# Patient Record
Sex: FEMALE | Race: WHITE | NOT HISPANIC OR LATINO | Employment: OTHER | RURAL
[De-identification: names, ages, dates, MRNs, and addresses within clinical notes are randomized per-mention and may not be internally consistent; named-entity substitution may affect disease eponyms.]

---

## 2017-08-29 ENCOUNTER — CONSULT (OUTPATIENT)
Dept: CARDIOLOGY | Facility: CLINIC | Age: 77
End: 2017-08-29

## 2017-08-29 ENCOUNTER — TELEPHONE (OUTPATIENT)
Dept: CARDIOLOGY | Facility: CLINIC | Age: 77
End: 2017-08-29

## 2017-08-29 VITALS
BODY MASS INDEX: 28.55 KG/M2 | HEART RATE: 101 BPM | WEIGHT: 136 LBS | DIASTOLIC BLOOD PRESSURE: 86 MMHG | HEIGHT: 58 IN | SYSTOLIC BLOOD PRESSURE: 128 MMHG

## 2017-08-29 DIAGNOSIS — I10 ESSENTIAL HYPERTENSION: ICD-10-CM

## 2017-08-29 DIAGNOSIS — I48.19 PERSISTENT ATRIAL FIBRILLATION (HCC): Primary | ICD-10-CM

## 2017-08-29 DIAGNOSIS — I25.10 CORONARY ARTERY DISEASE INVOLVING NATIVE CORONARY ARTERY OF NATIVE HEART WITHOUT ANGINA PECTORIS: ICD-10-CM

## 2017-08-29 PROCEDURE — 99204 OFFICE O/P NEW MOD 45 MIN: CPT | Performed by: INTERNAL MEDICINE

## 2017-08-29 PROCEDURE — 93000 ELECTROCARDIOGRAM COMPLETE: CPT | Performed by: INTERNAL MEDICINE

## 2017-08-29 RX ORDER — POTASSIUM CHLORIDE 750 MG/1
10 TABLET, EXTENDED RELEASE ORAL 2 TIMES DAILY
COMMUNITY
End: 2019-11-19

## 2017-08-29 RX ORDER — AMLODIPINE BESYLATE 5 MG/1
5 TABLET ORAL DAILY
COMMUNITY
End: 2019-12-02 | Stop reason: ALTCHOICE

## 2017-08-29 RX ORDER — BISOPROLOL FUMARATE 5 MG/1
5 TABLET, FILM COATED ORAL DAILY
COMMUNITY
End: 2018-09-20

## 2017-08-29 RX ORDER — ISOSORBIDE MONONITRATE 120 MG/1
120 TABLET, EXTENDED RELEASE ORAL DAILY
Status: ON HOLD | COMMUNITY
End: 2019-01-01 | Stop reason: SDUPTHER

## 2017-08-29 RX ORDER — FUROSEMIDE 40 MG/1
80 TABLET ORAL DAILY
COMMUNITY
End: 2019-12-02 | Stop reason: ALTCHOICE

## 2017-08-29 RX ORDER — ALENDRONATE SODIUM 70 MG/1
70 TABLET ORAL
COMMUNITY
End: 2019-06-03 | Stop reason: ALTCHOICE

## 2017-08-29 RX ORDER — CLOPIDOGREL BISULFATE 75 MG/1
75 TABLET ORAL DAILY
COMMUNITY
End: 2019-12-02 | Stop reason: ALTCHOICE

## 2017-08-29 RX ORDER — AMIODARONE HYDROCHLORIDE 200 MG/1
100 TABLET ORAL DAILY
COMMUNITY
End: 2017-12-21

## 2017-08-29 RX ORDER — PRAVASTATIN SODIUM 20 MG
20 TABLET ORAL DAILY
COMMUNITY
End: 2017-12-21

## 2017-08-29 NOTE — PROGRESS NOTES
Electrophysiology Consult     Pat Nowak  1940  [phone]      08/29/17    DATE OF ADMISSION: (Not on file)  [facility] CARDIOLOGY    Fredi Ledesma MD  [address] [facility] [id number]    Chief Complaint   Patient presents with   • Atrial Fibrillation     Problem List:   1. Persistent Atrial Fibrillation   A. CHADSVasc =  5 On Xarelto, diagnosed at time of CABG 2012  B. Amiodarone Initiation with ECV to NSR 6/26/17   2. CAD   A. Multiple previous PCI to LAD  B. CABG 2012   C. Last [facility] 4/2017: patent stents, and grafts  D. Echocardiogram 8/30/16: EF 60%, mild to moderate MR. RVSP 50 mmHg. Mild Pulmonary HTN  3. HTN  4. HLP    History of Present Illness:   77 year old WF referred by Dr. Block for atrial fibrillation. She was originally diagnosed in 2012 post operatively CABG. She was cardioverted and did well. However, two years ago, she had recurrence and was put on Amiodarone with ECV to NSR. She went off Amiodarone per Dr. Block's recommendation in June of this year, however, she had recurrence and was put back on it with subsequent ECV to NSR. Now, she is back in atrial fibrillation. She feels poorly with no energy and SOB. She has some CP with exertion as well. When she was in NSR after her ECV, she did not have these symptoms. She is on Xarelto but does not have any bleeding issues. Her BP is now controlled, but it had been running high a few months ago.     No Known Allergies     Cannot display prior to admission medications because the patient has not been admitted in this contact.            Current Outpatient Prescriptions:   •  alendronate (FOSAMAX) 70 MG tablet, Take 70 mg by mouth Every 7 (Seven) Days., Disp: , Rfl:   •  amiodarone (PACERONE) 100 MG half tablet, Take 100 mg by mouth Daily., Disp: , Rfl:   •  amLODIPine (NORVASC) 5 MG tablet, Take 5 mg by mouth Daily., Disp: , Rfl:   •  bisoprolol (ZEBeta) 5 MG tablet, Take 5 mg by mouth Daily.,  Disp: , Rfl:   •  clopidogrel (PLAVIX) 75 MG tablet, Take 75 mg by mouth Daily., Disp: , Rfl:   •  furosemide (LASIX) 40 MG tablet, Take 40 mg by mouth 2 (Two) Times a Day., Disp: , Rfl:   •  isosorbide mononitrate (IMDUR) 120 MG 24 hr tablet, Take 120 mg by mouth Daily., Disp: , Rfl:   •  potassium chloride (K-DUR,KLOR-CON) 10 MEQ CR tablet, Take 10 mEq by mouth 2 (Two) Times a Day., Disp: , Rfl:   •  pravastatin (PRAVACHOL) 20 MG tablet, Take 20 mg by mouth Daily., Disp: , Rfl:   •  rivaroxaban (XARELTO) 20 MG tablet, Take 20 mg by mouth Daily., Disp: , Rfl:     Social History     Social History   • Marital status:      Spouse name: N/A   • Number of children: N/A   • Years of education: N/A     Social History Main Topics   • Smoking status: Never Smoker   • Smokeless tobacco: Never Used   • Alcohol use No   • Drug use: No   • Sexual activity: Defer     Other Topics Concern   • None     Social History Narrative   • None       Family History: no cardiac family history. Family has  of cancer    REVIEW OF SYSTEMS:   CONST:  No weight loss, fever, chills, + weakness or fatigue.   HEENT:  No visual loss, blurred vision, double vision, yellow sclerae.                   No hearing loss, congestion, sore throat.   SKIN:      No rashes, urticaria, ulcers, sores.     RESP:     - shortness of breath,+ hemoptysis, cough, sputum.   GI:           No anorexia, nausea, vomiting, diarrhea. No abdominal pain, melena.   :         No burning on urination, hematuria or increased frequency.  ENDO:    No diaphoresis, cold or heat intolerance. No polyuria or polydipsia.   NEURO:  No headache, dizziness, syncope, paralysis, ataxia, or parasthesias.                  No change in bowel or bladder control. No history of CVA/TIA  MUSC:    No muscle, back pain, joint pain or stiffness.   HEME:    No anemia, bleeding, bruising. No history of DVT/PE.  PSYCH:  No history of depression, anxiety    Vitals:    17 1207   BP: 128/86  "  BP Location: Left arm   Patient Position: Sitting   Pulse: 101   Weight: 136 lb (61.7 kg)   Height: 58\" (147.3 cm)                 Physical Exam:  GEN: Well nourished, Well- developed  No acute distress  HEENT: Normocephalic, Atraumatic, PERRLA, moist mucous membranes  NECK: supple, NO JVD, no thyromegaly, no lymphadenopathy  CARD: S1S2  irr irr no murmur, gallop, rub  LUNGS: Clear to auscultataion, normal respiratory effort  ABDOMEN: Soft, nontender, normal bowel sounds  EXTREMITIES:No gross deformities,  No clubbing, cyanosis, or edema  SKIN: Warm, dry  NEURO: No focal deficits  PSYCHIATRIC: Normal affect and mood        ECG 12 Lead  Date/Time: 8/29/2017 1:10 PM  Performed by: GEOFF ARCHER  Authorized by: GEOFF ARCHER   Rhythm: atrial fibrillation  BPM: 72                ICD-10-CM ICD-9-CM   1. Persistent atrial fibrillation I48.1 427.31   2. Essential hypertension I10 401.9   3. Coronary artery disease involving native coronary artery of native heart without angina pectoris I25.10 414.01       Assessment and Plan:   1. Persistent atrial fibrillation - symptomatic in nature, failed Amiodarone. Will stop Amiodarone today. In 3 months, after Amio wash out, will initiate Tikosyn if she can afford it. If her heart rates start elevating above 100 in the meantime, double Zebeta. Can lower norvasc if BP too low   CHADSVASc = 5 on Xarelto  2. HTN - controlled  3. CAD - stable, follow with DR. Block    [facility]    Scribed for Geoff Archer MD by Stephania Canales PA-C. 8/29/2017  1:11 PM     IGeoff MD, personally performed the services described in this documentation as scribed by the above named individual in my presence, and it is both accurate and complete.  8/29/2017  1:14 PM  "

## 2017-12-21 ENCOUNTER — OFFICE VISIT (OUTPATIENT)
Dept: CARDIOLOGY | Facility: CLINIC | Age: 77
End: 2017-12-21

## 2017-12-21 VITALS
HEART RATE: 98 BPM | DIASTOLIC BLOOD PRESSURE: 64 MMHG | BODY MASS INDEX: 27.92 KG/M2 | HEIGHT: 58 IN | SYSTOLIC BLOOD PRESSURE: 120 MMHG | WEIGHT: 133 LBS

## 2017-12-21 DIAGNOSIS — I48.19 PERSISTENT ATRIAL FIBRILLATION (HCC): Primary | ICD-10-CM

## 2017-12-21 DIAGNOSIS — I63.40 CEREBROVASCULAR ACCIDENT (CVA) DUE TO EMBOLISM OF CEREBRAL ARTERY (HCC): ICD-10-CM

## 2017-12-21 DIAGNOSIS — I10 ESSENTIAL HYPERTENSION: ICD-10-CM

## 2017-12-21 DIAGNOSIS — I25.10 CORONARY ARTERY DISEASE INVOLVING NATIVE CORONARY ARTERY OF NATIVE HEART WITHOUT ANGINA PECTORIS: ICD-10-CM

## 2017-12-21 PROBLEM — I63.9 CEREBROVASCULAR ACCIDENT (CVA) DUE TO EMBOLISM (HCC): Status: ACTIVE | Noted: 2017-12-21

## 2017-12-21 PROCEDURE — 99214 OFFICE O/P EST MOD 30 MIN: CPT | Performed by: INTERNAL MEDICINE

## 2017-12-21 RX ORDER — ATORVASTATIN CALCIUM 40 MG/1
40 TABLET, FILM COATED ORAL NIGHTLY
COMMUNITY
End: 2019-01-01 | Stop reason: SDUPTHER

## 2017-12-21 NOTE — PROGRESS NOTES
Pat Nowak  1940  [phone]      12/21/2017    [facility] CARDIOLOGY     Fredi Ledesma MD  [address] [place] [id number]    Chief Complaint   Patient presents with   • Atrial Fibrillation       Problem List:   1. Persistent Atrial Fibrillation    A. CHADSVasc =  5 On Xarelto, diagnosed at time of CABG 2012   B. Amiodarone Initiation with ECV to NSR 6/26/17   2. CAD    A. Multiple previous PCI to LAD   B. CABG 2012    C. Last [facility] 4/2017: patent stents, and grafts   D. Echocardiogram 8/30/16: EF 60%, mild to moderate MR. RVSP 50 mmHg. Mild Pulmonary HTN  3. CVA  4. HTN  5. HLP    Allergies  No Known Allergies    Current Medications    Current Outpatient Prescriptions:   •  alendronate (FOSAMAX) 70 MG tablet, Take 70 mg by mouth Every 7 (Seven) Days., Disp: , Rfl:   •  amLODIPine (NORVASC) 5 MG tablet, Take 5 mg by mouth Daily., Disp: , Rfl:   •  apixaban (ELIQUIS) 2.5 MG tablet tablet, Take 2.5 mg by mouth 2 (Two) Times a Day., Disp: , Rfl:   •  atorvastatin (LIPITOR) 40 MG tablet, Take 40 mg by mouth Daily., Disp: , Rfl:   •  bisoprolol (ZEBeta) 5 MG tablet, Take 5 mg by mouth Daily., Disp: , Rfl:   •  clopidogrel (PLAVIX) 75 MG tablet, Take 75 mg by mouth Daily., Disp: , Rfl:   •  furosemide (LASIX) 40 MG tablet, Take 40 mg by mouth 2 (Two) Times a Day., Disp: , Rfl:   •  isosorbide mononitrate (IMDUR) 120 MG 24 hr tablet, Take 120 mg by mouth Daily., Disp: , Rfl:   •  potassium chloride (K-DUR,KLOR-CON) 10 MEQ CR tablet, Take 10 mEq by mouth 2 (Two) Times a Day., Disp: , Rfl:     History of Present Illness   HPI    Pt presents for follow up of persistent atrial fibrillation.  She was recently hospitalized last week for a CVA.  9 days ago she suddenly could not speak, no weakness.  She was admitted at , imaging revealed a small acute infarct within left frontal labe.  TPA/thrombectomy was not administered or performed.  She had been compliant on her  "Xarelto.  Her symptoms started to improve the next day and by the next day she was speaking normally.  Her Xarelto was switched to Eliquis.  This was felt to be cardioembolic in nature, however there was concern for paradoxical emboli as a PFO was seen on ECHO.  She has been compliant with her Eliquis.  She has had persistent atrial fibrillation.  She notes fatigue and shortness of breath but is otherwise unaware of her afib.  Since we last saw the pt, CP, LH, and dizziness.  Blood pressures have been well controlled.      ROS:  General:  + fatigue, - weight gain or loss  Cardiovascular:  Denies CP, PND, syncope, near syncope, edema or palpitations.  Pulmonary:  + FIGUEROA, cough, or wheezing      Vitals:    12/21/17 1023 12/21/17 1055   BP: (!) 120/6 120/64   BP Location: Right arm    Patient Position: Sitting    Pulse: 98    Weight: 60.3 kg (133 lb)    Height: 147.3 cm (58\")      PE:  General: NAD  Neck: no JVD, no carotid bruits, no TM  Heart Irregular rhythm , NL S1, S2, S4 present, no rubs, murmurs  Lungs: CTA, no wheezes, rhonchi, or rales  Abd: soft, non-tender, NL BS  Ext: No musculoskeletal deformities, no edema, cyanosis, or clubbing  Psych: normal mood and affect    Diagnostic Data:      Procedures    1. Persistent atrial fibrillation    2. Cerebrovascular accident (CVA) due to embolism of cerebral artery    3. Coronary artery disease involving native coronary artery of native heart without angina pectoris    4. Essential hypertension          Plan:  1) Persistent atrial fibrillation:  - Off amiodarone with plan for Tikosyn initiation though has financial concerns, cannot perform external CV for three months after recent CVA  2) Anticoagulation  - Continue Eliquis/plavix  3) CVA:  - CVA on Xarelto, now switched to Eliquis  - Symptoms resolved  3) CAD  - Stable with no anginal complaints  4) HTN  - Well controlled on current regimen  - Wt loss, exercise, salt reduction    F/up in 3 months    Scribed for Tien WHITLEY" MD Suzi by Sandy Solorio, APRN. 12/21/2017  10:55 AM     I, Tien Archer MD, personally performed the services described in this documentation as scribed by the above named individual in my presence, and it is both accurate and complete.  12/21/2017  11:08 AM      Records obtained from : Patient was admitted on 12/12/2017 and discharged the following day.  She was admitted with aphasia and imaging demonstrated a small acute infarct in the left frontal lobe.  TPA and thrombectomy were not performed.  Her Xarelto was switched to Eliquis and Plavix was initiated.  An echocardiogram was performed which demonstrated normal LV size and function EF 65% mild TR and moderate pulmonary hypertension and a patent foramen ovale her creatinine upon admission was 2.2

## 2018-03-14 NOTE — PROGRESS NOTES
Pat Nowak  1940  [phone]      03/15/2018    [facility] CARDIOLOGY     Fredi Ledesma MD  [address] [place] [id number]    No chief complaint on file.      Problem List:   1. Persistent Atrial Fibrillation               A. CHADSVasc =  5 On Xarelto, diagnosed at time of CABG 2012              B. Amiodarone Initiation with ECV to NSR 6/26/17   2. CAD               A. Multiple previous PCI to LAD              B. CABG 2012               C. Last [facility] 4/2017: patent stents, and grafts              D. Echocardiogram 8/30/16: EF 60%, mild to moderate MR. RVSP 50 mmHg. Mild Pulmonary HTN  3. CVA  4. HTN  5. HLP    Allergies  No Known Allergies    Current Medications    Current Outpatient Prescriptions:   •  alendronate (FOSAMAX) 70 MG tablet, Take 70 mg by mouth Every 7 (Seven) Days., Disp: , Rfl:   •  amLODIPine (NORVASC) 5 MG tablet, Take 5 mg by mouth Daily., Disp: , Rfl:   •  apixaban (ELIQUIS) 2.5 MG tablet tablet, Take 2.5 mg by mouth 2 (Two) Times a Day., Disp: , Rfl:   •  atorvastatin (LIPITOR) 40 MG tablet, Take 40 mg by mouth Daily., Disp: , Rfl:   •  bisoprolol (ZEBeta) 5 MG tablet, Take 5 mg by mouth Daily., Disp: , Rfl:   •  clopidogrel (PLAVIX) 75 MG tablet, Take 75 mg by mouth Daily., Disp: , Rfl:   •  furosemide (LASIX) 40 MG tablet, Take 40 mg by mouth 2 (Two) Times a Day., Disp: , Rfl:   •  isosorbide mononitrate (IMDUR) 120 MG 24 hr tablet, Take 120 mg by mouth Daily., Disp: , Rfl:   •  potassium chloride (K-DUR,KLOR-CON) 10 MEQ CR tablet, Take 10 mEq by mouth 2 (Two) Times a Day., Disp: , Rfl:     History of Present Illness   HPI    Pt presents for follow up of atrial fibrillation.  When we saw her last in the office in December, she was recently recovering from a CVA and had been switched from Xarelto to Eliquis. Since we last saw the pt, pt denies any AF episodes, SOB, CP, LH, and dizziness. Denies any hospitalizations, ER visits, bleeding on  Eliquis, or TIA/CVA symptoms. Overall feels well.    ROS:  General:  Denies fatigue, weight gain or loss  Cardiovascular:  Denies CP, PND, syncope, near syncope, edema or palpitations.  Pulmonary:  Denies FIGUEROA, cough, or wheezing      There were no vitals filed for this visit.  PE:  General: NAD  Neck: no JVD, no carotid bruits, no TM  Heart RRR, NL S1, S2, S4 present, no rubs, murmurs  Lungs: CTA, no wheezes, rhonchi, or rales  Abd: soft, non-tender, NL BS  Ext: No musculoskeletal deformities, no edema, cyanosis, or clubbing  Psych: normal mood and affect    Diagnostic Data:      Procedures    1. Persistent atrial fibrillation    2. Coronary artery disease involving native coronary artery of native heart without angina pectoris    3. Essential hypertension    4. Cerebrovascular accident (CVA) due to embolism of cerebral artery          Plan:  1) Persistent atrial fibrillation:  -  - Continue present medications.   2) Anticoagulation  Continue Eliquis  3) CAD:  - Stable with no anginal complaints  4) HTN:  - Well controlled on current regimen  - Wt loss, exercise, salt reduction  5) CVA:  - 12/2017, on Eliquis/plavix    F/up in 6 months

## 2018-03-15 ENCOUNTER — OFFICE VISIT (OUTPATIENT)
Dept: CARDIOLOGY | Facility: CLINIC | Age: 78
End: 2018-03-15

## 2018-03-15 VITALS
DIASTOLIC BLOOD PRESSURE: 66 MMHG | SYSTOLIC BLOOD PRESSURE: 116 MMHG | HEIGHT: 56 IN | BODY MASS INDEX: 30.14 KG/M2 | WEIGHT: 134 LBS | HEART RATE: 91 BPM

## 2018-03-15 DIAGNOSIS — I10 ESSENTIAL HYPERTENSION: ICD-10-CM

## 2018-03-15 DIAGNOSIS — I20.0 UNSTABLE ANGINA PECTORIS (HCC): Primary | ICD-10-CM

## 2018-03-15 DIAGNOSIS — I63.40 CEREBROVASCULAR ACCIDENT (CVA) DUE TO EMBOLISM OF CEREBRAL ARTERY (HCC): ICD-10-CM

## 2018-03-15 DIAGNOSIS — I48.19 PERSISTENT ATRIAL FIBRILLATION (HCC): ICD-10-CM

## 2018-03-15 PROCEDURE — 99214 OFFICE O/P EST MOD 30 MIN: CPT | Performed by: INTERNAL MEDICINE

## 2018-03-15 RX ORDER — BISOPROLOL FUMARATE 5 MG/1
5 TABLET, FILM COATED ORAL 2 TIMES DAILY
Qty: 180 TABLET | Refills: 2 | Status: SHIPPED | OUTPATIENT
Start: 2018-03-15 | End: 2018-09-20 | Stop reason: SDUPTHER

## 2018-03-15 NOTE — PROGRESS NOTES
Pat Nowak  1940  [phone]      03/15/2018    [facility] CARDIOLOGY     Fredi Ledesma MD  [address] [place] [id number]    Chief Complaint   Patient presents with   • Atrial Fibrillation       Problem List:   1. Persistent Atrial Fibrillation               A. CHADSVasc =  5 On Xarelto, diagnosed at time of CABG 2012              B. Amiodarone Initiation with ECV to NSR 6/26/17   2. CAD               A. Multiple previous PCI to LAD              B. CABG 2012               C. Last [facility] 4/2017: patent stents, and grafts              D. Echocardiogram 8/30/16: EF 60%, mild to moderate MR. RVSP 50 mmHg. Mild Pulmonary HTN  3. CVA  4. HTN  5. HLP      Allergies  No Known Allergies    Current Medications    Current Outpatient Prescriptions:   •  alendronate (FOSAMAX) 70 MG tablet, Take 70 mg by mouth Every 7 (Seven) Days., Disp: , Rfl:   •  amLODIPine (NORVASC) 5 MG tablet, Take 5 mg by mouth Daily., Disp: , Rfl:   •  apixaban (ELIQUIS) 2.5 MG tablet tablet, Take 2.5 mg by mouth 2 (Two) Times a Day., Disp: , Rfl:   •  atorvastatin (LIPITOR) 40 MG tablet, Take 40 mg by mouth Daily., Disp: , Rfl:   •  bisoprolol (ZEBeta) 5 MG tablet, Take 5 mg by mouth Daily., Disp: , Rfl:   •  clopidogrel (PLAVIX) 75 MG tablet, Take 75 mg by mouth Daily., Disp: , Rfl:   •  furosemide (LASIX) 40 MG tablet, Take 40 mg by mouth 2 (Two) Times a Day., Disp: , Rfl:   •  potassium chloride (K-DUR,KLOR-CON) 10 MEQ CR tablet, Take 10 mEq by mouth 2 (Two) Times a Day., Disp: , Rfl:   •  isosorbide mononitrate (IMDUR) 120 MG 24 hr tablet, Take 120 mg by mouth Daily., Disp: , Rfl:     History of Present Illness   HPI    Pt presents for follow up of AF/TIA/HTN . Since we last saw the pt, pt denies any TIA/CVA,  LH, and dizziness. Denies any hospitalizations, ER visits, bleeding, or TIA/CVA symptoms. Overall feels better. Has CP episodes frequently with exertion with pain relieving after  "stopping. SL NTG relieves the pain. BP at home has been stable with HR 70's. HR sometimes go high.    ROS:  General:  + fatigue, No weight gain or loss  Cardiovascular:  +CP, No PND, syncope, near syncope, edema or palpitations.  Pulmonary:  + FIGUEROA, No cough, or wheezing      Vitals:    03/15/18 1238   BP: 116/66   BP Location: Right arm   Patient Position: Sitting   Pulse: 91   Weight: 60.8 kg (134 lb)   Height: 142.2 cm (56\")     Body mass index is 30.04 kg/m².  PE:  General: NAD  Neck: no JVD, no carotid bruits, no TM  Heart RRR, NL S1, S2, S4 present, no rubs, murmurs  Lungs: CTA, no wheezes, rhonchi, or rales  Abd: soft, non-tender, NL BS  Ext: No musculoskeletal deformities, no edema, cyanosis, or clubbing  Psych: normal mood and affect    Diagnostic Data:      Procedures    1. Unstable angina pectoris    2. Persistent atrial fibrillation    3. Essential hypertension    4. Cerebrovascular accident (CVA) due to embolism of cerebral artery          Plan:  1) USA: needs to see Dr Block ASAYANET; increase zebeta to 5 mg po BID  Continue present medications.   2) CAF: needs better HR control;see # 1  3) Anticoagulation  Continue NOAC/plavix  4) HTN  Wt loss, exercise, salt reduction  5) CVA: on meds    F/up in 6 months      "

## 2018-09-20 ENCOUNTER — OFFICE VISIT (OUTPATIENT)
Dept: CARDIOLOGY | Facility: CLINIC | Age: 78
End: 2018-09-20

## 2018-09-20 VITALS
BODY MASS INDEX: 29.25 KG/M2 | DIASTOLIC BLOOD PRESSURE: 59 MMHG | SYSTOLIC BLOOD PRESSURE: 108 MMHG | HEIGHT: 56 IN | WEIGHT: 130 LBS | HEART RATE: 74 BPM

## 2018-09-20 DIAGNOSIS — I20.0 UNSTABLE ANGINA PECTORIS (HCC): Primary | ICD-10-CM

## 2018-09-20 DIAGNOSIS — R06.09 DOE (DYSPNEA ON EXERTION): ICD-10-CM

## 2018-09-20 DIAGNOSIS — I48.19 PERSISTENT ATRIAL FIBRILLATION (HCC): ICD-10-CM

## 2018-09-20 DIAGNOSIS — I10 ESSENTIAL HYPERTENSION: ICD-10-CM

## 2018-09-20 PROCEDURE — 99214 OFFICE O/P EST MOD 30 MIN: CPT | Performed by: INTERNAL MEDICINE

## 2018-09-20 RX ORDER — BISOPROLOL FUMARATE 5 MG/1
5 TABLET, FILM COATED ORAL 2 TIMES DAILY
Qty: 180 TABLET | Refills: 2 | Status: SHIPPED | OUTPATIENT
Start: 2018-09-20 | End: 2018-10-17 | Stop reason: DRUGHIGH

## 2018-09-20 RX ORDER — NITROGLYCERIN 0.4 MG/1
0.4 TABLET SUBLINGUAL
COMMUNITY
Start: 2018-08-13 | End: 2020-01-01 | Stop reason: HOSPADM

## 2018-09-20 NOTE — PROGRESS NOTES
Pat Nowak  1940    There is no work phone number on file.    09/20/2018    [facility] CARDIOLOGY     Atkins, Fredi Mishra MD  [address] [place] [id number]    Chief Complaint   Patient presents with   • Atrial Fibrillation       Problem List:   1. Persistent Atrial Fibrillation               A. CHADSVasc =  5 On Xarelto, diagnosed at time of CABG 2012              B. Amiodarone Initiation with ECV to NSR 6/26/17               C. left heart catheterization by Dr. Block March 2018 database incomplete   2. CAD               A. Multiple previous PCI to LAD              B. CABG 2012               C. Last [facility] 4/2017: patent stents, and grafts              D. Echocardiogram 8/30/16: EF 60%, mild to moderate MR. RVSP 50 mmHg. Mild Pulmonary HTN  3. CVA  4. HTN  5. HLP  6.  History of secondhand smoke exposure      Allergies  Allergies   Allergen Reactions   • Tuberculin Tests Rash       Current Medications    Current Outpatient Prescriptions:   •  alendronate (FOSAMAX) 70 MG tablet, Take 70 mg by mouth Every 7 (Seven) Days., Disp: , Rfl:   •  amLODIPine (NORVASC) 5 MG tablet, Take 5 mg by mouth Daily., Disp: , Rfl:   •  apixaban (ELIQUIS) 2.5 MG tablet tablet, Take 2.5 mg by mouth 2 (Two) Times a Day., Disp: , Rfl:   •  atorvastatin (LIPITOR) 40 MG tablet, Take 40 mg by mouth Daily., Disp: , Rfl:   •  bisoprolol (ZEBETA) 5 MG tablet, Take 1 tablet by mouth 2 (Two) Times a Day., Disp: 180 tablet, Rfl: 2  •  clopidogrel (PLAVIX) 75 MG tablet, Take 75 mg by mouth Daily., Disp: , Rfl:   •  furosemide (LASIX) 40 MG tablet, Take 40 mg by mouth Daily., Disp: , Rfl:   •  isosorbide mononitrate (IMDUR) 120 MG 24 hr tablet, Take 120 mg by mouth Daily., Disp: , Rfl:   •  nitroglycerin (NITROSTAT) 0.4 MG SL tablet, , Disp: , Rfl:   •  potassium chloride (K-DUR,KLOR-CON) 10 MEQ CR tablet, Take 10 mEq by mouth 2 (Two) Times a Day., Disp: , Rfl:     History of Present Illness  "  HPI    Pt presents for follow up of AF/HTN . Since we last saw the pt, she underwent apparent left heart catheterization by Dr. Block in March due to increasing episodes of chest pain with exertion.  Per the patient, she was told that there was no significant blockages.  Her beta blocker was increased.  She continued to have these episodes of chest pain with moderate exertional activities as well as shortness of breath while walking up steps.  They may or may not resolve with sublingual nitroglycerin.  She also is concerned that her heart rate tends to speed up when she does any minimal exertional activity despite being on beta blockers.  There is been no lightheadedness or near syncope.  She has not been back to the emergency room or hospital since her heart catheterization.    Blood pressures at home and been well controlled.    ROS:  General:  + fatigue, No weight gain or loss  Cardiovascular:  + CP, No PND, syncope, near syncope, edema or palpitations.  Pulmonary:  + FIGUEROA, + cough, No  wheezing      Vitals:    09/20/18 1244   BP: 108/59   BP Location: Left arm   Patient Position: Sitting   Pulse: 74   Weight: 59 kg (130 lb)   Height: 142.2 cm (56\")     Body mass index is 29.15 kg/m².  PE:  General: NAD  Neck: no JVD, no carotid bruits, no TM  Heart irregular rate and rhythm NL S1, S2, no rubs, murmurs  Lungs: CTA, no wheezes, rhonchi, or rales  Abd: soft, non-tender, NL BS  Ext: No musculoskeletal deformities, no edema, cyanosis, or clubbing  Psych: normal mood and affect    Diagnostic Data:      Procedures    1. Unstable angina pectoris (CMS/HCC)    2. FIGUEROA (dyspnea on exertion)    3. Persistent atrial fibrillation (CMS/HCC)    4. Essential hypertension          Plan:  1) unstable angina: We'll retrieve the left heart catheterization results from Dr. Block's office.  Consider altering nitrates once the results of the heart catheter are known.  2) dyspnea on exertion with a long-standing history of secondhand " smoke and abnormal chest x-ray., Will have the patient see a pulmonologist in [place] for PFTs and repeat chest x-ray.  3) atrial fibrillation chronic in nature with possible rapid ventricular rates.  We'll set up for a zio patch.    4) anticoagulation: Continue no lag.  Continue NOAC/warfarin/ASA  5) HTN  Wt loss, exercise, salt reduction    F/up in 6 months

## 2018-09-24 DIAGNOSIS — R06.09 DYSPNEA ON EXERTION: Primary | ICD-10-CM

## 2018-09-24 DIAGNOSIS — I48.19 PERSISTENT ATRIAL FIBRILLATION (HCC): ICD-10-CM

## 2018-10-17 ENCOUNTER — TELEPHONE (OUTPATIENT)
Dept: CARDIOLOGY | Facility: CLINIC | Age: 78
End: 2018-10-17

## 2018-10-17 RX ORDER — BISOPROLOL FUMARATE 5 MG/1
7.5 TABLET, FILM COATED ORAL 2 TIMES DAILY
Qty: 270 TABLET | Refills: 3 | Status: ON HOLD | OUTPATIENT
Start: 2018-10-17 | End: 2020-01-01 | Stop reason: SDUPTHER

## 2018-10-17 NOTE — TELEPHONE ENCOUNTER
Called pt per GFT and Sylvia report she is to increase her Zebeta to 7.5mg 1 BID. Pt verbally understands and new rx called into pharmacy.

## 2018-11-19 ENCOUNTER — OFFICE VISIT (OUTPATIENT)
Dept: PULMONOLOGY | Facility: CLINIC | Age: 78
End: 2018-11-19

## 2018-11-19 VITALS
TEMPERATURE: 98.5 F | HEART RATE: 96 BPM | WEIGHT: 131 LBS | OXYGEN SATURATION: 92 % | BODY MASS INDEX: 29.47 KG/M2 | SYSTOLIC BLOOD PRESSURE: 136 MMHG | DIASTOLIC BLOOD PRESSURE: 82 MMHG | HEIGHT: 56 IN

## 2018-11-19 DIAGNOSIS — J45.991 COUGH VARIANT ASTHMA: ICD-10-CM

## 2018-11-19 DIAGNOSIS — R06.02 SOB (SHORTNESS OF BREATH): Primary | ICD-10-CM

## 2018-11-19 DIAGNOSIS — I48.0 PAROXYSMAL ATRIAL FIBRILLATION (HCC): ICD-10-CM

## 2018-11-19 LAB
BNP SERPL-MCNC: 621 PG/ML (ref 0–100)
TSH SERPL DL<=0.05 MIU/L-ACNC: 9.43 MIU/ML (ref 0.35–5.35)

## 2018-11-19 PROCEDURE — 87385 HISTOPLASMA CAPSUL AG IA: CPT | Performed by: INTERNAL MEDICINE

## 2018-11-19 PROCEDURE — 86003 ALLG SPEC IGE CRUDE XTRC EA: CPT | Performed by: INTERNAL MEDICINE

## 2018-11-19 PROCEDURE — 86235 NUCLEAR ANTIGEN ANTIBODY: CPT | Performed by: INTERNAL MEDICINE

## 2018-11-19 PROCEDURE — 86256 FLUORESCENT ANTIBODY TITER: CPT | Performed by: INTERNAL MEDICINE

## 2018-11-19 PROCEDURE — 86698 HISTOPLASMA ANTIBODY: CPT | Performed by: INTERNAL MEDICINE

## 2018-11-19 PROCEDURE — 86609 BACTERIUM ANTIBODY: CPT | Performed by: INTERNAL MEDICINE

## 2018-11-19 PROCEDURE — 82164 ANGIOTENSIN I ENZYME TEST: CPT | Performed by: INTERNAL MEDICINE

## 2018-11-19 PROCEDURE — 86635 COCCIDIOIDES ANTIBODY: CPT | Performed by: INTERNAL MEDICINE

## 2018-11-19 PROCEDURE — 82103 ALPHA-1-ANTITRYPSIN TOTAL: CPT | Performed by: INTERNAL MEDICINE

## 2018-11-19 PROCEDURE — 86606 ASPERGILLUS ANTIBODY: CPT | Performed by: INTERNAL MEDICINE

## 2018-11-19 PROCEDURE — 83520 IMMUNOASSAY QUANT NOS NONAB: CPT | Performed by: INTERNAL MEDICINE

## 2018-11-19 PROCEDURE — 94375 RESPIRATORY FLOW VOLUME LOOP: CPT | Performed by: INTERNAL MEDICINE

## 2018-11-19 PROCEDURE — 86612 BLASTOMYCES ANTIBODY: CPT

## 2018-11-19 PROCEDURE — 82104 ALPHA-1-ANTITRYPSIN PHENO: CPT | Performed by: INTERNAL MEDICINE

## 2018-11-19 PROCEDURE — 86698 HISTOPLASMA ANTIBODY: CPT

## 2018-11-19 PROCEDURE — 82787 IGG 1 2 3 OR 4 EACH: CPT | Performed by: INTERNAL MEDICINE

## 2018-11-19 PROCEDURE — 83880 ASSAY OF NATRIURETIC PEPTIDE: CPT | Performed by: INTERNAL MEDICINE

## 2018-11-19 PROCEDURE — 86430 RHEUMATOID FACTOR TEST QUAL: CPT | Performed by: INTERNAL MEDICINE

## 2018-11-19 PROCEDURE — 86331 IMMUNODIFFUSION OUCHTERLONY: CPT | Performed by: INTERNAL MEDICINE

## 2018-11-19 PROCEDURE — 99205 OFFICE O/P NEW HI 60 MIN: CPT | Performed by: INTERNAL MEDICINE

## 2018-11-19 PROCEDURE — 84443 ASSAY THYROID STIM HORMONE: CPT | Performed by: INTERNAL MEDICINE

## 2018-11-19 PROCEDURE — 86671 FUNGUS NES ANTIBODY: CPT | Performed by: INTERNAL MEDICINE

## 2018-11-19 PROCEDURE — 86225 DNA ANTIBODY NATIVE: CPT | Performed by: INTERNAL MEDICINE

## 2018-11-19 PROCEDURE — 94729 DIFFUSING CAPACITY: CPT | Performed by: INTERNAL MEDICINE

## 2018-11-19 PROCEDURE — 82784 ASSAY IGA/IGD/IGG/IGM EACH: CPT | Performed by: INTERNAL MEDICINE

## 2018-11-19 PROCEDURE — 86602 ANTINOMYCES ANTIBODY: CPT | Performed by: INTERNAL MEDICINE

## 2018-11-19 PROCEDURE — 36415 COLL VENOUS BLD VENIPUNCTURE: CPT | Performed by: INTERNAL MEDICINE

## 2018-11-19 PROCEDURE — 94726 PLETHYSMOGRAPHY LUNG VOLUMES: CPT | Performed by: INTERNAL MEDICINE

## 2018-11-19 PROCEDURE — 94618 PULMONARY STRESS TESTING: CPT | Performed by: INTERNAL MEDICINE

## 2018-11-19 PROCEDURE — 82785 ASSAY OF IGE: CPT | Performed by: INTERNAL MEDICINE

## 2018-11-19 PROCEDURE — 86200 CCP ANTIBODY: CPT | Performed by: INTERNAL MEDICINE

## 2018-11-19 PROCEDURE — 86612 BLASTOMYCES ANTIBODY: CPT | Performed by: INTERNAL MEDICINE

## 2018-11-20 ENCOUNTER — LAB REQUISITION (OUTPATIENT)
Dept: LAB | Facility: HOSPITAL | Age: 78
End: 2018-11-20

## 2018-11-20 DIAGNOSIS — Z00.00 ROUTINE GENERAL MEDICAL EXAMINATION AT A HEALTH CARE FACILITY: ICD-10-CM

## 2018-11-20 LAB
BASOPHILS # BLD AUTO: 0.04 10*3/MM3 (ref 0–0.2)
BASOPHILS NFR BLD AUTO: 0.7 % (ref 0–1)
DEPRECATED RDW RBC AUTO: 56.4 FL (ref 37–54)
EOSINOPHIL # BLD AUTO: 0.25 10*3/MM3 (ref 0–0.3)
EOSINOPHIL NFR BLD AUTO: 4.3 % (ref 0–3)
ERYTHROCYTE [DISTWIDTH] IN BLOOD BY AUTOMATED COUNT: 15.9 % (ref 11.3–14.5)
HCT VFR BLD AUTO: 30.5 % (ref 34.5–44)
HGB BLD-MCNC: 9.3 G/DL (ref 11.5–15.5)
IMM GRANULOCYTES # BLD: 0.02 10*3/MM3 (ref 0–0.03)
IMM GRANULOCYTES NFR BLD: 0.3 % (ref 0–0.6)
LYMPHOCYTES # BLD AUTO: 0.73 10*3/MM3 (ref 0.6–4.8)
LYMPHOCYTES NFR BLD AUTO: 12.6 % (ref 24–44)
MCH RBC QN AUTO: 29.3 PG (ref 27–31)
MCHC RBC AUTO-ENTMCNC: 30.5 G/DL (ref 32–36)
MCV RBC AUTO: 96.2 FL (ref 80–99)
MONOCYTES # BLD AUTO: 0.43 10*3/MM3 (ref 0–1)
MONOCYTES NFR BLD AUTO: 7.4 % (ref 0–12)
NEUTROPHILS # BLD AUTO: 4.33 10*3/MM3 (ref 1.5–8.3)
NEUTROPHILS NFR BLD AUTO: 75 % (ref 41–71)
PLATELET # BLD AUTO: 288 10*3/MM3 (ref 150–450)
PMV BLD AUTO: 9.6 FL (ref 6–12)
RBC # BLD AUTO: 3.17 10*6/MM3 (ref 3.89–5.14)
RHEUMATOID FACT SERPL-ACNC: NEGATIVE [IU]/ML
WBC NRBC COR # BLD: 5.78 10*3/MM3 (ref 3.5–10.8)

## 2018-11-20 PROCEDURE — 85025 COMPLETE CBC W/AUTO DIFF WBC: CPT | Performed by: INTERNAL MEDICINE

## 2018-11-20 NOTE — PROGRESS NOTES
CC:    Shortness of air    HPI:    This is a pleasant 77 y/o WF w/ h/o lifetime non-smoking, Afib, prior amiodarone use, CAD s/p CABG 2012, previous stents, HTN, HLD, prior CVA w/ no obvious residual deficit, h/o secondhand smoke exposure, owns pet bird x 15-20 years who presents for evaluation of FIGUEROA.  She recently had a right and left heart cath 4/30/18 at [facility].  This showed a PA pressure of 45/23 (mean 30), PCWP 16, LVEDP 15, and CI of 1.5.  Calculated PVR is 6 Wood units.  LHC showed severe 2vCAD w/ patent LIMA to LAD, patent stents in LAD, patent SVG to PDA, and medical management was recommended.  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  No obvious auto-immune disease.      PMH:    Past Medical History:   Diagnosis Date   • Stroke (CMS/HCC)      PSH:    Past Surgical History:   Procedure Laterality Date   • BYPASS GRAFT     • CORONARY ANGIOPLASTY WITH STENT PLACEMENT       FH:    Family History   Problem Relation Age of Onset   • Cancer Mother    • No Known Problems Father    • Liver disease Sister      SH:    Social History     Socioeconomic History   • Marital status:      Spouse name: Not on file   • Number of children: Not on file   • Years of education: Not on file   • Highest education level: Not on file   Social Needs   • Financial resource strain: Not on file   • Food insecurity - worry: Not on file   • Food insecurity - inability: Not on file   • Transportation needs - medical: Not on file   • Transportation needs - non-medical: Not on file   Occupational History   • Not on file   Tobacco Use   • Smoking status: Never Smoker   • Smokeless tobacco: Never Used   Substance and Sexual Activity   • Alcohol use: No   • Drug use: No   • Sexual activity: Defer   Other Topics Concern   • Not on file   Social History Narrative   • Not on file     ALLERGIES:    Allergies   Allergen Reactions   • Tuberculin Tests Rash     MEDICATIONS:      Current Outpatient Medications:    •  alendronate (FOSAMAX) 70 MG tablet, Take 70 mg by mouth Every 7 (Seven) Days., Disp: , Rfl:   •  amLODIPine (NORVASC) 5 MG tablet, Take 5 mg by mouth Daily., Disp: , Rfl:   •  apixaban (ELIQUIS) 2.5 MG tablet tablet, Take 2.5 mg by mouth 2 (Two) Times a Day., Disp: , Rfl:   •  atorvastatin (LIPITOR) 40 MG tablet, Take 40 mg by mouth Daily., Disp: , Rfl:   •  bisoprolol (ZEBETA) 5 MG tablet, Take 1.5 tablets by mouth 2 (Two) Times a Day., Disp: 270 tablet, Rfl: 3  •  clopidogrel (PLAVIX) 75 MG tablet, Take 75 mg by mouth Daily., Disp: , Rfl:   •  furosemide (LASIX) 40 MG tablet, Take 40 mg by mouth Daily., Disp: , Rfl:   •  isosorbide mononitrate (IMDUR) 120 MG 24 hr tablet, Take 120 mg by mouth Daily., Disp: , Rfl:   •  nitroglycerin (NITROSTAT) 0.4 MG SL tablet, , Disp: , Rfl:   •  potassium chloride (K-DUR,KLOR-CON) 10 MEQ CR tablet, Take 10 mEq by mouth 2 (Two) Times a Day., Disp: , Rfl:   ROS:  Per HPI, otherwise all systems reviewed and negative.    DIAGNOSTIC DATA (Reviewed and interpreted by me unless otherwise specified):    PFT 11/19/18 - No obstruction, No restriction, moderate reduction in DLCO corrects for alveolar volume    6MW 11/19/18 - RA resting sat 95%, minimum sat 94% on RA, walked 137.2 meters 36% predicted    CXR 11/19/18 - chronic appearing changes in bases, mediastinal / hilar LAD    Vitals:    11/19/18 1416   BP: 136/82   Pulse: 96   Temp: 98.5 °F (36.9 °C)   SpO2: 92%       Physical Exam   Constitutional: Oriented to person, place, and time. Appears well-developed and well-nourished.   Head: Normocephalic and atraumatic.   Nose: Nose normal.   Mouth/Throat: Oropharynx is clear and moist.   Eyes: Conjunctivae are normal.  Pupils normal.  Neck: No tracheal deviation present.   Cardiovascular: Normal rate, regular rhythm, normal heart sounds and intact distal pulses.  Exam reveals no gallop and no friction rub.  No thrill.  No JVD.  No edema.  No murmur heard.  Pulmonary/Chest: Effort  normal and breath sounds normal.  No tenderness to palpation.  No clubbing.   Abdominal: Soft. Bowel sounds are normal. No distension. No tenderness. There is no guarding.   Musculoskeletal: Normal range of motion.  No tenderness.  Lymphadenopathy:  No cervical adenopathy.   Neurological:  No new focal neurological deficits observed   Skin: Skin is warm and dry. No rash noted.   Psychiatric: Normal mood and affect.  Behavior is normal. Judgment normal.    Assessment/Plan     1)  Dyspnea - possibilities include HP (pet bird), pulmonary arterial hypertension, amiodarone induced ILD vs other etiologies.  Need more data.  Will get HRCT, auto-immune labs.  Give a trial of Trelegy to see if this helps her symptoms.    RTC 1 month to follow up testing    Jeff Laura MD  Pulmonology and Critical Care Medicine  11/20/18 9:40 AM  Electronically Signed    C.C.:  Tien Archer MD, Fredi Ledesma MD

## 2018-11-21 LAB
ACE SERPL-CCNC: 16 U/L (ref 14–82)
CENTROMERE B AB SER-ACNC: <0.2 AI (ref 0–0.9)
CHROMATIN AB SERPL-ACNC: <0.2 AI (ref 0–0.9)
DSDNA AB SER-ACNC: <1 IU/ML (ref 0–9)
ENA JO1 AB SER-ACNC: <0.2 AI (ref 0–0.9)
ENA RNP AB SER-ACNC: 0.4 AI (ref 0–0.9)
ENA SCL70 AB SER-ACNC: <0.2 AI (ref 0–0.9)
ENA SM AB SER-ACNC: <0.2 AI (ref 0–0.9)
ENA SS-A AB SER-ACNC: <0.2 AI (ref 0–0.9)
ENA SS-B AB SER-ACNC: <0.2 AI (ref 0–0.9)
IGA SERPL-MCNC: 171 MG/DL (ref 64–422)
IGG SERPL-MCNC: 1338 MG/DL (ref 700–1600)
IGG SERPL-MCNC: 1349 MG/DL (ref 700–1600)
IGG1 SER-MCNC: 970 MG/DL (ref 248–810)
IGG2 SER-MCNC: 398 MG/DL (ref 130–555)
IGG3 SER-MCNC: 93 MG/DL (ref 15–102)
IGG4 SER-MCNC: 12 MG/DL (ref 2–96)
IGM SERPL-MCNC: 127 MG/DL (ref 26–217)
Lab: NORMAL

## 2018-11-22 LAB
A FUMIGATUS IGE QN: <0.1 KU/L
A NIGER IGE QN: <0.1 KU/L
CCP IGA+IGG SERPL IA-ACNC: 6 UNITS (ref 0–19)
H CAPSUL AB TITR SER ID: NEGATIVE {TITER}
H CAPSUL AG SER IA-MCNC: <0.5 NG/ML
Lab: NORMAL
TOTAL IGE SMQN RAST: 26 IU/ML (ref 0–100)

## 2018-11-23 LAB
A ALTERNATA IGE QN: <0.1 KU/L
A FLAVUS AB SER QL ID: NEGATIVE
A FUMIGATUS AB SER QL ID: NEGATIVE
A FUMIGATUS IGE QN: <0.1 KU/L
A NIGER AB SER QL ID: NEGATIVE
A NIGER AB SER QL: NEGATIVE
AMER ROACH IGE QN: <0.1 KU/L
B DERMAT AB TITR SER: NEGATIVE {TITER}
BAHIA GRASS IGE QN: <0.1 KU/L
BERMUDA GRASS IGE QN: <0.1 KU/L
BOXELDER IGE QN: <0.1 KU/L
C HERBARUM IGE QN: <0.1 KU/L
C-ANCA TITR SER IF: NORMAL TITER
CAT DANDER IGG QN: <0.1 KU/L
CMN PIGWEED IGE QN: <0.1 KU/L
COMMON RAGWEED IGE QN: <0.1 KU/L
CONV CLASS DESCRIPTION: ABNORMAL
D FARINAE IGE QN: <0.1 KU/L
D PTERONYSS IGE QN: <0.1 KU/L
DOG DANDER IGE QN: 0.14 KU/L
ENGL PLANTAIN IGE QN: <0.1 KU/L
H CAPSUL AB TITR SER ID: NEGATIVE {TITER}
HAZELNUT POLN IGE QN: <0.1 KU/L
JOHNSON GRASS IGE QN: <0.1 KU/L
KENT BLUE GRASS IGE QN: <0.1 KU/L
M RACEMOSUS IGE QN: <0.1 KU/L
MT JUNIPER IGE QN: <0.1 KU/L
MUGWORT IGE QN: <0.1 KU/L
MYELOPEROXIDASE AB SER-ACNC: <9 U/ML (ref 0–9)
NETTLE IGE QN: <0.1 KU/L
P NOTATUM IGE QN: <0.1 KU/L
P-ANCA ATYPICAL TITR SER IF: NORMAL TITER
P-ANCA TITR SER IF: NORMAL TITER
PROTEINASE3 AB SER IA-ACNC: <3.5 U/ML (ref 0–3.5)
S BOTRYOSUM IGE QN: <0.1 KU/L
SHEEP SORREL IGE QN: <0.1 KU/L
SWEET GUM IGE QN: <0.1 KU/L
T011-IGE MAPLE LEAF SYCAMORE: <0.1 KU/L
WHITE ELM IGE QN: <0.1 KU/L
WHITE HICKORY IGE QN: <0.1 KU/L
WHITE MULBERRY IGE QN: <0.1 KU/L
WHITE OAK IGE QN: <0.1 KU/L

## 2018-11-26 DIAGNOSIS — R06.02 SHORTNESS OF BREATH: Primary | ICD-10-CM

## 2018-11-26 LAB
A FUMIGATUS1 AB SER QL ID: NEGATIVE
A PULLULANS AB SER QL: NEGATIVE
A1AT SERPL-MCNC: 153 MG/DL (ref 90–200)
LACEYELLA SACCHARI AB SER QL: NEGATIVE
MICROPOLYSPORA FAENI: NEGATIVE
PHENOTYPE: NORMAL
PIGEON SERUM AB QL ID: NEGATIVE
T VULGARIS AB SER QL ID: NEGATIVE

## 2018-11-28 LAB
JO-1 (WB)*: NEGATIVE
KU AB SER QL: NEGATIVE
Lab: NEGATIVE
MI2 AB SER QL: NEGATIVE
OJ*: NEGATIVE
PL-12*: NEGATIVE
PL-7*: NEGATIVE
PM-SCL 100*: NEGATIVE
PM-SCL 75*: NEGATIVE
RNP: 32.8 EU/ML
RO-52*: NEGATIVE
SIGNAL RECOGNITION PARTICLE*: NEGATIVE

## 2018-11-29 ENCOUNTER — TRANSCRIBE ORDERS (OUTPATIENT)
Dept: PULMONOLOGY | Facility: CLINIC | Age: 78
End: 2018-11-29

## 2018-12-20 ENCOUNTER — OFFICE VISIT (OUTPATIENT)
Dept: CARDIOLOGY | Facility: CLINIC | Age: 78
End: 2018-12-20

## 2018-12-20 DIAGNOSIS — I48.19 PERSISTENT ATRIAL FIBRILLATION (HCC): Primary | ICD-10-CM

## 2018-12-20 DIAGNOSIS — I25.10 CORONARY ARTERY DISEASE INVOLVING NATIVE CORONARY ARTERY OF NATIVE HEART WITHOUT ANGINA PECTORIS: ICD-10-CM

## 2018-12-20 DIAGNOSIS — R06.09 DOE (DYSPNEA ON EXERTION): ICD-10-CM

## 2018-12-20 PROCEDURE — 99214 OFFICE O/P EST MOD 30 MIN: CPT | Performed by: INTERNAL MEDICINE

## 2018-12-20 NOTE — PROGRESS NOTES
Pat Nowak  1940  [phone]    12/20/2018    [facility] CARDIOLOGY     [place], Fredi Mishra MD  [address] [address]    Chief Complaint   Patient presents with   • Atrial Fibrillation   • Hypertension   • Coronary Artery Disease          Problem List:   1. Persistent Atrial Fibrillation               A. CHADSVasc =  5 On Xarelto, diagnosed at time of CABG 2012              B. Amiodarone Initiation with ECV to NSR 6/26/17               C. Holter monitor on 11/1/2018 persistent atrial fibrillation at a ventricular rate 75 bpm.    2. CAD               A. Multiple previous PCI to LAD              B. CABG 2012               C.[facility] 4/2017: patent stents, and grafts              D.[facility] 4/30/2018 severe 2 vessel native disease, patent LIMA to LAD, patent stents in mid LAD, patent SVG                     to posterior lateral branch with skip to PDA, normal LV size and function, mild pulmonary hypertension with right ventricular              pressure 47 over 10 mmHg and pulmonary artery pressure 45/23 mmHg.              E. Echocardiogram 8/30/16: EF 60%, mild to moderate MR. RVSP 50 mmHg. Mild Pulmonary HTN  3. CVA  4. HTN  5. HLP  6.  History of secondhand smoke exposure/COPD/interstitial lung disease      Allergies  Allergies   Allergen Reactions   • Tuberculin Tests Rash       Current Medications    Current Outpatient Medications:   •  alendronate (FOSAMAX) 70 MG tablet, Take 70 mg by mouth Every 7 (Seven) Days., Disp: , Rfl:   •  amLODIPine (NORVASC) 5 MG tablet, Take 5 mg by mouth Daily., Disp: , Rfl:   •  apixaban (ELIQUIS) 2.5 MG tablet tablet, Take 2.5 mg by mouth 2 (Two) Times a Day., Disp: , Rfl:   •  atorvastatin (LIPITOR) 40 MG tablet, Take 40 mg by mouth Daily., Disp: , Rfl:   •  bisoprolol (ZEBETA) 5 MG tablet, Take 1.5 tablets by mouth 2 (Two) Times a Day., Disp: 270 tablet, Rfl: 3  •  clopidogrel (PLAVIX) 75 MG tablet, Take 75 mg by mouth Daily.,  Disp: , Rfl:   •  Fluticasone-Umeclidin-Vilant 100-62.5-25 MCG/INH aerosol powder , Inhale 1 each Daily., Disp: 1 each, Rfl: 11  •  furosemide (LASIX) 40 MG tablet, Take 40 mg by mouth Daily., Disp: , Rfl:   •  isosorbide mononitrate (IMDUR) 120 MG 24 hr tablet, Take 120 mg by mouth Daily., Disp: , Rfl:   •  nitroglycerin (NITROSTAT) 0.4 MG SL tablet, , Disp: , Rfl:   •  potassium chloride (K-DUR,KLOR-CON) 10 MEQ CR tablet, Take 10 mEq by mouth 2 (Two) Times a Day., Disp: , Rfl:     History of Present Illness   Pt presents for follow up of AF/CAD/HTN. Since we last saw the pt, pt did see Dr. Josue Laura from pulmonary associates evaluation of her underlying lung disease.  Workup is presently in progress.  She apparently had a CT scan of her chest at [place] however the results are unknown at this time.  She was started on the medication and her shortness of breath has improved.  In addition with increasing her Zebeta, her palpitations are also improved with more energy.  However she continues to have chest tightness with exertional activity.  She is not sure whether or not she's been tried on Ranexa in the past.  Her blood pressures have been doing well at home running approximately 110/70 with heart rates in the 60-70 bpm range.  Denies any hospitalizations, ER visits, bleeding, or TIA/CVA symptoms.    ROS:  General:  + Mild fatigue, No weight gain or loss  Cardiovascular:  + CP, No PND, syncope, near syncope, edema or palpitations.  Pulmonary:  Denies FIGUEROA, cough, or wheezing      Vitals:    12/20/18 1300   BP: 114/71   Pulse: 82   Weight: 59 kg (130 lb)     Body mass index is 29.15 kg/m².  PE:  General: NAD  Neck: no JVD, no carotid bruits, no TM  Heart irregular rate and rhythm NL S1, S2, , no rubs, murmurs, PMI normal.  Lungs: CTA, no wheezes, rhonchi, or rales  Abd: soft, non-tender, NL BS  Ext: No musculoskeletal deformities, no edema, cyanosis, or clubbing  Psych: normal mood and affect    Diagnostic  Data:      Procedures    1. Persistent atrial fibrillation (CMS/[facility])    2. Coronary artery disease involving native coronary artery of native heart without angina pectoris    3. FIGUEROA (dyspnea on exertion)        Plan: .  1) atrial fibrillation chronic in nature with adequate heart rate control.  Symptoms of palpitations much improved after increasing beta blocker therapy.     2) unstable angina: Per report of last heart catheterization, no obvious areas of non-revascularization.  Follow-up with Dr. Block for further evaluation.  Possible addition of Ranexa although patient is not sure she has tried this medication the past.  She will look at her home medications and notify us by telephone if she did take this medication or had  previous problems with it  3)  dyspnea on exertion; improved overall after being treated with new medication by pulmonary associates.  She is to see pulmonary associates next week for follow-up of her CT scan of the chest.  4) anticoagulation Continue NOAC/Plavix      F/up in 4 months

## 2018-12-27 ENCOUNTER — OFFICE VISIT (OUTPATIENT)
Dept: PULMONOLOGY | Facility: CLINIC | Age: 78
End: 2018-12-27

## 2018-12-27 VITALS
HEIGHT: 56 IN | WEIGHT: 131 LBS | HEART RATE: 100 BPM | SYSTOLIC BLOOD PRESSURE: 130 MMHG | TEMPERATURE: 98.6 F | OXYGEN SATURATION: 93 % | DIASTOLIC BLOOD PRESSURE: 80 MMHG | BODY MASS INDEX: 29.47 KG/M2

## 2018-12-27 DIAGNOSIS — I63.40 CEREBROVASCULAR ACCIDENT (CVA) DUE TO EMBOLISM OF CEREBRAL ARTERY (HCC): ICD-10-CM

## 2018-12-27 DIAGNOSIS — R06.09 DYSPNEA ON EXERTION: Primary | ICD-10-CM

## 2018-12-27 DIAGNOSIS — I48.19 PERSISTENT ATRIAL FIBRILLATION (HCC): ICD-10-CM

## 2018-12-27 DIAGNOSIS — I27.21 PULMONARY ARTERIAL HYPERTENSION (HCC): ICD-10-CM

## 2018-12-27 PROCEDURE — 99214 OFFICE O/P EST MOD 30 MIN: CPT | Performed by: NURSE PRACTITIONER

## 2018-12-27 NOTE — PROGRESS NOTES
[facility] Pulmonary Follow up    CHIEF COMPLAINT    Dyspnea    HISTORY OF PRESENT ILLNESS    Pat Nowak is a 78 y.o.female here today for follow-up after her initial visit by Dr. Jeff Laura on November 19 for worsening shortness of air.  She had a heart catheterization in April at [facility] that showed a PA pressure of 45/23 with a wedge of 16.  She comes in today for follow-up on her further testing including a high-resolution CT scan and labs.  She does still complain of quite a bit of shortness of air with activity.  She also, is of generalized fatigue as well as lightheadedness and dizziness.  On her last visit she was given a sample of Trelegy, she does feel like it has helped her shortness of air some.    She does have persistent A. fib and follows up with Dr. Archer.  She is on Eliquis.  Her daughter states she did have a mild stroke this year on the Eliquis.  She does complain of worsening lower extremity edema, if she takes 2 of her Lasix in a day her shortness of breath is improved.    Patient Active Problem List   Diagnosis   • Persistent atrial fibrillation (CMS/HCC)   • Coronary artery disease involving native coronary artery of native heart without angina pectoris   • Essential hypertension   • Cerebrovascular accident (CVA) due to embolism (CMS/HCC)   • Dyspnea on exertion   • Pulmonary arterial hypertension (CMS/HCC)       Allergies   Allergen Reactions   • Tuberculin Tests Rash       Current Outpatient Medications:   •  alendronate (FOSAMAX) 70 MG tablet, Take 70 mg by mouth Every 7 (Seven) Days., Disp: , Rfl:   •  amLODIPine (NORVASC) 5 MG tablet, Take 5 mg by mouth Daily., Disp: , Rfl:   •  apixaban (ELIQUIS) 2.5 MG tablet tablet, Take 2.5 mg by mouth 2 (Two) Times a Day., Disp: , Rfl:   •  atorvastatin (LIPITOR) 40 MG tablet, Take 40 mg by mouth Daily., Disp: , Rfl:   •  bisoprolol (ZEBETA) 5 MG tablet, Take 1.5 tablets by mouth 2 (Two) Times a Day., Disp: 270 tablet, Rfl: 3  •   "clopidogrel (PLAVIX) 75 MG tablet, Take 75 mg by mouth Daily., Disp: , Rfl:   •  Fluticasone-Umeclidin-Vilant 100-62.5-25 MCG/INH aerosol powder , Inhale 1 each Daily., Disp: 1 each, Rfl: 11  •  furosemide (LASIX) 40 MG tablet, Take 40 mg by mouth Daily., Disp: , Rfl:   •  isosorbide mononitrate (IMDUR) 120 MG 24 hr tablet, Take 120 mg by mouth Daily., Disp: , Rfl:   •  nitroglycerin (NITROSTAT) 0.4 MG SL tablet, , Disp: , Rfl:   •  potassium chloride (K-DUR,KLOR-CON) 10 MEQ CR tablet, Take 10 mEq by mouth 2 (Two) Times a Day., Disp: , Rfl:   MEDICATION LIST AND ALLERGIES REVIEWED.    Social History     Tobacco Use   • Smoking status: Never Smoker   • Smokeless tobacco: Never Used   Substance Use Topics   • Alcohol use: No   • Drug use: No       FAMILY AND SOCIAL HISTORY REVIEWED.    Review of Systems   Constitutional: Positive for fatigue. Negative for chills, fever and unexpected weight change.   HENT: Negative for congestion, nosebleeds, postnasal drip, rhinorrhea, sinus pressure and trouble swallowing.    Respiratory: Positive for chest tightness and shortness of breath. Negative for cough and wheezing.    Cardiovascular: Positive for chest pain, palpitations and leg swelling.   Gastrointestinal: Negative for abdominal pain, constipation, diarrhea, nausea and vomiting.   Genitourinary: Negative for dysuria, frequency, hematuria and urgency.   Musculoskeletal: Positive for arthralgias. Negative for myalgias.   Neurological: Positive for dizziness, light-headedness and headaches. Negative for weakness and numbness.   All other systems reviewed and are negative.  .    /80   Pulse 100   Temp 98.6 °F (37 °C)   Ht 142.2 cm (56\")   Wt 59.4 kg (131 lb)   SpO2 93% Comment: room air at rest  BMI 29.37 kg/m²     Immunization History   Administered Date(s) Administered   • Influenza, Unspecified 10/01/2018   • Pneumococcal, Unspecified 11/20/2017   • Tetanus 11/19/2013       Physical Exam   Constitutional: She " is oriented to person, place, and time. She appears well-developed and well-nourished.   HENT:   Head: Normocephalic and atraumatic.   Eyes: EOM are normal. Pupils are equal, round, and reactive to light.   Neck: Normal range of motion. Neck supple.   Cardiovascular: Normal rate and regular rhythm.   No murmur heard.  Pulmonary/Chest: Effort normal and breath sounds normal. No respiratory distress. She has no wheezes. She has no rales.   Abdominal: Soft. Bowel sounds are normal. She exhibits no distension.   Musculoskeletal: Normal range of motion. She exhibits no edema.   Neurological: She is alert and oriented to person, place, and time.   Skin: Skin is warm and dry. No erythema.   Psychiatric: She has a normal mood and affect. Her behavior is normal.   Vitals reviewed.        RESULTS    Lab Results   Component Value Date    WBC 5.78 11/20/2018    HGB 9.3 (L) 11/20/2018    HCT 30.5 (L) 11/20/2018    MCV 96.2 11/20/2018     11/20/2018     heumatoid Factor Qualitative Negative Negative      Aspergillus fumigatus Neg:<1:1 Negative    Aspergillus flavus Neg:<1:1 Negative    Aspergillus niger Neg:<1:1 Negative    Blastomyces Abs, Qn, DID Neg:<1:1 Negative    Histoplasma Ab, Qn DID Neg:<1:1 Negative      A-1 Antitrypsin 90 - 200 mg/dL 153      IgE 0 - 100 IU/mL 26      IgG, IgA, IgM normal  ANADN, ANCA negative      TSH 0.350 - 5.350 mIU/mL 9.428 Abnormally high       RNP 32.8 Abnormal   A        No results found for: GLUCOSE, CALCIUM, NA, K, CO2, CL, BUN, CREATININE, EGFRIFAFRI, EGFRIFNONA, BCR, ANIONGAP      HRCT - [facility]  Impression from [facility]: The lungs demonstrate patchy areas of groundglass opacities with the appearance of a mosaic attenuation.  This could be related to some degree of alveolar edema, likely cardiogenic, however this appearance is somewhat nonspecific and could also be related to obstructive small airway disease such as bronchiolitis, as well as occlusive vascular disease  such as chronic thromboembolic disease.  Recommendations were for a high-resolution CT scan with inspiratory and expiratory sequence as well as evaluation for chronic thromboembolic disease such as a CT with PE protocol.    She also had four-chamber cardiomegaly with small bilateral pleural effusions.    Moderate-sized hiatal hernia.    PROBLEM LIST    Problem List Items Addressed This Visit        Cardiovascular and Mediastinum    Persistent atrial fibrillation (CMS/HCC)    Cerebrovascular accident (CVA) due to embolism (CMS/HCC)    Pulmonary arterial hypertension (CMS/HCC)       Respiratory    Dyspnea on exertion - Primary            DISCUSSION    I do believe we need to rule out chronic thromboembolic disease, I'll order a VQ scan.  Follow-up with Dr. Archer or the heart failure clinic for signs of worsening heart failure and pulmonary edema.  Follow-up high-resolution CT scan with supine and prone imaging to evaluate for small airway disease and edema.  Continue on the Trelegy for now.    Follow-up with myself or Dr. Jeff Laura in 4-6 weeks after the above testing.    I spent 25 minutes with the patient and family. I spent > 50% percent of this time counseling and discussing diagnosis, diagnostic testing, evaluation, current status, treatment options and management.    Odessa Khan, APRN  12/27/20181:18 PM  Electronically signed     Please note that portions of this note were completed with a voice recognition program. Efforts were made to edit the dictations, but occasionally words are mistranscribed.      CC: Fredi Ledesma MD

## 2018-12-28 VITALS
WEIGHT: 130 LBS | SYSTOLIC BLOOD PRESSURE: 114 MMHG | BODY MASS INDEX: 27.29 KG/M2 | HEIGHT: 58 IN | DIASTOLIC BLOOD PRESSURE: 71 MMHG | HEART RATE: 82 BPM

## 2018-12-28 PROBLEM — I27.21 PULMONARY ARTERIAL HYPERTENSION (HCC): Status: ACTIVE | Noted: 2018-12-28

## 2019-01-01 ENCOUNTER — TELEPHONE (OUTPATIENT)
Dept: CARDIOLOGY | Facility: CLINIC | Age: 79
End: 2019-01-01

## 2019-01-01 ENCOUNTER — READMISSION MANAGEMENT (OUTPATIENT)
Dept: CALL CENTER | Facility: HOSPITAL | Age: 79
End: 2019-01-01

## 2019-01-01 ENCOUNTER — TRANSITIONAL CARE MANAGEMENT TELEPHONE ENCOUNTER (OUTPATIENT)
Dept: FAMILY MEDICINE CLINIC | Facility: CLINIC | Age: 79
End: 2019-01-01

## 2019-01-01 ENCOUNTER — OFFICE VISIT (OUTPATIENT)
Dept: FAMILY MEDICINE CLINIC | Facility: CLINIC | Age: 79
End: 2019-01-01

## 2019-01-01 ENCOUNTER — CONSULT (OUTPATIENT)
Dept: CARDIOLOGY | Facility: CLINIC | Age: 79
End: 2019-01-01

## 2019-01-01 ENCOUNTER — HOSPITAL ENCOUNTER (OUTPATIENT)
Facility: HOSPITAL | Age: 79
Setting detail: OBSERVATION
Discharge: HOME | End: 2019-12-25
Attending: INTERNAL MEDICINE | Admitting: INTERNAL MEDICINE

## 2019-01-01 VITALS
HEIGHT: 58 IN | BODY MASS INDEX: 29.34 KG/M2 | OXYGEN SATURATION: 92 % | WEIGHT: 139.77 LBS | HEART RATE: 77 BPM | SYSTOLIC BLOOD PRESSURE: 130 MMHG | TEMPERATURE: 98.2 F | DIASTOLIC BLOOD PRESSURE: 80 MMHG | RESPIRATION RATE: 17 BRPM

## 2019-01-01 VITALS
HEART RATE: 58 BPM | BODY MASS INDEX: 28.97 KG/M2 | OXYGEN SATURATION: 90 % | HEIGHT: 58 IN | DIASTOLIC BLOOD PRESSURE: 68 MMHG | WEIGHT: 138 LBS | SYSTOLIC BLOOD PRESSURE: 110 MMHG

## 2019-01-01 VITALS
OXYGEN SATURATION: 96 % | HEART RATE: 70 BPM | WEIGHT: 134.2 LBS | HEIGHT: 58 IN | BODY MASS INDEX: 28.17 KG/M2 | RESPIRATION RATE: 18 BRPM

## 2019-01-01 DIAGNOSIS — N18.30 CHRONIC KIDNEY DISEASE, STAGE 3 (HCC): ICD-10-CM

## 2019-01-01 DIAGNOSIS — I50.33 ACUTE ON CHRONIC DIASTOLIC CONGESTIVE HEART FAILURE (HCC): ICD-10-CM

## 2019-01-01 DIAGNOSIS — I25.10 CORONARY ARTERY DISEASE INVOLVING NATIVE CORONARY ARTERY OF NATIVE HEART WITHOUT ANGINA PECTORIS: ICD-10-CM

## 2019-01-01 DIAGNOSIS — I27.21 PULMONARY ARTERIAL HYPERTENSION (HCC): ICD-10-CM

## 2019-01-01 DIAGNOSIS — E03.9 ACQUIRED HYPOTHYROIDISM: ICD-10-CM

## 2019-01-01 DIAGNOSIS — I50.31 ACUTE DIASTOLIC HEART FAILURE (HCC): ICD-10-CM

## 2019-01-01 DIAGNOSIS — E87.70 HYPERVOLEMIA, UNSPECIFIED HYPERVOLEMIA TYPE: Primary | ICD-10-CM

## 2019-01-01 DIAGNOSIS — E78.5 DYSLIPIDEMIA: ICD-10-CM

## 2019-01-01 DIAGNOSIS — I48.19 PERSISTENT ATRIAL FIBRILLATION (HCC): ICD-10-CM

## 2019-01-01 DIAGNOSIS — I10 ESSENTIAL HYPERTENSION: ICD-10-CM

## 2019-01-01 DIAGNOSIS — I50.33 ACUTE ON CHRONIC DIASTOLIC CONGESTIVE HEART FAILURE (HCC): Primary | ICD-10-CM

## 2019-01-01 LAB
ALBUMIN SERPL-MCNC: 4 G/DL (ref 3.5–5.2)
ALBUMIN SERPL-MCNC: 4.1 G/DL (ref 3.5–5.2)
ALBUMIN/GLOB SERPL: 1.3 G/DL
ALP SERPL-CCNC: 57 U/L (ref 39–117)
ALT SERPL W P-5'-P-CCNC: 11 U/L (ref 1–33)
ANION GAP SERPL CALCULATED.3IONS-SCNC: 15.5 MMOL/L (ref 5–15)
ANION GAP SERPL CALCULATED.3IONS-SCNC: 15.7 MMOL/L (ref 5–15)
ANION GAP SERPL CALCULATED.3IONS-SCNC: 18.2 MMOL/L (ref 5–15)
ANISOCYTOSIS BLD QL: NORMAL
AST SERPL-CCNC: 18 U/L (ref 1–32)
BASOPHILS # BLD AUTO: 0.04 10*3/MM3 (ref 0–0.2)
BASOPHILS # BLD AUTO: 0.05 10*3/MM3 (ref 0–0.2)
BASOPHILS NFR BLD AUTO: 1 % (ref 0–1.5)
BASOPHILS NFR BLD AUTO: 1.1 % (ref 0–1.5)
BILIRUB SERPL-MCNC: 0.8 MG/DL (ref 0.2–1.2)
BUN BLD-MCNC: 43 MG/DL (ref 8–23)
BUN BLD-MCNC: 48 MG/DL (ref 8–23)
BUN BLD-MCNC: 52 MG/DL (ref 8–23)
BUN SERPL-MCNC: 37 MG/DL (ref 8–23)
BUN SERPL-MCNC: 50 MG/DL (ref 8–23)
BUN/CREAT SERPL: 18.6 (ref 7–25)
BUN/CREAT SERPL: 20.7 (ref 7–25)
BUN/CREAT SERPL: 22 (ref 7–25)
BUN/CREAT SERPL: 22.3 (ref 7–25)
BUN/CREAT SERPL: 22.7 (ref 7–25)
CALCIUM SERPL-MCNC: 9.4 MG/DL (ref 8.6–10.5)
CALCIUM SERPL-MCNC: 9.5 MG/DL (ref 8.6–10.5)
CALCIUM SPEC-SCNC: 9.3 MG/DL (ref 8.6–10.5)
CALCIUM SPEC-SCNC: 9.4 MG/DL (ref 8.6–10.5)
CALCIUM SPEC-SCNC: 9.5 MG/DL (ref 8.6–10.5)
CHLORIDE SERPL-SCNC: 100 MMOL/L (ref 98–107)
CHLORIDE SERPL-SCNC: 98 MMOL/L (ref 98–107)
CHLORIDE SERPL-SCNC: 98 MMOL/L (ref 98–107)
CHLORIDE SERPL-SCNC: 99 MMOL/L (ref 98–107)
CHLORIDE SERPL-SCNC: 99 MMOL/L (ref 98–107)
CO2 SERPL-SCNC: 23.5 MMOL/L (ref 22–29)
CO2 SERPL-SCNC: 23.8 MMOL/L (ref 22–29)
CO2 SERPL-SCNC: 24 MMOL/L (ref 22–29)
CO2 SERPL-SCNC: 24.5 MMOL/L (ref 22–29)
CO2 SERPL-SCNC: 25.3 MMOL/L (ref 22–29)
CREAT BLD-MCNC: 2.08 MG/DL (ref 0.57–1)
CREAT BLD-MCNC: 2.15 MG/DL (ref 0.57–1)
CREAT BLD-MCNC: 2.29 MG/DL (ref 0.57–1)
CREAT SERPL-MCNC: 1.99 MG/DL (ref 0.57–1)
CREAT SERPL-MCNC: 2.27 MG/DL (ref 0.57–1)
DEPRECATED RDW RBC AUTO: 60.8 FL (ref 37–54)
DEPRECATED RDW RBC AUTO: 60.9 FL (ref 37–54)
EOSINOPHIL # BLD AUTO: 0.17 10*3/MM3 (ref 0–0.4)
EOSINOPHIL # BLD AUTO: 0.22 10*3/MM3 (ref 0–0.4)
EOSINOPHIL NFR BLD AUTO: 3.9 % (ref 0.3–6.2)
EOSINOPHIL NFR BLD AUTO: 5.7 % (ref 0.3–6.2)
ERYTHROCYTE [DISTWIDTH] IN BLOOD BY AUTOMATED COUNT: 18.1 % (ref 12.3–15.4)
ERYTHROCYTE [DISTWIDTH] IN BLOOD BY AUTOMATED COUNT: 18.2 % (ref 12.3–15.4)
GFR SERPL CREATININE-BSD FRML MDRD: 21 ML/MIN/1.73
GFR SERPL CREATININE-BSD FRML MDRD: 22 ML/MIN/1.73
GFR SERPL CREATININE-BSD FRML MDRD: 23 ML/MIN/1.73
GLOBULIN UR ELPH-MCNC: 3.1 GM/DL
GLUCOSE BLD-MCNC: 102 MG/DL (ref 65–99)
GLUCOSE BLD-MCNC: 104 MG/DL (ref 65–99)
GLUCOSE BLD-MCNC: 99 MG/DL (ref 65–99)
GLUCOSE SERPL-MCNC: 124 MG/DL (ref 65–99)
GLUCOSE SERPL-MCNC: 145 MG/DL (ref 65–99)
HCT VFR BLD AUTO: 28.1 % (ref 34–46.6)
HCT VFR BLD AUTO: 29.1 % (ref 34–46.6)
HGB BLD-MCNC: 8.7 G/DL (ref 12–15.9)
HGB BLD-MCNC: 8.7 G/DL (ref 12–15.9)
HYPOCHROMIA BLD QL: NORMAL
IMM GRANULOCYTES # BLD AUTO: 0.01 10*3/MM3 (ref 0–0.05)
IMM GRANULOCYTES # BLD AUTO: 0.03 10*3/MM3 (ref 0–0.05)
IMM GRANULOCYTES NFR BLD AUTO: 0.3 % (ref 0–0.5)
IMM GRANULOCYTES NFR BLD AUTO: 0.7 % (ref 0–0.5)
LYMPHOCYTES # BLD AUTO: 0.59 10*3/MM3 (ref 0.7–3.1)
LYMPHOCYTES # BLD AUTO: 0.61 10*3/MM3 (ref 0.7–3.1)
LYMPHOCYTES NFR BLD AUTO: 14 % (ref 19.6–45.3)
LYMPHOCYTES NFR BLD AUTO: 15.2 % (ref 19.6–45.3)
MAGNESIUM SERPL-MCNC: 2.1 MG/DL (ref 1.6–2.4)
MAGNESIUM SERPL-MCNC: 2.2 MG/DL (ref 1.6–2.4)
MCH RBC QN AUTO: 27.9 PG (ref 26.6–33)
MCH RBC QN AUTO: 28.5 PG (ref 26.6–33)
MCHC RBC AUTO-ENTMCNC: 29.9 G/DL (ref 31.5–35.7)
MCHC RBC AUTO-ENTMCNC: 31 G/DL (ref 31.5–35.7)
MCV RBC AUTO: 92.1 FL (ref 79–97)
MCV RBC AUTO: 93.3 FL (ref 79–97)
MONOCYTES # BLD AUTO: 0.4 10*3/MM3 (ref 0.1–0.9)
MONOCYTES # BLD AUTO: 0.44 10*3/MM3 (ref 0.1–0.9)
MONOCYTES NFR BLD AUTO: 10.1 % (ref 5–12)
MONOCYTES NFR BLD AUTO: 10.3 % (ref 5–12)
NEUTROPHILS # BLD AUTO: 2.63 10*3/MM3 (ref 1.7–7)
NEUTROPHILS # BLD AUTO: 3.07 10*3/MM3 (ref 1.7–7)
NEUTROPHILS NFR BLD AUTO: 67.5 % (ref 42.7–76)
NEUTROPHILS NFR BLD AUTO: 70.2 % (ref 42.7–76)
NRBC BLD AUTO-RTO: 0 /100 WBC (ref 0–0.2)
NRBC BLD AUTO-RTO: 0 /100 WBC (ref 0–0.2)
NT-PROBNP SERPL-MCNC: ABNORMAL PG/ML (ref 0–738)
OVALOCYTES BLD QL SMEAR: NORMAL
PHOSPHATE SERPL-MCNC: 3.7 MG/DL (ref 2.5–4.5)
PLATELET # BLD AUTO: 215 10*3/MM3 (ref 140–450)
PLATELET # BLD AUTO: 219 10*3/MM3 (ref 140–450)
PMV BLD AUTO: 9.1 FL (ref 6–12)
PMV BLD AUTO: 9.3 FL (ref 6–12)
POIKILOCYTOSIS BLD QL SMEAR: NORMAL
POTASSIUM BLD-SCNC: 4 MMOL/L (ref 3.5–5.2)
POTASSIUM BLD-SCNC: 4.3 MMOL/L (ref 3.5–5.2)
POTASSIUM BLD-SCNC: 4.4 MMOL/L (ref 3.5–5.2)
POTASSIUM SERPL-SCNC: 3.6 MMOL/L (ref 3.5–5.2)
POTASSIUM SERPL-SCNC: 4.2 MMOL/L (ref 3.5–5.2)
PROT SERPL-MCNC: 7.1 G/DL (ref 6–8.5)
RBC # BLD AUTO: 3.05 10*6/MM3 (ref 3.77–5.28)
RBC # BLD AUTO: 3.12 10*6/MM3 (ref 3.77–5.28)
SMALL PLATELETS BLD QL SMEAR: ADEQUATE
SODIUM BLD-SCNC: 139 MMOL/L (ref 136–145)
SODIUM BLD-SCNC: 140 MMOL/L (ref 136–145)
SODIUM BLD-SCNC: 140 MMOL/L (ref 136–145)
SODIUM SERPL-SCNC: 141 MMOL/L (ref 136–145)
SODIUM SERPL-SCNC: 141 MMOL/L (ref 136–145)
WBC MORPH BLD: NORMAL
WBC NRBC COR # BLD: 3.89 10*3/MM3 (ref 3.4–10.8)
WBC NRBC COR # BLD: 4.37 10*3/MM3 (ref 3.4–10.8)

## 2019-01-01 PROCEDURE — 99214 OFFICE O/P EST MOD 30 MIN: CPT | Performed by: INTERNAL MEDICINE

## 2019-01-01 PROCEDURE — G0378 HOSPITAL OBSERVATION PER HR: HCPCS

## 2019-01-01 PROCEDURE — 99496 TRANSJ CARE MGMT HIGH F2F 7D: CPT | Performed by: FAMILY MEDICINE

## 2019-01-01 PROCEDURE — 25010000002 FUROSEMIDE PER 20 MG: Performed by: INTERNAL MEDICINE

## 2019-01-01 PROCEDURE — 96376 TX/PRO/DX INJ SAME DRUG ADON: CPT

## 2019-01-01 PROCEDURE — 80053 COMPREHEN METABOLIC PANEL: CPT | Performed by: FAMILY MEDICINE

## 2019-01-01 PROCEDURE — 99225 PR SBSQ OBSERVATION CARE/DAY 25 MINUTES: CPT | Performed by: INTERNAL MEDICINE

## 2019-01-01 PROCEDURE — 96374 THER/PROPH/DIAG INJ IV PUSH: CPT

## 2019-01-01 PROCEDURE — 99220 PR INITIAL OBSERVATION CARE/DAY 70 MINUTES: CPT | Performed by: FAMILY MEDICINE

## 2019-01-01 PROCEDURE — 85025 COMPLETE CBC W/AUTO DIFF WBC: CPT | Performed by: FAMILY MEDICINE

## 2019-01-01 PROCEDURE — G0379 DIRECT REFER HOSPITAL OBSERV: HCPCS

## 2019-01-01 PROCEDURE — 80069 RENAL FUNCTION PANEL: CPT | Performed by: INTERNAL MEDICINE

## 2019-01-01 PROCEDURE — 83735 ASSAY OF MAGNESIUM: CPT | Performed by: FAMILY MEDICINE

## 2019-01-01 PROCEDURE — 25010000002 FUROSEMIDE PER 20 MG: Performed by: FAMILY MEDICINE

## 2019-01-01 PROCEDURE — 80048 BASIC METABOLIC PNL TOTAL CA: CPT | Performed by: FAMILY MEDICINE

## 2019-01-01 PROCEDURE — 85007 BL SMEAR W/DIFF WBC COUNT: CPT | Performed by: FAMILY MEDICINE

## 2019-01-01 PROCEDURE — 99217 PR OBSERVATION CARE DISCHARGE MANAGEMENT: CPT | Performed by: INTERNAL MEDICINE

## 2019-01-01 RX ORDER — ISOSORBIDE MONONITRATE 120 MG/1
60 TABLET, EXTENDED RELEASE ORAL DAILY
Start: 2019-01-01 | End: 2020-01-01 | Stop reason: HOSPADM

## 2019-01-01 RX ORDER — ISOSORBIDE MONONITRATE 60 MG/1
60 TABLET, EXTENDED RELEASE ORAL DAILY
Status: DISCONTINUED | OUTPATIENT
Start: 2019-01-01 | End: 2019-01-01 | Stop reason: HOSPADM

## 2019-01-01 RX ORDER — ONDANSETRON 2 MG/ML
4 INJECTION INTRAMUSCULAR; INTRAVENOUS EVERY 6 HOURS PRN
Status: DISCONTINUED | OUTPATIENT
Start: 2019-01-01 | End: 2019-01-01 | Stop reason: HOSPADM

## 2019-01-01 RX ORDER — SODIUM CHLORIDE 9 MG/ML
50 INJECTION, SOLUTION INTRAVENOUS CONTINUOUS
Status: DISCONTINUED | OUTPATIENT
Start: 2019-01-01 | End: 2019-01-01

## 2019-01-01 RX ORDER — LEVOTHYROXINE SODIUM 0.07 MG/1
75 TABLET ORAL
Status: DISCONTINUED | OUTPATIENT
Start: 2019-01-01 | End: 2019-01-01 | Stop reason: HOSPADM

## 2019-01-01 RX ORDER — PANTOPRAZOLE SODIUM 40 MG/1
40 TABLET, DELAYED RELEASE ORAL DAILY
Status: DISCONTINUED | OUTPATIENT
Start: 2019-01-01 | End: 2019-01-01 | Stop reason: HOSPADM

## 2019-01-01 RX ORDER — ONDANSETRON 4 MG/1
4 TABLET, FILM COATED ORAL EVERY 6 HOURS PRN
Status: DISCONTINUED | OUTPATIENT
Start: 2019-01-01 | End: 2019-01-01 | Stop reason: HOSPADM

## 2019-01-01 RX ORDER — FERROUS SULFATE TAB EC 324 MG (65 MG FE EQUIVALENT) 324 (65 FE) MG
324 TABLET DELAYED RESPONSE ORAL 2 TIMES DAILY WITH MEALS
Status: DISCONTINUED | OUTPATIENT
Start: 2019-01-01 | End: 2019-01-01 | Stop reason: HOSPADM

## 2019-01-01 RX ORDER — BISOPROLOL FUMARATE 5 MG/1
5 TABLET, FILM COATED ORAL 2 TIMES DAILY
Status: DISCONTINUED | OUTPATIENT
Start: 2019-01-01 | End: 2019-01-01 | Stop reason: HOSPADM

## 2019-01-01 RX ORDER — MYCOPHENOLATE MOFETIL 500 MG/1
TABLET ORAL 2 TIMES DAILY
Status: ON HOLD | COMMUNITY
End: 2019-01-01

## 2019-01-01 RX ORDER — NITROGLYCERIN 0.4 MG/1
0.4 TABLET SUBLINGUAL
Status: DISCONTINUED | OUTPATIENT
Start: 2019-01-01 | End: 2019-01-01 | Stop reason: HOSPADM

## 2019-01-01 RX ORDER — MYCOPHENOLATE MOFETIL 250 MG/1
500 CAPSULE ORAL EVERY 12 HOURS SCHEDULED
Status: DISCONTINUED | OUTPATIENT
Start: 2019-01-01 | End: 2019-01-01

## 2019-01-01 RX ORDER — POTASSIUM CHLORIDE 750 MG/1
10 CAPSULE, EXTENDED RELEASE ORAL 2 TIMES DAILY WITH MEALS
Status: DISCONTINUED | OUTPATIENT
Start: 2019-01-01 | End: 2019-01-01 | Stop reason: HOSPADM

## 2019-01-01 RX ORDER — FUROSEMIDE 40 MG/1
40 TABLET ORAL 2 TIMES DAILY
Qty: 60 TABLET | Refills: 0 | Status: ON HOLD | OUTPATIENT
Start: 2019-01-01 | End: 2020-01-01 | Stop reason: SDUPTHER

## 2019-01-01 RX ORDER — ATORVASTATIN CALCIUM 40 MG/1
40 TABLET, FILM COATED ORAL NIGHTLY
Status: DISCONTINUED | OUTPATIENT
Start: 2019-01-01 | End: 2019-01-01 | Stop reason: HOSPADM

## 2019-01-01 RX ORDER — SODIUM CHLORIDE 0.9 % (FLUSH) 0.9 %
10 SYRINGE (ML) INJECTION EVERY 12 HOURS SCHEDULED
Status: DISCONTINUED | OUTPATIENT
Start: 2019-01-01 | End: 2019-01-01 | Stop reason: HOSPADM

## 2019-01-01 RX ORDER — FUROSEMIDE 10 MG/ML
40 INJECTION INTRAMUSCULAR; INTRAVENOUS EVERY 12 HOURS
Status: DISCONTINUED | OUTPATIENT
Start: 2019-01-01 | End: 2019-01-01

## 2019-01-01 RX ORDER — SPIRONOLACTONE 25 MG/1
12.5 TABLET ORAL DAILY
Qty: 30 TABLET | Refills: 0 | Status: SHIPPED | OUTPATIENT
Start: 2019-01-01 | End: 2020-01-01 | Stop reason: HOSPADM

## 2019-01-01 RX ORDER — ATORVASTATIN CALCIUM 40 MG/1
40 TABLET, FILM COATED ORAL NIGHTLY
Qty: 90 TABLET | Refills: 3 | Status: SHIPPED | OUTPATIENT
Start: 2019-01-01 | End: 2020-01-01 | Stop reason: SDUPTHER

## 2019-01-01 RX ORDER — SODIUM CHLORIDE 0.9 % (FLUSH) 0.9 %
10 SYRINGE (ML) INJECTION AS NEEDED
Status: DISCONTINUED | OUTPATIENT
Start: 2019-01-01 | End: 2019-01-01 | Stop reason: HOSPADM

## 2019-01-01 RX ORDER — SPIRONOLACTONE 25 MG/1
12.5 TABLET ORAL DAILY
Status: DISCONTINUED | OUTPATIENT
Start: 2019-01-01 | End: 2019-01-01 | Stop reason: HOSPADM

## 2019-01-01 RX ORDER — FUROSEMIDE 10 MG/ML
80 INJECTION INTRAMUSCULAR; INTRAVENOUS EVERY 12 HOURS
Status: DISCONTINUED | OUTPATIENT
Start: 2019-01-01 | End: 2019-01-01 | Stop reason: HOSPADM

## 2019-01-01 RX ADMIN — FERROUS SULFATE TAB EC 324 MG (65 MG FE EQUIVALENT) 324 MG: 324 (65 FE) TABLET DELAYED RESPONSE at 07:48

## 2019-01-01 RX ADMIN — APIXABAN 2.5 MG: 2.5 TABLET, FILM COATED ORAL at 20:45

## 2019-01-01 RX ADMIN — FUROSEMIDE 40 MG: 10 INJECTION, SOLUTION INTRAMUSCULAR; INTRAVENOUS at 06:58

## 2019-01-01 RX ADMIN — FERROUS SULFATE TAB EC 324 MG (65 MG FE EQUIVALENT) 324 MG: 324 (65 FE) TABLET DELAYED RESPONSE at 18:20

## 2019-01-01 RX ADMIN — POTASSIUM CHLORIDE 10 MEQ: 750 CAPSULE, EXTENDED RELEASE ORAL at 08:02

## 2019-01-01 RX ADMIN — FUROSEMIDE 80 MG: 10 INJECTION, SOLUTION INTRAMUSCULAR; INTRAVENOUS at 09:03

## 2019-01-01 RX ADMIN — LEVOTHYROXINE SODIUM 75 MCG: 75 TABLET ORAL at 06:58

## 2019-01-01 RX ADMIN — BISOPROLOL FUMARATE 5 MG: 5 TABLET ORAL at 20:45

## 2019-01-01 RX ADMIN — ATORVASTATIN CALCIUM 40 MG: 40 TABLET, FILM COATED ORAL at 21:52

## 2019-01-01 RX ADMIN — PANTOPRAZOLE SODIUM 40 MG: 40 TABLET, DELAYED RELEASE ORAL at 08:02

## 2019-01-01 RX ADMIN — POTASSIUM CHLORIDE 10 MEQ: 750 CAPSULE, EXTENDED RELEASE ORAL at 17:41

## 2019-01-01 RX ADMIN — SPIRONOLACTONE 12.5 MG: 25 TABLET ORAL at 13:00

## 2019-01-01 RX ADMIN — FERROUS SULFATE TAB EC 324 MG (65 MG FE EQUIVALENT) 324 MG: 324 (65 FE) TABLET DELAYED RESPONSE at 08:02

## 2019-01-01 RX ADMIN — SODIUM CHLORIDE 50 ML/HR: 9 INJECTION, SOLUTION INTRAVENOUS at 18:20

## 2019-01-01 RX ADMIN — SODIUM CHLORIDE, PRESERVATIVE FREE 10 ML: 5 INJECTION INTRAVENOUS at 09:17

## 2019-01-01 RX ADMIN — LEVOTHYROXINE SODIUM 75 MCG: 75 TABLET ORAL at 06:50

## 2019-01-01 RX ADMIN — FERROUS SULFATE TAB EC 324 MG (65 MG FE EQUIVALENT) 324 MG: 324 (65 FE) TABLET DELAYED RESPONSE at 17:41

## 2019-01-01 RX ADMIN — POTASSIUM CHLORIDE 10 MEQ: 750 CAPSULE, EXTENDED RELEASE ORAL at 07:48

## 2019-01-01 RX ADMIN — BISOPROLOL FUMARATE 5 MG: 5 TABLET ORAL at 08:02

## 2019-01-01 RX ADMIN — ISOSORBIDE MONONITRATE 60 MG: 60 TABLET, EXTENDED RELEASE ORAL at 07:46

## 2019-01-01 RX ADMIN — PANTOPRAZOLE SODIUM 40 MG: 40 TABLET, DELAYED RELEASE ORAL at 07:49

## 2019-01-01 RX ADMIN — APIXABAN 2.5 MG: 2.5 TABLET, FILM COATED ORAL at 07:48

## 2019-01-01 RX ADMIN — BISOPROLOL FUMARATE 5 MG: 5 TABLET ORAL at 07:49

## 2019-01-01 RX ADMIN — FUROSEMIDE 40 MG: 10 INJECTION, SOLUTION INTRAMUSCULAR; INTRAVENOUS at 18:21

## 2019-01-01 RX ADMIN — ATORVASTATIN CALCIUM 40 MG: 40 TABLET, FILM COATED ORAL at 20:45

## 2019-01-01 RX ADMIN — BISOPROLOL FUMARATE 5 MG: 5 TABLET ORAL at 21:52

## 2019-01-01 RX ADMIN — ISOSORBIDE MONONITRATE 60 MG: 60 TABLET, EXTENDED RELEASE ORAL at 08:02

## 2019-01-01 RX ADMIN — SODIUM CHLORIDE, PRESERVATIVE FREE 10 ML: 5 INJECTION INTRAVENOUS at 21:55

## 2019-01-01 RX ADMIN — APIXABAN 2.5 MG: 2.5 TABLET, FILM COATED ORAL at 08:02

## 2019-01-01 RX ADMIN — POTASSIUM CHLORIDE 10 MEQ: 750 CAPSULE, EXTENDED RELEASE ORAL at 18:20

## 2019-01-01 RX ADMIN — SPIRONOLACTONE 12.5 MG: 25 TABLET ORAL at 08:02

## 2019-01-01 RX ADMIN — APIXABAN 2.5 MG: 2.5 TABLET, FILM COATED ORAL at 21:52

## 2019-01-01 RX ADMIN — FUROSEMIDE 80 MG: 10 INJECTION, SOLUTION INTRAMUSCULAR; INTRAVENOUS at 17:40

## 2019-01-02 ENCOUNTER — TELEPHONE (OUTPATIENT)
Dept: PULMONOLOGY | Facility: CLINIC | Age: 79
End: 2019-01-02

## 2019-01-02 DIAGNOSIS — R06.02 SHORTNESS OF BREATH: ICD-10-CM

## 2019-01-02 NOTE — TELEPHONE ENCOUNTER
Call discuss her CT scan findings as well as follow-up with Dr. Laura.  She is a high-resolution CT scan of VQ scan pending as well as follow-up with Dr. Jeff Laura on February 15.  Also gave the disks to Isabelle our x-ray tech to scan him into PACS.

## 2019-01-21 ENCOUNTER — HOSPITAL ENCOUNTER (OUTPATIENT)
Dept: GENERAL RADIOLOGY | Facility: HOSPITAL | Age: 79
Discharge: HOME | End: 2019-01-21

## 2019-01-21 ENCOUNTER — HOSPITAL ENCOUNTER (OUTPATIENT)
Dept: CT IMAGING | Facility: HOSPITAL | Age: 79
Discharge: HOME | End: 2019-01-21
Admitting: NURSE PRACTITIONER

## 2019-01-21 ENCOUNTER — HOSPITAL ENCOUNTER (OUTPATIENT)
Dept: NUCLEAR MEDICINE | Facility: HOSPITAL | Age: 79
Discharge: HOME | End: 2019-01-21

## 2019-01-21 ENCOUNTER — HOSPITAL ENCOUNTER (OUTPATIENT)
Dept: CT IMAGING | Facility: HOSPITAL | Age: 79
End: 2019-01-21

## 2019-01-21 ENCOUNTER — APPOINTMENT (OUTPATIENT)
Dept: CT IMAGING | Facility: HOSPITAL | Age: 79
End: 2019-01-21

## 2019-01-21 DIAGNOSIS — R06.09 DYSPNEA ON EXERTION: ICD-10-CM

## 2019-01-21 DIAGNOSIS — I27.21 PULMONARY ARTERIAL HYPERTENSION (HCC): ICD-10-CM

## 2019-01-21 PROCEDURE — 0 XENON XE 133: Performed by: NURSE PRACTITIONER

## 2019-01-21 PROCEDURE — 0 TECHNETIUM ALBUMIN AGGREGATED: Performed by: NURSE PRACTITIONER

## 2019-01-21 PROCEDURE — 78582 LUNG VENTILAT&PERFUS IMAGING: CPT

## 2019-01-21 PROCEDURE — 71250 CT THORAX DX C-: CPT

## 2019-01-21 PROCEDURE — 71045 X-RAY EXAM CHEST 1 VIEW: CPT

## 2019-01-21 PROCEDURE — A9540 TC99M MAA: HCPCS | Performed by: NURSE PRACTITIONER

## 2019-01-21 PROCEDURE — A9558 XE133 XENON 10MCI: HCPCS | Performed by: NURSE PRACTITIONER

## 2019-01-21 RX ADMIN — Medication 1 DOSE: at 09:46

## 2019-01-21 RX ADMIN — XENON XE-133 11.63 MILLICURIE: 10 GAS RESPIRATORY (INHALATION) at 09:35

## 2019-04-08 ENCOUNTER — OFFICE VISIT (OUTPATIENT)
Dept: PULMONOLOGY | Facility: CLINIC | Age: 79
End: 2019-04-08

## 2019-04-08 VITALS
BODY MASS INDEX: 29.11 KG/M2 | HEIGHT: 56 IN | TEMPERATURE: 97.3 F | OXYGEN SATURATION: 92 % | HEART RATE: 80 BPM | DIASTOLIC BLOOD PRESSURE: 60 MMHG | WEIGHT: 129.4 LBS | SYSTOLIC BLOOD PRESSURE: 122 MMHG

## 2019-04-08 DIAGNOSIS — J84.9 INTERSTITIAL LUNG DISEASE (HCC): Primary | ICD-10-CM

## 2019-04-08 PROCEDURE — 99214 OFFICE O/P EST MOD 30 MIN: CPT | Performed by: INTERNAL MEDICINE

## 2019-04-08 RX ORDER — MELATONIN
2000 DAILY
COMMUNITY
End: 2019-12-02 | Stop reason: ALTCHOICE

## 2019-04-08 RX ORDER — LEVOTHYROXINE SODIUM 0.07 MG/1
75 TABLET ORAL DAILY
COMMUNITY
Start: 2019-03-29 | End: 2020-01-01 | Stop reason: SDUPTHER

## 2019-04-08 RX ORDER — SODIUM CHLORIDE, SODIUM LACTATE, POTASSIUM CHLORIDE, CALCIUM CHLORIDE 600; 310; 30; 20 MG/100ML; MG/100ML; MG/100ML; MG/100ML
125 INJECTION, SOLUTION INTRAVENOUS CONTINUOUS
Status: CANCELLED | OUTPATIENT
Start: 2019-04-08

## 2019-04-08 RX ORDER — PREDNISONE 10 MG/1
20 TABLET ORAL DAILY
Qty: 60 TABLET | Refills: 1 | Status: SHIPPED | OUTPATIENT
Start: 2019-04-08 | End: 2019-06-03 | Stop reason: SDUPTHER

## 2019-04-08 RX ORDER — LIDOCAINE HYDROCHLORIDE 10 MG/ML
0.1 INJECTION, SOLUTION EPIDURAL; INFILTRATION; INTRACAUDAL; PERINEURAL ONCE AS NEEDED
Status: CANCELLED | OUTPATIENT
Start: 2019-04-08

## 2019-04-08 NOTE — H&P (VIEW-ONLY)
CC:    Shortness of air    HPI:    This is a pleasant 77 y/o WF w/ h/o lifetime non-smoking, Afib, prior amiodarone use, CAD s/p CABG 2012, previous stents, HTN, HLD, prior CVA w/ no obvious residual deficit, h/o secondhand smoke exposure, owns pet bird x 15-20 years who presents for evaluation of FIGUEROA.  She recently had a right and left heart cath 4/30/18 at [facility].  This showed a PA pressure of 45/23 (mean 30), PCWP 16, LVEDP 15, and CI of 1.5.  Calculated PVR is 6 Wood units.  LHC showed severe 2vCAD w/ patent LIMA to LAD, patent stents in LAD, patent SVG to PDA, and medical management was recommended.  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  No obvious auto-immune disease.      INTERVAL HISTORY:    Returns today for follow up.  Still has pet bird.  HRCT re-demonstrates diffuse GGO / mosaic attenuation worse in upper lobes.  V/Q w/ no evidence of CTEPH but +air trapping.  Extensive auto-immune / infectious serologies negative.  Breathing about the same.    PMH:    Past Medical History:   Diagnosis Date   • Stroke (CMS/HCC)      PSH:    Past Surgical History:   Procedure Laterality Date   • BYPASS GRAFT     • CORONARY ANGIOPLASTY WITH STENT PLACEMENT       FH:    Family History   Problem Relation Age of Onset   • Cancer Mother    • No Known Problems Father    • Liver disease Sister      SH:    Social History     Socioeconomic History   • Marital status:      Spouse name: Not on file   • Number of children: Not on file   • Years of education: Not on file   • Highest education level: Not on file   Tobacco Use   • Smoking status: Never Smoker   • Smokeless tobacco: Never Used   Substance and Sexual Activity   • Alcohol use: No   • Drug use: No   • Sexual activity: Defer     ALLERGIES:    Allergies   Allergen Reactions   • Tuberculin Tests Rash     MEDICATIONS:      Current Outpatient Medications:   •  alendronate (FOSAMAX) 70 MG tablet, Take 70 mg by mouth Every 7 (Seven)  Days., Disp: , Rfl:   •  amLODIPine (NORVASC) 5 MG tablet, Take 5 mg by mouth Daily., Disp: , Rfl:   •  apixaban (ELIQUIS) 2.5 MG tablet tablet, Take 2.5 mg by mouth 2 (Two) Times a Day., Disp: , Rfl:   •  atorvastatin (LIPITOR) 40 MG tablet, Take 40 mg by mouth Daily., Disp: , Rfl:   •  bisoprolol (ZEBETA) 5 MG tablet, Take 1.5 tablets by mouth 2 (Two) Times a Day., Disp: 270 tablet, Rfl: 3  •  cholecalciferol (VITAMIN D3) 1000 units tablet, Take 2,000 Units by mouth Daily., Disp: , Rfl:   •  clopidogrel (PLAVIX) 75 MG tablet, Take 75 mg by mouth Daily., Disp: , Rfl:   •  Fluticasone-Umeclidin-Vilant 100-62.5-25 MCG/INH aerosol powder , Inhale 1 each Daily., Disp: 1 each, Rfl: 11  •  furosemide (LASIX) 40 MG tablet, Take 40 mg by mouth Daily., Disp: , Rfl:   •  isosorbide mononitrate (IMDUR) 120 MG 24 hr tablet, Take 120 mg by mouth Daily., Disp: , Rfl:   •  levothyroxine (SYNTHROID, LEVOTHROID) 75 MCG tablet, , Disp: , Rfl:   •  nitroglycerin (NITROSTAT) 0.4 MG SL tablet, , Disp: , Rfl:   •  potassium chloride (K-DUR,KLOR-CON) 10 MEQ CR tablet, Take 10 mEq by mouth 2 (Two) Times a Day., Disp: , Rfl:   ROS:  Per HPI, otherwise all systems reviewed and negative.    DIAGNOSTIC DATA (Reviewed and interpreted by me unless otherwise specified):    PFT 11/19/18 - No obstruction, No restriction, moderate reduction in DLCO corrects for alveolar volume    6MW 11/19/18 - RA resting sat 95%, minimum sat 94% on RA, walked 137.2 meters 36% predicted    CXR 11/19/18 - chronic appearing changes in bases, mediastinal / hilar LAD    HRCT 11/30/18 & 1/21/19 - extensive GGO predominant in the bilateral upper lobes, likely some component of mosaic attenuation    V/Q 1/21/19 - air trapping    Vitals:    04/08/19 1541   BP: 122/60   Pulse: 80   Temp: 97.3 °F (36.3 °C)   SpO2: 92%       Physical Exam   Constitutional: Oriented to person, place, and time. Appears well-developed and well-nourished.   Head: Normocephalic and atraumatic.    Nose: Nose normal.   Mouth/Throat: Oropharynx is clear and moist.   Eyes: Conjunctivae are normal.  Pupils normal.  Neck: No tracheal deviation present.   Cardiovascular: Normal rate, regular rhythm, normal heart sounds and intact distal pulses.  Exam reveals no gallop and no friction rub.  No thrill.  No JVD.  No edema.  No murmur heard.  Pulmonary/Chest: Effort normal and breath sounds w/ prolonged expiratory phase.  No tenderness to palpation.  No clubbing.   Abdominal: Soft. Bowel sounds are normal. No distension. No tenderness. There is no guarding.   Musculoskeletal: Normal range of motion.  No tenderness.  Lymphadenopathy:  No cervical adenopathy.   Neurological:  No new focal neurological deficits observed   Skin: Skin is warm and dry. No rash noted.   Psychiatric: Normal mood and affect.  Behavior is normal. Judgment normal.    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  On Plavix / Eliquis for multiple coronary stents, also had a CVA one year ago and has Afib.  I think the risk of MI / CVA holding these medications to perform transbronchial biopsies outweighs the benefits.  Will do bronch w/ airway exam and BAL to send for cell count w/ diff, cytology, CD4/CD8 ratio (flow cytometry), and infectious etiologies.   Will go ahead and start prednisone 20 mg daily post procedure.  Has osteoporosis.  If prednisone not tolerated or she fails to improve we can add cellcept.  If she worsens despite treatment and getting rid of the bird we may have to re-consider transbronchial biopsies or even open lung biopsy.  Plan on bronch Friday.  Return in 6 weeks w/ 6MW and Full PFT.    I think her pulmonary htn is secondary to underlying lung disease and there is currently no role for selective pulmonary  vasodilators.    RTC 6 weeks w/ Full PFT and 6MW, bronch w/ BAL friday    Jeff Laura MD  Pulmonology and Critical Care Medicine  04/08/19 4:14 PM  Electronically Signed    C.C.:  Tien Archer MD, Fredi Ledesma MD

## 2019-04-08 NOTE — PROGRESS NOTES
CC:    Shortness of air    HPI:    This is a pleasant 79 y/o WF w/ h/o lifetime non-smoking, Afib, prior amiodarone use, CAD s/p CABG 2012, previous stents, HTN, HLD, prior CVA w/ no obvious residual deficit, h/o secondhand smoke exposure, owns pet bird x 15-20 years who presents for evaluation of FIGUEROA.  She recently had a right and left heart cath 4/30/18 at [facility].  This showed a PA pressure of 45/23 (mean 30), PCWP 16, LVEDP 15, and CI of 1.5.  Calculated PVR is 6 Wood units.  LHC showed severe 2vCAD w/ patent LIMA to LAD, patent stents in LAD, patent SVG to PDA, and medical management was recommended.  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  No obvious auto-immune disease.      INTERVAL HISTORY:    Returns today for follow up.  Still has pet bird.  HRCT re-demonstrates diffuse GGO / mosaic attenuation worse in upper lobes.  V/Q w/ no evidence of CTEPH but +air trapping.  Extensive auto-immune / infectious serologies negative.  Breathing about the same.    PMH:    Past Medical History:   Diagnosis Date   • Stroke (CMS/HCC)      PSH:    Past Surgical History:   Procedure Laterality Date   • BYPASS GRAFT     • CORONARY ANGIOPLASTY WITH STENT PLACEMENT       FH:    Family History   Problem Relation Age of Onset   • Cancer Mother    • No Known Problems Father    • Liver disease Sister      SH:    Social History     Socioeconomic History   • Marital status:      Spouse name: Not on file   • Number of children: Not on file   • Years of education: Not on file   • Highest education level: Not on file   Tobacco Use   • Smoking status: Never Smoker   • Smokeless tobacco: Never Used   Substance and Sexual Activity   • Alcohol use: No   • Drug use: No   • Sexual activity: Defer     ALLERGIES:    Allergies   Allergen Reactions   • Tuberculin Tests Rash     MEDICATIONS:      Current Outpatient Medications:   •  alendronate (FOSAMAX) 70 MG tablet, Take 70 mg by mouth Every 7 (Seven)  Days., Disp: , Rfl:   •  amLODIPine (NORVASC) 5 MG tablet, Take 5 mg by mouth Daily., Disp: , Rfl:   •  apixaban (ELIQUIS) 2.5 MG tablet tablet, Take 2.5 mg by mouth 2 (Two) Times a Day., Disp: , Rfl:   •  atorvastatin (LIPITOR) 40 MG tablet, Take 40 mg by mouth Daily., Disp: , Rfl:   •  bisoprolol (ZEBETA) 5 MG tablet, Take 1.5 tablets by mouth 2 (Two) Times a Day., Disp: 270 tablet, Rfl: 3  •  cholecalciferol (VITAMIN D3) 1000 units tablet, Take 2,000 Units by mouth Daily., Disp: , Rfl:   •  clopidogrel (PLAVIX) 75 MG tablet, Take 75 mg by mouth Daily., Disp: , Rfl:   •  Fluticasone-Umeclidin-Vilant 100-62.5-25 MCG/INH aerosol powder , Inhale 1 each Daily., Disp: 1 each, Rfl: 11  •  furosemide (LASIX) 40 MG tablet, Take 40 mg by mouth Daily., Disp: , Rfl:   •  isosorbide mononitrate (IMDUR) 120 MG 24 hr tablet, Take 120 mg by mouth Daily., Disp: , Rfl:   •  levothyroxine (SYNTHROID, LEVOTHROID) 75 MCG tablet, , Disp: , Rfl:   •  nitroglycerin (NITROSTAT) 0.4 MG SL tablet, , Disp: , Rfl:   •  potassium chloride (K-DUR,KLOR-CON) 10 MEQ CR tablet, Take 10 mEq by mouth 2 (Two) Times a Day., Disp: , Rfl:   ROS:  Per HPI, otherwise all systems reviewed and negative.    DIAGNOSTIC DATA (Reviewed and interpreted by me unless otherwise specified):    PFT 11/19/18 - No obstruction, No restriction, moderate reduction in DLCO corrects for alveolar volume    6MW 11/19/18 - RA resting sat 95%, minimum sat 94% on RA, walked 137.2 meters 36% predicted    CXR 11/19/18 - chronic appearing changes in bases, mediastinal / hilar LAD    HRCT 11/30/18 & 1/21/19 - extensive GGO predominant in the bilateral upper lobes, likely some component of mosaic attenuation    V/Q 1/21/19 - air trapping    Vitals:    04/08/19 1541   BP: 122/60   Pulse: 80   Temp: 97.3 °F (36.3 °C)   SpO2: 92%       Physical Exam   Constitutional: Oriented to person, place, and time. Appears well-developed and well-nourished.   Head: Normocephalic and atraumatic.    Nose: Nose normal.   Mouth/Throat: Oropharynx is clear and moist.   Eyes: Conjunctivae are normal.  Pupils normal.  Neck: No tracheal deviation present.   Cardiovascular: Normal rate, regular rhythm, normal heart sounds and intact distal pulses.  Exam reveals no gallop and no friction rub.  No thrill.  No JVD.  No edema.  No murmur heard.  Pulmonary/Chest: Effort normal and breath sounds w/ prolonged expiratory phase.  No tenderness to palpation.  No clubbing.   Abdominal: Soft. Bowel sounds are normal. No distension. No tenderness. There is no guarding.   Musculoskeletal: Normal range of motion.  No tenderness.  Lymphadenopathy:  No cervical adenopathy.   Neurological:  No new focal neurological deficits observed   Skin: Skin is warm and dry. No rash noted.   Psychiatric: Normal mood and affect.  Behavior is normal. Judgment normal.    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  On Plavix / Eliquis for multiple coronary stents, also had a CVA one year ago and has Afib.  I think the risk of MI / CVA holding these medications to perform transbronchial biopsies outweighs the benefits.  Will do bronch w/ airway exam and BAL to send for cell count w/ diff, cytology, CD4/CD8 ratio (flow cytometry), and infectious etiologies.   Will go ahead and start prednisone 20 mg daily post procedure.  Has osteoporosis.  If prednisone not tolerated or she fails to improve we can add cellcept.  If she worsens despite treatment and getting rid of the bird we may have to re-consider transbronchial biopsies or even open lung biopsy.  Plan on bronch Friday.  Return in 6 weeks w/ 6MW and Full PFT.    I think her pulmonary htn is secondary to underlying lung disease and there is currently no role for selective pulmonary  vasodilators.    RTC 6 weeks w/ Full PFT and 6MW, bronch w/ BAL friday    Jeff Laura MD  Pulmonology and Critical Care Medicine  04/08/19 4:14 PM  Electronically Signed    C.C.:  Tien Archer MD, Fredi Ledesma MD

## 2019-04-11 ENCOUNTER — DOCUMENTATION (OUTPATIENT)
Dept: PULMONOLOGY | Facility: CLINIC | Age: 79
End: 2019-04-11

## 2019-04-11 NOTE — PROGRESS NOTES
Pt called to confirm where Rx Prednisone was sent. Pt notified that Rx Prednisone has been sent to [facility]. Pt verbalized understanding.

## 2019-04-12 ENCOUNTER — ANESTHESIA EVENT (OUTPATIENT)
Dept: GASTROENTEROLOGY | Facility: HOSPITAL | Age: 79
End: 2019-04-12

## 2019-04-12 ENCOUNTER — ANESTHESIA (OUTPATIENT)
Dept: GASTROENTEROLOGY | Facility: HOSPITAL | Age: 79
End: 2019-04-12

## 2019-04-12 ENCOUNTER — HOSPITAL ENCOUNTER (OUTPATIENT)
Facility: HOSPITAL | Age: 79
Setting detail: HOSPITAL OUTPATIENT SURGERY
Discharge: HOME | End: 2019-04-12
Attending: INTERNAL MEDICINE | Admitting: INTERNAL MEDICINE

## 2019-04-12 VITALS
DIASTOLIC BLOOD PRESSURE: 85 MMHG | TEMPERATURE: 98.2 F | OXYGEN SATURATION: 93 % | RESPIRATION RATE: 16 BRPM | HEART RATE: 81 BPM | SYSTOLIC BLOOD PRESSURE: 154 MMHG

## 2019-04-12 DIAGNOSIS — J84.9 INTERSTITIAL LUNG DISEASE (HCC): ICD-10-CM

## 2019-04-12 LAB
APPEARANCE FLD: CLEAR
COLOR FLD: COLORLESS
LYMPHOCYTES NFR FLD MANUAL: 22 %
MACROPHAGE FLUID: 36 %
MESOTHL CELL NFR FLD MANUAL: 15 %
MONOCYTES NFR FLD: 8 %
NEUTROPHILS NFR FLD MANUAL: 19 %
POTASSIUM BLD-SCNC: 4.6 MMOL/L (ref 3.5–5.2)
RBC # FLD AUTO: <1000 /MM3
WBC # FLD AUTO: 371 /MM3

## 2019-04-12 PROCEDURE — 88112 CYTOPATH CELL ENHANCE TECH: CPT | Performed by: INTERNAL MEDICINE

## 2019-04-12 PROCEDURE — 89051 BODY FLUID CELL COUNT: CPT | Performed by: INTERNAL MEDICINE

## 2019-04-12 PROCEDURE — 87070 CULTURE OTHR SPECIMN AEROBIC: CPT | Performed by: INTERNAL MEDICINE

## 2019-04-12 PROCEDURE — 84132 ASSAY OF SERUM POTASSIUM: CPT | Performed by: ANESTHESIOLOGY

## 2019-04-12 PROCEDURE — 87116 MYCOBACTERIA CULTURE: CPT | Performed by: INTERNAL MEDICINE

## 2019-04-12 PROCEDURE — 94799 UNLISTED PULMONARY SVC/PX: CPT

## 2019-04-12 PROCEDURE — 87205 SMEAR GRAM STAIN: CPT | Performed by: INTERNAL MEDICINE

## 2019-04-12 PROCEDURE — 87206 SMEAR FLUORESCENT/ACID STAI: CPT | Performed by: INTERNAL MEDICINE

## 2019-04-12 PROCEDURE — 93010 ELECTROCARDIOGRAM REPORT: CPT | Performed by: INTERNAL MEDICINE

## 2019-04-12 PROCEDURE — 25010000002 PROPOFOL 10 MG/ML EMULSION: Performed by: NURSE ANESTHETIST, CERTIFIED REGISTERED

## 2019-04-12 PROCEDURE — 87102 FUNGUS ISOLATION CULTURE: CPT | Performed by: INTERNAL MEDICINE

## 2019-04-12 PROCEDURE — 93005 ELECTROCARDIOGRAM TRACING: CPT | Performed by: ANESTHESIOLOGY

## 2019-04-12 PROCEDURE — 88305 TISSUE EXAM BY PATHOLOGIST: CPT | Performed by: INTERNAL MEDICINE

## 2019-04-12 PROCEDURE — 31624 DX BRONCHOSCOPE/LAVAGE: CPT | Performed by: INTERNAL MEDICINE

## 2019-04-12 RX ORDER — ONDANSETRON 2 MG/ML
4 INJECTION INTRAMUSCULAR; INTRAVENOUS ONCE AS NEEDED
Status: DISCONTINUED | OUTPATIENT
Start: 2019-04-12 | End: 2019-04-12 | Stop reason: HOSPADM

## 2019-04-12 RX ORDER — LIDOCAINE HYDROCHLORIDE 40 MG/ML
4 INJECTION, SOLUTION RETROBULBAR; TOPICAL ONCE
Status: COMPLETED | OUTPATIENT
Start: 2019-04-12 | End: 2019-04-12

## 2019-04-12 RX ORDER — LIDOCAINE HYDROCHLORIDE 40 MG/ML
4 SOLUTION TOPICAL ONCE
Status: DISCONTINUED | OUTPATIENT
Start: 2019-04-12 | End: 2019-04-12

## 2019-04-12 RX ORDER — PROPOFOL 10 MG/ML
VIAL (ML) INTRAVENOUS AS NEEDED
Status: DISCONTINUED | OUTPATIENT
Start: 2019-04-12 | End: 2019-04-12 | Stop reason: SURG

## 2019-04-12 RX ORDER — LIDOCAINE HYDROCHLORIDE 10 MG/ML
INJECTION, SOLUTION EPIDURAL; INFILTRATION; INTRACAUDAL; PERINEURAL AS NEEDED
Status: DISCONTINUED | OUTPATIENT
Start: 2019-04-12 | End: 2019-04-12 | Stop reason: SURG

## 2019-04-12 RX ORDER — SODIUM CHLORIDE 0.9 % (FLUSH) 0.9 %
1-10 SYRINGE (ML) INJECTION AS NEEDED
Status: DISCONTINUED | OUTPATIENT
Start: 2019-04-12 | End: 2019-04-12 | Stop reason: HOSPADM

## 2019-04-12 RX ORDER — SODIUM CHLORIDE, SODIUM LACTATE, POTASSIUM CHLORIDE, CALCIUM CHLORIDE 600; 310; 30; 20 MG/100ML; MG/100ML; MG/100ML; MG/100ML
125 INJECTION, SOLUTION INTRAVENOUS CONTINUOUS
Status: DISCONTINUED | OUTPATIENT
Start: 2019-04-12 | End: 2019-04-12 | Stop reason: HOSPADM

## 2019-04-12 RX ORDER — LIDOCAINE HYDROCHLORIDE 10 MG/ML
0.5 INJECTION, SOLUTION EPIDURAL; INFILTRATION; INTRACAUDAL; PERINEURAL ONCE AS NEEDED
Status: DISCONTINUED | OUTPATIENT
Start: 2019-04-12 | End: 2019-04-12 | Stop reason: HOSPADM

## 2019-04-12 RX ORDER — LABETALOL HYDROCHLORIDE 5 MG/ML
5 INJECTION, SOLUTION INTRAVENOUS
Status: DISCONTINUED | OUTPATIENT
Start: 2019-04-12 | End: 2019-04-12 | Stop reason: HOSPADM

## 2019-04-12 RX ORDER — IPRATROPIUM BROMIDE AND ALBUTEROL SULFATE 2.5; .5 MG/3ML; MG/3ML
3 SOLUTION RESPIRATORY (INHALATION) ONCE AS NEEDED
Status: DISCONTINUED | OUTPATIENT
Start: 2019-04-12 | End: 2019-04-12 | Stop reason: HOSPADM

## 2019-04-12 RX ORDER — FAMOTIDINE 10 MG/ML
20 INJECTION, SOLUTION INTRAVENOUS ONCE
Status: COMPLETED | OUTPATIENT
Start: 2019-04-12 | End: 2019-04-12

## 2019-04-12 RX ORDER — SODIUM CHLORIDE 0.9 % (FLUSH) 0.9 %
3-10 SYRINGE (ML) INJECTION AS NEEDED
Status: DISCONTINUED | OUTPATIENT
Start: 2019-04-12 | End: 2019-04-12 | Stop reason: HOSPADM

## 2019-04-12 RX ORDER — SODIUM CHLORIDE, SODIUM LACTATE, POTASSIUM CHLORIDE, CALCIUM CHLORIDE 600; 310; 30; 20 MG/100ML; MG/100ML; MG/100ML; MG/100ML
9 INJECTION, SOLUTION INTRAVENOUS CONTINUOUS
Status: DISCONTINUED | OUTPATIENT
Start: 2019-04-12 | End: 2019-04-12 | Stop reason: HOSPADM

## 2019-04-12 RX ORDER — SODIUM CHLORIDE 0.9 % (FLUSH) 0.9 %
3 SYRINGE (ML) INJECTION EVERY 12 HOURS SCHEDULED
Status: DISCONTINUED | OUTPATIENT
Start: 2019-04-12 | End: 2019-04-12 | Stop reason: HOSPADM

## 2019-04-12 RX ORDER — LIDOCAINE HYDROCHLORIDE 10 MG/ML
0.1 INJECTION, SOLUTION EPIDURAL; INFILTRATION; INTRACAUDAL; PERINEURAL ONCE AS NEEDED
Status: DISCONTINUED | OUTPATIENT
Start: 2019-04-12 | End: 2019-04-12 | Stop reason: HOSPADM

## 2019-04-12 RX ORDER — FAMOTIDINE 20 MG/1
20 TABLET, FILM COATED ORAL ONCE
Status: DISCONTINUED | OUTPATIENT
Start: 2019-04-12 | End: 2019-04-12 | Stop reason: HOSPADM

## 2019-04-12 RX ORDER — IPRATROPIUM BROMIDE AND ALBUTEROL SULFATE 2.5; .5 MG/3ML; MG/3ML
3 SOLUTION RESPIRATORY (INHALATION) ONCE
Status: COMPLETED | OUTPATIENT
Start: 2019-04-12 | End: 2019-04-12

## 2019-04-12 RX ADMIN — PROPOFOL 20 MG: 10 INJECTION, EMULSION INTRAVENOUS at 11:58

## 2019-04-12 RX ADMIN — PROPOFOL 30 MG: 10 INJECTION, EMULSION INTRAVENOUS at 11:54

## 2019-04-12 RX ADMIN — FAMOTIDINE 20 MG: 10 INJECTION, SOLUTION INTRAVENOUS at 10:18

## 2019-04-12 RX ADMIN — IPRATROPIUM BROMIDE AND ALBUTEROL SULFATE 3 ML: 2.5; .5 SOLUTION RESPIRATORY (INHALATION) at 12:45

## 2019-04-12 RX ADMIN — LIDOCAINE HYDROCHLORIDE 4 ML: 40 INJECTION, SOLUTION RETROBULBAR; TOPICAL at 10:47

## 2019-04-12 RX ADMIN — SODIUM CHLORIDE, POTASSIUM CHLORIDE, SODIUM LACTATE AND CALCIUM CHLORIDE 9 ML/HR: 600; 310; 30; 20 INJECTION, SOLUTION INTRAVENOUS at 10:18

## 2019-04-12 RX ADMIN — LIDOCAINE HYDROCHLORIDE 50 MG: 10 INJECTION, SOLUTION EPIDURAL; INFILTRATION; INTRACAUDAL; PERINEURAL at 11:54

## 2019-04-12 NOTE — OP NOTE
Pre-Op Diagnosis:  Abnormal CT, Suspected Interstitial Lung Disease    Post-Op Diagnosis:  Same    Procedures:    1) Bronchoscopy    2) Bronchoalveolar Lavage Right Upper Lobe 80 cc in, 30 cc out    Indication:    Narrow differential diagnosis of Interstitial lung disease    Description:    Patient given MAC for sedation.  Anesthetized with topical lidocaine.  Bronchoscope introduced through oropharynx.  Vocal cords had normal abduction and adduction.  Trachea intubated.  Airways examined to segmental level which were normal.  Bronchoscope was wedged in the RUL anterior segment and BAL was performed with instillation of 80 cc of saline a return of 30 cc.  Will send for cytology, cell count w/ diff, cultures, and flow cytometry for cd4/cd8 ratio.    Recommendations:    Start prednisone for suspected hypersensitivity pneumonitis.  Follow up bronch results.    Jeff Laura MD  Pulmonology and Critical Care Medicine  04/12/19 12:30 PM  Electronically Signed

## 2019-04-12 NOTE — INTERVAL H&P NOTE
Proceed w/ Bronch and BAL as planned.    Jeff Laura MD  Pulmonology and Critical Care Medicine  04/12/19 11:47 AM  Electronically Signed

## 2019-04-12 NOTE — ANESTHESIA PREPROCEDURE EVALUATION
Anesthesia Evaluation     Patient summary reviewed and Nursing notes reviewed                Airway   Mallampati: II  Dental      Pulmonary    (+) shortness of breath,   Cardiovascular     (+) hypertension, PVD,       Neuro/Psych- negative ROS  GI/Hepatic/Renal/Endo - negative ROS     Musculoskeletal (-) negative ROS    Abdominal    Substance History - negative use     OB/GYN negative ob/gyn ROS         Other                        Anesthesia Plan    ASA 3     MAC     intravenous induction   Anesthetic plan, all risks, benefits, and alternatives have been provided, discussed and informed consent has been obtained with: patient.

## 2019-04-14 LAB
BACTERIA SPEC RESP CULT: NORMAL
GRAM STN SPEC: NORMAL

## 2019-04-26 LAB
LAB AP CASE REPORT: NORMAL
LAB AP FLOW CYTOMETRY SUMMARY: NORMAL
PATH REPORT.FINAL DX SPEC: NORMAL

## 2019-05-16 ENCOUNTER — OFFICE VISIT (OUTPATIENT)
Dept: CARDIOLOGY | Facility: CLINIC | Age: 79
End: 2019-05-16

## 2019-05-16 VITALS
BODY MASS INDEX: 26.45 KG/M2 | HEIGHT: 58 IN | WEIGHT: 126 LBS | HEART RATE: 84 BPM | SYSTOLIC BLOOD PRESSURE: 108 MMHG | DIASTOLIC BLOOD PRESSURE: 60 MMHG

## 2019-05-16 DIAGNOSIS — R06.09 DYSPNEA ON EXERTION: ICD-10-CM

## 2019-05-16 DIAGNOSIS — I10 ESSENTIAL HYPERTENSION: ICD-10-CM

## 2019-05-16 DIAGNOSIS — I48.19 PERSISTENT ATRIAL FIBRILLATION (HCC): Primary | ICD-10-CM

## 2019-05-16 DIAGNOSIS — J84.9 INTERSTITIAL LUNG DISEASE (HCC): ICD-10-CM

## 2019-05-16 DIAGNOSIS — I25.10 CORONARY ARTERY DISEASE INVOLVING NATIVE CORONARY ARTERY OF NATIVE HEART WITHOUT ANGINA PECTORIS: ICD-10-CM

## 2019-05-16 PROCEDURE — 99213 OFFICE O/P EST LOW 20 MIN: CPT | Performed by: INTERNAL MEDICINE

## 2019-05-16 NOTE — PROGRESS NOTES
Pat Nowak  1940  [phone]    05/16/2019    [facility] CARDIOLOGY     Santa RosaFredi MD  [address] [address]    Chief Complaint   Patient presents with   • Atrial Fibrillation       Problem List:   1. Persistent Atrial Fibrillation               A. CHADSVasc =  5 On Xarelto, diagnosed at time of CABG 2012              B. Amiodarone Initiation with ECV to NSR 6/26/17               C. Holter monitor on 11/1/2018 persistent atrial fibrillation at a ventricular rate 75 bpm.    2. CAD               A. Multiple previous PCI to LAD              B. CABG 2012               C.[facility] 4/2017: patent stents, and grafts              D.[facility] 4/30/2018 severe 2 vessel native disease, patent LIMA to LAD, patent stents in mid LAD, patent SVG                                                   to posterior lateral branch with skip to PDA, normal LV size and function, mild pulmonary hypertension with right ventricular              pressure 47 over 10 mmHg and pulmonary artery pressure 45/23 mmHg.              E. Echocardiogram 8/30/16: EF 60%, mild to moderate MR. RVSP 50 mmHg. Mild Pulmonary HTN  3. CVA  4. HTN  5. HLP  6.  History of secondhand smoke exposure/COPD/interstitial lung disease/hypersensitivity pneumonitis      Allergies  Allergies   Allergen Reactions   • Tuberculin Tests Rash       Current Medications    Current Outpatient Medications:   •  amLODIPine (NORVASC) 5 MG tablet, Take 5 mg by mouth Daily., Disp: , Rfl:   •  apixaban (ELIQUIS) 2.5 MG tablet tablet, Take 2.5 mg by mouth 2 (Two) Times a Day., Disp: , Rfl:   •  atorvastatin (LIPITOR) 40 MG tablet, Take 40 mg by mouth Daily., Disp: , Rfl:   •  bisoprolol (ZEBETA) 5 MG tablet, Take 1.5 tablets by mouth 2 (Two) Times a Day., Disp: 270 tablet, Rfl: 3  •  cholecalciferol (VITAMIN D3) 1000 units tablet, Take 2,000 Units by mouth Daily., Disp: , Rfl:   •  clopidogrel (PLAVIX) 75 MG tablet, Take 75 mg by  mouth Daily., Disp: , Rfl:   •  denosumab (PROLIA) 60 MG/ML solution prefilled syringe syringe, 60 mg 1 (One) Time., Disp: , Rfl:   •  Fluticasone-Umeclidin-Vilant 100-62.5-25 MCG/INH aerosol powder , Inhale 1 each Daily., Disp: 1 each, Rfl: 11  •  furosemide (LASIX) 40 MG tablet, Take 40 mg by mouth Daily., Disp: , Rfl:   •  isosorbide mononitrate (IMDUR) 120 MG 24 hr tablet, Take 120 mg by mouth Daily., Disp: , Rfl:   •  levothyroxine (SYNTHROID, LEVOTHROID) 75 MCG tablet, , Disp: , Rfl:   •  nitroglycerin (NITROSTAT) 0.4 MG SL tablet, , Disp: , Rfl:   •  potassium chloride (K-DUR,KLOR-CON) 10 MEQ CR tablet, Take 10 mEq by mouth 2 (Two) Times a Day., Disp: , Rfl:   •  predniSONE (DELTASONE) 10 MG tablet, Take 2 tablets by mouth Daily., Disp: 60 tablet, Rfl: 1  •  alendronate (FOSAMAX) 70 MG tablet, Take 70 mg by mouth Every 7 (Seven) Days., Disp: , Rfl:     History of Present Illness     Pt presents for follow up of AF/FIGUEROA/HTN. Since we last saw the pt, she underwent a bronchoscopy with a lavage of the right upper lobe on 4/12/2019 by Dr. Laura.  She was subsequently started on prednisone for possible hypersensitivity pneumonitis.  Cytology apparently was within normal limits.  Since initiation of the prednisone, her overall breathing has significantly improved.  She is less short of breath.  She does continue to have episodes of chest pain with exertional activity and is scheduled to see Dr. Block in the upcoming month.  He did bring a list of medications with her and that she tried in the past which included amiodarone as well as Ranexa.  Pt denies any rotations, LH, and dizziness. Denies any hospitalizations, bleeding, or TIA/CVA symptoms. Overall feels ok.  Pressure been well controlled at home.  Heart rates have been running 70s.    ROS:  General:  + fatigue, no weight gain or loss  Cardiovascular:  +CP, PND, syncope, near syncope, edema or palpitations.  Pulmonary:  + FIGUEROA, cough, or wheezing      Vitals:  "   05/16/19 1225   BP: 108/60   BP Location: Left arm   Patient Position: Sitting   Pulse: 84   Weight: 57.2 kg (126 lb)   Height: 147.3 cm (58\")     Body mass index is 26.33 kg/m².  PE:  General: NAD  Neck: no JVD, no carotid bruits, no TM  Heart RRR, NL S1, S2, S4 present, no rubs, murmurs  Lungs: CTA, no wheezes, rhonchi, or rales  Abd: soft, non-tender, NL BS  Ext: No musculoskeletal deformities, no edema, cyanosis, or clubbing  Psych: normal mood and affect    Diagnostic Data:      Procedures    1. Persistent atrial fibrillation (CMS/HCC)    2. Essential hypertension    3. Coronary artery disease involving native coronary artery of native heart without angina pectoris    4. Dyspnea on exertion    5. Interstitial lung disease (CMS/HCC)          Plan:  1) Chronic atrial fibrillation with adequate heart rate control.  on eliquis  2) Unstable angina:  Will up with cardiologist.  3) Dyspnea on exertion/hypersensitivity pneumonitis: Significant improvement on steroids.  4) Anticoagulation Continue NOAC/Plavix     F/up in 12 months      "

## 2019-05-24 LAB
FUNGUS WND CULT: NORMAL
MYCOBACTERIUM SPEC CULT: NORMAL
NIGHT BLUE STAIN TISS: NORMAL

## 2019-06-03 ENCOUNTER — OFFICE VISIT (OUTPATIENT)
Dept: PULMONOLOGY | Facility: CLINIC | Age: 79
End: 2019-06-03

## 2019-06-03 VITALS
TEMPERATURE: 97.5 F | SYSTOLIC BLOOD PRESSURE: 114 MMHG | HEIGHT: 58 IN | BODY MASS INDEX: 26.32 KG/M2 | RESPIRATION RATE: 18 BRPM | DIASTOLIC BLOOD PRESSURE: 78 MMHG | HEART RATE: 78 BPM | OXYGEN SATURATION: 94 % | WEIGHT: 125.38 LBS

## 2019-06-03 DIAGNOSIS — J84.9 INTERSTITIAL LUNG DISEASE (HCC): Primary | ICD-10-CM

## 2019-06-03 PROCEDURE — 99214 OFFICE O/P EST MOD 30 MIN: CPT | Performed by: INTERNAL MEDICINE

## 2019-06-03 PROCEDURE — 94726 PLETHYSMOGRAPHY LUNG VOLUMES: CPT | Performed by: INTERNAL MEDICINE

## 2019-06-03 PROCEDURE — 94729 DIFFUSING CAPACITY: CPT | Performed by: INTERNAL MEDICINE

## 2019-06-03 PROCEDURE — 94375 RESPIRATORY FLOW VOLUME LOOP: CPT | Performed by: INTERNAL MEDICINE

## 2019-06-03 PROCEDURE — 94618 PULMONARY STRESS TESTING: CPT | Performed by: INTERNAL MEDICINE

## 2019-06-03 RX ORDER — PREDNISONE 10 MG/1
20 TABLET ORAL DAILY
Qty: 60 TABLET | Refills: 3 | Status: SHIPPED | OUTPATIENT
Start: 2019-06-03 | End: 2019-12-02 | Stop reason: ALTCHOICE

## 2019-06-03 RX ORDER — PANTOPRAZOLE SODIUM 40 MG/1
40 TABLET, DELAYED RELEASE ORAL DAILY
Qty: 60 TABLET | Refills: 3 | Status: SHIPPED | OUTPATIENT
Start: 2019-06-03 | End: 2020-01-01 | Stop reason: SDUPTHER

## 2019-06-03 RX ORDER — POTASSIUM CHLORIDE 750 MG/1
10 TABLET, FILM COATED, EXTENDED RELEASE ORAL 2 TIMES DAILY
COMMUNITY
Start: 2019-04-18 | End: 2019-12-16 | Stop reason: SDUPTHER

## 2019-06-03 NOTE — PROGRESS NOTES
CC:    Shortness of air    HPI:    This is a pleasant 77 y/o WF w/ h/o lifetime non-smoking, Afib, prior amiodarone use, CAD s/p CABG 2012, previous stents, HTN, HLD, prior CVA w/ no obvious residual deficit, h/o secondhand smoke exposure, owns pet bird x 15-20 years who presents for evaluation of FIGUEROA.  She recently had a right and left heart cath 4/30/18 at [facility].  This showed a PA pressure of 45/23 (mean 30), PCWP 16, LVEDP 15, and CI of 1.5.  Calculated PVR is 6 Wood units.  LHC showed severe 2vCAD w/ patent LIMA to LAD, patent stents in LAD, patent SVG to PDA, and medical management was recommended.  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  No obvious auto-immune disease.     HRCT re-demonstrated diffuse GGO / mosaic attenuation worse in upper lobes.  V/Q w/ no evidence of CTEPH but +air trapping.  Extensive auto-immune / infectious serologies negative.  Underwent bronchoscopy with BAL RUL on 4/12/19 and prednisone 20 mg was started then.  Cultures were negative.  BAL fluid had lymphocyte predominance w/ CD4:CD8 ratio of 0.5.       INTERVAL HISTORY:    Returns today for follow up. Still has pet bird but has someone who is going to take it.  Has been on 20 mg of prednisone since around mid April.  Tolerating it well.  Has noticed a significant subjective improvement in exercise tolerance.  No chest tightness or wheezing.    PMH:    Past Medical History:   Diagnosis Date   • Arthritis    • Atrial fibrillation (CMS/HCC)    • Coronary artery disease    • Disease of thyroid gland    • Elevated cholesterol    • Hypertension    • Stroke (CMS/HCC)      PSH:    Past Surgical History:   Procedure Laterality Date   • BRONCHOSCOPY Bilateral 4/12/2019    Procedure: BRONCHOSCOPY;  Surgeon: Jeff Laura MD;  Location: [facility];  Service: Pulmonary   • BYPASS GRAFT     • CORONARY ANGIOPLASTY WITH STENT PLACEMENT     • HYSTERECTOMY       FH:    Family History   Problem  Relation Age of Onset   • Cancer Mother    • No Known Problems Father    • Liver disease Sister      SH:    Social History     Socioeconomic History   • Marital status:      Spouse name: Not on file   • Number of children: Not on file   • Years of education: Not on file   • Highest education level: Not on file   Tobacco Use   • Smoking status: Never Smoker   • Smokeless tobacco: Never Used   Substance and Sexual Activity   • Alcohol use: No   • Drug use: No   • Sexual activity: Defer     ALLERGIES:    Allergies   Allergen Reactions   • Tuberculin Tests Rash     MEDICATIONS:      Current Outpatient Medications:   •  amLODIPine (NORVASC) 5 MG tablet, Take 5 mg by mouth Daily., Disp: , Rfl:   •  apixaban (ELIQUIS) 2.5 MG tablet tablet, Take 2.5 mg by mouth 2 (Two) Times a Day., Disp: , Rfl:   •  atorvastatin (LIPITOR) 40 MG tablet, Take 40 mg by mouth Daily., Disp: , Rfl:   •  bisoprolol (ZEBETA) 5 MG tablet, Take 1.5 tablets by mouth 2 (Two) Times a Day., Disp: 270 tablet, Rfl: 3  •  cholecalciferol (VITAMIN D3) 1000 units tablet, Take 2,000 Units by mouth Daily., Disp: , Rfl:   •  clopidogrel (PLAVIX) 75 MG tablet, Take 75 mg by mouth Daily., Disp: , Rfl:   •  denosumab (PROLIA) 60 MG/ML solution prefilled syringe syringe, 60 mg 1 (One) Time., Disp: , Rfl:   •  furosemide (LASIX) 40 MG tablet, Take 40 mg by mouth Daily., Disp: , Rfl:   •  isosorbide mononitrate (IMDUR) 120 MG 24 hr tablet, Take 120 mg by mouth Daily., Disp: , Rfl:   •  levothyroxine (SYNTHROID, LEVOTHROID) 75 MCG tablet, , Disp: , Rfl:   •  nitroglycerin (NITROSTAT) 0.4 MG SL tablet, , Disp: , Rfl:   •  potassium chloride (K-DUR) 10 MEQ CR tablet, Take 10 mEq by mouth 2 (Two) Times a Day., Disp: , Rfl:   •  potassium chloride (K-DUR,KLOR-CON) 10 MEQ CR tablet, Take 10 mEq by mouth 2 (Two) Times a Day., Disp: , Rfl:   •  predniSONE (DELTASONE) 10 MG tablet, Take 2 tablets by mouth Daily., Disp: 60 tablet, Rfl: 1  ROS:  Per HPI, otherwise all  systems reviewed and negative.    DIAGNOSTIC DATA (Reviewed and interpreted by me unless otherwise specified):    PFT 11/19/18 - No obstruction, No restriction, air trapping, moderate reduction in DLCO corrects for alveolar volume    PFT 6/3/19 - no obstruction or restriction, FEV1 and FVC significantly improved from prior, air trapping, moderate reduction in DLCO corrects for alveolar volume    6MW 11/19/18 - RA resting sat 95%, minimum sat 94% on RA, walked 137.2 meters 36% predicted    6MW 6/3/19 - starting sat 99%, minimum sat 98% on RA, walked 137 meters, 36% predicted    CXR 11/19/18 - chronic appearing changes in bases, mediastinal / hilar LAD    HRCT 11/30/18 & 1/21/19 - extensive GGO predominant in the bilateral upper lobes, likely some component of mosaic attenuation    V/Q 1/21/19 - air trapping    Vitals:    06/03/19 1038   BP: 114/78   Pulse: 78   Resp: 18   Temp: 97.5 °F (36.4 °C)   SpO2: 94%       Physical Exam   Constitutional: Oriented to person, place, and time. Appears well-developed and well-nourished.   Head: Normocephalic and atraumatic.   Nose: Nose normal.   Mouth/Throat: Oropharynx is clear and moist.   Eyes: Conjunctivae are normal.  Pupils normal.  Neck: No tracheal deviation present.   Cardiovascular: Normal rate, regular rhythm, normal heart sounds and intact distal pulses.  Exam reveals no gallop and no friction rub.  No thrill.  No JVD.  No edema.  No murmur heard.  Pulmonary/Chest: Effort normal and breath sounds normal.  No tenderness to palpation.  No clubbing.   Abdominal: Soft. Bowel sounds are normal. No distension. No tenderness. There is no guarding.   Musculoskeletal: Normal range of motion.  No tenderness.  Lymphadenopathy:  No cervical adenopathy.   Neurological:  No new focal neurological deficits observed   Skin: Skin is warm and dry. No rash noted.   Psychiatric: Normal mood and affect.  Behavior is normal. Judgment normal.    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  Has been on prednisone 20 mg daily since mid April with minimal side effects.  Has osteoporosis.  If prednisone not tolerated or she fails to improve we can add cellcept.  She appears to be clinically and functionally (PFT's) improving on current treatment.  She is getting rid of the bird soon.  Will plan on continuing prednisone 20 mg and seeing her back in 3 months with full PFT, 6MW, and HRCT.  At that point we may be able to begin tapering. Add low dose PPI for gastroprotection.    I think her pulmonary htn is secondary to underlying lung disease and there is currently no role for selective pulmonary vasodilators.    RTC 3 months w/ PFT, 6MW, and HRCT    Jeff Laura MD  Pulmonology and Critical Care Medicine  06/03/19 11:09 AM  Electronically Signed    C.C.:  Tien Archer MD, Fredi Ledesma MD

## 2019-07-08 ENCOUNTER — TELEPHONE (OUTPATIENT)
Dept: PULMONOLOGY | Facility: CLINIC | Age: 79
End: 2019-07-08

## 2019-07-08 NOTE — TELEPHONE ENCOUNTER
Patient called states is having severe swelling in feet is having sepage instructing patient to call PCP or go to ED.

## 2019-08-15 ENCOUNTER — TELEPHONE (OUTPATIENT)
Dept: CARDIOLOGY | Facility: CLINIC | Age: 79
End: 2019-08-15

## 2019-08-15 NOTE — TELEPHONE ENCOUNTER
Pt's daughter had called the other day to see if Dr. Archer had gotten Dr. Block's records.  After review of those records, he feels the only option, due to her poor lung function is to try Tikosyn, with only @60% chance that that may work.  She will not benefit from BivPPM/AVN as her heart rates are controlled and her EF is 60-65%.  Her options are not good.

## 2019-08-22 NOTE — TELEPHONE ENCOUNTER
Dr. Archer reviewed additional recs from 2nd admission and still recommends tikosyn.  I spoke with dtr and instructed her that he still feels this is best option.  She would like to wait until her mother sees pulmonary on 9/4 to determine if they would like to do Tikosyn admission.

## 2019-09-04 ENCOUNTER — OFFICE VISIT (OUTPATIENT)
Dept: PULMONOLOGY | Facility: CLINIC | Age: 79
End: 2019-09-04

## 2019-09-04 ENCOUNTER — HOSPITAL ENCOUNTER (OUTPATIENT)
Dept: CT IMAGING | Facility: HOSPITAL | Age: 79
Discharge: HOME | End: 2019-09-04
Admitting: INTERNAL MEDICINE

## 2019-09-04 VITALS
OXYGEN SATURATION: 91 % | DIASTOLIC BLOOD PRESSURE: 58 MMHG | SYSTOLIC BLOOD PRESSURE: 98 MMHG | WEIGHT: 128.4 LBS | TEMPERATURE: 97.7 F | BODY MASS INDEX: 26.95 KG/M2 | HEIGHT: 58 IN | HEART RATE: 92 BPM

## 2019-09-04 DIAGNOSIS — J84.9 INTERSTITIAL LUNG DISEASE (HCC): ICD-10-CM

## 2019-09-04 DIAGNOSIS — J67.9 HYPERSENSITIVITY PNEUMONITIS (HCC): ICD-10-CM

## 2019-09-04 DIAGNOSIS — J84.9 INTERSTITIAL LUNG DISEASE (HCC): Primary | ICD-10-CM

## 2019-09-04 PROCEDURE — 71250 CT THORAX DX C-: CPT

## 2019-09-04 PROCEDURE — 99214 OFFICE O/P EST MOD 30 MIN: CPT | Performed by: INTERNAL MEDICINE

## 2019-09-04 PROCEDURE — 94729 DIFFUSING CAPACITY: CPT | Performed by: INTERNAL MEDICINE

## 2019-09-04 PROCEDURE — 94375 RESPIRATORY FLOW VOLUME LOOP: CPT | Performed by: INTERNAL MEDICINE

## 2019-09-04 PROCEDURE — 94726 PLETHYSMOGRAPHY LUNG VOLUMES: CPT | Performed by: INTERNAL MEDICINE

## 2019-09-04 RX ORDER — PREDNISONE 1 MG/1
TABLET ORAL
Qty: 10 TABLET | Refills: 0 | Status: SHIPPED | OUTPATIENT
Start: 2019-09-04 | End: 2019-11-19

## 2019-09-04 RX ORDER — MYCOPHENOLATE MOFETIL 500 MG/1
500 TABLET ORAL 2 TIMES DAILY
Qty: 60 TABLET | Refills: 4 | Status: SHIPPED | OUTPATIENT
Start: 2019-09-04 | End: 2019-12-09

## 2019-09-09 DIAGNOSIS — J84.9 INTERSTITIAL LUNG DISEASE (HCC): Primary | ICD-10-CM

## 2019-10-13 ENCOUNTER — HOSPITAL ENCOUNTER (EMERGENCY)
Facility: HOSPITAL | Age: 79
End: 2019-10-13
Attending: EMERGENCY MEDICINE

## 2019-10-13 ENCOUNTER — HOSPITAL ENCOUNTER (INPATIENT)
Facility: HOSPITAL | Age: 79
LOS: 4 days | Discharge: HOME | End: 2019-10-17
Attending: INTERNAL MEDICINE | Admitting: INTERNAL MEDICINE

## 2019-10-13 ENCOUNTER — APPOINTMENT (OUTPATIENT)
Dept: OTHER | Facility: HOSPITAL | Age: 79
End: 2019-10-13

## 2019-10-13 ENCOUNTER — APPOINTMENT (OUTPATIENT)
Dept: CARDIOLOGY | Facility: HOSPITAL | Age: 79
End: 2019-10-13

## 2019-10-13 VITALS — HEIGHT: 58 IN | BODY MASS INDEX: 29.6 KG/M2 | WEIGHT: 141 LBS

## 2019-10-13 DIAGNOSIS — K92.1 GASTROINTESTINAL HEMORRHAGE WITH MELENA: Primary | ICD-10-CM

## 2019-10-13 PROBLEM — N17.9 AKI (ACUTE KIDNEY INJURY) (HCC): Status: ACTIVE | Noted: 2019-10-13

## 2019-10-13 PROBLEM — K92.2 GI BLEED: Status: ACTIVE | Noted: 2019-10-13

## 2019-10-13 LAB
ABO GROUP BLD: NORMAL
ABO GROUP BLD: NORMAL
ADV 40+41 DNA STL QL NAA+NON-PROBE: NOT DETECTED
ALBUMIN SERPL-MCNC: 3.9 G/DL (ref 3.5–5.2)
ALBUMIN/GLOB SERPL: 1.3 G/DL
ALP SERPL-CCNC: 41 U/L (ref 39–117)
ALT SERPL W P-5'-P-CCNC: 11 U/L (ref 1–33)
ANION GAP SERPL CALCULATED.3IONS-SCNC: 11 MMOL/L (ref 5–15)
AST SERPL-CCNC: 16 U/L (ref 1–32)
ASTRO TYP 1-8 RNA STL QL NAA+NON-PROBE: NOT DETECTED
BASOPHILS # BLD AUTO: 0.02 10*3/MM3 (ref 0–0.2)
BASOPHILS NFR BLD AUTO: 0.3 % (ref 0–1.5)
BILIRUB SERPL-MCNC: 0.5 MG/DL (ref 0.2–1.2)
BLD GP AB SCN SERPL QL: NEGATIVE
BUN BLD-MCNC: 42 MG/DL (ref 8–23)
BUN/CREAT SERPL: 22.7 (ref 7–25)
C CAYETANENSIS DNA STL QL NAA+NON-PROBE: NOT DETECTED
C DIFF TOX GENS STL QL NAA+PROBE: NOT DETECTED
CALCIUM SPEC-SCNC: 8.8 MG/DL (ref 8.6–10.5)
CAMPY SP DNA.DIARRHEA STL QL NAA+PROBE: NOT DETECTED
CHLORIDE SERPL-SCNC: 110 MMOL/L (ref 98–107)
CO2 SERPL-SCNC: 20 MMOL/L (ref 22–29)
CREAT BLD-MCNC: 1.85 MG/DL (ref 0.57–1)
CRYPTOSP STL CULT: NOT DETECTED
DEPRECATED RDW RBC AUTO: 55.3 FL (ref 37–54)
E COLI DNA SPEC QL NAA+PROBE: NOT DETECTED
E HISTOLYT AG STL-ACNC: NOT DETECTED
EAEC PAA PLAS AGGR+AATA ST NAA+NON-PRB: NOT DETECTED
EC STX1 + STX2 GENES STL NAA+PROBE: NOT DETECTED
EOSINOPHIL # BLD AUTO: 0.06 10*3/MM3 (ref 0–0.4)
EOSINOPHIL NFR BLD AUTO: 0.9 % (ref 0.3–6.2)
EPEC EAE GENE STL QL NAA+NON-PROBE: NOT DETECTED
ERYTHROCYTE [DISTWIDTH] IN BLOOD BY AUTOMATED COUNT: 17 % (ref 12.3–15.4)
ETEC LTA+ST1A+ST1B TOX ST NAA+NON-PROBE: NOT DETECTED
FERRITIN SERPL-MCNC: 31.8 NG/ML (ref 13–150)
FOLATE SERPL-MCNC: >20 NG/ML (ref 4.78–24.2)
G LAMBLIA DNA SPEC QL NAA+PROBE: NOT DETECTED
GFR SERPL CREATININE-BSD FRML MDRD: 26 ML/MIN/1.73
GLOBULIN UR ELPH-MCNC: 2.9 GM/DL
GLUCOSE BLD-MCNC: 101 MG/DL (ref 65–99)
HCT VFR BLD AUTO: 26.7 % (ref 34–46.6)
HGB BLD-MCNC: 7.9 G/DL (ref 12–15.9)
IMM GRANULOCYTES # BLD AUTO: 0.03 10*3/MM3 (ref 0–0.05)
IMM GRANULOCYTES NFR BLD AUTO: 0.5 % (ref 0–0.5)
IRON 24H UR-MRATE: 29 MCG/DL (ref 37–145)
IRON SATN MFR SERPL: 7 % (ref 20–50)
LYMPHOCYTES # BLD AUTO: 0.72 10*3/MM3 (ref 0.7–3.1)
LYMPHOCYTES NFR BLD AUTO: 10.8 % (ref 19.6–45.3)
MCH RBC QN AUTO: 26.5 PG (ref 26.6–33)
MCHC RBC AUTO-ENTMCNC: 29.6 G/DL (ref 31.5–35.7)
MCV RBC AUTO: 89.6 FL (ref 79–97)
MONOCYTES # BLD AUTO: 0.42 10*3/MM3 (ref 0.1–0.9)
MONOCYTES NFR BLD AUTO: 6.3 % (ref 5–12)
NEUTROPHILS # BLD AUTO: 5.39 10*3/MM3 (ref 1.7–7)
NEUTROPHILS NFR BLD AUTO: 81.2 % (ref 42.7–76)
NOROVIRUS GI+II RNA STL QL NAA+NON-PROBE: NOT DETECTED
NRBC BLD AUTO-RTO: 0 /100 WBC (ref 0–0.2)
NT-PROBNP SERPL-MCNC: 7224 PG/ML (ref 5–1800)
P SHIGELLOIDES DNA STL QL NAA+PROBE: NOT DETECTED
PLATELET # BLD AUTO: 238 10*3/MM3 (ref 140–450)
PMV BLD AUTO: 9.4 FL (ref 6–12)
POTASSIUM BLD-SCNC: 4.6 MMOL/L (ref 3.5–5.2)
PROT SERPL-MCNC: 6.8 G/DL (ref 6–8.5)
RBC # BLD AUTO: 2.98 10*6/MM3 (ref 3.77–5.28)
RETICS # AUTO: 0.06 10*6/MM3 (ref 0.02–0.13)
RETICS/RBC NFR AUTO: 1.95 % (ref 0.7–1.9)
RH BLD: POSITIVE
RH BLD: POSITIVE
RV RNA STL NAA+PROBE: NOT DETECTED
SALMONELLA DNA SPEC QL NAA+PROBE: NOT DETECTED
SAPO I+II+IV+V RNA STL QL NAA+NON-PROBE: NOT DETECTED
SHIGELLA SP+EIEC IPAH STL QL NAA+PROBE: NOT DETECTED
SODIUM BLD-SCNC: 141 MMOL/L (ref 136–145)
T&S EXPIRATION DATE: NORMAL
TIBC SERPL-MCNC: 392 MCG/DL (ref 298–536)
TRANSFERRIN SERPL-MCNC: 263 MG/DL (ref 200–360)
TSH SERPL DL<=0.05 MIU/L-ACNC: 3.17 UIU/ML (ref 0.27–4.2)
V CHOLERAE DNA SPEC QL NAA+PROBE: NOT DETECTED
VIBRIO DNA SPEC NAA+PROBE: NOT DETECTED
VIT B12 BLD-MCNC: 308 PG/ML (ref 211–946)
WBC NRBC COR # BLD: 6.64 10*3/MM3 (ref 3.4–10.8)
YERSINIA STL CULT: NOT DETECTED

## 2019-10-13 PROCEDURE — 82746 ASSAY OF FOLIC ACID SERUM: CPT | Performed by: INTERNAL MEDICINE

## 2019-10-13 PROCEDURE — 86901 BLOOD TYPING SEROLOGIC RH(D): CPT

## 2019-10-13 PROCEDURE — 86850 RBC ANTIBODY SCREEN: CPT | Performed by: INTERNAL MEDICINE

## 2019-10-13 PROCEDURE — 83540 ASSAY OF IRON: CPT | Performed by: INTERNAL MEDICINE

## 2019-10-13 PROCEDURE — 86901 BLOOD TYPING SEROLOGIC RH(D): CPT | Performed by: INTERNAL MEDICINE

## 2019-10-13 PROCEDURE — 85025 COMPLETE CBC W/AUTO DIFF WBC: CPT | Performed by: INTERNAL MEDICINE

## 2019-10-13 PROCEDURE — 87493 C DIFF AMPLIFIED PROBE: CPT | Performed by: INTERNAL MEDICINE

## 2019-10-13 PROCEDURE — 99223 1ST HOSP IP/OBS HIGH 75: CPT | Performed by: INTERNAL MEDICINE

## 2019-10-13 PROCEDURE — 86900 BLOOD TYPING SEROLOGIC ABO: CPT

## 2019-10-13 PROCEDURE — 84443 ASSAY THYROID STIM HORMONE: CPT | Performed by: INTERNAL MEDICINE

## 2019-10-13 PROCEDURE — 82728 ASSAY OF FERRITIN: CPT | Performed by: INTERNAL MEDICINE

## 2019-10-13 PROCEDURE — 93306 TTE W/DOPPLER COMPLETE: CPT | Performed by: INTERNAL MEDICINE

## 2019-10-13 PROCEDURE — 85045 AUTOMATED RETICULOCYTE COUNT: CPT | Performed by: INTERNAL MEDICINE

## 2019-10-13 PROCEDURE — 93306 TTE W/DOPPLER COMPLETE: CPT

## 2019-10-13 PROCEDURE — 84466 ASSAY OF TRANSFERRIN: CPT | Performed by: INTERNAL MEDICINE

## 2019-10-13 PROCEDURE — 83880 ASSAY OF NATRIURETIC PEPTIDE: CPT | Performed by: INTERNAL MEDICINE

## 2019-10-13 PROCEDURE — 80053 COMPREHEN METABOLIC PANEL: CPT | Performed by: INTERNAL MEDICINE

## 2019-10-13 PROCEDURE — 63710000001 MYCOPHENOLATE MOFETIL PER 250 MG: Performed by: INTERNAL MEDICINE

## 2019-10-13 PROCEDURE — 25010000002 FUROSEMIDE PER 20 MG: Performed by: INTERNAL MEDICINE

## 2019-10-13 PROCEDURE — 82607 VITAMIN B-12: CPT | Performed by: INTERNAL MEDICINE

## 2019-10-13 PROCEDURE — 0097U HC BIOFIRE FILMARRAY GI PANEL: CPT | Performed by: INTERNAL MEDICINE

## 2019-10-13 PROCEDURE — 86900 BLOOD TYPING SEROLOGIC ABO: CPT | Performed by: INTERNAL MEDICINE

## 2019-10-13 RX ORDER — LEVOTHYROXINE SODIUM 0.07 MG/1
75 TABLET ORAL
Status: DISCONTINUED | OUTPATIENT
Start: 2019-10-14 | End: 2019-10-17 | Stop reason: HOSPADM

## 2019-10-13 RX ORDER — PANTOPRAZOLE SODIUM 40 MG/10ML
40 INJECTION, POWDER, LYOPHILIZED, FOR SOLUTION INTRAVENOUS
Status: DISCONTINUED | OUTPATIENT
Start: 2019-10-13 | End: 2019-10-16

## 2019-10-13 RX ORDER — BISOPROLOL FUMARATE 5 MG/1
5 TABLET, FILM COATED ORAL DAILY
Status: DISCONTINUED | OUTPATIENT
Start: 2019-10-13 | End: 2019-10-17 | Stop reason: HOSPADM

## 2019-10-13 RX ORDER — SODIUM CHLORIDE 0.9 % (FLUSH) 0.9 %
10 SYRINGE (ML) INJECTION AS NEEDED
Status: DISCONTINUED | OUTPATIENT
Start: 2019-10-13 | End: 2019-10-17 | Stop reason: HOSPADM

## 2019-10-13 RX ORDER — SODIUM CHLORIDE 0.9 % (FLUSH) 0.9 %
10 SYRINGE (ML) INJECTION EVERY 12 HOURS SCHEDULED
Status: DISCONTINUED | OUTPATIENT
Start: 2019-10-13 | End: 2019-10-17 | Stop reason: HOSPADM

## 2019-10-13 RX ORDER — FUROSEMIDE 10 MG/ML
40 INJECTION INTRAMUSCULAR; INTRAVENOUS ONCE
Status: COMPLETED | OUTPATIENT
Start: 2019-10-13 | End: 2019-10-13

## 2019-10-13 RX ORDER — FUROSEMIDE 80 MG
80 TABLET ORAL
COMMUNITY
End: 2019-12-02 | Stop reason: ALTCHOICE

## 2019-10-13 RX ORDER — ATORVASTATIN CALCIUM 40 MG/1
40 TABLET, FILM COATED ORAL NIGHTLY
Status: DISCONTINUED | OUTPATIENT
Start: 2019-10-13 | End: 2019-10-17 | Stop reason: HOSPADM

## 2019-10-13 RX ORDER — MYCOPHENOLATE MOFETIL 250 MG/1
500 CAPSULE ORAL EVERY 12 HOURS SCHEDULED
Status: DISCONTINUED | OUTPATIENT
Start: 2019-10-13 | End: 2019-10-17 | Stop reason: HOSPADM

## 2019-10-13 RX ORDER — ONDANSETRON 2 MG/ML
4 INJECTION INTRAMUSCULAR; INTRAVENOUS EVERY 6 HOURS PRN
Status: DISCONTINUED | OUTPATIENT
Start: 2019-10-13 | End: 2019-10-17 | Stop reason: HOSPADM

## 2019-10-13 RX ADMIN — BISOPROLOL FUMARATE 5 MG: 5 TABLET ORAL at 17:34

## 2019-10-13 RX ADMIN — SODIUM CHLORIDE, PRESERVATIVE FREE 10 ML: 5 INJECTION INTRAVENOUS at 17:31

## 2019-10-13 RX ADMIN — MYCOPHENOLATE MOFETIL 500 MG: 250 CAPSULE ORAL at 17:29

## 2019-10-13 RX ADMIN — SODIUM CHLORIDE, PRESERVATIVE FREE 10 ML: 5 INJECTION INTRAVENOUS at 21:19

## 2019-10-13 RX ADMIN — FUROSEMIDE 40 MG: 10 INJECTION, SOLUTION INTRAMUSCULAR; INTRAVENOUS at 17:29

## 2019-10-13 RX ADMIN — PANTOPRAZOLE SODIUM 40 MG: 40 INJECTION, POWDER, FOR SOLUTION INTRAVENOUS at 17:29

## 2019-10-13 RX ADMIN — ATORVASTATIN CALCIUM 40 MG: 40 TABLET, FILM COATED ORAL at 21:15

## 2019-10-13 NOTE — H&P
[facility] Medicine Services  HISTORY AND PHYSICAL    Patient Name: Pat Morel  : 1940  MRN: [id number]  Primary Care Physician: Fredi Ledesma MD  Date of admission: 10/13/2019      Subjective   Subjective     Chief Complaint:GI bleed    HPI:  Pat Morel is a 79 y.o. female with history of CAD status post CABG, hypersensitivity pneumonitis currently on CellCept and , atrial fibrillation currently on Eliquis, previous stroke with no residual deficits, stable hypertension, pulmonary hypertension, recent GI bleed status post EGD.  Patient is transferred from OSH with concerns for ongoing GI bleed after presenting with 8 days of progressively worsening shortness of breath, with associated epigastric pain and dark tarry stools.  Patient states that for the last 1 week she has been feeling overtly tired, significant swelling, did endorse some abdominal cramping with associated diarrhea, however, yesterday diarrhea dark similar to her previous presentation.  On presentation to OSH work-up did include a CBC that showed hemoglobin of 8.0, Cr 2.19, BUN 43, CT abd\pelv showed multiple large gallstones with no evidence of cholecystitis, pandiverticulosis.  Patient was then transferred here for further work-up.  The time of evaluation patient did endorse abdominal cramping, felt lightheaded/dizzy, denied any fevers or chills, no chest pain, had some nausea but no vomiting.    Review of Systems   Gen- No fevers, chills  CV- No chest pain, palpitations  Resp- No cough, dyspnea  GI- + nausea, no V/D, +abd pain      All other systems reviewed and are negative.     Personal History     Past Medical History:   Diagnosis Date   • Arthritis    • Atrial fibrillation (CMS/HCC)    • Coronary artery disease    • Disease of thyroid gland    • Elevated cholesterol    • Hypertension    • Stroke (CMS/HCC)        Past Surgical History:   Procedure Laterality Date   • BRONCHOSCOPY  Bilateral 4/12/2019    Procedure: BRONCHOSCOPY;  Surgeon: Jeff Laura MD;  Location: [facility] ENDOSCOPY;  Service: Pulmonary   • BYPASS GRAFT     • CORONARY ANGIOPLASTY WITH STENT PLACEMENT     • HYSTERECTOMY         Family History: family history includes Cancer in her mother; Liver disease in her sister; No Known Problems in her father. Otherwise pertinent FHx was reviewed and unremarkable.     Social History:  reports that she has never smoked. She has never used smokeless tobacco. She reports that she does not drink alcohol or use drugs.  Social History     Social History Narrative   • Not on file       Medications:    Available home medication information reviewed.  Medications Prior to Admission   Medication Sig Dispense Refill Last Dose   • furosemide (LASIX) 40 MG tablet Take 40 mg by mouth Daily With Lunch.      • amLODIPine (NORVASC) 5 MG tablet Take 5 mg by mouth Daily.    at Unknown time   • apixaban (ELIQUIS) 2.5 MG tablet tablet Take 2.5 mg by mouth 2 (Two) Times a Day.   Taking   • atorvastatin (LIPITOR) 40 MG tablet Take 40 mg by mouth Daily.   Taking   • bisoprolol (ZEBETA) 5 MG tablet Take 1.5 tablets by mouth 2 (Two) Times a Day. (Patient taking differently: Take 5 mg by mouth Daily.) 270 tablet 3 Taking   • cholecalciferol (VITAMIN D3) 1000 units tablet Take 2,000 Units by mouth Daily.   Taking   • clopidogrel (PLAVIX) 75 MG tablet Take 75 mg by mouth Daily.   Not Taking   • denosumab (PROLIA) 60 MG/ML solution prefilled syringe syringe 60 mg 1 (One) Time.   Not Taking   • Fluticasone Furoate-Vilanterol (BREO ELLIPTA) 200-25 MCG/INH inhaler Inhale 1 puff Daily. 1 each 11    • furosemide (LASIX) 40 MG tablet Take 80 mg by mouth Daily.   Taking   • isosorbide mononitrate (IMDUR) 120 MG 24 hr tablet Take 120 mg by mouth Daily.   Taking   • levothyroxine (SYNTHROID, LEVOTHROID) 75 MCG tablet    Taking   • mycophenolate (CELLCEPT) 500 MG tablet Take 1 tablet by mouth 2 (Two) Times a  Day. 60 tablet 4    • nitroglycerin (NITROSTAT) 0.4 MG SL tablet    Taking   • pantoprazole (PROTONIX) 40 MG EC tablet Take 1 tablet by mouth Daily. 60 tablet 3 Taking   • potassium chloride (K-DUR) 10 MEQ CR tablet Take 10 mEq by mouth 2 (Two) Times a Day.   Taking   • potassium chloride (K-DUR,KLOR-CON) 10 MEQ CR tablet Take 10 mEq by mouth 2 (Two) Times a Day.   Taking   • predniSONE (DELTASONE) 10 MG tablet Take 2 tablets by mouth Daily. 60 tablet 3 Patient Taking Differently   • predniSONE (DELTASONE) 5 MG tablet 5 mg daily x 1 week, then 5 mg mon, wed, fri, then stop 10 tablet 0        Allergies   Allergen Reactions   • Tuberculin Tests Rash       Objective   Objective     Vital Signs:   Temp:  [98.1 °F (36.7 °C)] 98.1 °F (36.7 °C)  Heart Rate:  [89] 89  Resp:  [18] 18  BP: (149-152)/() 149/105        Physical Exam   Constitutional: Pleasant elderly lady, in no acute distress, awake, alert  Eyes: PERRLA, sclerae anicteric, no conjunctival injection  HENT: NCAT, mucous membranes moist  Neck: Supple, no thyromegaly, no lymphadenopathy, trachea midline  Respiratory: Clear to auscultation bilaterally, nonlabored respirations   Cardiovascular: RRR, no murmurs, rubs, or gallops, palpable pedal pulses bilaterally  Gastrointestinal: Positive bowel sounds, soft, mild epigastric tenderness , nondistended  Musculoskeletal: No bilateral ankle edema, no clubbing or cyanosis to extremities  Psychiatric: Appropriate affect, cooperative  Neurologic: Oriented x 3, strength symmetric in all extremities, Cranial Nerves grossly intact to confrontation, speech clear  Skin: No rashes    Results Reviewed:  I have personally reviewed current lab and radiology data.              Invalid input(s):  ALKPHOS  CrCl cannot be calculated (No order found.).  Brief Urine Lab Results     None        Imaging Results (last 24 hours)     ** No results found for the last 24 hours. **             Assessment/Plan   Assessment / Plan     Active  Hospital Problems    Diagnosis POA   • **GI bleed [K92.2] Yes   • Hypersensitivity pneumonitis (CMS/HCC) [J67.9] Yes   • Pulmonary arterial hypertension (CMS/HCC) [I27.21] Yes   • Persistent atrial fibrillation [I48.19] Yes     79 yof with history of hypersensitivity pneumonitis currently on CellCept and prednisone, atrial fibrillation currently on Eliquis, previous stroke with no residual deficits, CAD status post CABG upper GI bleed, stable hypertension, pulmonary hypertension.  Patient is transferred from OSH with concerns for ongoing GI bleed after presenting with 8 days of progressively worsening shortness of breath, with associated epigastric pain and dark tarry stools.    Plan  -GI bleed, suspect upper with setting of elevated BUN, has prior history of ulcers,will start protinx IV, get stat CBC and serum pain, trend h/h q8hrs, consult GI for possible EGD, will keep n.p.o. for now  -Hypersensitivity pneumonitis, followed by pulmonary, patient currently CellCept, she has previously been weaned off prednisone will continue this for now as she is currently stable, consider pulmonary consult in the a.m.  -Hypertension, BP seems to be currently stable, will hold on home antihypertensives considering GI bleed  -History of CAD s/p CABG, now with volume overload, no history of CHF, she is s/p LHC at OSH this past August (data deficient), on Plavix and Lasix which we will hold for now, pending evaluation by GI, get echo  -Group 3 mild pulmonary hypertension likely secondary to underlying, follows with pulmonary.  -History of persistent atrial fibrillation, rates controlled, currently on  Eliquis, will hold this sec to GI bleed, continue beta-blocker.  -Continue Synthroid for hypothyroidism, check TSH  -PT/OT/CM  -RENEE, baseline Cr unknown, 2.19 at OSH, will recheck here    DVT prophylaxis: Mechanical (Eliquis on hold for GI bleed)    CODE STATUS:    Code Status and Medical Interventions:   Ordered at: 10/13/19 1142      Level Of Support Discussed With:    Patient     Code Status:    No CPR     Medical Interventions (Level of Support Prior to Arrest):    Full       Admission Status:  I believe this patient meets INPATIENT status due to GI bleed requiring close monitoring of h/h and GI evaluation for possible EGD.  I feel patient’s risk for adverse outcomes and need for care warrant INPATIENT evaluation and I predict the patient’s care encounter to likely last beyond 2 midnights.    Electronically signed by Luis Mayorga MD, 10/13/19, 10:59 AM.

## 2019-10-13 NOTE — PLAN OF CARE
Problem: Patient Care Overview  Goal: Plan of Care Review  Outcome: Ongoing (interventions implemented as appropriate)   10/13/19 1645   Coping/Psychosocial   Plan of Care Reviewed With patient   Plan of Care Review   Progress improving   OTHER   Outcome Summary pt arrived from OSH. orders and consults completed. EGD planned for tomorrow. c diff sent and negative. will continue to monitor.

## 2019-10-13 NOTE — CONSULTS
[facility] [place] [facility]    Referring Provider: Valente Murphy MD    Primary Care Provider: Fredi Ledesma MD    Reason for Consultation: Melena    Chief complaint melena    History of present illness:  Pat Morel is a 79 y.o. female who is admitted with melena.  She has had loose diarrhea stools for the last 7 days.  Today she had an episode of melena.  She had a history of GI bleed in August of this year at [place].  Apparently  ulcers were found.  Has been on Protonix.  No aspirin NSAIDs.  She is on Plavix and Eliquis.  She is seen in the pulmonary clinic by Dr. Laura for hypersensitivity pneumonitis and started on CellCept.  She has been on steroids with this been decreased.  She had nausea but no vomiting or hematemesis.  No abdominal pain.  Complains of early satiety.  Has nocturnal diarrhea.  No bright red blood per rectum.  Last colonoscopy was over 5 years ago.    Allergies:  Tuberculin tests    Scheduled Meds:    atorvastatin 40 mg Oral Nightly   bisoprolol 5 mg Oral Daily   [START ON 10/14/2019] levothyroxine 75 mcg Oral Q AM   mycophenolate 500 mg Oral Q12H   pantoprazole 40 mg Intravenous BID AC   sodium chloride 10 mL Intravenous Q12H        Infusions:       PRN Meds:  ondansetron  •  sodium chloride    Home Meds:  Medications Prior to Admission   Medication Sig Dispense Refill Last Dose   • furosemide (LASIX) 40 MG tablet Take 40 mg by mouth Daily With Lunch.      • amLODIPine (NORVASC) 5 MG tablet Take 5 mg by mouth Daily.    at Unknown time   • apixaban (ELIQUIS) 2.5 MG tablet tablet Take 2.5 mg by mouth 2 (Two) Times a Day.   Taking   • atorvastatin (LIPITOR) 40 MG tablet Take 40 mg by mouth Daily.   Taking   • bisoprolol (ZEBETA) 5 MG tablet Take 1.5 tablets by mouth 2 (Two) Times a Day. (Patient taking differently: Take 5 mg by mouth Daily.) 270 tablet 3 Taking   • cholecalciferol (VITAMIN D3) 1000 units tablet Take 2,000 Units by mouth Daily.   Taking   • clopidogrel (PLAVIX)  75 MG tablet Take 75 mg by mouth Daily.   Not Taking   • denosumab (PROLIA) 60 MG/ML solution prefilled syringe syringe 60 mg 1 (One) Time.   Not Taking   • Fluticasone Furoate-Vilanterol (BREO ELLIPTA) 200-25 MCG/INH inhaler Inhale 1 puff Daily. 1 each 11    • furosemide (LASIX) 40 MG tablet Take 80 mg by mouth Daily.   Taking   • isosorbide mononitrate (IMDUR) 120 MG 24 hr tablet Take 120 mg by mouth Daily.   Taking   • levothyroxine (SYNTHROID, LEVOTHROID) 75 MCG tablet    Taking   • mycophenolate (CELLCEPT) 500 MG tablet Take 1 tablet by mouth 2 (Two) Times a Day. 60 tablet 4    • nitroglycerin (NITROSTAT) 0.4 MG SL tablet    Taking   • pantoprazole (PROTONIX) 40 MG EC tablet Take 1 tablet by mouth Daily. 60 tablet 3 Taking   • potassium chloride (K-DUR) 10 MEQ CR tablet Take 10 mEq by mouth 2 (Two) Times a Day.   Taking   • potassium chloride (K-DUR,KLOR-CON) 10 MEQ CR tablet Take 10 mEq by mouth 2 (Two) Times a Day.   Taking   • predniSONE (DELTASONE) 10 MG tablet Take 2 tablets by mouth Daily. 60 tablet 3 Patient Taking Differently   • predniSONE (DELTASONE) 5 MG tablet 5 mg daily x 1 week, then 5 mg mon, wed, fri, then stop 10 tablet 0        ROS: Review of Systems   Constitutional: Positive for activity change, appetite change and fatigue. Negative for chills, fever and unexpected weight change.   Respiratory: Positive for shortness of breath.    Cardiovascular: Positive for leg swelling. Negative for chest pain.   Gastrointestinal: Positive for blood in stool, diarrhea and nausea. Negative for abdominal pain and vomiting.   All other systems reviewed and are negative.      PAST MED HX: Pt  has a past medical history of Arthritis, Atrial fibrillation (CMS/HCC), Coronary artery disease, Disease of thyroid gland, Elevated cholesterol, Hypertension, and Stroke (CMS/HCC).  PAST SURG HX: Pt  has a past surgical history that includes Coronary angioplasty with stent; Bypass Graft; Hysterectomy; and Bronchoscopy  (Bilateral, 4/12/2019).  FAM HX: family history includes Cancer in her mother; Liver disease in her sister; No Known Problems in her father.  SOC HX: Pt  reports that she has never smoked. She has never used smokeless tobacco. She reports that she does not drink alcohol or use drugs.    BP (!) 149/105 (BP Location: Right arm, Patient Position: Lying)   Pulse 89   Temp 98.1 °F (36.7 °C) (Axillary)   Resp 18   Wt 64.3 kg (141 lb 12.8 oz)   LMP  (LMP Unknown)   SpO2 96%   BMI 29.64 kg/m²     Physical Exam  Wt Readings from Last 3 Encounters:   10/13/19 64.3 kg (141 lb 12.8 oz)   09/04/19 58.2 kg (128 lb 6.4 oz)   06/03/19 56.9 kg (125 lb 6 oz)   ,body mass index is 29.64 kg/m².    General Well developed; well nourished; no acute distress. Moon face  ENT Good dentition.  Oral mucosa pink & moist without thrush or lesions.    Neck Neck supple; trachea midline. No thyromegaly  Resp CTA; no rhonchi, rales, or wheezes.  Respiration effort normal  CV irregularly irregular, midline sternotomy scar; ; no M/R/G.1 +  lower extremity edema  GI Abd soft, NT, ND, normal active bowel sounds.  No HSM.  No abd hernia  Skin No rash; no lesions; no bruises.  Skin turgor normal  Musc No clubbing; no cyanosis.    Psych Oriented to time, place, and person.  Appropriate affect      Results Review:   I reviewed the patient's new clinical results.    Lab Results   Component Value Date    WBC 6.64 10/13/2019    HGB 7.9 (L) 10/13/2019    HCT 26.7 (L) 10/13/2019    MCV 89.6 10/13/2019     10/13/2019       No results found for: GLUCOSE, BUN, CREATININE, EGFRIFNONA, EGFRIFAFRI, BCR, CO2, CALCIUM, PROTENTOTREF, ALBUMIN, LABIL2, AST, ALT  Outside CT report Per radiologist   1. trace right pleural effusion   2.  Moderate hiatal hernia   3.  Multiple large gallstones  4.  Pandiverticulosis  Results for VILMA ORSAS (MRN [id number]) as of 10/13/2019 12:58   Ref. Range 10/13/2019 12:20   Glucose Latest Ref Range: 65 - 99 mg/dL 101  (H)   Sodium Latest Ref Range: 136 - 145 mmol/L 141   Potassium Latest Ref Range: 3.5 - 5.2 mmol/L 4.6   CO2 Latest Ref Range: 22.0 - 29.0 mmol/L 20.0 (L)   Chloride Latest Ref Range: 98 - 107 mmol/L 110 (H)   Anion Gap Latest Ref Range: 5.0 - 15.0 mmol/L 11.0   Creatinine Latest Ref Range: 0.57 - 1.00 mg/dL 1.85 (H)   BUN Latest Ref Range: 8 - 23 mg/dL 42 (H)   BUN/Creatinine Ratio Latest Ref Range: 7.0 - 25.0  22.7   Calcium Latest Ref Range: 8.6 - 10.5 mg/dL 8.8   eGFR Non  Am Latest Ref Range: >60 mL/min/1.73 26 (L)   Alkaline Phosphatase Latest Ref Range: 39 - 117 U/L 41   Total Protein Latest Ref Range: 6.0 - 8.5 g/dL 6.8   ALT (SGPT) Latest Ref Range: 1 - 33 U/L 11   AST (SGOT) Latest Ref Range: 1 - 32 U/L 16   Total Bilirubin Latest Ref Range: 0.2 - 1.2 mg/dL 0.5   Albumin Latest Ref Range: 3.50 - 5.20 g/dL 3.90     ASSESSMENTS/PLANS    1 melena  2.  Acute blood loss anemia  3.  History peptic ulcer disease  4.  Hypersensitivity pneumonitis  5.  A. fib #6 history of CVA with no residual  6.  CKD  7.  Pulmonary hypertension  8.  Asystematic gallstones  9.  Diarrhea    <<Will obtain anemia panel.  <<Plan EGD for a.m.  Unusual to have peptic disease on a PPI.  Rule out angiectasia.  <<Obtain copy of last EGD.  <Protonix  IV twice daily  <Will check C. difficile toxin as well as GI panel PCR.    I discussed the patients findings and my recommendations with patient, family and nursing staff    Fredi Aaron MD  10/13/19  12:51 PM

## 2019-10-13 NOTE — NURSING NOTE
ACC REVIEW REPORT: [facility]        PATIENT NAME: Pat Morel    PATIENT ID: [id number]    BED:    5H / s582    BED TYPE:  TELEMETRY    BED GIVEN TO:  FLORENTIN REN RN    TIME BED GIVEN:   08:25    YOB: 1940    AGE:  79    GENDER:  FEMALE    PREVIOUS ADMIT TO [facility]:  YES    PREVIOUS ADMISSION DATE:      PATIENT CLASS:  OUTPATIENT    TODAY'S DATE: 10/13/2019    TRANSFER DATE:  10/13/2019    ETA:   10:00 - 10:30    TRANSFERRING FACILITY:  [facility] /     TRANSFERRING FACILITY PHONE # :  ED  [phone]    TRANSFERRING MD:  DR DEEP KLEIN    DATE/TIME REQUEST RECEIVED:  10/13/2019 @ 08:00    [facility] RN:  PARADISE GRANADO RN    REPORT FROM:  FLORENTIN REN RN    TIME REPORT TAKEN:  08:10    DIAGNOSIS:  GI BLEED    REASON FOR TRANSFER TO [facility]:  THERE IS NO GI PHYSICIAN OR SURGEON ON CALL TODAY AT [facility]    TRANSPORTATION:  PT DAUGHTER WILL BRING BY CAR    CLINICAL REASON FOR TRANSFER TO [facility]:   MRS MOREL WAS BROUGHT TO THE [facility] ED AT 06:00 BY HER DAUGHTER WITH C/O SHORTNESS OF AIR, EPIGASTRIC PAIN, and DARK TARRY STOOLS FOR THE LAST 8 DAYS.    SHE HAS A HISTORY OF ATRIAL FIB AND IS ON ELIQUIS.     CLINICAL INFORMATION    HEIGHT:  4'10''    WEIGHT:  141.2 lb    ALLERGIES:  TUBERCULIN TESTS    LAST VITAL SIGNS:  TIME:  08:00  TEMP:  96.8  PULSE:  80  B/P:  121/64  RESP:  18, O2 SAT ON ROOM AIR 93%    LAB INFORMATION:  H/H 8.0 / 26.1,  WBC 6.2,  GLUCOSE 110,  BUN 43,  CREATININE 2.19,  HEMOCCULT was negative but stool was black and tarry.    MEDS/IV FLUIDS:   IV ACCESS PER A # 18 GA ANGIO IN THE LEFT A/C TO SALINE LOCK    MEDICATIONS; NORMAL SALINE BOLUS 500 ml, ZOFRAN 4 mg IVP,  PROTONIX BOLUS 80 mg, pt was on a Protonix drip in ED, this was d/c'd for transport by private vehicle to [facility].      CARDIAC SYSTEM:    CHEST PAIN:  NO    RHYTHM:  ATRIAL FIBRILLATION    Is patient taking or has patient been given any drugs that could increase bleeding?  YES  (Plavix, Brilinta,  Effient, Eliquis, Xarelto, Warfarin, Integrilin, Angiomax)    DRUG:   ELIQUIS    DOSE/FREQUENCY:      CARDIAC NOTES:  MRS ROSAS HAS A HISTORY OF CAD WITH CARDIAC STENTS IN 2010, CABG IN 2012, HTN, ATRIAL FIBRILLATION, PULMONARY HTN, HIGH CHOLESTEROL, INTERSTITIAL LUNG DISEASE, OSTEOPOROSIS, and CVA IN 2017 WITHOUT RESIDUAL.  SHE HAS BEEN TREATED BY DR GEOFF ALVES and DR IVANNA INGRAM.    RESPIRATORY SYSTEM:    LUNG SOUNDS:    CLEAR:  YES    OXYGEN:  NONE    O2 SAT:   93% on room air    RADIOLOGY RESULTS:  A CXR WILL BE SENT WITH THE PATIENT IF AVAILABLE AT TIME OF TRANSFER.    RESPIRATORY STATUS:  MRS ROSAS HAS A HISTORY OF INTERSTITIAL LUNG DISEASE, PULMONARY HTN, and                                                HYPERSENSITIVITY PNEUMONITIS.  HER LUNGS ARE CLEAR BUT SHE STATED ON ADMISSION SHE                                               HAD EXPERIENCED A SHORTNESS OF AIR FOR THE LAST 8 DAYS.       CNS/MUSCULOSKELETAL    ALERT AND ORIENTED:    PERSON:  YES  PLACE:  YES   TIME:  YES    INJURY:   NONE    SANDRITA COMA SCALE:    E:  4  M:  6  V:  5    CNS/MUSCULOSKELETAL NOTES:  MRS ROSAS IS ALERT AND ORIENTED X 3.  SHE IS PLEASANT, HER DAUGHTER IS AT BEDSIDE.  SHE HAS BEEN RESTING ON A STRETCHER BUT PRIOR TO ADMISSION TO THE ED SHE WAS AMBULATORY AT HOME WITHOUT ASSISTANCE.  IN 2017 SHE HAD A CVA OR TIA BUT SHE HAS NO DEFICITS.       GI//GY      ABDOMINAL PAIN:  POSITIVE FOR EPIGASTRIC PAIN    VOMITING: NONE    DIARRHEA: NONE    NAUSEA: NONE    BOWEL SOUNDS: ACTIVE    OCCULT STOOL:  THE HEMOCCULT WAS NEGATIVE BUT THE ED DOC BELIEVES SHE IS POSITIVE FOR OCCULT STOOL    CT SCAN:  ABD / PELVIS WITHOUT CONTRAST on 11/13/2019,  A DISC IS COMING WITH THE PATIENT.    CT SCAN RESULTS:  SMALL RIGHT PLEURAL EFFUSION, HIATAL HERNIA, LARGE GALLSTONES, and PAN DIVERTICULOSIS      GI//GY NOTES:  DIET AT HOME UNKNOWN BY TRANSFERRING NURSE    PAST MEDICAL HISTORY:      OTHER SYMPTOM NOTES:      ADDITIONAL NOTES:             Kamilah White RN  10/13/2019  8:37 AM

## 2019-10-14 ENCOUNTER — ANESTHESIA (OUTPATIENT)
Dept: GASTROENTEROLOGY | Facility: HOSPITAL | Age: 79
End: 2019-10-14

## 2019-10-14 ENCOUNTER — ANESTHESIA EVENT (OUTPATIENT)
Dept: GASTROENTEROLOGY | Facility: HOSPITAL | Age: 79
End: 2019-10-14

## 2019-10-14 PROBLEM — K92.1 GASTROINTESTINAL HEMORRHAGE WITH MELENA: Status: ACTIVE | Noted: 2019-10-13

## 2019-10-14 LAB
HCT VFR BLD AUTO: 27.1 % (ref 34–46.6)
HCT VFR BLD AUTO: 27.9 % (ref 34–46.6)
HCT VFR BLD AUTO: 32.1 % (ref 34–46.6)
HGB BLD-MCNC: 7.9 G/DL (ref 12–15.9)
HGB BLD-MCNC: 8.1 G/DL (ref 12–15.9)
HGB BLD-MCNC: 8.6 G/DL (ref 12–15.9)

## 2019-10-14 PROCEDURE — 63710000001 MYCOPHENOLATE MOFETIL PER 250 MG: Performed by: INTERNAL MEDICINE

## 2019-10-14 PROCEDURE — 85014 HEMATOCRIT: CPT | Performed by: INTERNAL MEDICINE

## 2019-10-14 PROCEDURE — 0DJ08ZZ INSPECTION OF UPPER INTESTINAL TRACT, VIA NATURAL OR ARTIFICIAL OPENING ENDOSCOPIC: ICD-10-PCS | Performed by: INTERNAL MEDICINE

## 2019-10-14 PROCEDURE — 85018 HEMOGLOBIN: CPT | Performed by: INTERNAL MEDICINE

## 2019-10-14 PROCEDURE — 25010000002 PROPOFOL 10 MG/ML EMULSION: Performed by: NURSE ANESTHETIST, CERTIFIED REGISTERED

## 2019-10-14 PROCEDURE — 99233 SBSQ HOSP IP/OBS HIGH 50: CPT | Performed by: INTERNAL MEDICINE

## 2019-10-14 RX ORDER — SODIUM CHLORIDE 0.9 % (FLUSH) 0.9 %
3-10 SYRINGE (ML) INJECTION AS NEEDED
Status: DISCONTINUED | OUTPATIENT
Start: 2019-10-14 | End: 2019-10-14 | Stop reason: HOSPADM

## 2019-10-14 RX ORDER — ONDANSETRON 2 MG/ML
4 INJECTION INTRAMUSCULAR; INTRAVENOUS ONCE AS NEEDED
Status: DISCONTINUED | OUTPATIENT
Start: 2019-10-14 | End: 2019-10-14 | Stop reason: HOSPADM

## 2019-10-14 RX ORDER — FAMOTIDINE 20 MG/1
20 TABLET, FILM COATED ORAL
Status: DISCONTINUED | OUTPATIENT
Start: 2019-10-14 | End: 2019-10-14 | Stop reason: HOSPADM

## 2019-10-14 RX ORDER — DEXAMETHASONE SODIUM PHOSPHATE 4 MG/ML
8 INJECTION, SOLUTION INTRA-ARTICULAR; INTRALESIONAL; INTRAMUSCULAR; INTRAVENOUS; SOFT TISSUE ONCE AS NEEDED
Status: DISCONTINUED | OUTPATIENT
Start: 2019-10-14 | End: 2019-10-14 | Stop reason: HOSPADM

## 2019-10-14 RX ORDER — LIDOCAINE HYDROCHLORIDE 10 MG/ML
INJECTION, SOLUTION EPIDURAL; INFILTRATION; INTRACAUDAL; PERINEURAL AS NEEDED
Status: DISCONTINUED | OUTPATIENT
Start: 2019-10-14 | End: 2019-10-14 | Stop reason: SURG

## 2019-10-14 RX ORDER — PROPOFOL 10 MG/ML
VIAL (ML) INTRAVENOUS AS NEEDED
Status: DISCONTINUED | OUTPATIENT
Start: 2019-10-14 | End: 2019-10-14 | Stop reason: SURG

## 2019-10-14 RX ORDER — FENTANYL CITRATE 50 UG/ML
50 INJECTION, SOLUTION INTRAMUSCULAR; INTRAVENOUS
Status: DISCONTINUED | OUTPATIENT
Start: 2019-10-14 | End: 2019-10-14 | Stop reason: HOSPADM

## 2019-10-14 RX ORDER — SODIUM CHLORIDE 9 MG/ML
9 INJECTION, SOLUTION INTRAVENOUS CONTINUOUS
Status: DISCONTINUED | OUTPATIENT
Start: 2019-10-14 | End: 2019-10-17 | Stop reason: HOSPADM

## 2019-10-14 RX ORDER — LIDOCAINE HYDROCHLORIDE 10 MG/ML
0.5 INJECTION, SOLUTION EPIDURAL; INFILTRATION; INTRACAUDAL; PERINEURAL ONCE AS NEEDED
Status: DISCONTINUED | OUTPATIENT
Start: 2019-10-14 | End: 2019-10-14 | Stop reason: HOSPADM

## 2019-10-14 RX ORDER — PEG-3350, SODIUM SULFATE, SODIUM CHLORIDE, POTASSIUM CHLORIDE, SODIUM ASCORBATE AND ASCORBIC ACID 7.5-2.691G
1000 KIT ORAL
Status: COMPLETED | OUTPATIENT
Start: 2019-10-15 | End: 2019-10-15

## 2019-10-14 RX ORDER — PEG-3350, SODIUM SULFATE, SODIUM CHLORIDE, POTASSIUM CHLORIDE, SODIUM ASCORBATE AND ASCORBIC ACID 7.5-2.691G
1000 KIT ORAL
Status: COMPLETED | OUTPATIENT
Start: 2019-10-14 | End: 2019-10-14

## 2019-10-14 RX ORDER — SODIUM CHLORIDE, SODIUM LACTATE, POTASSIUM CHLORIDE, CALCIUM CHLORIDE 600; 310; 30; 20 MG/100ML; MG/100ML; MG/100ML; MG/100ML
9 INJECTION, SOLUTION INTRAVENOUS CONTINUOUS PRN
Status: DISCONTINUED | OUTPATIENT
Start: 2019-10-14 | End: 2019-10-16

## 2019-10-14 RX ORDER — SODIUM CHLORIDE 0.9 % (FLUSH) 0.9 %
3 SYRINGE (ML) INJECTION EVERY 12 HOURS SCHEDULED
Status: DISCONTINUED | OUTPATIENT
Start: 2019-10-14 | End: 2019-10-14 | Stop reason: HOSPADM

## 2019-10-14 RX ADMIN — LIDOCAINE HYDROCHLORIDE 50 MG: 10 INJECTION, SOLUTION EPIDURAL; INFILTRATION; INTRACAUDAL; PERINEURAL at 09:45

## 2019-10-14 RX ADMIN — PANTOPRAZOLE SODIUM 40 MG: 40 INJECTION, POWDER, FOR SOLUTION INTRAVENOUS at 08:10

## 2019-10-14 RX ADMIN — LEVOTHYROXINE SODIUM 75 MCG: 75 TABLET ORAL at 05:04

## 2019-10-14 RX ADMIN — MYCOPHENOLATE MOFETIL 500 MG: 250 CAPSULE ORAL at 20:54

## 2019-10-14 RX ADMIN — BISOPROLOL FUMARATE 5 MG: 5 TABLET ORAL at 08:11

## 2019-10-14 RX ADMIN — PANTOPRAZOLE SODIUM 40 MG: 40 INJECTION, POWDER, FOR SOLUTION INTRAVENOUS at 17:43

## 2019-10-14 RX ADMIN — POLYETHYLENE GLYCOL 3350, SODIUM SULFATE, SODIUM CHLORIDE, POTASSIUM CHLORIDE, ASCORBIC ACID, SODIUM ASCORBATE 1000 ML: KIT at 17:43

## 2019-10-14 RX ADMIN — MYCOPHENOLATE MOFETIL 500 MG: 250 CAPSULE ORAL at 08:11

## 2019-10-14 RX ADMIN — PROPOFOL 50 MG: 10 INJECTION, EMULSION INTRAVENOUS at 09:46

## 2019-10-14 RX ADMIN — SODIUM CHLORIDE 9 ML/HR: 9 INJECTION, SOLUTION INTRAVENOUS at 09:25

## 2019-10-14 RX ADMIN — ATORVASTATIN CALCIUM 40 MG: 40 TABLET, FILM COATED ORAL at 20:54

## 2019-10-14 RX ADMIN — SODIUM CHLORIDE, PRESERVATIVE FREE 10 ML: 5 INJECTION INTRAVENOUS at 20:56

## 2019-10-14 RX ADMIN — SODIUM CHLORIDE, PRESERVATIVE FREE 10 ML: 5 INJECTION INTRAVENOUS at 08:12

## 2019-10-14 NOTE — PLAN OF CARE
Problem: Patient Care Overview  Goal: Plan of Care Review  Outcome: Ongoing (interventions implemented as appropriate)   10/14/19 0526   Coping/Psychosocial   Plan of Care Reviewed With patient   Plan of Care Review   Progress improving   OTHER   Outcome Summary VSS. No complaints through the night. Rested well. Good UOP. 1 small light brown liquid stool this morning. H&H checks continue. Room air. EGD scheduled for today. Will monitor.        Problem: Fall Risk (Adult)  Goal: Absence of Fall  Outcome: Ongoing (interventions implemented as appropriate)   10/14/19 0526   Fall Risk (Adult)   Absence of Fall making progress toward outcome       Problem: Gastrointestinal Bleeding (Adult)  Goal: Signs and Symptoms of Listed Potential Problems Will be Absent, Minimized or Managed (Gastrointestinal Bleeding)  Outcome: Ongoing (interventions implemented as appropriate)   10/14/19 0526   Goal/Outcome Evaluation   Problems Assessed (GI Bleeding) all   Problems Present (GI Bleeding) situational response

## 2019-10-14 NOTE — CONSULTS
[facility] at [facility]  CARDIOLOGY CONSULTATION NOTE    Pat Morel  : 1940  MRN:[id number]    Date of Admission:10/13/2019  Date of Consultation: 10/14/19    PCP: Fredi Ledesma MD  Cardiologist:  Dr. Block  Pulmonary: Dr. Aguilar.     IDENTIFICATION: A 79 y.o. female     CC: Afib    PROBLEM LIST:   1. Persistent Atrial Fibrillation               A. CHADSVasc =  5 On Xarelto, diagnosed at time of CABG               B. Amiodarone Initiation with ECV to NSR 17               C. Holter monitor on 2018 persistent atrial fibrillation at a ventricular rate 75 bpm.    2. CAD               A. Multiple previous PCI to LAD              B. CABG                C.[facility] 2017: patent stents, and grafts              D.[facility] 2018 severe 2 vessel native disease, patent LIMA to LAD, patent stents in mid LAD, patent  SVG   to posterior lateral branch with skip to PDA, normal LV size and function, mild pulmonary  hypertension with right ventricular   pressure 47 over 10 mmHg and pulmonary artery pressure 45/23  mmHg.              E. Echocardiogram 16: EF 60%, mild to moderate MR. RVSP 50 mmHg. Mild Pulmonary HTN   F. [facility] with Dr. Mcdonnell [place].  no CBI, Medical therapy. Severe Pulmonary hypertension with   Pulmonary pressure over 70mmHg.   3. CVA  4. HTN  5. HLP  6.  History of secondhand smoke exposure/COPD/interstitial lung disease/hypersensitivity pneumonitis, Currently under treatment with Cellcept. Dr. Aguilar  6. GIB:  Recurrent GIB with admit to [facility] 19. Eliquis on hold.         ALLERGIES:   Allergies   Allergen Reactions   • Tuberculin Tests Rash       HOME MEDICINES:   Outpatient Medications    furosemide (LASIX) 40 MG tablet     amLODIPine (NORVASC) 5 MG tablet     apixaban (ELIQUIS) 2.5 MG tablet tablet     atorvastatin (LIPITOR) 40 MG tablet     bisoprolol (ZEBETA) 5 MG tablet     cholecalciferol (VITAMIN D3) 1000 units tablet     clopidogrel  (PLAVIX) 75 MG tablet     denosumab (PROLIA) 60 MG/ML solution prefilled syringe syringe     Fluticasone Furoate-Vilanterol (BREO ELLIPTA) 200-25 MCG/INH inhaler     furosemide (LASIX) 40 MG tablet     isosorbide mononitrate (IMDUR) 120 MG 24 hr tablet     levothyroxine (SYNTHROID, LEVOTHROID) 75 MCG tablet     mycophenolate (CELLCEPT) 500 MG tablet     nitroglycerin (NITROSTAT) 0.4 MG SL tablet     pantoprazole (PROTONIX) 40 MG EC tablet     potassium chloride (K-DUR) 10 MEQ CR tablet     potassium chloride (K-DUR,KLOR-CON) 10 MEQ CR tablet     predniSONE (DELTASONE) 10 MG tablet     predniSONE (DELTASONE) 5 MG tablet          HPI:   79-year-old female seen in consultation regarding atrial fibrillation.  She has long-standing persistent atrial fibrillation rate controlled well on beta-blocker therapy.  She is [facility] for recurrent acute GI bleed.  Eliquis is being held she is undergone a EGD today and she is planning to have a colonoscopy tomorrow.  She is recently had a heart catheterization with Dr. Mcdonnell in [facility].  This revealed patent bypass grafts no intervention required.  However she did have severe pulmonary hypertension with point pressures greater than 70.  She is undergoing treatment for advanced interstitial lung disease with CellCept therapy.  She wonders if she could try anticus and while she is here in the hospital as this was considered a possible plan for her persistent atrial fibrillation.    ROS: All systems have been reviewed and are negative with the exception of those mentioned in the HPI and problem list above.    Surgical History:   Past Surgical History:   Procedure Laterality Date   • BRONCHOSCOPY Bilateral 4/12/2019    Procedure: BRONCHOSCOPY;  Surgeon: Jeff Laura MD;  Location: [facility] ENDOSCOPY;  Service: Pulmonary   • BYPASS GRAFT     • CORONARY ANGIOPLASTY WITH STENT PLACEMENT     • HYSTERECTOMY         Social History:   Social  "History     Socioeconomic History   • Marital status:      Spouse name: Not on file   • Number of children: Not on file   • Years of education: Not on file   • Highest education level: Not on file   Tobacco Use   • Smoking status: Never Smoker   • Smokeless tobacco: Never Used   Substance and Sexual Activity   • Alcohol use: No   • Drug use: No   • Sexual activity: Defer       Family History:   Family History   Problem Relation Age of Onset   • Cancer Mother    • No Known Problems Father    • Liver disease Sister        Objective     /86 (BP Location: Left arm, Patient Position: Lying)   Pulse 87   Temp 97.5 °F (36.4 °C) (Oral)   Resp 18   Ht 147.3 cm (58\")   Wt 62.8 kg (138 lb 6.4 oz)   LMP  (LMP Unknown)   SpO2 96%   BMI 28.93 kg/m²     Intake/Output Summary (Last 24 hours) at 10/14/2019 1404  Last data filed at 10/14/2019 1002  Gross per 24 hour   Intake 100 ml   Output 1150 ml   Net -1050 ml       PHYSICAL EXAM:  Constitutional:  Well-nourished, cooperative, in no acute distress.   Head:  Normocephalic, without obvious abnormality, atraumatic.   Neck: No adenopathy, supple, trachea midline, no thyromegaly, no    carotid bruit, no JVD.   Respiratory:   Fine crackles bases    Cardiovascular:  IRIR, normal S1 and S2, no  murmur, no gallop, no rub, no click.   Pulses: Peripheral pulses are present and equal bilaterally.   GI:   Soft, non-distended. Bowel sounds heard throughout. No organomegaly or masses. Non-tender to palpation, no guarding.   Extremities: No edema, clubbing or cyanosis.   Skin: Skin is warm and dry. No bleeding, bruising or rash.   Neurological: Alert, oriented to time, person and place. No focal deficits.     Labs/Diagnostic Data  Results from last 7 days   Lab Units 10/13/19  1220   SODIUM mmol/L 141   POTASSIUM mmol/L 4.6   CHLORIDE mmol/L 110*   CO2 mmol/L 20.0*   BUN mg/dL 42*   CREATININE mg/dL 1.85*   GLUCOSE mg/dL 101*   CALCIUM mg/dL 8.8         Results from last 7 " days   Lab Units 10/14/19  0756 10/14/19  0040 10/13/19  1220   WBC 10*3/mm3  --   --  6.64   HEMOGLOBIN g/dL 8.1* 7.9* 7.9*   HEMATOCRIT % 27.9* 27.1* 26.7*   PLATELETS 10*3/mm3  --   --  238             Results from last 7 days   Lab Units 10/13/19  1220   TSH uIU/mL 3.170         Results from last 7 days   Lab Units 10/13/19  1220   PROBNP pg/mL 7,224.0*           I personally reviewed the patient's EKG/Telemetry data        Current Medications:      atorvastatin 40 mg Oral Nightly   bisoprolol 5 mg Oral Daily   levothyroxine 75 mcg Oral Q AM   mycophenolate 500 mg Oral Q12H   pantoprazole 40 mg Intravenous BID AC   PEG-KCl-NaCl-NaSulf-Na Asc-C 1,000 mL Oral Once When Specified   Followed by      [START ON 10/15/2019] PEG-KCl-NaCl-NaSulf-Na Asc-C 1,000 mL Oral Once When Specified   sodium chloride 10 mL Intravenous Q12H       lactated ringers 9 mL/hr    sodium chloride 9 mL/hr Last Rate: 9 mL/hr (10/14/19 0925)       Assessment and Plan:     1. Persistent Afib, rate controlled  2. GIB:  Eliquis on hold, EGD today, Colonoscopy in am  3. CKD:  Stage IV, CrCl 21.1  4. Advanced ILD, Pulmonary pressure >70mmHg on RHC 8/19.   5. CAD:  Recent LHC , No CBI .  Continue medical Therapy.     PLAN:  · Patient is not considered a candidate for Tikosyn at this time secondary to ongoing GI bleed, severe anemia, severe renal insufficiency with a creatinine clearance of 21.1.  · Continue to focus on rate control for atrial fibrillation.  · Patient could be considered a candidate for watchman device in the future if able to take Coumadin for short-term.  However long-term prognosis poor in setting of advanced interstitial lung disease.    Thank you for allowing me to participate in the care of Pat Morel. Feel free to contact me directly with any further questions or concerns.  Electronically signed by SARAH Medina, 10/14/19, 2:22 PM.

## 2019-10-14 NOTE — PLAN OF CARE
Problem: Patient Care Overview  Goal: Plan of Care Review  Outcome: Ongoing (interventions implemented as appropriate)   10/14/19 1434   Coping/Psychosocial   Plan of Care Reviewed With patient   Plan of Care Review   Progress no change   OTHER   Outcome Summary Pt had EGD today julienne well; will have colonoscopy tomorrow; vss; denies pain or discomfort; will c/m       Problem: Fall Risk (Adult)  Goal: Absence of Fall  Outcome: Ongoing (interventions implemented as appropriate)   10/14/19 1434   Fall Risk (Adult)   Absence of Fall achieves outcome       Problem: Gastrointestinal Bleeding (Adult)  Goal: Signs and Symptoms of Listed Potential Problems Will be Absent, Minimized or Managed (Gastrointestinal Bleeding)  Outcome: Ongoing (interventions implemented as appropriate)   10/14/19 1434   Goal/Outcome Evaluation   Problems Assessed (GI Bleeding) all   Problems Present (GI Bleeding) other (see comments);situational response  (small amount bright red blood in stool)

## 2019-10-14 NOTE — ANESTHESIA PREPROCEDURE EVALUATION
" Anesthesia Evaluation     Patient summary reviewed and Nursing notes reviewed   NPO Solid Status: > 8 hours  NPO Liquid Status: > 8 hours           Airway   Mallampati: III  Neck ROM: full  No difficulty expected  Dental      Pulmonary    (+) shortness of breath,   (-) COPD, recent URI, sleep apnea, not a smoker, lung cancer    ROS comment: Allergic   Cardiovascular     Patient on routine beta blocker    (+) hypertension, CAD, cardiac stents more than 12 months ago dysrhythmias Atrial Fib, PVD, hyperlipidemia,   (-) past MI, anginaCAB       Neuro/Psych  (+) CVA (2017 frontal \"scattered \"embolic ),     (-) seizures, TIA  GI/Hepatic/Renal/Endo    (+)  GI bleeding, renal disease (creat 1.8 ) CRI, hypothyroidism,   (-) hepatitis, liver disease    Musculoskeletal     Abdominal    Substance History      OB/GYN          Other   (+) arthritis     ROS/Med Hx Other: eliquis plavix prednisone5   Cellsept per pulmanologist     HCT 27 Creat 1.8                 Anesthesia Plan    ASA 3     general   (PFL Maintain MAP >65 )  intravenous induction   Anesthetic plan, all risks, benefits, and alternatives have been provided, discussed and informed consent has been obtained with: patient.    Plan discussed with CRNA.      "

## 2019-10-14 NOTE — ANESTHESIA POSTPROCEDURE EVALUATION
Patient: Pat Morel    Procedure Summary     Date:  10/14/19 Room / Location:   [facility]    Anesthesia Start:  0944 Anesthesia Stop:  1002    Procedure:  ESOPHAGOGASTRODUODENOSCOPY (N/A ) Diagnosis:       Gastrointestinal hemorrhage with melena      (Gastrointestinal hemorrhage with melena [K92.1])    Surgeon:  Fredi Aaron MD Provider:  Des Dugan MD    Anesthesia Type:  general ASA Status:  3          Anesthesia Type: general  Last vitals  BP   137/94 (10/14/19 0909)   Temp   97.6 °F (36.4 °C) (10/14/19 0743)   Pulse   81 (10/14/19 0909)   Resp   15 (10/14/19 0909)     SpO2   97 % (10/14/19 0909)     Post Anesthesia Care and Evaluation    Patient location during evaluation: PACU  Patient participation: complete - patient participated  Level of consciousness: responsive to verbal stimuli  Pain score: 0  Pain management: adequate  Airway patency: patent  Anesthetic complications: No anesthetic complications  PONV Status: none  Cardiovascular status: hemodynamically stable and acceptable  Respiratory status: nonlabored ventilation, acceptable and nasal cannula  Hydration status: acceptable

## 2019-10-14 NOTE — PROGRESS NOTES
[facility] Medicine Services  PROGRESS NOTE    Patient Name: Pat Morel  : 1940  MRN: [id number]    Date of Admission: 10/13/2019  Primary Care Physician: Fredi Ledesma MD    Subjective   Subjective     CC:  F/u GI bleed    HPI:  Patient resting in bed. She complains of bilateral lower quadrant pain. Tolerated EGD well, there were no acute findings. She denies SOA. Son and  at bedside. Asking if patient can see Dr. Archer to discuss Tikosyn while she is here.    Review of Systems  Gen- No fevers, chills  CV- No chest pain, palpitations  Resp- No cough, dyspnea  GI- as above    All other systems reviewed and negative.     Objective   Objective     Vital Signs:   Temp:  [97.1 °F (36.2 °C)-97.7 °F (36.5 °C)] 97.5 °F (36.4 °C)  Heart Rate:  [] 87  Resp:  [15-18] 18  BP: (109-149)/() 131/86        Physical Exam:  Constitutional: No acute distress, awake, alert  HENT: NCAT, mucous membranes moist  Respiratory: Clear to auscultation bilaterally, respiratory effort normal   Cardiovascular: RRR, no murmurs, rubs, or gallops, palpable pedal pulses bilaterally  Gastrointestinal: Positive bowel sounds, soft, tender in bilateral lower quadrants  Musculoskeletal: No bilateral ankle edema  Psychiatric: Appropriate affect, cooperative  Neurologic: Oriented x 3, strength symmetric in all extremities, Cranial Nerves grossly intact to confrontation, speech clear  Skin: No rashes      Results Reviewed:    Results from last 7 days   Lab Units 10/14/19  0756 10/14/19  0040 10/13/19  1220   WBC 10*3/mm3  --   --  6.64   HEMOGLOBIN g/dL 8.1* 7.9* 7.9*   HEMATOCRIT % 27.9* 27.1* 26.7*   PLATELETS 10*3/mm3  --   --  238     Results from last 7 days   Lab Units 10/13/19  1220   SODIUM mmol/L 141   POTASSIUM mmol/L 4.6   CHLORIDE mmol/L 110*   CO2 mmol/L 20.0*   BUN mg/dL 42*   CREATININE mg/dL 1.85*   GLUCOSE mg/dL 101*   CALCIUM mg/dL 8.8   ALT (SGPT) U/L 11   AST  (SGOT) U/L 16   PROBNP pg/mL 7,224.0*     Estimated Creatinine Clearance: 21.1 mL/min (A) (by C-G formula based on SCr of 1.85 mg/dL (H)).    Microbiology Results Abnormal     Procedure Component Value - Date/Time    Gastrointestinal Panel, PCR - Stool, Per Rectum [[id number]]  (Normal) Collected:  10/13/19 1439    Lab Status:  Final result Specimen:  Stool from Per Rectum Updated:  10/13/19 2149     Campylobacter Not Detected     Plesiomonas shigelloides Not Detected     Salmonella Not Detected     Vibrio Not Detected     Vibrio cholerae Not Detected     Yersinia enterocolitica Not Detected     Enteroaggregative E. coli (EAEC) Not Detected     Enteropathogenic E. coli (EPEC) Not Detected     Enterotoxigenic E. coli (ETEC) lt/st Not Detected     Shiga-like toxin-producing E. coli (STEC) stx1/stx2 Not Detected     E. coli O157 Not Detected     Shigella/Enteroinvasive E. coli (EIEC) Not Detected     Cryptosporidium Not Detected     Cyclospora cayetanensis Not Detected     Entamoeba histolytica Not Detected     Giardia lamblia Not Detected     Adenovirus F40/41 Not Detected     Astrovirus Not Detected     Norovirus GI/GII Not Detected     Rotavirus A Not Detected     Sapovirus (I, II, IV or V) Not Detected    Narrative:       If Aeromonas, Staphylococcus aureus or Bacillus cereus are suspected, please order [id number]: Stool Culture, Aeromonas, S aureus, B Cereus.    Clostridium Difficile Toxin - Stool, Per Rectum [[id number]] Collected:  10/13/19 1439    Lab Status:  Final result Specimen:  Stool from Per Rectum Updated:  10/13/19 0579    Narrative:       The following orders were created for panel order Clostridium Difficile Toxin - Stool, Per Rectum.  Procedure                               Abnormality         Status                     ---------                               -----------         ------                     Clostridium Difficile To...[[id number]]  Normal              Final result                 Please view  results for these tests on the individual orders.    Clostridium Difficile Toxin, PCR - Stool, Per Rectum [[id number]]  (Normal) Collected:  10/13/19 1439    Lab Status:  Final result Specimen:  Stool from Per Rectum Updated:  10/13/19 1541     C. Difficile Toxins by PCR Not Detected    Narrative:       Performance characteristics of test not established for patients <2 years of age.  Negative for Toxigenic C. Difficile          Imaging Results (last 24 hours)     Procedure Component Value Units Date/Time    CT Outside Films [[id number]] Resulted:  10/13/19 1310     Updated:  10/13/19 1310    Narrative:       This procedure was auto-finalized with no dictation required.               I have reviewed the medications:  Scheduled Meds:  atorvastatin 40 mg Oral Nightly   bisoprolol 5 mg Oral Daily   levothyroxine 75 mcg Oral Q AM   mycophenolate 500 mg Oral Q12H   pantoprazole 40 mg Intravenous BID AC   PEG-KCl-NaCl-NaSulf-Na Asc-C 1,000 mL Oral Once When Specified   Followed by      [START ON 10/15/2019] PEG-KCl-NaCl-NaSulf-Na Asc-C 1,000 mL Oral Once When Specified   sodium chloride 10 mL Intravenous Q12H     Continuous Infusions:  lactated ringers 9 mL/hr    sodium chloride 9 mL/hr Last Rate: 9 mL/hr (10/14/19 0925)     PRN Meds:.lactated ringers  •  ondansetron  •  sodium chloride      Assessment/Plan   Assessment / Plan     Active Hospital Problems    Diagnosis  POA   • **GI bleed [K92.2]  Yes   • RENEE (acute kidney injury) (CMS/HCC) [N17.9]  Yes   • Gastrointestinal hemorrhage with melena [K92.1]  Unknown   • Hypersensitivity pneumonitis (CMS/HCC) [J67.9]  Yes   • Pulmonary arterial hypertension (CMS/HCC) [I27.21]  Yes   • Persistent atrial fibrillation [I48.19]  Yes      Resolved Hospital Problems   No resolved problems to display.        Brief Hospital Course to date:  Pat Morel is a 79 y.o. female with history of hypersensitivity pneumonitis currently on CellCept and prednisone, atrial fibrillation  currently on Eliquis, previous stroke with no residual deficits, CAD status post CABG upper GI bleed, stable hypertension, pulmonary hypertension.  Patient is transferred from OSH with concerns for ongoing GI bleed after presenting with 8 days of progressively worsening shortness of breath, with associated epigastric pain and dark tarry stools.    GI Bleed:  - GI following, s/p EGD 10/14 which revealed hiatal hernia but no evidence of active bleeding, recommending colonoscopy in the AM  - H&H remains stable, transfuse PRN Hgb <7, trending Hemoglobin  - Eliquis on hold    Hypersensitivity pneumonitis  - followed by pulmonary, patient currently CellCept, she has previously been weaned off prednisone will continue this for now as she is currently stable  - consider pulmonary consult if there is any clinical change    Hypertension  - holding home antihypertensives considering GI bleed, she is normotensive    History of CAD s/p CABG  -  she is s/p LHC at OSH this past August (data deficient)  - on Plavix and Lasix which we will hold for now  - Echo pending    Group 3 mild Pulmonary Hypertension:  - likely secondary to underlying lung disease, follows with pulmonary    History of persistent atrial fibrillation  - rates controlled, currently on  Eliquis, will hold this sec to GI bleed  - continue beta-blocker  - family requesting to discuss starting Tikosyn with Dr. Archer while patient here as they were told in August that this was one of patient's only options, however at that time she wished to wait as it would require hospitalization    Hypothyroidism  Continue Synthroid     Debility  -PT/OT/CM    RENEE:  -baseline Cr unknown, 2.19 at OSH, 1.85 here     DVT Prophylaxis:  Mechanical given GIB    Disposition: I expect the patient to be discharged 2-3 days    CODE STATUS:   Code Status and Medical Interventions:   Ordered at: 10/13/19 1140     Level Of Support Discussed With:    Patient     Code Status:    No CPR      Medical Interventions (Level of Support Prior to Arrest):    Full         Electronically signed by Elva Alegria DO, 10/14/19, 11:09 AM.

## 2019-10-14 NOTE — PROGRESS NOTES
Discharge Planning Assessment  [facility]     Patient Name: Pat Morel  MRN: [id number]  Today's Date: 10/14/2019    Admit Date: 10/13/2019    Discharge Needs Assessment     Row Name 10/14/19 1353       Living Environment    Lives With  spouse    Current Living Arrangements  home/apartment/condo    Living Arrangement Comments  Just moved to an apartment in [place].       Discharge Needs Assessment    Equipment Currently Used at Home  cane, straight;walker, rolling    Equipment Needed After Discharge  none    Discharge Coordination/Progress  No HH involved.        Discharge Plan     Row Name 10/14/19 4002       Plan    Plan  Home at DC    Patient/Family in Agreement with Plan  yes    Plan Comments  I spoke with the pt. She has no DC needs at this time.        Destination      No service coordination in this encounter.      Durable Medical Equipment      No service coordination in this encounter.      Dialysis/Infusion      No service coordination in this encounter.      Home Medical Care      No service coordination in this encounter.      Therapy      No service coordination in this encounter.      Community Resources      No service coordination in this encounter.        Expected Discharge Date and Time     Expected Discharge Date Expected Discharge Time    Oct 16, 2019         Demographic Summary    No documentation.       Functional Status     Row Name 10/14/19 1350       Functional Status    Usual Activity Tolerance  good       Functional Status, IADL    Medications  independent    Meal Preparation  independent    Housekeeping  independent    Laundry  independent    Shopping  independent        Psychosocial    No documentation.       Abuse/Neglect    No documentation.       Legal    No documentation.       Substance Abuse    No documentation.       Patient Forms    No documentation.           Lucie Villatoro RN

## 2019-10-15 ENCOUNTER — ANESTHESIA EVENT (OUTPATIENT)
Dept: GASTROENTEROLOGY | Facility: HOSPITAL | Age: 79
End: 2019-10-15

## 2019-10-15 ENCOUNTER — APPOINTMENT (OUTPATIENT)
Dept: GASTROENTEROLOGY | Facility: HOSPITAL | Age: 79
End: 2019-10-15

## 2019-10-15 ENCOUNTER — ANESTHESIA (OUTPATIENT)
Dept: GASTROENTEROLOGY | Facility: HOSPITAL | Age: 79
End: 2019-10-15

## 2019-10-15 LAB
ANION GAP SERPL CALCULATED.3IONS-SCNC: 12 MMOL/L (ref 5–15)
BASOPHILS # BLD AUTO: 0.04 10*3/MM3 (ref 0–0.2)
BASOPHILS NFR BLD AUTO: 0.6 % (ref 0–1.5)
BUN BLD-MCNC: 26 MG/DL (ref 8–23)
BUN/CREAT SERPL: 15.5 (ref 7–25)
CALCIUM SPEC-SCNC: 9.1 MG/DL (ref 8.6–10.5)
CHLORIDE SERPL-SCNC: 111 MMOL/L (ref 98–107)
CO2 SERPL-SCNC: 20 MMOL/L (ref 22–29)
CREAT BLD-MCNC: 1.68 MG/DL (ref 0.57–1)
DEPRECATED RDW RBC AUTO: 55.7 FL (ref 37–54)
EOSINOPHIL # BLD AUTO: 0.21 10*3/MM3 (ref 0–0.4)
EOSINOPHIL NFR BLD AUTO: 3.2 % (ref 0.3–6.2)
ERYTHROCYTE [DISTWIDTH] IN BLOOD BY AUTOMATED COUNT: 17.3 % (ref 12.3–15.4)
GFR SERPL CREATININE-BSD FRML MDRD: 29 ML/MIN/1.73
GLUCOSE BLD-MCNC: 94 MG/DL (ref 65–99)
HCT VFR BLD AUTO: 27.9 % (ref 34–46.6)
HCT VFR BLD AUTO: 29.8 % (ref 34–46.6)
HGB BLD-MCNC: 8.1 G/DL (ref 12–15.9)
HGB BLD-MCNC: 8.7 G/DL (ref 12–15.9)
IMM GRANULOCYTES # BLD AUTO: 0.02 10*3/MM3 (ref 0–0.05)
IMM GRANULOCYTES NFR BLD AUTO: 0.3 % (ref 0–0.5)
LYMPHOCYTES # BLD AUTO: 0.73 10*3/MM3 (ref 0.7–3.1)
LYMPHOCYTES NFR BLD AUTO: 11 % (ref 19.6–45.3)
MCH RBC QN AUTO: 26.1 PG (ref 26.6–33)
MCHC RBC AUTO-ENTMCNC: 29.2 G/DL (ref 31.5–35.7)
MCV RBC AUTO: 89.5 FL (ref 79–97)
MONOCYTES # BLD AUTO: 0.55 10*3/MM3 (ref 0.1–0.9)
MONOCYTES NFR BLD AUTO: 8.3 % (ref 5–12)
NEUTROPHILS # BLD AUTO: 5.11 10*3/MM3 (ref 1.7–7)
NEUTROPHILS NFR BLD AUTO: 76.6 % (ref 42.7–76)
NRBC BLD AUTO-RTO: 0 /100 WBC (ref 0–0.2)
PLATELET # BLD AUTO: 258 10*3/MM3 (ref 140–450)
PMV BLD AUTO: 9 FL (ref 6–12)
POTASSIUM BLD-SCNC: 3.7 MMOL/L (ref 3.5–5.2)
RBC # BLD AUTO: 3.33 10*6/MM3 (ref 3.77–5.28)
SODIUM BLD-SCNC: 143 MMOL/L (ref 136–145)
WBC NRBC COR # BLD: 6.66 10*3/MM3 (ref 3.4–10.8)

## 2019-10-15 PROCEDURE — 63710000001 MYCOPHENOLATE MOFETIL PER 250 MG: Performed by: INTERNAL MEDICINE

## 2019-10-15 PROCEDURE — 91110 GI TRC IMG INTRAL ESOPH-ILE: CPT

## 2019-10-15 PROCEDURE — 85025 COMPLETE CBC W/AUTO DIFF WBC: CPT | Performed by: INTERNAL MEDICINE

## 2019-10-15 PROCEDURE — 99233 SBSQ HOSP IP/OBS HIGH 50: CPT | Performed by: INTERNAL MEDICINE

## 2019-10-15 PROCEDURE — 85014 HEMATOCRIT: CPT | Performed by: INTERNAL MEDICINE

## 2019-10-15 PROCEDURE — 25010000002 PROPOFOL 10 MG/ML EMULSION: Performed by: NURSE ANESTHETIST, CERTIFIED REGISTERED

## 2019-10-15 PROCEDURE — 0DJD8ZZ INSPECTION OF LOWER INTESTINAL TRACT, VIA NATURAL OR ARTIFICIAL OPENING ENDOSCOPIC: ICD-10-PCS | Performed by: INTERNAL MEDICINE

## 2019-10-15 PROCEDURE — 99232 SBSQ HOSP IP/OBS MODERATE 35: CPT | Performed by: NURSE PRACTITIONER

## 2019-10-15 PROCEDURE — 85018 HEMOGLOBIN: CPT | Performed by: INTERNAL MEDICINE

## 2019-10-15 PROCEDURE — 80048 BASIC METABOLIC PNL TOTAL CA: CPT | Performed by: INTERNAL MEDICINE

## 2019-10-15 RX ORDER — SODIUM CHLORIDE, SODIUM LACTATE, POTASSIUM CHLORIDE, CALCIUM CHLORIDE 600; 310; 30; 20 MG/100ML; MG/100ML; MG/100ML; MG/100ML
9 INJECTION, SOLUTION INTRAVENOUS CONTINUOUS
Status: DISCONTINUED | OUTPATIENT
Start: 2019-10-15 | End: 2019-10-15

## 2019-10-15 RX ORDER — ONDANSETRON 2 MG/ML
4 INJECTION INTRAMUSCULAR; INTRAVENOUS ONCE AS NEEDED
Status: DISCONTINUED | OUTPATIENT
Start: 2019-10-15 | End: 2019-10-15 | Stop reason: HOSPADM

## 2019-10-15 RX ORDER — SIMETHICONE 20 MG/.3ML
200 EMULSION ORAL ONCE
Status: COMPLETED | OUTPATIENT
Start: 2019-10-15 | End: 2019-10-15

## 2019-10-15 RX ORDER — FAMOTIDINE 20 MG/1
20 TABLET, FILM COATED ORAL ONCE
Status: CANCELLED | OUTPATIENT
Start: 2019-10-15 | End: 2019-10-15

## 2019-10-15 RX ORDER — SODIUM CHLORIDE 0.9 % (FLUSH) 0.9 %
3 SYRINGE (ML) INJECTION EVERY 12 HOURS SCHEDULED
Status: DISCONTINUED | OUTPATIENT
Start: 2019-10-15 | End: 2019-10-15 | Stop reason: HOSPADM

## 2019-10-15 RX ORDER — PROPOFOL 10 MG/ML
VIAL (ML) INTRAVENOUS AS NEEDED
Status: DISCONTINUED | OUTPATIENT
Start: 2019-10-15 | End: 2019-10-15 | Stop reason: SURG

## 2019-10-15 RX ORDER — CASTOR OIL AND BALSAM, PERU 788; 87 MG/G; MG/G
OINTMENT TOPICAL EVERY 12 HOURS SCHEDULED
Status: DISCONTINUED | OUTPATIENT
Start: 2019-10-15 | End: 2019-10-17 | Stop reason: HOSPADM

## 2019-10-15 RX ORDER — LIDOCAINE HYDROCHLORIDE 10 MG/ML
0.5 INJECTION, SOLUTION EPIDURAL; INFILTRATION; INTRACAUDAL; PERINEURAL ONCE AS NEEDED
Status: DISCONTINUED | OUTPATIENT
Start: 2019-10-15 | End: 2019-10-15 | Stop reason: HOSPADM

## 2019-10-15 RX ORDER — SODIUM CHLORIDE 0.9 % (FLUSH) 0.9 %
3-10 SYRINGE (ML) INJECTION AS NEEDED
Status: DISCONTINUED | OUTPATIENT
Start: 2019-10-15 | End: 2019-10-15 | Stop reason: HOSPADM

## 2019-10-15 RX ORDER — FAMOTIDINE 10 MG/ML
20 INJECTION, SOLUTION INTRAVENOUS ONCE
Status: CANCELLED | OUTPATIENT
Start: 2019-10-15 | End: 2019-10-15

## 2019-10-15 RX ORDER — LIDOCAINE HYDROCHLORIDE 10 MG/ML
INJECTION, SOLUTION EPIDURAL; INFILTRATION; INTRACAUDAL; PERINEURAL AS NEEDED
Status: DISCONTINUED | OUTPATIENT
Start: 2019-10-15 | End: 2019-10-15 | Stop reason: SURG

## 2019-10-15 RX ORDER — SODIUM CHLORIDE 9 MG/ML
50 INJECTION, SOLUTION INTRAVENOUS CONTINUOUS
Status: DISCONTINUED | OUTPATIENT
Start: 2019-10-15 | End: 2019-10-15

## 2019-10-15 RX ADMIN — BISOPROLOL FUMARATE 5 MG: 5 TABLET ORAL at 09:35

## 2019-10-15 RX ADMIN — SIMETHICONE 200 MG: 20 SUSPENSION/ DROPS ORAL at 09:55

## 2019-10-15 RX ADMIN — PANTOPRAZOLE SODIUM 40 MG: 40 INJECTION, POWDER, FOR SOLUTION INTRAVENOUS at 08:16

## 2019-10-15 RX ADMIN — LIDOCAINE HYDROCHLORIDE 50 MG: 10 INJECTION, SOLUTION EPIDURAL; INFILTRATION; INTRACAUDAL; PERINEURAL at 08:40

## 2019-10-15 RX ADMIN — CASTOR OIL AND BALSAM, PERU: 788; 87 OINTMENT TOPICAL at 17:43

## 2019-10-15 RX ADMIN — SODIUM CHLORIDE 50 ML/HR: 9 INJECTION, SOLUTION INTRAVENOUS at 08:17

## 2019-10-15 RX ADMIN — LEVOTHYROXINE SODIUM 75 MCG: 75 TABLET ORAL at 07:11

## 2019-10-15 RX ADMIN — PANTOPRAZOLE SODIUM 40 MG: 40 INJECTION, POWDER, FOR SOLUTION INTRAVENOUS at 17:45

## 2019-10-15 RX ADMIN — POLYETHYLENE GLYCOL 3350, SODIUM SULFATE, SODIUM CHLORIDE, POTASSIUM CHLORIDE, ASCORBIC ACID, SODIUM ASCORBATE 1000 ML: KIT at 04:09

## 2019-10-15 RX ADMIN — MYCOPHENOLATE MOFETIL 500 MG: 250 CAPSULE ORAL at 21:15

## 2019-10-15 RX ADMIN — ATORVASTATIN CALCIUM 40 MG: 40 TABLET, FILM COATED ORAL at 21:14

## 2019-10-15 RX ADMIN — PROPOFOL 50 MCG/KG/MIN: 10 INJECTION, EMULSION INTRAVENOUS at 08:40

## 2019-10-15 RX ADMIN — PROPOFOL 80 MG: 10 INJECTION, EMULSION INTRAVENOUS at 08:40

## 2019-10-15 NOTE — ANESTHESIA POSTPROCEDURE EVALUATION
Patient: Pat Morel    Procedure Summary     Date:  10/15/19 Room / Location:   [facility]    Anesthesia Start:  0833 Anesthesia Stop:      Procedure:  COLONOSCOPY (N/A ) Diagnosis:       Gastrointestinal hemorrhage with melena      (Gastrointestinal hemorrhage with melena [K92.1])    Surgeon:  Fredi Aaron MD Provider:  Adalberto Wagner MD    Anesthesia Type:  general ASA Status:  4          Anesthesia Type: general  Last vitals  BP   131/96   Temp   97.5   Pulse   79   Resp   16     SpO2   100%     Post Anesthesia Care and Evaluation    Patient location during evaluation: PACU  Patient participation: complete - patient participated  Level of consciousness: awake  Pain score: 0  Pain management: adequate  Airway patency: patent  Anesthetic complications: No anesthetic complications  PONV Status: none  Cardiovascular status: acceptable and stable  Respiratory status: nasal cannula, unassisted, acceptable and spontaneous ventilation  Hydration status: acceptable

## 2019-10-15 NOTE — PROGRESS NOTES
[facility] at [facility]  Progress Note       LOS: 2 days   Patient Care Team:  Fredi Ledesma MD as PCP - General (Internal Medicine)  Tien Archer MD as Consulting Physician (Cardiac Electrophysiology)  Xu Block MD (Cardiology)    Chief Complaint:  F/u AF, anticoagulation    Subjective     Interval History: Patient denies any new complaints today.  Remains in atrial fibrillation with adequate heart rate control.  No c/o CP or changes to SOB.        Review of Systems:   Pertinent positives in HPI, all others reviewed and negative.      Objective       Current Facility-Administered Medications:   •  atorvastatin (LIPITOR) tablet 40 mg, 40 mg, Oral, Nightly, Luis Mayorga MD, 40 mg at 10/14/19 2054  •  bisoprolol (ZEBeta) tablet 5 mg, 5 mg, Oral, Daily, Luis Mayorga MD, 5 mg at 10/15/19 0935  •  lactated ringers infusion, 9 mL/hr, Intravenous, Continuous PRN, Des Dugan MD  •  levothyroxine (SYNTHROID, LEVOTHROID) tablet 75 mcg, 75 mcg, Oral, Q AM, Luis Mayorga MD, 75 mcg at 10/15/19 0711  •  mycophenolate (CELLCEPT) capsule 500 mg, 500 mg, Oral, Q12H, Luis Mayorga MD, 500 mg at 10/14/19 2054  •  ondansetron (ZOFRAN) injection 4 mg, 4 mg, Intravenous, Q6H PRN, Luis Mayorga MD  •  pantoprazole (PROTONIX) injection 40 mg, 40 mg, Intravenous, BID AC, Luis Mayorga MD, 40 mg at 10/15/19 0816  •  sodium chloride 0.9 % flush 10 mL, 10 mL, Intravenous, Q12H, Luis Mayorga MD, 10 mL at 10/14/19 2056  •  sodium chloride 0.9 % flush 10 mL, 10 mL, Intravenous, PRN, Luis Mayorga MD  •  sodium chloride 0.9 % infusion, 9 mL/hr, Intravenous, Continuous, Des Dugan MD, Last Rate: 9 mL/hr at 10/14/19 0925    Vital Sign Min/Max for last 24 hours  Temp  Min: 97 °F (36.1 °C)  Max: 97.6 °F (36.4 °C)   BP  Min: 113/68  Max: 161/90   Pulse  Min: 69  Max: 98   Resp  Min: 16  Max: 18   SpO2  Min: 95 %  Max: 100 %   Flow (L/min)  Min: 2  Max: 2   Weight  Min: 59.9  "kg (132 lb)  Max: 60.2 kg (132 lb 12.8 oz)     Flowsheet Rows      First Filed Value   Admission Height  147.3 cm (57.99\") Documented at 10/13/2019 1505   Admission Weight  64.3 kg (141 lb 12.8 oz) Documented at 10/13/2019 1105            Intake/Output Summary (Last 24 hours) at 10/15/2019 1407  Last data filed at 10/15/2019 0855  Gross per 24 hour   Intake 100 ml   Output --   Net 100 ml       Physical Exam:     General Appearance:    Alert, cooperative, in no acute distress   Lungs:     Clear to auscultation, respirations regular, even and                  unlabored    Heart:    Irreg irreg, normal S1 and S2, no murmur, no gallop, no rub, no click   Chest Wall:    No abnormalities observed   Abdomen:     Normal bowel sounds, no masses, soft, non-tender, non-distended   Extremities:   Moves all extremities well, no edema, no cyanosis, no             redness   Pulses:   Pulses palpable and equal bilaterally   Skin:   No bleeding, bruising or rash        Results Review:   Results from last 7 days   Lab Units 10/15/19  0755 10/15/19  0018 10/14/19  1552  10/13/19  1220   WBC 10*3/mm3 6.66  --   --   --  6.64   HEMOGLOBIN g/dL 8.7* 8.1* 8.6*   < > 7.9*   HEMATOCRIT % 29.8* 27.9* 32.1*   < > 26.7*   PLATELETS 10*3/mm3 258  --   --   --  238    < > = values in this interval not displayed.     Results from last 7 days   Lab Units 10/15/19  0755 10/13/19  1220   SODIUM mmol/L 143 141   POTASSIUM mmol/L 3.7 4.6   CHLORIDE mmol/L 111* 110*   CO2 mmol/L 20.0* 20.0*   BUN mg/dL 26* 42*   CREATININE mg/dL 1.68* 1.85*   GLUCOSE mg/dL 94 101*              Results from last 7 days   Lab Units 10/13/19  1220   TSH uIU/mL 3.170     Results from last 7 days   Lab Units 10/13/19  1220   PROBNP pg/mL 7,224.0*                   Intake/Output Summary (Last 24 hours) at 10/15/2019 1407  Last data filed at 10/15/2019 0855  Gross per 24 hour   Intake 100 ml   Output --   Net 100 ml       I personally viewed and interpreted the patient's " EKG/Telemetry data    EKG: None for review today    Telemetry: Atrial fibrillation, HR 69-98 bpm    Ejection Fraction  No results found for: EF    Echo EF Estimated  No results found for: ECHOEFEST      Present on Admission:  • Pulmonary arterial hypertension (CMS/HCC)  • Hypersensitivity pneumonitis (CMS/HCC)  • Persistent atrial fibrillation  • GI bleed  • RENEE (acute kidney injury) (CMS/HCC)    Assessment/Plan   1. Persistent Afib, rate controlled, CHADSVASc = 5  2. GIB:  H/H stable. Eliquis on hold, Colonoscopy showing excoriated anal mucosa, left diverticula.  Pill Cam today.  3. CKD:  Stage IV, CrCl 21.1  4. Advanced ILD, Pulmonary pressure >70mmHg on RHC 8/19.   5. CAD:  Recent LHC , No CBI.  Continue medical Therapy.     Plan: No changes from an EP standpoint.  Holding anticoagulation at this time.  Awaiting results of PillCam and recommendations regarding restarting blood thinner.  HR's controlled.      Electronically signed by AYANNA Grissom, 10/15/19, 2:07 PM.      ITien MD, personally performed the services face to face as described and documented by the above named individual. I have made any necessary edits and it is both accurate and complete 10/15/2019  8:05 PM

## 2019-10-15 NOTE — NURSING NOTE
Pt's daughter was called and informed to get consent. Pt instructed on pill cam and given instructions on when she can eat, drink, and when finished.  Pt ingested pill without difficulty and verbalized all understanding. Tolerated well.

## 2019-10-15 NOTE — PROGRESS NOTES
[facility]  PROGRESS NOTE    Patient Name: Pat Morel  : 1940  MRN: [id number]    Date of Admission: 10/13/2019  Primary Care Physician: Fredi Ledesma MD    Subjective   Subjective     CC:  F/u GI bleed    HPI:  Patient resting in bed. She has no complaints. Just tired. Pill cam placed.     Review of Systems  Gen- No fevers, chills  CV- No chest pain, palpitations  Resp- No cough, dyspnea  GI- as above    All other systems reviewed and negative.     Objective   Objective     Vital Signs:   Temp:  [97 °F (36.1 °C)-97.6 °F (36.4 °C)] 97.3 °F (36.3 °C)  Heart Rate:  [69-98] 98  Resp:  [16-18] 17  BP: (113-161)/(68-96) 128/91        Physical Exam:  Constitutional: No acute distress, awake, alert  HENT: NCAT, mucous membranes moist  Respiratory: Clear to auscultation bilaterally, respiratory effort normal   Cardiovascular: RRR, no murmurs, rubs, or gallops, palpable pedal pulses bilaterally  Gastrointestinal: Positive bowel sounds, soft, tender in bilateral lower quadrants  Musculoskeletal: No bilateral ankle edema  Psychiatric: Appropriate affect, cooperative  Neurologic: Oriented x 3, strength symmetric in all extremities, Cranial Nerves grossly intact to confrontation, speech clear  Skin: No rashes      Results Reviewed:    Results from last 7 days   Lab Units 10/15/19  0755 10/15/19  0018 10/14/19  1552  10/13/19  1220   WBC 10*3/mm3 6.66  --   --   --  6.64   HEMOGLOBIN g/dL 8.7* 8.1* 8.6*   < > 7.9*   HEMATOCRIT % 29.8* 27.9* 32.1*   < > 26.7*   PLATELETS 10*3/mm3 258  --   --   --  238    < > = values in this interval not displayed.     Results from last 7 days   Lab Units 10/15/19  0755 10/13/19  1220   SODIUM mmol/L 143 141   POTASSIUM mmol/L 3.7 4.6   CHLORIDE mmol/L 111* 110*   CO2 mmol/L 20.0* 20.0*   BUN mg/dL 26* 42*   CREATININE mg/dL 1.68* 1.85*   GLUCOSE mg/dL 94 101*   CALCIUM mg/dL 9.1 8.8   ALT (SGPT) U/L  --  11   AST (SGOT) U/L   --  16   PROBNP pg/mL  --  7,224.0*     Estimated Creatinine Clearance: 22.7 mL/min (A) (by C-G formula based on SCr of 1.68 mg/dL (H)).    Microbiology Results Abnormal     Procedure Component Value - Date/Time    Gastrointestinal Panel, PCR - Stool, Per Rectum [[id number]]  (Normal) Collected:  10/13/19 1439    Lab Status:  Final result Specimen:  Stool from Per Rectum Updated:  10/13/19 4106     Campylobacter Not Detected     Plesiomonas shigelloides Not Detected     Salmonella Not Detected     Vibrio Not Detected     Vibrio cholerae Not Detected     Yersinia enterocolitica Not Detected     Enteroaggregative E. coli (EAEC) Not Detected     Enteropathogenic E. coli (EPEC) Not Detected     Enterotoxigenic E. coli (ETEC) lt/st Not Detected     Shiga-like toxin-producing E. coli (STEC) stx1/stx2 Not Detected     E. coli O157 Not Detected     Shigella/Enteroinvasive E. coli (EIEC) Not Detected     Cryptosporidium Not Detected     Cyclospora cayetanensis Not Detected     Entamoeba histolytica Not Detected     Giardia lamblia Not Detected     Adenovirus F40/41 Not Detected     Astrovirus Not Detected     Norovirus GI/GII Not Detected     Rotavirus A Not Detected     Sapovirus (I, II, IV or V) Not Detected    Narrative:       If Aeromonas, Staphylococcus aureus or Bacillus cereus are suspected, please order [id number]: Stool Culture, Aeromonas, S aureus, B Cereus.    Clostridium Difficile Toxin - Stool, Per Rectum [[id number]] Collected:  10/13/19 1439    Lab Status:  Final result Specimen:  Stool from Per Rectum Updated:  10/13/19 1117    Narrative:       The following orders were created for panel order Clostridium Difficile Toxin - Stool, Per Rectum.  Procedure                               Abnormality         Status                     ---------                               -----------         ------                     Clostridium Difficile To...[[id number]]  Normal              Final result                 Please view  results for these tests on the individual orders.    Clostridium Difficile Toxin, PCR - Stool, Per Rectum [[id number]]  (Normal) Collected:  10/13/19 1439    Lab Status:  Final result Specimen:  Stool from Per Rectum Updated:  10/13/19 1541     C. Difficile Toxins by PCR Not Detected    Narrative:       Performance characteristics of test not established for patients <2 years of age.  Negative for Toxigenic C. Difficile          Imaging Results (last 24 hours)     ** No results found for the last 24 hours. **               I have reviewed the medications:  Scheduled Meds:    atorvastatin 40 mg Oral Nightly   bisoprolol 5 mg Oral Daily   levothyroxine 75 mcg Oral Q AM   mycophenolate 500 mg Oral Q12H   pantoprazole 40 mg Intravenous BID AC   sodium chloride 10 mL Intravenous Q12H     Continuous Infusions:    lactated ringers 9 mL/hr    sodium chloride 9 mL/hr Last Rate: 9 mL/hr (10/14/19 0925)   sodium chloride 50 mL/hr Last Rate: 50 mL/hr (10/15/19 0817)     PRN Meds:.lactated ringers  •  ondansetron  •  sodium chloride      Assessment/Plan   Assessment / Plan     Active Hospital Problems    Diagnosis  POA   • **GI bleed [K92.2]  Yes   • RENEE (acute kidney injury) (CMS/HCC) [N17.9]  Yes   • Gastrointestinal hemorrhage with melena [K92.1]  Unknown   • Hypersensitivity pneumonitis (CMS/HCC) [J67.9]  Yes   • Pulmonary arterial hypertension (CMS/HCC) [I27.21]  Yes   • Persistent atrial fibrillation [I48.19]  Yes      Resolved Hospital Problems   No resolved problems to display.        Brief Hospital Course to date:  Pat Morel is a 79 y.o. female with history of hypersensitivity pneumonitis currently on CellCept and prednisone, atrial fibrillation currently on Eliquis, previous stroke with no residual deficits, CAD status post CABG upper GI bleed, stable hypertension, pulmonary hypertension.  Patient is transferred from OSH with concerns for ongoing GI bleed after presenting with 8 days of progressively worsening  shortness of breath, with associated epigastric pain and dark tarry stools.    GI Bleed:  - GI following, s/p EGD 10/14 which revealed hiatal hernia but no evidence of active bleeding  - colonoscopy done 10/15 without any abnormalities  - pill cam placed 10/15, results pending  - H&H remains stable, transfuse PRN Hgb <7, trending Hemoglobin  - Eliquis on hold    Hypersensitivity pneumonitis  - followed by pulmonary, patient currently on CellCept, she has previously been weaned off prednisone, will continue cellcept, she has f/u appointment with Dr. Laura in December  - consider pulmonary consult if there is any clinical change    Hypertension  - holding home antihypertensives considering GI bleed, she is normotensive    History of CAD s/p CABG  - She is s/p LHC at OSH this past August (data deficient)  - on Plavix and Lasix which we will hold for now  - Echo pending    Group 3 mild Pulmonary Hypertension:  - likely secondary to underlying lung disease, follows with pulmonary    History of persistent atrial fibrillation  - rates controlled, currently on  Eliquis, will hold this sec to GI bleed  - continue beta-blocker  - not a candidate for Tikosyn per Dr. Archer given poor renal function and GIB, continue rate controll    Hypothyroidism  Continue Synthroid     Debility  -PT/OT/CM    RENEE:  -baseline Cr unknown, 2.19 at OSH, now improved to 1.68 here, per family she has some underlying renal dysfunction. Suspect CKD.     DVT Prophylaxis:  Mechanical given GIB     Disposition: I expect the patient to be discharged 2-3 days    CODE STATUS:   Code Status and Medical Interventions:   Ordered at: 10/13/19 1140     Level Of Support Discussed With:    Patient     Code Status:    No CPR     Medical Interventions (Level of Support Prior to Arrest):    Full         Electronically signed by Elva Alegria DO, 10/15/19, 11:40 AM.

## 2019-10-15 NOTE — PLAN OF CARE
Problem: Patient Care Overview  Goal: Plan of Care Review  Outcome: Ongoing (interventions implemented as appropriate)   10/15/19 0817   Coping/Psychosocial   Plan of Care Reviewed With patient   Plan of Care Review   Progress no change   OTHER   Outcome Summary Patient presents with anus/perirectal excoriation and denuded areas sustained from GI bleed, frequent loose stools, and wiping. See wound care order for care needs. Order Venelex ointment for topical care. Make sure to apply stoma powder prior to application. POC discussed with patient. Will continue to follow. Please contact if needs arise. Thanks

## 2019-10-15 NOTE — ANESTHESIA PREPROCEDURE EVALUATION
Anesthesia Evaluation     Patient summary reviewed and Nursing notes reviewed                Airway   Mallampati: II  TM distance: >3 FB  Neck ROM: full  No difficulty expected  Dental - normal exam     Pulmonary - negative pulmonary ROS and normal exam     ROS comment: Advanced ILD, Pulmonary pressure >70mmHg on RHC   Cardiovascular     Rhythm: irregular  Rate: normal    (+) hypertension, CAD, cardiac stents dysrhythmias (eliquis tx) Paroxysmal Atrial Fib, hyperlipidemia,   CAB.      Neuro/Psych  (+) CVA (plavix tx),     GI/Hepatic/Renal/Endo    (+)  GERD, GI bleeding (HCT 28), renal disease, hypothyroidism,     Musculoskeletal     Abdominal  - normal exam    Bowel sounds: normal.   Substance History - negative use     OB/GYN negative ob/gyn ROS         Other   (+) arthritis                   Anesthesia Plan    ASA 4     general   (Propofol    Will keep BP elevated d/t severe pulm htn)  intravenous induction   Anesthetic plan, all risks, benefits, and alternatives have been provided, discussed and informed consent has been obtained with: patient.    Plan discussed with CRNA.

## 2019-10-16 LAB
ANION GAP SERPL CALCULATED.3IONS-SCNC: 12 MMOL/L (ref 5–15)
BASOPHILS # BLD AUTO: 0.03 10*3/MM3 (ref 0–0.2)
BASOPHILS NFR BLD AUTO: 0.4 % (ref 0–1.5)
BH CV ECHO MEAS - AO MAX PG (FULL): 1 MMHG
BH CV ECHO MEAS - AO MAX PG: 3.7 MMHG
BH CV ECHO MEAS - AO MEAN PG (FULL): 1 MMHG
BH CV ECHO MEAS - AO MEAN PG: 2 MMHG
BH CV ECHO MEAS - AO ROOT AREA (BSA CORRECTED): 1.7
BH CV ECHO MEAS - AO ROOT AREA: 5.7 CM^2
BH CV ECHO MEAS - AO ROOT DIAM: 2.7 CM
BH CV ECHO MEAS - AO V2 MAX: 96.1 CM/SEC
BH CV ECHO MEAS - AO V2 MEAN: 65.9 CM/SEC
BH CV ECHO MEAS - AO V2 VTI: 18 CM
BH CV ECHO MEAS - ASC AORTA: 3.2 CM
BH CV ECHO MEAS - AVA(I,A): 22.9 CM^2
BH CV ECHO MEAS - AVA(I,D): 22.9 CM^2
BH CV ECHO MEAS - AVA(V,A): 22.5 CM^2
BH CV ECHO MEAS - AVA(V,D): 22.5 CM^2
BH CV ECHO MEAS - BSA(HAYCOCK): 1.6 M^2
BH CV ECHO MEAS - BSA: 1.6 M^2
BH CV ECHO MEAS - BZI_BMI: 29.5 KILOGRAMS/M^2
BH CV ECHO MEAS - BZI_METRIC_HEIGHT: 147.3 CM
BH CV ECHO MEAS - BZI_METRIC_WEIGHT: 64 KG
BH CV ECHO MEAS - EDV(CUBED): 61.6 ML
BH CV ECHO MEAS - EDV(TEICH): 67.9 ML
BH CV ECHO MEAS - EF(CUBED): 68.8 %
BH CV ECHO MEAS - EF(TEICH): 61 %
BH CV ECHO MEAS - ESV(CUBED): 19.2 ML
BH CV ECHO MEAS - ESV(TEICH): 26.5 ML
BH CV ECHO MEAS - FS: 32.2 %
BH CV ECHO MEAS - IVS/LVPW: 1
BH CV ECHO MEAS - IVSD: 1.1 CM
BH CV ECHO MEAS - LA DIMENSION: 1.8 CM
BH CV ECHO MEAS - LA/AO: 0.67
BH CV ECHO MEAS - LAD MAJOR: 5.8 CM
BH CV ECHO MEAS - LAT PEAK E' VEL: 12.8 CM/SEC
BH CV ECHO MEAS - LATERAL E/E' RATIO: 11.8
BH CV ECHO MEAS - LV MASS(C)D: 147.6 GRAMS
BH CV ECHO MEAS - LV MASS(C)DI: 94 GRAMS/M^2
BH CV ECHO MEAS - LV MAX PG: 2.7 MMHG
BH CV ECHO MEAS - LV MEAN PG: 1 MMHG
BH CV ECHO MEAS - LV V1 MAX: 81.8 CM/SEC
BH CV ECHO MEAS - LV V1 MEAN: 54.3 CM/SEC
BH CV ECHO MEAS - LV V1 VTI: 15.6 CM
BH CV ECHO MEAS - LVIDD: 4 CM
BH CV ECHO MEAS - LVIDS: 2.7 CM
BH CV ECHO MEAS - LVOT AREA (M): 26.4 CM^2
BH CV ECHO MEAS - LVOT AREA: 26.4 CM^2
BH CV ECHO MEAS - LVOT DIAM: 5.8 CM
BH CV ECHO MEAS - LVPWD: 1.1 CM
BH CV ECHO MEAS - MED PEAK E' VEL: 6.9 CM/SEC
BH CV ECHO MEAS - MEDIAL E/E' RATIO: 21.9
BH CV ECHO MEAS - MR MAX PG: 97 MMHG
BH CV ECHO MEAS - MR MAX VEL: 492.5 CM/SEC
BH CV ECHO MEAS - MR MEAN PG: 62 MMHG
BH CV ECHO MEAS - MR MEAN VEL: 372 CM/SEC
BH CV ECHO MEAS - MR VTI: 140 CM
BH CV ECHO MEAS - MV A MAX VEL: 32.1 CM/SEC
BH CV ECHO MEAS - MV DEC SLOPE: 1262 CM/SEC^2
BH CV ECHO MEAS - MV DEC TIME: 0.11 SEC
BH CV ECHO MEAS - MV E MAX VEL: 151 CM/SEC
BH CV ECHO MEAS - MV E/A: 4.7
BH CV ECHO MEAS - MV MAX PG: 11.8 MMHG
BH CV ECHO MEAS - MV MEAN PG: 5 MMHG
BH CV ECHO MEAS - MV V2 MAX: 172 CM/SEC
BH CV ECHO MEAS - MV V2 MEAN: 96.1 CM/SEC
BH CV ECHO MEAS - MV V2 VTI: 26.3 CM
BH CV ECHO MEAS - MVA(VTI): 15.7 CM^2
BH CV ECHO MEAS - PA ACC SLOPE: 797 CM/SEC^2
BH CV ECHO MEAS - PA ACC TIME: 0.06 SEC
BH CV ECHO MEAS - PA MAX PG: 1.1 MMHG
BH CV ECHO MEAS - PA PR(ACCEL): 53.8 MMHG
BH CV ECHO MEAS - PA V2 MAX: 52.1 CM/SEC
BH CV ECHO MEAS - RAP SYSTOLE: 8 MMHG
BH CV ECHO MEAS - RVSP: 50 MMHG
BH CV ECHO MEAS - SI(AO): 65.6 ML/M^2
BH CV ECHO MEAS - SI(CUBED): 27 ML/M^2
BH CV ECHO MEAS - SI(LVOT): 262.5 ML/M^2
BH CV ECHO MEAS - SI(TEICH): 26.4 ML/M^2
BH CV ECHO MEAS - SV(AO): 103.1 ML
BH CV ECHO MEAS - SV(CUBED): 42.4 ML
BH CV ECHO MEAS - SV(LVOT): 412.2 ML
BH CV ECHO MEAS - SV(TEICH): 41.4 ML
BH CV ECHO MEAS - TAPSE (>1.6): 0.7 CM2
BH CV ECHO MEAS - TR MAX PG: 42 MMHG
BH CV ECHO MEAS - TR MAX VEL: 323 CM/SEC
BH CV ECHO MEASUREMENTS AVERAGE E/E' RATIO: 15.33
BH CV VAS BP LEFT ARM: NORMAL MMHG
BH CV XLRA - RV BASE: 4.1 CM
BH CV XLRA - RV LENGTH: 5.5 CM
BH CV XLRA - RV MID: 2.8 CM
BUN BLD-MCNC: 22 MG/DL (ref 8–23)
BUN/CREAT SERPL: 14.2 (ref 7–25)
CALCIUM SPEC-SCNC: 8.6 MG/DL (ref 8.6–10.5)
CHLORIDE SERPL-SCNC: 109 MMOL/L (ref 98–107)
CO2 SERPL-SCNC: 19 MMOL/L (ref 22–29)
CREAT BLD-MCNC: 1.55 MG/DL (ref 0.57–1)
DEPRECATED RDW RBC AUTO: 55.4 FL (ref 37–54)
EOSINOPHIL # BLD AUTO: 0.25 10*3/MM3 (ref 0–0.4)
EOSINOPHIL NFR BLD AUTO: 3.7 % (ref 0.3–6.2)
ERYTHROCYTE [DISTWIDTH] IN BLOOD BY AUTOMATED COUNT: 17.2 % (ref 12.3–15.4)
GFR SERPL CREATININE-BSD FRML MDRD: 32 ML/MIN/1.73
GLUCOSE BLD-MCNC: 87 MG/DL (ref 65–99)
HCT VFR BLD AUTO: 27.9 % (ref 34–46.6)
HGB BLD-MCNC: 8.1 G/DL (ref 12–15.9)
IMM GRANULOCYTES # BLD AUTO: 0.03 10*3/MM3 (ref 0–0.05)
IMM GRANULOCYTES NFR BLD AUTO: 0.4 % (ref 0–0.5)
LEFT ATRIUM VOLUME INDEX: 49.7 ML/M^2
LEFT ATRIUM VOLUME: 78 ML
LYMPHOCYTES # BLD AUTO: 0.79 10*3/MM3 (ref 0.7–3.1)
LYMPHOCYTES NFR BLD AUTO: 11.6 % (ref 19.6–45.3)
MAGNESIUM SERPL-MCNC: 2.1 MG/DL (ref 1.6–2.4)
MCH RBC QN AUTO: 25.7 PG (ref 26.6–33)
MCHC RBC AUTO-ENTMCNC: 29 G/DL (ref 31.5–35.7)
MCV RBC AUTO: 88.6 FL (ref 79–97)
MONOCYTES # BLD AUTO: 0.61 10*3/MM3 (ref 0.1–0.9)
MONOCYTES NFR BLD AUTO: 9 % (ref 5–12)
NEUTROPHILS # BLD AUTO: 5.09 10*3/MM3 (ref 1.7–7)
NEUTROPHILS NFR BLD AUTO: 74.9 % (ref 42.7–76)
NRBC BLD AUTO-RTO: 0 /100 WBC (ref 0–0.2)
PLATELET # BLD AUTO: 250 10*3/MM3 (ref 140–450)
PMV BLD AUTO: 9.3 FL (ref 6–12)
POTASSIUM BLD-SCNC: 3.4 MMOL/L (ref 3.5–5.2)
POTASSIUM BLD-SCNC: 3.8 MMOL/L (ref 3.5–5.2)
RBC # BLD AUTO: 3.15 10*6/MM3 (ref 3.77–5.28)
SODIUM BLD-SCNC: 140 MMOL/L (ref 136–145)
WBC NRBC COR # BLD: 6.8 10*3/MM3 (ref 3.4–10.8)

## 2019-10-16 PROCEDURE — 85025 COMPLETE CBC W/AUTO DIFF WBC: CPT | Performed by: INTERNAL MEDICINE

## 2019-10-16 PROCEDURE — 83735 ASSAY OF MAGNESIUM: CPT | Performed by: INTERNAL MEDICINE

## 2019-10-16 PROCEDURE — 84132 ASSAY OF SERUM POTASSIUM: CPT | Performed by: INTERNAL MEDICINE

## 2019-10-16 PROCEDURE — 99232 SBSQ HOSP IP/OBS MODERATE 35: CPT | Performed by: INTERNAL MEDICINE

## 2019-10-16 PROCEDURE — 80048 BASIC METABOLIC PNL TOTAL CA: CPT | Performed by: INTERNAL MEDICINE

## 2019-10-16 PROCEDURE — 63710000001 MYCOPHENOLATE MOFETIL PER 250 MG: Performed by: INTERNAL MEDICINE

## 2019-10-16 RX ORDER — POTASSIUM CHLORIDE 7.45 MG/ML
10 INJECTION INTRAVENOUS
Status: DISCONTINUED | OUTPATIENT
Start: 2019-10-16 | End: 2019-10-17 | Stop reason: HOSPADM

## 2019-10-16 RX ORDER — POTASSIUM CHLORIDE 1.5 G/1.77G
40 POWDER, FOR SOLUTION ORAL AS NEEDED
Status: DISCONTINUED | OUTPATIENT
Start: 2019-10-16 | End: 2019-10-17 | Stop reason: HOSPADM

## 2019-10-16 RX ORDER — CLOPIDOGREL BISULFATE 75 MG/1
75 TABLET ORAL DAILY
Status: DISCONTINUED | OUTPATIENT
Start: 2019-10-16 | End: 2019-10-17 | Stop reason: HOSPADM

## 2019-10-16 RX ORDER — PANTOPRAZOLE SODIUM 40 MG/1
40 TABLET, DELAYED RELEASE ORAL
Status: DISCONTINUED | OUTPATIENT
Start: 2019-10-16 | End: 2019-10-17 | Stop reason: HOSPADM

## 2019-10-16 RX ORDER — POTASSIUM CHLORIDE 750 MG/1
40 CAPSULE, EXTENDED RELEASE ORAL AS NEEDED
Status: DISCONTINUED | OUTPATIENT
Start: 2019-10-16 | End: 2019-10-17 | Stop reason: HOSPADM

## 2019-10-16 RX ADMIN — ATORVASTATIN CALCIUM 40 MG: 40 TABLET, FILM COATED ORAL at 20:42

## 2019-10-16 RX ADMIN — CLOPIDOGREL BISULFATE 75 MG: 75 TABLET ORAL at 18:23

## 2019-10-16 RX ADMIN — POTASSIUM CHLORIDE 40 MEQ: 750 CAPSULE, EXTENDED RELEASE ORAL at 06:09

## 2019-10-16 RX ADMIN — MYCOPHENOLATE MOFETIL 500 MG: 250 CAPSULE ORAL at 20:42

## 2019-10-16 RX ADMIN — CASTOR OIL AND BALSAM, PERU: 788; 87 OINTMENT TOPICAL at 20:44

## 2019-10-16 RX ADMIN — SODIUM CHLORIDE, PRESERVATIVE FREE 10 ML: 5 INJECTION INTRAVENOUS at 20:44

## 2019-10-16 RX ADMIN — APIXABAN 2.5 MG: 2.5 TABLET, FILM COATED ORAL at 20:42

## 2019-10-16 RX ADMIN — POTASSIUM CHLORIDE 40 MEQ: 750 CAPSULE, EXTENDED RELEASE ORAL at 13:16

## 2019-10-16 RX ADMIN — PANTOPRAZOLE SODIUM 40 MG: 40 INJECTION, POWDER, FOR SOLUTION INTRAVENOUS at 08:30

## 2019-10-16 RX ADMIN — BISOPROLOL FUMARATE 5 MG: 5 TABLET ORAL at 08:29

## 2019-10-16 RX ADMIN — MYCOPHENOLATE MOFETIL 500 MG: 250 CAPSULE ORAL at 08:29

## 2019-10-16 RX ADMIN — LEVOTHYROXINE SODIUM 75 MCG: 75 TABLET ORAL at 06:09

## 2019-10-16 RX ADMIN — SODIUM CHLORIDE, PRESERVATIVE FREE 10 ML: 5 INJECTION INTRAVENOUS at 08:31

## 2019-10-16 RX ADMIN — CASTOR OIL AND BALSAM, PERU: 788; 87 OINTMENT TOPICAL at 08:34

## 2019-10-16 RX ADMIN — PANTOPRAZOLE SODIUM 40 MG: 40 TABLET, DELAYED RELEASE ORAL at 18:23

## 2019-10-16 NOTE — PROGRESS NOTES
[facility]  PROGRESS NOTE    Patient Name: Pat Morel  : 1940  MRN: [id number]    Date of Admission: 10/13/2019  Primary Care Physician: Fredi Ledesma MD    Subjective   Subjective     CC:  F/u GI bleed    HPI:  Patient resting in bed. She has no complaints. No further bleeding that she is aware of. No chest pain. Complains of some abdominal cramping. Tolerating diet.    Review of Systems  Gen- No fevers, chills  CV- No chest pain, palpitations  Resp- No cough, dyspnea  GI- as above    All other systems reviewed and negative.     Objective   Objective     Vital Signs:   Temp:  [97.6 °F (36.4 °C)] 97.6 °F (36.4 °C)  Heart Rate:  [74-85] 85  Resp:  [18] 18  BP: (118-136)/(70-72) 136/72        Physical Exam:  Constitutional: No acute distress, awake, alert  HENT: NCAT, mucous membranes moist  Respiratory: Clear to auscultation bilaterally, respiratory effort normal   Cardiovascular: RRR, no murmurs, rubs, or gallops, palpable pedal pulses bilaterally  Gastrointestinal: Positive bowel sounds, soft, tender in bilateral lower quadrants  Musculoskeletal: No bilateral ankle edema  Psychiatric: Appropriate affect, cooperative  Neurologic: Oriented x 3, strength symmetric in all extremities, Cranial Nerves grossly intact to confrontation, speech clear  Skin: No rashes  Exam is unchanged from 10/15    Results Reviewed:    Results from last 7 days   Lab Units 10/16/19  0346 10/15/19  0755 10/15/19  0018  10/13/19  1220   WBC 10*3/mm3 6.80 6.66  --   --  6.64   HEMOGLOBIN g/dL 8.1* 8.7* 8.1*   < > 7.9*   HEMATOCRIT % 27.9* 29.8* 27.9*   < > 26.7*   PLATELETS 10*3/mm3 250 258  --   --  238    < > = values in this interval not displayed.     Results from last 7 days   Lab Units 10/16/19  0346 10/15/19  0755 10/13/19  1220   SODIUM mmol/L 140 143 141   POTASSIUM mmol/L 3.4* 3.7 4.6   CHLORIDE mmol/L 109* 111* 110*   CO2 mmol/L 19.0* 20.0* 20.0*   BUN mg/dL 22 26*  42*   CREATININE mg/dL 1.55* 1.68* 1.85*   GLUCOSE mg/dL 87 94 101*   CALCIUM mg/dL 8.6 9.1 8.8   ALT (SGPT) U/L  --   --  11   AST (SGOT) U/L  --   --  16   PROBNP pg/mL  --   --  7,224.0*     Estimated Creatinine Clearance: 24.9 mL/min (A) (by C-G formula based on SCr of 1.55 mg/dL (H)).    Microbiology Results Abnormal     Procedure Component Value - Date/Time    Gastrointestinal Panel, PCR - Stool, Per Rectum [[id number]]  (Normal) Collected:  10/13/19 1439    Lab Status:  Final result Specimen:  Stool from Per Rectum Updated:  10/13/19 2145     Campylobacter Not Detected     Plesiomonas shigelloides Not Detected     Salmonella Not Detected     Vibrio Not Detected     Vibrio cholerae Not Detected     Yersinia enterocolitica Not Detected     Enteroaggregative E. coli (EAEC) Not Detected     Enteropathogenic E. coli (EPEC) Not Detected     Enterotoxigenic E. coli (ETEC) lt/st Not Detected     Shiga-like toxin-producing E. coli (STEC) stx1/stx2 Not Detected     E. coli O157 Not Detected     Shigella/Enteroinvasive E. coli (EIEC) Not Detected     Cryptosporidium Not Detected     Cyclospora cayetanensis Not Detected     Entamoeba histolytica Not Detected     Giardia lamblia Not Detected     Adenovirus F40/41 Not Detected     Astrovirus Not Detected     Norovirus GI/GII Not Detected     Rotavirus A Not Detected     Sapovirus (I, II, IV or V) Not Detected    Narrative:       If Aeromonas, Staphylococcus aureus or Bacillus cereus are suspected, please order [id number]: Stool Culture, Aeromonas, S aureus, B Cereus.    Clostridium Difficile Toxin - Stool, Per Rectum [[id number]] Collected:  10/13/19 1439    Lab Status:  Final result Specimen:  Stool from Per Rectum Updated:  10/13/19 1541    Narrative:       The following orders were created for panel order Clostridium Difficile Toxin - Stool, Per Rectum.  Procedure                               Abnormality         Status                     ---------                                -----------         ------                     Clostridium Difficile To...[[id number]]  Normal              Final result                 Please view results for these tests on the individual orders.    Clostridium Difficile Toxin, PCR - Stool, Per Rectum [[id number]]  (Normal) Collected:  10/13/19 1439    Lab Status:  Final result Specimen:  Stool from Per Rectum Updated:  10/13/19 1541     C. Difficile Toxins by PCR Not Detected    Narrative:       Performance characteristics of test not established for patients <2 years of age.  Negative for Toxigenic C. Difficile          Imaging Results (last 24 hours)     ** No results found for the last 24 hours. **               I have reviewed the medications:  Scheduled Meds:    atorvastatin 40 mg Oral Nightly   bisoprolol 5 mg Oral Daily   castor oil-balsam peru  Topical Q12H   levothyroxine 75 mcg Oral Q AM   mycophenolate 500 mg Oral Q12H   pantoprazole 40 mg Intravenous BID AC   sodium chloride 10 mL Intravenous Q12H     Continuous Infusions:    sodium chloride 9 mL/hr Last Rate: 9 mL/hr (10/14/19 0925)     PRN Meds:.ondansetron  •  potassium chloride **OR** potassium chloride **OR** potassium chloride  •  sodium chloride      Assessment/Plan   Assessment / Plan     Active Hospital Problems    Diagnosis  POA   • **GI bleed [K92.2]  Yes   • RENEE (acute kidney injury) (CMS/HCC) [N17.9]  Yes   • Gastrointestinal hemorrhage with melena [K92.1]  Unknown   • Hypersensitivity pneumonitis (CMS/HCC) [J67.9]  Yes   • Pulmonary arterial hypertension (CMS/HCC) [I27.21]  Yes   • Persistent atrial fibrillation [I48.19]  Yes      Resolved Hospital Problems   No resolved problems to display.        Brief Hospital Course to date:  Pat Morel is a 79 y.o. female with history of hypersensitivity pneumonitis currently on CellCept and prednisone, atrial fibrillation currently on Eliquis, previous stroke with no residual deficits, CAD status post CABG upper GI bleed, stable  hypertension, pulmonary hypertension.  Patient is transferred from OSH with concerns for ongoing GI bleed after presenting with 8 days of progressively worsening shortness of breath, with associated epigastric pain and dark tarry stools.    GI Bleed:  - GI following, s/p EGD 10/14 which revealed hiatal hernia but no evidence of active bleeding  - colonoscopy done 10/15 without any abnormalities  - pill cam placed 10/15 and completed. Awaiting results today  - H&H remains stable, transfuse PRN Hgb <7  - Plavix and Eliquis on hold, if pill cam okay, can likely resume both and monitor    Hypersensitivity pneumonitis  - followed by pulmonary, patient currently on CellCept, she has previously been weaned off prednisone, will continue cellcept, she has f/u appointment with Dr. Laura in December  - consider pulmonary consult if there is any clinical change    Hypertension  - holding home antihypertensives considering GI bleed, she is normotensive    History of CAD s/p CABG  - She is s/p LHC at OSH this past August (data deficient)  - Plavix, lasix on hold  - Echo pending    Group 3 mild Pulmonary Hypertension:  - likely secondary to underlying lung disease, follows with pulmonary    History of persistent atrial fibrillation  - rates controlled, currently on  Eliquis, will hold this sec to GI bleed  - continue beta-blocker  - not a candidate for Tikosyn per Dr. Archer given poor renal function and GIB, continue rate controll    Hypothyroidism  Continue Synthroid     Debility  -PT/OT/CM    CKD IV  -baseline Cr unknown, 2.19 at OSH, now improved to 1.55 here.     DVT Prophylaxis:  Mechanical given GIB, if okay with GI will resume Plavix and Eliquis today and monitor     Disposition: I expect the patient to be discharged 1 day    CODE STATUS:   Code Status and Medical Interventions:   Ordered at: 10/13/19 1140     Level Of Support Discussed With:    Patient     Code Status:    No CPR     Medical Interventions (Level of  Support Prior to Arrest):    Full         Electronically signed by Elva Alegria DO, 10/16/19, 2:47 PM.

## 2019-10-16 NOTE — PLAN OF CARE
Problem: Patient Care Overview  Goal: Plan of Care Review  Outcome: Ongoing (interventions implemented as appropriate)   10/16/19 2267   Coping/Psychosocial   Plan of Care Reviewed With patient   Plan of Care Review   Progress improving   OTHER   Outcome Summary VSS. Pt remains on clear liquid diet. No c/o nausea. Pt passed pill cam. Stoma powder and venelex applied to excoriated areas after BMs. Will continue to monitor.

## 2019-10-16 NOTE — PROGRESS NOTES
Continued Stay Note   [place]     Patient Name: Pat Morel  MRN: [id number]  Today's Date: 10/16/2019    Admit Date: 10/13/2019    Discharge Plan     Row Name 10/16/19 1533       Plan    Plan  Home at DC    Patient/Family in Agreement with Plan  yes    Plan Comments  I spoke with the pt. Her goal remains home at DC. She declines the need for HH. States she has no new DC needs.        Discharge Codes    No documentation.       Expected Discharge Date and Time     Expected Discharge Date Expected Discharge Time    Oct 16, 2019             Lucie Villatoro RN

## 2019-10-16 NOTE — PLAN OF CARE
Problem: Patient Care Overview  Goal: Plan of Care Review  Outcome: Ongoing (interventions implemented as appropriate)   10/16/19 1610   Coping/Psychosocial   Plan of Care Reviewed With patient   Plan of Care Review   Progress improving   OTHER   Outcome Summary VSS. AFib on the monitor. Pill cam was taken to endo this afternoon. 2L O2 needed while sleeping. K+ replaced today. Pt diet advanced to GI bland/soft. Pt had 3 BM's during the shift. No blood. Pt does c/o abd discomfort. Will continue to monitor.        Problem: Fall Risk (Adult)  Goal: Absence of Fall  Outcome: Ongoing (interventions implemented as appropriate)      Problem: Gastrointestinal Bleeding (Adult)  Goal: Signs and Symptoms of Listed Potential Problems Will be Absent, Minimized or Managed (Gastrointestinal Bleeding)  Outcome: Ongoing (interventions implemented as appropriate)

## 2019-10-17 VITALS
RESPIRATION RATE: 18 BRPM | WEIGHT: 135.3 LBS | HEIGHT: 58 IN | BODY MASS INDEX: 28.4 KG/M2 | DIASTOLIC BLOOD PRESSURE: 66 MMHG | TEMPERATURE: 98.2 F | SYSTOLIC BLOOD PRESSURE: 126 MMHG | HEART RATE: 76 BPM | OXYGEN SATURATION: 95 %

## 2019-10-17 LAB
BASOPHILS # BLD AUTO: 0.02 10*3/MM3 (ref 0–0.2)
BASOPHILS NFR BLD AUTO: 0.3 % (ref 0–1.5)
DEPRECATED RDW RBC AUTO: 55.2 FL (ref 37–54)
EOSINOPHIL # BLD AUTO: 0.2 10*3/MM3 (ref 0–0.4)
EOSINOPHIL NFR BLD AUTO: 2.8 % (ref 0.3–6.2)
ERYTHROCYTE [DISTWIDTH] IN BLOOD BY AUTOMATED COUNT: 17.2 % (ref 12.3–15.4)
HCT VFR BLD AUTO: 28 % (ref 34–46.6)
HGB BLD-MCNC: 8 G/DL (ref 12–15.9)
IMM GRANULOCYTES # BLD AUTO: 0.03 10*3/MM3 (ref 0–0.05)
IMM GRANULOCYTES NFR BLD AUTO: 0.4 % (ref 0–0.5)
LYMPHOCYTES # BLD AUTO: 0.93 10*3/MM3 (ref 0.7–3.1)
LYMPHOCYTES NFR BLD AUTO: 13 % (ref 19.6–45.3)
MCH RBC QN AUTO: 25.4 PG (ref 26.6–33)
MCHC RBC AUTO-ENTMCNC: 28.6 G/DL (ref 31.5–35.7)
MCV RBC AUTO: 88.9 FL (ref 79–97)
MONOCYTES # BLD AUTO: 0.58 10*3/MM3 (ref 0.1–0.9)
MONOCYTES NFR BLD AUTO: 8.1 % (ref 5–12)
NEUTROPHILS # BLD AUTO: 5.38 10*3/MM3 (ref 1.7–7)
NEUTROPHILS NFR BLD AUTO: 75.4 % (ref 42.7–76)
NRBC BLD AUTO-RTO: 0 /100 WBC (ref 0–0.2)
PLATELET # BLD AUTO: 245 10*3/MM3 (ref 140–450)
PMV BLD AUTO: 9 FL (ref 6–12)
RBC # BLD AUTO: 3.15 10*6/MM3 (ref 3.77–5.28)
WBC NRBC COR # BLD: 7.14 10*3/MM3 (ref 3.4–10.8)

## 2019-10-17 PROCEDURE — 63710000001 MYCOPHENOLATE MOFETIL PER 250 MG: Performed by: INTERNAL MEDICINE

## 2019-10-17 PROCEDURE — 85025 COMPLETE CBC W/AUTO DIFF WBC: CPT | Performed by: INTERNAL MEDICINE

## 2019-10-17 PROCEDURE — 99239 HOSP IP/OBS DSCHRG MGMT >30: CPT | Performed by: INTERNAL MEDICINE

## 2019-10-17 RX ORDER — DOXYCYCLINE HYCLATE 50 MG/1
324 CAPSULE, GELATIN COATED ORAL
Qty: 30 TABLET | Refills: 0 | Status: SHIPPED | OUTPATIENT
Start: 2019-10-17 | End: 2020-01-01 | Stop reason: HOSPADM

## 2019-10-17 RX ADMIN — PANTOPRAZOLE SODIUM 40 MG: 40 TABLET, DELAYED RELEASE ORAL at 16:44

## 2019-10-17 RX ADMIN — APIXABAN 2.5 MG: 2.5 TABLET, FILM COATED ORAL at 08:13

## 2019-10-17 RX ADMIN — LEVOTHYROXINE SODIUM 75 MCG: 75 TABLET ORAL at 06:40

## 2019-10-17 RX ADMIN — PANTOPRAZOLE SODIUM 40 MG: 40 TABLET, DELAYED RELEASE ORAL at 08:13

## 2019-10-17 RX ADMIN — MYCOPHENOLATE MOFETIL 500 MG: 250 CAPSULE ORAL at 08:13

## 2019-10-17 RX ADMIN — BISOPROLOL FUMARATE 5 MG: 5 TABLET ORAL at 08:12

## 2019-10-17 RX ADMIN — CLOPIDOGREL BISULFATE 75 MG: 75 TABLET ORAL at 08:13

## 2019-10-17 RX ADMIN — SODIUM CHLORIDE, PRESERVATIVE FREE 10 ML: 5 INJECTION INTRAVENOUS at 08:13

## 2019-10-17 NOTE — PLAN OF CARE
Problem: Fall Risk (Adult)  Goal: Identify Related Risk Factors and Signs and Symptoms  Outcome: Ongoing (interventions implemented as appropriate)   10/17/19 1512   Fall Risk (Adult)   Related Risk Factors (Fall Risk) age-related changes;fatigue/slow reaction;gait/mobility problems;environment unfamiliar   Signs and Symptoms (Fall Risk) presence of risk factors     Goal: Absence of Fall  Outcome: Ongoing (interventions implemented as appropriate)   10/17/19 1512   Fall Risk (Adult)   Absence of Fall making progress toward outcome

## 2019-10-17 NOTE — DISCHARGE SUMMARY
[facility] Medicine Services  DISCHARGE SUMMARY    Patient Name: Pat Morel  : 1940  MRN: [id number]    Date of Admission: 10/13/2019  Date of Discharge:  10/17/2019  Primary Care Physician: Fredi Ledesma MD    Consults     Date and Time Order Name Status Description    10/14/2019 1055 Inpatient Cardiology Consult Completed     10/13/2019 1140 Inpatient Gastroenterology Consult Completed           Hospital Course     Presenting Problem:   GI bleed [K92.2]    Active Hospital Problems    Diagnosis  POA   • **GI bleed [K92.2]  Yes   • RENEE (acute kidney injury) (CMS/HCC) [N17.9]  Yes   • Gastrointestinal hemorrhage with melena [K92.1]  Unknown   • Hypersensitivity pneumonitis (CMS/HCC) [J67.9]  Yes   • Pulmonary arterial hypertension (CMS/HCC) [I27.21]  Yes   • Persistent atrial fibrillation [I48.19]  Yes      Resolved Hospital Problems   No resolved problems to display.          Hospital Course:  Pat Morel is a 79 y.o. female with history of hypersensitivity pneumonitis currently on CellCept and prednisone, atrial fibrillation currently on Eliquis, CKD IV, previous stroke with no residual deficits, CAD status post CABG upper GI bleed,essential hypertension, pulmonary hypertension.  Patient is transferred from OSH with concerns for ongoing GI bleed after presenting with 8 days of progressively worsening shortness of breath, with associated epigastric pain and dark tarry stools. Gastroenterology was consulted and patient underwent  EGD 10/14 which revealed hiatal hernia but no evidence of active bleeding. Subsequent colonoscopy was performed on 10/15 without any clear evidence of bleeding. Showed excoriated anal mucosa and left diverticula without evidence of diverticulitis. Pill Cam completed 10/16 and results were also negative. Plavix and Eliquis were restarted on 10/17. Her hemoglobin remains low, but stable. She has had no further bleeding since resumption  of her blood thinners. Family did request evaluation by Dr. Archer regarding A.fib and possible Tikosyn initiation, however patient was not felt to be a good candidate due to CKD IV and GIB. Will discharge with oral iron replacement. Patient should f/u with PCP in one week, recommend CBC at that time.    Discharge Follow Up Recommendations for labs/diagnostics:  - follow-up with PCP in one week, recommend CBC at that time  - Keep scheduled follow-up with Dr. Laura in December    Day of Discharge     HPI:   Patient complains of some mild abdominal tenderness, having some diarrhea, but no further dark stools. Denies chest pain. Has some mild SOA with exertion but this is her baseline.    Review of Systems  Gen- No fevers, chills  CV- No chest pain, palpitations  Resp- No cough, dyspnea  GI- No N/V/D, abd pain    Otherwise ROS is negative except as mentioned in the HPI.    Vital Signs:   Temp:  [97.9 °F (36.6 °C)-98.2 °F (36.8 °C)] 98.2 °F (36.8 °C)  Heart Rate:  [76-96] 76  Resp:  [18] 18  BP: (126-131)/(66-81) 126/66     Physical Exam:  Constitutional: No acute distress, awake, alert  HENT: NCAT, mucous membranes moist  Respiratory: Clear to auscultation bilaterally, respiratory effort normal   Cardiovascular: RRR, no murmurs, rubs, or gallops, palpable pedal pulses bilaterally  Gastrointestinal: Positive bowel sounds, soft, nontender, nondistended  Musculoskeletal: No bilateral ankle edema  Psychiatric: Appropriate affect, cooperative  Neurologic: Oriented x 3, strength symmetric in all extremities, Cranial Nerves grossly intact to confrontation, speech clear  Skin: No rashes      Pertinent  and/or Most Recent Results     Results from last 7 days   Lab Units 10/17/19  0325 10/16/19  1703 10/16/19  0346 10/15/19  0755 10/15/19  0018 10/14/19  1552 10/14/19  0756 10/14/19  0040 10/13/19  1220   WBC 10*3/mm3 7.14  --  6.80 6.66  --   --   --   --  6.64   HEMOGLOBIN g/dL 8.0*  --  8.1* 8.7* 8.1* 8.6* 8.1* 7.9*  7.9*   HEMATOCRIT % 28.0*  --  27.9* 29.8* 27.9* 32.1* 27.9* 27.1* 26.7*   PLATELETS 10*3/mm3 245  --  250 258  --   --   --   --  238   SODIUM mmol/L  --   --  140 143  --   --   --   --  141   POTASSIUM mmol/L  --  3.8 3.4* 3.7  --   --   --   --  4.6   CHLORIDE mmol/L  --   --  109* 111*  --   --   --   --  110*   CO2 mmol/L  --   --  19.0* 20.0*  --   --   --   --  20.0*   BUN mg/dL  --   --  22 26*  --   --   --   --  42*   CREATININE mg/dL  --   --  1.55* 1.68*  --   --   --   --  1.85*   GLUCOSE mg/dL  --   --  87 94  --   --   --   --  101*   CALCIUM mg/dL  --   --  8.6 9.1  --   --   --   --  8.8     Results from last 7 days   Lab Units 10/13/19  1220   BILIRUBIN mg/dL 0.5   ALK PHOS U/L 41   ALT (SGPT) U/L 11   AST (SGOT) U/L 16           Invalid input(s): TG, LDLCALC, LDLREALC  Results from last 7 days   Lab Units 10/13/19  1220   TSH uIU/mL 3.170   PROBNP pg/mL 7,224.0*       Brief Urine Lab Results     None          Microbiology Results Abnormal     Procedure Component Value - Date/Time    Gastrointestinal Panel, PCR - Stool, Per Rectum [[id number]]  (Normal) Collected:  10/13/19 1439    Lab Status:  Final result Specimen:  Stool from Per Rectum Updated:  10/13/19 2140     Campylobacter Not Detected     Plesiomonas shigelloides Not Detected     Salmonella Not Detected     Vibrio Not Detected     Vibrio cholerae Not Detected     Yersinia enterocolitica Not Detected     Enteroaggregative E. coli (EAEC) Not Detected     Enteropathogenic E. coli (EPEC) Not Detected     Enterotoxigenic E. coli (ETEC) lt/st Not Detected     Shiga-like toxin-producing E. coli (STEC) stx1/stx2 Not Detected     E. coli O157 Not Detected     Shigella/Enteroinvasive E. coli (EIEC) Not Detected     Cryptosporidium Not Detected     Cyclospora cayetanensis Not Detected     Entamoeba histolytica Not Detected     Giardia lamblia Not Detected     Adenovirus F40/41 Not Detected     Astrovirus Not Detected     Norovirus GI/GII Not Detected      Rotavirus A Not Detected     Sapovirus (I, II, IV or V) Not Detected    Narrative:       If Aeromonas, Staphylococcus aureus or Bacillus cereus are suspected, please order [id number]: Stool Culture, Aeromonas, S aureus, B Cereus.    Clostridium Difficile Toxin - Stool, Per Rectum [[id number]] Collected:  10/13/19 1439    Lab Status:  Final result Specimen:  Stool from Per Rectum Updated:  10/13/19 1541    Narrative:       The following orders were created for panel order Clostridium Difficile Toxin - Stool, Per Rectum.  Procedure                               Abnormality         Status                     ---------                               -----------         ------                     Clostridium Difficile To...[[id number]]  Normal              Final result                 Please view results for these tests on the individual orders.    Clostridium Difficile Toxin, PCR - Stool, Per Rectum [[id number]]  (Normal) Collected:  10/13/19 1439    Lab Status:  Final result Specimen:  Stool from Per Rectum Updated:  10/13/19 1541     C. Difficile Toxins by PCR Not Detected    Narrative:       Performance characteristics of test not established for patients <2 years of age.  Negative for Toxigenic C. Difficile          Imaging Results (all)     Procedure Component Value Units Date/Time    CT Outside Films [[id number]] Resulted:  10/13/19 1310     Updated:  10/13/19 1310    Narrative:       This procedure was auto-finalized with no dictation required.                    Results for orders placed during the hospital encounter of 10/13/19   Adult Transthoracic Echo Complete W/ Cont if Necessary Per Protocol    Narrative · The left atrium is enlarged.  · Global and segmental LV wall motion is normal. The estimated LV ejection   fraction is >70%.  · The aortic valve appears to be mildly sclerotic. AS and AI are absent.  · There is mild-moderate mitral regurgitation.  · There is moderate tricuspid regurgitation with a moderate  elevation is   estimated PA pressure (45-55 mmHg)  · There are no other important findings on this study.            Discharge Details        Discharge Medications      New Medications      Instructions Start Date   ferrous gluconate 324 MG tablet  Commonly known as:  FERGON   324 mg, Oral, Daily With Breakfast         Changes to Medications      Instructions Start Date   bisoprolol 5 MG tablet  Commonly known as:  ZEBETA  What changed:    · how much to take  · when to take this   7.5 mg, Oral, 2 Times Daily         Continue These Medications      Instructions Start Date   amLODIPine 5 MG tablet  Commonly known as:  NORVASC   5 mg, Oral, Daily      apixaban 2.5 MG tablet tablet  Commonly known as:  ELIQUIS   2.5 mg, Oral, 2 Times Daily      atorvastatin 40 MG tablet  Commonly known as:  LIPITOR   40 mg, Oral, Daily      cholecalciferol 1000 units tablet  Commonly known as:  VITAMIN D3   2,000 Units, Oral, Daily      clopidogrel 75 MG tablet  Commonly known as:  PLAVIX   75 mg, Oral, Daily      denosumab 60 MG/ML solution prefilled syringe syringe  Commonly known as:  PROLIA   60 mg, Once      Fluticasone Furoate-Vilanterol 200-25 MCG/INH inhaler  Commonly known as:  BREO ELLIPTA   1 puff, Inhalation, Daily      furosemide 40 MG tablet  Commonly known as:  LASIX   80 mg, Oral, Daily      furosemide 40 MG tablet  Commonly known as:  LASIX   40 mg, Oral, Daily With Lunch      isosorbide mononitrate 120 MG 24 hr tablet  Commonly known as:  IMDUR   120 mg, Oral, Daily      levothyroxine 75 MCG tablet  Commonly known as:  SYNTHROID, LEVOTHROID   No dose, route, or frequency recorded.      mycophenolate 500 MG tablet  Commonly known as:  CELLCEPT   500 mg, Oral, 2 Times Daily      nitroglycerin 0.4 MG SL tablet  Commonly known as:  NITROSTAT   No dose, route, or frequency recorded.      pantoprazole 40 MG EC tablet  Commonly known as:  PROTONIX   40 mg, Oral, Daily      potassium chloride 10 MEQ CR tablet  Commonly known  as:  K-DUR,KLOR-CON   10 mEq, Oral, 2 Times Daily      potassium chloride 10 MEQ CR tablet  Commonly known as:  K-DUR   10 mEq, Oral, 2 Times Daily      predniSONE 10 MG tablet  Commonly known as:  DELTASONE   20 mg, Oral, Daily      predniSONE 5 MG tablet  Commonly known as:  DELTASONE   5 mg daily x 1 week, then 5 mg mon, wed, fri, then stop             Allergies   Allergen Reactions   • Tuberculin Tests Rash         Discharge Disposition:  Home or Self Care    Diet:  Hospital:  Diet Order   Procedures   • Diet Regular; GI Soft/[place]     Discharge:     Discharge Activity:   - as tolerated    CODE STATUS:    Code Status and Medical Interventions:   Ordered at: 10/13/19 1140     Level Of Support Discussed With:    Patient     Code Status:    No CPR     Medical Interventions (Level of Support Prior to Arrest):    Full         Future Appointments   Date Time Provider Department Center   12/9/2019 10:30 AM [facility], [facility] LAB 1 [facility] None   12/9/2019 11:00 AM Jeff Laura MD [facility] None   5/21/2020 10:30 AM Tien Archer MD [facility] None           Time Spent on Discharge:  40 minutes    Electronically signed by Elva Alegria DO, 10/17/19, 12:43 PM.

## 2019-10-17 NOTE — PLAN OF CARE
Problem: Fall Risk (Adult)  Goal: Absence of Fall  Outcome: Ongoing (interventions implemented as appropriate)   10/17/19 0447   Fall Risk (Adult)   Absence of Fall making progress toward outcome       Problem: Gastrointestinal Bleeding (Adult)  Goal: Signs and Symptoms of Listed Potential Problems Will be Absent, Minimized or Managed (Gastrointestinal Bleeding)  Outcome: Ongoing (interventions implemented as appropriate)

## 2019-10-18 ENCOUNTER — READMISSION MANAGEMENT (OUTPATIENT)
Dept: CALL CENTER | Facility: HOSPITAL | Age: 79
End: 2019-10-18

## 2019-10-18 NOTE — OUTREACH NOTE
Prep Survey      Responses   Facility patient discharged from?  [place]   Is patient eligible?  Yes   Discharge diagnosis  GIB,s/p EGD and colonoscopy,  RENEE, hypersensitvity pneumonitis, pulmonary arterial HTN, persistent AFib   Does the patient have one of the following disease processes/diagnoses(primary or secondary)?  Other   Does the patient have Home health ordered?  No   Is there a DME ordered?  No   Comments regarding appointments  See AVS   Prep survey completed?  Yes          Deborah Rivas RN

## 2019-10-21 ENCOUNTER — READMISSION MANAGEMENT (OUTPATIENT)
Dept: CALL CENTER | Facility: HOSPITAL | Age: 79
End: 2019-10-21

## 2019-10-21 NOTE — OUTREACH NOTE
Medical Week 1 Survey      Responses   Facility patient discharged from?  [place]   Does the patient have one of the following disease processes/diagnoses(primary or secondary)?  Other   Is there a successful TCM telephone encounter documented?  No   Week 1 attempt successful?  No   Unsuccessful attempts  Attempt 1          Yudelka Astudillo RN

## 2019-10-23 ENCOUNTER — READMISSION MANAGEMENT (OUTPATIENT)
Dept: CALL CENTER | Facility: HOSPITAL | Age: 79
End: 2019-10-23

## 2019-10-23 NOTE — OUTREACH NOTE
"Medical Week 1 Survey      Responses   Facility patient discharged from?  [place]   Does the patient have one of the following disease processes/diagnoses(primary or secondary)?  Other   Is there a successful TCM telephone encounter documented?  No   Week 1 attempt successful?  Yes   Call start time  1557   Call end time  1608   Discharge diagnosis  GIB,s/p EGD and colonoscopy,  RENEE, hypersensitvity pneumonitis, pulmonary arterial HTN, persistent AFib   Meds reviewed with patient/caregiver?  Yes   Is the patient having any side effects they believe may be caused by any medication additions or changes?  No   Does the patient have all medications ordered at discharge?  Yes   Is the patient taking all medications as directed (includes completed medication regime)?  Yes   Does the patient have a primary care provider?   Yes   Does the patient have an appointment with their PCP within 7 days of discharge?  N/A   Comments regarding PCP  Pt is trying to get a new PCP. Old PCP is 2 hours away. RN gave pt a number to a PCP group in [place].    Has the patient kept scheduled appointments due by today?  N/A   Psychosocial issues?  No   Did the patient receive a copy of their discharge instructions?  Yes   Nursing interventions  Reviewed instructions with patient   What is the patient's perception of their health status since discharge?  Improving [Pt. reports, \"I have when, I walk a limp. I've never had a limp.\"]   Is the patient/caregiver able to teach back signs and symptoms related to disease process for when to call PCP?  Yes   Is the patient/caregiver able to teach back signs and symptoms related to disease process for when to call 911?  Yes   Is the patient/caregiver able to teach back the hierarchy of who to call/visit for symptoms/problems? PCP, Specialist, Home health nurse, Urgent Care, ED, 911  Yes   If the patient is a current smoker, are they able to teach back resources for cessation?  -- [Pt is not a smoker.] "   Additional teach back comments  Advised pt to follow up with new PCP and mention the limp.   Week 1 call completed?  Yes          Noemí Russo RN

## 2019-10-30 ENCOUNTER — READMISSION MANAGEMENT (OUTPATIENT)
Dept: CALL CENTER | Facility: HOSPITAL | Age: 79
End: 2019-10-30

## 2019-10-30 NOTE — OUTREACH NOTE
Medical Week 2 Survey      Responses   Facility patient discharged from?  [place]   Does the patient have one of the following disease processes/diagnoses(primary or secondary)?  Other   Week 2 attempt successful?  No   Unsuccessful attempts  Attempt 1          Torrie Muhammad RN

## 2019-10-31 ENCOUNTER — READMISSION MANAGEMENT (OUTPATIENT)
Dept: CALL CENTER | Facility: HOSPITAL | Age: 79
End: 2019-10-31

## 2019-10-31 NOTE — OUTREACH NOTE
Medical Week 2 Survey      Responses   Facility patient discharged from?  [place]   Does the patient have one of the following disease processes/diagnoses(primary or secondary)?  Other   Week 2 attempt successful?  No   Unsuccessful attempts  Attempt 2          Noemí Russo RN

## 2019-11-03 ENCOUNTER — READMISSION MANAGEMENT (OUTPATIENT)
Dept: CALL CENTER | Facility: HOSPITAL | Age: 79
End: 2019-11-03

## 2019-11-03 NOTE — OUTREACH NOTE
Medical Week 3 Survey      Responses   Facility patient discharged from?  [place]   Does the patient have one of the following disease processes/diagnoses(primary or secondary)?  Other   Week 3 attempt successful?  Yes   Call start time  1143   Call end time  1145   Medication alerts for this patient  reviewed medication, no medication  changes   Meds reviewed with patient/caregiver?  No   Is the patient taking all medications as directed (includes completed medication regime)?  Yes   Comments regarding appointments  has appointments schedule   Does the patient have a primary care provider?   Yes   What is the patient's perception of their health status since discharge?  Improving   Week 3 Call Completed?  Yes   Wrap up additional comments  feels triie she is doing better, trying to get her strength back          Tika Riggs RN

## 2019-11-12 ENCOUNTER — READMISSION MANAGEMENT (OUTPATIENT)
Dept: CALL CENTER | Facility: HOSPITAL | Age: 79
End: 2019-11-12

## 2019-11-12 NOTE — OUTREACH NOTE
Medical Week 4 Survey      Responses   Facility patient discharged from?  [place]   Does the patient have one of the following disease processes/diagnoses(primary or secondary)?  Other   Week 4 attempt successful?  Yes   Call start time  1554   Call end time  1559   Discharge diagnosis  GIB,s/p EGD and colonoscopy,  RENEE, hypersensitvity pneumonitis, pulmonary arterial HTN, persistent AFib   Meds reviewed with patient/caregiver?  Yes   Is the patient having any side effects they believe may be caused by any medication additions or changes?  No   Is the patient taking all medications as directed (includes completed medication regime)?  Yes   Has the patient kept scheduled appointments due by today?  Yes   Is the patient still receiving Home Health Services?  N/A   Psychosocial issues?  No   What is the patient's perception of their health status since discharge?  Improving   Is the patient/caregiver able to teach back signs and symptoms related to disease process for when to call PCP?  Yes   Is the patient/caregiver able to teach back signs and symptoms related to disease process for when to call 911?  Yes   Is the patient/caregiver able to teach back the hierarchy of who to call/visit for symptoms/problems? PCP, Specialist, Home health nurse, Urgent Care, ED, 911  Yes   Week 4 Call Completed?  Yes          Luis Miguel Rasheed RN

## 2019-11-19 ENCOUNTER — OFFICE VISIT (OUTPATIENT)
Dept: GASTROENTEROLOGY | Facility: CLINIC | Age: 79
End: 2019-11-19

## 2019-11-19 VITALS
SYSTOLIC BLOOD PRESSURE: 173 MMHG | BODY MASS INDEX: 28.46 KG/M2 | HEIGHT: 58 IN | DIASTOLIC BLOOD PRESSURE: 65 MMHG | HEART RATE: 77 BPM | WEIGHT: 135.6 LBS

## 2019-11-19 DIAGNOSIS — I48.19 PERSISTENT ATRIAL FIBRILLATION (HCC): ICD-10-CM

## 2019-11-19 DIAGNOSIS — K25.9 GASTRIC ULCER, UNSPECIFIED CHRONICITY, UNSPECIFIED WHETHER GASTRIC ULCER HEMORRHAGE OR PERFORATION PRESENT: Primary | ICD-10-CM

## 2019-11-19 DIAGNOSIS — Z87.19 HISTORY OF MELENA: ICD-10-CM

## 2019-11-19 DIAGNOSIS — I63.40 CEREBROVASCULAR ACCIDENT (CVA) DUE TO EMBOLISM OF CEREBRAL ARTERY (HCC): ICD-10-CM

## 2019-11-19 DIAGNOSIS — I27.21 PULMONARY ARTERIAL HYPERTENSION (HCC): ICD-10-CM

## 2019-11-19 DIAGNOSIS — Z98.890 HISTORY OF COLONOSCOPY: ICD-10-CM

## 2019-11-19 DIAGNOSIS — I25.10 CORONARY ARTERY DISEASE INVOLVING NATIVE CORONARY ARTERY OF NATIVE HEART WITHOUT ANGINA PECTORIS: ICD-10-CM

## 2019-11-19 PROCEDURE — 99214 OFFICE O/P EST MOD 30 MIN: CPT | Performed by: INTERNAL MEDICINE

## 2019-11-19 NOTE — PROGRESS NOTES
GASTROENTEROLOGY OFFICE NOTE  Pat Morel  [id number]  1940    CARE TEAM  Patient Care Team:  Fredi Ledesma MD as PCP - General (Internal Medicine)  Tien Archer MD as Consulting Physician (Cardiac Electrophysiology)  Xu Block MD (Cardiology)    No ref. provider found     Chief Complaint   Patient presents with   • GI Bleeding     upper ER f/u        HISTORY OF PRESENT ILLNESS:  79-year-old white female with history of coronary artery disease, atrial fibrillation, pulmonary hypertension, cerebrovascular accidents, interstitial lung disease and renal insufficiency who presents for posthospitalization follow-up where she presented with an upper GI bleed.    Patient has a history of recurrent upper GI bleeds.  This past summer, she underwent EGD for evaluation of melanotic stools and reportedly was found to have gastric ulcers on upper endoscopy done in [place].  I do not have those records immediately available.  She again had a upper GI bleed in October and was transferred to [facility] where an EGD, subsequent colonoscopy and ultimately PillCam failed to reveal any ongoing bleeding or any source of bleeding.  She has had no further episodes with melena and currently, from a GI standpoint is asymptomatic without dysphagia to solids, odynophagia, early satiety or unexplained weight loss.  She remains on anticoagulant specifically Eliquis and has been discharged from the hospital on Protonix 40 mg daily which she continues to take.    PAST MEDICAL HISTORY  Past Medical History:   Diagnosis Date   • Arthritis    • Atrial fibrillation (CMS/HCC)    • Coronary artery disease    • Disease of thyroid gland    • Elevated cholesterol    • Hypertension    • Stroke (CMS/HCC)         PAST SURGICAL HISTORY  Past Surgical History:   Procedure Laterality Date   • BRONCHOSCOPY Bilateral 4/12/2019    Procedure: BRONCHOSCOPY;  Surgeon: Jeff Laura MD;   Location:  [facility];  Service: Pulmonary   • BYPASS GRAFT     • COLONOSCOPY N/A 10/15/2019    Procedure: COLONOSCOPY;  Surgeon: Ferdi Aaron MD;  Location:  [facility];  Service: Gastroenterology   • CORONARY ANGIOPLASTY WITH STENT PLACEMENT     • ENDOSCOPY N/A 10/14/2019    Procedure: ESOPHAGOGASTRODUODENOSCOPY;  Surgeon: Fredi Aaron MD;  Location:  [facility];  Service: Gastroenterology   • HYSTERECTOMY          MEDICATIONS:    Current Outpatient Medications:   •  amLODIPine (NORVASC) 5 MG tablet, Take 5 mg by mouth Daily., Disp: , Rfl:   •  apixaban (ELIQUIS) 2.5 MG tablet tablet, Take 2.5 mg by mouth 2 (Two) Times a Day., Disp: , Rfl:   •  atorvastatin (LIPITOR) 40 MG tablet, Take 40 mg by mouth Daily., Disp: , Rfl:   •  bisoprolol (ZEBETA) 5 MG tablet, Take 1.5 tablets by mouth 2 (Two) Times a Day. (Patient taking differently: Take 5 mg by mouth Daily.), Disp: 270 tablet, Rfl: 3  •  cholecalciferol (VITAMIN D3) 1000 units tablet, Take 2,000 Units by mouth Daily., Disp: , Rfl:   •  clopidogrel (PLAVIX) 75 MG tablet, Take 75 mg by mouth Daily., Disp: , Rfl:   •  denosumab (PROLIA) 60 MG/ML solution prefilled syringe syringe, 60 mg 1 (One) Time., Disp: , Rfl:   •  ferrous gluconate (FERGON) 324 MG tablet, Take 1 tablet by mouth Daily With Breakfast., Disp: 30 tablet, Rfl: 0  •  Fluticasone Furoate-Vilanterol (BREO ELLIPTA) 200-25 MCG/INH inhaler, Inhale 1 puff Daily., Disp: 1 each, Rfl: 11  •  furosemide (LASIX) 40 MG tablet, Take 80 mg by mouth Daily., Disp: , Rfl:   •  furosemide (LASIX) 40 MG tablet, Take 40 mg by mouth Daily With Lunch., Disp: , Rfl:   •  isosorbide mononitrate (IMDUR) 120 MG 24 hr tablet, Take 120 mg by mouth Daily., Disp: , Rfl:   •  levothyroxine (SYNTHROID, LEVOTHROID) 75 MCG tablet, , Disp: , Rfl:   •  mycophenolate (CELLCEPT) 500 MG tablet, Take 1 tablet by mouth 2 (Two) Times a Day., Disp: 60 tablet, Rfl: 4  •  nitroglycerin (NITROSTAT) 0.4 MG SL tablet, ,  "Disp: , Rfl:   •  pantoprazole (PROTONIX) 40 MG EC tablet, Take 1 tablet by mouth Daily., Disp: 60 tablet, Rfl: 3  •  potassium chloride (K-DUR) 10 MEQ CR tablet, Take 10 mEq by mouth 2 (Two) Times a Day., Disp: , Rfl:   •  predniSONE (DELTASONE) 10 MG tablet, Take 2 tablets by mouth Daily., Disp: 60 tablet, Rfl: 3    ALLERGIES  Allergies   Allergen Reactions   • Tuberculin Tests Rash       FAMILY HISTORY:  Family History   Problem Relation Age of Onset   • Cancer Mother    • No Known Problems Father    • Liver disease Sister        SOCIAL HISTORY  Social History     Socioeconomic History   • Marital status:      Spouse name: Not on file   • Number of children: Not on file   • Years of education: Not on file   • Highest education level: Not on file   Tobacco Use   • Smoking status: Never Smoker   • Smokeless tobacco: Never Used   Substance and Sexual Activity   • Alcohol use: No   • Drug use: No   • Sexual activity: Defer     Socioeconomic History:  She is  with 2 sons and 1 daughter.  She is a non-smoker/nondrinker.       REVIEW OF SYSTEMS  Review of Systems   Constitutional: Negative for activity change, appetite change, chills, diaphoresis, fatigue, fever, unexpected weight gain and unexpected weight loss.   HENT: Negative for trouble swallowing and voice change.    Gastrointestinal: Positive for abdominal pain and nausea. Negative for abdominal distention, anal bleeding, blood in stool, constipation, diarrhea, rectal pain, vomiting, GERD and indigestion.     I reviewed the above noted review of systems.    PHYSICAL EXAM   /65 (BP Location: Right arm, Patient Position: Sitting, Cuff Size: Adult)   Pulse 77   Ht 147.3 cm (57.99\")   Wt 61.5 kg (135 lb 9.6 oz)   LMP  (LMP Unknown)   BMI 28.35 kg/m²   General: Alert and oriented x 3. In no apparent or acute distress.  and No stigmata of chronic liver disease  HEENT: Anicteric slcera. Normal oropharynx  Neck: Supple. Without " lymphadenopathy  CV: Irregular irregular S1-S2 without gallops or clicks  Lungs: Clear to ausculation. Without rales, robchi and wheezing  Abdomen:  Soft,non-distended without palpable masses or hepatosplenomeagaly, areas of rebound tenderness or guarding.   Extremeties: 2-3+ pitting edema  Neurologic:  Alert and oriented x 3 without focal motor or sensory deficits  Rectal exam: deferred        Results Review:  I reviewed the patient's new clinical results.      ASSESSMENT  1.-Recurrent upper GI bleed with history of gastric ulcer on summer 2019 EGD but no ulcer identified on October 2019 EGD.  Extensive GI work-up during recent hospitalization was nondiagnostic and therefore most likely she bled from a small bowel angiodysplasia that was missed on PillCam (common occurrence).  2.-  Renal insufficiency.  Stable in fact, her creatinine was at time of discharge the lowest it had been during her hospitalization at about 1.55  3.-  Atrial fibrillation  4.-  History of atherosclerotic vessel disease, coronary artery disease, cerebrovascular accident    PLAN  1.-  Continue Protonix 40 mg daily with careful monitoring of renal function.  Given her prior history of gastric ulcer and ongoing need for anticoagulants, aggressive prevention of recurrent ulcer disease is recommended and outweighs the unclear and unlikely risk of worsening renal issues with ongoing proton pump inhibitor use.  2.-  Patient is encouraged to seek referral from a primary care physician to nephrologist  3.-  Recurrent melanotic stools should prompt consideration of repeat PillCam to rule out small bowel angiodysplasia  4.-  GI follow-up as needed gastric ulcer history      I discussed the patients findings and my recommendations with patient    Erik Queen MD  11/19/2019   3:12 PM    Much of this note is an electronic transcription of spoken language to printed text. Electronic transcription of spoken language may permit erroneous,  nonsensical word phrases to be inadvertently transcribed.  Although I have reviewed the note for these errors, some may still be present.

## 2019-12-02 ENCOUNTER — OFFICE VISIT (OUTPATIENT)
Dept: FAMILY MEDICINE CLINIC | Facility: CLINIC | Age: 79
End: 2019-12-02

## 2019-12-02 VITALS
WEIGHT: 141 LBS | OXYGEN SATURATION: 92 % | SYSTOLIC BLOOD PRESSURE: 130 MMHG | DIASTOLIC BLOOD PRESSURE: 70 MMHG | HEIGHT: 58 IN | TEMPERATURE: 97.7 F | HEART RATE: 89 BPM | BODY MASS INDEX: 29.6 KG/M2

## 2019-12-02 DIAGNOSIS — E03.9 ACQUIRED HYPOTHYROIDISM: ICD-10-CM

## 2019-12-02 DIAGNOSIS — E78.5 DYSLIPIDEMIA: ICD-10-CM

## 2019-12-02 DIAGNOSIS — D50.9 IRON DEFICIENCY ANEMIA, UNSPECIFIED IRON DEFICIENCY ANEMIA TYPE: ICD-10-CM

## 2019-12-02 DIAGNOSIS — K92.1 GASTROINTESTINAL HEMORRHAGE WITH MELENA: ICD-10-CM

## 2019-12-02 DIAGNOSIS — D68.318 COAGULATION DISORDER DUE TO CIRCULATING ANTICOAGULANTS (HCC): ICD-10-CM

## 2019-12-02 DIAGNOSIS — I25.10 CORONARY ARTERY DISEASE INVOLVING NATIVE CORONARY ARTERY OF NATIVE HEART WITHOUT ANGINA PECTORIS: ICD-10-CM

## 2019-12-02 DIAGNOSIS — I10 ESSENTIAL HYPERTENSION: ICD-10-CM

## 2019-12-02 DIAGNOSIS — N18.30 CHRONIC KIDNEY DISEASE, STAGE 3 (HCC): ICD-10-CM

## 2019-12-02 DIAGNOSIS — I87.2 CHRONIC VENOUS INSUFFICIENCY OF LOWER EXTREMITY: ICD-10-CM

## 2019-12-02 DIAGNOSIS — K21.9 GERD WITHOUT ESOPHAGITIS: ICD-10-CM

## 2019-12-02 DIAGNOSIS — J84.9 INTERSTITIAL LUNG DISEASE (HCC): ICD-10-CM

## 2019-12-02 DIAGNOSIS — I48.19 PERSISTENT ATRIAL FIBRILLATION (HCC): Primary | ICD-10-CM

## 2019-12-02 PROCEDURE — 99214 OFFICE O/P EST MOD 30 MIN: CPT | Performed by: FAMILY MEDICINE

## 2019-12-02 RX ORDER — MULTIVIT-MIN/IRON/FOLIC ACID/K 18-600-40
1 CAPSULE ORAL DAILY
COMMUNITY
End: 2020-01-01

## 2019-12-02 RX ORDER — TORSEMIDE 20 MG/1
20 TABLET ORAL DAILY
Qty: 30 TABLET | Refills: 1 | Status: ON HOLD | OUTPATIENT
Start: 2019-12-02 | End: 2019-12-11 | Stop reason: ALTCHOICE

## 2019-12-02 NOTE — PROGRESS NOTES
New Patient History and Physical      Referring Physician: No ref. provider found    Chief Complaint:    Chief Complaint   Patient presents with   • Establish Care     new to area; c/o A fib, shortness of breath, nausea/vomiting        History of Present Illness:   79-year-old female who is here to establish with a new primary care provider.    No labs since hospitalization at [facility] in mid-October.  S/P hospitalization at [facility] in October for symptomatic anemia; presumed GI loss, but unable to identify on upper/lower endoscopy.    Still having exertional fatigue out of proportion to usual.    GI bleed in past, diagnosed with EGD while living in [place], [place].  Diagnosed with fixed hiatal hernia in past on endoscopy also.    Known chronic atrial fibrillation.  Has been seen by electrophysiologist in [place].  She would like to establish with a more local cardiologist if able due to the difficulty of traveling to and from [place].    Patient does report unresponsive edematous changes to the lower extremities despite increased amount of Lasix.  Currently utilizing 80 mg in the morning and 40 mg at lunch daily.  He has maintained good urine output.  She denies any orthopnea.    Patient is followed by Dr. Laura, pulmonology, in [place] due to her underlying pulmonary hypertension.  She also has been diagnosed with interstitial lung disease, as well as currently utilizing CellCept daily.  Subjective     Review of Systems     1. Constitutional: Negative for fever. Negative for chills, diaphoresis, fatigue and unexpected weight change.   2. HENT: No dysphagia; no changes to vision/hearing/smell/taste; no epistaxis  3. Eyes: Negative for redness and visual disturbance.   4. Respiratory: negative for shortness of breath. Negative for chest pain . Negative for cough and chest tightness.   5. Cardiovascular: Negative for chest pain and palpitations.   6. Gastrointestinal: Negative for abdominal distention, abdominal  pain and blood in stool.   7. Endocrine: Negative for cold intolerance and heat intolerance.   8. Genitourinary: Negative for difficulty urinating, dysuria and frequency.   9. Musculoskeletal: Negative for arthralgias, back pain and myalgias.  Edema to lower extremities as per above.  10. Skin: Negative for color change, rash and wound.   11. Neurological: Negative for syncope, weakness and headaches.   12. Hematological: Negative for adenopathy. Does not bruise/bleed easily.   13. Psychiatric/Behavioral: Negative for confusion. The patient is not nervous/anxious.     The following portions of the patient's history were reviewed and updated as appropriate: allergies, current medications, past family history, past medical history, past social history, past surgical history and problem list.    Past Medical History:   Past Medical History:   Diagnosis Date   • Arthritis    • Atrial fibrillation (CMS/HCC)    • Coronary artery disease    • Disease of thyroid gland    • Elevated cholesterol    • GERD without esophagitis 12/2/2019   • Hypertension    • Stroke (CMS/HCC)        Past Surgical History:  Past Surgical History:   Procedure Laterality Date   • BRONCHOSCOPY Bilateral 4/12/2019    Procedure: BRONCHOSCOPY;  Surgeon: Jeff Laura MD;  Location:  [facility];  Service: Pulmonary   • BYPASS GRAFT     • COLONOSCOPY N/A 10/15/2019    Procedure: COLONOSCOPY;  Surgeon: Fredi Aaron MD;  Location: [facility];  Service: Gastroenterology   • CORONARY ANGIOPLASTY WITH STENT PLACEMENT     • ENDOSCOPY N/A 10/14/2019    Procedure: ESOPHAGOGASTRODUODENOSCOPY;  Surgeon: Fredi Aaron MD;  Location:  [facility];  Service: Gastroenterology   • HYSTERECTOMY         Family History: family history includes Cancer in her mother; Liver disease in her sister; No Known Problems in her father.    Social History:  reports that she has never smoked. She has never used smokeless tobacco. She reports that  "she does not drink alcohol or use drugs.    Medications:    Current Outpatient Medications:   •  apixaban (ELIQUIS) 2.5 MG tablet tablet, Take 2.5 mg by mouth 2 (Two) Times a Day., Disp: , Rfl:   •  atorvastatin (LIPITOR) 40 MG tablet, Take 40 mg by mouth Daily., Disp: , Rfl:   •  bisoprolol (ZEBETA) 5 MG tablet, Take 1.5 tablets by mouth 2 (Two) Times a Day. (Patient taking differently: Take 5 mg by mouth Daily.), Disp: 270 tablet, Rfl: 3  •  ferrous gluconate (FERGON) 324 MG tablet, Take 1 tablet by mouth Daily With Breakfast., Disp: 30 tablet, Rfl: 0  •  isosorbide mononitrate (IMDUR) 120 MG 24 hr tablet, Take 120 mg by mouth Daily., Disp: , Rfl:   •  levothyroxine (SYNTHROID, LEVOTHROID) 75 MCG tablet, , Disp: , Rfl:   •  mycophenolate (CELLCEPT) 500 MG tablet, Take 1 tablet by mouth 2 (Two) Times a Day., Disp: 60 tablet, Rfl: 4  •  nitroglycerin (NITROSTAT) 0.4 MG SL tablet, , Disp: , Rfl:   •  pantoprazole (PROTONIX) 40 MG EC tablet, Take 1 tablet by mouth Daily., Disp: 60 tablet, Rfl: 3  •  potassium chloride (K-DUR) 10 MEQ CR tablet, Take 10 mEq by mouth 2 (Two) Times a Day., Disp: , Rfl:   •  Vitamin D, Cholecalciferol, 50 MCG (2000 UT) capsule, Take  by mouth., Disp: , Rfl:   •  Fluticasone Furoate-Vilanterol (BREO ELLIPTA) 200-25 MCG/INH inhaler, Inhale 1 puff Daily., Disp: 1 each, Rfl: 11  •  torsemide (DEMADEX) 20 MG tablet, Take 1 tablet by mouth Daily., Disp: 30 tablet, Rfl: 1    Allergies:  Allergies   Allergen Reactions   • Tuberculin Tests Rash       Objective     Physical Exam:  Vital Signs: /70   Pulse 89   Temp 97.7 °F (36.5 °C)   Ht 147.3 cm (58\")   Wt 64 kg (141 lb)   LMP  (LMP Unknown)   SpO2 92%   BMI 29.47 kg/m²      General Appearance: alert, oriented x 3, no acute distress.  Skin: warm and dry.   HEENT: Atraumatic.  pupils round and reactive to light and accommodation, oral mucosa pink and moist.  Nares patent without epistaxis.  External auditory canals are patent tympanic " membranes intact.  Neck: supple, no JVD, trachea midline.  No thyromegaly  Lungs: CTA, unlabored breathing effort.  Heart: RRR, normal S1 and S2, no S3, no rub.  Abdomen: soft, non-tender, no palpable bladder, present bowel sounds to auscultation ×4.  No guarding or rigidity.  Extremities: no clubbing, cyanosis.  Good range of motion actively and passively.  Symmetric muscle strength and development.  2+ pitting edema that extends to the knees bilaterally.  Neuro: normal speech and mental status.  Cranial nerves II through XII intact.  No anosmia. DTR 2+; proprioception intact.  No focal motor/sensory deficits.        Assessment / Plan     Assessment:   Pat was seen today for establish care.    Diagnoses and all orders for this visit:    Persistent atrial fibrillation  -     TSH  -     T4, Free  -     Comprehensive Metabolic Panel  -     CBC & Differential  -     Ambulatory Referral to Cardiology    Essential hypertension  -     TSH  -     T4, Free  -     Comprehensive Metabolic Panel  -     CBC & Differential  -     Iron Profile  -     Ferritin  -     torsemide (DEMADEX) 20 MG tablet; Take 1 tablet by mouth Daily.    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        Plan:  Stop lasix.    Start torsemide.  Adjust dose as needed based on clinical response.  Surveillance labs.  Preserved E.F on echo; suspect edema/venous insufficiency partly d/t imdur use.  Pt using very low sodium intake.    Thyroid labs today; adjust dose as needed based on results.    Cont PPI d/t GERD/hiatal hernia and hx of GI bleed.  Iron labs today, infusion if still low.    Referral to Dr. Higgins for establishment with local cardiologist.    Referral to Dr. Tsang for establishment with nephrology.  Discussion Summary:    Discussed plan of care in detail with pt today; pt verb understanding and agrees.  Follow up:  Return in about 3 weeks (around 12/23/2019) for Recheck, Med Change/New Meds.     There are no Patient Instructions on file for this visit.    Anthony Sweet,   12/02/19  6:09 PM    Please note that portions of this note may have been completed with a voice recognition program. Efforts were made to edit the dictations, but occasionally words are mistranscribed.

## 2019-12-03 LAB
ALBUMIN SERPL-MCNC: 4.6 G/DL (ref 3.5–4.8)
ALBUMIN/GLOB SERPL: 1.8 {RATIO} (ref 1.2–2.2)
ALP SERPL-CCNC: 56 IU/L (ref 39–117)
ALT SERPL-CCNC: 13 IU/L (ref 0–32)
AST SERPL-CCNC: 20 IU/L (ref 0–40)
BASOPHILS # BLD AUTO: 0 X10E3/UL (ref 0–0.2)
BASOPHILS NFR BLD AUTO: 0 %
BILIRUB SERPL-MCNC: 0.5 MG/DL (ref 0–1.2)
BUN SERPL-MCNC: 36 MG/DL (ref 8–27)
BUN/CREAT SERPL: 21 (ref 12–28)
CALCIUM SERPL-MCNC: 9.1 MG/DL (ref 8.7–10.3)
CHLORIDE SERPL-SCNC: 99 MMOL/L (ref 96–106)
CO2 SERPL-SCNC: 20 MMOL/L (ref 20–29)
CREAT SERPL-MCNC: 1.7 MG/DL (ref 0.57–1)
EOSINOPHIL # BLD AUTO: 0.2 X10E3/UL (ref 0–0.4)
EOSINOPHIL NFR BLD AUTO: 4 %
ERYTHROCYTE [DISTWIDTH] IN BLOOD BY AUTOMATED COUNT: 20 % (ref 12.3–15.4)
FERRITIN SERPL-MCNC: 79 NG/ML (ref 15–150)
GLOBULIN SER CALC-MCNC: 2.6 G/DL (ref 1.5–4.5)
GLUCOSE SERPL-MCNC: 101 MG/DL (ref 65–99)
HCT VFR BLD AUTO: 28.1 % (ref 34–46.6)
HGB BLD-MCNC: 8.7 G/DL (ref 11.1–15.9)
IMM GRANULOCYTES # BLD AUTO: 0 X10E3/UL (ref 0–0.1)
IMM GRANULOCYTES NFR BLD AUTO: 0 %
IRON SATN MFR SERPL: 18 % (ref 15–55)
IRON SERPL-MCNC: 68 UG/DL (ref 27–139)
LYMPHOCYTES # BLD AUTO: 0.8 X10E3/UL (ref 0.7–3.1)
LYMPHOCYTES NFR BLD AUTO: 14 %
MCH RBC QN AUTO: 27 PG (ref 26.6–33)
MCHC RBC AUTO-ENTMCNC: 31 G/DL (ref 31.5–35.7)
MCV RBC AUTO: 87 FL (ref 79–97)
MONOCYTES # BLD AUTO: 0.6 X10E3/UL (ref 0.1–0.9)
MONOCYTES NFR BLD AUTO: 11 %
NEUTROPHILS # BLD AUTO: 4 X10E3/UL (ref 1.4–7)
NEUTROPHILS NFR BLD AUTO: 71 %
PLATELET # BLD AUTO: 250 X10E3/UL (ref 150–450)
POTASSIUM SERPL-SCNC: 3.8 MMOL/L (ref 3.5–5.2)
PROT SERPL-MCNC: 7.2 G/DL (ref 6–8.5)
RBC # BLD AUTO: 3.22 X10E6/UL (ref 3.77–5.28)
SODIUM SERPL-SCNC: 139 MMOL/L (ref 134–144)
T4 FREE SERPL-MCNC: 1.4 NG/DL (ref 0.82–1.77)
TIBC SERPL-MCNC: 375 UG/DL (ref 250–450)
TSH SERPL DL<=0.005 MIU/L-ACNC: 4.63 UIU/ML (ref 0.45–4.5)
UIBC SERPL-MCNC: 307 UG/DL (ref 118–369)
WBC # BLD AUTO: 5.6 X10E3/UL (ref 3.4–10.8)

## 2019-12-09 ENCOUNTER — HOSPITAL ENCOUNTER (INPATIENT)
Facility: HOSPITAL | Age: 79
LOS: 3 days | Discharge: HOME | End: 2019-12-12
Attending: INTERNAL MEDICINE | Admitting: INTERNAL MEDICINE

## 2019-12-09 ENCOUNTER — APPOINTMENT (OUTPATIENT)
Dept: GENERAL RADIOLOGY | Facility: HOSPITAL | Age: 79
End: 2019-12-09

## 2019-12-09 ENCOUNTER — OFFICE VISIT (OUTPATIENT)
Dept: PULMONOLOGY | Facility: CLINIC | Age: 79
End: 2019-12-09

## 2019-12-09 VITALS
TEMPERATURE: 97.9 F | HEIGHT: 58 IN | OXYGEN SATURATION: 92 % | SYSTOLIC BLOOD PRESSURE: 110 MMHG | WEIGHT: 143 LBS | DIASTOLIC BLOOD PRESSURE: 76 MMHG | HEART RATE: 88 BPM | RESPIRATION RATE: 18 BRPM | BODY MASS INDEX: 30.02 KG/M2

## 2019-12-09 DIAGNOSIS — R13.10 DYSPHAGIA, UNSPECIFIED TYPE: Primary | ICD-10-CM

## 2019-12-09 DIAGNOSIS — J84.9 INTERSTITIAL LUNG DISEASE (HCC): Primary | ICD-10-CM

## 2019-12-09 PROBLEM — E87.70 VOLUME OVERLOAD: Status: ACTIVE | Noted: 2019-12-09

## 2019-12-09 LAB
ALBUMIN SERPL-MCNC: 4.2 G/DL (ref 3.5–5.2)
ALBUMIN/GLOB SERPL: 1.4 G/DL
ALP SERPL-CCNC: 55 U/L (ref 39–117)
ALT SERPL W P-5'-P-CCNC: 10 U/L (ref 1–33)
ANION GAP SERPL CALCULATED.3IONS-SCNC: 16 MMOL/L (ref 5–15)
AST SERPL-CCNC: 18 U/L (ref 1–32)
BASOPHILS # BLD AUTO: 0.02 10*3/MM3 (ref 0–0.2)
BASOPHILS NFR BLD AUTO: 0.4 % (ref 0–1.5)
BILIRUB SERPL-MCNC: 1 MG/DL (ref 0.2–1.2)
BUN BLD-MCNC: 39 MG/DL (ref 8–23)
BUN/CREAT SERPL: 21.4 (ref 7–25)
CALCIUM SPEC-SCNC: 9.6 MG/DL (ref 8.6–10.5)
CHLORIDE SERPL-SCNC: 101 MMOL/L (ref 98–107)
CO2 SERPL-SCNC: 21 MMOL/L (ref 22–29)
CREAT BLD-MCNC: 1.82 MG/DL (ref 0.57–1)
DEPRECATED RDW RBC AUTO: 67.7 FL (ref 37–54)
EOSINOPHIL # BLD AUTO: 0.04 10*3/MM3 (ref 0–0.4)
EOSINOPHIL NFR BLD AUTO: 0.7 % (ref 0.3–6.2)
ERYTHROCYTE [DISTWIDTH] IN BLOOD BY AUTOMATED COUNT: 19.8 % (ref 12.3–15.4)
GFR SERPL CREATININE-BSD FRML MDRD: 27 ML/MIN/1.73
GLOBULIN UR ELPH-MCNC: 3 GM/DL
GLUCOSE BLD-MCNC: 112 MG/DL (ref 65–99)
HBA1C MFR BLD: 5.6 % (ref 4.8–5.6)
HCT VFR BLD AUTO: 29.2 % (ref 34–46.6)
HGB BLD-MCNC: 8.6 G/DL (ref 12–15.9)
IMM GRANULOCYTES # BLD AUTO: 0.02 10*3/MM3 (ref 0–0.05)
IMM GRANULOCYTES NFR BLD AUTO: 0.4 % (ref 0–0.5)
LYMPHOCYTES # BLD AUTO: 0.44 10*3/MM3 (ref 0.7–3.1)
LYMPHOCYTES NFR BLD AUTO: 7.8 % (ref 19.6–45.3)
MCH RBC QN AUTO: 27.6 PG (ref 26.6–33)
MCHC RBC AUTO-ENTMCNC: 29.5 G/DL (ref 31.5–35.7)
MCV RBC AUTO: 93.6 FL (ref 79–97)
MONOCYTES # BLD AUTO: 0.48 10*3/MM3 (ref 0.1–0.9)
MONOCYTES NFR BLD AUTO: 8.6 % (ref 5–12)
NEUTROPHILS # BLD AUTO: 4.61 10*3/MM3 (ref 1.7–7)
NEUTROPHILS NFR BLD AUTO: 82.1 % (ref 42.7–76)
NRBC BLD AUTO-RTO: 0 /100 WBC (ref 0–0.2)
NT-PROBNP SERPL-MCNC: 9920 PG/ML (ref 5–1800)
PLATELET # BLD AUTO: 231 10*3/MM3 (ref 140–450)
PMV BLD AUTO: 9.1 FL (ref 6–12)
POTASSIUM BLD-SCNC: 3.6 MMOL/L (ref 3.5–5.2)
PROT SERPL-MCNC: 7.2 G/DL (ref 6–8.5)
RBC # BLD AUTO: 3.12 10*6/MM3 (ref 3.77–5.28)
SODIUM BLD-SCNC: 138 MMOL/L (ref 136–145)
T4 FREE SERPL-MCNC: 1.86 NG/DL (ref 0.93–1.7)
TSH SERPL DL<=0.05 MIU/L-ACNC: 2.61 UIU/ML (ref 0.27–4.2)
WBC NRBC COR # BLD: 5.61 10*3/MM3 (ref 3.4–10.8)

## 2019-12-09 PROCEDURE — 83036 HEMOGLOBIN GLYCOSYLATED A1C: CPT | Performed by: INTERNAL MEDICINE

## 2019-12-09 PROCEDURE — 71045 X-RAY EXAM CHEST 1 VIEW: CPT

## 2019-12-09 PROCEDURE — 92611 MOTION FLUOROSCOPY/SWALLOW: CPT

## 2019-12-09 PROCEDURE — 74230 X-RAY XM SWLNG FUNCJ C+: CPT

## 2019-12-09 PROCEDURE — 84443 ASSAY THYROID STIM HORMONE: CPT | Performed by: INTERNAL MEDICINE

## 2019-12-09 PROCEDURE — 94010 BREATHING CAPACITY TEST: CPT | Performed by: INTERNAL MEDICINE

## 2019-12-09 PROCEDURE — 74240 X-RAY XM UPR GI TRC 1CNTRST: CPT

## 2019-12-09 PROCEDURE — 85025 COMPLETE CBC W/AUTO DIFF WBC: CPT | Performed by: INTERNAL MEDICINE

## 2019-12-09 PROCEDURE — 80053 COMPREHEN METABOLIC PANEL: CPT | Performed by: INTERNAL MEDICINE

## 2019-12-09 PROCEDURE — 92610 EVALUATE SWALLOWING FUNCTION: CPT

## 2019-12-09 PROCEDURE — 25010000002 FUROSEMIDE PER 20 MG: Performed by: INTERNAL MEDICINE

## 2019-12-09 PROCEDURE — 99214 OFFICE O/P EST MOD 30 MIN: CPT | Performed by: INTERNAL MEDICINE

## 2019-12-09 PROCEDURE — 99223 1ST HOSP IP/OBS HIGH 75: CPT | Performed by: INTERNAL MEDICINE

## 2019-12-09 PROCEDURE — 83880 ASSAY OF NATRIURETIC PEPTIDE: CPT | Performed by: INTERNAL MEDICINE

## 2019-12-09 PROCEDURE — 84439 ASSAY OF FREE THYROXINE: CPT | Performed by: INTERNAL MEDICINE

## 2019-12-09 RX ORDER — ATORVASTATIN CALCIUM 40 MG/1
40 TABLET, FILM COATED ORAL DAILY
Status: DISCONTINUED | OUTPATIENT
Start: 2019-12-10 | End: 2019-12-12 | Stop reason: HOSPADM

## 2019-12-09 RX ORDER — BISOPROLOL FUMARATE 5 MG/1
7.5 TABLET, FILM COATED ORAL 2 TIMES DAILY
Status: DISCONTINUED | OUTPATIENT
Start: 2019-12-09 | End: 2019-12-12 | Stop reason: HOSPADM

## 2019-12-09 RX ORDER — SODIUM CHLORIDE 0.9 % (FLUSH) 0.9 %
10 SYRINGE (ML) INJECTION AS NEEDED
Status: DISCONTINUED | OUTPATIENT
Start: 2019-12-09 | End: 2019-12-12 | Stop reason: HOSPADM

## 2019-12-09 RX ORDER — LEVOTHYROXINE SODIUM 0.07 MG/1
75 TABLET ORAL
Status: DISCONTINUED | OUTPATIENT
Start: 2019-12-10 | End: 2019-12-12 | Stop reason: HOSPADM

## 2019-12-09 RX ORDER — FUROSEMIDE 10 MG/ML
40 INJECTION INTRAMUSCULAR; INTRAVENOUS ONCE
Status: COMPLETED | OUTPATIENT
Start: 2019-12-09 | End: 2019-12-09

## 2019-12-09 RX ORDER — SODIUM CHLORIDE 0.9 % (FLUSH) 0.9 %
10 SYRINGE (ML) INJECTION EVERY 12 HOURS SCHEDULED
Status: DISCONTINUED | OUTPATIENT
Start: 2019-12-09 | End: 2019-12-12 | Stop reason: HOSPADM

## 2019-12-09 RX ORDER — ACETAMINOPHEN 160 MG/5ML
650 SOLUTION ORAL EVERY 4 HOURS PRN
Status: DISCONTINUED | OUTPATIENT
Start: 2019-12-09 | End: 2019-12-12 | Stop reason: HOSPADM

## 2019-12-09 RX ORDER — FUROSEMIDE 10 MG/ML
40 INJECTION INTRAMUSCULAR; INTRAVENOUS DAILY
Status: DISCONTINUED | OUTPATIENT
Start: 2019-12-10 | End: 2019-12-10

## 2019-12-09 RX ORDER — ISOSORBIDE MONONITRATE 60 MG/1
120 TABLET, EXTENDED RELEASE ORAL DAILY
Status: DISCONTINUED | OUTPATIENT
Start: 2019-12-10 | End: 2019-12-12 | Stop reason: HOSPADM

## 2019-12-09 RX ORDER — ACETAMINOPHEN 325 MG/1
650 TABLET ORAL EVERY 4 HOURS PRN
Status: DISCONTINUED | OUTPATIENT
Start: 2019-12-09 | End: 2019-12-12 | Stop reason: HOSPADM

## 2019-12-09 RX ORDER — PANTOPRAZOLE SODIUM 40 MG/1
40 TABLET, DELAYED RELEASE ORAL DAILY
Status: DISCONTINUED | OUTPATIENT
Start: 2019-12-10 | End: 2019-12-12 | Stop reason: HOSPADM

## 2019-12-09 RX ORDER — ACETAMINOPHEN 650 MG/1
650 SUPPOSITORY RECTAL EVERY 4 HOURS PRN
Status: DISCONTINUED | OUTPATIENT
Start: 2019-12-09 | End: 2019-12-12 | Stop reason: HOSPADM

## 2019-12-09 RX ADMIN — SODIUM CHLORIDE, PRESERVATIVE FREE 10 ML: 5 INJECTION INTRAVENOUS at 20:28

## 2019-12-09 RX ADMIN — APIXABAN 2.5 MG: 2.5 TABLET, FILM COATED ORAL at 20:27

## 2019-12-09 RX ADMIN — BARIUM SULFATE 20 ML: 400 PASTE ORAL at 14:50

## 2019-12-09 RX ADMIN — BARIUM SULFATE 10 ML: 0.81 POWDER, FOR SUSPENSION ORAL at 14:55

## 2019-12-09 RX ADMIN — SODIUM CHLORIDE, PRESERVATIVE FREE 10 ML: 5 INJECTION INTRAVENOUS at 14:25

## 2019-12-09 RX ADMIN — FUROSEMIDE 40 MG: 10 INJECTION, SOLUTION INTRAMUSCULAR; INTRAVENOUS at 14:24

## 2019-12-09 RX ADMIN — BISOPROLOL FUMARATE 7.5 MG: 5 TABLET ORAL at 20:27

## 2019-12-09 RX ADMIN — BARIUM SULFATE 100 ML: 0.81 POWDER, FOR SUSPENSION ORAL at 14:50

## 2019-12-09 NOTE — THERAPY EVALUATION
Acute Care - Speech Language Pathology   [facility]allow [facility]       Patient Name: Pat Morel  : 1940  MRN: [id number]  Today's Date: 2019               Admit Date: 2019    Visit Dx:     ICD-10-CM ICD-9-CM   1. Dysphagia, unspecified type R13.10 787.20     Patient Active Problem List   Diagnosis   • Persistent atrial fibrillation   • Coronary artery disease involving native coronary artery of native heart without angina pectoris   • Essential hypertension   • Cerebrovascular accident (CVA) due to embolism (CMS/HCC)   • Dyspnea on exertion   • Pulmonary arterial hypertension (CMS/HCC)   • Interstitial lung disease (CMS/HCC)   • Hypersensitivity pneumonitis (CMS/HCC)   • GI bleed   • RENEE (acute kidney injury) (CMS/HCC)   • Gastrointestinal hemorrhage with melena   • Gastric ulcer   • History of melena   • History of colonoscopy   • Coagulation disorder due to circulating anticoagulants (CMS/HCC)   • Dyslipidemia   • Acquired hypothyroidism   • Chronic kidney disease, stage 3 (CMS/HCC)   • GERD without esophagitis   • Iron deficiency anemia   • Volume overload     Past Medical History:   Diagnosis Date   • Arthritis    • Atrial fibrillation (CMS/HCC)    • Coronary artery disease    • Disease of thyroid gland    • Elevated cholesterol    • GERD without esophagitis 2019   • Hypertension    • Stroke (CMS/HCC)      Past Surgical History:   Procedure Laterality Date   • BRONCHOSCOPY Bilateral 2019    Procedure: BRONCHOSCOPY;  Surgeon: Jeff Laura MD;  Location: [facility] ENDOSCOPY;  Service: Pulmonary   • BYPASS GRAFT     • COLONOSCOPY N/A 10/15/2019    Procedure: COLONOSCOPY;  Surgeon: Fredi Aaron MD;  Location: [facility] ENDOSCOPY;  Service: Gastroenterology   • CORONARY ANGIOPLASTY WITH STENT PLACEMENT     • ENDOSCOPY N/A 10/14/2019    Procedure: ESOPHAGOGASTRODUODENOSCOPY;  Surgeon: Fredi Aaron MD;  Location: [facility]  "ENDOSCOPY;  Service: Gastroenterology   • HYSTERECTOMY          SWALLOW EVALUATION (last 72 hours)      SLP Adult Swallow Evaluation     Row Name 12/09/19 1400                   Rehab Evaluation    Document Type  evaluation  -        Subjective Information  complains of difficulty swallowing  -        Patient Observations  alert;cooperative;agree to therapy  -        Patient/Family Observations  daughter present  -CH        Patient Effort  good  -           General Information    Patient Profile Reviewed  yes  -CH        Pertinent History Of Current Problem  79yoF with history of pulmonary HTN, CVA, GI bleed, CKD III, HTN, CAD, Afib on anticoagulation who presents as a direct admission from pulmonary clinic with worsening volume overload, dyspnea on exertion, weight gain and LE edema. CSE completed d/t patient c/o swallowing difficulties with both liquids and solids and of foods \"getting stuck\" in her throat.  -        Current Method of Nutrition  regular textures;thin liquids  -        Precautions/Limitations, Vision  WFL;for purposes of eval  -CH        Precautions/Limitations, Hearing  WFL;for purposes of eval  -CH        Prior Level of Function-Communication  WFL  -        Prior Level of Function-Swallowing  no diet consistency restrictions  -        Plans/Goals Discussed with  patient and family;agreed upon  -        Barriers to Rehab  none identified  -        Patient's Goals for Discharge  patient did not state  -           Pain Assessment    Additional Documentation  Pain Scale: FACES Pre/Post-Treatment (Group)  -           Pain Scale: FACES Pre/Post-Treatment    Pain: FACES Scale, Pretreatment  0-->no hurt  -CH        Pain: FACES Scale, Post-Treatment  0-->no hurt  -           Oral Motor and Function    Dentition Assessment  natural, present and adequate  -CH        Secretion Management  WNL/WFL  -CH        Mucosal Quality  moist, healthy  -        Volitional Swallow  WFL  -    "     Volitional Cough  WFL  -CH           Oral Musculature and Cranial Nerve Assessment    Oral Motor General Assessment  WFL  -CH           General Eating/Swallowing Observations    Respiratory Support Currently in Use  nasal cannula  -CH        Eating/Swallowing Skills  self-fed;appropriate self-feeding skills observed  -CH        Positioning During Eating  upright 90 degree;upright in bed  -CH        Utensils Used  spoon;cup  -CH        Consistencies Trialed  regular textures;soft textures;ground;mechanical soft, no mixed consistencies;pureed;thin liquids;nectar/syrup-thick liquids ice chips, mixed soft solid consistency  -CH        Pre SpO2 (%)  96  -CH        Post SpO2 (%)  96  -CH           Clinical Swallow Eval    Oral Prep Phase  WFL  -CH        Oral Transit  WFL  -CH        Oral Residue  WFL  -CH        Pharyngeal Phase  suspected pharyngeal impairment  -CH        Esophageal Phase  suspected esophageal impairment  -CH        Clinical Swallow Evaluation Summary  Patient presented with trials of ice chips, spoon, and cup sips of thin H2O, NT liquids via sm single cup sips, pureed, MS, MS mixed, and regular consistencies. Oral phase appeared to be WFL.  Patient tolerated soft solids without s/s of aspiration,  Multiple swallows observed with pureed and  throat clear following mixed consistencies,  initial cup sips of thin H2O and with pureed initial trial, however, not with subsequent trials. Patient tolerated teaspoon sized single sips of thin liquids without s/s of aspiration. C/O solids getting stuck at the level of the UES (patient pointing). Patient also reporting significant reflux. Recommend level IV, no mixed consistencies, thin liquids via small single cup sips, no straws. MBSS with limited upper GI to further assess swallow function.     -CH           Pharyngeal Phase Concerns    Pharyngeal Phase Concerns  multiple swallows;throat clear  -CH        Multiple Swallows  pudding  -        Throat Clear   thin;pudding;other (see comments) mixed consistencies  -           Esophageal Phase Concerns    Esophageal Phase Concerns  sensation of material sticking  -        Sensation of Material Sticking  mixed consistencies;pudding;mechanical soft;other (see comments) inconsistent sensation  -        Esophageal Phase Concerns, Comment  Patient reported significant reflux  -           Clinical Impression    SLP Swallowing Diagnosis  functional oral phase;suspected pharyngeal dysfunction;other (see comments) suspected esophageal dysfunction  -        Functional Impact  risk of aspiration/pneumonia;risk of malnutrition;risk of dehydration  -        Rehab Potential/Prognosis, Swallowing  good, to achieve stated therapy goals  -        Swallow Criteria for Skilled Therapeutic Interventions Met  demonstrates skilled criteria  -           Recommendations    Therapy Frequency (Swallow)  evaluation only  -        SLP Diet Recommendation  mechanical soft with no mixed consistencies;thin liquids;other (see comments) small single cup sips, NO STRAWS  -        Recommended Diagnostics  VFSS (MBS);other (see comments) with limited upper GI  -        Recommended Precautions and Strategies  upright posture during/after eating;small bites of food and sips of liquid;no straw;alternate between small bites of food and sips of liquid  -        SLP Rec. for Method of Medication Administration  meds crushed;with pudding or applesauce;as tolerated  -        Monitor for Signs of Aspiration  yes;notify SLP if any concerns  -        Anticipated Dischage Disposition  unknown  -          User Key  (r) = Recorded By, (t) = Taken By, (c) = Cosigned By    Initials Name Effective Dates    Madiha Lock, MS CCC-SLP 02/14/19 -           EDUCATION  The patient has been educated in the following areas:   Dysphagia (Swallowing Impairment) Modified Diet Instruction.    SLP Recommendation and Plan  SLP Swallowing Diagnosis:  functional oral phase, suspected pharyngeal dysfunction, other (see comments)(suspected esophageal dysfunction)  SLP Diet Recommendation: mechanical soft with no mixed consistencies, thin liquids, other (see comments)(small single cup sips, NO STRAWS)  Recommended Precautions and Strategies: upright posture during/after eating, small bites of food and sips of liquid, no straw, alternate between small bites of food and sips of liquid  SLP Rec. for Method of Medication Administration: meds crushed, with pudding or applesauce, as tolerated     Monitor for Signs of Aspiration: yes, notify SLP if any concerns  Recommended Diagnostics: [place] [place] other (see comments)(with limited upper GI)  Swallow Criteria for Skilled Therapeutic Interventions Met: demonstrates skilled criteria  Anticipated Dischage Disposition: unknown  Rehab Potential/Prognosis, Swallowing: good, to achieve stated therapy goals  Therapy Frequency (Swallow): evaluation only                      Time Calculation:   Time Calculation- SLP     Row Name 12/09/19 1438             Time Calculation- SLP    SLP Start Time  1400  -CH      SLP Received On  12/09/19  -        User Key  (r) = Recorded By, (t) = Taken By, (c) = Cosigned By    Initials Name Provider Type     Madiha Saez MS CCC-SLP Speech and Language Pathologist          Therapy Charges for Today     Code Description Service Date Service Provider Modifiers Qty    [id number] HC ST EVAL ORAL PHARYNG SWALLOW 5 12/9/2019 Madiha Saez MS CCC-SLP GN 1               Madiha Saez MS CCC-MEI  12/9/2019

## 2019-12-09 NOTE — PROGRESS NOTES
CC:    Shortness of air    HPI:    This is a pleasant 77 y/o WF w/ h/o lifetime non-smoking, Afib, prior amiodarone use, CAD s/p CABG 2012, previous stents, HTN, HLD, prior CVA w/ no obvious residual deficit, h/o secondhand smoke exposure, owns pet bird x 15-20 years who presents for evaluation of FIGUEROA.  She recently had a right and left heart cath 4/30/18 at [facility].  This showed a PA pressure of 45/23 (mean 30), PCWP 16, LVEDP 15, and CI of 1.5.  Calculated PVR is 6 Wood units.  LHC showed severe 2vCAD w/ patent LIMA to LAD, patent stents in LAD, patent SVG to PDA, and medical management was recommended.  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  No obvious auto-immune disease.     HRCT re-demonstrated diffuse GGO / mosaic attenuation worse in upper lobes.  V/Q w/ no evidence of CTEPH but +air trapping.  Extensive auto-immune / infectious serologies negative.  Underwent bronchoscopy with BAL RUL on 4/12/19 and prednisone 20 mg was started then.  Cultures were negative.  BAL fluid had lymphocyte predominance w/ CD4:CD8 ratio of 0.5.       INTERVAL HISTORY:    Returns today for follow up.  She did get rid of the bird.  Was recently hospitalized for a second UGIB.   Currently off prednisone.  Today she has gained about 10-15 lbs since last visit and is very swollen.  Has LE and facial edema.  Gets very SOA with exertion.    PMH:    Past Medical History:   Diagnosis Date   • Arthritis    • Atrial fibrillation (CMS/HCC)    • Coronary artery disease    • Disease of thyroid gland    • Elevated cholesterol    • GERD without esophagitis 12/2/2019   • Hypertension    • Stroke (CMS/HCC)      PSH:    Past Surgical History:   Procedure Laterality Date   • BRONCHOSCOPY Bilateral 4/12/2019    Procedure: BRONCHOSCOPY;  Surgeon: Jeff Laura MD;  Location: [facility] ENDOSCOPY;  Service: Pulmonary   • BYPASS GRAFT     • COLONOSCOPY N/A 10/15/2019    Procedure: COLONOSCOPY;  Surgeon:  Fredi Aaron MD;  Location:  [facility] ENDOSCOPY;  Service: Gastroenterology   • CORONARY ANGIOPLASTY WITH STENT PLACEMENT     • ENDOSCOPY N/A 10/14/2019    Procedure: ESOPHAGOGASTRODUODENOSCOPY;  Surgeon: Fredi Aaron MD;  Location:  [facility] ENDOSCOPY;  Service: Gastroenterology   • HYSTERECTOMY       FH:    Family History   Problem Relation Age of Onset   • Cancer Mother    • No Known Problems Father    • Liver disease Sister      SH:    Social History     Socioeconomic History   • Marital status:      Spouse name: Not on file   • Number of children: Not on file   • Years of education: Not on file   • Highest education level: Not on file   Tobacco Use   • Smoking status: Never Smoker   • Smokeless tobacco: Never Used   Substance and Sexual Activity   • Alcohol use: No   • Drug use: No   • Sexual activity: Defer     ALLERGIES:    Allergies   Allergen Reactions   • Tuberculin Tests Rash     MEDICATIONS:      Current Outpatient Medications:   •  apixaban (ELIQUIS) 2.5 MG tablet tablet, Take 2.5 mg by mouth 2 (Two) Times a Day., Disp: , Rfl:   •  atorvastatin (LIPITOR) 40 MG tablet, Take 40 mg by mouth Daily., Disp: , Rfl:   •  bisoprolol (ZEBETA) 5 MG tablet, Take 1.5 tablets by mouth 2 (Two) Times a Day. (Patient taking differently: Take 5 mg by mouth Daily.), Disp: 270 tablet, Rfl: 3  •  ferrous gluconate (FERGON) 324 MG tablet, Take 1 tablet by mouth Daily With Breakfast., Disp: 30 tablet, Rfl: 0  •  Fluticasone Furoate-Vilanterol (BREO ELLIPTA) 200-25 MCG/INH inhaler, Inhale 1 puff Daily., Disp: 1 each, Rfl: 11  •  isosorbide mononitrate (IMDUR) 120 MG 24 hr tablet, Take 120 mg by mouth Daily., Disp: , Rfl:   •  levothyroxine (SYNTHROID, LEVOTHROID) 75 MCG tablet, , Disp: , Rfl:   •  mycophenolate (CELLCEPT) 500 MG tablet, Take 1 tablet by mouth 2 (Two) Times a Day., Disp: 60 tablet, Rfl: 4  •  nitroglycerin (NITROSTAT) 0.4 MG SL tablet, , Disp: , Rfl:   •  pantoprazole (PROTONIX) 40 MG EC tablet, Take 1  tablet by mouth Daily., Disp: 60 tablet, Rfl: 3  •  potassium chloride (K-DUR) 10 MEQ CR tablet, Take 10 mEq by mouth 2 (Two) Times a Day., Disp: , Rfl:   •  torsemide (DEMADEX) 20 MG tablet, Take 1 tablet by mouth Daily., Disp: 30 tablet, Rfl: 1  •  Vitamin D, Cholecalciferol, 50 MCG (2000 UT) capsule, Take  by mouth., Disp: , Rfl:   ROS:  Per HPI, otherwise all systems reviewed and negative.    DIAGNOSTIC DATA (Reviewed and interpreted by me unless otherwise specified):    PFT 11/19/18 - No obstruction, No restriction, air trapping, moderate reduction in DLCO corrects for alveolar volume    PFT 6/3/19 - no obstruction or restriction, FEV1 and FVC significantly improved from prior, air trapping, moderate reduction in DLCO corrects for alveolar volume    PFT 9/4/19 - no obstruction or restriction, +air trapping, moderate reduction in DLCO corrects for alveolar volume, stable from 6/2019    [place] 12/9/19 - no obstruction, mild non-specific reduction in FEV1 and FVC    6MW 11/19/18 - RA resting sat 95%, minimum sat 94% on RA, walked 137.2 meters 36% predicted    6MW 6/3/19 - starting sat 99%, minimum sat 98% on RA, walked 137 meters, 36% predicted    CXR 11/19/18 - chronic appearing changes in bases, mediastinal / hilar LAD    HRCT 11/30/18 & 1/21/19 - extensive GGO predominant in the bilateral upper lobes, likely some component of mosaic attenuation    HRCT 9/4/19 - GGO still present but improved from prior    V/Q 1/21/19 - air trapping    Vitals:    12/09/19 1046   BP: 110/76   Pulse: 88   Resp: 18   Temp: 97.9 °F (36.6 °C)   SpO2: 92%       Physical Exam   Constitutional: Oriented to person, place, and time. Appears well-developed and well-nourished.   Head: Normocephalic and atraumatic.   Nose: Nose normal.   Mouth/Throat: Oropharynx is clear and moist.   Eyes: Conjunctivae are normal.  Pupils normal.  Neck: No tracheal deviation present.   Cardiovascular: Normal rate, regular rhythm, normal heart sounds and  intact distal pulses.  Exam reveals no gallop and no friction rub.  No thrill.  No JVD.  2-3+ edema.  No murmur heard.  Pulmonary/Chest: Effort normal and breath sounds normal.  No tenderness to palpation.  No clubbing.   Abdominal: Soft. Bowel sounds are normal. No distension. No tenderness. There is no guarding.   Musculoskeletal: Normal range of motion.  No tenderness.  Lymphadenopathy:  No cervical adenopathy.   Neurological:  No new focal neurological deficits observed   Skin: Skin is warm and dry. No rash noted.   Psychiatric: Normal mood and affect.  Behavior is normal. Judgment normal.    Assessment/Plan     1)  Hypersensitivity Pneumonitis / Group 3 Mild Pulmonary HTN - based on clinical history of bird exposure, radiographic appearance, BAL findings, and lack of alternative diagnosis on serologies I think she most likely has Hypersensitivity Pneumonitis.  Has been on prednisone 20 mg daily since mid April 2019, is now gaining weight and had a recent GIB x 2.  Currently off prednisone and most recently on cellcept.  She is grossly edematous.  I think is in acute on chronic diastolic heart failure.  At this point I think her lung disease is clinically stable.  Will d/c cellcept.  Off all therapy for HP now.  Cellcept can also cause edema.  Will admit her to the hospital for diuresis.  Will plan on re-checking her PFT and 6MW in about 6 weeks to give her plenty of time off cellcept and to achieve euvolemia.      I think her pulmonary htn is secondary to underlying lung disease and diastolic HF, and there is currently no role for selective pulmonary vasodilators.    RTC 6 weeks for PFT and 6MW    Jeff Laura MD  Pulmonology and Critical Care Medicine  12/09/19 12:17 PM  Electronically Signed    C.C.:  No ref. provider found, Anthony Sweet DO

## 2019-12-09 NOTE — PLAN OF CARE
Problem: Patient Care Overview  Goal: Plan of Care Review  Outcome: Ongoing (interventions implemented as appropriate)  Flowsheets (Taken 12/9/2019 1440)  Plan of Care Reviewed With: patient; daughter  Note:   SLP evaluation completed. Will address dysphagia via MBSS. Please see note for further details and recommendations.

## 2019-12-09 NOTE — H&P
[facility] Medicine Services  HISTORY AND PHYSICAL    Patient Name: Pat Morel  : 1940  MRN: [id number]  Primary Care Physician: Anthony Sweet DO  Date of admission: 2019      Subjective   Subjective     Chief Complaint:  Volume overload     HPI:  Pat Morel is 79yoF with history of pulmonary HTN followed by pulmonary (Dr Laura), CVA, GI bleed, CKD III, HTN, CAD, Afib on anticoagulation who presents as a direct admission from pulmonary clinic with worsening volume overload, dyspnea on exertion, weight gain and LE edema. Patient reports that her PCP adjusted her diuretics from lasix to toresmide recently. She reports urinating very minimal amounts since this change and continued weight gain. Reports gaining almost 10lbs. Also with dyspnea on exertion, difficulty lying flat. Was in office to see Dr Laura today for pulm HTN f/u and was sent to [facility] for direct admission.  Admitted for IV diuresis. Stat labs pending.       Review of Systems   Gen- No fevers, chills  CV- No chest pain, palpitations  Resp- No cough, + dyspnea  GI- No N/V/D, abd pain      All other systems reviewed and are negative.     Personal History     Past Medical History:   Diagnosis Date   • Arthritis    • Atrial fibrillation (CMS/HCC)    • Coronary artery disease    • Disease of thyroid gland    • Elevated cholesterol    • GERD without esophagitis 2019   • Hypertension    • Stroke (CMS/HCC)        Past Surgical History:   Procedure Laterality Date   • BRONCHOSCOPY Bilateral 2019    Procedure: BRONCHOSCOPY;  Surgeon: Jeff Laura MD;  Location:  [facility] ENDOSCOPY;  Service: Pulmonary   • BYPASS GRAFT     • COLONOSCOPY N/A 10/15/2019    Procedure: COLONOSCOPY;  Surgeon: Fredi Aaron MD;  Location:  [facility] ENDOSCOPY;  Service: Gastroenterology   • CORONARY ANGIOPLASTY WITH STENT PLACEMENT     • ENDOSCOPY N/A 10/14/2019    Procedure: ESOPHAGOGASTRODUODENOSCOPY;   Surgeon: Fredi Aaron MD;  Location: [facility] ENDOSCOPY;  Service: Gastroenterology   • HYSTERECTOMY         Family History: family history includes Cancer in her mother; Liver disease in her sister; No Known Problems in her father. Otherwise pertinent FHx was reviewed and unremarkable.     Social History:  reports that she has never smoked. She has never used smokeless tobacco. She reports that she does not drink alcohol or use drugs.  Social History     Social History Narrative   • Not on file       Medications:  Available home medication information reviewed.  Medications Prior to Admission   Medication Sig Dispense Refill Last Dose   • apixaban (ELIQUIS) 2.5 MG tablet tablet Take 2.5 mg by mouth 2 (Two) Times a Day.   12/9/2019 at Unknown time   • atorvastatin (LIPITOR) 40 MG tablet Take 40 mg by mouth Daily.   12/9/2019 at Unknown time   • bisoprolol (ZEBETA) 5 MG tablet Take 1.5 tablets by mouth 2 (Two) Times a Day. (Patient taking differently: Take 5 mg by mouth Daily.) 270 tablet 3 12/9/2019 at Unknown time   • ferrous gluconate (FERGON) 324 MG tablet Take 1 tablet by mouth Daily With Breakfast. 30 tablet 0 12/9/2019 at Unknown time   • isosorbide mononitrate (IMDUR) 120 MG 24 hr tablet Take 120 mg by mouth Daily.   12/9/2019 at Unknown time   • levothyroxine (SYNTHROID, LEVOTHROID) 75 MCG tablet    12/9/2019 at Unknown time   • pantoprazole (PROTONIX) 40 MG EC tablet Take 1 tablet by mouth Daily. 60 tablet 3 12/9/2019 at Unknown time   • potassium chloride (K-DUR) 10 MEQ CR tablet Take 10 mEq by mouth 2 (Two) Times a Day.   12/9/2019 at Unknown time   • torsemide (DEMADEX) 20 MG tablet Take 1 tablet by mouth Daily. 30 tablet 1 12/9/2019 at Unknown time   • Vitamin D, Cholecalciferol, 50 MCG (2000 UT) capsule Take  by mouth.   12/9/2019 at Unknown time   • nitroglycerin (NITROSTAT) 0.4 MG SL tablet    Taking       Allergies   Allergen Reactions   • Tuberculin Tests Rash       Objective   Objective      Vital Signs:   Temp:  [97.9 °F (36.6 °C)] 97.9 °F (36.6 °C)  Heart Rate:  [88] 88  Resp:  [18] 18  BP: (110)/(76) 110/76        Physical Exam   Constitutional: Awake, alert  Eyes: PERRLA, sclerae anicteric, no conjunctival injection  HENT: NCAT, mucous membranes moist, NC   Neck: Supple, no thyromegaly, no lymphadenopathy, trachea midline  Respiratory: slightly labored respirations, on 2 L NC, +bibasilar crackles  Cardiovascular: RRR, no murmurs, rubs, or gallops, palpable pedal pulses bilaterally  Gastrointestinal: Positive bowel sounds, soft, nontender, nondistended  Musculoskeletal: 2+ LE pitting edema   Psychiatric: Appropriate affect, cooperative  Neurologic: Oriented x 3, Cranial Nerves grossly intact to confrontation, speech clear  Skin: No rashes      Results Reviewed:  I have personally reviewed current lab and radiology data.    Results from last 7 days   Lab Units 12/02/19  1648   WBC x10E3/uL 5.6   HEMOGLOBIN g/dL 8.7*   HEMATOCRIT % 28.1*   PLATELETS x10E3/uL 250     Results from last 7 days   Lab Units 12/02/19  1648   SODIUM mmol/L 139   POTASSIUM mmol/L 3.8   CHLORIDE mmol/L 99   TOTAL CO2 mmol/L 20   BUN mg/dL 36*   CREATININE mg/dL 1.70*   CALCIUM mg/dL 9.1   ALT (SGPT) IU/L 13   AST (SGOT) IU/L 20     Estimated Creatinine Clearance: 23.3 mL/min (A) (by C-G formula based on SCr of 1.7 mg/dL (H)).  Brief Urine Lab Results     None        Imaging Results (Last 24 Hours)     Procedure Component Value Units Date/Time    XR Chest 1 View [[id number]] Resulted:  12/09/19 1332     Updated:  12/09/19 1332        Results for orders placed during the hospital encounter of 10/13/19   Adult Transthoracic Echo Complete W/ Cont if Necessary Per Protocol    Narrative · The left atrium is enlarged.  · Global and segmental LV wall motion is normal. The estimated LV ejection   fraction is >70%.  · The aortic valve appears to be mildly sclerotic. AS and AI are absent.  · There is mild-moderate mitral  regurgitation.  · There is moderate tricuspid regurgitation with a moderate elevation is   estimated PA pressure (45-55 mmHg)  · There are no other important findings on this study.          Assessment/Plan   Assessment / Plan     Active Hospital Problems    Diagnosis POA   • Volume overload [E87.70] Yes   • Interstitial lung disease (CMS/HCC) [J84.9] Yes   • Pulmonary arterial hypertension (CMS/HCC) [I27.21] Yes   • Dyspnea on exertion [R06.09] Yes   • Cerebrovascular accident (CVA) due to embolism (CMS/HCC) [I63.9] Yes   • Persistent atrial fibrillation [I48.19] Yes   • Coronary artery disease involving native coronary artery of native heart without angina pectoris [I25.10] Yes   • Essential hypertension [I10] Yes       Pat Morel is 79yoF with history of pulmonary HTN followed by pulmonary (Dr Laura), CVA, GI bleed, CKD III, HTN, CAD, Afib on anticoagulation who presents as a direct admission from pulmonary clinic with worsening volume overload, dyspnea on exertion, weight gain and LE edema. Admitted for IV diuresis.       Volume overload, likely multifactorial  Pulmonary HTN  ---chart reviewed of patient's history of Group 3 Pulm HTN/HSN Pneumonitis followed by pulmonary and recently off prednisone and cellcept. Cellcept can cause edema per pulmonary, but likely this episode also exacerbated by changing diuretic from lasix 120 mg qday to toresmide 20mg qday. Last ECHO reviewed from October 2019- normal EF but elevated RVSP c/w pulmonary HTN.  ----strict I/Os, stat labs pending as well as CXR, will start with IV lasix 40mg and assess response   ----consider cardiology consultation to assist with diuretic management    Reported history of CKD III  ---as above, stat labs pending, reviewed last Cr 1.7 from 12/2   ---avoid nephrotoxic agents, BMP in AM    History of GIB  ---continue PPI    pAF  ----continue eliquis, Bblocker    Hypothyroidism  ---TSH pending, continue synthroid    **Addendum- labs reviewed,  Cr 1.8, appears to be close to baseline. Continue lasix, have changed order to daily. Otherwise plan of care as above.         DVT prophylaxis:  eliquis    CODE STATUS:    Code Status and Medical Interventions:   Ordered at: 12/09/19 1307     Level Of Support Discussed With:    Patient     Code Status:    CPR     Medical Interventions (Level of Support Prior to Arrest):    Full       Admission Status:  I believe this patient meets INPATIENT status due to close monitoring with IV diuresis, possible subspeciality consultation, strict I/Os- likely will be here >2MN.  I feel patient’s risk for adverse outcomes and need for care warrant INPATIENT evaluation and I predict the patient’s care encounter to likely last beyond 2 midnights.    Electronically signed by Nicole High MD, 12/09/19, 1:31 PM.

## 2019-12-09 NOTE — MBS/VFSS/FEES
Acute Care - Speech Language Pathology   Swallow Initial Evaluation  Edie   Modified Barium Swallow Study (MBS)     Patient Name: Pat Morel  : 1940  MRN: [id number]  Today's Date: 2019               Admit Date: 2019    Visit Dx:     ICD-10-CM ICD-9-CM   1. Dysphagia, unspecified type R13.10 787.20     Patient Active Problem List   Diagnosis   • Persistent atrial fibrillation   • Coronary artery disease involving native coronary artery of native heart without angina pectoris   • Essential hypertension   • Cerebrovascular accident (CVA) due to embolism (CMS/HCC)   • Dyspnea on exertion   • Pulmonary arterial hypertension (CMS/HCC)   • Interstitial lung disease (CMS/HCC)   • Hypersensitivity pneumonitis (CMS/HCC)   • GI bleed   • RENEE (acute kidney injury) (CMS/HCC)   • Gastrointestinal hemorrhage with melena   • Gastric ulcer   • History of melena   • History of colonoscopy   • Coagulation disorder due to circulating anticoagulants (CMS/HCC)   • Dyslipidemia   • Acquired hypothyroidism   • Chronic kidney disease, stage 3 (CMS/HCC)   • GERD without esophagitis   • Iron deficiency anemia   • Volume overload     Past Medical History:   Diagnosis Date   • Arthritis    • Atrial fibrillation (CMS/HCC)    • Coronary artery disease    • Disease of thyroid gland    • Elevated cholesterol    • GERD without esophagitis 2019   • Hypertension    • Stroke (CMS/HCC)      Past Surgical History:   Procedure Laterality Date   • BRONCHOSCOPY Bilateral 2019    Procedure: BRONCHOSCOPY;  Surgeon: Jeff Laura MD;  Location: [facility] ENDOSCOPY;  Service: Pulmonary   • BYPASS GRAFT     • COLONOSCOPY N/A 10/15/2019    Procedure: COLONOSCOPY;  Surgeon: Fredi Aaron MD;  Location: [facility] ENDOSCOPY;  Service: Gastroenterology   • CORONARY ANGIOPLASTY WITH STENT PLACEMENT     • ENDOSCOPY N/A 10/14/2019    Procedure: ESOPHAGOGASTRODUODENOSCOPY;  Surgeon: Fredi Aaron MD;  Location:   "[facility]OSCOPY;  Service: Gastroenterology   • HYSTERECTOMY          SWALLOW EVALUATION (last 72 hours)      SLP Adult Swallow Evaluation     Row Name 12/09/19 2674                Rehab Evaluation    Document Type  evaluation  -RD       Subjective Information  no complaints  -RD       Patient Observations  alert;cooperative;agree to therapy  -RD       Patient/Family Observations  No family present  -RD       Patient Effort  good  -RD          General Information    Patient Profile Reviewed  yes  -RD       Pertinent History Of Current Problem  79yoF with history of pulmonary HTN, CVA, GI bleed, CKD III, HTN, CAD, Afib on anticoagulation who presents as a direct admission from pulmonary clinic with worsening volume overload, dyspnea on exertion, weight gain and LE edema. CSE completed d/t patient c/o swallowing difficulties with both liquids and solids and of foods \"getting stuck\" in her throat.  -RD       Current Method of Nutrition  mechanical soft, no mixed consistencies;thin liquids  -RD       Precautions/Limitations, Vision  WFL;for purposes of eval  -RD       Precautions/Limitations, Hearing  WFL;for purposes of eval  -RD       Prior Level of Function-Communication  WFL  -RD       Prior Level of Function-Swallowing  no diet consistency restrictions;esophageal concerns  -RD       Plans/Goals Discussed with  patient;agreed upon  -RD       Barriers to Rehab  none identified  -RD       Patient's Goals for Discharge  return to regular diet  -RD          Pain Assessment    Additional Documentation  Pain Scale: Numbers Pre/Post-Treatment (Group)  -RD          Pain Scale: Numbers Pre/Post-Treatment    Pain Scale: Numbers, Pretreatment  0/10 - no pain  -RD       Pain Scale: Numbers, Post-Treatment  0/10 - no pain  -RD          MBS/VFSS    Utensils Used  spoon;cup;straw  -RD       Consistencies Trialed  thin liquids;pudding thick;regular textures  -RD          MBS/VFSS Interpretation    Oral Prep Phase  WFL  -RD       Oral " Transit Phase  WFL  -RD       Oral Residue  WFL  -RD          Initiation of Pharyngeal Swallow    Initiation of Pharyngeal Swallow  bolus in pyriform sinuses  -RD       Pharyngeal Phase  functional pharyngeal phase of swallowing  -RD       Rosenbek's Scale  thin:;pudding/puree:;regular textures:;1--->level 1  -RD       Pharyngeal Phase, Comment  Functional pharyngeal swallow. No penetration or aspiration w/ any trial or consistency, even when pushed w/ large consecutive sips of thins. No significant pharyngeal residue noted.   -RD          Esophageal Phase    Esophageal Phase  esophageal retention with retrograde flow below PES;see radiology report for further details;other (see comments)  -RD       Esophageal Phase, Comment  Pt w/ limited UGI exam complete. Pt noted w/ hiatal hernia and GERD per radiology PA. Pt would benefit from general aspiration and reflux precautions. Education provided after exam. Pt may benefit from GI f/u if continued concerns.   -RD          Clinical Impression    SLP Swallowing Diagnosis  functional oral phase;functional pharyngeal phase;esophageal dysfunction  -RD       Functional Impact  no impact on function  -RD       Rehab Potential/Prognosis, Swallowing  good, to achieve stated therapy goals  -RD       Swallow Criteria for Skilled Therapeutic Interventions Met  no problems identified which require skilled intervention  -RD          Recommendations    Therapy Frequency (Swallow)  evaluation only  -RD       SLP Diet Recommendation  regular textures;thin liquids  -RD       Recommended Diagnostics  --       Recommended Precautions and Strategies  upright posture during/after eating;other (see comments) general aspiration/reflux precautions; oral care BID/PRN  -RD       SLP Rec. for Method of Medication Administration  meds whole;with thin liquids;meds crushed;with pudding or applesauce;as tolerated  -RD       Monitor for Signs of Aspiration  yes;notify SLP if any concerns  -RD        Anticipated Dischage Disposition  unknown  -RD       Demonstrates Need for Referral to Another Service  gastroenterology;other (see comments) okay as an OP- if cont'd concerns (per MD discretion)  -         User Key  (r) = Recorded By, (t) = Taken By, (c) = Cosigned By    Initials Name Effective Dates    Chantelle Schwartz MS CCC-SLP 04/03/18 -      Madiha Saez MS CCC-SLP 02/14/19 -           EDUCATION  The patient has been educated in the following areas:   Dysphagia (Swallowing Impairment) Oral Care/Hydration Modified Diet Instruction.    SLP Recommendation and Plan  SLP Swallowing Diagnosis: functional oral phase, functional pharyngeal phase, esophageal dysfunction  SLP Diet Recommendation: regular textures, thin liquids  Recommended Precautions and Strategies: upright posture during/after eating, other (see comments)(general aspiration/reflux precautions; oral care BID/PRN)  SLP Rec. for Method of Medication Administration: meds whole, with thin liquids, meds crushed, with pudding or applesauce, as tolerated     Monitor for Signs of Aspiration: yes, notify SLP if any concerns     Swallow Criteria for Skilled Therapeutic Interventions Met: no problems identified which require skilled intervention  Anticipated Dischage Disposition: unknown  Rehab Potential/Prognosis, Swallowing: good, to achieve stated therapy goals  Therapy Frequency (Swallow): evaluation only     Demonstrates Need for Referral to Another Service: gastroenterology, other (see comments)(okay as an OP- if cont'd concerns (per MD discretion))    Plan of Care Reviewed With: patient           Time Calculation:   Time Calculation- SLP     Row Name 12/09/19 1525 12/09/19 1438          Time Calculation- SLP    SLP Start Time  1150  -RD  1400  -     SLP Received On  12/09/19  -  12/09/19  -       User Key  (r) = Recorded By, (t) = Taken By, (c) = Cosigned By    Initials Name Provider Type    Chantelle Schwartz MS CCC-SLP Speech  and Language Pathologist    Madiha Lock, MS CCC-SLP Speech and Language Pathologist          Therapy Charges for Today     Code Description Service Date Service Provider Modifiers Qty    [id number] HC ST MOTION FLUORO EVAL SWALLOW 3 12/9/2019 Chantelle Briscoe, MS CCC-SLP GN 1               Chantelle Briscoe MS CCC-SLP  12/9/2019

## 2019-12-09 NOTE — PLAN OF CARE
Problem: Patient Care Overview  Goal: Plan of Care Review  Outcome: Ongoing (interventions implemented as appropriate)  Flowsheets (Taken 12/9/2019 1511)  Plan of Care Reviewed With: patient  Note:   SLP MBS evaluation completed. Will sign-off as pt presents w/ functional oropharyngeal swallow. Suspect esophageal phase contributing. See radiology PA report for full details. Pt may benefit from further GI f/u or management if continued concerns. Please see note for further details and recommendations.

## 2019-12-09 NOTE — PLAN OF CARE
Problem: Patient Care Overview  Goal: Plan of Care Review  12/9/2019 1653 by Ivelisse Floyd RN  Outcome: Ongoing (interventions implemented as appropriate)  Flowsheets (Taken 12/9/2019 1653)  Progress: no change  Plan of Care Reviewed With: patient     Problem: Fall Risk (Adult)  Goal: Identify Related Risk Factors and Signs and Symptoms  12/9/2019 1653 by Ivelisse Floyd RN  Outcome: Ongoing (interventions implemented as appropriate)  Flowsheets (Taken 12/9/2019 1653)  Related Risk Factors (Fall Risk): slippery/uneven surfaces; environment unfamiliar  Signs and Symptoms (Fall Risk): presence of risk factors  12/9/2019 1652 by Ivelisse Floyd RN  Outcome: Ongoing (interventions implemented as appropriate)  Goal: Absence of Fall  Outcome: Ongoing (interventions implemented as appropriate)  Flowsheets (Taken 12/9/2019 1653)  Absence of Fall: making progress toward outcome     Problem: Fluid Volume Excess (Adult)  Goal: Identify Related Risk Factors and Signs and Symptoms  12/9/2019 1653 by Ivelisse Floyd RN  Outcome: Ongoing (interventions implemented as appropriate)  Flowsheets (Taken 12/9/2019 1653)  Signs and Symptoms (Fluid Volume Excess): acute weight gain; nausea and vomiting; respiratory pattern changes  12/9/2019 1652 by Ivelisse lFoyd RN  Outcome: Ongoing (interventions implemented as appropriate)  Goal: Optimal Fluid Balance  Outcome: Ongoing (interventions implemented as appropriate)  Flowsheets (Taken 12/9/2019 1653)  Optimal Fluid Balance: making progress toward outcome     Problem: Fluid Volume Excess (Adult)  Goal: Optimal Fluid Balance  Outcome: Ongoing (interventions implemented as appropriate)  Flowsheets (Taken 12/9/2019 1653)  Optimal Fluid Balance: making progress toward outcome

## 2019-12-09 NOTE — DISCHARGE INSTR - DIET
MBS/VFSS 12/9/19  Reason for Referral  Patient was referred for a MBS to assess the efficiency of his/her swallow function, rule out aspiration and make recommendations regarding safe dietary consistencies, effective compensatory strategies, and safe eating environment.             Recommendations/Treatment  SLP Swallowing Diagnosis: functional oral phase, functional pharyngeal phase, esophageal dysfunction  Functional Impact: no impact on function  Rehab Potential/Prognosis, Swallowing: good, to achieve stated therapy goals  Swallow Criteria for Skilled Therapeutic Interventions Met: no problems identified which require skilled intervention  Therapy Frequency (Swallow): evaluation only  SLP Diet Recommendation: regular textures, thin liquids  Recommended Precautions and Strategies: upright posture during/after eating, other (see comments)(general aspiration/reflux precautions; oral care BID/PRN)  SLP Rec. for Method of Medication Administration: meds whole, with thin liquids, meds crushed, with pudding or applesauce, as tolerated  Monitor for Signs of Aspiration: yes, notify SLP if any concerns  Anticipated Dischage Disposition: unknown  Demonstrates Need for Referral to Another Service: gastroenterology, other (see comments)(okay as an OP- if cont'd concerns (per MD discretion))    Instrumental Set-up  Utensils Used: spoon, cup, straw  Consistencies Trialed: thin liquids, pudding thick, regular textures    Oral Preparation/ Oral Phase  Oral Prep Phase: WFL  Oral Transit Phase: WFL  Oral Residue: WFL             Pharyngeal Phase  Initiation of Pharyngeal Swallow: bolus in pyriform sinuses  Pharyngeal Phase: functional pharyngeal phase of swallowing  Pharyngeal Phase, Comment: Functional pharyngeal swallow. No penetration or aspiration w/ any trial or consistency, even when pushed w/ large consecutive sips of thins. No significant pharyngeal residue noted.     Cervical Esophageal Phase  Esophageal Phase: esophageal  retention with retrograde flow below PES, see radiology report for further details, other (see comments)  Esophageal Phase, Comment: Pt w/ limited UGI exam complete. Pt noted w/ hiatal hernia and GERD per radiology PA. Pt would benefit from general aspiration and reflux precautions. Education provided after exam. Pt may benefit from GI f/u if continued concerns.

## 2019-12-10 LAB
ANION GAP SERPL CALCULATED.3IONS-SCNC: 14 MMOL/L (ref 5–15)
BASOPHILS # BLD AUTO: 0.03 10*3/MM3 (ref 0–0.2)
BASOPHILS NFR BLD AUTO: 0.7 % (ref 0–1.5)
BUN BLD-MCNC: 37 MG/DL (ref 8–23)
BUN/CREAT SERPL: 19.7 (ref 7–25)
CALCIUM SPEC-SCNC: 9.2 MG/DL (ref 8.6–10.5)
CHLORIDE SERPL-SCNC: 103 MMOL/L (ref 98–107)
CO2 SERPL-SCNC: 22 MMOL/L (ref 22–29)
CREAT BLD-MCNC: 1.88 MG/DL (ref 0.57–1)
DEPRECATED RDW RBC AUTO: 65.4 FL (ref 37–54)
EOSINOPHIL # BLD AUTO: 0.19 10*3/MM3 (ref 0–0.4)
EOSINOPHIL NFR BLD AUTO: 4.1 % (ref 0.3–6.2)
ERYTHROCYTE [DISTWIDTH] IN BLOOD BY AUTOMATED COUNT: 19.4 % (ref 12.3–15.4)
GFR SERPL CREATININE-BSD FRML MDRD: 26 ML/MIN/1.73
GLUCOSE BLD-MCNC: 127 MG/DL (ref 65–99)
HCT VFR BLD AUTO: 26.6 % (ref 34–46.6)
HGB BLD-MCNC: 8 G/DL (ref 12–15.9)
IMM GRANULOCYTES # BLD AUTO: 0.02 10*3/MM3 (ref 0–0.05)
IMM GRANULOCYTES NFR BLD AUTO: 0.4 % (ref 0–0.5)
LYMPHOCYTES # BLD AUTO: 0.6 10*3/MM3 (ref 0.7–3.1)
LYMPHOCYTES NFR BLD AUTO: 13.1 % (ref 19.6–45.3)
MCH RBC QN AUTO: 27.8 PG (ref 26.6–33)
MCHC RBC AUTO-ENTMCNC: 30.1 G/DL (ref 31.5–35.7)
MCV RBC AUTO: 92.4 FL (ref 79–97)
MONOCYTES # BLD AUTO: 0.43 10*3/MM3 (ref 0.1–0.9)
MONOCYTES NFR BLD AUTO: 9.4 % (ref 5–12)
NEUTROPHILS # BLD AUTO: 3.31 10*3/MM3 (ref 1.7–7)
NEUTROPHILS NFR BLD AUTO: 72.3 % (ref 42.7–76)
NRBC BLD AUTO-RTO: 0 /100 WBC (ref 0–0.2)
PLATELET # BLD AUTO: 211 10*3/MM3 (ref 140–450)
PMV BLD AUTO: 9 FL (ref 6–12)
POTASSIUM BLD-SCNC: 3.3 MMOL/L (ref 3.5–5.2)
RBC # BLD AUTO: 2.88 10*6/MM3 (ref 3.77–5.28)
SODIUM BLD-SCNC: 139 MMOL/L (ref 136–145)
WBC NRBC COR # BLD: 4.58 10*3/MM3 (ref 3.4–10.8)

## 2019-12-10 PROCEDURE — 25010000002 FUROSEMIDE PER 20 MG: Performed by: NURSE PRACTITIONER

## 2019-12-10 PROCEDURE — 99232 SBSQ HOSP IP/OBS MODERATE 35: CPT | Performed by: INTERNAL MEDICINE

## 2019-12-10 PROCEDURE — 25010000002 FUROSEMIDE PER 20 MG: Performed by: INTERNAL MEDICINE

## 2019-12-10 PROCEDURE — 99222 1ST HOSP IP/OBS MODERATE 55: CPT | Performed by: INTERNAL MEDICINE

## 2019-12-10 PROCEDURE — 80048 BASIC METABOLIC PNL TOTAL CA: CPT | Performed by: INTERNAL MEDICINE

## 2019-12-10 PROCEDURE — 85025 COMPLETE CBC W/AUTO DIFF WBC: CPT | Performed by: INTERNAL MEDICINE

## 2019-12-10 RX ORDER — POTASSIUM CHLORIDE 750 MG/1
10 CAPSULE, EXTENDED RELEASE ORAL 2 TIMES DAILY WITH MEALS
Status: DISCONTINUED | OUTPATIENT
Start: 2019-12-10 | End: 2019-12-12 | Stop reason: HOSPADM

## 2019-12-10 RX ORDER — FUROSEMIDE 10 MG/ML
40 INJECTION INTRAMUSCULAR; INTRAVENOUS EVERY 12 HOURS
Status: DISCONTINUED | OUTPATIENT
Start: 2019-12-10 | End: 2019-12-11

## 2019-12-10 RX ORDER — POTASSIUM CHLORIDE 750 MG/1
40 CAPSULE, EXTENDED RELEASE ORAL AS NEEDED
Status: DISCONTINUED | OUTPATIENT
Start: 2019-12-10 | End: 2019-12-12 | Stop reason: HOSPADM

## 2019-12-10 RX ORDER — ONDANSETRON 2 MG/ML
4 INJECTION INTRAMUSCULAR; INTRAVENOUS EVERY 6 HOURS PRN
Status: DISCONTINUED | OUTPATIENT
Start: 2019-12-10 | End: 2019-12-12 | Stop reason: HOSPADM

## 2019-12-10 RX ORDER — POTASSIUM CHLORIDE 7.45 MG/ML
10 INJECTION INTRAVENOUS
Status: DISCONTINUED | OUTPATIENT
Start: 2019-12-10 | End: 2019-12-12 | Stop reason: HOSPADM

## 2019-12-10 RX ORDER — POTASSIUM CHLORIDE 1.5 G/1.77G
40 POWDER, FOR SOLUTION ORAL AS NEEDED
Status: DISCONTINUED | OUTPATIENT
Start: 2019-12-10 | End: 2019-12-12 | Stop reason: HOSPADM

## 2019-12-10 RX ORDER — METOLAZONE 5 MG/1
5 TABLET ORAL ONCE
Status: COMPLETED | OUTPATIENT
Start: 2019-12-10 | End: 2019-12-10

## 2019-12-10 RX ADMIN — ATORVASTATIN CALCIUM 40 MG: 40 TABLET, FILM COATED ORAL at 08:55

## 2019-12-10 RX ADMIN — POTASSIUM CHLORIDE 10 MEQ: 750 CAPSULE, EXTENDED RELEASE ORAL at 15:09

## 2019-12-10 RX ADMIN — ISOSORBIDE MONONITRATE 120 MG: 60 TABLET, EXTENDED RELEASE ORAL at 08:54

## 2019-12-10 RX ADMIN — FUROSEMIDE 40 MG: 10 INJECTION, SOLUTION INTRAMUSCULAR; INTRAVENOUS at 08:54

## 2019-12-10 RX ADMIN — BISOPROLOL FUMARATE 7.5 MG: 5 TABLET ORAL at 20:43

## 2019-12-10 RX ADMIN — APIXABAN 2.5 MG: 2.5 TABLET, FILM COATED ORAL at 20:43

## 2019-12-10 RX ADMIN — APIXABAN 2.5 MG: 2.5 TABLET, FILM COATED ORAL at 08:55

## 2019-12-10 RX ADMIN — PANTOPRAZOLE SODIUM 40 MG: 40 TABLET, DELAYED RELEASE ORAL at 08:55

## 2019-12-10 RX ADMIN — METOLAZONE 5 MG: 5 TABLET ORAL at 13:33

## 2019-12-10 RX ADMIN — LEVOTHYROXINE SODIUM 75 MCG: 75 TABLET ORAL at 06:33

## 2019-12-10 RX ADMIN — BISOPROLOL FUMARATE 7.5 MG: 5 TABLET ORAL at 08:54

## 2019-12-10 RX ADMIN — FUROSEMIDE 40 MG: 10 INJECTION, SOLUTION INTRAMUSCULAR; INTRAVENOUS at 20:43

## 2019-12-10 NOTE — PLAN OF CARE
Problem: Patient Care Overview  Goal: Plan of Care Review  Outcome: Ongoing (interventions implemented as appropriate)  Flowsheets  Taken 12/10/2019 0522  Progress: no change  Outcome Summary: Pt has been resting well this shift. Gets up to the bathroom but does get really nauseous while walking. VSS. Will continue to monitor. 0520 12/10/19  Taken 12/9/2019 2000  Plan of Care Reviewed With: patient

## 2019-12-10 NOTE — PROGRESS NOTES
Discharge Planning Assessment  [facility]     Patient Name: Pat Morel  MRN: [id number]  Today's Date: 12/10/2019    Admit Date: 12/9/2019    Discharge Needs Assessment     Row Name 12/10/19 1139       Living Environment    Lives With  spouse    Current Living Arrangements  home/apartment/condo    Primary Care Provided by  self    Provides Primary Care For  no one    Quality of Family Relationships  supportive    Able to Return to Prior Arrangements  yes       Resource/Environmental Concerns    Resource/Environmental Concerns  none    Transportation Concerns  car, none       Transition Planning    Patient/Family Anticipates Transition to  home    Patient/Family Anticipated Services at Transition  none    Transportation Anticipated  family or friend will provide       Discharge Needs Assessment    Readmission Within the Last 30 Days  no previous admission in last 30 days    Equipment Currently Used at Home  walker, rolling    Anticipated Changes Related to Illness  none    Equipment Needed After Discharge  none        Discharge Plan     Row Name 12/10/19 5758       Plan    Plan  Home    Patient/Family in Agreement with Plan  yes    Plan Comments  Spoke with pt at bedside. Pt is independent in ADL's and IADL's with the use of a walker. Pt has prescription coverage with her insurance and her PCP is Anthony Sweet. Pt explained that her and  just moved to [place] From [place] to be closer to Doctors and family. Pt reports her children are very supportive and helpful. Pt had no current discharge needs or concerns. SW continues to follow for discharge needs as arise.     Final Discharge Disposition Code  01 - home or self-care        Destination      Coordination has not been started for this encounter.      Durable Medical Equipment      Coordination has not been started for this encounter.      Dialysis/Infusion      Coordination has not been started for this encounter.      Home Medical Care       Coordination has not been started for this encounter.      Therapy      Coordination has not been started for this encounter.      Community Resources      Coordination has not been started for this encounter.          Demographic Summary     Row Name 12/10/19 1138       General Information    Reason for Consult  discharge planning        Functional Status     Row Name 12/10/19 1138       Functional Status, IADL    Medications  independent    Meal Preparation  assistive equipment    Housekeeping  assistive equipment    Laundry  assistive equipment    Shopping  assistive equipment        Psychosocial    No documentation.       Abuse/Neglect    No documentation.       Legal    No documentation.       Substance Abuse    No documentation.       Patient Forms    No documentation.           RAE Hall

## 2019-12-10 NOTE — CONSULTS
Pat Morel  [id number]  1940   LOS: 1 day   Patient Care Team:  PHYSICIAN: Anthony Sweet DO   ELECTROPHYSIOLOGIST:Tien Archer MD, [facility]  CARDIOLOGIST: Xu Block MD   PULMONOLOGIST: Jeff Laura MD  CARDIOTHORACIC SURGEON: Vivek Beckwith MD  GASTROENTEROLOGIST: Fredi Aaron MD    Mrs. Sandoval is a 79-year-old  white female from [place], [place].    Chief Complaint:  Volume overload    Problem List:  1. Persistent atrial fibrillation:  a. CHADsVasc=  5 On Xarelto, diagnosed at time of CABG 2012  b. Amiodarone Initiation with ECV to NSR 6/26/17   c. Holter monitor on 11/1/2018 persistent atrial fibrillation at a ventricular rate 75 bpm.   d. Echocardiogram 7/17/2019: Normal LV size, thickness, global LV systolic function with EF 60-65%, moderately dilated LA, moderately dilated right heart chambers, normal RV systolic function, no pericardial effusion, mild mitral annular calcification, moderate aortic valve leaflets sclerosis, severe TR with PA systolic pressure 75-80 mmHg   e. Echocardiogram 10/13/2019: LA enlarged, LVEF greater than 70%, aortic valve appears to be mildly sclerotic.  Mild to moderate MR, moderate TR, RVSP 45-55 mmHg  2. Coronary Artery Disease:   a. Multiple previous PCI to LAD  b. CABG 2012   c. Myocardial SPECT 8/30/2016: Ischemia of a moderate portion of the apical and anterior walls noted on myocardial perfusion imaging, normal LV EF, resting hypertension, nondiagnostic ECG with adenosine infusion secondary to adequate heart rate  d. [facility] 4/2017: patent stents, and grafts  e. [facility] 4/30/2018 severe 2 vessel native disease, patent LIMA to LAD, patent stents in mid LAD, patent SVG to posterior lateral branch with skip to PDA, normal LV size and function, mild pulmonary hypertension with right ventricular pressure 47 over 10 mmHg and pulmonary artery pressure 45/23 mmHg.  f. Echocardiogram 8/30/16: EF 60%, mild to moderate MR. RVSP 50 mmHg. Mild Pulmonary  HTN  g. Right and left heart catheterization 7/19/2019 :([facility]) severe two-vessel CAD, patent LIMA to LAD, patent stents in unbypassed diagonal with sluggish flow in distal segment of the stent with significant narrowing of the vessel distal to the stent, occluded SVG to diagonal, patent SVG to PDA, severe biventricular failure  3. CVA  4. Hypertension  5. Severe pulmonary hypertension with RVSP 75-80mmHg in July 2019  6. Hyperlipidemia  7. History of secondhand smoke exposure/COPD/interstitial lung disease/hypersensitivity pneumonitis  8. Chronic anemia and upper GIB August 2019, 4-day [facility] hospitalization October 2019  9. Acute on chronic diastolic heart failure December 2019, proBNP 9920  10. Surgical history  a. Bronchoscopy April 2019  b. CABG  c. LHC with stent placements  d. Hysterectomy    Allergies   Allergen Reactions   • Tuberculin Tests Rash     Medications Prior to Admission   Medication Sig Dispense Refill Last Dose   • apixaban (ELIQUIS) 2.5 MG tablet tablet Take 2.5 mg by mouth 2 (Two) Times a Day.   12/9/2019 at Unknown time   • atorvastatin (LIPITOR) 40 MG tablet Take 40 mg by mouth Daily.   12/9/2019 at Unknown time   • bisoprolol (ZEBETA) 5 MG tablet Take 1.5 tablets by mouth 2 (Two) Times a Day. (Patient taking differently: Take 5 mg by mouth Daily.) 270 tablet 3 12/9/2019 at Unknown time   • ferrous gluconate (FERGON) 324 MG tablet Take 1 tablet by mouth Daily With Breakfast. 30 tablet 0 12/9/2019 at Unknown time   • isosorbide mononitrate (IMDUR) 120 MG 24 hr tablet Take 120 mg by mouth Daily.   12/9/2019 at Unknown time   • levothyroxine (SYNTHROID, LEVOTHROID) 75 MCG tablet    12/9/2019 at Unknown time   • pantoprazole (PROTONIX) 40 MG EC tablet Take 1 tablet by mouth Daily. 60 tablet 3 12/9/2019 at Unknown time   • potassium chloride (K-DUR) 10 MEQ CR tablet Take 10 mEq by mouth 2 (Two) Times a Day.   12/9/2019 at Unknown time   • torsemide (DEMADEX) 20 MG tablet Take 1 tablet by mouth  Daily. 30 tablet 1 12/9/2019 at Unknown time   • Vitamin D, Cholecalciferol, 50 MCG (2000 UT) capsule Take  by mouth.   12/9/2019 at Unknown time   • nitroglycerin (NITROSTAT) 0.4 MG SL tablet    Taking     Scheduled Meds:  apixaban 2.5 mg Oral Q12H   atorvastatin 40 mg Oral Daily   bisoprolol 7.5 mg Oral BID   furosemide 40 mg Intravenous Daily   isosorbide mononitrate 120 mg Oral Daily   levothyroxine 75 mcg Oral Q AM   pantoprazole 40 mg Oral Daily   sodium chloride 10 mL Intravenous Q12H          History of Present Illness:   Patient with diastolic heart failure exacerbation with proBNP on admission 9920.  Patient had recently establish care with a new primary care physician 12/2/2019 who switched her from Lasix to torsemide and since that time the patient has had minimal urine output with continued weight gain.  Patient endorses orthopnea, dyspnea on exertion, lower extremity edema, and recent 10 pound weight gain.  Occasionally she will have burning chest pain.  She was admitted directly from her pulmonology follow-up 12/9/2019. Her last echocardiogram was in October 2019 with EF >70% and RVSP 45-55mmHg.  The patient had a upper GI bleed in August 2019.  She had a heart catheterization apparently in [place] and did not have to have any stents placed at that time.  She had a repeat GI bleed and a 4-day [facility] hospitalization in October 2019.  The patient feels that her breathing has improved since admission.  She is no longer on oxygen therapy and feels that her lower extremity edema has also improved.  The patient was on 80 mg of Lasix in the morning and 40 mg in the afternoon prior to being started on torsemide.  Patient has an upcoming appointment with Dr. Yomi Higgins on 12/20/2019.    Cardiac risk factors: advanced age (older than 55 for men, 65 for women), dyslipidemia, hypertension and sedentary lifestyle.    Social History     Socioeconomic History   • Marital status:      Spouse name: Not on file  "  • Number of children: Not on file   • Years of education: Not on file   • Highest education level: Not on file   Tobacco Use   • Smoking status: Never Smoker   • Smokeless tobacco: Never Used   Substance and Sexual Activity   • Alcohol use: No   • Drug use: No   • Sexual activity: Defer     Family History   Problem Relation Age of Onset   • Cancer Mother    • No Known Problems Father    • Liver disease Sister        Review of Systems  10 point review of systems was completed, positives outlined in the HPI, and otherwise all other systems are negative.      Objective:       Physical Exam  /68 (BP Location: Left arm, Patient Position: Lying)   Pulse 72   Temp 97.7 °F (36.5 °C) (Oral)   Resp 18   Ht 147.3 cm (58\")   Wt 64.9 kg (143 lb 3 oz)   LMP  (LMP Unknown)   SpO2 97%   BMI 29.93 kg/m²       12/09/19  1244   Weight: 64.9 kg (143 lb 3 oz)     Body mass index is 29.93 kg/m².    Intake/Output Summary (Last 24 hours) at 12/10/2019 0944  Last data filed at 12/10/2019 0900  Gross per 24 hour   Intake 200 ml   Output 1300 ml   Net -1100 ml       General Appearance:  Alert, cooperative, no distress, appears stated age   Head:  Normocephalic, without obvious abnormality, atraumatic   Neck: Supple, symmetrical, trachea midline, no adenopathy, thyroid: not enlarged, symmetric, no tenderness/mass/nodules, no carotid bruit or JVD   Lungs:   Decreased breath sounds to auscultation bilaterally, respirations unlabored   Heart:  Irregular rate and rhythm, S1, S2 normal, no murmur, rub or gallop   Abdomen:   Soft, non-tender, no masses, no organomegaly, bowel sounds audible x4   Extremities: 1+ edema, normal range of motion   Pulses: 2+ and symmetric   Skin: Skin color, texture, turgor normal, no rashes or lesions   Neurologic: Normal       Cardiographics  EKG:No new ECG to review    Imaging  Chest x-ray 12/9/19:  Increased bony vascularity of interstitial edema pattern  without evidence of pleural effusion to " suggest decompensation.       Lab Review   Results from last 7 days   Lab Units 12/10/19  0826 12/09/19  1316   SODIUM mmol/L 139 138   POTASSIUM mmol/L 3.3* 3.6   CHLORIDE mmol/L 103 101   CO2 mmol/L 22.0 21.0*   BUN mg/dL 37* 39*   CREATININE mg/dL 1.88* 1.82*   GLUCOSE mg/dL 127* 112*   CALCIUM mg/dL 9.2 9.6     Results from last 7 days   Lab Units 12/10/19  0826 12/09/19  1316   WBC 10*3/mm3 4.58 5.61   HEMOGLOBIN g/dL 8.0* 8.6*   HEMATOCRIT % 26.6* 29.2*   PLATELETS 10*3/mm3 211 231         Results from last 7 days   Lab Units 12/09/19  1316   HEMOGLOBIN A1C % 5.60       proBNP 9920      Assessment:    Patient with diastolic heart failure exacerbation in the setting of pulmonary hypertension and persistent atrial fibrillation.  Would defer MPS/LHC/ECV at this time.  The patient apparently had a heart catheterization August 2019 in [place] which was normal-no deficit.          Plan:     1.  Potassium replacement  2. CBC, BMP, Magnesium, ECG in morning  3. Increase IV lasix to 40mg bid  4. Zaroxolyn 5 mg x once  5. Daily weights, strict I/O  6. Defer MPS/LHC/ECV/echo at this time      Herlinda Woody, APRN. 12/10/2019  10:11 AM     Patient seen and examined and interviewed in a face-to-face encounter.  Case discussed with nurse practitioner.  Pat has a known history of permanent atrial fibrillation and diastolic congestive heart failure presenting with progressive dyspnea and a 10 pound weight gain after recent change in her baseline diuretic dose.  Physical exam is remarkable for mild bibasilar inspiratory rales, and irregular regular heart rate without significant murmur rub or gallop, normal S1-S2 and 2+ bilateral pretibial edema.  Overall consistent with acute on chronic exacerbation of heart failure with preserved ejection fraction  Secondary to inadequate diuretic dose.  We will restore appropriate volume status with scheduled IV loop diuretics while trending renal function and electrolytes.

## 2019-12-10 NOTE — PROGRESS NOTES
[facility] Medicine Services  PROGRESS NOTE    Patient Name: Pat Morel  : 1940  MRN: [id number]    Date of Admission: 2019  Primary Care Physician: Anthony Sweet DO    Subjective   Subjective     CC:  Volume overload, AoC CHF exac    HPI:  Patient reports she feels well. Urinated significantly with diuresis yesterday. Swelling remains, patient states is may be slightly better. No family present this morning. No other complaints or issues.     Review of Systems  Gen- No fevers, chills  CV- No chest pain, palpitations  Resp- No cough, dyspnea  GI- No N/V/D, abd pain        Objective   Objective     Vital Signs:   Temp:  [97.7 °F (36.5 °C)-98 °F (36.7 °C)] 97.7 °F (36.5 °C)  Heart Rate:  [72-83] 72  Resp:  [18-22] 18  BP: (115-157)/(68-90) 115/68        Physical Exam:  GEN- no acute distress noted, resting in bed, awake  HEENT- atraumatic, normocephlic, eomi  NECK- supple, trachea midline, no masses  RESP: ctab, normal effort  CV: no murmurs, s1/s2, rrr  MSK: 2+ pitting edema noted of b/l LE, spontaneous movement of all extremities  NEURO: alert, oriented, no focal deficits  SKIN: no rashes  PSYCH: appropriate mood and affect       Results Reviewed:    Results from last 7 days   Lab Units 12/10/19  0826 19  1316   WBC 10*3/mm3 4.58 5.61   HEMOGLOBIN g/dL 8.0* 8.6*   HEMATOCRIT % 26.6* 29.2*   PLATELETS 10*3/mm3 211 231     Results from last 7 days   Lab Units 12/10/19  0826 19  1316   SODIUM mmol/L 139 138   POTASSIUM mmol/L 3.3* 3.6   CHLORIDE mmol/L 103 101   CO2 mmol/L 22.0 21.0*   BUN mg/dL 37* 39*   CREATININE mg/dL 1.88* 1.82*   GLUCOSE mg/dL 127* 112*   CALCIUM mg/dL 9.2 9.6   ALT (SGPT) U/L  --  10   AST (SGOT) U/L  --  18   PROBNP pg/mL  --  9,920.0*     Estimated Creatinine Clearance: 21.1 mL/min (A) (by C-G formula based on SCr of 1.88 mg/dL (H)).    Microbiology Results Abnormal     None          Imaging Results (Last 24 Hours)     Procedure  Component Value Units Date/Time    FL Video Swallow With Speech [[id number]] Collected:  12/09/19 1520     Updated:  12/09/19 1635    Narrative:       EXAMINATION: FL VIDEO SWALLOW WITH SPEECH-, FL LIMITED UGI FOR MBS  REFLUX-12/09/2019:     INDICATION: Dysphagia; R13.10-Dysphagia, unspecified.     TECHNIQUE: 50 seconds of fluoroscopic time was used for this exam.  Two  associated images were saved. The patient was evaluated in the seated  lateral position while taking a variety of consistencies of barium by  mouth under the direction of speech pathology.     COMPARISON: NONE.     FINDINGS: There was no penetration and no aspiration with any of the  media ingested. A limited view of the esophagus with the patient in the  seated lateral position demonstrated no signs of a high-grade focal  esophageal stricture. There was a moderate sized hiatal hernia, with  mild gastroesophageal reflux to the level of the mid esophagus.       Impression:       Fluoroscopy provided for a modified barium swallow. Please  see speech therapy report for full details and recommendations. Limited  upper GI series demonstrated mild gastroesophageal reflux, and a  moderate sized hiatal hernia.     D:  12/09/2019  E:  12/09/2019              FL Limited Ugi For Mbs Reflux [[id number]] Collected:  12/09/19 1520     Updated:  12/09/19 1635    Narrative:       EXAMINATION: FL VIDEO SWALLOW WITH SPEECH-, FL LIMITED UGI FOR MBS  REFLUX-12/09/2019:     INDICATION: Dysphagia; R13.10-Dysphagia, unspecified.     TECHNIQUE: 50 seconds of fluoroscopic time was used for this exam.  Two  associated images were saved. The patient was evaluated in the seated  lateral position while taking a variety of consistencies of barium by  mouth under the direction of speech pathology.     COMPARISON: NONE.     FINDINGS: There was no penetration and no aspiration with any of the  media ingested. A limited view of the esophagus with the patient in the  seated lateral  position demonstrated no signs of a high-grade focal  esophageal stricture. There was a moderate sized hiatal hernia, with  mild gastroesophageal reflux to the level of the mid esophagus.       Impression:       Fluoroscopy provided for a modified barium swallow. Please  see speech therapy report for full details and recommendations. Limited  upper GI series demonstrated mild gastroesophageal reflux, and a  moderate sized hiatal hernia.     D:  12/09/2019  E:  12/09/2019              XR Chest 1 View [[id number]] Collected:  12/09/19 1606     Updated:  12/09/19 1633    Narrative:       EXAMINATION: XR CHEST 1 VW- 12/09/2019     INDICATION: volume overload      COMPARISON: 01/21/2019     FINDINGS: Status post median sternotomy and CABG with cardiac silhouette  enlarged and increased pulmonary vascularity without effusion. No  pneumothorax.           Impression:       Increased bony vascularity of interstitial edema pattern  without evidence of pleural effusion to suggest decompensation.     D:  12/09/2019  E:  12/09/2019     This report was finalized on 12/9/2019 4:30 PM by Dr. Vasile Echols.             Results for orders placed during the hospital encounter of 10/13/19   Adult Transthoracic Echo Complete W/ Cont if Necessary Per Protocol    Narrative · The left atrium is enlarged.  · Global and segmental LV wall motion is normal. The estimated LV ejection   fraction is >70%.  · The aortic valve appears to be mildly sclerotic. AS and AI are absent.  · There is mild-moderate mitral regurgitation.  · There is moderate tricuspid regurgitation with a moderate elevation is   estimated PA pressure (45-55 mmHg)  · There are no other important findings on this study.          I have reviewed the medications:  Scheduled Meds:  apixaban 2.5 mg Oral Q12H   atorvastatin 40 mg Oral Daily   bisoprolol 7.5 mg Oral BID   furosemide 40 mg Intravenous Q12H   isosorbide mononitrate 120 mg Oral Daily   levothyroxine 75 mcg Oral Q AM    metOLazone 5 mg Oral Once   pantoprazole 40 mg Oral Daily   sodium chloride 10 mL Intravenous Q12H     Continuous Infusions:   PRN Meds:.•  acetaminophen **OR** acetaminophen **OR** acetaminophen  •  ondansetron  •  potassium chloride **OR** potassium chloride **OR** potassium chloride  •  sodium chloride      Assessment/Plan   Assessment / Plan     Active Hospital Problems    Diagnosis  POA   • Volume overload [E87.70]  Yes   • Interstitial lung disease (CMS/HCC) [J84.9]  Yes   • Pulmonary arterial hypertension (CMS/HCC) [I27.21]  Yes   • Dyspnea on exertion [R06.09]  Yes   • Cerebrovascular accident (CVA) due to embolism (CMS/HCC) [I63.9]  Yes   • Persistent atrial fibrillation [I48.19]  Yes   • Coronary artery disease involving native coronary artery of native heart without angina pectoris [I25.10]  Yes   • Essential hypertension [I10]  Yes      Resolved Hospital Problems   No resolved problems to display.        Brief Hospital Course to date:  Pat Morel is 79yoF with history of pulmonary HTN followed by pulmonary (Dr Laura), CVA, GI bleed, CKD III, HTN, CAD, Afib on anticoagulation who presents as a direct admission from pulmonary clinic with worsening volume overload, dyspnea on exertion, weight gain and LE edema. Admitted for IV diuresis.         Volume overload, likely multifactorial  Pulmonary HTN  ---chart reviewed of patient's history of Group 3 Pulm HTN/HSN Pneumonitis followed by pulmonary and recently off prednisone and cellcept. Cellcept can cause edema per pulmonary, but likely this episode also exacerbated by changing diuretic from lasix 120 mg qday to toresmide 20mg qday. Last ECHO reviewed from October 2019- normal EF but elevated RVSP c/w pulmonary HTN.  ----strict I/Os, daily weights, continue IV diuresis, appreciate cardiology assistance with diuresis and care    Hypokalemia  ---replace per protocol, monitor        Reported history of CKD III  ---Cr stable around 1.8 today, monitor  closely in light of diuresis   ---avoid nephrotoxic agents, BMP in AM     History of GIB  ---continue PPI     pAF  ----continue eliquis, Bblocker     Hypothyroidism  ---TSH normal, continue synthroid        DVT Prophylaxis:  eliquis   Disposition: I expect the patient to be discharged pending further improvement    CODE STATUS:   Code Status and Medical Interventions:   Ordered at: 12/09/19 1307     Level Of Support Discussed With:    Patient     Code Status:    CPR     Medical Interventions (Level of Support Prior to Arrest):    Full         Electronically signed by Nicole Hihg MD, 12/10/19, 12:09 PM.

## 2019-12-11 LAB
ANION GAP SERPL CALCULATED.3IONS-SCNC: 13 MMOL/L (ref 5–15)
BUN BLD-MCNC: 37 MG/DL (ref 8–23)
BUN/CREAT SERPL: 19.6 (ref 7–25)
CALCIUM SPEC-SCNC: 9.3 MG/DL (ref 8.6–10.5)
CHLORIDE SERPL-SCNC: 99 MMOL/L (ref 98–107)
CO2 SERPL-SCNC: 26 MMOL/L (ref 22–29)
CREAT BLD-MCNC: 1.89 MG/DL (ref 0.57–1)
DEPRECATED RDW RBC AUTO: 66.1 FL (ref 37–54)
ERYTHROCYTE [DISTWIDTH] IN BLOOD BY AUTOMATED COUNT: 19.5 % (ref 12.3–15.4)
GFR SERPL CREATININE-BSD FRML MDRD: 26 ML/MIN/1.73
GLUCOSE BLD-MCNC: 96 MG/DL (ref 65–99)
HCT VFR BLD AUTO: 25.8 % (ref 34–46.6)
HGB BLD-MCNC: 7.8 G/DL (ref 12–15.9)
MAGNESIUM SERPL-MCNC: 2 MG/DL (ref 1.6–2.4)
MCH RBC QN AUTO: 27.9 PG (ref 26.6–33)
MCHC RBC AUTO-ENTMCNC: 30.2 G/DL (ref 31.5–35.7)
MCV RBC AUTO: 92.1 FL (ref 79–97)
PLATELET # BLD AUTO: 218 10*3/MM3 (ref 140–450)
PMV BLD AUTO: 9.3 FL (ref 6–12)
POTASSIUM BLD-SCNC: 3.3 MMOL/L (ref 3.5–5.2)
RBC # BLD AUTO: 2.8 10*6/MM3 (ref 3.77–5.28)
SODIUM BLD-SCNC: 138 MMOL/L (ref 136–145)
WBC NRBC COR # BLD: 4.8 10*3/MM3 (ref 3.4–10.8)

## 2019-12-11 PROCEDURE — 25010000002 FUROSEMIDE PER 20 MG: Performed by: INTERNAL MEDICINE

## 2019-12-11 PROCEDURE — 99232 SBSQ HOSP IP/OBS MODERATE 35: CPT | Performed by: INTERNAL MEDICINE

## 2019-12-11 PROCEDURE — 85027 COMPLETE CBC AUTOMATED: CPT | Performed by: NURSE PRACTITIONER

## 2019-12-11 PROCEDURE — 83735 ASSAY OF MAGNESIUM: CPT | Performed by: NURSE PRACTITIONER

## 2019-12-11 PROCEDURE — 93010 ELECTROCARDIOGRAM REPORT: CPT | Performed by: INTERNAL MEDICINE

## 2019-12-11 PROCEDURE — 93005 ELECTROCARDIOGRAM TRACING: CPT | Performed by: NURSE PRACTITIONER

## 2019-12-11 PROCEDURE — 25010000002 FUROSEMIDE PER 20 MG: Performed by: NURSE PRACTITIONER

## 2019-12-11 PROCEDURE — 80048 BASIC METABOLIC PNL TOTAL CA: CPT | Performed by: NURSE PRACTITIONER

## 2019-12-11 RX ORDER — FUROSEMIDE 10 MG/ML
40 INJECTION INTRAMUSCULAR; INTRAVENOUS ONCE
Status: COMPLETED | OUTPATIENT
Start: 2019-12-11 | End: 2019-12-11

## 2019-12-11 RX ORDER — BUMETANIDE 2 MG/1
2 TABLET ORAL 2 TIMES DAILY
Status: DISCONTINUED | OUTPATIENT
Start: 2019-12-12 | End: 2019-12-12 | Stop reason: HOSPADM

## 2019-12-11 RX ORDER — POTASSIUM CHLORIDE 750 MG/1
40 CAPSULE, EXTENDED RELEASE ORAL ONCE
Status: COMPLETED | OUTPATIENT
Start: 2019-12-11 | End: 2019-12-11

## 2019-12-11 RX ADMIN — BISOPROLOL FUMARATE 7.5 MG: 5 TABLET ORAL at 08:51

## 2019-12-11 RX ADMIN — POTASSIUM CHLORIDE 10 MEQ: 750 CAPSULE, EXTENDED RELEASE ORAL at 08:50

## 2019-12-11 RX ADMIN — APIXABAN 2.5 MG: 2.5 TABLET, FILM COATED ORAL at 08:52

## 2019-12-11 RX ADMIN — SODIUM CHLORIDE, PRESERVATIVE FREE 10 ML: 5 INJECTION INTRAVENOUS at 22:50

## 2019-12-11 RX ADMIN — POTASSIUM CHLORIDE 40 MEQ: 750 CAPSULE, EXTENDED RELEASE ORAL at 18:42

## 2019-12-11 RX ADMIN — FUROSEMIDE 40 MG: 10 INJECTION, SOLUTION INTRAMUSCULAR; INTRAVENOUS at 18:41

## 2019-12-11 RX ADMIN — FUROSEMIDE 40 MG: 10 INJECTION, SOLUTION INTRAMUSCULAR; INTRAVENOUS at 08:52

## 2019-12-11 RX ADMIN — ISOSORBIDE MONONITRATE 120 MG: 60 TABLET, EXTENDED RELEASE ORAL at 10:22

## 2019-12-11 RX ADMIN — PANTOPRAZOLE SODIUM 40 MG: 40 TABLET, DELAYED RELEASE ORAL at 08:52

## 2019-12-11 RX ADMIN — SODIUM CHLORIDE, PRESERVATIVE FREE 10 ML: 5 INJECTION INTRAVENOUS at 08:53

## 2019-12-11 RX ADMIN — SODIUM CHLORIDE, PRESERVATIVE FREE 10 ML: 5 INJECTION INTRAVENOUS at 18:45

## 2019-12-11 RX ADMIN — BISOPROLOL FUMARATE 7.5 MG: 5 TABLET ORAL at 22:49

## 2019-12-11 RX ADMIN — LEVOTHYROXINE SODIUM 75 MCG: 75 TABLET ORAL at 06:19

## 2019-12-11 RX ADMIN — ATORVASTATIN CALCIUM 40 MG: 40 TABLET, FILM COATED ORAL at 08:52

## 2019-12-11 RX ADMIN — APIXABAN 2.5 MG: 2.5 TABLET, FILM COATED ORAL at 22:49

## 2019-12-11 RX ADMIN — POTASSIUM CHLORIDE 10 MEQ: 750 CAPSULE, EXTENDED RELEASE ORAL at 18:43

## 2019-12-11 NOTE — PLAN OF CARE
Problem: Patient Care Overview  Goal: Plan of Care Review  Outcome: Ongoing (interventions implemented as appropriate)  Flowsheets  Taken 12/11/2019 0325  Progress: improving  Outcome Summary: Pt has been resting this shift. No complaints at this time. Getting up and walking well to the bathroom. VSS. Will continue to monitor. 320 12/11/19  Taken 12/10/2019 2000  Plan of Care Reviewed With: patient

## 2019-12-11 NOTE — PROGRESS NOTES
"[facility] at [facility] Progress Note     LOS: 2 days   Patient Care Team:  Anthony Sweet DO as PCP - General (Family Medicine)  Tien Archer MD as Consulting Physician (Cardiac Electrophysiology)  Xu Block MD (Cardiology)  PCP:  Anthony Sweet DO    Chief Complaint: Congestive heart failure     SUBJECTIVE: Decreasing shortness of breath.  Responding well to diuresis, decreasing lower extremity edema.      Review of Systems:   All systems have been reviewed and are negative with the exception of those mentioned above.      OBJECTIVE:    Vital Sign Min/Max for last 24 hours  Temp  Min: 97.6 °F (36.4 °C)  Max: 98 °F (36.7 °C)   BP  Min: 111/72  Max: 142/84   Pulse  Min: 63  Max: 91   Resp  Min: 16  Max: 18   SpO2  Min: 97 %  Max: 99 %   No data recorded   Weight  Min: 64 kg (141 lb)  Max: 64 kg (141 lb)     Flowsheet Rows      First Filed Value   Admission Height  147.3 cm (58\") Documented at 12/09/2019 1244   Admission Weight  64.9 kg (143 lb 3 oz) Documented at 12/09/2019 1244            Intake/Output Summary (Last 24 hours) at 12/11/2019 1608  Last data filed at 12/11/2019 1345  Gross per 24 hour   Intake 440 ml   Output 2900 ml   Net -2460 ml     Intake & Output (last 3 days)       12/08 0701 - 12/09 0700 12/09 0701 - 12/10 0700 12/10 0701 - 12/11 0700 12/11 0701 - 12/12 0700    P.O.   320 440    Total Intake(mL/kg)   320 (5) 440 (6.9)    Urine (mL/kg/hr)  1000 2500 (1.6) 900 (1.5)    Stool    0    Total Output  1000 2500 900    Net  -1000 -2180 -460            Stool Unmeasured Occurrence    1 x           Physical Exam:    General Appearance:    Alert, cooperative, no distress, appears stated age   Neck:   Supple, symmetrical, trachea midline.   Lungs:     Clear to auscultation bilaterally, respirations unlabored   Chest Wall:    No tenderness or deformity    Heart:   Irregular regular, S1 and S2 normal, no murmur, rub   or gallop, normal carotid impulse bilaterally " without bruit.   Extremities:   Extremities normal, atraumatic, no cyanosis, 1+ bilateral pretibial edema   Pulses:   2+ and symmetric all extremities   Skin:   Skin color, texture, turgor normal, no rashes or lesions      LABS/DIAGNOSTIC DATA:  Results from last 7 days   Lab Units 12/11/19  0621 12/10/19  0826 12/09/19  1316   WBC 10*3/mm3 4.80 4.58 5.61   HEMOGLOBIN g/dL 7.8* 8.0* 8.6*   HEMATOCRIT % 25.8* 26.6* 29.2*   PLATELETS 10*3/mm3 218 211 231     No results found for: TROPONINT      Results from last 7 days   Lab Units 12/11/19  0621 12/10/19  0826 12/09/19  1316   SODIUM mmol/L 138 139 138   POTASSIUM mmol/L 3.3* 3.3* 3.6   CHLORIDE mmol/L 99 103 101   CO2 mmol/L 26.0 22.0 21.0*   BUN mg/dL 37* 37* 39*   CREATININE mg/dL 1.89* 1.88* 1.82*   CALCIUM mg/dL 9.3 9.2 9.6   BILIRUBIN mg/dL  --   --  1.0   ALK PHOS U/L  --   --  55   ALT (SGPT) U/L  --   --  10   AST (SGOT) U/L  --   --  18   GLUCOSE mg/dL 96 127* 112*     Results from last 7 days   Lab Units 12/09/19  1316   HEMOGLOBIN A1C % 5.60         Results from last 7 days   Lab Units 12/09/19  1316   TSH uIU/mL 2.610   FREE T4 ng/dL 1.86*           Medication Review:     apixaban 2.5 mg Oral Q12H   atorvastatin 40 mg Oral Daily   bisoprolol 7.5 mg Oral BID   [START ON 12/12/2019] bumetanide 2 mg Oral BID   furosemide 40 mg Intravenous Once   isosorbide mononitrate 120 mg Oral Daily   levothyroxine 75 mcg Oral Q AM   pantoprazole 40 mg Oral Daily   pharmacy consult - MTM  Does not apply Daily   potassium chloride 10 mEq Oral BID With Meals   sodium chloride 10 mL Intravenous Q12H             ASSESSMENT/PLAN:    Persistent atrial fibrillation    Coronary artery disease involving native coronary artery of native heart without angina pectoris    Essential hypertension    Cerebrovascular accident (CVA) due to embolism (CMS/HCC)    Dyspnea on exertion    Pulmonary arterial hypertension (CMS/HCC)    Interstitial lung disease (CMS/HCC)    Volume  overload      Heart failure with preserved ejection fraction, acute on chronic exacerbation: Responding well to diuresis.  Transitioning to oral Bumex tomorrow.  Anticipate that she will be stable for discharge tomorrow, has initial follow-up with her new primary cardiologist, on December 20.          Bebo Samuels III, MD   12/11/19  4:08 PM

## 2019-12-11 NOTE — PROGRESS NOTES
[facility]  PROGRESS NOTE    Patient Name: Pat Morel  : 1940  MRN: [id number]    Date of Admission: 2019  Primary Care Physician: Anthony Sweet DO    Subjective   Subjective     CC:  Volume overload, AoC CHF exac    HPI:  Doing well. Reports swelling improved. Wondering if she will have to go home on oxygen but reports breathing is improved. Will try to wean oxygen.     Review of Systems  Gen- No fevers, chills  CV- No chest pain, palpitations  Resp- No cough, dyspnea  GI- No N/V/D, abd pain        Objective   Objective     Vital Signs:   Temp:  [97.6 °F (36.4 °C)-98 °F (36.7 °C)] 97.9 °F (36.6 °C)  Heart Rate:  [63-91] 63  Resp:  [16-18] 16  BP: (111-142)/(70-89) 111/72        Physical Exam:  GEN- no acute distress noted, resting in bed, awake  HEENT- atraumatic, normocephlic, eomi  NECK- supple, trachea midline, no masses  RESP: ctab, normal effort, on 2 %   CV: no murmurs, s1/s2, rrr  MSK: 2+ pitting edema noted of b/l LE, spontaneous movement of all extremities  NEURO: alert, oriented, no focal deficits  SKIN: no rashes  PSYCH: appropriate mood and affect       Results Reviewed:    Results from last 7 days   Lab Units 19  0621 12/10/19  0819  1316   WBC 10*3/mm3 4.80 4.58 5.61   HEMOGLOBIN g/dL 7.8* 8.0* 8.6*   HEMATOCRIT % 25.8* 26.6* 29.2*   PLATELETS 10*3/mm3 218 211 231     Results from last 7 days   Lab Units 19  0621 12/10/19  0819  1316   SODIUM mmol/L 138 139 138   POTASSIUM mmol/L 3.3* 3.3* 3.6   CHLORIDE mmol/L 99 103 101   CO2 mmol/L 26.0 22.0 21.0*   BUN mg/dL 37* 37* 39*   CREATININE mg/dL 1.89* 1.88* 1.82*   GLUCOSE mg/dL 96 127* 112*   CALCIUM mg/dL 9.3 9.2 9.6   ALT (SGPT) U/L  --   --  10   AST (SGOT) U/L  --   --  18   PROBNP pg/mL  --   --  9,920.0*     Estimated Creatinine Clearance: 20.9 mL/min (A) (by C-G formula based on SCr of 1.89 mg/dL (H)).    Microbiology Results Abnormal     None           Imaging Results (Last 24 Hours)     ** No results found for the last 24 hours. **          Results for orders placed during the hospital encounter of 10/13/19   Adult Transthoracic Echo Complete W/ Cont if Necessary Per Protocol    Narrative · The left atrium is enlarged.  · Global and segmental LV wall motion is normal. The estimated LV ejection   fraction is >70%.  · The aortic valve appears to be mildly sclerotic. AS and AI are absent.  · There is mild-moderate mitral regurgitation.  · There is moderate tricuspid regurgitation with a moderate elevation is   estimated PA pressure (45-55 mmHg)  · There are no other important findings on this study.          I have reviewed the medications:  Scheduled Meds:    apixaban 2.5 mg Oral Q12H   atorvastatin 40 mg Oral Daily   bisoprolol 7.5 mg Oral BID   furosemide 40 mg Intravenous Q12H   isosorbide mononitrate 120 mg Oral Daily   levothyroxine 75 mcg Oral Q AM   pantoprazole 40 mg Oral Daily   pharmacy consult - MTM  Does not apply Daily   potassium chloride 10 mEq Oral BID With Meals   sodium chloride 10 mL Intravenous Q12H     Continuous Infusions:   PRN Meds:.•  acetaminophen **OR** acetaminophen **OR** acetaminophen  •  ondansetron  •  potassium chloride **OR** potassium chloride **OR** potassium chloride  •  sodium chloride      Assessment/Plan   Assessment / Plan     Active Hospital Problems    Diagnosis  POA   • Volume overload [E87.70]  Yes   • Interstitial lung disease (CMS/HCC) [J84.9]  Yes   • Pulmonary arterial hypertension (CMS/HCC) [I27.21]  Yes   • Dyspnea on exertion [R06.09]  Yes   • Cerebrovascular accident (CVA) due to embolism (CMS/HCC) [I63.9]  Yes   • Persistent atrial fibrillation [I48.19]  Yes   • Coronary artery disease involving native coronary artery of native heart without angina pectoris [I25.10]  Yes   • Essential hypertension [I10]  Yes      Resolved Hospital Problems   No resolved problems to display.        Brief Hospital Course  to date:  Pat Morel is 79yoF with history of pulmonary HTN followed by pulmonary (Dr Laura), CVA, GI bleed, CKD III, HTN, CAD, Afib on anticoagulation who presents as a direct admission from pulmonary clinic with worsening volume overload, dyspnea on exertion, weight gain and LE edema. Admitted for IV diuresis.         Volume overload, likely multifactorial  Pulmonary HTN  ---chart reviewed of patient's history of Group 3 Pulm HTN/HSN Pneumonitis followed by pulmonary and recently off prednisone and cellcept. Cellcept can cause edema per pulmonary, but likely this episode also exacerbated by changing diuretic from lasix 120 mg qday to toresmide 20mg qday. Last ECHO reviewed from October 2019- normal EF but elevated RVSP c/w pulmonary HTN.  ----strict I/Os, daily weights, continue IV diuresis, appreciate cardiology assistance with diuresis and care    Hypokalemia  ---replace per protocol, monitor        Reported history of CKD III  ---Cr stable around 1.8 today, monitor closely in light of diuresis   ---avoid nephrotoxic agents, BMP in AM     History of GIB  ---continue PPI     pAF  ----continue eliquis, Bblocker     Hypothyroidism  ---TSH normal, continue synthroid        DVT Prophylaxis:  eliquis   Disposition: I expect the patient to be discharged pending further improvement    CODE STATUS:   Code Status and Medical Interventions:   Ordered at: 12/09/19 1307     Level Of Support Discussed With:    Patient     Code Status:    CPR     Medical Interventions (Level of Support Prior to Arrest):    Full         Electronically signed by Nicole High MD, 12/11/19, 1:37 PM.

## 2019-12-12 VITALS
TEMPERATURE: 97.8 F | DIASTOLIC BLOOD PRESSURE: 68 MMHG | HEART RATE: 91 BPM | BODY MASS INDEX: 29.01 KG/M2 | RESPIRATION RATE: 18 BRPM | OXYGEN SATURATION: 94 % | SYSTOLIC BLOOD PRESSURE: 124 MMHG | WEIGHT: 138.2 LBS | HEIGHT: 58 IN

## 2019-12-12 LAB
ANION GAP SERPL CALCULATED.3IONS-SCNC: 14 MMOL/L (ref 5–15)
BASOPHILS # BLD AUTO: 0.05 10*3/MM3 (ref 0–0.2)
BASOPHILS NFR BLD AUTO: 1.1 % (ref 0–1.5)
BUN BLD-MCNC: 41 MG/DL (ref 8–23)
BUN/CREAT SERPL: 21.9 (ref 7–25)
CALCIUM SPEC-SCNC: 9.2 MG/DL (ref 8.6–10.5)
CHLORIDE SERPL-SCNC: 98 MMOL/L (ref 98–107)
CO2 SERPL-SCNC: 26 MMOL/L (ref 22–29)
CREAT BLD-MCNC: 1.87 MG/DL (ref 0.57–1)
DEPRECATED RDW RBC AUTO: 67.1 FL (ref 37–54)
EOSINOPHIL # BLD AUTO: 0.27 10*3/MM3 (ref 0–0.4)
EOSINOPHIL NFR BLD AUTO: 5.8 % (ref 0.3–6.2)
ERYTHROCYTE [DISTWIDTH] IN BLOOD BY AUTOMATED COUNT: 19.5 % (ref 12.3–15.4)
GFR SERPL CREATININE-BSD FRML MDRD: 26 ML/MIN/1.73
GLUCOSE BLD-MCNC: 94 MG/DL (ref 65–99)
HCT VFR BLD AUTO: 27.9 % (ref 34–46.6)
HGB BLD-MCNC: 8.2 G/DL (ref 12–15.9)
IMM GRANULOCYTES # BLD AUTO: 0.01 10*3/MM3 (ref 0–0.05)
IMM GRANULOCYTES NFR BLD AUTO: 0.2 % (ref 0–0.5)
LYMPHOCYTES # BLD AUTO: 0.81 10*3/MM3 (ref 0.7–3.1)
LYMPHOCYTES NFR BLD AUTO: 17.3 % (ref 19.6–45.3)
MCH RBC QN AUTO: 27.3 PG (ref 26.6–33)
MCHC RBC AUTO-ENTMCNC: 29.4 G/DL (ref 31.5–35.7)
MCV RBC AUTO: 93 FL (ref 79–97)
MONOCYTES # BLD AUTO: 0.47 10*3/MM3 (ref 0.1–0.9)
MONOCYTES NFR BLD AUTO: 10 % (ref 5–12)
NEUTROPHILS # BLD AUTO: 3.08 10*3/MM3 (ref 1.7–7)
NEUTROPHILS NFR BLD AUTO: 65.6 % (ref 42.7–76)
NRBC BLD AUTO-RTO: 0 /100 WBC (ref 0–0.2)
PLATELET # BLD AUTO: 227 10*3/MM3 (ref 140–450)
PMV BLD AUTO: 9.4 FL (ref 6–12)
POTASSIUM BLD-SCNC: 3.8 MMOL/L (ref 3.5–5.2)
RBC # BLD AUTO: 3 10*6/MM3 (ref 3.77–5.28)
SODIUM BLD-SCNC: 138 MMOL/L (ref 136–145)
WBC NRBC COR # BLD: 4.69 10*3/MM3 (ref 3.4–10.8)

## 2019-12-12 PROCEDURE — 80048 BASIC METABOLIC PNL TOTAL CA: CPT | Performed by: INTERNAL MEDICINE

## 2019-12-12 PROCEDURE — 99232 SBSQ HOSP IP/OBS MODERATE 35: CPT | Performed by: PHYSICIAN ASSISTANT

## 2019-12-12 PROCEDURE — 85025 COMPLETE CBC W/AUTO DIFF WBC: CPT | Performed by: INTERNAL MEDICINE

## 2019-12-12 PROCEDURE — 99239 HOSP IP/OBS DSCHRG MGMT >30: CPT | Performed by: INTERNAL MEDICINE

## 2019-12-12 RX ORDER — BUMETANIDE 2 MG/1
2 TABLET ORAL 2 TIMES DAILY
Qty: 60 TABLET | Refills: 3 | Status: SHIPPED | OUTPATIENT
Start: 2019-12-12 | End: 2019-12-13

## 2019-12-12 RX ADMIN — LEVOTHYROXINE SODIUM 75 MCG: 75 TABLET ORAL at 06:35

## 2019-12-12 RX ADMIN — POTASSIUM CHLORIDE 10 MEQ: 750 CAPSULE, EXTENDED RELEASE ORAL at 08:58

## 2019-12-12 RX ADMIN — SODIUM CHLORIDE, PRESERVATIVE FREE 10 ML: 5 INJECTION INTRAVENOUS at 08:59

## 2019-12-12 RX ADMIN — ISOSORBIDE MONONITRATE 120 MG: 60 TABLET, EXTENDED RELEASE ORAL at 08:58

## 2019-12-12 RX ADMIN — APIXABAN 2.5 MG: 2.5 TABLET, FILM COATED ORAL at 08:59

## 2019-12-12 RX ADMIN — BISOPROLOL FUMARATE 7.5 MG: 5 TABLET ORAL at 08:58

## 2019-12-12 RX ADMIN — PANTOPRAZOLE SODIUM 40 MG: 40 TABLET, DELAYED RELEASE ORAL at 08:58

## 2019-12-12 RX ADMIN — BUMETANIDE 2 MG: 2 TABLET ORAL at 08:58

## 2019-12-12 RX ADMIN — ATORVASTATIN CALCIUM 40 MG: 40 TABLET, FILM COATED ORAL at 08:58

## 2019-12-12 NOTE — NURSING NOTE
Patient refuses appointment to be [facility]. States she has enough doctors and will f/u with her cardiologist and PCP as scheduled.

## 2019-12-12 NOTE — PROGRESS NOTES
"[facility] at [facility] Progress Note     LOS: 3 days   Patient Care Team:  Anthony Sweet DO as PCP - General (Family Medicine)  Tien Archer MD as Consulting Physician (Cardiac Electrophysiology)  Xu Block MD (Cardiology)  PCP:  Anthony Sweet DO    Chief Complaint: Following for Congestive heart failure     SUBJECTIVE: Feeling much better.  Responding well to diuresis, decreasing lower extremity edema.  Ready to go home but worried about the edema worsening.  Asking about diet at home.      Review of Systems:   All systems have been reviewed and are negative with the exception of those mentioned above.      OBJECTIVE:    Vital Sign Min/Max for last 24 hours  Temp  Min: 97.6 °F (36.4 °C)  Max: 98 °F (36.7 °C)   BP  Min: 110/66  Max: 141/96   Pulse  Min: 61  Max: 116   Resp  Min: 16  Max: 18   SpO2  Min: 89 %  Max: 100 %   No data recorded   Weight  Min: 62.7 kg (138 lb 3.2 oz)  Max: 62.7 kg (138 lb 3.2 oz)     Flowsheet Rows      First Filed Value   Admission Height  147.3 cm (58\") Documented at 12/09/2019 1244   Admission Weight  64.9 kg (143 lb 3 oz) Documented at 12/09/2019 1244            Intake/Output Summary (Last 24 hours) at 12/12/2019 1127  Last data filed at 12/12/2019 1100  Gross per 24 hour   Intake 440 ml   Output 2300 ml   Net -1860 ml     Intake & Output (last 3 days)       12/09 0701 - 12/10 0700 12/10 0701 - 12/11 0700 12/11 0701 - 12/12 0700 12/12 0701 - 12/13 0700    P.O.  320 440 240    Total Intake(mL/kg)  320 (5) 440 (7) 240 (3.8)    Urine (mL/kg/hr) 1000 2500 (1.6) 2400 (1.6) 200 (0.7)    Stool   0     Total Output 1000 2500 2400 200    Net -1000 -2180 -1960 +40            Stool Unmeasured Occurrence   1 x            Physical Exam:    General Appearance:    Alert, cooperative, no distress, appears stated age   Neck:   Supple, symmetrical, trachea midline.   Lungs:     Clear to auscultation bilaterally, respirations unlabored   Chest Wall:    No " tenderness or deformity    Heart:   Irregular regular, S1 and S2 normal, no murmur, rub   or gallop, normal carotid impulse bilaterally without bruit.   Extremities:   Extremities normal, atraumatic, no cyanosis, 1+ bilateral pretibial edema   Pulses:   2+ and symmetric all extremities   Skin:   Skin color, texture, turgor normal, no rashes or lesions      LABS/DIAGNOSTIC DATA:  Results from last 7 days   Lab Units 12/12/19  0425 12/11/19  0621 12/10/19  0826   WBC 10*3/mm3 4.69 4.80 4.58   HEMOGLOBIN g/dL 8.2* 7.8* 8.0*   HEMATOCRIT % 27.9* 25.8* 26.6*   PLATELETS 10*3/mm3 227 218 211     No results found for: TROPONINT      Results from last 7 days   Lab Units 12/12/19  0425 12/11/19  0621 12/10/19  0826 12/09/19  1316   SODIUM mmol/L 138 138 139 138   POTASSIUM mmol/L 3.8 3.3* 3.3* 3.6   CHLORIDE mmol/L 98 99 103 101   CO2 mmol/L 26.0 26.0 22.0 21.0*   BUN mg/dL 41* 37* 37* 39*   CREATININE mg/dL 1.87* 1.89* 1.88* 1.82*   CALCIUM mg/dL 9.2 9.3 9.2 9.6   BILIRUBIN mg/dL  --   --   --  1.0   ALK PHOS U/L  --   --   --  55   ALT (SGPT) U/L  --   --   --  10   AST (SGOT) U/L  --   --   --  18   GLUCOSE mg/dL 94 96 127* 112*     Results from last 7 days   Lab Units 12/09/19  1316   HEMOGLOBIN A1C % 5.60         Results from last 7 days   Lab Units 12/09/19  1316   TSH uIU/mL 2.610   FREE T4 ng/dL 1.86*           Medication Review:     apixaban 2.5 mg Oral Q12H   atorvastatin 40 mg Oral Daily   bisoprolol 7.5 mg Oral BID   bumetanide 2 mg Oral BID   isosorbide mononitrate 120 mg Oral Daily   levothyroxine 75 mcg Oral Q AM   pantoprazole 40 mg Oral Daily   pharmacy consult - MTM  Does not apply Daily   potassium chloride 10 mEq Oral BID With Meals   sodium chloride 10 mL Intravenous Q12H             ASSESSMENT/PLAN:    Persistent atrial fibrillation    Coronary artery disease involving native coronary artery of native heart without angina pectoris    Essential hypertension    Cerebrovascular accident (CVA) due to  embolism (CMS/HCC)    Dyspnea on exertion    Pulmonary arterial hypertension (CMS/HCC)    Interstitial lung disease (CMS/HCC)    Volume overload      Heart failure with preserved ejection fraction, acute on chronic exacerbation: Responding well to diuresis.  Transition to oral Bumex today.  Cr is stable.  Anemia has improved slightly.    Okay to discharge from cardiology standpoint today, has initial follow-up with her new primary cardiologist, on December 20 and should keep this. DC meds as noted above under Medication Review.  Discussed appropriate diet/elevation lower extremities/fluid monitoring.  Will provide more dietary info prior to DC.      SARAH Owens functioning independently.  12/12/19  11:27 AM   Electronically signed by SARAH Owens, 12/12/19, 11:33 AM.

## 2019-12-12 NOTE — PROGRESS NOTES
Case Management Discharge Note      Final Note: Spoke with pt at bedside. Pt plan remains to discharge home. Pt denies any HH or medical equipment needs. pt reports that she is worried about hospital bill. SW explained and provided resources such as medicaid spend down, and financial counseling. SW called financial counseling and they are going to mail pt a financial packet.          Destination      No service has been selected for the patient.      Durable Medical Equipment      No service has been selected for the patient.      Dialysis/Infusion      No service has been selected for the patient.      Home Medical Care      No service has been selected for the patient.      Therapy      No service has been selected for the patient.      Community Resources      No service has been selected for the patient.             Final Discharge Disposition Code: 01 - home or self-care

## 2019-12-12 NOTE — PLAN OF CARE
Problem: Fluid Volume Excess (Adult)  Goal: Identify Related Risk Factors and Signs and Symptoms  Outcome: Ongoing (interventions implemented as appropriate)  Flowsheets (Taken 12/12/2019 0605)  Related Risk Factors (Fluid Volume Excess): autoregulation impaired  Signs and Symptoms (Fluid Volume Excess): acute weight gain; edema; lab value changes

## 2019-12-12 NOTE — PLAN OF CARE
Problem: Fall Risk (Adult)  Goal: Absence of Fall  Outcome: Ongoing (interventions implemented as appropriate)  Flowsheets (Taken 12/12/2019 0606)  Absence of Fall: making progress toward outcome

## 2019-12-12 NOTE — DISCHARGE SUMMARY
[facility] Medicine Services  DISCHARGE SUMMARY    Patient Name: Pat Morel  : 1940  MRN: [id number]    Date of Admission: 2019 11:49 AM  Date of Discharge:  2019  Primary Care Physician: Anthony Sweet DO    Consults     Date and Time Order Name Status Description    12/10/2019 0738 Inpatient Cardiology Consult Completed           Hospital Course     Presenting Problem:   CHF (congestive heart failure) (CMS/HCC) [I50.9]  Volume overload [E87.70]    Active Hospital Problems    Diagnosis  POA   • Volume overload [E87.70]  Yes   • Interstitial lung disease (CMS/HCC) [J84.9]  Yes   • Pulmonary arterial hypertension (CMS/HCC) [I27.21]  Yes   • Dyspnea on exertion [R06.09]  Yes   • Cerebrovascular accident (CVA) due to embolism (CMS/HCC) [I63.9]  Yes   • Persistent atrial fibrillation [I48.19]  Yes   • Coronary artery disease involving native coronary artery of native heart without angina pectoris [I25.10]  Yes   • Essential hypertension [I10]  Yes      Resolved Hospital Problems   No resolved problems to display.          Hospital Course:  Pat Morel is 79yoF with history of pulmonary HTN followed by pulmonary (Dr Laura), CVA, GI bleed, CKD III, HTN, CAD, Afib on anticoagulation who presents as a direct admission from pulmonary clinic with worsening volume overload, dyspnea on exertion, weight gain and LE edema. Admitted for IV diuresis.         Volume overload, likely multifactorial  Pulmonary HTN  ---chart reviewed of patient's history of Group 3 Pulm HTN/HSN Pneumonitis followed by pulmonary and recently off prednisone and cellcept. Cellcept can cause edema per pulmonary, but likely this episode also exacerbated by changing diuretic from lasix 120 mg qday to toresmide 20mg qday. Last ECHO reviewed from 2019- normal EF but elevated RVSP c/w pulmonary HTN.  ----patient was followed by cardiology during admission, was initially diuresed with IV bumex  with good response, transitioned to oral bumex 2mg BID at time of discharge   ----patient counseled that if gained 3lbs in 1 day or 5lbs in 3 days instructed to take extra 2mg tablet of bumex on those days        Reported history of CKD III  ---Cr stable around 1.8 throughout admission, will need close PCP follow up     History of GIB  ---continue PPI     pAF  ----continue eliquis, Bblocker     Hypothyroidism  ---TSH normal, continue synthroid       Discharge Follow Up Recommendations for labs/diagnostics:  Dr Sweet 12/23 as scheduled  Dr Higgins 12/20 as scheduled    Day of Discharge     HPI:   Doing well. Ambulated. Now on room air. Wants to go home. On Oral diuretics.     Review of Systems  Gen- No fevers, chills  CV- No chest pain, palpitations  Resp- No cough, dyspnea  GI- No N/V/D, abd pain      Otherwise ROS is negative except as mentioned in the HPI.    Vital Signs:   Temp:  [97.6 °F (36.4 °C)-98 °F (36.7 °C)] 97.8 °F (36.6 °C)  Heart Rate:  [] 80  Resp:  [16-18] 18  BP: (110-141)/(66-97) 124/68     Physical Exam:  GEN- no acute distress noted, resting in bed, awake  HEENT- atraumatic, normocephlic, eomi  NECK- supple, trachea midline, no masses  RESP: ctab, normal effort, on room air  CV: no murmurs, s1/s2, rrr  MSK: 2+ pitting edema noted of b/l LE, spontaneous movement of all extremities  NEURO: alert, oriented, no focal deficits  SKIN: no rashes  PSYCH: appropriate mood and affect          Pertinent  and/or Most Recent Results     Results from last 7 days   Lab Units 12/12/19  0425 12/11/19  0621 12/10/19  0826 12/09/19  1316   WBC 10*3/mm3 4.69 4.80 4.58 5.61   HEMOGLOBIN g/dL 8.2* 7.8* 8.0* 8.6*   HEMATOCRIT % 27.9* 25.8* 26.6* 29.2*   PLATELETS 10*3/mm3 227 218 211 231   SODIUM mmol/L 138 138 139 138   POTASSIUM mmol/L 3.8 3.3* 3.3* 3.6   CHLORIDE mmol/L 98 99 103 101   CO2 mmol/L 26.0 26.0 22.0 21.0*   BUN mg/dL 41* 37* 37* 39*   CREATININE mg/dL 1.87* 1.89* 1.88* 1.82*   GLUCOSE mg/dL 94 96 127*  112*   CALCIUM mg/dL 9.2 9.3 9.2 9.6     Results from last 7 days   Lab Units 12/09/19  1316   BILIRUBIN mg/dL 1.0   ALK PHOS U/L 55   ALT (SGPT) U/L 10   AST (SGOT) U/L 18           Invalid input(s): TG, LDLCALC, LDLREALC  Results from last 7 days   Lab Units 12/09/19  1316   TSH uIU/mL 2.610   HEMOGLOBIN A1C % 5.60   PROBNP pg/mL 9,920.0*       Brief Urine Lab Results     None          Microbiology Results Abnormal     None          Imaging Results (All)     Procedure Component Value Units Date/Time    FL Video Swallow With Speech [[id number]] Collected:  12/09/19 1520     Updated:  12/10/19 1320    Narrative:       EXAMINATION: FL VIDEO SWALLOW WITH SPEECH-, FL LIMITED UGI FOR MBS  REFLUX-12/09/2019:     INDICATION: Dysphagia; R13.10-Dysphagia, unspecified.     TECHNIQUE: 50 seconds of fluoroscopic time was used for this exam.  Two  associated images were saved. The patient was evaluated in the seated  lateral position while taking a variety of consistencies of barium by  mouth under the direction of speech pathology.     COMPARISON: NONE.     FINDINGS: There was no penetration and no aspiration with any of the  media ingested. A limited view of the esophagus with the patient in the  seated lateral position demonstrated no signs of a high-grade focal  esophageal stricture. There was a moderate sized hiatal hernia, with  mild gastroesophageal reflux to the level of the mid esophagus.       Impression:       Fluoroscopy provided for a modified barium swallow. Please  see speech therapy report for full details and recommendations. Limited  upper GI series demonstrated mild gastroesophageal reflux, and a  moderate sized hiatal hernia.     D:  12/09/2019  E:  12/09/2019            This report was finalized on 12/10/2019 1:16 PM by Dr. Franki Rutherford MD.       FL Limited Ugi For Mbs Reflux [[id number]] Collected:  12/09/19 1520     Updated:  12/10/19 1320    Narrative:       EXAMINATION: FL VIDEO SWALLOW WITH SPEECH-,  FL LIMITED UGI FOR MBS  REFLUX-12/09/2019:     INDICATION: Dysphagia; R13.10-Dysphagia, unspecified.     TECHNIQUE: 50 seconds of fluoroscopic time was used for this exam.  Two  associated images were saved. The patient was evaluated in the seated  lateral position while taking a variety of consistencies of barium by  mouth under the direction of speech pathology.     COMPARISON: NONE.     FINDINGS: There was no penetration and no aspiration with any of the  media ingested. A limited view of the esophagus with the patient in the  seated lateral position demonstrated no signs of a high-grade focal  esophageal stricture. There was a moderate sized hiatal hernia, with  mild gastroesophageal reflux to the level of the mid esophagus.       Impression:       Fluoroscopy provided for a modified barium swallow. Please  see speech therapy report for full details and recommendations. Limited  upper GI series demonstrated mild gastroesophageal reflux, and a  moderate sized hiatal hernia.     D:  12/09/2019  E:  12/09/2019            This report was finalized on 12/10/2019 1:16 PM by Dr. Franki Rutherford MD.       XR Chest 1 View [[id number]] Collected:  12/09/19 1606     Updated:  12/09/19 1633    Narrative:       EXAMINATION: XR CHEST 1 VW- 12/09/2019     INDICATION: volume overload      COMPARISON: 01/21/2019     FINDINGS: Status post median sternotomy and CABG with cardiac silhouette  enlarged and increased pulmonary vascularity without effusion. No  pneumothorax.           Impression:       Increased bony vascularity of interstitial edema pattern  without evidence of pleural effusion to suggest decompensation.     D:  12/09/2019  E:  12/09/2019     This report was finalized on 12/9/2019 4:30 PM by Dr. Vasile Echols.                       Results for orders placed during the hospital encounter of 10/13/19   Adult Transthoracic Echo Complete W/ Cont if Necessary Per Protocol    Narrative · The left atrium is enlarged.  ·  Global and segmental LV wall motion is normal. The estimated LV ejection   fraction is >70%.  · The aortic valve appears to be mildly sclerotic. AS and AI are absent.  · There is mild-moderate mitral regurgitation.  · There is moderate tricuspid regurgitation with a moderate elevation is   estimated PA pressure (45-55 mmHg)  · There are no other important findings on this study.            Discharge Details        Discharge Medications      New Medications      Instructions Start Date   bumetanide 2 MG tablet  Commonly known as:  BUMEX   2 mg, Oral, 2 Times Daily         Continue These Medications      Instructions Start Date   apixaban 2.5 MG tablet tablet  Commonly known as:  ELIQUIS   2.5 mg, Oral, 2 Times Daily      atorvastatin 40 MG tablet  Commonly known as:  LIPITOR   40 mg, Oral, Nightly      bisoprolol 5 MG tablet  Commonly known as:  ZEBETA   7.5 mg, Oral, 2 Times Daily      ferrous gluconate 324 MG tablet  Commonly known as:  FERGON   324 mg, Oral, Daily With Breakfast      isosorbide mononitrate 120 MG 24 hr tablet  Commonly known as:  IMDUR   120 mg, Oral, Daily      levothyroxine 75 MCG tablet  Commonly known as:  SYNTHROID, LEVOTHROID   No dose, route, or frequency recorded.      nitroglycerin 0.4 MG SL tablet  Commonly known as:  NITROSTAT   No dose, route, or frequency recorded.      pantoprazole 40 MG EC tablet  Commonly known as:  PROTONIX   40 mg, Oral, Daily      potassium chloride 10 MEQ CR tablet  Commonly known as:  K-DUR   10 mEq, Oral, 2 Times Daily      Vitamin D (Cholecalciferol) 50 MCG (2000 UT) capsule   Oral             Allergies   Allergen Reactions   • Tuberculin Tests Rash         Discharge Disposition:  Home or Self Care    Diet:  Hospital:  Diet Order   Procedures   • Diet Regular; Thin; Cardiac       Activity:  As tolerated    Restrictions or Other Recommendations:  Diuretic instructions  ---if gained 3lbs in 1 day or 5lbs in 3 days instructed to take extra 2mg tablet of bumex  on those days         CODE STATUS:    Code Status and Medical Interventions:   Ordered at: 12/09/19 1307     Level Of Support Discussed With:    Patient     Code Status:    CPR     Medical Interventions (Level of Support Prior to Arrest):    Full       Future Appointments   Date Time Provider Department Center   12/20/2019  9:30 AM Yomi Higgins MD [facility]   12/23/2019  2:15 PM Anthony Sweet DO [facility] None   5/21/2020 10:30 AM Tien Archer MD [facility] None       Additional Instructions for the Follow-ups that You Need to Schedule     Discharge Follow-up with PCP   As directed       Currently Documented PCP:    Anthony Sweet DO    PCP Phone Number:    [phone]     Follow Up Details:  1-2 weeks--- Dr Sweet, reports has appointment 12/23- can keep         Discharge Follow-up with Specified Provider: 12/20, Dr Higgins, primary cardiologist   As directed      To:  12/20, Dr Higgins, primary cardiologist               Time Spent on Discharge: 35 minutes    Electronically signed by Nicole High MD, 12/12/19, 12:27 PM.

## 2019-12-12 NOTE — PLAN OF CARE
Problem: Patient Care Overview  Goal: Plan of Care Review  Outcome: Ongoing (interventions implemented as appropriate)  Flowsheets  Taken 12/11/2019 1855 by Ailyn Palafox RN  Progress: improving  Taken 12/12/2019 0607 by Sonya Lara RN  Plan of Care Reviewed With: patient  Outcome Summary: Edema has improved-has mild edema in BLE; pt reports feeling better; up with one assist

## 2019-12-13 ENCOUNTER — READMISSION MANAGEMENT (OUTPATIENT)
Dept: CALL CENTER | Facility: HOSPITAL | Age: 79
End: 2019-12-13

## 2019-12-13 ENCOUNTER — TRANSITIONAL CARE MANAGEMENT TELEPHONE ENCOUNTER (OUTPATIENT)
Dept: INTERNAL MEDICINE | Facility: CLINIC | Age: 79
End: 2019-12-13

## 2019-12-13 RX ORDER — BUMETANIDE 2 MG/1
2 TABLET ORAL 2 TIMES DAILY
Qty: 180 TABLET | Refills: 0 | Status: SHIPPED | OUTPATIENT
Start: 2019-12-13 | End: 2019-01-01 | Stop reason: HOSPADM

## 2019-12-13 NOTE — OUTREACH NOTE
Prep Survey      Responses   Facility patient discharged from?  [place]   Is patient eligible?  Yes   Discharge diagnosis  volume overload, CHF   Does the patient have one of the following disease processes/diagnoses(primary or secondary)?  CHF   Does the patient have Home health ordered?  No   Is there a DME ordered?  No   Prep survey completed?  Yes          Bernadette Sy RN

## 2019-12-13 NOTE — OUTREACH NOTE
"TCM call completed.  See TCM flowsheet for details.  Does have upcoming hospital follow up appt with Dr. Sweet 12/23/19.  States \"I feel better.  Ankles have gone down quite a bit. \"  Has started on her new bumex without issues.  Says breathing is better.  Is weighing self q am and logging.  Was told at the hospital to report a weight gain of 2 pounds or more, which she said she would.  Is eating and drinking and no issues with bowels or bladder.  Says still some SOB, lightheadedness, and occasional chest pain, but all have improved.  She denies any needs at present and appreciated the call.   "

## 2019-12-16 ENCOUNTER — READMISSION MANAGEMENT (OUTPATIENT)
Dept: CALL CENTER | Facility: HOSPITAL | Age: 79
End: 2019-12-16

## 2019-12-16 RX ORDER — POTASSIUM CHLORIDE 750 MG/1
10 TABLET, FILM COATED, EXTENDED RELEASE ORAL 2 TIMES DAILY
Qty: 20 TABLET | Refills: 0 | Status: ON HOLD | OUTPATIENT
Start: 2019-12-16 | End: 2020-01-01 | Stop reason: SDUPTHER

## 2019-12-20 PROBLEM — I50.33 ACUTE ON CHRONIC DIASTOLIC CONGESTIVE HEART FAILURE (HCC): Status: ACTIVE | Noted: 2019-01-01

## 2019-12-20 NOTE — PROGRESS NOTES
[facility] Cardiology OP Consult Note    Pat Morel  [id number]  12/20/2019    Referred By: Anthony Sweet DO    Chief Complaint: Reestablish cardiac care    History of Present Illness:   Mrs. Pat Morel is a 79 y.o. female who presents to the Cardiology Clinic to reestablish primary cardiac care.  The patient has a past medical history which includes hypertension, prior CVA, history of GI bleeding in the setting of peptic ulcer disease, and stage III chronic kidney disease.  She also has a history of pulmonary artery hypertension and interstitial lung disease possibly secondary to hypersensitivity pneumonitis.  Her cardiac history is significant for persistent atrial fibrillation, with a history of prior cardioversion.  She is previously followed with electrophysiology in [facility], who recommended consideration of initiating Tikosyn given the patient's underlying pulmonary issues.  The patient, however, ultimately decided not to pursue Tikosyn initiation.  Her cardiac history is also significant for coronary artery disease, status post three-vessel CABG.  She reports coronary angiography 1 to 2 years ago, at which time her bypass grafts were found to be patent.  Recent history is significant for hospitalization at [facility] for volume overload.  She was discharged on 12/12 after diuresis with IV diuretics.  At the time of discharge, she was sent home on twice daily Bumex.  Soon after discharge, the patient reports adequate diuresis with oral Bumex.  Over the past several days, however, she has been experiencing decreased urine output despite continuation of her oral Bumex.  With her decreased urine output, she has noted progressive bilateral lower extremity swelling as well as continuous weight gain.  She denies any chest pain or chest discomfort.  No significant orthopnea.  She denies significant exertional dyspnea, no worse than baseline.  She has no other  specific complaints at this time.    Past Cardiac Testin. Last Coronary Angio: Rody years ago, report unavailable  2. Prior Stress Testing: None  3. Last Echo: 10/16/2019  · The left atrium is enlarged.  · Global and segmental LV wall motion is normal. The estimated LV ejection fraction is >70%.  · The aortic valve appears to be mildly sclerotic. AS and AI are absent.  · There is mild-moderate mitral regurgitation.  · There is moderate tricuspid regurgitation with a moderate elevation is estimated PA pressure (45-55 mmHg)  · There are no other important findings on this study.  4. Prior Holter Monitor: 7-day Holter monitor 2018   1.  100% atrial fibrillation    Review of Systems:   Review of Systems   Constitutional: Negative for activity change, appetite change, chills, diaphoresis, fatigue, fever, unexpected weight gain and unexpected weight loss.   Respiratory: Positive for shortness of breath. Negative for cough, chest tightness and wheezing.    Cardiovascular: Positive for palpitations and leg swelling. Negative for chest pain.   Gastrointestinal: Negative for abdominal pain, anal bleeding, blood in stool and GERD.   Endocrine: Negative for cold intolerance and heat intolerance.   Genitourinary: Negative for hematuria.   Neurological: Negative for dizziness, syncope, weakness and light-headedness.   Hematological: Does not bruise/bleed easily.   Psychiatric/Behavioral: Negative for depressed mood and stress. The patient is not nervous/anxious.        Past Medical History:   Past Medical History:   Diagnosis Date   • Acute on chronic diastolic congestive heart failure (CMS/HCC) 2019   • Anemia    • Angina at rest (CMS/HCC)    • Arthritis    • Atrial fibrillation (CMS/HCC)    • Coronary artery disease    • Disease of thyroid gland    • Elevated cholesterol    • GERD without esophagitis 2019   • History of blood transfusion    • History of stomach ulcers    • Hypertension    • Osteoporosis     • Positive TB test    • Stroke (CMS/HCC)        Past Surgical History:   Past Surgical History:   Procedure Laterality Date   • BRONCHOSCOPY Bilateral 4/12/2019    Procedure: BRONCHOSCOPY;  Surgeon: Jeff Laura MD;  Location:  [facility];  Service: Pulmonary   • BYPASS GRAFT     • COLONOSCOPY N/A 10/15/2019    Procedure: COLONOSCOPY;  Surgeon: Fredi Aaron MD;  Location:  [facility];  Service: Gastroenterology   • CORONARY ANGIOPLASTY WITH STENT PLACEMENT     • CORONARY ARTERY BYPASS GRAFT  10/18/2010   • ENDOSCOPY N/A 10/14/2019    Procedure: ESOPHAGOGASTRODUODENOSCOPY;  Surgeon: Fredi Aaron MD;  Location:  [facility];  Service: Gastroenterology   • HYSTERECTOMY     • STOMACH SURGERY  08/11/2019    Upper GI bleed    • STOMACH SURGERY  10/13/2019       Family History:   Family History   Problem Relation Age of Onset   • Cancer Mother    • Arthritis Mother    • No Known Problems Father    • Liver disease Sister        Social History:   Social History     Socioeconomic History   • Marital status:      Spouse name: Not on file   • Number of children: Not on file   • Years of education: Not on file   • Highest education level: Not on file   Tobacco Use   • Smoking status: Never Smoker   • Smokeless tobacco: Never Used   Substance and Sexual Activity   • Alcohol use: No   • Drug use: No   • Sexual activity: Defer       Medications:     Current Outpatient Medications:   •  apixaban (ELIQUIS) 2.5 MG tablet tablet, Take 2.5 mg by mouth 2 (Two) Times a Day., Disp: , Rfl:   •  atorvastatin (LIPITOR) 40 MG tablet, Take 40 mg by mouth Every Night., Disp: , Rfl:   •  bisoprolol (ZEBETA) 5 MG tablet, Take 1.5 tablets by mouth 2 (Two) Times a Day., Disp: 270 tablet, Rfl: 3  •  bumetanide (BUMEX) 2 MG tablet, Take 1 tablet by mouth 2 (Two) Times a Day., Disp: 180 tablet, Rfl: 0  •  ferrous gluconate (FERGON) 324 MG tablet, Take 1 tablet by mouth Daily With Breakfast., Disp: 30  "tablet, Rfl: 0  •  isosorbide mononitrate (IMDUR) 120 MG 24 hr tablet, Take 120 mg by mouth Daily., Disp: , Rfl:   •  levothyroxine (SYNTHROID, LEVOTHROID) 75 MCG tablet, , Disp: , Rfl:   •  mycophenolate (CELLCEPT) 500 MG tablet, Take  by mouth 2 (Two) Times a Day., Disp: , Rfl:   •  nitroglycerin (NITROSTAT) 0.4 MG SL tablet, , Disp: , Rfl:   •  pantoprazole (PROTONIX) 40 MG EC tablet, Take 1 tablet by mouth Daily., Disp: 60 tablet, Rfl: 3  •  potassium chloride (K-DUR) 10 MEQ CR tablet, Take 1 tablet by mouth 2 (Two) Times a Day., Disp: 20 tablet, Rfl: 0  •  Vitamin D, Cholecalciferol, 50 MCG (2000 UT) capsule, Take  by mouth., Disp: , Rfl:     Allergies:   Allergies   Allergen Reactions   • Tuberculin Tests Rash       Physical Exam:  Vital Signs:   Vitals:    12/20/19 0942 12/20/19 0955 12/20/19 0956   BP: 122/70 122/70 110/68   BP Location: Right arm Left arm Left arm   Patient Position: Sitting Sitting Standing   Cuff Size: Adult Adult Adult   Pulse: 58     SpO2: 90%     Weight: 62.6 kg (138 lb)     Height: 147.3 cm (58\")         Physical Exam   Constitutional: She is oriented to person, place, and time. She appears well-developed and well-nourished. No distress.   HENT:   Head: Normocephalic and atraumatic.   Moist Mucous Membranes.    Eyes: Pupils are equal, round, and reactive to light. EOM are normal. No scleral icterus.   Neck: No tracheal deviation present.   Cardiovascular: Normal rate, normal heart sounds and intact distal pulses. Exam reveals no gallop and no friction rub.   No murmur heard.  Irregularly irregular rhythm.  Normal JVD.  2+ bilateral lower extremity pitting edema.   Pulmonary/Chest: Effort normal and breath sounds normal. No stridor. No respiratory distress. She has no wheezes. She has no rales. She exhibits no tenderness.   Abdominal: Soft. Bowel sounds are normal. She exhibits no distension. There is no tenderness. There is no rebound and no guarding.   Musculoskeletal: Normal range " of motion. She exhibits edema.   Ambulates with a cane.   Lymphadenopathy:     She has no cervical adenopathy.   Neurological: She is alert and oriented to person, place, and time.   Skin: Skin is warm and dry. She is not diaphoretic. No erythema.   Psychiatric: She has a normal mood and affect. Her behavior is normal.       Results Review:   I reviewed the patient's new clinical results.  I personally viewed and interpreted the patient's EKG/Telemetry data  Labs reviewed from 12/12/2019:  1.  BMP: Creatinine 1.87, sodium 138, potassium 3.8  2.  CBC: WBC 4.69, hemoglobin 8.2, platelets 227  3.  proBNP 12/9/2019: 9920    ECG 12/11/2019: Atrial fibrillation with a ventricular rate of 76 bpm.  Nonspecific T wave abnormality.  Low voltage ECG.  No significant change compared to prior ECGs.    Assessment / Plan:     1.  Acute on chronic diastolic heart failure   --Echocardiogram reviewed, appears to have at least grade 2 diastolic dysfunction with left atrial enlargement and elevated left atrial pressures  --Presents today with worsening volume overload and decreasing urinary output in response to oral Bumex  --Suspect worsening volume overload likely multifactorial secondary to acute on chronic diastolic heart failure in the setting of atrial fibrillation, pulmonary hypertension in the setting of interstitial lung disease, chronic kidney disease  --Per patient report, appears to have had some degree of volume overload at the time of recent discharge  --Will obtain repeat proBNP and BMP today to reevaluate renal function as well efficacy of outpatient oral diuresis  --Advised to continue twice daily oral Bumex for now  --If renal function abnormal or BNP significantly elevated compared to recent labs, will then likely need inpatient IV diuresis  --If renal function stable and BNP stable/improved, could then consider the addition of a short course of metolazone and attempt to promote outpatient diuresis    2.  Multivessel  coronary artery disease  --History of three-vessel CABG 2012 including LIMA-LAD, as SVG- PL and PDA  --Last coronary angiography in 2017 with patent grafts at that time  --No current chest pain or anginal symptoms  --Currently on Eliquis for anticoagulation  --Continue high intensity statin    3.  Persistent atrial fibrillation  --Prior cardioversion  --The patient has been offered Tikosyn initiation in the past, however she has decided not to pursue [place] and at this point  --Continue Eliquis for CVA prophylaxis    4.  Pulmonary arterial hypertension   --In the setting of interstitial lung disease  --Mild to moderate RV and RA enlargement, mild to moderate TR with RVSP 50 mmHg on last echocardiogram, RV systolic function appeared to be preserved  --Likely contributing to lower extremity edema and volume overload as well as chronic dyspnea    5.  Chronic kidney disease, stage 3  --Repeat labs today to reevaluate renal function    6.  Dyslipidemia  --Continue statin therapy        Follow Up:   Return in about 1 month (around 1/20/2020).      Thank you for allowing me to participate in the care of your patient. Please to not hesitate to contact me with additional questions or concerns.     LA NENA Higgins MD  Interventional Cardiology   12/20/2019  9:34 AM

## 2019-12-23 PROBLEM — I50.33 ACUTE ON CHRONIC DIASTOLIC (CONGESTIVE) HEART FAILURE (HCC): Status: ACTIVE | Noted: 2019-01-01

## 2019-12-23 NOTE — TELEPHONE ENCOUNTER
----- Message from Yomi Higgins MD sent at 12/23/2019  9:37 AM EST -----  Please call the patient and let her know that her renal function is slightly worse compared to recent labs, and her BNP level has risen. If she has not had any improvement in her lower extremity swelling over the weekend, then she should present to the emergency department for evaluation and potentially for admission and IV diuresis.       Thanks.

## 2019-12-23 NOTE — PLAN OF CARE
Problem: Patient Care Overview  Goal: Plan of Care Review  Flowsheets  Taken 12/23/2019 1714  Progress: no change  Outcome Summary: VSS room air. Patient resting in bed. Comfortable, no c/o pain/discomfort. Will continue to monitor.  Taken 12/23/2019 1615  Plan of Care Reviewed With: patient

## 2019-12-23 NOTE — H&P
[facility] Medicine Services  HISTORY AND PHYSICAL    Primary Care Physician: Anthony Sweet DO    Subjective     Chief Complaint: Dyspnea on exertion/acute on chronic diastolic heart failure/volume overload      History of Present Illness:   Patient is a 79-year-old female who is recently established with me for primary care needs.  She is status post hospitalization recently at [facility] due to volume overload and diagnosis of acute on chronic diastolic congestive heart failure.  She was successfully diuresed with IV medication during the hospitalization, improvement of her generalized edema as well as orthopnea/dyspnea on exertion, and was transitioned home.  She has established with a new cardiologist, as they are essentially newer to the area having moved here from [facility].  She was evaluated by Dr. Higgins, in which she was starting to develop worsened edematous changes again.  Laboratory evaluation yielded findings of acute on chronic kidney disease, demonstrating prerenal azotemia, as well as a significantly elevated BNP level.  It was recommended that if her symptoms were not improved that she should travel to the local ER for IV diuresis and further evaluation and monitoring.  Patient states that she has not seen any significant improvement over the course of the weekend, is here to see me today for follow-up of the symptoms.    She continues to have orthopnea, as well as easy fatigability and dyspnea on even minimal exertion.  Oxygen saturation in the office demonstrated findings of a range 87% to 93% on room air.  She has had decreased frequency and volume of urination.  She denies any active chest pain, no reports of fever or chills.  No reports of any dysphagia or aspiration.      Review of Systems   1. Constitutional: Negative for fever. Negative for chills, diaphoresis.  Malaise/fatigue and abnormal weight change.   2. HENT: No dysphagia; no  changes to vision/hearing/smell/taste; no epistaxis  3. Eyes: Negative for redness and visual disturbance.   4. Respiratory: As per above.  Ss.   5. Cardiovascular: Negative for chest pain and palpitations.   6. Gastrointestinal: Negative for abdominal distention, abdominal pain and blood in stool.   7. Endocrine: Negative for cold intolerance and heat intolerance.   8. Genitourinary: Decreased frequency and volume of urination.  9. Musculoskeletal: Negative for arthralgias, back pain and myalgias.   10. Skin: Negative for color change, rash and wound.   11. Neurological: Negative for syncope, weakness and headaches.   12. Hematological: Negative for adenopathy. Does not bruise/bleed easily.   13. Psychiatric/Behavioral: Negative for confusion. The patient is not nervous/anxious.     Past Medical History:   Past Medical History:   Diagnosis Date   • Acute on chronic diastolic congestive heart failure (CMS/HCC) 12/20/2019   • Anemia    • Angina at rest (CMS/[facility])    • Arthritis    • Atrial fibrillation (CMS/[facility])    • Coronary artery disease    • Disease of thyroid gland    • Elevated cholesterol    • GERD without esophagitis 12/2/2019   • History of blood transfusion    • History of stomach ulcers    • Hypertension    • Osteoporosis    • Positive TB test    • Stroke (CMS/[facility])        Past Surgical History:  Past Surgical History:   Procedure Laterality Date   • BRONCHOSCOPY Bilateral 4/12/2019    Procedure: BRONCHOSCOPY;  Surgeon: Jeff Laura MD;  Location:  [facility];  Service: Pulmonary   • BYPASS GRAFT     • COLONOSCOPY N/A 10/15/2019    Procedure: COLONOSCOPY;  Surgeon: Fredi Aaron MD;  Location:  [facility];  Service: Gastroenterology   • CORONARY ANGIOPLASTY WITH STENT PLACEMENT     • CORONARY ARTERY BYPASS GRAFT  10/18/2010   • ENDOSCOPY N/A 10/14/2019    Procedure: ESOPHAGOGASTRODUODENOSCOPY;  Surgeon: Fredi Aaron MD;  Location:  [facility];  Service: Gastroenterology  "  • HYSTERECTOMY     • STOMACH SURGERY  08/11/2019    Upper GI bleed    • STOMACH SURGERY  10/13/2019       Family History: family history includes Arthritis in her mother; Cancer in her mother; Liver disease in her sister; No Known Problems in her father.    Social History:  reports that she has never smoked. She has never used smokeless tobacco. She reports that she does not drink alcohol or use drugs.    Medications:  Medications Prior to Admission   Medication Sig Dispense Refill Last Dose   • apixaban (ELIQUIS) 2.5 MG tablet tablet Take 2.5 mg by mouth 2 (Two) Times a Day.   12/23/2019 at Unknown time   • atorvastatin (LIPITOR) 40 MG tablet Take 40 mg by mouth Every Night.   12/23/2019 at Unknown time   • bisoprolol (ZEBETA) 5 MG tablet Take 1.5 tablets by mouth 2 (Two) Times a Day. 270 tablet 3 12/23/2019 at Unknown time   • bumetanide (BUMEX) 2 MG tablet Take 1 tablet by mouth 2 (Two) Times a Day. 180 tablet 0 12/23/2019 at Unknown time   • ferrous gluconate (FERGON) 324 MG tablet Take 1 tablet by mouth Daily With Breakfast. 30 tablet 0 12/23/2019 at Unknown time   • isosorbide mononitrate (IMDUR) 120 MG 24 hr tablet Take 120 mg by mouth Daily.   12/23/2019 at Unknown time   • mycophenolate (CELLCEPT) 500 MG tablet Take  by mouth 2 (Two) Times a Day.   12/23/2019 at Unknown time   • pantoprazole (PROTONIX) 40 MG EC tablet Take 1 tablet by mouth Daily. 60 tablet 3 12/23/2019 at Unknown time   • potassium chloride (K-DUR) 10 MEQ CR tablet Take 1 tablet by mouth 2 (Two) Times a Day. 20 tablet 0 12/23/2019 at Unknown time   • Vitamin D, Cholecalciferol, 50 MCG (2000 UT) capsule Take  by mouth.   12/23/2019 at Unknown time   • levothyroxine (SYNTHROID, LEVOTHROID) 75 MCG tablet    Taking   • nitroglycerin (NITROSTAT) 0.4 MG SL tablet    Unknown at Unknown time       Allergies:  Allergies   Allergen Reactions   • Tuberculin Tests Rash         Objective     Physical Exam:  Vital Signs: Ht 147.3 cm (58\")   Wt 62.6 " kg (138 lb)   LMP  (LMP Unknown)   BMI 28.84 kg/m²      General Appearance: alert, oriented x 3, no acute distress.  Pleasant and interactive during questioning and examination.  Skin: warm and dry.  Age-related atrophy of the skin.  HEENT: Atraumatic.  pupils round and reactive to light and accommodation, oral mucosa pink and moist.  Nares patent without epistaxis.  External auditory canals are patent tympanic membranes intact.  Neck: supple, no JVD, trachea midline.  No thyromegaly  Lungs: Audible air exchange noted all lung fields, unlabored breathing effort.  Trace bibasilar fine crackles on auscultation.  Heart: RRR, normal S1 and S2, no S3, no rub.  Abdomen: soft, non-tender, no palpable bladder, present bowel sounds to auscultation ×4.  No guarding or rigidity.  Extremities: no clubbing, cyanosis.  2+ pitting edema that extends to just above the knees bilaterally, 3+ pitting edema that extends to the distal third of the tibias bilaterally.  Good range of motion actively and passively.  Symmetric muscle strength and development.  Neuro: normal speech and mental status.  Cranial nerves II through XII intact.  No anosmia. DTR 2+; proprioception intact.  No focal motor/sensory deficits.          Results Reviewed:    Lab Results (last 72 hours)     ** No results found for the last 72 hours. **          Imaging Results (Last 72 Hours)     ** No results found for the last 72 hours. **          ECG/EMG Results (most recent)     None          I have personally reviewed and interpreted available lab data, radiology studies and ECG obtained at time of admission.     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  She will need surveillance of her renal function and electrolytes during this diuresis, including gentle intravascular rehydration using normal saline.  I have placed a referral for cardiology to see patient while inpatient additionally for further medication adjustments.    I recommended adjusting oral long-acting vasodilator medication, decreasing isosorbide dose from 120 mg down to 60 mg daily.    Hold oral Bumex at this time, utilize IV Lasix and monitor for clinical effectiveness of extravascular volume reduction.  Gentle intravascular rehydration with normal saline, not to exceed 50 mL/h.  Referral has been placed to nephrology for evaluation/establishment.    Her heart rate is slightly bradycardic, I recommended a slight reduction in her beta-blocker dosing, decreasing from 7.5 mg Zebeta down to 5 mg twice daily dosing.  Continue anticoagulation.  I suspect that her low hemoglobin and hematocrit on review of most recent labs as a result of hemodilution due to volume overload.  Continue iron supplementation.    Continue current thyroid supplementation dose.    Stat labs upon admission, including BMP/magnesium/CBC.  Repeat CMP/CBC/magnesium tomorrow for surveillance.  Correction of any abnormalities to electrolytes as able or needed.    Oxygen supplementation as needed, continuous pulse oximetry ordered as well as telemetry.      Discussed plan of care in detail with pt today; pt verb understanding and agrees; counseled for approx 40 min of total 70 min exam time.      Anthony Sweet,  12/23/19 5:19  PM    Please note that portions of this note may have been completed with a voice recognition program. Efforts were made to edit the dictations, but occasionally words are mistranscribed.

## 2019-12-24 NOTE — PROGRESS NOTES
Discharge Planning Assessment   [facility]     Patient Name: Pat Morel  MRN: [id number]  Today's Date: 12/24/2019    Admit Date: 12/23/2019    Discharge Needs Assessment     Row Name 12/24/19 1110       Living Environment    Lives With  spouse    Current Living Arrangements  home/apartment/condo    Provides Primary Care For  no one    Family Caregiver if Needed  spouse    Family Caregiver Names  Gerber        Resource/Environmental Concerns    Transportation Concerns  car, none       Transition Planning    Patient/Family Anticipates Transition to  home with family    Transportation Anticipated  family or friend will provide       Discharge Needs Assessment    Equipment Currently Used at Home  walker, standard;cane, straight    Equipment Needed After Discharge  none        Discharge Plan     Row Name 12/24/19 1121       Plan    Plan Spoke to patient and her  Gerber for DCP.  Patient is independent and uses a cane or walker when needed.  Denies home health or DME.  Does have a living will and it is on file.  Confirmed demos, contacts and PCP as correct.  No financial concerns.  CM will continue to follow.     Patient/Family in Agreement with Plan  yes        Destination      Coordination has not been started for this encounter.      Durable Medical Equipment      Coordination has not been started for this encounter.      Dialysis/Infusion      Coordination has not been started for this encounter.      Home Medical Care      Coordination has not been started for this encounter.      Therapy      Coordination has not been started for this encounter.      Community Resources      Coordination has not been started for this encounter.          Demographic Summary     Row Name 12/24/19 1110       General Information    Admission Type  observation    Required Notices Provided  Observation Status Notice    Referral Source  admission list        Functional Status    No documentation.       Psychosocial    No  documentation.       Abuse/Neglect    No documentation.       Legal    No documentation.       Substance Abuse    No documentation.       Patient Forms    No documentation.           Malou Aguilar RN

## 2019-12-24 NOTE — CONSULTS
Referring Provider: Franklin Beauchamp MD  Reason for Consultation: Kirit    Subjective     Chief complaint No chief complaint on file.      History of present illness:      78 Y/O WF with PMH of hypertension, hypothyroidism, diastolic congestive heart failure and pulmonary hypertension who was recently admitted to [facility] for worsening shortness of air and lower extremity edema.  Patient returned yesterday to the hospital because of same symptoms with shortness of air on minimal exertion and her oxygen sat was 87% on room air.  Patient was admitted and started on IV Lasix.  She feels a little bit better today.  She wants to go home but she wants to make sure that she does not again back the weight when she goes home and does not come back to the hospital right away.    Past Medical History:   Diagnosis Date   • Acute on chronic diastolic congestive heart failure (CMS/HCC) 12/20/2019   • Anemia    • Angina at rest (CMS/HCC)    • Arthritis    • Atrial fibrillation (CMS/HCC)    • Coronary artery disease    • Disease of thyroid gland    • Elevated cholesterol    • GERD without esophagitis 12/2/2019   • History of blood transfusion    • History of stomach ulcers    • Hypertension    • Osteoporosis    • Positive TB test    • Stroke (CMS/HCC)      Past Surgical History:   Procedure Laterality Date   • BRONCHOSCOPY Bilateral 4/12/2019    Procedure: BRONCHOSCOPY;  Surgeon: Jeff Laura MD;  Location:  [facility];  Service: Pulmonary   • BYPASS GRAFT     • COLONOSCOPY N/A 10/15/2019    Procedure: COLONOSCOPY;  Surgeon: Fredi Aaron MD;  Location:  [facility];  Service: Gastroenterology   • CORONARY ANGIOPLASTY WITH STENT PLACEMENT     • CORONARY ARTERY BYPASS GRAFT  10/18/2010   • ENDOSCOPY N/A 10/14/2019    Procedure: ESOPHAGOGASTRODUODENOSCOPY;  Surgeon: Fredi Aaron MD;  Location:  [facility];  Service: Gastroenterology   • HYSTERECTOMY     • STOMACH SURGERY  08/11/2019     Upper GI bleed    • STOMACH SURGERY  10/13/2019     Family History   Problem Relation Age of Onset   • Cancer Mother    • Arthritis Mother    • No Known Problems Father    • Liver disease Sister      Social History     Tobacco Use   • Smoking status: Never Smoker   • Smokeless tobacco: Never Used   Substance Use Topics   • Alcohol use: No   • Drug use: No     Medications Prior to Admission   Medication Sig Dispense Refill Last Dose   • apixaban (ELIQUIS) 2.5 MG tablet tablet Take 2.5 mg by mouth 2 (Two) Times a Day.   12/23/2019 at Unknown time   • atorvastatin (LIPITOR) 40 MG tablet Take 40 mg by mouth Every Night.   12/23/2019 at Unknown time   • bisoprolol (ZEBETA) 5 MG tablet Take 1.5 tablets by mouth 2 (Two) Times a Day. 270 tablet 3 12/23/2019 at Unknown time   • bumetanide (BUMEX) 2 MG tablet Take 1 tablet by mouth 2 (Two) Times a Day. 180 tablet 0 12/23/2019 at Unknown time   • ferrous gluconate (FERGON) 324 MG tablet Take 1 tablet by mouth Daily With Breakfast. 30 tablet 0 12/23/2019 at Unknown time   • isosorbide mononitrate (IMDUR) 120 MG 24 hr tablet Take 120 mg by mouth Daily.   12/23/2019 at Unknown time   • pantoprazole (PROTONIX) 40 MG EC tablet Take 1 tablet by mouth Daily. 60 tablet 3 12/23/2019 at Unknown time   • potassium chloride (K-DUR) 10 MEQ CR tablet Take 1 tablet by mouth 2 (Two) Times a Day. 20 tablet 0 12/23/2019 at Unknown time   • Vitamin D, Cholecalciferol, 50 MCG (2000 UT) capsule Take  by mouth.   12/23/2019 at Unknown time   • levothyroxine (SYNTHROID, LEVOTHROID) 75 MCG tablet    Taking   • nitroglycerin (NITROSTAT) 0.4 MG SL tablet    Unknown at Unknown time     Allergies:  Tuberculin tests    Review of Systems  Pertinent items are noted in HPI.    Objective     Vital Signs  Temp:  [96.6 °F (35.9 °C)-98.3 °F (36.8 °C)] 98.3 °F (36.8 °C)  Heart Rate:  [54-83] 70  Resp:  [16-19] 16  BP: (100-160)/(51-90) 153/90    Flowsheet Rows      First Filed Value   Admission Height  147.3 cm  "(58\") Documented at 12/23/2019 1616   Admission Weight  62.6 kg (138 lb) Documented at 12/23/2019 1616           I/O this shift:  In: -   Out: 350 [Urine:350]  I/O last 3 completed shifts:  In: -   Out: 1600 [Urine:1600]    Intake/Output Summary (Last 24 hours) at 12/24/2019 0815  Last data filed at 12/24/2019 0744  Gross per 24 hour   Intake --   Output 1950 ml   Net -1950 ml       Physical Exam:     General Appearance:    Alert, cooperative, in no acute distress   Head:    Normocephalic, without obvious abnormality, atraumatic   Eyes:            Lids and lashes normal, conjunctivae and sclerae normal, no   icterus, no pallor, corneas clear, PERRLA   Ears:    Ears appear intact with no abnormalities noted   Throat:   No oral lesions, no thrush, oral mucosa moist   Neck:   No adenopathy, supple, trachea midline, no thyromegaly, no     carotid bruit, no JVD   Back:     No kyphosis present, no scoliosis present, no skin lesions,       erythema or scars, no tenderness to percussion or                   palpation,   range of motion normal   Lungs:     Clear to auscultation,respirations regular, even and                   unlabored    Heart:    Regular rhythm and normal rate, normal S1 and S2, no            murmur, no gallop, no rub, no click   Breast Exam:    Deferred   Abdomen:     Normal bowel sounds, no masses, no organomegaly, soft        non-tender, non-distended, no guarding, no rebound                 tenderness   Genitalia:    Deferred   Extremities:   Moves all extremities well, no edema, no cyanosis, no              redness   Pulses:   Pulses palpable and equal bilaterally   Skin:   No bleeding, bruising or rash   Lymph nodes:   No palpable adenopathy   Neurologic:   Cranial nerves 2 - 12 grossly intact, sensation intact, DTR        present and equal bilaterally       Results Review:  Results from last 7 days   Lab Units 12/24/19  0626 12/23/19  1728 12/20/19  1021   SODIUM mmol/L 139 140 141   POTASSIUM " mmol/L 4.3 4.4 4.2   CHLORIDE mmol/L 100 98 98   TOTAL CO2 mmol/L  --   --  24.5   CO2 mmol/L 23.5 23.8  --    BUN mg/dL 48* 52* 50*   CREATININE mg/dL 2.15* 2.29* 2.27*   CALCIUM mg/dL 9.5 9.3 9.4   BILIRUBIN mg/dL 0.8  --   --    ALK PHOS U/L 57  --   --    ALT (SGPT) U/L 11  --   --    AST (SGOT) U/L 18  --   --    GLUCOSE mg/dL 99 104*  --        Estimated Creatinine Clearance: 18 mL/min (A) (by C-G formula based on SCr of 2.15 mg/dL (H)).    Results from last 7 days   Lab Units 12/24/19  0626 12/23/19  1728   MAGNESIUM mg/dL 2.1 2.2       Results from last 7 days   Lab Units 12/24/19  0626 12/23/19  1728   WBC 10*3/mm3 3.89 4.37   HEMOGLOBIN g/dL 8.7* 8.7*   PLATELETS 10*3/mm3 215 219             Active Medications    apixaban 2.5 mg Oral Q12H   atorvastatin 40 mg Oral Nightly   bisoprolol 5 mg Oral BID   ferrous sulfate 324 mg Oral BID With Meals   furosemide 40 mg Intravenous Q12H   isosorbide mononitrate 60 mg Oral Daily   levothyroxine 75 mcg Oral Q AM   mycophenolate 500 mg Oral Q12H   pantoprazole 40 mg Oral Daily   potassium chloride 10 mEq Oral BID With Meals   sodium chloride 10 mL Intravenous Q12H       sodium chloride 50 mL/hr Last Rate: 50 mL/hr (12/23/19 2104)       Assessment/Plan       Acute on chronic diastolic congestive heart failure (CMS/HCC)    Persistent atrial fibrillation    Coronary artery disease involving native coronary artery of native heart without angina pectoris    Essential hypertension    Pulmonary arterial hypertension (CMS/HCC)    Interstitial lung disease (CMS/HCC)    RENEE (acute kidney injury) (CMS/HCC)    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  She remains volume overloaded.  Will increase Lasix to 80 mg IV twice a day.  Had lengthy discussion with the patient about the importance of daily weight and the following low sodium diet.    -Chronic kidney disease stage III/IV baseline creatinine 1.8 mg/dL  -Acute on chronic diastolic congestive heart failure and pulmonary hypertension  -Pulmonary hypertension  -Hypothyroidism  -Atrial fibrillation        Ira Vang MD  12/24/19  8:15 AM

## 2019-12-24 NOTE — PLAN OF CARE
Problem: Patient Care Overview  Goal: Plan of Care Review  Outcome: Ongoing (interventions implemented as appropriate)  Flowsheets (Taken 12/24/2019 6142)  Progress: no change  Plan of Care Reviewed With: patient  Outcome Summary: Pt. VSS, no pain at this time, edema still present in legs and feet, pt slept thru night will continue to monitor.

## 2019-12-24 NOTE — PROGRESS NOTES
Hospitalist Progress Note.    LOS: 1 day    Patient Care Team:  Anthony Sweet DO as PCP - General (Family Medicine)  Tien Archer MD as Consulting Physician (Cardiac Electrophysiology)  Xu Block MD (Cardiology)    Chief Complaint:   Follow-up on fluid overload, follow-up on mild worsening of CKD stage III    SUBJECTIVE:  Patient seen and examined this morning.  Events from last night noted.  Patient continues to feel volume overloaded in her lower extremities but denies any significant shortness of breath.  She is not requiring any supplemental oxygen.  Her renal function seems to have improved slightly.  Total output since admission has been about 2 L.  Nephrology and cardiology are following.    Patient is a very pleasant 79-year-old female with medical history of chronic congestive diastolic heart failure, CKD stage III, coronary artery disease status post CABG in 2012, multiple stents, hypertension, hyperlipidemia, prior CVA without any residual deficit, atrial fibrillation with previous use of amiodarone, hypersensitivity pneumonitis, mild pulmonary hypertension, who recently moved to [facility] [place] [facility].  She is status post hospitalization at [facility] for volume overload about 2 weeks ago.  She follows with Dr. Higgins for cardiology and Dr. young for primary care.  She presented to his office on 12/23/2019 with complaints of increased lower extremity edema and shortness of breath.  She had lab work done by Dr. Higgins the day prior and was noted to have significantly elevated proBNP with mildly worsening renal functions from baseline.  She was also noted to be slightly hypoxic on room air to anywhere between 87 to 93%.  She was admitted from his office and placed on IV diuretics in the form of Lasix 40 mg twice daily.  Nephrology and cardiology were consulted.    Review of Systems:    The pertinent  ROS was done and it is noted above, rest  was negative.    OBJECTIVE:    Vital  "Signs  /74 (BP Location: Right arm, Patient Position: Lying)   Pulse 84   Temp 97.5 °F (36.4 °C) (Oral)   Resp 18   Ht 147.3 cm (58\")   Wt 61.5 kg (135 lb 8 oz)   LMP  (LMP Unknown)   SpO2 91%   BMI 28.32 kg/m²       I/O this shift:  In: 1110 [P.O.:1110]  Out: 650 [Urine:650]    Intake/Output Summary (Last 24 hours) at 12/24/2019 1621  Last data filed at 12/24/2019 1300  Gross per 24 hour   Intake 1110 ml   Output 2250 ml   Net -1140 ml       Physical Exam:    General Appearance: alert, oriented x 3, no acute distress  HEENT: pupils round and reactive to light, oral mucosa dry, extraocular movements intact.  Neck: supple, no JVD, trachea midline  Lungs: Clear to Auscultation, unlabored breathing effort  Heart: RRR, normal S1 and S2, no S3, no rub  Abdomen: soft, non-tender, no palpable bladder, present bowel sounds to auscultation  Extremities: 1+ pitting edema, cyanosis or clubbing.   Neuro: normal speech and mental status, grossly non focal.     Results Review:    Results from last 7 days   Lab Units 12/24/19  0626 12/23/19  1728 12/20/19  1021   SODIUM mmol/L 139 140 141   POTASSIUM mmol/L 4.3 4.4 4.2   CHLORIDE mmol/L 100 98 98   TOTAL CO2 mmol/L  --   --  24.5   CO2 mmol/L 23.5 23.8  --    BUN mg/dL 48* 52* 50*   CREATININE mg/dL 2.15* 2.29* 2.27*   CALCIUM mg/dL 9.5 9.3 9.4   BILIRUBIN mg/dL 0.8  --   --    ALK PHOS U/L 57  --   --    ALT (SGPT) U/L 11  --   --    AST (SGOT) U/L 18  --   --    GLUCOSE mg/dL 99 104*  --        Estimated Creatinine Clearance: 18 mL/min (A) (by C-G formula based on SCr of 2.15 mg/dL (H)).    Results from last 7 days   Lab Units 12/24/19  0626 12/23/19  1728   MAGNESIUM mg/dL 2.1 2.2             Results from last 7 days   Lab Units 12/24/19  0626 12/23/19  1728   WBC 10*3/mm3 3.89 4.37   HEMOGLOBIN g/dL 8.7* 8.7*   PLATELETS 10*3/mm3 215 219               Imaging Results (Last 24 Hours)     ** No results found for the last 24 hours. **          apixaban 2.5 mg Oral " Q12H   atorvastatin 40 mg Oral Nightly   bisoprolol 5 mg Oral BID   ferrous sulfate 324 mg Oral BID With Meals   furosemide 80 mg Intravenous Q12H   isosorbide mononitrate 60 mg Oral Daily   levothyroxine 75 mcg Oral Q AM   mycophenolate 500 mg Oral Q12H   pantoprazole 40 mg Oral Daily   potassium chloride 10 mEq Oral BID With Meals   sodium chloride 10 mL Intravenous Q12H   spironolactone 12.5 mg Oral Daily          Medication Review:   Current Facility-Administered Medications   Medication Dose Route Frequency Provider Last Rate Last Dose   • apixaban (ELIQUIS) tablet 2.5 mg  2.5 mg Oral Q12H Jetmore, Anthony K, DO   2.5 mg at 12/24/19 0748   • atorvastatin (LIPITOR) tablet 40 mg  40 mg Oral Nightly Micki, Anthony K, DO   40 mg at 12/23/19 2045   • bisoprolol (ZEBeta) tablet 5 mg  5 mg Oral BID Micki, Anthony K, DO   5 mg at 12/24/19 0749   • ferrous sulfate EC tablet 324 mg  324 mg Oral BID With Meals Jetmore, Anthony K, DO   324 mg at 12/24/19 0748   • furosemide (LASIX) injection 80 mg  80 mg Intravenous Q12H Ira Vang MD       • isosorbide mononitrate (IMDUR) 24 hr tablet 60 mg  60 mg Oral Daily Micki, Anthony K, DO   60 mg at 12/24/19 0746   • levothyroxine (SYNTHROID, LEVOTHROID) tablet 75 mcg  75 mcg Oral Q AM Jetmore Anthony K, DO   75 mcg at 12/24/19 0658   • mycophenolate (CELLCEPT) capsule 500 mg  500 mg Oral Q12H Micki, Anthony K, DO       • nitroglycerin (NITROSTAT) SL tablet 0.4 mg  0.4 mg Sublingual Q5 Min PRN Jetmore, Anthony K, DO       • ondansetron (ZOFRAN) tablet 4 mg  4 mg Oral Q6H PRN Micki, Anthony K, DO        Or   • ondansetron (ZOFRAN) injection 4 mg  4 mg Intravenous Q6H PRN Micki, Anthony K, DO       • pantoprazole (PROTONIX) EC tablet 40 mg  40 mg Oral Daily Micki, Anthony K, DO   40 mg at 12/24/19 0749   • potassium chloride (MICRO-K) CR capsule 10 mEq  10 mEq Oral BID With Meals Anthony Sweet K DO   10 mEq at 12/24/19 5548   • sodium chloride 0.9 % flush 10 mL  10 mL Intravenous  Q12H Anthony Sweet, DO       • sodium chloride 0.9 % flush 10 mL  10 mL Intravenous PRN Anthony Sweet, DO       • spironolactone (ALDACTONE) tablet 12.5 mg  12.5 mg Oral Daily Nahid Mayer MD   12.5 mg at 12/24/19 1300       ASSESSMENT/PLAN:    Acute on chronic diastolic congestive heart failure (CMS/HCC)    Persistent atrial fibrillation    Coronary artery disease involving native coronary artery of native heart without angina pectoris    Essential hypertension    Pulmonary arterial hypertension (CMS/HCC)    Interstitial lung disease (CMS/HCC)    RENEE (acute kidney injury) (CMS/HCC)    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  She is tolerating IV diuresis.  Dr. Albrecht from nephrology did increase her dose of Lasix from 40 mg twice daily to 80 mg twice daily.  We will continue to monitor her renal functions closely.  Dr. mayer has also seen the patient and recommends low-dose Aldactone in addition to loop diuretics which has been started as well.  Recommends an echo on an outpatient basis which can be done at his office.  Monitor strict ins and outs.  Restrict sodium to 1500 g daily and fluid to 1500 cc daily.  Patient has been advised on these instructions.     If she has adequate diuresis, she can likely be discharged home in the next 24 to 48 hours.    Details were discussed with the patient as well as family in the room.   I also discussed the details with the nursing staff.    Rest as ordered.    Franklin Beauchamp MD  12/24/19  4:21 PM    Please note that portions of this note may have been completed with a voice recognition program. Efforts were made to edit the dictations, but occasionally words are mistranscribed.

## 2019-12-24 NOTE — OUTREACH NOTE
CHF Week 3 Survey      Responses   Facility patient discharged from?  [facility]   Does the patient have one of the following disease processes/diagnoses(primary or secondary)?  CHF   Week 3 attempt successful?  No   Revoke  Readmitted          Tamara Larkin RN

## 2019-12-24 NOTE — PLAN OF CARE
Problem: Patient Care Overview  Goal: Plan of Care Review  Flowsheets  Taken 12/24/2019 1528  Progress: improving  Outcome Summary: VSS room air. Fluid restrictions in place. Resting in bed. Spouse at bedside. Fluids d/c'd. Will continue to monitor.  Taken 12/24/2019 0800  Plan of Care Reviewed With: patient

## 2019-12-24 NOTE — CONSULTS
[facility]-Cardiology Consult Note    Referring Provider: Quynh  Reason for Consultation: Congestive heart failure decompensation    Patient Care Team:  Anthony Sweet DO as PCP - General (Family Medicine)  Tien Archer MD as Consulting Physician (Cardiac Electrophysiology)  Xu Block MD (Cardiology)    Chief complaint : Shortness of breath    Subjective:    History of present illness: This is a 79-year-old female patient with chronic diastolic heart failure and permanent atrial fibrillation presented to the hospital after seeing her primary care provider in clinic yesterday with complaints of progressively worsening shortness of breath, orthopnea and increasing peripheral edema.  The patient denied having chest discomfort.  She had had one episode of PND the night before and was sleeping on 4 pillows due to worsening orthopnea.  She has had no dizziness palpitations or syncope.  She has a history of hypertension and permanent atrial fibrillation treated with a rate control and anticoagulation strategy.  She has had no signs or symptoms to suggest stroke, TIA or other cardioembolic event.  She has had no bleeding issues related to chronic anticoagulation therapy.  She is demonstrated to have moderate anemia.  On presentation the patient was noted to be in acute on chronic renal failure.  Her creatinine has improved slightly with diuresis.  She indicates feeling much better this morning and feels that her breathing is back to baseline.    Review of Systems   Review of Systems   Constitution: Positive for malaise/fatigue. Negative for chills, diaphoresis, fever, weight gain and weight loss.   HENT: Negative for ear discharge, hearing loss, hoarse voice and nosebleeds.    Eyes: Negative for discharge, double vision, pain and photophobia.   Cardiovascular: Positive for dyspnea on exertion and leg swelling. Negative for chest pain, claudication, cyanosis, irregular heartbeat, near-syncope, orthopnea,  palpitations, paroxysmal nocturnal dyspnea and syncope.   Respiratory: Positive for shortness of breath. Negative for cough, hemoptysis, sputum production and wheezing.    Endocrine: Negative for cold intolerance, heat intolerance, polydipsia, polyphagia and polyuria.   Hematologic/Lymphatic: Negative for adenopathy and bleeding problem. Does not bruise/bleed easily.   Skin: Negative for color change, flushing, itching and rash.   Musculoskeletal: Negative for muscle cramps, muscle weakness, myalgias and stiffness.   Gastrointestinal: Negative for abdominal pain, diarrhea, hematemesis, hematochezia, nausea and vomiting.   Genitourinary: Negative for dysuria, frequency and nocturia.   Neurological: Positive for dizziness. Negative for focal weakness, loss of balance, numbness, paresthesias and seizures.   Psychiatric/Behavioral: Negative for altered mental status, hallucinations and suicidal ideas.   Allergic/Immunologic: Negative for HIV exposure, hives and persistent infections.       History  Past Medical History:   Diagnosis Date   • Acute on chronic diastolic congestive heart failure (CMS/HCC) 12/20/2019   • Anemia    • Angina at rest (CMS/HCC)    • Arthritis    • Atrial fibrillation (CMS/HCC)    • Coronary artery disease    • Disease of thyroid gland    • Elevated cholesterol    • GERD without esophagitis 12/2/2019   • History of blood transfusion    • History of stomach ulcers    • Hypertension    • Osteoporosis    • Positive TB test    • Stroke (CMS/HCC)    ,   Past Surgical History:   Procedure Laterality Date   • BRONCHOSCOPY Bilateral 4/12/2019    Procedure: BRONCHOSCOPY;  Surgeon: Jeff Laura MD;  Location:  [facility];  Service: Pulmonary   • BYPASS GRAFT     • COLONOSCOPY N/A 10/15/2019    Procedure: COLONOSCOPY;  Surgeon: Fredi Aaron MD;  Location:  [facility];  Service: Gastroenterology   • CORONARY ANGIOPLASTY WITH STENT PLACEMENT     • CORONARY ARTERY BYPASS GRAFT   10/18/2010   • ENDOSCOPY N/A 10/14/2019    Procedure: ESOPHAGOGASTRODUODENOSCOPY;  Surgeon: Fredi Aaron MD;  Location: [facility];  Service: Gastroenterology   • HYSTERECTOMY     • STOMACH SURGERY  08/11/2019    Upper GI bleed    • STOMACH SURGERY  10/13/2019   ,   Family History   Problem Relation Age of Onset   • Cancer Mother    • Arthritis Mother    • No Known Problems Father    • Liver disease Sister    ,   Social History     Tobacco Use   • Smoking status: Never Smoker   • Smokeless tobacco: Never Used   Substance Use Topics   • Alcohol use: No   • Drug use: No   ,   Medications Prior to Admission   Medication Sig Dispense Refill Last Dose   • apixaban (ELIQUIS) 2.5 MG tablet tablet Take 2.5 mg by mouth 2 (Two) Times a Day.   12/23/2019 at Unknown time   • atorvastatin (LIPITOR) 40 MG tablet Take 40 mg by mouth Every Night.   12/23/2019 at Unknown time   • bisoprolol (ZEBETA) 5 MG tablet Take 1.5 tablets by mouth 2 (Two) Times a Day. 270 tablet 3 12/23/2019 at Unknown time   • bumetanide (BUMEX) 2 MG tablet Take 1 tablet by mouth 2 (Two) Times a Day. 180 tablet 0 12/23/2019 at Unknown time   • ferrous gluconate (FERGON) 324 MG tablet Take 1 tablet by mouth Daily With Breakfast. 30 tablet 0 12/23/2019 at Unknown time   • isosorbide mononitrate (IMDUR) 120 MG 24 hr tablet Take 120 mg by mouth Daily.   12/23/2019 at Unknown time   • levothyroxine (SYNTHROID, LEVOTHROID) 75 MCG tablet Take 75 mcg by mouth Daily.   12/23/2019 at Unknown time   • pantoprazole (PROTONIX) 40 MG EC tablet Take 1 tablet by mouth Daily. 60 tablet 3 12/23/2019 at Unknown time   • potassium chloride (K-DUR) 10 MEQ CR tablet Take 1 tablet by mouth 2 (Two) Times a Day. 20 tablet 0 12/23/2019 at Unknown time   • Vitamin D, Cholecalciferol, 50 MCG (2000 UT) capsule Take 1 capsule by mouth Daily.   12/23/2019 at Unknown time   • nitroglycerin (NITROSTAT) 0.4 MG SL tablet Place 0.4 mg under the tongue Every 5 (Five) Minutes As Needed.  "  Unknown at Unknown time    and Allergies:  Tuberculin tests    Objective:    Vital Sign Min/Max for last 24 hours  Temp  Min: 96.6 °F (35.9 °C)  Max: 98.3 °F (36.8 °C)   BP  Min: 100/60  Max: 160/82   Pulse  Min: 54  Max: 83   Resp  Min: 16  Max: 19   SpO2  Min: 90 %  Max: 95 %   No data recorded   Weight  Min: 61.5 kg (135 lb 8 oz)  Max: 63.5 kg (139 lb 14.4 oz)     Flowsheet Rows      First Filed Value   Admission Height  147.3 cm (58\") Documented at 12/23/2019 1616   Admission Weight  62.6 kg (138 lb) Documented at 12/23/2019 1616             Physical Exam:   Physical Exam   Constitutional: She is oriented to person, place, and time. She appears well-developed and well-nourished.   HENT:   Head: Normocephalic and atraumatic.   Mouth/Throat: Oropharynx is clear and moist.   Eyes: Pupils are equal, round, and reactive to light. Conjunctivae and EOM are normal. No scleral icterus.   Neck: Normal range of motion. Neck supple. No JVD present. No tracheal deviation present. No thyromegaly present.   Cardiovascular: Normal rate, S1 normal, S2 normal, intact distal pulses and normal pulses. An irregularly irregular rhythm present. PMI is not displaced. Exam reveals no gallop and no friction rub.   Murmur heard.   Low-pitched blowing plateau holosystolic murmur is present with a grade of 2/6 at the lower left sternal border.  Pulmonary/Chest: Effort normal and breath sounds normal. No respiratory distress. She has no wheezes. She has no rales.   Abdominal: Soft. Bowel sounds are normal. She exhibits no distension and no mass. There is no tenderness. There is no rebound and no guarding.   Musculoskeletal: Normal range of motion. She exhibits edema. She exhibits no deformity.   Neurological: She is alert and oriented to person, place, and time. She displays normal reflexes. No cranial nerve deficit. Coordination normal.   Skin: Skin is warm and dry. No rash noted. No erythema.   Psychiatric: She has a normal mood and " affect. Her behavior is normal. Thought content normal.       Results Review:   I reviewed the patient's new clinical results.  Results from last 7 days   Lab Units 12/24/19  0626 12/23/19  1728   WBC 10*3/mm3 3.89 4.37   HEMOGLOBIN g/dL 8.7* 8.7*   HEMATOCRIT % 29.1* 28.1*   PLATELETS 10*3/mm3 215 219     Results from last 7 days   Lab Units 12/24/19  0626 12/23/19  1728  12/20/19  1021   SODIUM mmol/L 139 140  --  141   POTASSIUM mmol/L 4.3 4.4  --  4.2   CHLORIDE mmol/L 100 98  --  98   TOTAL CO2 mmol/L  --   --   --  24.5   CO2 mmol/L 23.5 23.8  --   --    BUN mg/dL 48* 52*  --  50*   CREATININE mg/dL 2.15* 2.29*  --  2.27*   GLUCOSE mg/dL 99 104*   < >  --    CALCIUM mg/dL 9.5 9.3  --  9.4    < > = values in this interval not displayed.     No results found for: TROPONINT          Assessment/Plan:      Acute on chronic diastolic congestive heart failure (CMS/HCC).  Heart failure with preserved ejection fraction.  Stage D.  Volume overloaded.  Left ventricular ejection fraction greater than 70%    Permanent atrial fibrillation    Coronary artery disease involving native coronary artery of native heart without angina pectoris    Essential hypertension    Pulmonary arterial hypertension (CMS/[facility]).  Moderate.  Estimated RV systolic pressures 55-60 mmHg.  WHO group 2.    Interstitial lung disease (CMS/[facility])    RENEE (acute kidney injury) (CMS/[facility]).  Renal function gradually improving.    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    Aortic valve sclerosis without stenosis.    Mild-moderate valvular mitral regurgitation.  Chronic.  Normal LV chamber dimensions.  Normal ejection fraction.    Moderate tricuspid regurgitation.      I have recommended adding spironolactone 12.5 mg orally once in the morning.  I agree with IV  loop diuresis.  I have recommended a 1.5 L/day fluid restriction and a 1500 mg/day sodium restriction.  After an additional 24 hours of IV loop diuresis, the patient can be transitioned to oral therapy.  I suspect she will be stable for discharge in the morning.  Echocardiogram can be performed as an outpatient in our office.    I discussed the patients findings and my recommendations with patient, family, nursing staff and consulting provider    Nahid Horvath MD  12/24/19  11:14 AM

## 2019-12-25 NOTE — PROGRESS NOTES
NEPHROLOGY PROGRESS NOTE    PATIENT IDENTIFICATION:   Name:  Pat Morel      MRN:  [id number]     79 y.o.  female             Reason for visit: RENEE    SUBJECTIVE:   Rashmi nd examined. Feels better. No SOA  OBJECTIVE:  Vitals:    12/24/19 2300 12/25/19 0331 12/25/19 0500 12/25/19 0700   BP: 124/82 143/78  127/76   BP Location: Left arm Left arm  Right arm   Patient Position: Lying Lying  Lying   Pulse:  72  70   Resp: 18 18 19   Temp: 98 °F (36.7 °C) 97.5 °F (36.4 °C)  97.8 °F (36.6 °C)   TempSrc: Oral Axillary  Oral   SpO2: 94% 92%  90%   Weight:   63.4 kg (139 lb 11.2 oz)    Height:               Body mass index is 29.2 kg/m².    Intake/Output Summary (Last 24 hours) at 12/25/2019 0827  Last data filed at 12/25/2019 0530  Gross per 24 hour   Intake 1110 ml   Output 1200 ml   Net -90 ml         Exam:  GEN:  No distress, appears stated age  EYES:   Anicteric sclera  ENT:    External ears/nose normal, MM are moist  NECK:  No adenopathy, JVP none  LUNGS: Normal chest on inspection; not labored  CV:  Normal S1S2, without murmur  ABD:  Non-tender, non-distended, no hepatosplenomegaly, +BS  EXT:  No edema; no cyanosis; clubbing    Scheduled meds:    apixaban 2.5 mg Oral Q12H   atorvastatin 40 mg Oral Nightly   bisoprolol 5 mg Oral BID   ferrous sulfate 324 mg Oral BID With Meals   furosemide 80 mg Intravenous Q12H   isosorbide mononitrate 60 mg Oral Daily   levothyroxine 75 mcg Oral Q AM   pantoprazole 40 mg Oral Daily   potassium chloride 10 mEq Oral BID With Meals   sodium chloride 10 mL Intravenous Q12H   spironolactone 12.5 mg Oral Daily     IV meds:                           Data Review:    Results from last 7 days   Lab Units 12/25/19  0454 12/24/19  0626 12/23/19  1728   SODIUM mmol/L 140 139 140   POTASSIUM mmol/L 4.0 4.3 4.4   CHLORIDE mmol/L 99 100 98   CO2 mmol/L 25.3 23.5 23.8   BUN mg/dL 43* 48* 52*   CREATININE mg/dL 2.08* 2.15* 2.29*   CALCIUM mg/dL 9.4 9.5 9.3   BILIRUBIN mg/dL  --  0.8  --     ALK PHOS U/L  --  57  --    ALT (SGPT) U/L  --  11  --    AST (SGOT) U/L  --  18  --    GLUCOSE mg/dL 102* 99 104*       Estimated Creatinine Clearance: 18.9 mL/min (A) (by C-G formula based on SCr of 2.08 mg/dL (H)).    Results from last 7 days   Lab Units 12/25/19  0454 12/24/19  0626 12/23/19  1728   MAGNESIUM mg/dL  --  2.1 2.2   PHOSPHORUS mg/dL 3.7  --   --        Results from last 7 days   Lab Units 12/24/19  0626 12/23/19  1728   WBC 10*3/mm3 3.89 4.37   HEMOGLOBIN g/dL 8.7* 8.7*   PLATELETS 10*3/mm3 215 219                   ASSESSMENT:     Acute on chronic diastolic congestive heart failure (CMS/HCC)    Persistent atrial fibrillation    Coronary artery disease involving native coronary artery of native heart without angina pectoris    Essential hypertension    Pulmonary arterial hypertension (CMS/HCC)    Interstitial lung disease (CMS/HCC)    RENEE (acute kidney injury) (CMS/HCC)    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  Ok for discharge from renal stand point on Lasix 40 BID with labs in one week and renal follow up in 2 weeks.   Had lengthy discussion with the patient about the importance of daily weight and the following low sodium diet.     -Chronic kidney disease stage III/IV baseline creatinine 1.8 mg/dL  -Acute on chronic diastolic congestive heart failure and pulmonary hypertension  -Pulmonary hypertension  -Hypothyroidism  -Atrial fibrillation      Ira Vang MD  12/25/2019    8:27 AM

## 2019-12-25 NOTE — PLAN OF CARE
Problem: Patient Care Overview  Goal: Plan of Care Review  Outcome: Ongoing (interventions implemented as appropriate)  Flowsheets (Taken 12/25/2019 0512)  Progress: improving  Plan of Care Reviewed With: patient  Outcome Summary: Pt. DORIE , R/A, BM 12/24/19, Fluid restriction increase in diuretic med, edema less in legs today slept thru out night will continue to monitor.

## 2019-12-25 NOTE — DISCHARGE SUMMARY
[facility]      Name:  Pat Morel   Age:  79 y.o.  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 (CMS/[facility])    Acquired hypothyroidism    Chronic kidney disease, stage 3 (CMS/[facility])    Volume overload    Acute on chronic diastolic (congestive) heart failure (CMS/HCC)      Presenting Problem:    Acute on chronic diastolic (congestive) heart failure (CMS/HCC) [I50.33]     Consults:     Consults     Date and Time Order Name Status Description    2019 0821 Inpatient Cardiology Consult Completed     2019 1829 Inpatient Nephrology Consult Completed     12/10/2019 0738 Inpatient Cardiology Consult Completed         Consulting Physician(s)     Provider Relationship Specialty    Sanford Tsang MD, EVELINA Consulting Physician Nephrology    Breeding, Nahid MODI MD Consulting Physician Cardiology          Procedures Performed:           History of presenting illness/Hospital Course:  Patient is a very pleasant 79-year-old female with medical history of chronic congestive diastolic heart failure, CKD stage III, coronary artery disease status post CABG in , multiple stents, hypertension, hyperlipidemia, prior CVA without any residual deficit, atrial fibrillation with previous use of amiodarone, hypersensitivity pneumonitis, mild pulmonary hypertension, who recently moved to [facility]  She is  status post hospitalization at [facility] for volume overload about 2 weeks ago.  She follows with Dr. Higgins for cardiology and Dr. Sweet for primary care.  She presented to see Dr. Sweet on 12/23/2019 with complaints of increased lower extremity edema and shortness of breath. She had lab work done by Dr. Higgins was noted to have significantly elevated proBNP with mildly worsening renal functions from baseline 1.8 to 2.2.  She was also noted to be slightly hypoxic on room air to anywhere between 87 to 93% in the office.  She was admitted from his office and placed on IV diuretics in the form of Lasix 40 mg twice daily.  Nephrology and cardiology were consulted.    Dr. Horvath from cardiology recommended continuation of loop diuretics in addition to low-dose Aldactone and Dr. Vang also saw the patient and increased her Lasix dose to 80 mg twice daily.  Patient diuresed well with significant improvement in her lower extremity edema as well as shortness of breath over the last 2 days.  She does not require any supplemental oxygen to maintain adequate oxygen saturation.  She denies any cough or chest pain or pressure.  As she has made significant improvement in her fluid overload without any worsening renal functions, she does appear to be stable for discharge home today.  Nephrology recommends the patient be placed back on 40 mg oral Lasix twice daily.  She is been advised to follow-up with Dr. Sweet in 1 week, Dr. Higgins in 1 week and nephrology in 2 weeks.  I have also advised her to follow a strict 1.5 g low-sodium diet and 1500 cc/day fluid restriction diet.  I have also advised that she wear compression stockings on a daily basis.  I asked her to check her weights daily and to take an extra Lasix dose if she gains more than 1 pound a day.  Patient is currently stable and will be discharged home this afternoon.    Vital Signs:    Temp:  [97.5 °F (36.4 °C)-98.2 °F (36.8 °C)] 98.2 °F (36.8  °C)  Heart Rate:  [70-84] 77  Resp:  [17-19] 17  BP: (124-143)/(74-82) 130/80    Physical Exam:    General Appearance:  Alert and cooperative, not in any acute distress.   Head:  Atraumatic and normocephalic, without obvious abnormality.   Eyes:          PERRLA, conjunctivae and sclerae normal, no Icterus. No pallor. Extraocular movements are within normal limits.   Ears:  Ears appear intact with no abnormalities noted.   Throat: No oral lesions, no thrush, oral mucosa moist.   Neck: Supple, trachea midline, no thyromegaly, no carotid bruit.   Back:   No kyphoscoliosis present. No tenderness to palpation,   range of motion normal.   Lungs:   Chest shape is normal. Breath sounds heard bilaterally equally.  No crackles or wheezing. No Pleural rub or bronchial breathing.   Heart:  Normal S1 and S2, no murmur, no gallop, no rub. No JVD.   Abdomen:   Normal bowel sounds, no masses, no organomegaly. Soft, non-tender, non-distended, no guarding, no rebound tenderness.   Extremities: Moves all extremities well, trace edema, no cyanosis, no clubbing.   Pulses: Pulses palpable and equal bilaterally.   Skin: No bleeding, bruising or rash.   Lymph nodes: No palpable adenopathy.   Neurologic: Alert and oriented x 3. Moves all four limbs equally. No tremors. No facial asymetry.     Pertinent Lab Results:     Results from last 7 days   Lab Units 12/25/19  0454 12/24/19  0626 12/23/19  1728   SODIUM mmol/L 140 139 140   POTASSIUM mmol/L 4.0 4.3 4.4   CHLORIDE mmol/L 99 100 98   CO2 mmol/L 25.3 23.5 23.8   BUN mg/dL 43* 48* 52*   CREATININE mg/dL 2.08* 2.15* 2.29*   CALCIUM mg/dL 9.4 9.5 9.3   BILIRUBIN mg/dL  --  0.8  --    ALK PHOS U/L  --  57  --    ALT (SGPT) U/L  --  11  --    AST (SGOT) U/L  --  18  --    GLUCOSE mg/dL 102* 99 104*     Results from last 7 days   Lab Units 12/24/19  0626 12/23/19  1728   WBC 10*3/mm3 3.89 4.37   HEMOGLOBIN g/dL 8.7* 8.7*   HEMATOCRIT % 29.1* 28.1*   PLATELETS 10*3/mm3 215 219              Results from last 7 days   Lab Units 12/20/19  1021   PROBNP pg/mL 10,610*                       Invalid input(s): USDES,  BLOODU, NITRITITE, BACT, EP  Pain Management Panel     There is no flowsheet data to display.              Pertinent Radiology Results:    Imaging Results (All)     None          Condition on Discharge:      Stable.    Code status during the hospital stay:    Code Status and Medical Interventions:   Ordered at: 12/23/19 1810     Limited Support to NOT Include:    Intubation     Level Of Support Discussed With:    Patient     Code Status:    No CPR     Medical Interventions (Level of Support Prior to Arrest):    Limited       Discharge Disposition:    Home or Self Care    Discharge Medications:       Discharge Medications      New Medications      Instructions Start Date   furosemide 40 MG tablet  Commonly known as:  LASIX   40 mg, Oral, 2 Times Daily      spironolactone 25 MG tablet  Commonly known as:  ALDACTONE   12.5 mg, Oral, Daily   Start Date:  December 26, 2019        Changes to Medications      Instructions Start Date   isosorbide mononitrate 120 MG 24 hr tablet  Commonly known as:  IMDUR  What changed:  how much to take   60 mg, Oral, Daily         Continue These Medications      Instructions Start Date   apixaban 2.5 MG tablet tablet  Commonly known as:  ELIQUIS   2.5 mg, Oral, 2 Times Daily      atorvastatin 40 MG tablet  Commonly known as:  LIPITOR   40 mg, Oral, Nightly      bisoprolol 5 MG tablet  Commonly known as:  ZEBETA   7.5 mg, Oral, 2 Times Daily      ferrous gluconate 324 MG tablet  Commonly known as:  FERGON   324 mg, Oral, Daily With Breakfast      levothyroxine 75 MCG tablet  Commonly known as:  SYNTHROID, LEVOTHROID   75 mcg, Oral, Daily      nitroglycerin 0.4 MG SL tablet  Commonly known as:  NITROSTAT   0.4 mg, Sublingual, Every 5 Minutes PRN      pantoprazole 40 MG EC tablet  Commonly known as:  PROTONIX   40 mg, Oral, Daily      potassium chloride 10 MEQ CR  tablet  Commonly known as:  K-DUR   10 mEq, Oral, 2 Times Daily      Vitamin D (Cholecalciferol) 50 MCG (2000 UT) capsule   1 capsule, Oral, Daily         Stop These Medications    bumetanide 2 MG tablet  Commonly known as:  BUMEX            Discharge Diet:     Diet Instructions     Diet: Cardiac, Specialty Diet; Thin Liquids, No Restrictions; Low Sodium      Discharge Diet:   Cardiac  Specialty Diet       Fluid Consistency:  Thin Liquids, No Restrictions    Specialty Diets:  Low Sodium    Fluid Restriction per day:  1500 mL Fluid          Activity at Discharge:     Activity Instructions     Activity as Tolerated            Follow-up Appointments:    Additional Instructions for the Follow-ups that You Need to Schedule     Discharge Follow-up with PCP   As directed       Currently Documented PCP:    Anthony Sweet DO    PCP Phone Number:    [phone]     Follow Up Details:  1 week         Discharge Follow-up with Specified Provider: Dr. Tsang; 2 Weeks   As directed      To:  Dr. Tsang    Follow Up:  2 Weeks         Discharge Follow-up with Specified Provider: Dr. Higgins; 1 Week   As directed      To:  Dr. Higgins    Follow Up:  1 Week           Follow-up Information     Anthony Sweet DO .    Specialty:  Family Medicine  Why:  1 week  Contact information:  [address] [place] [address] [address] [place] [address]  [phone]                   Future Appointments   Date Time Provider Department Center   1/24/2020 10:00 AM Yomi Higgins MD [facility]   5/21/2020 10:30 AM Tien Archer MD [facility] None       Additional Instructions for the Follow-ups that You Need to Schedule     Discharge Follow-up with PCP   As directed       Currently Documented PCP:    Anthony Sweet DO    PCP Phone Number:    [phone]     Follow Up Details:  1 week         Discharge Follow-up with Specified Provider: Dr. Tsang; 2 Weeks   As directed      To:  Dr. Tsang    Follow Up:  2 Weeks         Discharge Follow-up with Specified  Provider: Dr. Higgins; 1 Week   As directed      To:  Dr. Higgins    Follow Up:  1 Week               Test Results Pending at Discharge:           Franklin Beauchamp MD  12/25/19  12:50 PM    Time spent: 25 minutes    Dictated utilizing Dragon dictation.

## 2019-12-26 NOTE — PROGRESS NOTES
Case Management Discharge Note      Final Note: home by car         Destination      No service has been selected for the patient.      Durable Medical Equipment      No service has been selected for the patient.      Dialysis/Infusion      No service has been selected for the patient.      Home Medical Care      No service has been selected for the patient.      Therapy      No service has been selected for the patient.      Community Resources      No service has been selected for the patient.        Transportation Services  Private: Car    Final Discharge Disposition Code: 01 - home or self-care

## 2019-12-26 NOTE — OUTREACH NOTE
Prep Survey      Responses   Facility patient discharged from?  [facility]   Is patient eligible?  Yes   Discharge diagnosis  CHF   Does the patient have one of the following disease processes/diagnoses(primary or secondary)?  CHF   Does the patient have Home health ordered?  No   Is there a DME ordered?  No   Comments regarding appointments  call for apmt   Medication alerts for this patient  lasix, aldactone, imdur   Prep survey completed?  Yes          Ivy Guzman RN

## 2019-12-26 NOTE — OUTREACH NOTE
"NICOLE call completed. Please see flow sheet for additional details.  Pt states she's \"pretty good\" - LE edema, SOA, cough sx all improving.  Weight this  lbs. Trying to follow 1.5G NA+/1500 cc diet/fluid restricts. Says just picked up lasix & spironolactone RXs at [facility] to start today- but notes lasix bottle has hand-written note to \"HOLD potassium\", that is not in her DC orders.  I called [facility] - pharmacist Madelin clarified that Dr Acosta called [facility] w/ [facility]-HOLD order after RXs received, b/c of spironolactone addition. Pt informed & she verb understanding. We discussed decreased dose of imdur also - pt states she will follow dose instructions per AVS.  Pt denies chest pain, palps, dizziness, F/C, N/V, bowel or bladder problems. Confirms NICOLE appt 12/31/19 & verb understanding re sx requiring immediate medical care. She verb appreciation for the call.  "

## 2019-12-31 NOTE — PROGRESS NOTES
Transitional Care Follow Up Visit  Subjective     Pat Morel is a 79 y.o. female who presents for a transitional care management visit.    Within 48 business hours after discharge our office contacted her via telephone to coordinate her care and needs.      I reviewed and discussed the details of that call along with the discharge summary, hospital problems, inpatient lab results, inpatient diagnostic studies, and consultation reports with Pat.     Current outpatient and discharge medications have been reconciled for the patient.  Reviewed by: Anthony Sweet DO      Date of TCM Phone Call 12/26/2019   [facility]   Date of Admission 12/23/2019   Date of Discharge 12/25/2019   Discharge Disposition Home or Self Care     Risk for Readmission (LACE) Score: 9 (12/25/2019  6:00 AM)      History of Present Illness   Course During Hospital Stay:  Patient is a very pleasant 79-year-old female with medical history of chronic congestive diastolic heart failure, CKD stage III, coronary artery disease status post CABG in 2012, multiple stents, hypertension, hyperlipidemia, prior CVA without any residual deficit, atrial fibrillation with previous use of amiodarone, hypersensitivity pneumonitis, mild pulmonary hypertension, who recently moved to [facility].  She is status post hospitalization at [facility] for volume overload about 2 weeks ago.  She follows with Dr. Higgins for cardiology and Dr. Sweet for primary care.  She presented to see Dr. Sweet on 12/23/2019 with complaints of increased lower extremity edema and shortness of breath. She had lab work done by Dr. Higgins was noted to have significantly elevated proBNP with mildly worsening renal functions from baseline 1.8 to 2.2.  She was also noted to be slightly hypoxic on room air to anywhere between 87 to 93% in the office.  She was admitted from his office and placed on IV diuretics in the form of Lasix 40 mg twice  daily.  Nephrology and cardiology were consulted.     Dr. Horvath from cardiology recommended continuation of loop diuretics in addition to low-dose Aldactone and Dr. Vang also saw the patient and increased her Lasix dose to 80 mg twice daily.  Patient diuresed well with significant improvement in her lower extremity edema as well as shortness of breath over the last 2 days.  She does not require any supplemental oxygen to maintain adequate oxygen saturation.  She denies any cough or chest pain or pressure.  As she has made significant improvement in her fluid overload without any worsening renal functions, she does appear to be stable for discharge home today.  Nephrology recommends the patient be placed back on 40 mg oral Lasix twice daily.  She is been advised to follow-up with Dr. Sweet in 1 week, Dr. Higgins in 1 week and nephrology in 2 weeks.  I have also advised her to follow a strict 1.5 g low-sodium diet and 1500 cc/day fluid restriction diet.  I have also advised that she wear compression stockings on a daily basis.  I asked her to check her weights daily and to take an extra Lasix dose if she gains more than 1 pound a day.     Patient reports continued diuresis.  She denies any orthopnea.  She states the edema to her lower extremities continues to be improved.  She did require an extra dose of Lasix on Sunday as a result of weight gain of unexpected nature, only approximately 1 pound.  Subsequently returned to her baseline weight on the following day.  She denies any fever or chills.  Denies any chest pain or palpitations.  Denies any dysphagia.  No reports of aspiration.  She has tolerated medication changes made at the time of her discharge.  She does have a scheduled follow-up appointment with cardiology at the end of January.  She also has a scheduled follow-up visit with nephrology.     The following portions of the patient's history were reviewed and updated as appropriate: allergies, current  medications, past family history, past medical history, past social history, past surgical history and problem list.    Review of Systems   Constitutional: Negative for activity change, appetite change, chills, diaphoresis, fatigue, fever and unexpected weight change.   HENT: Negative for congestion, dental problem, drooling, ear discharge, ear pain, facial swelling, hearing loss, mouth sores, nosebleeds, postnasal drip, rhinorrhea, sinus pressure, sneezing, sore throat, tinnitus, trouble swallowing and voice change.    Eyes: Negative for photophobia, pain, discharge, redness, itching and visual disturbance.   Respiratory: Positive for cough. Negative for apnea, choking, chest tightness, shortness of breath, wheezing and stridor.    Cardiovascular: Positive for leg swelling. Negative for chest pain and palpitations.   Gastrointestinal: Negative for abdominal distention, abdominal pain, anal bleeding, blood in stool, constipation, diarrhea, nausea, rectal pain and vomiting.   Endocrine: Negative for cold intolerance, heat intolerance, polydipsia, polyphagia and polyuria.   Genitourinary: Negative for decreased urine volume, difficulty urinating, dysuria, enuresis, flank pain, frequency, genital sores, hematuria and urgency.   Musculoskeletal: Negative for arthralgias, back pain, gait problem, joint swelling, myalgias, neck pain and neck stiffness.   Skin: Negative for color change, pallor, rash and wound.   Allergic/Immunologic: Negative for food allergies and immunocompromised state.   Neurological: Negative for dizziness, tremors, seizures, syncope, facial asymmetry, speech difficulty, weakness, light-headedness, numbness and headaches.   Hematological: Negative for adenopathy. Does not bruise/bleed easily.   Psychiatric/Behavioral: Negative for agitation, behavioral problems, confusion, decreased concentration, dysphoric mood, hallucinations, self-injury, sleep disturbance and suicidal ideas. The patient is not  nervous/anxious and is not hyperactive.          Objective   Physical Exam   Constitutional: She is oriented to person, place, and time. She appears well-developed and well-nourished. No distress.   HENT:   Head: Normocephalic and atraumatic.   Right Ear: External ear normal.   Left Ear: External ear normal.   Nose: Nose normal.   Mouth/Throat: Oropharynx is clear and moist. No oropharyngeal exudate.   Eyes: Pupils are equal, round, and reactive to light. Conjunctivae and EOM are normal. Right eye exhibits no discharge. Left eye exhibits no discharge. No scleral icterus.   Neck: Normal range of motion. Neck supple. No JVD present. No tracheal deviation present. No thyromegaly present.   Cardiovascular: Normal rate, normal heart sounds and intact distal pulses. Exam reveals no gallop and no friction rub.   No murmur heard.  Post CABG scarring noted to the sternum.   Pulmonary/Chest: Effort normal and breath sounds normal. No stridor. No respiratory distress. She has no wheezes. She has no rales. She exhibits no tenderness.   Referred upper airway congestion is cleared with light cough.  Audible air exchange noted all lung fields   Abdominal: Soft. Bowel sounds are normal. She exhibits no distension and no mass. There is no tenderness. There is no rebound and no guarding. No hernia.   Postsurgical scarring noted to the abdomen.   Genitourinary:   Genitourinary Comments: Pt defers   Musculoskeletal: Normal range of motion. She exhibits edema (1+ pitting edema that extends to the mid tibias bilaterally). She exhibits no tenderness or deformity.   Lymphadenopathy:     She has no cervical adenopathy.   Neurological: She is alert and oriented to person, place, and time. She has normal reflexes. She displays normal reflexes. No cranial nerve deficit. She exhibits normal muscle tone. Coordination normal.   Skin: Skin is warm. Capillary refill takes 2 to 3 seconds. No rash noted. She is not diaphoretic. No erythema. No  pallor.   Psychiatric: She has a normal mood and affect. Her behavior is normal. Judgment and thought content normal.   Nursing note and vitals reviewed.          Assessment/Plan   Pat was seen today for follow-up.    Diagnoses and all orders for this visit:    Acute on chronic diastolic congestive heart failure (CMS/HCC)  -     Basic Metabolic Panel    Chronic kidney disease, stage 3 (CMS/HCC)  -     Basic Metabolic Panel    Essential hypertension    Acquired hypothyroidism    Persistent atrial fibrillation    Other orders  -     atorvastatin (LIPITOR) 40 MG tablet; Take 1 tablet by mouth Every Night.      Patient is instructed to continue with current diuretic dosing regimen, including reduced dose of spironolactone.  She is to continue with her daily weight checks additionally.  BMP will be ordered today for evaluation of her electrolytes.    I have instructed patient to continue her planned follow-ups with cardiology and nephrology.    I will plan to see patient back in approximately 2 weeks for reevaluation, as well as repeat BMP.    Heart rate well controlled, vital signs demonstrate hemodynamic stability.    Continue current thyroid supplementation.

## 2020-01-01 ENCOUNTER — TRANSITIONAL CARE MANAGEMENT TELEPHONE ENCOUNTER (OUTPATIENT)
Dept: CALL CENTER | Facility: HOSPITAL | Age: 80
End: 2020-01-01

## 2020-01-01 ENCOUNTER — TELEPHONE (OUTPATIENT)
Dept: FAMILY MEDICINE CLINIC | Facility: CLINIC | Age: 80
End: 2020-01-01

## 2020-01-01 ENCOUNTER — EPISODE CHANGES (OUTPATIENT)
Dept: CASE MANAGEMENT | Facility: OTHER | Age: 80
End: 2020-01-01

## 2020-01-01 ENCOUNTER — READMISSION MANAGEMENT (OUTPATIENT)
Dept: CALL CENTER | Facility: HOSPITAL | Age: 80
End: 2020-01-01

## 2020-01-01 ENCOUNTER — TELEMEDICINE (OUTPATIENT)
Dept: NEUROLOGY | Facility: CLINIC | Age: 80
End: 2020-01-01

## 2020-01-01 ENCOUNTER — TELEPHONE (OUTPATIENT)
Dept: PULMONOLOGY | Facility: CLINIC | Age: 80
End: 2020-01-01

## 2020-01-01 ENCOUNTER — APPOINTMENT (OUTPATIENT)
Dept: GENERAL RADIOLOGY | Facility: HOSPITAL | Age: 80
End: 2020-01-01

## 2020-01-01 ENCOUNTER — ANCILLARY PROCEDURE (OUTPATIENT)
Dept: SPEECH THERAPY | Facility: HOSPITAL | Age: 80
End: 2020-01-01

## 2020-01-01 ENCOUNTER — OFFICE VISIT (OUTPATIENT)
Dept: FAMILY MEDICINE CLINIC | Facility: CLINIC | Age: 80
End: 2020-01-01

## 2020-01-01 ENCOUNTER — HOSPITAL ENCOUNTER (EMERGENCY)
Facility: HOSPITAL | Age: 80
Discharge: HOME | End: 2020-04-03
Attending: EMERGENCY MEDICINE | Admitting: EMERGENCY MEDICINE

## 2020-01-01 ENCOUNTER — OUTSIDE FACILITY SERVICE (OUTPATIENT)
Dept: INTERNAL MEDICINE | Facility: HOSPITAL | Age: 80
End: 2020-01-01

## 2020-01-01 ENCOUNTER — APPOINTMENT (OUTPATIENT)
Dept: CT IMAGING | Facility: HOSPITAL | Age: 80
End: 2020-01-01

## 2020-01-01 ENCOUNTER — TELEPHONE (OUTPATIENT)
Dept: NEUROLOGY | Facility: CLINIC | Age: 80
End: 2020-01-01

## 2020-01-01 ENCOUNTER — HOSPITAL ENCOUNTER (OUTPATIENT)
Dept: GENERAL RADIOLOGY | Facility: HOSPITAL | Age: 80
Discharge: HOME | End: 2020-07-09
Admitting: FAMILY MEDICINE

## 2020-01-01 ENCOUNTER — APPOINTMENT (OUTPATIENT)
Dept: ULTRASOUND IMAGING | Facility: HOSPITAL | Age: 80
End: 2020-01-01

## 2020-01-01 ENCOUNTER — HOSPITAL ENCOUNTER (INPATIENT)
Facility: HOSPITAL | Age: 80
LOS: 5 days | Discharge: HOME HEALTH | End: 2020-01-10
Attending: EMERGENCY MEDICINE | Admitting: FAMILY MEDICINE

## 2020-01-01 ENCOUNTER — TRANSITIONAL CARE MANAGEMENT TELEPHONE ENCOUNTER (OUTPATIENT)
Dept: FAMILY MEDICINE CLINIC | Facility: CLINIC | Age: 80
End: 2020-01-01

## 2020-01-01 ENCOUNTER — APPOINTMENT (OUTPATIENT)
Dept: MRI IMAGING | Facility: HOSPITAL | Age: 80
End: 2020-01-01

## 2020-01-01 ENCOUNTER — TRANSCRIBE ORDERS (OUTPATIENT)
Dept: ADMINISTRATIVE | Facility: HOSPITAL | Age: 80
End: 2020-01-01

## 2020-01-01 ENCOUNTER — HOSPITAL ENCOUNTER (INPATIENT)
Facility: HOSPITAL | Age: 80
LOS: 5 days | Discharge: HOME HEALTH | End: 2020-08-05
Attending: EMERGENCY MEDICINE | Admitting: INTERNAL MEDICINE

## 2020-01-01 ENCOUNTER — ANESTHESIA (OUTPATIENT)
Dept: PERIOP | Facility: HOSPITAL | Age: 80
End: 2020-01-01

## 2020-01-01 ENCOUNTER — APPOINTMENT (OUTPATIENT)
Dept: CARDIOLOGY | Facility: HOSPITAL | Age: 80
End: 2020-01-01

## 2020-01-01 ENCOUNTER — ANESTHESIA EVENT (OUTPATIENT)
Dept: PERIOP | Facility: HOSPITAL | Age: 80
End: 2020-01-01

## 2020-01-01 ENCOUNTER — APPOINTMENT (OUTPATIENT)
Dept: NEPHROLOGY | Facility: HOSPITAL | Age: 80
End: 2020-01-01

## 2020-01-01 ENCOUNTER — HOSPITAL ENCOUNTER (INPATIENT)
Facility: HOSPITAL | Age: 80
LOS: 13 days | Discharge: NURSING, REHAB OR LONG-TERM CARE | End: 2020-02-19
Attending: EMERGENCY MEDICINE | Admitting: INTERNAL MEDICINE

## 2020-01-01 ENCOUNTER — PATIENT OUTREACH (OUTPATIENT)
Dept: CASE MANAGEMENT | Facility: OTHER | Age: 80
End: 2020-01-01

## 2020-01-01 ENCOUNTER — HOSPITAL ENCOUNTER (EMERGENCY)
Facility: HOSPITAL | Age: 80
Discharge: HOME | End: 2020-06-08
Attending: EMERGENCY MEDICINE | Admitting: EMERGENCY MEDICINE

## 2020-01-01 ENCOUNTER — OFFICE VISIT (OUTPATIENT)
Dept: CARDIOLOGY | Facility: CLINIC | Age: 80
End: 2020-01-01

## 2020-01-01 ENCOUNTER — CONSULT (OUTPATIENT)
Dept: CARDIOLOGY | Facility: CLINIC | Age: 80
End: 2020-01-01

## 2020-01-01 ENCOUNTER — OUTSIDE FACILITY SERVICE (OUTPATIENT)
Dept: FAMILY MEDICINE CLINIC | Facility: CLINIC | Age: 80
End: 2020-01-01

## 2020-01-01 ENCOUNTER — HOSPITAL ENCOUNTER (INPATIENT)
Facility: HOSPITAL | Age: 80
LOS: 3 days | Discharge: HOME HEALTH | End: 2020-09-19
Attending: EMERGENCY MEDICINE | Admitting: HOSPITALIST

## 2020-01-01 ENCOUNTER — NURSING HOME (OUTPATIENT)
Dept: FAMILY MEDICINE CLINIC | Facility: CLINIC | Age: 80
End: 2020-01-01

## 2020-01-01 ENCOUNTER — OFFICE VISIT (OUTPATIENT)
Dept: PULMONOLOGY | Facility: CLINIC | Age: 80
End: 2020-01-01

## 2020-01-01 ENCOUNTER — HOSPITAL ENCOUNTER (INPATIENT)
Facility: HOSPITAL | Age: 80
LOS: 5 days | Discharge: HOME HEALTH | End: 2020-01-22
Attending: INTERNAL MEDICINE | Admitting: FAMILY MEDICINE

## 2020-01-01 ENCOUNTER — DOCUMENTATION (OUTPATIENT)
Dept: FAMILY MEDICINE CLINIC | Facility: CLINIC | Age: 80
End: 2020-01-01

## 2020-01-01 VITALS
OXYGEN SATURATION: 89 % | HEART RATE: 90 BPM | BODY MASS INDEX: 28 KG/M2 | TEMPERATURE: 97.9 F | HEIGHT: 58 IN | SYSTOLIC BLOOD PRESSURE: 121 MMHG | DIASTOLIC BLOOD PRESSURE: 70 MMHG | WEIGHT: 133.38 LBS

## 2020-01-01 VITALS
RESPIRATION RATE: 18 BRPM | DIASTOLIC BLOOD PRESSURE: 70 MMHG | WEIGHT: 120 LBS | OXYGEN SATURATION: 98 % | HEART RATE: 91 BPM | TEMPERATURE: 98.6 F | HEIGHT: 58 IN | BODY MASS INDEX: 25.19 KG/M2 | SYSTOLIC BLOOD PRESSURE: 123 MMHG

## 2020-01-01 VITALS
HEART RATE: 65 BPM | HEIGHT: 58 IN | OXYGEN SATURATION: 95 % | WEIGHT: 116 LBS | DIASTOLIC BLOOD PRESSURE: 60 MMHG | SYSTOLIC BLOOD PRESSURE: 104 MMHG | BODY MASS INDEX: 24.35 KG/M2 | TEMPERATURE: 98 F

## 2020-01-01 VITALS
OXYGEN SATURATION: 95 % | SYSTOLIC BLOOD PRESSURE: 110 MMHG | RESPIRATION RATE: 18 BRPM | DIASTOLIC BLOOD PRESSURE: 60 MMHG | HEART RATE: 108 BPM | BODY MASS INDEX: 22.25 KG/M2 | WEIGHT: 106 LBS | HEIGHT: 58 IN

## 2020-01-01 VITALS
TEMPERATURE: 97.5 F | SYSTOLIC BLOOD PRESSURE: 115 MMHG | OXYGEN SATURATION: 100 % | HEIGHT: 58 IN | RESPIRATION RATE: 16 BRPM | HEART RATE: 75 BPM | BODY MASS INDEX: 23.51 KG/M2 | WEIGHT: 112 LBS | DIASTOLIC BLOOD PRESSURE: 61 MMHG

## 2020-01-01 VITALS
TEMPERATURE: 98.8 F | HEART RATE: 95 BPM | OXYGEN SATURATION: 87 % | SYSTOLIC BLOOD PRESSURE: 139 MMHG | DIASTOLIC BLOOD PRESSURE: 84 MMHG | WEIGHT: 142.4 LBS | BODY MASS INDEX: 29.89 KG/M2 | HEIGHT: 58 IN | RESPIRATION RATE: 20 BRPM

## 2020-01-01 VITALS
OXYGEN SATURATION: 98 % | WEIGHT: 124.01 LBS | SYSTOLIC BLOOD PRESSURE: 130 MMHG | BODY MASS INDEX: 26.03 KG/M2 | DIASTOLIC BLOOD PRESSURE: 67 MMHG | HEIGHT: 58 IN | TEMPERATURE: 97.8 F | RESPIRATION RATE: 18 BRPM | HEART RATE: 108 BPM

## 2020-01-01 VITALS
BODY MASS INDEX: 30.94 KG/M2 | TEMPERATURE: 97.6 F | WEIGHT: 147.4 LBS | HEART RATE: 107 BPM | SYSTOLIC BLOOD PRESSURE: 152 MMHG | OXYGEN SATURATION: 94 % | DIASTOLIC BLOOD PRESSURE: 95 MMHG | RESPIRATION RATE: 16 BRPM | HEIGHT: 58 IN

## 2020-01-01 VITALS
SYSTOLIC BLOOD PRESSURE: 156 MMHG | HEIGHT: 58 IN | BODY MASS INDEX: 28.34 KG/M2 | WEIGHT: 135 LBS | DIASTOLIC BLOOD PRESSURE: 80 MMHG | TEMPERATURE: 98.8 F | OXYGEN SATURATION: 99 % | HEART RATE: 88 BPM

## 2020-01-01 VITALS
HEIGHT: 58 IN | TEMPERATURE: 97.8 F | SYSTOLIC BLOOD PRESSURE: 125 MMHG | BODY MASS INDEX: 23.56 KG/M2 | HEART RATE: 84 BPM | DIASTOLIC BLOOD PRESSURE: 60 MMHG | WEIGHT: 112.25 LBS | OXYGEN SATURATION: 97 %

## 2020-01-01 VITALS
RESPIRATION RATE: 24 BRPM | HEART RATE: 140 BPM | TEMPERATURE: 97.5 F | OXYGEN SATURATION: 95 % | SYSTOLIC BLOOD PRESSURE: 132 MMHG | DIASTOLIC BLOOD PRESSURE: 86 MMHG

## 2020-01-01 VITALS
HEART RATE: 72 BPM | DIASTOLIC BLOOD PRESSURE: 64 MMHG | SYSTOLIC BLOOD PRESSURE: 120 MMHG | TEMPERATURE: 97.8 F | OXYGEN SATURATION: 97 % | BODY MASS INDEX: 28.01 KG/M2 | RESPIRATION RATE: 18 BRPM | WEIGHT: 134 LBS

## 2020-01-01 VITALS
OXYGEN SATURATION: 96 % | BODY MASS INDEX: 29.45 KG/M2 | WEIGHT: 140.3 LBS | HEART RATE: 78 BPM | DIASTOLIC BLOOD PRESSURE: 64 MMHG | HEIGHT: 58 IN | SYSTOLIC BLOOD PRESSURE: 105 MMHG | TEMPERATURE: 97.4 F | RESPIRATION RATE: 18 BRPM

## 2020-01-01 VITALS
DIASTOLIC BLOOD PRESSURE: 62 MMHG | HEIGHT: 58 IN | WEIGHT: 112 LBS | OXYGEN SATURATION: 95 % | BODY MASS INDEX: 23.51 KG/M2 | SYSTOLIC BLOOD PRESSURE: 110 MMHG | HEART RATE: 72 BPM | TEMPERATURE: 99.2 F

## 2020-01-01 VITALS
TEMPERATURE: 97.8 F | OXYGEN SATURATION: 97 % | DIASTOLIC BLOOD PRESSURE: 60 MMHG | SYSTOLIC BLOOD PRESSURE: 120 MMHG | BODY MASS INDEX: 23.3 KG/M2 | WEIGHT: 111 LBS | HEIGHT: 58 IN | HEART RATE: 82 BPM

## 2020-01-01 VITALS
DIASTOLIC BLOOD PRESSURE: 70 MMHG | BODY MASS INDEX: 28 KG/M2 | HEIGHT: 58 IN | SYSTOLIC BLOOD PRESSURE: 115 MMHG | BODY MASS INDEX: 30.26 KG/M2 | WEIGHT: 144.13 LBS | HEIGHT: 58 IN | HEART RATE: 82 BPM | DIASTOLIC BLOOD PRESSURE: 60 MMHG | SYSTOLIC BLOOD PRESSURE: 114 MMHG | WEIGHT: 133.38 LBS | OXYGEN SATURATION: 91 % | RESPIRATION RATE: 16 BRPM | TEMPERATURE: 98 F | TEMPERATURE: 98 F

## 2020-01-01 VITALS
BODY MASS INDEX: 30.86 KG/M2 | SYSTOLIC BLOOD PRESSURE: 112 MMHG | HEIGHT: 58 IN | OXYGEN SATURATION: 98 % | HEART RATE: 75 BPM | WEIGHT: 147 LBS | DIASTOLIC BLOOD PRESSURE: 60 MMHG

## 2020-01-01 DIAGNOSIS — I48.91 ATRIAL FIBRILLATION WITH RVR (HCC): ICD-10-CM

## 2020-01-01 DIAGNOSIS — N17.9 ACUTE RENAL FAILURE, UNSPECIFIED ACUTE RENAL FAILURE TYPE (HCC): Primary | ICD-10-CM

## 2020-01-01 DIAGNOSIS — K21.9 GERD WITHOUT ESOPHAGITIS: ICD-10-CM

## 2020-01-01 DIAGNOSIS — R53.81 PHYSICAL DECONDITIONING: ICD-10-CM

## 2020-01-01 DIAGNOSIS — Z99.2 STAGE 5 CHRONIC KIDNEY DISEASE ON CHRONIC DIALYSIS (HCC): ICD-10-CM

## 2020-01-01 DIAGNOSIS — E03.9 ACQUIRED HYPOTHYROIDISM: ICD-10-CM

## 2020-01-01 DIAGNOSIS — D64.9 NORMOCYTIC ANEMIA: ICD-10-CM

## 2020-01-01 DIAGNOSIS — I48.19 PERSISTENT ATRIAL FIBRILLATION (HCC): ICD-10-CM

## 2020-01-01 DIAGNOSIS — I50.33 ACUTE ON CHRONIC DIASTOLIC CONGESTIVE HEART FAILURE (HCC): Primary | ICD-10-CM

## 2020-01-01 DIAGNOSIS — Z74.09 IMPAIRED MOBILITY AND ADLS: ICD-10-CM

## 2020-01-01 DIAGNOSIS — I63.9 ISCHEMIC STROKE OF FRONTAL LOBE (HCC): Primary | ICD-10-CM

## 2020-01-01 DIAGNOSIS — J67.9 HYPERSENSITIVITY PNEUMONITIS (HCC): ICD-10-CM

## 2020-01-01 DIAGNOSIS — I10 ESSENTIAL HYPERTENSION: ICD-10-CM

## 2020-01-01 DIAGNOSIS — I50.32 CHRONIC DIASTOLIC HEART FAILURE (HCC): ICD-10-CM

## 2020-01-01 DIAGNOSIS — J96.01 ACUTE RESPIRATORY FAILURE WITH HYPOXIA (HCC): ICD-10-CM

## 2020-01-01 DIAGNOSIS — M25.552 LEFT HIP PAIN: ICD-10-CM

## 2020-01-01 DIAGNOSIS — J84.9 INTERSTITIAL LUNG DISEASE (HCC): ICD-10-CM

## 2020-01-01 DIAGNOSIS — I50.32 CHRONIC DIASTOLIC HEART FAILURE (HCC): Primary | ICD-10-CM

## 2020-01-01 DIAGNOSIS — R53.1 WEAKNESS: Primary | ICD-10-CM
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
## 2020-01-01 DIAGNOSIS — I27.21 PULMONARY ARTERIAL HYPERTENSION (HCC): ICD-10-CM

## 2020-01-01 DIAGNOSIS — D68.318 COAGULATION DISORDER DUE TO CIRCULATING ANTICOAGULANTS (HCC): ICD-10-CM

## 2020-01-01 DIAGNOSIS — N18.6 CKD (CHRONIC KIDNEY DISEASE) REQUIRING CHRONIC DIALYSIS (HCC): ICD-10-CM

## 2020-01-01 DIAGNOSIS — I50.33 ACUTE ON CHRONIC DIASTOLIC CONGESTIVE HEART FAILURE (HCC): ICD-10-CM

## 2020-01-01 DIAGNOSIS — B37.0 ORAL CANDIDIASIS: Primary | ICD-10-CM

## 2020-01-01 DIAGNOSIS — J18.9 PNEUMONIA OF BOTH LUNGS DUE TO INFECTIOUS ORGANISM, UNSPECIFIED PART OF LUNG: Primary | ICD-10-CM

## 2020-01-01 DIAGNOSIS — N18.30 CHRONIC KIDNEY DISEASE, STAGE 3 (HCC): ICD-10-CM

## 2020-01-01 DIAGNOSIS — Z86.79 HISTORY OF ATRIAL FIBRILLATION: ICD-10-CM

## 2020-01-01 DIAGNOSIS — R47.01 APHASIA: ICD-10-CM

## 2020-01-01 DIAGNOSIS — Z99.2 CKD (CHRONIC KIDNEY DISEASE) REQUIRING CHRONIC DIALYSIS (HCC): ICD-10-CM

## 2020-01-01 DIAGNOSIS — R63.0 LACK OF APPETITE: ICD-10-CM

## 2020-01-01 DIAGNOSIS — I69.351 HEMIPARESIS AFFECTING RIGHT SIDE AS LATE EFFECT OF CEREBROVASCULAR ACCIDENT (HCC): Primary | ICD-10-CM

## 2020-01-01 DIAGNOSIS — J67.9 HYPERSENSITIVITY PNEUMONITIS (HCC): Primary | ICD-10-CM

## 2020-01-01 DIAGNOSIS — Z78.9 IMPAIRED MOBILITY AND ADLS: ICD-10-CM

## 2020-01-01 DIAGNOSIS — I25.10 CORONARY ARTERY DISEASE INVOLVING NATIVE CORONARY ARTERY OF NATIVE HEART WITHOUT ANGINA PECTORIS: ICD-10-CM

## 2020-01-01 DIAGNOSIS — Z74.09 IMPAIRED MOBILITY AND ADLS: Primary | ICD-10-CM

## 2020-01-01 DIAGNOSIS — R06.2 WHEEZE: ICD-10-CM

## 2020-01-01 DIAGNOSIS — R13.12 OROPHARYNGEAL DYSPHAGIA: ICD-10-CM

## 2020-01-01 DIAGNOSIS — R13.10 DYSPHAGIA, UNSPECIFIED TYPE: ICD-10-CM

## 2020-01-01 DIAGNOSIS — R06.09 DYSPNEA ON EXERTION: ICD-10-CM

## 2020-01-01 DIAGNOSIS — I69.30 LATE EFFECT OF CEREBROVASCULAR ACCIDENT (CVA): ICD-10-CM

## 2020-01-01 DIAGNOSIS — N18.6 STAGE 5 CHRONIC KIDNEY DISEASE ON CHRONIC DIALYSIS (HCC): ICD-10-CM

## 2020-01-01 DIAGNOSIS — I69.351 HEMIPARESIS AFFECTING RIGHT SIDE AS LATE EFFECT OF CEREBROVASCULAR ACCIDENT (HCC): ICD-10-CM

## 2020-01-01 DIAGNOSIS — R53.1 WEAKNESS: ICD-10-CM

## 2020-01-01 DIAGNOSIS — R42 DIZZINESS: ICD-10-CM

## 2020-01-01 DIAGNOSIS — D50.9 IRON DEFICIENCY ANEMIA, UNSPECIFIED IRON DEFICIENCY ANEMIA TYPE: ICD-10-CM

## 2020-01-01 DIAGNOSIS — I63.40 CEREBROVASCULAR ACCIDENT (CVA) DUE TO EMBOLISM OF CEREBRAL ARTERY (HCC): ICD-10-CM

## 2020-01-01 DIAGNOSIS — E87.20 LACTIC ACIDOSIS: ICD-10-CM

## 2020-01-01 DIAGNOSIS — R06.89 ACUTE RESPIRATORY INSUFFICIENCY: ICD-10-CM

## 2020-01-01 DIAGNOSIS — I48.91 ATRIAL FIBRILLATION, UNSPECIFIED TYPE (HCC): ICD-10-CM

## 2020-01-01 DIAGNOSIS — I48.19 ATRIAL FIBRILLATION, PERSISTENT (HCC): Primary | ICD-10-CM

## 2020-01-01 DIAGNOSIS — E78.5 DYSLIPIDEMIA: ICD-10-CM

## 2020-01-01 DIAGNOSIS — D68.9 COAGULOPATHY (HCC): ICD-10-CM

## 2020-01-01 DIAGNOSIS — R09.02 HYPOXIA: ICD-10-CM

## 2020-01-01 DIAGNOSIS — I48.19 PERSISTENT ATRIAL FIBRILLATION (HCC): Primary | ICD-10-CM

## 2020-01-01 DIAGNOSIS — N18.6 ESRD (END STAGE RENAL DISEASE) (HCC): Chronic | ICD-10-CM

## 2020-01-01 DIAGNOSIS — R07.9 CHEST PAIN, UNSPECIFIED TYPE: Primary | ICD-10-CM

## 2020-01-01 DIAGNOSIS — I69.320 COMBINED RECEPTIVE AND EXPRESSIVE APHASIA AS LATE EFFECT OF CEREBROVASCULAR ACCIDENT (CVA): ICD-10-CM

## 2020-01-01 DIAGNOSIS — I67.1 CEREBRAL ANEURYSM WITHOUT RUPTURE: ICD-10-CM

## 2020-01-01 DIAGNOSIS — I63.9 ISCHEMIC STROKE OF FRONTAL LOBE (HCC): ICD-10-CM

## 2020-01-01 DIAGNOSIS — I48.91 ATRIAL FIBRILLATION WITH RVR (HCC): Primary | ICD-10-CM

## 2020-01-01 DIAGNOSIS — Z74.09 IMPAIRED FUNCTIONAL MOBILITY, BALANCE, GAIT, AND ENDURANCE: ICD-10-CM

## 2020-01-01 DIAGNOSIS — N18.6 END STAGE RENAL DISEASE (HCC): Primary | ICD-10-CM

## 2020-01-01 DIAGNOSIS — I63.512 ACUTE ISCHEMIC LEFT MCA STROKE (HCC): Primary | ICD-10-CM

## 2020-01-01 DIAGNOSIS — R05.9 COUGH: Primary | ICD-10-CM

## 2020-01-01 DIAGNOSIS — R07.81 RIB PAIN ON LEFT SIDE: ICD-10-CM

## 2020-01-01 DIAGNOSIS — E87.5 HYPERKALEMIA: ICD-10-CM

## 2020-01-01 DIAGNOSIS — R53.81 DEBILITATED: ICD-10-CM

## 2020-01-01 DIAGNOSIS — N17.0 ACUTE RENAL FAILURE WITH TUBULAR NECROSIS (HCC): ICD-10-CM

## 2020-01-01 DIAGNOSIS — J20.9 ACUTE BRONCHITIS, UNSPECIFIED ORGANISM: ICD-10-CM

## 2020-01-01 DIAGNOSIS — R29.6 RECURRENT FALLS: ICD-10-CM

## 2020-01-01 DIAGNOSIS — Z78.9 IMPAIRED MOBILITY AND ADLS: Primary | ICD-10-CM

## 2020-01-01 DIAGNOSIS — I63.512 ACUTE ISCHEMIC LEFT MCA STROKE (HCC): ICD-10-CM

## 2020-01-01 DIAGNOSIS — F34.1 DYSTHYMIA: ICD-10-CM

## 2020-01-01 DIAGNOSIS — R77.8 ELEVATED TROPONIN: ICD-10-CM

## 2020-01-01 DIAGNOSIS — S31.801A TEAR OF SKIN OF BUTTOCK, UNSPECIFIED LATERALITY, INITIAL ENCOUNTER: ICD-10-CM

## 2020-01-01 DIAGNOSIS — E87.70 HYPERVOLEMIA, UNSPECIFIED HYPERVOLEMIA TYPE: ICD-10-CM

## 2020-01-01 DIAGNOSIS — F41.9 ANXIETY: ICD-10-CM

## 2020-01-01 LAB
25(OH)D3 SERPL-MCNC: 50.9 NG/ML (ref 30–100)
A FLAVUS AB SER QL ID: NEGATIVE
A FUMIGATUS AB SER QL ID: NEGATIVE
A FUMIGATUS1 AB SER QL ID: NEGATIVE
A NIGER AB SER QL ID: NEGATIVE
A PULLULANS AB SER QL: NEGATIVE
A-A DO2: 209.1 MMHG
A-A DO2: 415.9 MMHG
A-A DO2: 46.5 MMHG
A-A DO2: ABNORMAL
ABO + RH BLD: NORMAL
ABO GROUP BLD: NORMAL
ALBUMIN SERPL-MCNC: 2.9 G/DL (ref 3.5–5.2)
ALBUMIN SERPL-MCNC: 3.1 G/DL (ref 3.5–5.2)
ALBUMIN SERPL-MCNC: 3.1 G/DL (ref 3.5–5.2)
ALBUMIN SERPL-MCNC: 3.2 G/DL (ref 3.5–5.2)
ALBUMIN SERPL-MCNC: 3.3 G/DL (ref 3.5–5.2)
ALBUMIN SERPL-MCNC: 3.4 G/DL (ref 3.5–5.2)
ALBUMIN SERPL-MCNC: 3.6 G/DL (ref 3.5–5.2)
ALBUMIN SERPL-MCNC: 3.6 G/DL (ref 3.5–5.2)
ALBUMIN SERPL-MCNC: 3.7 G/DL (ref 3.5–5.2)
ALBUMIN SERPL-MCNC: 3.7 G/DL (ref 3.5–5.2)
ALBUMIN SERPL-MCNC: 3.8 G/DL (ref 3.5–5.2)
ALBUMIN SERPL-MCNC: 3.9 G/DL (ref 3.5–5.2)
ALBUMIN SERPL-MCNC: 4 G/DL (ref 3.5–5.2)
ALBUMIN SERPL-MCNC: 4.2 G/DL (ref 3.5–5.2)
ALBUMIN SERPL-MCNC: 4.6 G/DL (ref 3.5–5.2)
ALBUMIN SERPL-MCNC: 4.6 G/DL (ref 3.5–5.2)
ALBUMIN SERPL-MCNC: 4.7 G/DL (ref 3.5–5.2)
ALBUMIN/GLOB SERPL: 1 G/DL
ALBUMIN/GLOB SERPL: 1.1 G/DL
ALBUMIN/GLOB SERPL: 1.1 G/DL
ALBUMIN/GLOB SERPL: 1.2 G/DL
ALBUMIN/GLOB SERPL: 1.3 G/DL
ALBUMIN/GLOB SERPL: 1.4 G/DL
ALBUMIN/GLOB SERPL: 1.5 G/DL
ALBUMIN/GLOB SERPL: 1.6 G/DL
ALBUMIN/GLOB SERPL: 1.8 G/DL
ALP SERPL-CCNC: 109 U/L (ref 39–117)
ALP SERPL-CCNC: 180 U/L (ref 39–117)
ALP SERPL-CCNC: 189 U/L (ref 39–117)
ALP SERPL-CCNC: 195 U/L (ref 39–117)
ALP SERPL-CCNC: 210 U/L (ref 39–117)
ALP SERPL-CCNC: 215 U/L (ref 39–117)
ALP SERPL-CCNC: 216 U/L (ref 39–117)
ALP SERPL-CCNC: 222 U/L (ref 39–117)
ALP SERPL-CCNC: 229 U/L (ref 39–117)
ALP SERPL-CCNC: 233 U/L (ref 39–117)
ALP SERPL-CCNC: 234 U/L (ref 39–117)
ALP SERPL-CCNC: 253 U/L (ref 39–117)
ALP SERPL-CCNC: 261 U/L (ref 39–117)
ALP SERPL-CCNC: 275 U/L (ref 39–117)
ALP SERPL-CCNC: 71 U/L (ref 39–117)
ALP SERPL-CCNC: 92 U/L (ref 39–117)
ALP SERPL-CCNC: 96 U/L (ref 39–117)
ALP SERPL-CCNC: 96 U/L (ref 39–117)
ALT SERPL W P-5'-P-CCNC: 106 U/L (ref 1–33)
ALT SERPL W P-5'-P-CCNC: 119 U/L (ref 1–33)
ALT SERPL W P-5'-P-CCNC: 12 U/L (ref 1–33)
ALT SERPL W P-5'-P-CCNC: 129 U/L (ref 1–33)
ALT SERPL W P-5'-P-CCNC: 138 U/L (ref 1–33)
ALT SERPL W P-5'-P-CCNC: 149 U/L (ref 1–33)
ALT SERPL W P-5'-P-CCNC: 15 U/L (ref 1–33)
ALT SERPL W P-5'-P-CCNC: 163 U/L (ref 1–33)
ALT SERPL W P-5'-P-CCNC: 20 U/L (ref 1–33)
ALT SERPL W P-5'-P-CCNC: 26 U/L (ref 1–33)
ALT SERPL W P-5'-P-CCNC: 28 U/L (ref 1–33)
ALT SERPL W P-5'-P-CCNC: 29 U/L (ref 1–33)
ALT SERPL W P-5'-P-CCNC: 39 U/L (ref 1–33)
ALT SERPL W P-5'-P-CCNC: 43 U/L (ref 1–33)
ALT SERPL W P-5'-P-CCNC: 53 U/L (ref 1–33)
ALT SERPL W P-5'-P-CCNC: 54 U/L (ref 1–33)
ALT SERPL W P-5'-P-CCNC: 70 U/L (ref 1–33)
ALT SERPL-CCNC: 69 U/L (ref 1–33)
ANION GAP SERPL CALCULATED.3IONS-SCNC: 11 MMOL/L (ref 5–15)
ANION GAP SERPL CALCULATED.3IONS-SCNC: 11.2 MMOL/L (ref 5–15)
ANION GAP SERPL CALCULATED.3IONS-SCNC: 11.2 MMOL/L (ref 5–15)
ANION GAP SERPL CALCULATED.3IONS-SCNC: 12 MMOL/L (ref 5–15)
ANION GAP SERPL CALCULATED.3IONS-SCNC: 12 MMOL/L (ref 5–15)
ANION GAP SERPL CALCULATED.3IONS-SCNC: 12.1 MMOL/L (ref 5–15)
ANION GAP SERPL CALCULATED.3IONS-SCNC: 13 MMOL/L (ref 5–15)
ANION GAP SERPL CALCULATED.3IONS-SCNC: 13.9 MMOL/L (ref 5–15)
ANION GAP SERPL CALCULATED.3IONS-SCNC: 13.9 MMOL/L (ref 5–15)
ANION GAP SERPL CALCULATED.3IONS-SCNC: 14 MMOL/L (ref 5–15)
ANION GAP SERPL CALCULATED.3IONS-SCNC: 14 MMOL/L (ref 5–15)
ANION GAP SERPL CALCULATED.3IONS-SCNC: 14.2 MMOL/L (ref 5–15)
ANION GAP SERPL CALCULATED.3IONS-SCNC: 14.5 MMOL/L (ref 5–15)
ANION GAP SERPL CALCULATED.3IONS-SCNC: 14.9 MMOL/L (ref 5–15)
ANION GAP SERPL CALCULATED.3IONS-SCNC: 15 MMOL/L (ref 5–15)
ANION GAP SERPL CALCULATED.3IONS-SCNC: 15.4 MMOL/L (ref 5–15)
ANION GAP SERPL CALCULATED.3IONS-SCNC: 15.6 MMOL/L (ref 5–15)
ANION GAP SERPL CALCULATED.3IONS-SCNC: 16 MMOL/L (ref 5–15)
ANION GAP SERPL CALCULATED.3IONS-SCNC: 16.3 MMOL/L (ref 5–15)
ANION GAP SERPL CALCULATED.3IONS-SCNC: 16.7 MMOL/L (ref 5–15)
ANION GAP SERPL CALCULATED.3IONS-SCNC: 17.4 MMOL/L (ref 5–15)
ANION GAP SERPL CALCULATED.3IONS-SCNC: 17.6 MMOL/L (ref 5–15)
ANION GAP SERPL CALCULATED.3IONS-SCNC: 18.1 MMOL/L (ref 5–15)
ANION GAP SERPL CALCULATED.3IONS-SCNC: 19.1 MMOL/L (ref 5–15)
ANION GAP SERPL CALCULATED.3IONS-SCNC: 19.1 MMOL/L (ref 5–15)
ANION GAP SERPL CALCULATED.3IONS-SCNC: 19.2 MMOL/L (ref 5–15)
ANION GAP SERPL CALCULATED.3IONS-SCNC: 19.7 MMOL/L (ref 5–15)
ANION GAP SERPL CALCULATED.3IONS-SCNC: 19.9 MMOL/L (ref 5–15)
ANION GAP SERPL CALCULATED.3IONS-SCNC: 20 MMOL/L (ref 5–15)
ANION GAP SERPL CALCULATED.3IONS-SCNC: 20.4 MMOL/L (ref 5–15)
ANION GAP SERPL CALCULATED.3IONS-SCNC: 20.8 MMOL/L (ref 5–15)
ANION GAP SERPL CALCULATED.3IONS-SCNC: 23.1 MMOL/L (ref 5–15)
ANION GAP SERPL CALCULATED.3IONS-SCNC: 24.6 MMOL/L (ref 5–15)
ANISOCYTOSIS BLD QL: NORMAL
ARTERIAL PATENCY WRIST A: ABNORMAL
ARTERIAL PATENCY WRIST A: ABNORMAL
ARTERIAL PATENCY WRIST A: POSITIVE
AST SERPL-CCNC: 102 U/L (ref 1–32)
AST SERPL-CCNC: 106 U/L (ref 1–32)
AST SERPL-CCNC: 130 U/L (ref 1–32)
AST SERPL-CCNC: 157 U/L (ref 1–32)
AST SERPL-CCNC: 20 U/L (ref 1–32)
AST SERPL-CCNC: 24 U/L (ref 1–32)
AST SERPL-CCNC: 25 U/L (ref 1–32)
AST SERPL-CCNC: 37 U/L (ref 1–32)
AST SERPL-CCNC: 41 U/L (ref 1–32)
AST SERPL-CCNC: 42 U/L (ref 1–32)
AST SERPL-CCNC: 54 U/L (ref 1–32)
AST SERPL-CCNC: 55 U/L (ref 1–32)
AST SERPL-CCNC: 58 U/L (ref 1–32)
AST SERPL-CCNC: 63 U/L (ref 1–32)
AST SERPL-CCNC: 65 U/L (ref 1–32)
AST SERPL-CCNC: 66 U/L (ref 1–32)
AST SERPL-CCNC: 73 U/L (ref 1–32)
AST SERPL-CCNC: 83 U/L (ref 1–32)
ATMOSPHERIC PRESS: 733 MMHG
ATMOSPHERIC PRESS: 734 MMHG
ATMOSPHERIC PRESS: 734 MMHG
ATMOSPHERIC PRESS: 737 MMHG
ATMOSPHERIC PRESS: ABNORMAL MM[HG]
B DERMAT AB TITR SER: NEGATIVE {TITER}
B PARAPERT DNA SPEC QL NAA+PROBE: NOT DETECTED
B PARAPERT DNA SPEC QL NAA+PROBE: NOT DETECTED
B PERT DNA SPEC QL NAA+PROBE: NOT DETECTED
B PERT DNA SPEC QL NAA+PROBE: NOT DETECTED
BACTERIA SPEC AEROBE CULT: NO GROWTH
BACTERIA SPEC AEROBE CULT: NORMAL
BACTERIA UR QL AUTO: ABNORMAL /HPF
BACTERIA UR QL AUTO: ABNORMAL /HPF
BASE EXCESS BLDA CALC-SCNC: -3.5 MMOL/L (ref 0–2)
BASE EXCESS BLDA CALC-SCNC: -3.6 MMOL/L (ref 0–2)
BASE EXCESS BLDA CALC-SCNC: -4.4 MMOL/L (ref 0–2)
BASE EXCESS BLDA CALC-SCNC: 2 MMOL/L (ref -5–5)
BASE EXCESS BLDA CALC-SCNC: 2.4 MMOL/L (ref 0–2)
BASE EXCESS BLDA CALC-SCNC: 3.3 MMOL/L (ref 0–2)
BASOPHILS # BLD AUTO: 0 10*3/MM3 (ref 0–0.2)
BASOPHILS # BLD AUTO: 0.01 10*3/MM3 (ref 0–0.2)
BASOPHILS # BLD AUTO: 0.02 10*3/MM3 (ref 0–0.2)
BASOPHILS # BLD AUTO: 0.03 10*3/MM3 (ref 0–0.2)
BASOPHILS # BLD AUTO: 0.04 10*3/MM3 (ref 0–0.2)
BASOPHILS # BLD AUTO: 0.04 10*3/MM3 (ref 0–0.2)
BASOPHILS # BLD AUTO: 0.05 10*3/MM3 (ref 0–0.2)
BASOPHILS # BLD AUTO: 0.05 10*3/MM3 (ref 0–0.2)
BASOPHILS NFR BLD AUTO: 0 % (ref 0–1.5)
BASOPHILS NFR BLD AUTO: 0.1 % (ref 0–1.5)
BASOPHILS NFR BLD AUTO: 0.2 % (ref 0–1.5)
BASOPHILS NFR BLD AUTO: 0.3 % (ref 0–1.5)
BASOPHILS NFR BLD AUTO: 0.4 % (ref 0–1.5)
BASOPHILS NFR BLD AUTO: 0.5 % (ref 0–1.5)
BASOPHILS NFR BLD AUTO: 0.5 % (ref 0–1.5)
BASOPHILS NFR BLD AUTO: 0.6 % (ref 0–1.5)
BASOPHILS NFR BLD AUTO: 0.6 % (ref 0–1.5)
BDY SITE: ABNORMAL
BH BB BLOOD EXPIRATION DATE: NORMAL
BH BB BLOOD TYPE BARCODE: 6200
BH BB DISPENSE STATUS: NORMAL
BH BB PRODUCT CODE: NORMAL
BH BB UNIT NUMBER: NORMAL
BH CV ECHO MEAS - IVSD: 1.3 CM
BH CV ECHO MEAS - LA DIMENSION: 4.8 CM
BH CV ECHO MEAS - LVPWD: 1.2 CM
BH CV ECHO MEAS - RAP SYSTOLE: 12 MMHG
BH CV ECHO MEAS - RVSP: 60 MMHG
BH CV ECHO MEAS - RVSP: 77 MMHG
BH CV ECHO MEAS - TAPSE (>1.6): 1 CM
BH CV VAS BP RIGHT ARM: NORMAL MMHG
BILIRUB CONJ SERPL-MCNC: 0.9 MG/DL (ref 0.2–0.3)
BILIRUB CONJ SERPL-MCNC: 1.2 MG/DL (ref 0.2–0.3)
BILIRUB INDIRECT SERPL-MCNC: 0.4 MG/DL
BILIRUB INDIRECT SERPL-MCNC: 0.5 MG/DL
BILIRUB SERPL-MCNC: 0.2 MG/DL (ref 0.2–1.2)
BILIRUB SERPL-MCNC: 0.6 MG/DL (ref 0.2–1.2)
BILIRUB SERPL-MCNC: 0.6 MG/DL (ref 0–1.2)
BILIRUB SERPL-MCNC: 0.6 MG/DL (ref 0–1.2)
BILIRUB SERPL-MCNC: 1.3 MG/DL (ref 0.2–1.2)
BILIRUB SERPL-MCNC: 1.4 MG/DL (ref 0.2–1.2)
BILIRUB SERPL-MCNC: 1.5 MG/DL (ref 0.2–1.2)
BILIRUB SERPL-MCNC: 1.6 MG/DL (ref 0.2–1.2)
BILIRUB SERPL-MCNC: 1.8 MG/DL (ref 0.2–1.2)
BILIRUB SERPL-MCNC: 2.9 MG/DL (ref 0.2–1.2)
BILIRUB SERPL-MCNC: 3.1 MG/DL (ref 0.2–1.2)
BILIRUB UR QL STRIP: NEGATIVE
BLD GP AB SCN SERPL QL: NEGATIVE
BODY TEMPERATURE: 37 C
BUN BLD-MCNC: 102 MG/DL (ref 8–23)
BUN BLD-MCNC: 103 MG/DL (ref 8–23)
BUN BLD-MCNC: 105 MG/DL (ref 8–23)
BUN BLD-MCNC: 109 MG/DL (ref 8–23)
BUN BLD-MCNC: 109 MG/DL (ref 8–23)
BUN BLD-MCNC: 112 MG/DL (ref 8–23)
BUN BLD-MCNC: 119 MG/DL (ref 8–23)
BUN BLD-MCNC: 119 MG/DL (ref 8–23)
BUN BLD-MCNC: 132 MG/DL (ref 8–23)
BUN BLD-MCNC: 16 MG/DL (ref 8–23)
BUN BLD-MCNC: 41 MG/DL (ref 8–23)
BUN BLD-MCNC: 43 MG/DL (ref 8–23)
BUN BLD-MCNC: 44 MG/DL (ref 8–23)
BUN BLD-MCNC: 45 MG/DL (ref 8–23)
BUN BLD-MCNC: 46 MG/DL (ref 8–23)
BUN BLD-MCNC: 49 MG/DL (ref 8–23)
BUN BLD-MCNC: 51 MG/DL (ref 8–23)
BUN BLD-MCNC: 54 MG/DL (ref 8–23)
BUN BLD-MCNC: 58 MG/DL (ref 8–23)
BUN BLD-MCNC: 59 MG/DL (ref 8–23)
BUN BLD-MCNC: 63 MG/DL (ref 8–23)
BUN BLD-MCNC: 65 MG/DL (ref 8–23)
BUN BLD-MCNC: 66 MG/DL (ref 8–23)
BUN BLD-MCNC: 75 MG/DL (ref 8–23)
BUN BLD-MCNC: 8 MG/DL (ref 8–23)
BUN SERPL-MCNC: 16 MG/DL (ref 8–23)
BUN SERPL-MCNC: 17 MG/DL (ref 8–23)
BUN SERPL-MCNC: 26 MG/DL (ref 8–23)
BUN SERPL-MCNC: 28 MG/DL (ref 8–23)
BUN SERPL-MCNC: 35 MG/DL (ref 8–23)
BUN SERPL-MCNC: 65 MG/DL (ref 8–23)
BUN SERPL-MCNC: 9 MG/DL (ref 8–23)
BUN/CREAT SERPL: 11 (ref 7–25)
BUN/CREAT SERPL: 14.3 (ref 7–25)
BUN/CREAT SERPL: 16.8 (ref 7–25)
BUN/CREAT SERPL: 18.6 (ref 7–25)
BUN/CREAT SERPL: 19.3 (ref 7–25)
BUN/CREAT SERPL: 19.9 (ref 7–25)
BUN/CREAT SERPL: 20.4 (ref 7–25)
BUN/CREAT SERPL: 20.6 (ref 7–25)
BUN/CREAT SERPL: 22.5 (ref 7–25)
BUN/CREAT SERPL: 22.8 (ref 7–25)
BUN/CREAT SERPL: 23.1 (ref 7–25)
BUN/CREAT SERPL: 24.6 (ref 7–25)
BUN/CREAT SERPL: 24.7 (ref 7–25)
BUN/CREAT SERPL: 24.9 (ref 7–25)
BUN/CREAT SERPL: 26.3 (ref 7–25)
BUN/CREAT SERPL: 26.3 (ref 7–25)
BUN/CREAT SERPL: 26.9 (ref 7–25)
BUN/CREAT SERPL: 27.3 (ref 7–25)
BUN/CREAT SERPL: 27.4 (ref 7–25)
BUN/CREAT SERPL: 27.8 (ref 7–25)
BUN/CREAT SERPL: 28.5 (ref 7–25)
BUN/CREAT SERPL: 28.7 (ref 7–25)
BUN/CREAT SERPL: 28.7 (ref 7–25)
BUN/CREAT SERPL: 30.3 (ref 7–25)
BUN/CREAT SERPL: 30.4 (ref 7–25)
BUN/CREAT SERPL: 32 (ref 7–25)
BUN/CREAT SERPL: 32.1 (ref 7–25)
BUN/CREAT SERPL: 34.2 (ref 7–25)
BUN/CREAT SERPL: 36 (ref 7–25)
BUN/CREAT SERPL: 6.5 (ref 7–25)
BUN/CREAT SERPL: 7.4 (ref 7–25)
BUN/CREAT SERPL: 7.4 (ref 7–25)
BUN/CREAT SERPL: 8.5 (ref 7–25)
BUN/CREAT SERPL: 8.6 (ref 7–25)
BUN/CREAT SERPL: 9.3 (ref 7–25)
BUN/CREAT SERPL: 9.8 (ref 7–25)
C PNEUM DNA NPH QL NAA+NON-PROBE: NOT DETECTED
C PNEUM DNA NPH QL NAA+NON-PROBE: NOT DETECTED
CA-I BLDA-SCNC: 1.14 MMOL/L (ref 1.2–1.32)
CALCIUM SERPL-MCNC: 9.4 MG/DL (ref 8.6–10.5)
CALCIUM SPEC-SCNC: 7.2 MG/DL (ref 8.6–10.5)
CALCIUM SPEC-SCNC: 7.9 MG/DL (ref 8.6–10.5)
CALCIUM SPEC-SCNC: 8 MG/DL (ref 8.6–10.5)
CALCIUM SPEC-SCNC: 8 MG/DL (ref 8.6–10.5)
CALCIUM SPEC-SCNC: 8.3 MG/DL (ref 8.6–10.5)
CALCIUM SPEC-SCNC: 8.4 MG/DL (ref 8.6–10.5)
CALCIUM SPEC-SCNC: 8.4 MG/DL (ref 8.6–10.5)
CALCIUM SPEC-SCNC: 8.5 MG/DL (ref 8.6–10.5)
CALCIUM SPEC-SCNC: 8.6 MG/DL (ref 8.6–10.5)
CALCIUM SPEC-SCNC: 8.7 MG/DL (ref 8.6–10.5)
CALCIUM SPEC-SCNC: 8.7 MG/DL (ref 8.6–10.5)
CALCIUM SPEC-SCNC: 8.8 MG/DL (ref 8.6–10.5)
CALCIUM SPEC-SCNC: 8.9 MG/DL (ref 8.6–10.5)
CALCIUM SPEC-SCNC: 9 MG/DL (ref 8.6–10.5)
CALCIUM SPEC-SCNC: 9.1 MG/DL (ref 8.6–10.5)
CALCIUM SPEC-SCNC: 9.1 MG/DL (ref 8.6–10.5)
CALCIUM SPEC-SCNC: 9.2 MG/DL (ref 8.6–10.5)
CALCIUM SPEC-SCNC: 9.3 MG/DL (ref 8.6–10.5)
CALCIUM SPEC-SCNC: 9.3 MG/DL (ref 8.6–10.5)
CALCIUM SPEC-SCNC: 9.4 MG/DL (ref 8.6–10.5)
CALCIUM SPEC-SCNC: 9.5 MG/DL (ref 8.6–10.5)
CALCIUM SPEC-SCNC: 9.5 MG/DL (ref 8.6–10.5)
CALCIUM SPEC-SCNC: 9.6 MG/DL (ref 8.6–10.5)
CALCIUM SPEC-SCNC: 9.6 MG/DL (ref 8.6–10.5)
CALCIUM SPEC-SCNC: 9.7 MG/DL (ref 8.6–10.5)
CALCIUM SPEC-SCNC: 9.8 MG/DL (ref 8.6–10.5)
CALCIUM SPEC-SCNC: 9.9 MG/DL (ref 8.6–10.5)
CHLORIDE SERPL-SCNC: 100 MMOL/L (ref 98–107)
CHLORIDE SERPL-SCNC: 101 MMOL/L (ref 98–107)
CHLORIDE SERPL-SCNC: 103 MMOL/L (ref 98–107)
CHLORIDE SERPL-SCNC: 104 MMOL/L (ref 98–107)
CHLORIDE SERPL-SCNC: 104 MMOL/L (ref 98–107)
CHLORIDE SERPL-SCNC: 105 MMOL/L (ref 98–107)
CHLORIDE SERPL-SCNC: 92 MMOL/L (ref 98–107)
CHLORIDE SERPL-SCNC: 93 MMOL/L (ref 98–107)
CHLORIDE SERPL-SCNC: 93 MMOL/L (ref 98–107)
CHLORIDE SERPL-SCNC: 94 MMOL/L (ref 98–107)
CHLORIDE SERPL-SCNC: 96 MMOL/L (ref 98–107)
CHLORIDE SERPL-SCNC: 97 MMOL/L (ref 98–107)
CHLORIDE SERPL-SCNC: 99 MMOL/L (ref 98–107)
CHOLEST SERPL-MCNC: 165 MG/DL (ref 0–200)
CHOLEST SERPL-MCNC: 189 MG/DL (ref 0–200)
CLARITY UR: ABNORMAL
CLARITY UR: ABNORMAL
CLARITY UR: CLEAR
CO2 BLDA-SCNC: 27.5 MMOL/L (ref 22–33)
CO2 BLDA-SCNC: 28 MMOL/L (ref 24–29)
CO2 SERPL-SCNC: 18.4 MMOL/L (ref 22–29)
CO2 SERPL-SCNC: 18.6 MMOL/L (ref 22–29)
CO2 SERPL-SCNC: 19 MMOL/L (ref 22–29)
CO2 SERPL-SCNC: 19.6 MMOL/L (ref 22–29)
CO2 SERPL-SCNC: 19.9 MMOL/L (ref 22–29)
CO2 SERPL-SCNC: 20 MMOL/L (ref 22–29)
CO2 SERPL-SCNC: 20 MMOL/L (ref 22–29)
CO2 SERPL-SCNC: 20.1 MMOL/L (ref 22–29)
CO2 SERPL-SCNC: 20.4 MMOL/L (ref 22–29)
CO2 SERPL-SCNC: 20.9 MMOL/L (ref 22–29)
CO2 SERPL-SCNC: 20.9 MMOL/L (ref 22–29)
CO2 SERPL-SCNC: 21.1 MMOL/L (ref 22–29)
CO2 SERPL-SCNC: 21.2 MMOL/L (ref 22–29)
CO2 SERPL-SCNC: 21.3 MMOL/L (ref 22–29)
CO2 SERPL-SCNC: 21.8 MMOL/L (ref 22–29)
CO2 SERPL-SCNC: 21.9 MMOL/L (ref 22–29)
CO2 SERPL-SCNC: 22.4 MMOL/L (ref 22–29)
CO2 SERPL-SCNC: 22.5 MMOL/L (ref 22–29)
CO2 SERPL-SCNC: 22.6 MMOL/L (ref 22–29)
CO2 SERPL-SCNC: 22.8 MMOL/L (ref 22–29)
CO2 SERPL-SCNC: 23 MMOL/L (ref 22–29)
CO2 SERPL-SCNC: 23 MMOL/L (ref 22–29)
CO2 SERPL-SCNC: 23.3 MMOL/L (ref 22–29)
CO2 SERPL-SCNC: 23.7 MMOL/L (ref 22–29)
CO2 SERPL-SCNC: 23.9 MMOL/L (ref 22–29)
CO2 SERPL-SCNC: 24 MMOL/L (ref 22–29)
CO2 SERPL-SCNC: 24.1 MMOL/L (ref 22–29)
CO2 SERPL-SCNC: 25 MMOL/L (ref 22–29)
CO2 SERPL-SCNC: 25.1 MMOL/L (ref 22–29)
CO2 SERPL-SCNC: 26.1 MMOL/L (ref 22–29)
COHGB MFR BLD: 0.4 % (ref 0–2)
COHGB MFR BLD: 0.7 % (ref 0–2)
COHGB MFR BLD: 0.8 % (ref 0–2)
COHGB MFR BLD: 1.3 % (ref 0–2)
COHGB MFR BLD: 1.4 % (ref 0–2)
COLLECT DURATION TIME UR: 24 HRS
COLLECT DURATION TIME UR: 24 HRS
COLOR UR: ABNORMAL
COLOR UR: YELLOW
COLOR UR: YELLOW
CREAT BLD-MCNC: 0.43 MG/DL (ref 0.57–1)
CREAT BLD-MCNC: 1.12 MG/DL (ref 0.57–1)
CREAT BLD-MCNC: 1.5 MG/DL (ref 0.57–1)
CREAT BLD-MCNC: 1.58 MG/DL (ref 0.57–1)
CREAT BLD-MCNC: 1.64 MG/DL (ref 0.57–1)
CREAT BLD-MCNC: 1.75 MG/DL (ref 0.57–1)
CREAT BLD-MCNC: 1.77 MG/DL (ref 0.57–1)
CREAT BLD-MCNC: 1.81 MG/DL (ref 0.57–1)
CREAT BLD-MCNC: 1.86 MG/DL (ref 0.57–1)
CREAT BLD-MCNC: 1.87 MG/DL (ref 0.57–1)
CREAT BLD-MCNC: 1.9 MG/DL (ref 0.57–1)
CREAT BLD-MCNC: 1.96 MG/DL (ref 0.57–1)
CREAT BLD-MCNC: 1.98 MG/DL (ref 0.57–1)
CREAT BLD-MCNC: 2.01 MG/DL (ref 0.57–1)
CREAT BLD-MCNC: 2.54 MG/DL (ref 0.57–1)
CREAT BLD-MCNC: 3.03 MG/DL (ref 0.57–1)
CREAT BLD-MCNC: 3.25 MG/DL (ref 0.57–1)
CREAT BLD-MCNC: 3.32 MG/DL (ref 0.57–1)
CREAT BLD-MCNC: 3.68 MG/DL (ref 0.57–1)
CREAT BLD-MCNC: 3.93 MG/DL (ref 0.57–1)
CREAT BLD-MCNC: 3.98 MG/DL (ref 0.57–1)
CREAT BLD-MCNC: 4.05 MG/DL (ref 0.57–1)
CREAT BLD-MCNC: 4.14 MG/DL (ref 0.57–1)
CREAT BLD-MCNC: 4.53 MG/DL (ref 0.57–1)
CREAT BLD-MCNC: 4.61 MG/DL (ref 0.57–1)
CREAT BLD-MCNC: 4.74 MG/DL (ref 0.57–1)
CREAT BLD-MCNC: 4.99 MG/DL (ref 0.57–1)
CREAT BLDA-MCNC: 2.2 MG/DL (ref 0.6–1.3)
CREAT CL 24H UR+SERPL-VRATE: 3.2 ML/MIN (ref 88–128)
CREAT CL 24H UR+SERPL-VRATE: 4.6 L/24 HR (ref 126.7–184.3)
CREAT SERPL-MCNC: 1.38 MG/DL (ref 0.57–1)
CREAT SERPL-MCNC: 1.87 MG/DL (ref 0.57–1)
CREAT SERPL-MCNC: 2 MG/DL (ref 0.57–1)
CREAT SERPL-MCNC: 2.15 MG/DL (ref 0.57–1)
CREAT SERPL-MCNC: 2.17 MG/DL (ref 0.57–1)
CREAT SERPL-MCNC: 2.47 MG/DL (ref 0.57–1)
CREAT SERPL-MCNC: 2.64 MG/DL (ref 0.57–1)
CREAT SERPL-MCNC: 3.01 MG/DL (ref 0.57–1)
CREAT SERPL-MCNC: 3.17 MG/DL (ref 0.57–1)
CREAT UR-MCNC: 43.3 MG/DL
CREAT UR-MCNC: 43.7 MG/DL
CREAT UR-MCNC: 46.7 MG/DL
CREAT UR-MCNC: 67.7 MG/DL
CREATINE 24H UR-MRATE: 0.17 G/24 HR (ref 0.7–1.6)
CREATINE 24H UR-MRATE: 0.17 G/24 HR (ref 0.7–1.6)
CROSSMATCH INTERPRETATION: NORMAL
CRP SERPL-MCNC: 11.74 MG/DL (ref 0–0.5)
CRP SERPL-MCNC: 3.04 MG/DL (ref 0–0.5)
CRP SERPL-MCNC: 7.13 MG/DL (ref 0–0.5)
D-LACTATE SERPL-SCNC: 1.3 MMOL/L (ref 0.5–2)
D-LACTATE SERPL-SCNC: 2.4 MMOL/L (ref 0.5–2)
D-LACTATE SERPL-SCNC: 2.8 MMOL/L (ref 0.5–2)
D-LACTATE SERPL-SCNC: 3.9 MMOL/L (ref 0.5–2)
D-LACTATE SERPL-SCNC: 6.6 MMOL/L (ref 0.5–2)
DEPRECATED RDW RBC AUTO: 48 FL (ref 37–54)
DEPRECATED RDW RBC AUTO: 55.1 FL (ref 37–54)
DEPRECATED RDW RBC AUTO: 56.2 FL (ref 37–54)
DEPRECATED RDW RBC AUTO: 57.6 FL (ref 37–54)
DEPRECATED RDW RBC AUTO: 57.8 FL (ref 37–54)
DEPRECATED RDW RBC AUTO: 57.9 FL (ref 37–54)
DEPRECATED RDW RBC AUTO: 57.9 FL (ref 37–54)
DEPRECATED RDW RBC AUTO: 58 FL (ref 37–54)
DEPRECATED RDW RBC AUTO: 58.1 FL (ref 37–54)
DEPRECATED RDW RBC AUTO: 58.2 FL (ref 37–54)
DEPRECATED RDW RBC AUTO: 59.4 FL (ref 37–54)
DEPRECATED RDW RBC AUTO: 59.7 FL (ref 37–54)
DEPRECATED RDW RBC AUTO: 60.4 FL (ref 37–54)
DEPRECATED RDW RBC AUTO: 61.2 FL (ref 37–54)
DEPRECATED RDW RBC AUTO: 61.7 FL (ref 37–54)
DEPRECATED RDW RBC AUTO: 62.6 FL (ref 37–54)
DEPRECATED RDW RBC AUTO: 63 FL (ref 37–54)
DEPRECATED RDW RBC AUTO: 63 FL (ref 37–54)
DEPRECATED RDW RBC AUTO: 63.6 FL (ref 37–54)
DEPRECATED RDW RBC AUTO: 63.8 FL (ref 37–54)
DEPRECATED RDW RBC AUTO: 64.1 FL (ref 37–54)
DEPRECATED RDW RBC AUTO: 64.4 FL (ref 37–54)
DEPRECATED RDW RBC AUTO: 64.8 FL (ref 37–54)
DEPRECATED RDW RBC AUTO: 65.1 FL (ref 37–54)
DEPRECATED RDW RBC AUTO: 65.2 FL (ref 37–54)
DEPRECATED RDW RBC AUTO: 65.6 FL (ref 37–54)
DEPRECATED RDW RBC AUTO: 66.2 FL (ref 37–54)
DEPRECATED RDW RBC AUTO: 66.5 FL (ref 37–54)
DEPRECATED RDW RBC AUTO: 66.8 FL (ref 37–54)
DEPRECATED RDW RBC AUTO: 71.6 FL (ref 37–54)
ELLIPTOCYTES BLD QL SMEAR: NORMAL
EOSINOPHIL # BLD AUTO: 0 10*3/MM3 (ref 0–0.4)
EOSINOPHIL # BLD AUTO: 0.04 10*3/MM3 (ref 0–0.4)
EOSINOPHIL # BLD AUTO: 0.05 10*3/MM3 (ref 0–0.4)
EOSINOPHIL # BLD AUTO: 0.06 10*3/MM3 (ref 0–0.4)
EOSINOPHIL # BLD AUTO: 0.1 10*3/MM3 (ref 0–0.4)
EOSINOPHIL # BLD AUTO: 0.18 10*3/MM3 (ref 0–0.4)
EOSINOPHIL # BLD AUTO: 0.22 10*3/MM3 (ref 0–0.4)
EOSINOPHIL # BLD AUTO: 0.24 10*3/MM3 (ref 0–0.4)
EOSINOPHIL NFR BLD AUTO: 0 % (ref 0.3–6.2)
EOSINOPHIL NFR BLD AUTO: 0.5 % (ref 0.3–6.2)
EOSINOPHIL NFR BLD AUTO: 0.5 % (ref 0.3–6.2)
EOSINOPHIL NFR BLD AUTO: 0.9 % (ref 0.3–6.2)
EOSINOPHIL NFR BLD AUTO: 1.9 % (ref 0.3–6.2)
EOSINOPHIL NFR BLD AUTO: 2 % (ref 0.3–6.2)
EOSINOPHIL NFR BLD AUTO: 2.7 % (ref 0.3–6.2)
EOSINOPHIL NFR BLD AUTO: 2.9 % (ref 0.3–6.2)
ERYTHROCYTE [DISTWIDTH] IN BLOOD BY AUTOMATED COUNT: 13.9 % (ref 12.3–15.4)
ERYTHROCYTE [DISTWIDTH] IN BLOOD BY AUTOMATED COUNT: 15.5 % (ref 12.3–15.4)
ERYTHROCYTE [DISTWIDTH] IN BLOOD BY AUTOMATED COUNT: 15.7 % (ref 12.3–15.4)
ERYTHROCYTE [DISTWIDTH] IN BLOOD BY AUTOMATED COUNT: 15.8 % (ref 12.3–15.4)
ERYTHROCYTE [DISTWIDTH] IN BLOOD BY AUTOMATED COUNT: 15.9 % (ref 12.3–15.4)
ERYTHROCYTE [DISTWIDTH] IN BLOOD BY AUTOMATED COUNT: 16.2 % (ref 12.3–15.4)
ERYTHROCYTE [DISTWIDTH] IN BLOOD BY AUTOMATED COUNT: 16.2 % (ref 12.3–15.4)
ERYTHROCYTE [DISTWIDTH] IN BLOOD BY AUTOMATED COUNT: 16.5 % (ref 12.3–15.4)
ERYTHROCYTE [DISTWIDTH] IN BLOOD BY AUTOMATED COUNT: 16.7 % (ref 12.3–15.4)
ERYTHROCYTE [DISTWIDTH] IN BLOOD BY AUTOMATED COUNT: 16.7 % (ref 12.3–15.4)
ERYTHROCYTE [DISTWIDTH] IN BLOOD BY AUTOMATED COUNT: 16.8 % (ref 12.3–15.4)
ERYTHROCYTE [DISTWIDTH] IN BLOOD BY AUTOMATED COUNT: 16.9 % (ref 12.3–15.4)
ERYTHROCYTE [DISTWIDTH] IN BLOOD BY AUTOMATED COUNT: 17.3 % (ref 12.3–15.4)
ERYTHROCYTE [DISTWIDTH] IN BLOOD BY AUTOMATED COUNT: 17.9 % (ref 12.3–15.4)
ERYTHROCYTE [DISTWIDTH] IN BLOOD BY AUTOMATED COUNT: 17.9 % (ref 12.3–15.4)
ERYTHROCYTE [DISTWIDTH] IN BLOOD BY AUTOMATED COUNT: 18.2 % (ref 12.3–15.4)
ERYTHROCYTE [DISTWIDTH] IN BLOOD BY AUTOMATED COUNT: 18.3 % (ref 12.3–15.4)
ERYTHROCYTE [DISTWIDTH] IN BLOOD BY AUTOMATED COUNT: 18.4 % (ref 12.3–15.4)
ERYTHROCYTE [DISTWIDTH] IN BLOOD BY AUTOMATED COUNT: 18.8 % (ref 12.3–15.4)
ERYTHROCYTE [DISTWIDTH] IN BLOOD BY AUTOMATED COUNT: 18.8 % (ref 12.3–15.4)
ERYTHROCYTE [DISTWIDTH] IN BLOOD BY AUTOMATED COUNT: 18.9 % (ref 12.3–15.4)
ERYTHROCYTE [DISTWIDTH] IN BLOOD BY AUTOMATED COUNT: 19.1 % (ref 12.3–15.4)
ERYTHROCYTE [DISTWIDTH] IN BLOOD BY AUTOMATED COUNT: 19.1 % (ref 12.3–15.4)
ERYTHROCYTE [DISTWIDTH] IN BLOOD BY AUTOMATED COUNT: 19.3 % (ref 12.3–15.4)
ERYTHROCYTE [DISTWIDTH] IN BLOOD BY AUTOMATED COUNT: 19.4 % (ref 12.3–15.4)
ERYTHROCYTE [DISTWIDTH] IN BLOOD BY AUTOMATED COUNT: 19.5 % (ref 12.3–15.4)
ERYTHROCYTE [DISTWIDTH] IN BLOOD BY AUTOMATED COUNT: 19.5 % (ref 12.3–15.4)
ERYTHROCYTE [DISTWIDTH] IN BLOOD BY AUTOMATED COUNT: 19.6 % (ref 12.3–15.4)
ERYTHROCYTE [DISTWIDTH] IN BLOOD BY AUTOMATED COUNT: 19.6 % (ref 12.3–15.4)
EXPIRATION DATE: NORMAL
FLUAV AG NPH QL: NEGATIVE
FLUAV AG NPH QL: NEGATIVE
FLUAV H1 2009 PAND RNA NPH QL NAA+PROBE: NOT DETECTED
FLUAV H1 2009 PAND RNA NPH QL NAA+PROBE: NOT DETECTED
FLUAV H1 HA GENE NPH QL NAA+PROBE: NOT DETECTED
FLUAV H1 HA GENE NPH QL NAA+PROBE: NOT DETECTED
FLUAV H3 RNA NPH QL NAA+PROBE: NOT DETECTED
FLUAV H3 RNA NPH QL NAA+PROBE: NOT DETECTED
FLUAV SUBTYP SPEC NAA+PROBE: NOT DETECTED
FLUAV SUBTYP SPEC NAA+PROBE: NOT DETECTED
FLUBV AG NPH QL IA: NEGATIVE
FLUBV AG NPH QL: NEGATIVE
FLUBV RNA ISLT QL NAA+PROBE: NOT DETECTED
FLUBV RNA ISLT QL NAA+PROBE: NOT DETECTED
GALACTOMANNAN AG SPEC IA-ACNC: 0.12 INDEX (ref 0–0.49)
GAS FLOW AIRWAY: 1.5 LPM
GAS FLOW AIRWAY: 6 LPM
GAS FLOW AIRWAY: 8 LPM
GFR SERPL CREATININE-BSD FRML MDRD: 10 ML/MIN/1.73
GFR SERPL CREATININE-BSD FRML MDRD: 11 ML/MIN/1.73
GFR SERPL CREATININE-BSD FRML MDRD: 12 ML/MIN/1.73
GFR SERPL CREATININE-BSD FRML MDRD: 13 ML/MIN/1.73
GFR SERPL CREATININE-BSD FRML MDRD: 14 ML/MIN/1.73
GFR SERPL CREATININE-BSD FRML MDRD: 14 ML/MIN/1.73
GFR SERPL CREATININE-BSD FRML MDRD: 142 ML/MIN/1.73
GFR SERPL CREATININE-BSD FRML MDRD: 15 ML/MIN/1.73
GFR SERPL CREATININE-BSD FRML MDRD: 15 ML/MIN/1.73
GFR SERPL CREATININE-BSD FRML MDRD: 17 ML/MIN/1.73
GFR SERPL CREATININE-BSD FRML MDRD: 18 ML/MIN/1.73
GFR SERPL CREATININE-BSD FRML MDRD: 22 ML/MIN/1.73
GFR SERPL CREATININE-BSD FRML MDRD: 22 ML/MIN/1.73
GFR SERPL CREATININE-BSD FRML MDRD: 24 ML/MIN/1.73
GFR SERPL CREATININE-BSD FRML MDRD: 25 ML/MIN/1.73
GFR SERPL CREATININE-BSD FRML MDRD: 26 ML/MIN/1.73
GFR SERPL CREATININE-BSD FRML MDRD: 27 ML/MIN/1.73
GFR SERPL CREATININE-BSD FRML MDRD: 28 ML/MIN/1.73
GFR SERPL CREATININE-BSD FRML MDRD: 28 ML/MIN/1.73
GFR SERPL CREATININE-BSD FRML MDRD: 30 ML/MIN/1.73
GFR SERPL CREATININE-BSD FRML MDRD: 32 ML/MIN/1.73
GFR SERPL CREATININE-BSD FRML MDRD: 33 ML/MIN/1.73
GFR SERPL CREATININE-BSD FRML MDRD: 37 ML/MIN/1.73
GFR SERPL CREATININE-BSD FRML MDRD: 47 ML/MIN/1.73
GFR SERPL CREATININE-BSD FRML MDRD: 8 ML/MIN/1.73
GFR SERPL CREATININE-BSD FRML MDRD: 9 ML/MIN/1.73
GFR SERPL CREATININE-BSD FRML MDRD: ABNORMAL ML/MIN/{1.73_M2}
GLOBULIN SER CALC-MCNC: 2.3 GM/DL
GLOBULIN UR ELPH-MCNC: 2.3 GM/DL
GLOBULIN UR ELPH-MCNC: 2.4 GM/DL
GLOBULIN UR ELPH-MCNC: 2.6 GM/DL
GLOBULIN UR ELPH-MCNC: 2.7 GM/DL
GLOBULIN UR ELPH-MCNC: 2.8 GM/DL
GLOBULIN UR ELPH-MCNC: 2.9 GM/DL
GLOBULIN UR ELPH-MCNC: 3 GM/DL
GLOBULIN UR ELPH-MCNC: 3.1 GM/DL
GLOBULIN UR ELPH-MCNC: 3.3 GM/DL
GLOBULIN UR ELPH-MCNC: 3.3 GM/DL
GLOBULIN UR ELPH-MCNC: 4 GM/DL
GLUCOSE BLD-MCNC: 104 MG/DL (ref 65–99)
GLUCOSE BLD-MCNC: 107 MG/DL (ref 65–99)
GLUCOSE BLD-MCNC: 115 MG/DL (ref 65–99)
GLUCOSE BLD-MCNC: 117 MG/DL (ref 65–99)
GLUCOSE BLD-MCNC: 118 MG/DL (ref 65–99)
GLUCOSE BLD-MCNC: 119 MG/DL (ref 65–99)
GLUCOSE BLD-MCNC: 119 MG/DL (ref 65–99)
GLUCOSE BLD-MCNC: 122 MG/DL (ref 65–99)
GLUCOSE BLD-MCNC: 128 MG/DL (ref 65–99)
GLUCOSE BLD-MCNC: 130 MG/DL (ref 65–99)
GLUCOSE BLD-MCNC: 133 MG/DL (ref 65–99)
GLUCOSE BLD-MCNC: 138 MG/DL (ref 65–99)
GLUCOSE BLD-MCNC: 140 MG/DL (ref 65–99)
GLUCOSE BLD-MCNC: 146 MG/DL (ref 65–99)
GLUCOSE BLD-MCNC: 154 MG/DL (ref 65–99)
GLUCOSE BLD-MCNC: 158 MG/DL (ref 65–99)
GLUCOSE BLD-MCNC: 165 MG/DL (ref 65–99)
GLUCOSE BLD-MCNC: 165 MG/DL (ref 65–99)
GLUCOSE BLD-MCNC: 168 MG/DL (ref 65–99)
GLUCOSE BLD-MCNC: 176 MG/DL (ref 65–99)
GLUCOSE BLD-MCNC: 177 MG/DL (ref 65–99)
GLUCOSE BLD-MCNC: 179 MG/DL (ref 65–99)
GLUCOSE BLD-MCNC: 184 MG/DL (ref 65–99)
GLUCOSE BLD-MCNC: 189 MG/DL (ref 65–99)
GLUCOSE BLD-MCNC: 191 MG/DL (ref 65–99)
GLUCOSE BLD-MCNC: 224 MG/DL (ref 65–99)
GLUCOSE BLD-MCNC: 95 MG/DL (ref 65–99)
GLUCOSE BLDC GLUCOMTR-MCNC: 101 MG/DL (ref 70–130)
GLUCOSE BLDC GLUCOMTR-MCNC: 107 MG/DL (ref 70–130)
GLUCOSE BLDC GLUCOMTR-MCNC: 111 MG/DL (ref 70–130)
GLUCOSE BLDC GLUCOMTR-MCNC: 145 MG/DL (ref 70–130)
GLUCOSE BLDC GLUCOMTR-MCNC: 76 MG/DL (ref 70–130)
GLUCOSE BLDC GLUCOMTR-MCNC: 78 MG/DL (ref 70–130)
GLUCOSE BLDC GLUCOMTR-MCNC: 79 MG/DL (ref 70–130)
GLUCOSE BLDC GLUCOMTR-MCNC: 81 MG/DL (ref 70–130)
GLUCOSE BLDC GLUCOMTR-MCNC: 98 MG/DL (ref 70–130)
GLUCOSE BLDC GLUCOMTR-MCNC: 99 MG/DL (ref 70–130)
GLUCOSE SERPL-MCNC: 103 MG/DL (ref 65–99)
GLUCOSE SERPL-MCNC: 115 MG/DL (ref 65–99)
GLUCOSE SERPL-MCNC: 125 MG/DL (ref 65–99)
GLUCOSE SERPL-MCNC: 85 MG/DL (ref 65–99)
GLUCOSE SERPL-MCNC: 86 MG/DL (ref 65–99)
GLUCOSE SERPL-MCNC: 86 MG/DL (ref 65–99)
GLUCOSE SERPL-MCNC: 87 MG/DL (ref 65–99)
GLUCOSE SERPL-MCNC: 96 MG/DL (ref 65–99)
GLUCOSE SERPL-MCNC: 97 MG/DL (ref 65–99)
GLUCOSE UR STRIP-MCNC: NEGATIVE MG/DL
GRAN CASTS URNS QL MICRO: ABNORMAL /LPF
H CAPSUL AB TITR SER ID: NEGATIVE {TITER}
HADV DNA SPEC NAA+PROBE: NOT DETECTED
HADV DNA SPEC NAA+PROBE: NOT DETECTED
HAV IGM SERPL QL IA: NORMAL
HBA1C MFR BLD: 4.7 % (ref 4.8–5.6)
HBA1C MFR BLD: 5 % (ref 4.8–5.6)
HBV CORE IGM SERPL QL IA: NORMAL
HBV SURFACE AG SERPL QL IA: NORMAL
HCO3 BLDA-SCNC: 19 MMOL/L (ref 22–28)
HCO3 BLDA-SCNC: 20 MMOL/L (ref 22–28)
HCO3 BLDA-SCNC: 20.1 MMOL/L (ref 22–28)
HCO3 BLDA-SCNC: 26.4 MMOL/L (ref 20–26)
HCO3 BLDA-SCNC: 26.5 MMOL/L (ref 22–28)
HCO3 BLDA-SCNC: 26.8 MMOL/L (ref 22–26)
HCOV 229E RNA SPEC QL NAA+PROBE: NOT DETECTED
HCOV 229E RNA SPEC QL NAA+PROBE: NOT DETECTED
HCOV HKU1 RNA SPEC QL NAA+PROBE: NOT DETECTED
HCOV HKU1 RNA SPEC QL NAA+PROBE: NOT DETECTED
HCOV NL63 RNA SPEC QL NAA+PROBE: NOT DETECTED
HCOV NL63 RNA SPEC QL NAA+PROBE: NOT DETECTED
HCOV OC43 RNA SPEC QL NAA+PROBE: NOT DETECTED
HCOV OC43 RNA SPEC QL NAA+PROBE: NOT DETECTED
HCT VFR BLD AUTO: 25.2 % (ref 34–46.6)
HCT VFR BLD AUTO: 25.2 % (ref 34–46.6)
HCT VFR BLD AUTO: 25.4 % (ref 34–46.6)
HCT VFR BLD AUTO: 25.5 % (ref 34–46.6)
HCT VFR BLD AUTO: 25.6 % (ref 34–46.6)
HCT VFR BLD AUTO: 26.2 % (ref 34–46.6)
HCT VFR BLD AUTO: 26.8 % (ref 34–46.6)
HCT VFR BLD AUTO: 27 % (ref 34–46.6)
HCT VFR BLD AUTO: 27.3 % (ref 34–46.6)
HCT VFR BLD AUTO: 27.4 % (ref 34–46.6)
HCT VFR BLD AUTO: 27.5 % (ref 34–46.6)
HCT VFR BLD AUTO: 27.6 % (ref 34–46.6)
HCT VFR BLD AUTO: 27.9 % (ref 34–46.6)
HCT VFR BLD AUTO: 27.9 % (ref 34–46.6)
HCT VFR BLD AUTO: 28 % (ref 34–46.6)
HCT VFR BLD AUTO: 28.2 % (ref 34–46.6)
HCT VFR BLD AUTO: 28.5 % (ref 34–46.6)
HCT VFR BLD AUTO: 28.9 % (ref 34–46.6)
HCT VFR BLD AUTO: 29.5 % (ref 34–46.6)
HCT VFR BLD AUTO: 29.8 % (ref 34–46.6)
HCT VFR BLD AUTO: 29.9 % (ref 34–46.6)
HCT VFR BLD AUTO: 30.3 % (ref 34–46.6)
HCT VFR BLD AUTO: 30.7 % (ref 34–46.6)
HCT VFR BLD AUTO: 31.2 % (ref 34–46.6)
HCT VFR BLD AUTO: 31.3 % (ref 34–46.6)
HCT VFR BLD AUTO: 32.2 % (ref 34–46.6)
HCT VFR BLD AUTO: 33.4 % (ref 34–46.6)
HCT VFR BLD AUTO: 33.4 % (ref 34–46.6)
HCT VFR BLD AUTO: 34.1 % (ref 34–46.6)
HCT VFR BLD AUTO: 35 % (ref 34–46.6)
HCT VFR BLD AUTO: 35.7 % (ref 34–46.6)
HCT VFR BLD AUTO: 39.7 % (ref 34–46.6)
HCT VFR BLD CALC: 26.9 %
HCT VFR BLD CALC: 27.4 %
HCT VFR BLD CALC: 29.9 %
HCT VFR BLD CALC: 29.9 %
HCT VFR BLD CALC: 35 %
HCT VFR BLDA CALC: 35 % (ref 38–51)
HCV AB SER DONR QL: NORMAL
HDLC SERPL-MCNC: 62 MG/DL (ref 40–60)
HDLC SERPL-MCNC: 76 MG/DL (ref 40–60)
HEMOCCULT STL QL: POSITIVE
HGB BLD-MCNC: 10 G/DL (ref 12–15.9)
HGB BLD-MCNC: 10.2 G/DL (ref 12–15.9)
HGB BLD-MCNC: 10.3 G/DL (ref 12–15.9)
HGB BLD-MCNC: 11.1 G/DL (ref 12–15.9)
HGB BLD-MCNC: 11.2 G/DL (ref 12–15.9)
HGB BLD-MCNC: 11.2 G/DL (ref 12–15.9)
HGB BLD-MCNC: 11.3 G/DL (ref 12–15.9)
HGB BLD-MCNC: 12.9 G/DL (ref 12–15.9)
HGB BLD-MCNC: 7.6 G/DL (ref 12–15.9)
HGB BLD-MCNC: 7.6 G/DL (ref 12–15.9)
HGB BLD-MCNC: 7.8 G/DL (ref 12–15.9)
HGB BLD-MCNC: 7.9 G/DL (ref 12–15.9)
HGB BLD-MCNC: 8 G/DL (ref 12–15.9)
HGB BLD-MCNC: 8.1 G/DL (ref 12–15.9)
HGB BLD-MCNC: 8.1 G/DL (ref 12–15.9)
HGB BLD-MCNC: 8.5 G/DL (ref 12–15.9)
HGB BLD-MCNC: 8.5 G/DL (ref 12–15.9)
HGB BLD-MCNC: 8.6 G/DL (ref 12–15.9)
HGB BLD-MCNC: 8.7 G/DL (ref 12–15.9)
HGB BLD-MCNC: 8.9 G/DL (ref 12–15.9)
HGB BLD-MCNC: 8.9 G/DL (ref 12–15.9)
HGB BLD-MCNC: 9 G/DL (ref 12–15.9)
HGB BLD-MCNC: 9 G/DL (ref 12–15.9)
HGB BLD-MCNC: 9.4 G/DL (ref 12–15.9)
HGB BLD-MCNC: 9.6 G/DL (ref 12–15.9)
HGB BLD-MCNC: 9.7 G/DL (ref 12–15.9)
HGB BLDA-MCNC: 11.4 G/DL (ref 12–18)
HGB BLDA-MCNC: 11.9 G/DL (ref 12–17)
HGB BLDA-MCNC: 8.8 G/DL (ref 12–18)
HGB BLDA-MCNC: 8.9 G/DL (ref 12–18)
HGB BLDA-MCNC: 9.7 G/DL (ref 14–18)
HGB BLDA-MCNC: 9.8 G/DL (ref 12–18)
HGB UR QL STRIP.AUTO: ABNORMAL
HGB UR QL STRIP.AUTO: NEGATIVE
HGB UR QL STRIP.AUTO: NEGATIVE
HMPV RNA NPH QL NAA+NON-PROBE: NOT DETECTED
HMPV RNA NPH QL NAA+NON-PROBE: NOT DETECTED
HOLD SPECIMEN: NORMAL
HOROWITZ INDEX BLD+IHG-RTO: 100 %
HOROWITZ INDEX BLD+IHG-RTO: 26 %
HOROWITZ INDEX BLD+IHG-RTO: 44 %
HPIV1 RNA SPEC QL NAA+PROBE: NOT DETECTED
HPIV1 RNA SPEC QL NAA+PROBE: NOT DETECTED
HPIV2 RNA SPEC QL NAA+PROBE: NOT DETECTED
HPIV2 RNA SPEC QL NAA+PROBE: NOT DETECTED
HPIV3 RNA NPH QL NAA+PROBE: NOT DETECTED
HPIV3 RNA NPH QL NAA+PROBE: NOT DETECTED
HPIV4 P GENE NPH QL NAA+PROBE: NOT DETECTED
HPIV4 P GENE NPH QL NAA+PROBE: NOT DETECTED
HYALINE CASTS UR QL AUTO: ABNORMAL /LPF
HYALINE CASTS UR QL AUTO: ABNORMAL /LPF
HYPOCHROMIA BLD QL: NORMAL
IGA SERPL-MCNC: 156 MG/DL (ref 64–422)
IGG SERPL-MCNC: 1237 MG/DL (ref 700–1600)
IGM SERPL-MCNC: 125 MG/DL (ref 26–217)
IMM GRANULOCYTES # BLD AUTO: 0.03 10*3/MM3 (ref 0–0.05)
IMM GRANULOCYTES # BLD AUTO: 0.03 10*3/MM3 (ref 0–0.05)
IMM GRANULOCYTES # BLD AUTO: 0.04 10*3/MM3 (ref 0–0.05)
IMM GRANULOCYTES # BLD AUTO: 0.05 10*3/MM3 (ref 0–0.05)
IMM GRANULOCYTES # BLD AUTO: 0.05 10*3/MM3 (ref 0–0.05)
IMM GRANULOCYTES # BLD AUTO: 0.07 10*3/MM3 (ref 0–0.05)
IMM GRANULOCYTES # BLD AUTO: 0.11 10*3/MM3 (ref 0–0.05)
IMM GRANULOCYTES # BLD AUTO: 0.12 10*3/MM3 (ref 0–0.05)
IMM GRANULOCYTES # BLD AUTO: 0.13 10*3/MM3 (ref 0–0.05)
IMM GRANULOCYTES # BLD AUTO: 0.13 10*3/MM3 (ref 0–0.05)
IMM GRANULOCYTES # BLD AUTO: 0.14 10*3/MM3 (ref 0–0.05)
IMM GRANULOCYTES # BLD AUTO: 0.15 10*3/MM3 (ref 0–0.05)
IMM GRANULOCYTES # BLD AUTO: 0.17 10*3/MM3 (ref 0–0.05)
IMM GRANULOCYTES NFR BLD AUTO: 0.4 % (ref 0–0.5)
IMM GRANULOCYTES NFR BLD AUTO: 0.5 % (ref 0–0.5)
IMM GRANULOCYTES NFR BLD AUTO: 0.5 % (ref 0–0.5)
IMM GRANULOCYTES NFR BLD AUTO: 0.6 % (ref 0–0.5)
IMM GRANULOCYTES NFR BLD AUTO: 0.6 % (ref 0–0.5)
IMM GRANULOCYTES NFR BLD AUTO: 0.7 % (ref 0–0.5)
IMM GRANULOCYTES NFR BLD AUTO: 1 % (ref 0–0.5)
IMM GRANULOCYTES NFR BLD AUTO: 1 % (ref 0–0.5)
IMM GRANULOCYTES NFR BLD AUTO: 1.2 % (ref 0–0.5)
IMM GRANULOCYTES NFR BLD AUTO: 1.2 % (ref 0–0.5)
IMM GRANULOCYTES NFR BLD AUTO: 1.3 % (ref 0–0.5)
IMM GRANULOCYTES NFR BLD AUTO: 1.5 % (ref 0–0.5)
IMM GRANULOCYTES NFR BLD AUTO: 1.5 % (ref 0–0.5)
IMM GRANULOCYTES NFR BLD AUTO: 1.6 % (ref 0–0.5)
IMM GRANULOCYTES NFR BLD AUTO: 1.6 % (ref 0–0.5)
IMM GRANULOCYTES NFR BLD AUTO: 1.7 % (ref 0–0.5)
IMM GRANULOCYTES NFR BLD AUTO: 1.8 % (ref 0–0.5)
IMM GRANULOCYTES NFR BLD AUTO: 2.1 % (ref 0–0.5)
INHALED O2 CONCENTRATION: 21 %
INR PPP: 1 (ref 0.8–1.2)
INR PPP: 1.71 (ref 0.85–1.16)
INR PPP: 1.71 (ref 0.9–1.1)
INTERNAL CONTROL: NORMAL
INTERPRETATION UR IFE-IMP: NORMAL
IRON 24H UR-MRATE: 29 MCG/DL (ref 37–145)
IRON 24H UR-MRATE: 33 MCG/DL (ref 37–145)
IRON SATN MFR SERPL: 8 % (ref 20–50)
IRON SATN MFR SERPL: 8 % (ref 20–50)
KETONES UR QL STRIP: ABNORMAL
KETONES UR QL STRIP: NEGATIVE
KETONES UR QL STRIP: NEGATIVE
L PNEUMO1 AG UR QL IA: NEGATIVE
LACEYELLA SACCHARI AB SER QL: NEGATIVE
LDLC SERPL CALC-MCNC: 60 MG/DL (ref 0–100)
LDLC SERPL CALC-MCNC: 98 MG/DL (ref 0–100)
LDLC/HDLC SERPL: 0.79 {RATIO}
LDLC/HDLC SERPL: 1.59 {RATIO}
LEFT ATRIUM VOLUME INDEX: 48 ML/M2
LEUKOCYTE ESTERASE UR QL STRIP.AUTO: ABNORMAL
LEUKOCYTE ESTERASE UR QL STRIP.AUTO: NEGATIVE
LEUKOCYTE ESTERASE UR QL STRIP.AUTO: NEGATIVE
LV EF 2D ECHO EST: 85 %
LYMPHOCYTES # BLD AUTO: 0.07 10*3/MM3 (ref 0.7–3.1)
LYMPHOCYTES # BLD AUTO: 0.07 10*3/MM3 (ref 0.7–3.1)
LYMPHOCYTES # BLD AUTO: 0.13 10*3/MM3 (ref 0.7–3.1)
LYMPHOCYTES # BLD AUTO: 0.18 10*3/MM3 (ref 0.7–3.1)
LYMPHOCYTES # BLD AUTO: 0.19 10*3/MM3 (ref 0.7–3.1)
LYMPHOCYTES # BLD AUTO: 0.2 10*3/MM3 (ref 0.7–3.1)
LYMPHOCYTES # BLD AUTO: 0.23 10*3/MM3 (ref 0.7–3.1)
LYMPHOCYTES # BLD AUTO: 0.26 10*3/MM3 (ref 0.7–3.1)
LYMPHOCYTES # BLD AUTO: 0.26 10*3/MM3 (ref 0.7–3.1)
LYMPHOCYTES # BLD AUTO: 0.27 10*3/MM3 (ref 0.7–3.1)
LYMPHOCYTES # BLD AUTO: 0.31 10*3/MM3 (ref 0.7–3.1)
LYMPHOCYTES # BLD AUTO: 0.59 10*3/MM3 (ref 0.7–3.1)
LYMPHOCYTES # BLD AUTO: 0.75 10*3/MM3 (ref 0.7–3.1)
LYMPHOCYTES # BLD AUTO: 0.88 10*3/MM3 (ref 0.7–3.1)
LYMPHOCYTES # BLD AUTO: 0.96 10*3/MM3 (ref 0.7–3.1)
LYMPHOCYTES # BLD AUTO: 1.04 10*3/MM3 (ref 0.7–3.1)
LYMPHOCYTES # BLD AUTO: 1.09 10*3/MM3 (ref 0.7–3.1)
LYMPHOCYTES # BLD AUTO: 1.62 10*3/MM3 (ref 0.7–3.1)
LYMPHOCYTES # BLD MANUAL: 0.16 10*3/MM3 (ref 0.7–3.1)
LYMPHOCYTES # BLD MANUAL: 0.42 10*3/MM3 (ref 0.7–3.1)
LYMPHOCYTES NFR BLD AUTO: 0.6 % (ref 19.6–45.3)
LYMPHOCYTES NFR BLD AUTO: 1.2 % (ref 19.6–45.3)
LYMPHOCYTES NFR BLD AUTO: 1.7 % (ref 19.6–45.3)
LYMPHOCYTES NFR BLD AUTO: 1.9 % (ref 19.6–45.3)
LYMPHOCYTES NFR BLD AUTO: 10.4 % (ref 19.6–45.3)
LYMPHOCYTES NFR BLD AUTO: 16.3 % (ref 19.6–45.3)
LYMPHOCYTES NFR BLD AUTO: 18.1 % (ref 19.6–45.3)
LYMPHOCYTES NFR BLD AUTO: 2.1 % (ref 19.6–45.3)
LYMPHOCYTES NFR BLD AUTO: 2.4 % (ref 19.6–45.3)
LYMPHOCYTES NFR BLD AUTO: 2.4 % (ref 19.6–45.3)
LYMPHOCYTES NFR BLD AUTO: 2.6 % (ref 19.6–45.3)
LYMPHOCYTES NFR BLD AUTO: 2.9 % (ref 19.6–45.3)
LYMPHOCYTES NFR BLD AUTO: 22.3 % (ref 19.6–45.3)
LYMPHOCYTES NFR BLD AUTO: 3 % (ref 19.6–45.3)
LYMPHOCYTES NFR BLD AUTO: 3.2 % (ref 19.6–45.3)
LYMPHOCYTES NFR BLD AUTO: 3.6 % (ref 19.6–45.3)
LYMPHOCYTES NFR BLD AUTO: 3.8 % (ref 19.6–45.3)
LYMPHOCYTES NFR BLD AUTO: 7.2 % (ref 19.6–45.3)
LYMPHOCYTES NFR BLD AUTO: 9 % (ref 19.6–45.3)
LYMPHOCYTES NFR BLD AUTO: 9.6 % (ref 19.6–45.3)
LYMPHOCYTES NFR BLD MANUAL: 1 % (ref 5–12)
LYMPHOCYTES NFR BLD MANUAL: 2 % (ref 19.6–45.3)
LYMPHOCYTES NFR BLD MANUAL: 5 % (ref 19.6–45.3)
LYMPHOCYTES NFR BLD MANUAL: 8 % (ref 5–12)
Lab: NORMAL
M PNEUMO IGG SER IA-ACNC: NOT DETECTED
M PNEUMO IGG SER IA-ACNC: NOT DETECTED
MAGNESIUM SERPL-MCNC: 2.2 MG/DL (ref 1.6–2.4)
MAGNESIUM SERPL-MCNC: 2.4 MG/DL (ref 1.6–2.4)
MAGNESIUM SERPL-MCNC: 2.6 MG/DL (ref 1.6–2.4)
MAGNESIUM SERPL-MCNC: 2.6 MG/DL (ref 1.6–2.4)
MAGNESIUM SERPL-MCNC: 2.8 MG/DL (ref 1.6–2.4)
MAGNESIUM SERPL-MCNC: 2.9 MG/DL (ref 1.6–2.4)
MAXIMAL PREDICTED HEART RATE: 141 BPM
MAXIMAL PREDICTED HEART RATE: 141 BPM
MCH RBC QN AUTO: 28.2 PG (ref 26.6–33)
MCH RBC QN AUTO: 28.3 PG (ref 26.6–33)
MCH RBC QN AUTO: 28.4 PG (ref 26.6–33)
MCH RBC QN AUTO: 28.5 PG (ref 26.6–33)
MCH RBC QN AUTO: 28.6 PG (ref 26.6–33)
MCH RBC QN AUTO: 28.7 PG (ref 26.6–33)
MCH RBC QN AUTO: 28.8 PG (ref 26.6–33)
MCH RBC QN AUTO: 28.9 PG (ref 26.6–33)
MCH RBC QN AUTO: 28.9 PG (ref 26.6–33)
MCH RBC QN AUTO: 29 PG (ref 26.6–33)
MCH RBC QN AUTO: 29 PG (ref 26.6–33)
MCH RBC QN AUTO: 29.1 PG (ref 26.6–33)
MCH RBC QN AUTO: 29.1 PG (ref 26.6–33)
MCH RBC QN AUTO: 29.3 PG (ref 26.6–33)
MCH RBC QN AUTO: 29.4 PG (ref 26.6–33)
MCH RBC QN AUTO: 29.4 PG (ref 26.6–33)
MCH RBC QN AUTO: 29.5 PG (ref 26.6–33)
MCH RBC QN AUTO: 29.5 PG (ref 26.6–33)
MCH RBC QN AUTO: 29.7 PG (ref 26.6–33)
MCH RBC QN AUTO: 29.7 PG (ref 26.6–33)
MCH RBC QN AUTO: 30 PG (ref 26.6–33)
MCH RBC QN AUTO: 30.3 PG (ref 26.6–33)
MCH RBC QN AUTO: 30.8 PG (ref 26.6–33)
MCH RBC QN AUTO: 31.1 PG (ref 26.6–33)
MCH RBC QN AUTO: 31.5 PG (ref 26.6–33)
MCH RBC QN AUTO: 31.7 PG (ref 26.6–33)
MCH RBC QN AUTO: 32 PG (ref 26.6–33)
MCH RBC QN AUTO: 32.2 PG (ref 26.6–33)
MCH RBC QN AUTO: 32.5 PG (ref 26.6–33)
MCHC RBC AUTO-ENTMCNC: 28 G/DL (ref 31.5–35.7)
MCHC RBC AUTO-ENTMCNC: 29.3 G/DL (ref 31.5–35.7)
MCHC RBC AUTO-ENTMCNC: 29.5 G/DL (ref 31.5–35.7)
MCHC RBC AUTO-ENTMCNC: 29.8 G/DL (ref 31.5–35.7)
MCHC RBC AUTO-ENTMCNC: 29.9 G/DL (ref 31.5–35.7)
MCHC RBC AUTO-ENTMCNC: 30.1 G/DL (ref 31.5–35.7)
MCHC RBC AUTO-ENTMCNC: 30.2 G/DL (ref 31.5–35.7)
MCHC RBC AUTO-ENTMCNC: 30.5 G/DL (ref 31.5–35.7)
MCHC RBC AUTO-ENTMCNC: 30.8 G/DL (ref 31.5–35.7)
MCHC RBC AUTO-ENTMCNC: 30.9 G/DL (ref 31.5–35.7)
MCHC RBC AUTO-ENTMCNC: 30.9 G/DL (ref 31.5–35.7)
MCHC RBC AUTO-ENTMCNC: 31 G/DL (ref 31.5–35.7)
MCHC RBC AUTO-ENTMCNC: 31.2 G/DL (ref 31.5–35.7)
MCHC RBC AUTO-ENTMCNC: 31.2 G/DL (ref 31.5–35.7)
MCHC RBC AUTO-ENTMCNC: 31.3 G/DL (ref 31.5–35.7)
MCHC RBC AUTO-ENTMCNC: 31.4 G/DL (ref 31.5–35.7)
MCHC RBC AUTO-ENTMCNC: 31.5 G/DL (ref 31.5–35.7)
MCHC RBC AUTO-ENTMCNC: 31.7 G/DL (ref 31.5–35.7)
MCHC RBC AUTO-ENTMCNC: 31.8 G/DL (ref 31.5–35.7)
MCHC RBC AUTO-ENTMCNC: 31.8 G/DL (ref 31.5–35.7)
MCHC RBC AUTO-ENTMCNC: 31.9 G/DL (ref 31.5–35.7)
MCHC RBC AUTO-ENTMCNC: 32 G/DL (ref 31.5–35.7)
MCHC RBC AUTO-ENTMCNC: 32 G/DL (ref 31.5–35.7)
MCHC RBC AUTO-ENTMCNC: 32.5 G/DL (ref 31.5–35.7)
MCHC RBC AUTO-ENTMCNC: 32.6 G/DL (ref 31.5–35.7)
MCHC RBC AUTO-ENTMCNC: 32.6 G/DL (ref 31.5–35.7)
MCHC RBC AUTO-ENTMCNC: 33.5 G/DL (ref 31.5–35.7)
MCV RBC AUTO: 100 FL (ref 79–97)
MCV RBC AUTO: 101.8 FL (ref 79–97)
MCV RBC AUTO: 91.8 FL (ref 79–97)
MCV RBC AUTO: 92.3 FL (ref 79–97)
MCV RBC AUTO: 92.5 FL (ref 79–97)
MCV RBC AUTO: 92.6 FL (ref 79–97)
MCV RBC AUTO: 93.2 FL (ref 79–97)
MCV RBC AUTO: 93.2 FL (ref 79–97)
MCV RBC AUTO: 93.4 FL (ref 79–97)
MCV RBC AUTO: 93.8 FL (ref 79–97)
MCV RBC AUTO: 94.1 FL (ref 79–97)
MCV RBC AUTO: 94.3 FL (ref 79–97)
MCV RBC AUTO: 94.3 FL (ref 79–97)
MCV RBC AUTO: 94.6 FL (ref 79–97)
MCV RBC AUTO: 95.1 FL (ref 79–97)
MCV RBC AUTO: 95.2 FL (ref 79–97)
MCV RBC AUTO: 95.2 FL (ref 79–97)
MCV RBC AUTO: 95.5 FL (ref 79–97)
MCV RBC AUTO: 95.6 FL (ref 79–97)
MCV RBC AUTO: 96 FL (ref 79–97)
MCV RBC AUTO: 96.2 FL (ref 79–97)
MCV RBC AUTO: 96.8 FL (ref 79–97)
MCV RBC AUTO: 96.9 FL (ref 79–97)
MCV RBC AUTO: 98.3 FL (ref 79–97)
MCV RBC AUTO: 98.4 FL (ref 79–97)
MCV RBC AUTO: 98.5 FL (ref 79–97)
MCV RBC AUTO: 98.7 FL (ref 79–97)
MCV RBC AUTO: 99.1 FL (ref 79–97)
MCV RBC AUTO: 99.7 FL (ref 79–97)
METHGB BLD QL: 0.7 % (ref 0–1.5)
METHGB BLD QL: 0.8 % (ref 0–1.5)
METHGB BLD QL: 1.2 % (ref 0–1.5)
METHGB BLD QL: 1.2 % (ref 0–1.5)
METHGB BLD QL: 1.3 % (ref 0–1.5)
MICROPOLYSPORA FAENI: NEGATIVE
MODALITY: ABNORMAL
MONOCYTES # BLD AUTO: 0.08 10*3/MM3 (ref 0.1–0.9)
MONOCYTES # BLD AUTO: 0.24 10*3/MM3 (ref 0.1–0.9)
MONOCYTES # BLD AUTO: 0.28 10*3/MM3 (ref 0.1–0.9)
MONOCYTES # BLD AUTO: 0.28 10*3/MM3 (ref 0.1–0.9)
MONOCYTES # BLD AUTO: 0.29 10*3/MM3 (ref 0.1–0.9)
MONOCYTES # BLD AUTO: 0.31 10*3/MM3 (ref 0.1–0.9)
MONOCYTES # BLD AUTO: 0.34 10*3/MM3 (ref 0.1–0.9)
MONOCYTES # BLD AUTO: 0.38 10*3/MM3 (ref 0.1–0.9)
MONOCYTES # BLD AUTO: 0.39 10*3/MM3 (ref 0.1–0.9)
MONOCYTES # BLD AUTO: 0.4 10*3/MM3 (ref 0.1–0.9)
MONOCYTES # BLD AUTO: 0.4 10*3/MM3 (ref 0.1–0.9)
MONOCYTES # BLD AUTO: 0.42 10*3/MM3 (ref 0.1–0.9)
MONOCYTES # BLD AUTO: 0.44 10*3/MM3 (ref 0.1–0.9)
MONOCYTES # BLD AUTO: 0.47 10*3/MM3 (ref 0.1–0.9)
MONOCYTES # BLD AUTO: 0.49 10*3/MM3 (ref 0.1–0.9)
MONOCYTES # BLD AUTO: 0.54 10*3/MM3 (ref 0.1–0.9)
MONOCYTES # BLD AUTO: 0.54 10*3/MM3 (ref 0.1–0.9)
MONOCYTES # BLD AUTO: 0.66 10*3/MM3 (ref 0.1–0.9)
MONOCYTES # BLD AUTO: 0.68 10*3/MM3 (ref 0.1–0.9)
MONOCYTES NFR BLD AUTO: 2.8 % (ref 5–12)
MONOCYTES NFR BLD AUTO: 2.9 % (ref 5–12)
MONOCYTES NFR BLD AUTO: 3 % (ref 5–12)
MONOCYTES NFR BLD AUTO: 3.4 % (ref 5–12)
MONOCYTES NFR BLD AUTO: 3.5 % (ref 5–12)
MONOCYTES NFR BLD AUTO: 3.5 % (ref 5–12)
MONOCYTES NFR BLD AUTO: 3.7 % (ref 5–12)
MONOCYTES NFR BLD AUTO: 4 % (ref 5–12)
MONOCYTES NFR BLD AUTO: 4 % (ref 5–12)
MONOCYTES NFR BLD AUTO: 4.6 % (ref 5–12)
MONOCYTES NFR BLD AUTO: 4.9 % (ref 5–12)
MONOCYTES NFR BLD AUTO: 4.9 % (ref 5–12)
MONOCYTES NFR BLD AUTO: 5.8 % (ref 5–12)
MONOCYTES NFR BLD AUTO: 5.8 % (ref 5–12)
MONOCYTES NFR BLD AUTO: 6.2 % (ref 5–12)
MONOCYTES NFR BLD AUTO: 6.5 % (ref 5–12)
MONOCYTES NFR BLD AUTO: 6.6 % (ref 5–12)
MONOCYTES NFR BLD AUTO: 6.6 % (ref 5–12)
MONOCYTES NFR BLD AUTO: 7.2 % (ref 5–12)
MONOCYTES NFR BLD AUTO: 8.9 % (ref 5–12)
MRSA SPEC QL CULT: NORMAL
NEUTROPHILS # BLD AUTO: 10.77 10*3/MM3 (ref 1.7–7)
NEUTROPHILS # BLD AUTO: 5.01 10*3/MM3 (ref 1.7–7)
NEUTROPHILS # BLD AUTO: 5.05 10*3/MM3 (ref 1.7–7)
NEUTROPHILS # BLD AUTO: 5.53 10*3/MM3 (ref 1.7–7)
NEUTROPHILS # BLD AUTO: 6.38 10*3/MM3 (ref 1.7–7)
NEUTROPHILS # BLD AUTO: 6.51 10*3/MM3 (ref 1.7–7)
NEUTROPHILS # BLD AUTO: 6.66 10*3/MM3 (ref 1.7–7)
NEUTROPHILS # BLD AUTO: 7.15 10*3/MM3 (ref 1.7–7)
NEUTROPHILS # BLD AUTO: 7.33 10*3/MM3 (ref 1.7–7)
NEUTROPHILS # BLD AUTO: 7.38 10*3/MM3 (ref 1.7–7)
NEUTROPHILS # BLD AUTO: 7.41 10*3/MM3 (ref 1.7–7)
NEUTROPHILS # BLD AUTO: 7.61 10*3/MM3 (ref 1.7–7)
NEUTROPHILS # BLD AUTO: 7.81 10*3/MM3 (ref 1.7–7)
NEUTROPHILS # BLD AUTO: 7.93 10*3/MM3 (ref 1.7–7)
NEUTROPHILS # BLD AUTO: 8.23 10*3/MM3 (ref 1.7–7)
NEUTROPHILS # BLD AUTO: 9.45 10*3/MM3 (ref 1.7–7)
NEUTROPHILS # BLD AUTO: 9.83 10*3/MM3 (ref 1.7–7)
NEUTROPHILS NFR BLD AUTO: 3.66 10*3/MM3 (ref 1.7–7)
NEUTROPHILS NFR BLD AUTO: 6.65 10*3/MM3 (ref 1.7–7)
NEUTROPHILS NFR BLD AUTO: 6.77 10*3/MM3 (ref 1.7–7)
NEUTROPHILS NFR BLD AUTO: 68.8 % (ref 42.7–76)
NEUTROPHILS NFR BLD AUTO: 69.2 % (ref 42.7–76)
NEUTROPHILS NFR BLD AUTO: 7.27 10*3/MM3 (ref 1.7–7)
NEUTROPHILS NFR BLD AUTO: 75.6 % (ref 42.7–76)
NEUTROPHILS NFR BLD AUTO: 79.5 % (ref 42.7–76)
NEUTROPHILS NFR BLD AUTO: 79.7 % (ref 42.7–76)
NEUTROPHILS NFR BLD AUTO: 8.25 10*3/MM3 (ref 1.7–7)
NEUTROPHILS NFR BLD AUTO: 82.5 % (ref 42.7–76)
NEUTROPHILS NFR BLD AUTO: 82.5 % (ref 42.7–76)
NEUTROPHILS NFR BLD AUTO: 89.3 % (ref 42.7–76)
NEUTROPHILS NFR BLD AUTO: 90.1 % (ref 42.7–76)
NEUTROPHILS NFR BLD AUTO: 91.2 % (ref 42.7–76)
NEUTROPHILS NFR BLD AUTO: 91.3 % (ref 42.7–76)
NEUTROPHILS NFR BLD AUTO: 91.7 % (ref 42.7–76)
NEUTROPHILS NFR BLD AUTO: 91.7 % (ref 42.7–76)
NEUTROPHILS NFR BLD AUTO: 92 % (ref 42.7–76)
NEUTROPHILS NFR BLD AUTO: 92.3 % (ref 42.7–76)
NEUTROPHILS NFR BLD AUTO: 93 % (ref 42.7–76)
NEUTROPHILS NFR BLD AUTO: 93.2 % (ref 42.7–76)
NEUTROPHILS NFR BLD AUTO: 93.4 % (ref 42.7–76)
NEUTROPHILS NFR BLD AUTO: 94.1 % (ref 42.7–76)
NEUTROPHILS NFR BLD AUTO: 96 % (ref 42.7–76)
NEUTROPHILS NFR BLD MANUAL: 75 % (ref 42.7–76)
NEUTROPHILS NFR BLD MANUAL: 97 % (ref 42.7–76)
NEUTS BAND NFR BLD MANUAL: 12 % (ref 0–5)
NITRITE UR QL STRIP: NEGATIVE
NOTE: ABNORMAL
NRBC BLD AUTO-RTO: 0 /100 WBC (ref 0–0.2)
NRBC BLD AUTO-RTO: 0.2 /100 WBC (ref 0–0.2)
NRBC BLD AUTO-RTO: 0.2 /100 WBC (ref 0–0.2)
NRBC BLD AUTO-RTO: 0.4 /100 WBC (ref 0–0.2)
NRBC BLD AUTO-RTO: 0.4 /100 WBC (ref 0–0.2)
NRBC BLD AUTO-RTO: 0.5 /100 WBC (ref 0–0.2)
NRBC BLD AUTO-RTO: 0.7 /100 WBC (ref 0–0.2)
NRBC BLD AUTO-RTO: 0.9 /100 WBC (ref 0–0.2)
NRBC BLD AUTO-RTO: 0.9 /100 WBC (ref 0–0.2)
NRBC BLD AUTO-RTO: 1.1 /100 WBC (ref 0–0.2)
NRBC BLD AUTO-RTO: 1.8 /100 WBC (ref 0–0.2)
NRBC SPEC MANUAL: 2 /100 WBC (ref 0–0.2)
NT-PROBNP SERPL-MCNC: ABNORMAL PG/ML (ref 0–738)
NT-PROBNP SERPL-MCNC: ABNORMAL PG/ML (ref 5–1800)
OXYHGB MFR BLDV: 89.1 % (ref 94–99)
OXYHGB MFR BLDV: 90.1 % (ref 94–99)
OXYHGB MFR BLDV: 90.7 % (ref 94–99)
OXYHGB MFR BLDV: 97.5 % (ref 94–99)
OXYHGB MFR BLDV: 97.9 % (ref 94–99)
PCO2 BLDA: 28.1 MM HG (ref 35–45)
PCO2 BLDA: 29.6 MM HG (ref 35–45)
PCO2 BLDA: 30.9 MM HG (ref 35–45)
PCO2 BLDA: 34.4 MM HG (ref 35–45)
PCO2 BLDA: 37.7 MM HG (ref 35–45)
PCO2 BLDA: 42.5 MM HG (ref 35–45)
PCO2 TEMP ADJ BLD: 37.7 MM HG (ref 35–45)
PCO2 TEMP ADJ BLD: ABNORMAL MM[HG]
PH BLDA: 7.41 PH UNITS (ref 7.35–7.6)
PH BLDA: 7.42 PH UNITS (ref 7.3–7.5)
PH BLDA: 7.44 PH UNITS (ref 7.3–7.5)
PH BLDA: 7.44 PH UNITS (ref 7.3–7.5)
PH BLDA: 7.45 PH UNITS (ref 7.35–7.45)
PH BLDA: 7.49 PH UNITS (ref 7.3–7.5)
PH UR STRIP.AUTO: 5.5 [PH] (ref 5–8)
PH UR STRIP.AUTO: 8.5 [PH] (ref 5–8)
PH UR STRIP.AUTO: <=5 [PH] (ref 5–8)
PH, TEMP CORRECTED: 7.45 PH UNITS
PH, TEMP CORRECTED: ABNORMAL
PHOSPHATE SERPL-MCNC: 2.8 MG/DL (ref 2.5–4.5)
PHOSPHATE SERPL-MCNC: 2.9 MG/DL (ref 2.5–4.5)
PHOSPHATE SERPL-MCNC: 2.9 MG/DL (ref 2.5–4.5)
PHOSPHATE SERPL-MCNC: 3.4 MG/DL (ref 2.5–4.5)
PHOSPHATE SERPL-MCNC: 3.7 MG/DL (ref 2.5–4.5)
PHOSPHATE SERPL-MCNC: 3.9 MG/DL (ref 2.5–4.5)
PHOSPHATE SERPL-MCNC: 4 MG/DL (ref 2.5–4.5)
PHOSPHATE SERPL-MCNC: 4.1 MG/DL (ref 2.5–4.5)
PHOSPHATE SERPL-MCNC: 4.2 MG/DL (ref 2.5–4.5)
PHOSPHATE SERPL-MCNC: 5.3 MG/DL (ref 2.5–4.5)
PHOSPHATE SERPL-MCNC: 5.8 MG/DL (ref 2.5–4.5)
PHOSPHATE SERPL-MCNC: 5.8 MG/DL (ref 2.5–4.5)
PHOSPHATE SERPL-MCNC: 6.5 MG/DL (ref 2.5–4.5)
PIGEON SERUM AB QL ID: NEGATIVE
PLAT MORPH BLD: NORMAL
PLAT MORPH BLD: NORMAL
PLATELET # BLD AUTO: 104 10*3/MM3 (ref 140–450)
PLATELET # BLD AUTO: 107 10*3/MM3 (ref 140–450)
PLATELET # BLD AUTO: 109 10*3/MM3 (ref 140–450)
PLATELET # BLD AUTO: 112 10*3/MM3 (ref 140–450)
PLATELET # BLD AUTO: 119 10*3/MM3 (ref 140–450)
PLATELET # BLD AUTO: 120 10*3/MM3 (ref 140–450)
PLATELET # BLD AUTO: 145 10*3/MM3 (ref 140–450)
PLATELET # BLD AUTO: 153 10*3/MM3 (ref 140–450)
PLATELET # BLD AUTO: 171 10*3/MM3 (ref 140–450)
PLATELET # BLD AUTO: 184 10*3/MM3 (ref 140–450)
PLATELET # BLD AUTO: 190 10*3/MM3 (ref 140–450)
PLATELET # BLD AUTO: 194 10*3/MM3 (ref 140–450)
PLATELET # BLD AUTO: 200 10*3/MM3 (ref 140–450)
PLATELET # BLD AUTO: 206 10*3/MM3 (ref 140–450)
PLATELET # BLD AUTO: 217 10*3/MM3 (ref 140–450)
PLATELET # BLD AUTO: 221 10*3/MM3 (ref 140–450)
PLATELET # BLD AUTO: 222 10*3/MM3 (ref 140–450)
PLATELET # BLD AUTO: 226 10*3/MM3 (ref 140–450)
PLATELET # BLD AUTO: 227 10*3/MM3 (ref 140–450)
PLATELET # BLD AUTO: 228 10*3/MM3 (ref 140–450)
PLATELET # BLD AUTO: 228 10*3/MM3 (ref 140–450)
PLATELET # BLD AUTO: 231 10*3/MM3 (ref 140–450)
PLATELET # BLD AUTO: 236 10*3/MM3 (ref 140–450)
PLATELET # BLD AUTO: 253 10*3/MM3 (ref 140–450)
PLATELET # BLD AUTO: 282 10*3/MM3 (ref 140–450)
PLATELET # BLD AUTO: 288 10*3/MM3 (ref 140–450)
PLATELET # BLD AUTO: 303 10*3/MM3 (ref 140–450)
PLATELET # BLD AUTO: 69 10*3/MM3 (ref 140–450)
PLATELET # BLD AUTO: 76 10*3/MM3 (ref 140–450)
PLATELET # BLD AUTO: 77 10*3/MM3 (ref 140–450)
PLATELET # BLD AUTO: 80 10*3/MM3 (ref 140–450)
PMV BLD AUTO: 10.2 FL (ref 6–12)
PMV BLD AUTO: 10.3 FL (ref 6–12)
PMV BLD AUTO: 11.2 FL (ref 6–12)
PMV BLD AUTO: 11.4 FL (ref 6–12)
PMV BLD AUTO: 11.4 FL (ref 6–12)
PMV BLD AUTO: 11.5 FL (ref 6–12)
PMV BLD AUTO: 11.5 FL (ref 6–12)
PMV BLD AUTO: 11.6 FL (ref 6–12)
PMV BLD AUTO: 11.7 FL (ref 6–12)
PMV BLD AUTO: 11.8 FL (ref 6–12)
PMV BLD AUTO: 11.9 FL (ref 6–12)
PMV BLD AUTO: 12 FL (ref 6–12)
PMV BLD AUTO: 12.3 FL (ref 6–12)
PMV BLD AUTO: 12.4 FL (ref 6–12)
PMV BLD AUTO: 13.7 FL (ref 6–12)
PMV BLD AUTO: 8.3 FL (ref 6–12)
PMV BLD AUTO: 8.6 FL (ref 6–12)
PMV BLD AUTO: 8.6 FL (ref 6–12)
PMV BLD AUTO: 8.7 FL (ref 6–12)
PMV BLD AUTO: 8.8 FL (ref 6–12)
PMV BLD AUTO: 8.9 FL (ref 6–12)
PMV BLD AUTO: 9 FL (ref 6–12)
PMV BLD AUTO: 9.1 FL (ref 6–12)
PMV BLD AUTO: 9.4 FL (ref 6–12)
PMV BLD AUTO: 9.5 FL (ref 6–12)
PMV BLD AUTO: 9.7 FL (ref 6–12)
PMV BLD AUTO: 9.8 FL (ref 6–12)
PMV BLD AUTO: 9.9 FL (ref 6–12)
PO2 BLDA: 145 MM HG (ref 75–100)
PO2 BLDA: 243 MM HG (ref 75–100)
PO2 BLDA: 31 MMHG (ref 80–105)
PO2 BLDA: 62 MM HG (ref 75–100)
PO2 BLDA: 62.3 MM HG (ref 83–108)
PO2 BLDA: 64.3 MM HG (ref 75–100)
PO2 TEMP ADJ BLD: 62.3 MM HG (ref 83–108)
PO2 TEMP ADJ BLD: ABNORMAL MM[HG]
POIKILOCYTOSIS BLD QL SMEAR: NORMAL
POTASSIUM BLD-SCNC: 2.6 MMOL/L (ref 3.5–5.2)
POTASSIUM BLD-SCNC: 3.3 MMOL/L (ref 3.5–5.2)
POTASSIUM BLD-SCNC: 3.5 MMOL/L (ref 3.5–5.2)
POTASSIUM BLD-SCNC: 3.5 MMOL/L (ref 3.5–5.2)
POTASSIUM BLD-SCNC: 3.6 MMOL/L (ref 3.5–5.2)
POTASSIUM BLD-SCNC: 3.7 MMOL/L (ref 3.5–5.2)
POTASSIUM BLD-SCNC: 3.9 MMOL/L (ref 3.5–5.2)
POTASSIUM BLD-SCNC: 4.1 MMOL/L (ref 3.5–5.2)
POTASSIUM BLD-SCNC: 4.1 MMOL/L (ref 3.5–5.2)
POTASSIUM BLD-SCNC: 4.2 MMOL/L (ref 3.5–5.2)
POTASSIUM BLD-SCNC: 4.3 MMOL/L (ref 3.5–5.2)
POTASSIUM BLD-SCNC: 4.3 MMOL/L (ref 3.5–5.2)
POTASSIUM BLD-SCNC: 4.5 MMOL/L (ref 3.5–5.2)
POTASSIUM BLD-SCNC: 4.6 MMOL/L (ref 3.5–5.2)
POTASSIUM BLD-SCNC: 4.7 MMOL/L (ref 3.5–5.2)
POTASSIUM BLD-SCNC: 4.9 MMOL/L (ref 3.5–5.2)
POTASSIUM BLD-SCNC: 6.1 MMOL/L (ref 3.5–5.2)
POTASSIUM BLDA-SCNC: 4.5 MMOL/L (ref 3.5–4.9)
POTASSIUM SERPL-SCNC: 3.4 MMOL/L (ref 3.5–5.2)
POTASSIUM SERPL-SCNC: 4.1 MMOL/L (ref 3.5–5.2)
POTASSIUM SERPL-SCNC: 4.2 MMOL/L (ref 3.5–5.2)
POTASSIUM SERPL-SCNC: 4.4 MMOL/L (ref 3.5–5.2)
POTASSIUM SERPL-SCNC: 4.5 MMOL/L (ref 3.5–5.2)
POTASSIUM SERPL-SCNC: 4.5 MMOL/L (ref 3.5–5.2)
POTASSIUM SERPL-SCNC: 4.9 MMOL/L (ref 3.5–5.2)
PROCALCITONIN SERPL-MCNC: 0.04 NG/ML (ref 0.1–0.25)
PROCALCITONIN SERPL-MCNC: 0.25 NG/ML (ref 0.1–0.25)
PROCALCITONIN SERPL-MCNC: 0.33 NG/ML (ref 0.1–0.25)
PROCALCITONIN SERPL-MCNC: 0.33 NG/ML (ref 0.1–0.25)
PROCALCITONIN SERPL-MCNC: 0.43 NG/ML (ref 0.1–0.25)
PROT 24H UR-MRATE: 312 MG/24HOURS (ref 0–150)
PROT PATTERN SERPL IFE-IMP: NORMAL
PROT SERPL-MCNC: 5.7 G/DL (ref 6–8.5)
PROT SERPL-MCNC: 5.7 G/DL (ref 6–8.5)
PROT SERPL-MCNC: 5.8 G/DL (ref 6–8.5)
PROT SERPL-MCNC: 6 G/DL (ref 6–8.5)
PROT SERPL-MCNC: 6.2 G/DL (ref 6–8.5)
PROT SERPL-MCNC: 6.5 G/DL (ref 6–8.5)
PROT SERPL-MCNC: 6.6 G/DL (ref 6–8.5)
PROT SERPL-MCNC: 7 G/DL (ref 6–8.5)
PROT SERPL-MCNC: 7 G/DL (ref 6–8.5)
PROT SERPL-MCNC: 7.1 G/DL (ref 6–8.5)
PROT SERPL-MCNC: 7.7 G/DL (ref 6–8.5)
PROT SERPL-MCNC: 8 G/DL (ref 6–8.5)
PROT SERPL-MCNC: 8.6 G/DL (ref 6–8.5)
PROT UR QL STRIP: ABNORMAL
PROT UR-MCNC: 28 MG/DL
PROT UR-MCNC: 87 MG/DL
PROT/CREAT UR: 1863 MG/G CREA (ref 0–200)
PROT/CREAT UR: 413.6 MG/G CREA (ref 0–200)
PROTHROMBIN TIME: 12.3 SECONDS (ref 12.8–15.2)
PROTHROMBIN TIME: 19.3 SECONDS (ref 11.2–14.3)
PROTHROMBIN TIME: 20.5 SECONDS (ref 12–15.1)
PTH-INTACT SERPL-MCNC: 87.6 PG/ML (ref 15–65)
RBC # BLD AUTO: 2.62 10*6/MM3 (ref 3.77–5.28)
RBC # BLD AUTO: 2.65 10*6/MM3 (ref 3.77–5.28)
RBC # BLD AUTO: 2.68 10*6/MM3 (ref 3.77–5.28)
RBC # BLD AUTO: 2.68 10*6/MM3 (ref 3.77–5.28)
RBC # BLD AUTO: 2.73 10*6/MM3 (ref 3.77–5.28)
RBC # BLD AUTO: 2.73 10*6/MM3 (ref 3.77–5.28)
RBC # BLD AUTO: 2.77 10*6/MM3 (ref 3.77–5.28)
RBC # BLD AUTO: 2.81 10*6/MM3 (ref 3.77–5.28)
RBC # BLD AUTO: 2.85 10*6/MM3 (ref 3.77–5.28)
RBC # BLD AUTO: 2.93 10*6/MM3 (ref 3.77–5.28)
RBC # BLD AUTO: 2.94 10*6/MM3 (ref 3.77–5.28)
RBC # BLD AUTO: 2.95 10*6/MM3 (ref 3.77–5.28)
RBC # BLD AUTO: 2.95 10*6/MM3 (ref 3.77–5.28)
RBC # BLD AUTO: 2.96 10*6/MM3 (ref 3.77–5.28)
RBC # BLD AUTO: 2.96 10*6/MM3 (ref 3.77–5.28)
RBC # BLD AUTO: 2.97 10*6/MM3 (ref 3.77–5.28)
RBC # BLD AUTO: 2.97 10*6/MM3 (ref 3.77–5.28)
RBC # BLD AUTO: 3.07 10*6/MM3 (ref 3.77–5.28)
RBC # BLD AUTO: 3.11 10*6/MM3 (ref 3.77–5.28)
RBC # BLD AUTO: 3.12 10*6/MM3 (ref 3.77–5.28)
RBC # BLD AUTO: 3.15 10*6/MM3 (ref 3.77–5.28)
RBC # BLD AUTO: 3.16 10*6/MM3 (ref 3.77–5.28)
RBC # BLD AUTO: 3.17 10*6/MM3 (ref 3.77–5.28)
RBC # BLD AUTO: 3.17 10*6/MM3 (ref 3.77–5.28)
RBC # BLD AUTO: 3.25 10*6/MM3 (ref 3.77–5.28)
RBC # BLD AUTO: 3.42 10*6/MM3 (ref 3.77–5.28)
RBC # BLD AUTO: 3.51 10*6/MM3 (ref 3.77–5.28)
RBC # BLD AUTO: 3.56 10*6/MM3 (ref 3.77–5.28)
RBC # BLD AUTO: 3.63 10*6/MM3 (ref 3.77–5.28)
RBC # BLD AUTO: 3.64 10*6/MM3 (ref 3.77–5.28)
RBC # BLD AUTO: 4.03 10*6/MM3 (ref 3.77–5.28)
RBC # UR: ABNORMAL /HPF
RBC # UR: ABNORMAL /HPF
RBC MORPH BLD: NORMAL
RBC MORPH BLD: NORMAL
REF LAB TEST METHOD: ABNORMAL
REF LAB TEST METHOD: ABNORMAL
RH BLD: POSITIVE
RHINOVIRUS RNA SPEC NAA+PROBE: NOT DETECTED
RHINOVIRUS RNA SPEC NAA+PROBE: NOT DETECTED
RSV RNA NPH QL NAA+NON-PROBE: DETECTED
RSV RNA NPH QL NAA+NON-PROBE: NOT DETECTED
S PNEUM AG SPEC QL LA: NEGATIVE
SAO2 % BLDA: 60 % (ref 95–98)
SAO2 % BLDCOA: 92 % (ref 94–100)
SAO2 % BLDCOA: 92.6 % (ref 94–100)
SAO2 % BLDCOA: 99.5 % (ref 94–100)
SAO2 % BLDCOA: 99.8 % (ref 94–100)
SARS-COV-2 RNA NPH QL NAA+NON-PROBE: NOT DETECTED
SCAN SLIDE: NORMAL
SCAN SLIDE: NORMAL
SMALL PLATELETS BLD QL SMEAR: ADEQUATE
SODIUM BLD-SCNC: 132 MMOL/L (ref 138–146)
SODIUM BLD-SCNC: 135 MMOL/L (ref 136–145)
SODIUM BLD-SCNC: 136 MMOL/L (ref 136–145)
SODIUM BLD-SCNC: 137 MMOL/L (ref 136–145)
SODIUM BLD-SCNC: 138 MMOL/L (ref 136–145)
SODIUM BLD-SCNC: 139 MMOL/L (ref 136–145)
SODIUM BLD-SCNC: 140 MMOL/L (ref 136–145)
SODIUM BLD-SCNC: 141 MMOL/L (ref 136–145)
SODIUM BLD-SCNC: 141 MMOL/L (ref 136–145)
SODIUM BLD-SCNC: 144 MMOL/L (ref 136–145)
SODIUM SERPL-SCNC: 132 MMOL/L (ref 136–145)
SODIUM SERPL-SCNC: 136 MMOL/L (ref 136–145)
SODIUM SERPL-SCNC: 137 MMOL/L (ref 136–145)
SODIUM SERPL-SCNC: 138 MMOL/L (ref 136–145)
SODIUM SERPL-SCNC: 139 MMOL/L (ref 136–145)
SP GR UR STRIP: 1.01 (ref 1–1.03)
SP GR UR STRIP: 1.02 (ref 1–1.03)
SP GR UR STRIP: 1.02 (ref 1–1.03)
SPECIMEN VOL 24H UR: 400 ML
SPECIMEN VOL 24H UR: 400 ML
SQUAMOUS #/AREA URNS HPF: ABNORMAL /HPF
SQUAMOUS #/AREA URNS HPF: ABNORMAL /HPF
STRESS TARGET HR: 120 BPM
STRESS TARGET HR: 120 BPM
T VULGARIS AB SER QL ID: NEGATIVE
T&S EXPIRATION DATE: NORMAL
TIBC SERPL-MCNC: 365 MCG/DL (ref 298–536)
TIBC SERPL-MCNC: 390 MCG/DL (ref 298–536)
TRANSFERRIN SERPL-MCNC: 245 MG/DL (ref 200–360)
TRANSFERRIN SERPL-MCNC: 262 MG/DL (ref 200–360)
TRIGL SERPL-MCNC: 143 MG/DL (ref 0–150)
TRIGL SERPL-MCNC: 144 MG/DL (ref 0–150)
TROPONIN I SERPL-MCNC: 0.03 NG/ML (ref 0–0.05)
TROPONIN T SERPL-MCNC: 0.01 NG/ML (ref 0–0.03)
TROPONIN T SERPL-MCNC: 0.02 NG/ML (ref 0–0.03)
TROPONIN T SERPL-MCNC: 0.03 NG/ML (ref 0–0.03)
TROPONIN T SERPL-MCNC: 0.1 NG/ML (ref 0–0.03)
TROPONIN T SERPL-MCNC: 0.11 NG/ML (ref 0–0.03)
TROPONIN T SERPL-MCNC: 0.11 NG/ML (ref 0–0.03)
TROPONIN T SERPL-MCNC: 0.12 NG/ML (ref 0–0.03)
TROPONIN T SERPL-MCNC: 0.15 NG/ML (ref 0–0.03)
TROPONIN T SERPL-MCNC: 0.15 NG/ML (ref 0–0.03)
TROPONIN T SERPL-MCNC: <0.01 NG/ML (ref 0–0.03)
TROPONIN T SERPL-MCNC: <0.01 NG/ML (ref 0–0.03)
TSH SERPL DL<=0.05 MIU/L-ACNC: 3.63 UIU/ML (ref 0.27–4.2)
UNIT  ABO: NORMAL
UNIT  RH: NORMAL
UROBILINOGEN UR QL STRIP: ABNORMAL
VANCOMYCIN TROUGH SERPL-MCNC: 10.1 MCG/ML (ref 5–20)
VENTILATOR MODE: ABNORMAL
VLDLC SERPL-MCNC: 28.6 MG/DL
VLDLC SERPL-MCNC: 28.8 MG/DL
WBC # BLD AUTO: 10 10*3/MM3 (ref 3.4–10.8)
WBC # BLD AUTO: 5.29 10*3/MM3 (ref 3.4–10.8)
WBC # BLD AUTO: 6.01 10*3/MM3 (ref 3.4–10.8)
WBC # BLD AUTO: 7.16 10*3/MM3 (ref 3.4–10.8)
WBC # BLD AUTO: 8.2 10*3/MM3 (ref 3.4–10.8)
WBC # BLD AUTO: 8.34 10*3/MM3 (ref 3.4–10.8)
WBC # BLD AUTO: 9.15 10*3/MM3 (ref 3.4–10.8)
WBC MORPH BLD: NORMAL
WBC NRBC COR # BLD: 10.24 10*3/MM3 (ref 3.4–10.8)
WBC NRBC COR # BLD: 10.53 10*3/MM3 (ref 3.4–10.8)
WBC NRBC COR # BLD: 11.22 10*3/MM3 (ref 3.4–10.8)
WBC NRBC COR # BLD: 6.03 10*3/MM3 (ref 3.4–10.8)
WBC NRBC COR # BLD: 6.69 10*3/MM3 (ref 3.4–10.8)
WBC NRBC COR # BLD: 6.99 10*3/MM3 (ref 3.4–10.8)
WBC NRBC COR # BLD: 7.14 10*3/MM3 (ref 3.4–10.8)
WBC NRBC COR # BLD: 7.28 10*3/MM3 (ref 3.4–10.8)
WBC NRBC COR # BLD: 7.28 10*3/MM3 (ref 3.4–10.8)
WBC NRBC COR # BLD: 7.29 10*3/MM3 (ref 3.4–10.8)
WBC NRBC COR # BLD: 7.6 10*3/MM3 (ref 3.4–10.8)
WBC NRBC COR # BLD: 7.88 10*3/MM3 (ref 3.4–10.8)
WBC NRBC COR # BLD: 7.97 10*3/MM3 (ref 3.4–10.8)
WBC NRBC COR # BLD: 8.18 10*3/MM3 (ref 3.4–10.8)
WBC NRBC COR # BLD: 8.22 10*3/MM3 (ref 3.4–10.8)
WBC NRBC COR # BLD: 8.34 10*3/MM3 (ref 3.4–10.8)
WBC NRBC COR # BLD: 8.42 10*3/MM3 (ref 3.4–10.8)
WBC NRBC COR # BLD: 8.48 10*3/MM3 (ref 3.4–10.8)
WBC NRBC COR # BLD: 8.51 10*3/MM3 (ref 3.4–10.8)
WBC NRBC COR # BLD: 8.51 10*3/MM3 (ref 3.4–10.8)
WBC NRBC COR # BLD: 8.86 10*3/MM3 (ref 3.4–10.8)
WBC NRBC COR # BLD: 8.87 10*3/MM3 (ref 3.4–10.8)
WBC NRBC COR # BLD: 9.11 10*3/MM3 (ref 3.4–10.8)
WBC NRBC COR # BLD: 9.58 10*3/MM3 (ref 3.4–10.8)
WBC UR QL AUTO: ABNORMAL /HPF
WBC UR QL AUTO: ABNORMAL /HPF
WHOLE BLOOD HOLD SPECIMEN: NORMAL

## 2020-01-01 PROCEDURE — G0378 HOSPITAL OBSERVATION PER HR: HCPCS

## 2020-01-01 PROCEDURE — 99285 EMERGENCY DEPT VISIT HI MDM: CPT

## 2020-01-01 PROCEDURE — 80053 COMPREHEN METABOLIC PANEL: CPT | Performed by: EMERGENCY MEDICINE

## 2020-01-01 PROCEDURE — 85610 PROTHROMBIN TIME: CPT | Performed by: INTERNAL MEDICINE

## 2020-01-01 PROCEDURE — 71045 X-RAY EXAM CHEST 1 VIEW: CPT

## 2020-01-01 PROCEDURE — 85018 HEMOGLOBIN: CPT | Performed by: FAMILY MEDICINE

## 2020-01-01 PROCEDURE — 85025 COMPLETE CBC W/AUTO DIFF WBC: CPT | Performed by: INTERNAL MEDICINE

## 2020-01-01 PROCEDURE — 86901 BLOOD TYPING SEROLOGIC RH(D): CPT | Performed by: INTERNAL MEDICINE

## 2020-01-01 PROCEDURE — 83735 ASSAY OF MAGNESIUM: CPT | Performed by: FAMILY MEDICINE

## 2020-01-01 PROCEDURE — 85014 HEMATOCRIT: CPT | Performed by: FAMILY MEDICINE

## 2020-01-01 PROCEDURE — 25010000002 VANCOMYCIN 5 G RECONSTITUTED SOLUTION 5,000 MG VIAL: Performed by: FAMILY MEDICINE

## 2020-01-01 PROCEDURE — 94799 UNLISTED PULMONARY SVC/PX: CPT

## 2020-01-01 PROCEDURE — 97110 THERAPEUTIC EXERCISES: CPT

## 2020-01-01 PROCEDURE — 83050 HGB METHEMOGLOBIN QUAN: CPT

## 2020-01-01 PROCEDURE — 99222 1ST HOSP IP/OBS MODERATE 55: CPT | Performed by: FAMILY MEDICINE

## 2020-01-01 PROCEDURE — 86612 BLASTOMYCES ANTIBODY: CPT | Performed by: INTERNAL MEDICINE

## 2020-01-01 PROCEDURE — 76000 FLUOROSCOPY <1 HR PHYS/QHP: CPT

## 2020-01-01 PROCEDURE — 84466 ASSAY OF TRANSFERRIN: CPT | Performed by: INTERNAL MEDICINE

## 2020-01-01 PROCEDURE — 99315 NF DSCHRG MGMT 30 MIN/LESS: CPT | Performed by: NURSE PRACTITIONER

## 2020-01-01 PROCEDURE — 93005 ELECTROCARDIOGRAM TRACING: CPT | Performed by: INTERNAL MEDICINE

## 2020-01-01 PROCEDURE — 97530 THERAPEUTIC ACTIVITIES: CPT

## 2020-01-01 PROCEDURE — 82962 GLUCOSE BLOOD TEST: CPT

## 2020-01-01 PROCEDURE — 63710000001 PREDNISONE PER 5 MG: Performed by: SURGERY

## 2020-01-01 PROCEDURE — 86140 C-REACTIVE PROTEIN: CPT | Performed by: INTERNAL MEDICINE

## 2020-01-01 PROCEDURE — 97162 PT EVAL MOD COMPLEX 30 MIN: CPT

## 2020-01-01 PROCEDURE — 82330 ASSAY OF CALCIUM: CPT

## 2020-01-01 PROCEDURE — 93005 ELECTROCARDIOGRAM TRACING: CPT | Performed by: EMERGENCY MEDICINE

## 2020-01-01 PROCEDURE — 97166 OT EVAL MOD COMPLEX 45 MIN: CPT

## 2020-01-01 PROCEDURE — 36600 WITHDRAWAL OF ARTERIAL BLOOD: CPT

## 2020-01-01 PROCEDURE — 99239 HOSP IP/OBS DSCHRG MGMT >30: CPT | Performed by: HOSPITALIST

## 2020-01-01 PROCEDURE — 63710000001 PREDNISONE PER 5 MG: Performed by: INTERNAL MEDICINE

## 2020-01-01 PROCEDURE — 63710000001 PREDNISONE PER 1 MG: Performed by: INTERNAL MEDICINE

## 2020-01-01 PROCEDURE — 99232 SBSQ HOSP IP/OBS MODERATE 35: CPT | Performed by: FAMILY MEDICINE

## 2020-01-01 PROCEDURE — 80048 BASIC METABOLIC PNL TOTAL CA: CPT | Performed by: INTERNAL MEDICINE

## 2020-01-01 PROCEDURE — 80076 HEPATIC FUNCTION PANEL: CPT | Performed by: FAMILY MEDICINE

## 2020-01-01 PROCEDURE — 87899 AGENT NOS ASSAY W/OPTIC: CPT | Performed by: FAMILY MEDICINE

## 2020-01-01 PROCEDURE — G0180 MD CERTIFICATION HHA PATIENT: HCPCS | Performed by: FAMILY MEDICINE

## 2020-01-01 PROCEDURE — 99233 SBSQ HOSP IP/OBS HIGH 50: CPT | Performed by: INTERNAL MEDICINE

## 2020-01-01 PROCEDURE — 92610 EVALUATE SWALLOWING FUNCTION: CPT

## 2020-01-01 PROCEDURE — S0260 H&P FOR SURGERY: HCPCS | Performed by: SURGERY

## 2020-01-01 PROCEDURE — 25010000002 PIPERACILLIN SOD-TAZOBACTAM PER 1 G: Performed by: SURGERY

## 2020-01-01 PROCEDURE — 25010000002 HEPARIN (PORCINE) PER 1000 UNITS: Performed by: INTERNAL MEDICINE

## 2020-01-01 PROCEDURE — 80069 RENAL FUNCTION PANEL: CPT | Performed by: FAMILY MEDICINE

## 2020-01-01 PROCEDURE — 86920 COMPATIBILITY TEST SPIN: CPT

## 2020-01-01 PROCEDURE — 84156 ASSAY OF PROTEIN URINE: CPT | Performed by: INTERNAL MEDICINE

## 2020-01-01 PROCEDURE — 81050 URINALYSIS VOLUME MEASURE: CPT | Performed by: INTERNAL MEDICINE

## 2020-01-01 PROCEDURE — 93306 TTE W/DOPPLER COMPLETE: CPT | Performed by: INTERNAL MEDICINE

## 2020-01-01 PROCEDURE — 92612 ENDOSCOPY SWALLOW (FEES) VID: CPT

## 2020-01-01 PROCEDURE — 94660 CPAP INITIATION&MGMT: CPT

## 2020-01-01 PROCEDURE — 99222 1ST HOSP IP/OBS MODERATE 55: CPT | Performed by: NURSE PRACTITIONER

## 2020-01-01 PROCEDURE — 25010000002 AZITHROMYCIN: Performed by: INTERNAL MEDICINE

## 2020-01-01 PROCEDURE — 83605 ASSAY OF LACTIC ACID: CPT | Performed by: FAMILY MEDICINE

## 2020-01-01 PROCEDURE — 87305 ASPERGILLUS AG IA: CPT | Performed by: INTERNAL MEDICINE

## 2020-01-01 PROCEDURE — 84484 ASSAY OF TROPONIN QUANT: CPT | Performed by: INTERNAL MEDICINE

## 2020-01-01 PROCEDURE — 25010000002 METHYLPREDNISOLONE PER 40 MG: Performed by: INTERNAL MEDICINE

## 2020-01-01 PROCEDURE — 99223 1ST HOSP IP/OBS HIGH 75: CPT | Performed by: INTERNAL MEDICINE

## 2020-01-01 PROCEDURE — 83970 ASSAY OF PARATHORMONE: CPT | Performed by: INTERNAL MEDICINE

## 2020-01-01 PROCEDURE — 94640 AIRWAY INHALATION TREATMENT: CPT

## 2020-01-01 PROCEDURE — 99284 EMERGENCY DEPT VISIT MOD MDM: CPT

## 2020-01-01 PROCEDURE — 92526 ORAL FUNCTION THERAPY: CPT

## 2020-01-01 PROCEDURE — 83880 ASSAY OF NATRIURETIC PEPTIDE: CPT | Performed by: FAMILY MEDICINE

## 2020-01-01 PROCEDURE — 25010000002 PIPERACILLIN SOD-TAZOBACTAM PER 1 G: Performed by: FAMILY MEDICINE

## 2020-01-01 PROCEDURE — 5A1D70Z PERFORMANCE OF URINARY FILTRATION, INTERMITTENT, LESS THAN 6 HOURS PER DAY: ICD-10-PCS | Performed by: INTERNAL MEDICINE

## 2020-01-01 PROCEDURE — 86671 FUNGUS NES ANTIBODY: CPT | Performed by: INTERNAL MEDICINE

## 2020-01-01 PROCEDURE — 99232 SBSQ HOSP IP/OBS MODERATE 35: CPT | Performed by: INTERNAL MEDICINE

## 2020-01-01 PROCEDURE — 74230 X-RAY XM SWLNG FUNCJ C+: CPT

## 2020-01-01 PROCEDURE — 83036 HEMOGLOBIN GLYCOSYLATED A1C: CPT | Performed by: INTERNAL MEDICINE

## 2020-01-01 PROCEDURE — 83735 ASSAY OF MAGNESIUM: CPT | Performed by: INTERNAL MEDICINE

## 2020-01-01 PROCEDURE — 85610 PROTHROMBIN TIME: CPT

## 2020-01-01 PROCEDURE — 63710000001 PREDNISONE PER 5 MG: Performed by: FAMILY MEDICINE

## 2020-01-01 PROCEDURE — 86900 BLOOD TYPING SEROLOGIC ABO: CPT

## 2020-01-01 PROCEDURE — 80053 COMPREHEN METABOLIC PANEL: CPT | Performed by: FAMILY MEDICINE

## 2020-01-01 PROCEDURE — 82784 ASSAY IGA/IGD/IGG/IGM EACH: CPT | Performed by: INTERNAL MEDICINE

## 2020-01-01 PROCEDURE — 87040 BLOOD CULTURE FOR BACTERIA: CPT | Performed by: FAMILY MEDICINE

## 2020-01-01 PROCEDURE — 97116 GAIT TRAINING THERAPY: CPT

## 2020-01-01 PROCEDURE — C1894 INTRO/SHEATH, NON-LASER: HCPCS | Performed by: SURGERY

## 2020-01-01 PROCEDURE — 73502 X-RAY EXAM HIP UNI 2-3 VIEWS: CPT

## 2020-01-01 PROCEDURE — 99233 SBSQ HOSP IP/OBS HIGH 50: CPT | Performed by: NURSE PRACTITIONER

## 2020-01-01 PROCEDURE — 80053 COMPREHEN METABOLIC PANEL: CPT | Performed by: PHYSICIAN ASSISTANT

## 2020-01-01 PROCEDURE — 82375 ASSAY CARBOXYHB QUANT: CPT

## 2020-01-01 PROCEDURE — 76700 US EXAM ABDOM COMPLETE: CPT

## 2020-01-01 PROCEDURE — 84145 PROCALCITONIN (PCT): CPT | Performed by: EMERGENCY MEDICINE

## 2020-01-01 PROCEDURE — 25010000002 AMIODARONE IN DEXTROSE 5% 360-4.14 MG/200ML-% SOLUTION: Performed by: INTERNAL MEDICINE

## 2020-01-01 PROCEDURE — 85027 COMPLETE CBC AUTOMATED: CPT | Performed by: FAMILY MEDICINE

## 2020-01-01 PROCEDURE — 80048 BASIC METABOLIC PNL TOTAL CA: CPT | Performed by: HOSPITALIST

## 2020-01-01 PROCEDURE — 82805 BLOOD GASES W/O2 SATURATION: CPT

## 2020-01-01 PROCEDURE — 92507 TX SP LANG VOICE COMM INDIV: CPT

## 2020-01-01 PROCEDURE — C1750 CATH, HEMODIALYSIS,LONG-TERM: HCPCS | Performed by: SURGERY

## 2020-01-01 PROCEDURE — 84100 ASSAY OF PHOSPHORUS: CPT | Performed by: INTERNAL MEDICINE

## 2020-01-01 PROCEDURE — 84145 PROCALCITONIN (PCT): CPT | Performed by: INTERNAL MEDICINE

## 2020-01-01 PROCEDURE — 25010000002 AZITHROMYCIN: Performed by: EMERGENCY MEDICINE

## 2020-01-01 PROCEDURE — 99214 OFFICE O/P EST MOD 30 MIN: CPT | Performed by: NURSE PRACTITIONER

## 2020-01-01 PROCEDURE — 80074 ACUTE HEPATITIS PANEL: CPT | Performed by: FAMILY MEDICINE

## 2020-01-01 PROCEDURE — 84484 ASSAY OF TROPONIN QUANT: CPT | Performed by: PHYSICIAN ASSISTANT

## 2020-01-01 PROCEDURE — 80069 RENAL FUNCTION PANEL: CPT | Performed by: INTERNAL MEDICINE

## 2020-01-01 PROCEDURE — 25010000002 ENOXAPARIN PER 10 MG: Performed by: INTERNAL MEDICINE

## 2020-01-01 PROCEDURE — 82947 ASSAY GLUCOSE BLOOD QUANT: CPT

## 2020-01-01 PROCEDURE — 0202U NFCT DS 22 TRGT SARS-COV-2: CPT | Performed by: INTERNAL MEDICINE

## 2020-01-01 PROCEDURE — 85027 COMPLETE CBC AUTOMATED: CPT | Performed by: INTERNAL MEDICINE

## 2020-01-01 PROCEDURE — 84443 ASSAY THYROID STIM HORMONE: CPT | Performed by: INTERNAL MEDICINE

## 2020-01-01 PROCEDURE — 36558 INSERT TUNNELED CV CATH: CPT | Performed by: SURGERY

## 2020-01-01 PROCEDURE — 83880 ASSAY OF NATRIURETIC PEPTIDE: CPT | Performed by: EMERGENCY MEDICINE

## 2020-01-01 PROCEDURE — 83880 ASSAY OF NATRIURETIC PEPTIDE: CPT | Performed by: INTERNAL MEDICINE

## 2020-01-01 PROCEDURE — 71250 CT THORAX DX C-: CPT

## 2020-01-01 PROCEDURE — 05HM33Z INSERTION OF INFUSION DEVICE INTO RIGHT INTERNAL JUGULAR VEIN, PERCUTANEOUS APPROACH: ICD-10-PCS | Performed by: SURGERY

## 2020-01-01 PROCEDURE — 85025 COMPLETE CBC W/AUTO DIFF WBC: CPT | Performed by: EMERGENCY MEDICINE

## 2020-01-01 PROCEDURE — 81001 URINALYSIS AUTO W/SCOPE: CPT | Performed by: PHYSICIAN ASSISTANT

## 2020-01-01 PROCEDURE — 71111 X-RAY EXAM RIBS/CHEST4/> VWS: CPT

## 2020-01-01 PROCEDURE — 85025 COMPLETE CBC W/AUTO DIFF WBC: CPT | Performed by: FAMILY MEDICINE

## 2020-01-01 PROCEDURE — 93010 ELECTROCARDIOGRAM REPORT: CPT | Performed by: INTERNAL MEDICINE

## 2020-01-01 PROCEDURE — 81001 URINALYSIS AUTO W/SCOPE: CPT | Performed by: INTERNAL MEDICINE

## 2020-01-01 PROCEDURE — 93306 TTE W/DOPPLER COMPLETE: CPT

## 2020-01-01 PROCEDURE — 0 IOPAMIDOL PER 1 ML: Performed by: EMERGENCY MEDICINE

## 2020-01-01 PROCEDURE — 99222 1ST HOSP IP/OBS MODERATE 55: CPT | Performed by: INTERNAL MEDICINE

## 2020-01-01 PROCEDURE — 84145 PROCALCITONIN (PCT): CPT | Performed by: FAMILY MEDICINE

## 2020-01-01 PROCEDURE — 85007 BL SMEAR W/DIFF WBC COUNT: CPT

## 2020-01-01 PROCEDURE — 85007 BL SMEAR W/DIFF WBC COUNT: CPT | Performed by: EMERGENCY MEDICINE

## 2020-01-01 PROCEDURE — B513ZZA FLUOROSCOPY OF RIGHT JUGULAR VEINS, GUIDANCE: ICD-10-PCS | Performed by: SURGERY

## 2020-01-01 PROCEDURE — 25010000002 CEFTRIAXONE SODIUM-DEXTROSE 1-3.74 GM-%(50ML) RECONSTITUTED SOLUTION: Performed by: INTERNAL MEDICINE

## 2020-01-01 PROCEDURE — 84484 ASSAY OF TROPONIN QUANT: CPT | Performed by: EMERGENCY MEDICINE

## 2020-01-01 PROCEDURE — 81003 URINALYSIS AUTO W/O SCOPE: CPT | Performed by: INTERNAL MEDICINE

## 2020-01-01 PROCEDURE — 97161 PT EVAL LOW COMPLEX 20 MIN: CPT

## 2020-01-01 PROCEDURE — 25010000003 CEFAZOLIN SODIUM-DEXTROSE 2-3 GM-%(50ML) RECONSTITUTED SOLUTION: Performed by: SURGERY

## 2020-01-01 PROCEDURE — 82570 ASSAY OF URINE CREATININE: CPT | Performed by: INTERNAL MEDICINE

## 2020-01-01 PROCEDURE — 86334 IMMUNOFIX E-PHORESIS SERUM: CPT | Performed by: INTERNAL MEDICINE

## 2020-01-01 PROCEDURE — 99214 OFFICE O/P EST MOD 30 MIN: CPT | Performed by: FAMILY MEDICINE

## 2020-01-01 PROCEDURE — 80053 COMPREHEN METABOLIC PANEL: CPT | Performed by: INTERNAL MEDICINE

## 2020-01-01 PROCEDURE — 36430 TRANSFUSION BLD/BLD COMPNT: CPT

## 2020-01-01 PROCEDURE — 99232 SBSQ HOSP IP/OBS MODERATE 35: CPT | Performed by: HOSPITALIST

## 2020-01-01 PROCEDURE — 83735 ASSAY OF MAGNESIUM: CPT | Performed by: EMERGENCY MEDICINE

## 2020-01-01 PROCEDURE — 83540 ASSAY OF IRON: CPT | Performed by: FAMILY MEDICINE

## 2020-01-01 PROCEDURE — 92523 SPEECH SOUND LANG COMPREHEN: CPT

## 2020-01-01 PROCEDURE — 63710000001 INSULIN REGULAR HUMAN PER 5 UNITS: Performed by: EMERGENCY MEDICINE

## 2020-01-01 PROCEDURE — 86331 IMMUNODIFFUSION OUCHTERLONY: CPT | Performed by: INTERNAL MEDICINE

## 2020-01-01 PROCEDURE — 93000 ELECTROCARDIOGRAM COMPLETE: CPT | Performed by: INTERNAL MEDICINE

## 2020-01-01 PROCEDURE — 25010000002 EPOETIN ALFA-EPBX 10000 UNIT/ML SOLUTION: Performed by: INTERNAL MEDICINE

## 2020-01-01 PROCEDURE — 25010000002 FUROSEMIDE PER 20 MG: Performed by: FAMILY MEDICINE

## 2020-01-01 PROCEDURE — 85025 COMPLETE CBC W/AUTO DIFF WBC: CPT

## 2020-01-01 PROCEDURE — P9016 RBC LEUKOCYTES REDUCED: HCPCS

## 2020-01-01 PROCEDURE — 99214 OFFICE O/P EST MOD 30 MIN: CPT | Performed by: INTERNAL MEDICINE

## 2020-01-01 PROCEDURE — 85007 BL SMEAR W/DIFF WBC COUNT: CPT | Performed by: FAMILY MEDICINE

## 2020-01-01 PROCEDURE — 93005 ELECTROCARDIOGRAM TRACING: CPT | Performed by: FAMILY MEDICINE

## 2020-01-01 PROCEDURE — 86609 BACTERIUM ANTIBODY: CPT | Performed by: INTERNAL MEDICINE

## 2020-01-01 PROCEDURE — 80048 BASIC METABOLIC PNL TOTAL CA: CPT | Performed by: FAMILY MEDICINE

## 2020-01-01 PROCEDURE — P9047 ALBUMIN (HUMAN), 25%, 50ML: HCPCS | Performed by: FAMILY MEDICINE

## 2020-01-01 PROCEDURE — 96374 THER/PROPH/DIAG INJ IV PUSH: CPT

## 2020-01-01 PROCEDURE — 74181 MRI ABDOMEN W/O CONTRAST: CPT

## 2020-01-01 PROCEDURE — 99239 HOSP IP/OBS DSCHRG MGMT >30: CPT | Performed by: INTERNAL MEDICINE

## 2020-01-01 PROCEDURE — 85025 COMPLETE CBC W/AUTO DIFF WBC: CPT | Performed by: HOSPITALIST

## 2020-01-01 PROCEDURE — 80202 ASSAY OF VANCOMYCIN: CPT | Performed by: FAMILY MEDICINE

## 2020-01-01 PROCEDURE — 87040 BLOOD CULTURE FOR BACTERIA: CPT

## 2020-01-01 PROCEDURE — 74018 RADEX ABDOMEN 1 VIEW: CPT

## 2020-01-01 PROCEDURE — 82272 OCCULT BLD FECES 1-3 TESTS: CPT | Performed by: INTERNAL MEDICINE

## 2020-01-01 PROCEDURE — 87081 CULTURE SCREEN ONLY: CPT | Performed by: INTERNAL MEDICINE

## 2020-01-01 PROCEDURE — 87804 INFLUENZA ASSAY W/OPTIC: CPT | Performed by: FAMILY MEDICINE

## 2020-01-01 PROCEDURE — 25010000002 VANCOMYCIN 1 G RECONSTITUTED SOLUTION 1 EACH VIAL: Performed by: INTERNAL MEDICINE

## 2020-01-01 PROCEDURE — G0427 INPT/ED TELECONSULT70: HCPCS | Performed by: PSYCHIATRY & NEUROLOGY

## 2020-01-01 PROCEDURE — 80061 LIPID PANEL: CPT | Performed by: INTERNAL MEDICINE

## 2020-01-01 PROCEDURE — 70496 CT ANGIOGRAPHY HEAD: CPT

## 2020-01-01 PROCEDURE — 83605 ASSAY OF LACTIC ACID: CPT

## 2020-01-01 PROCEDURE — 92611 MOTION FLUOROSCOPY/SWALLOW: CPT

## 2020-01-01 PROCEDURE — 86602 ANTINOMYCES ANTIBODY: CPT | Performed by: INTERNAL MEDICINE

## 2020-01-01 PROCEDURE — 99283 EMERGENCY DEPT VISIT LOW MDM: CPT

## 2020-01-01 PROCEDURE — 25010000002 METHYLPREDNISOLONE PER 80 MG: Performed by: INTERNAL MEDICINE

## 2020-01-01 PROCEDURE — 93005 ELECTROCARDIOGRAM TRACING: CPT | Performed by: PHYSICIAN ASSISTANT

## 2020-01-01 PROCEDURE — 87804 INFLUENZA ASSAY W/OPTIC: CPT | Performed by: EMERGENCY MEDICINE

## 2020-01-01 PROCEDURE — 99495 TRANSJ CARE MGMT MOD F2F 14D: CPT | Performed by: NURSE PRACTITIONER

## 2020-01-01 PROCEDURE — 25010000002 METHYLPREDNISOLONE PER 125 MG: Performed by: EMERGENCY MEDICINE

## 2020-01-01 PROCEDURE — 25010000002 CEFTRIAXONE SODIUM-DEXTROSE 1-3.74 GM-%(50ML) RECONSTITUTED SOLUTION: Performed by: EMERGENCY MEDICINE

## 2020-01-01 PROCEDURE — 29581 APPL MULTLAYER CMPRN SYS LEG: CPT

## 2020-01-01 PROCEDURE — 82575 CREATININE CLEARANCE TEST: CPT | Performed by: INTERNAL MEDICINE

## 2020-01-01 PROCEDURE — 93005 ELECTROCARDIOGRAM TRACING: CPT

## 2020-01-01 PROCEDURE — 87086 URINE CULTURE/COLONY COUNT: CPT | Performed by: PHYSICIAN ASSISTANT

## 2020-01-01 PROCEDURE — 80053 COMPREHEN METABOLIC PANEL: CPT

## 2020-01-01 PROCEDURE — 25010000002 FUROSEMIDE PER 20 MG: Performed by: INTERNAL MEDICINE

## 2020-01-01 PROCEDURE — 63710000001 PREDNISONE PER 1 MG: Performed by: FAMILY MEDICINE

## 2020-01-01 PROCEDURE — 86850 RBC ANTIBODY SCREEN: CPT | Performed by: INTERNAL MEDICINE

## 2020-01-01 PROCEDURE — 25010000002 AMIODARONE IN DEXTROSE 5% 150-4.21 MG/100ML-% SOLUTION: Performed by: INTERNAL MEDICINE

## 2020-01-01 PROCEDURE — 97535 SELF CARE MNGMENT TRAINING: CPT

## 2020-01-01 PROCEDURE — 97165 OT EVAL LOW COMPLEX 30 MIN: CPT

## 2020-01-01 PROCEDURE — 84132 ASSAY OF SERUM POTASSIUM: CPT

## 2020-01-01 PROCEDURE — 82565 ASSAY OF CREATININE: CPT

## 2020-01-01 PROCEDURE — 86698 HISTOPLASMA ANTIBODY: CPT | Performed by: INTERNAL MEDICINE

## 2020-01-01 PROCEDURE — 86606 ASPERGILLUS ANTIBODY: CPT | Performed by: INTERNAL MEDICINE

## 2020-01-01 PROCEDURE — 83540 ASSAY OF IRON: CPT | Performed by: INTERNAL MEDICINE

## 2020-01-01 PROCEDURE — 99223 1ST HOSP IP/OBS HIGH 75: CPT | Performed by: PSYCHIATRY & NEUROLOGY

## 2020-01-01 PROCEDURE — 0100U HC BIOFIRE FILMARRAY RESP PANEL 2: CPT | Performed by: FAMILY MEDICINE

## 2020-01-01 PROCEDURE — G0408 INPT/TELE FOLLOW UP 35: HCPCS | Performed by: PSYCHIATRY & NEUROLOGY

## 2020-01-01 PROCEDURE — 25010000002 MIDAZOLAM PER 1MG: Performed by: NURSE ANESTHETIST, CERTIFIED REGISTERED

## 2020-01-01 PROCEDURE — 0042T HC CT CEREBRAL PERFUSION W/WO CONTRAST: CPT

## 2020-01-01 PROCEDURE — 0JH63XZ INSERTION OF TUNNELED VASCULAR ACCESS DEVICE INTO CHEST SUBCUTANEOUS TISSUE AND FASCIA, PERCUTANEOUS APPROACH: ICD-10-PCS | Performed by: SURGERY

## 2020-01-01 PROCEDURE — 76775 US EXAM ABDO BACK WALL LIM: CPT

## 2020-01-01 PROCEDURE — 25010000002 LORAZEPAM PER 2 MG: Performed by: EMERGENCY MEDICINE

## 2020-01-01 PROCEDURE — 93880 EXTRACRANIAL BILAT STUDY: CPT

## 2020-01-01 PROCEDURE — 70450 CT HEAD/BRAIN W/O DYE: CPT

## 2020-01-01 PROCEDURE — 85025 COMPLETE CBC W/AUTO DIFF WBC: CPT | Performed by: PHYSICIAN ASSISTANT

## 2020-01-01 PROCEDURE — 83605 ASSAY OF LACTIC ACID: CPT | Performed by: EMERGENCY MEDICINE

## 2020-01-01 PROCEDURE — 70551 MRI BRAIN STEM W/O DYE: CPT

## 2020-01-01 PROCEDURE — 82803 BLOOD GASES ANY COMBINATION: CPT

## 2020-01-01 PROCEDURE — 86335 IMMUNFIX E-PHORSIS/URINE/CSF: CPT | Performed by: INTERNAL MEDICINE

## 2020-01-01 PROCEDURE — 94640 AIRWAY INHALATION TREATMENT: CPT | Performed by: FAMILY MEDICINE

## 2020-01-01 PROCEDURE — 25010000002 EPOETIN ALFA PER 1000 UNITS: Performed by: INTERNAL MEDICINE

## 2020-01-01 PROCEDURE — 86900 BLOOD TYPING SEROLOGIC ABO: CPT | Performed by: INTERNAL MEDICINE

## 2020-01-01 PROCEDURE — 99231 SBSQ HOSP IP/OBS SF/LOW 25: CPT | Performed by: INTERNAL MEDICINE

## 2020-01-01 PROCEDURE — 70498 CT ANGIOGRAPHY NECK: CPT

## 2020-01-01 PROCEDURE — G0406 INPT/TELE FOLLOW UP 15: HCPCS | Performed by: PSYCHIATRY & NEUROLOGY

## 2020-01-01 PROCEDURE — 25010000002 ALBUMIN HUMAN 25% PER 50 ML: Performed by: FAMILY MEDICINE

## 2020-01-01 PROCEDURE — 70544 MR ANGIOGRAPHY HEAD W/O DYE: CPT

## 2020-01-01 PROCEDURE — 99231 SBSQ HOSP IP/OBS SF/LOW 25: CPT | Performed by: PSYCHIATRY & NEUROLOGY

## 2020-01-01 PROCEDURE — 84466 ASSAY OF TRANSFERRIN: CPT | Performed by: FAMILY MEDICINE

## 2020-01-01 PROCEDURE — 71046 X-RAY EXAM CHEST 2 VIEWS: CPT

## 2020-01-01 PROCEDURE — 25010000002 HEPARIN (PORCINE) PER 1000 UNITS: Performed by: SURGERY

## 2020-01-01 PROCEDURE — 84295 ASSAY OF SERUM SODIUM: CPT

## 2020-01-01 PROCEDURE — 99239 HOSP IP/OBS DSCHRG MGMT >30: CPT | Performed by: FAMILY MEDICINE

## 2020-01-01 PROCEDURE — 94618 PULMONARY STRESS TESTING: CPT

## 2020-01-01 PROCEDURE — 85014 HEMATOCRIT: CPT

## 2020-01-01 PROCEDURE — 82306 VITAMIN D 25 HYDROXY: CPT | Performed by: INTERNAL MEDICINE

## 2020-01-01 PROCEDURE — 25010000003 LIDOCAINE 1 % SOLUTION: Performed by: SURGERY

## 2020-01-01 PROCEDURE — 85007 BL SMEAR W/DIFF WBC COUNT: CPT | Performed by: INTERNAL MEDICINE

## 2020-01-01 RX ORDER — ATORVASTATIN CALCIUM 40 MG/1
40 TABLET, FILM COATED ORAL NIGHTLY
Status: DISCONTINUED | OUTPATIENT
Start: 2020-01-01 | End: 2020-01-01 | Stop reason: HOSPADM

## 2020-01-01 RX ORDER — ONDANSETRON 2 MG/ML
4 INJECTION INTRAMUSCULAR; INTRAVENOUS EVERY 6 HOURS PRN
Status: DISCONTINUED | OUTPATIENT
Start: 2020-01-01 | End: 2020-01-01 | Stop reason: HOSPADM

## 2020-01-01 RX ORDER — PREDNISONE 1 MG/1
5 TABLET ORAL DAILY
Start: 2020-01-01 | End: 2020-01-01 | Stop reason: SDUPTHER

## 2020-01-01 RX ORDER — ACETAMINOPHEN 160 MG/5ML
650 SOLUTION ORAL EVERY 4 HOURS PRN
Status: DISCONTINUED | OUTPATIENT
Start: 2020-01-01 | End: 2020-01-01 | Stop reason: HOSPADM

## 2020-01-01 RX ORDER — BISOPROLOL FUMARATE 5 MG/1
10 TABLET, FILM COATED ORAL 2 TIMES DAILY
Status: DISCONTINUED | OUTPATIENT
Start: 2020-01-01 | End: 2020-01-01 | Stop reason: HOSPADM

## 2020-01-01 RX ORDER — IPRATROPIUM BROMIDE AND ALBUTEROL SULFATE 2.5; .5 MG/3ML; MG/3ML
3 SOLUTION RESPIRATORY (INHALATION)
Status: DISCONTINUED | OUTPATIENT
Start: 2020-01-01 | End: 2020-01-01

## 2020-01-01 RX ORDER — POTASSIUM CHLORIDE 750 MG/1
40 CAPSULE, EXTENDED RELEASE ORAL ONCE
Status: COMPLETED | OUTPATIENT
Start: 2020-01-01 | End: 2020-01-01

## 2020-01-01 RX ORDER — ATORVASTATIN CALCIUM 40 MG/1
40 TABLET, FILM COATED ORAL NIGHTLY
Qty: 90 TABLET | Refills: 3 | Status: SHIPPED | OUTPATIENT
Start: 2020-01-01

## 2020-01-01 RX ORDER — DILTIAZEM HYDROCHLORIDE 5 MG/ML
20 INJECTION INTRAVENOUS ONCE
Status: COMPLETED | OUTPATIENT
Start: 2020-01-01 | End: 2020-01-01

## 2020-01-01 RX ORDER — IPRATROPIUM BROMIDE AND ALBUTEROL SULFATE 2.5; .5 MG/3ML; MG/3ML
3 SOLUTION RESPIRATORY (INHALATION) EVERY 4 HOURS PRN
Status: DISCONTINUED | OUTPATIENT
Start: 2020-01-01 | End: 2020-01-01 | Stop reason: HOSPADM

## 2020-01-01 RX ORDER — HEPARIN SODIUM 5000 [USP'U]/ML
5000 INJECTION, SOLUTION INTRAVENOUS; SUBCUTANEOUS EVERY 8 HOURS SCHEDULED
Status: DISCONTINUED | OUTPATIENT
Start: 2020-01-01 | End: 2020-01-01 | Stop reason: HOSPADM

## 2020-01-01 RX ORDER — BUMETANIDE 1 MG/1
2 TABLET ORAL 2 TIMES DAILY
Status: DISCONTINUED | OUTPATIENT
Start: 2020-01-01 | End: 2020-01-01 | Stop reason: HOSPADM

## 2020-01-01 RX ORDER — POTASSIUM CHLORIDE 750 MG/1
20 CAPSULE, EXTENDED RELEASE ORAL DAILY
Qty: 30 CAPSULE | Refills: 0 | Status: SHIPPED | OUTPATIENT
Start: 2020-01-01 | End: 2020-01-01 | Stop reason: SDUPTHER

## 2020-01-01 RX ORDER — BENZONATATE 100 MG/1
200 CAPSULE ORAL 3 TIMES DAILY PRN
Status: DISCONTINUED | OUTPATIENT
Start: 2020-01-01 | End: 2020-01-01 | Stop reason: HOSPADM

## 2020-01-01 RX ORDER — PREDNISONE 20 MG/1
60 TABLET ORAL 2 TIMES DAILY WITH MEALS
Qty: 120 TABLET | Refills: 0 | Status: ON HOLD | OUTPATIENT
Start: 2020-01-01 | End: 2020-01-01

## 2020-01-01 RX ORDER — ISOSORBIDE MONONITRATE 60 MG/1
60 TABLET, EXTENDED RELEASE ORAL DAILY
Status: DISCONTINUED | OUTPATIENT
Start: 2020-01-01 | End: 2020-01-01 | Stop reason: HOSPADM

## 2020-01-01 RX ORDER — LEVOTHYROXINE SODIUM 0.07 MG/1
75 TABLET ORAL DAILY
Status: DISCONTINUED | OUTPATIENT
Start: 2020-01-01 | End: 2020-01-01 | Stop reason: HOSPADM

## 2020-01-01 RX ORDER — RANOLAZINE 500 MG/1
500 TABLET, EXTENDED RELEASE ORAL EVERY 12 HOURS SCHEDULED
Status: DISCONTINUED | OUTPATIENT
Start: 2020-01-01 | End: 2020-01-01 | Stop reason: HOSPADM

## 2020-01-01 RX ORDER — HEPARIN SODIUM 5000 [USP'U]/ML
5000 INJECTION, SOLUTION INTRAVENOUS; SUBCUTANEOUS ONCE
Status: COMPLETED | OUTPATIENT
Start: 2020-01-01 | End: 2020-01-01

## 2020-01-01 RX ORDER — BUMETANIDE 0.25 MG/ML
2 INJECTION INTRAMUSCULAR; INTRAVENOUS ONCE
Status: COMPLETED | OUTPATIENT
Start: 2020-01-01 | End: 2020-01-01

## 2020-01-01 RX ORDER — BUMETANIDE 1 MG/1
2 TABLET ORAL 2 TIMES DAILY
Status: DISCONTINUED | OUTPATIENT
Start: 2020-01-01 | End: 2020-01-01

## 2020-01-01 RX ORDER — FERROUS SULFATE TAB EC 324 MG (65 MG FE EQUIVALENT) 324 (65 FE) MG
324 TABLET DELAYED RESPONSE ORAL
Status: DISCONTINUED | OUTPATIENT
Start: 2020-01-01 | End: 2020-01-01 | Stop reason: HOSPADM

## 2020-01-01 RX ORDER — BUDESONIDE 0.5 MG/2ML
0.5 INHALANT ORAL
Status: COMPLETED | OUTPATIENT
Start: 2020-01-01 | End: 2020-01-01

## 2020-01-01 RX ORDER — METHYLPREDNISOLONE SODIUM SUCCINATE 40 MG/ML
40 INJECTION, POWDER, LYOPHILIZED, FOR SOLUTION INTRAMUSCULAR; INTRAVENOUS EVERY 6 HOURS
Status: DISCONTINUED | OUTPATIENT
Start: 2020-01-01 | End: 2020-01-01

## 2020-01-01 RX ORDER — BISOPROLOL FUMARATE 5 MG/1
5 TABLET, FILM COATED ORAL 2 TIMES DAILY
Qty: 60 TABLET | Refills: 0 | Status: SHIPPED | OUTPATIENT
Start: 2020-01-01 | End: 2020-01-01 | Stop reason: HOSPADM

## 2020-01-01 RX ORDER — FERROUS SULFATE 325(65) MG
325 TABLET ORAL 2 TIMES DAILY WITH MEALS
Qty: 60 TABLET | Refills: 0 | Status: SHIPPED | OUTPATIENT
Start: 2020-01-01 | End: 2020-01-01 | Stop reason: SDUPTHER

## 2020-01-01 RX ORDER — FUROSEMIDE 40 MG/1
40 TABLET ORAL
Status: DISCONTINUED | OUTPATIENT
Start: 2020-01-01 | End: 2020-01-01

## 2020-01-01 RX ORDER — GUAIFENESIN 600 MG/1
600 TABLET, EXTENDED RELEASE ORAL 2 TIMES DAILY
Status: DISCONTINUED | OUTPATIENT
Start: 2020-01-01 | End: 2020-01-01 | Stop reason: HOSPADM

## 2020-01-01 RX ORDER — ASPIRIN 81 MG/1
81 TABLET ORAL DAILY
Start: 2020-01-01 | End: 2020-01-01

## 2020-01-01 RX ORDER — ACETAMINOPHEN 325 MG/1
650 TABLET ORAL EVERY 4 HOURS PRN
Status: DISCONTINUED | OUTPATIENT
Start: 2020-01-01 | End: 2020-01-01 | Stop reason: HOSPADM

## 2020-01-01 RX ORDER — BISOPROLOL FUMARATE 10 MG/1
10 TABLET, FILM COATED ORAL 2 TIMES DAILY
Qty: 60 TABLET | Refills: 0 | Status: SHIPPED | OUTPATIENT
Start: 2020-01-01 | End: 2020-01-01 | Stop reason: HOSPADM

## 2020-01-01 RX ORDER — SENNA PLUS 8.6 MG/1
2 TABLET ORAL NIGHTLY
Status: DISCONTINUED | OUTPATIENT
Start: 2020-01-01 | End: 2020-01-01 | Stop reason: HOSPADM

## 2020-01-01 RX ORDER — PANTOPRAZOLE SODIUM 40 MG/1
40 TABLET, DELAYED RELEASE ORAL DAILY
Status: DISCONTINUED | OUTPATIENT
Start: 2020-01-01 | End: 2020-01-01 | Stop reason: HOSPADM

## 2020-01-01 RX ORDER — CALCIUM ACETATE 667 MG/1
1334 CAPSULE ORAL
Qty: 180 CAPSULE
Start: 2020-01-01 | End: 2020-01-01 | Stop reason: ALTCHOICE

## 2020-01-01 RX ORDER — ASPIRIN 81 MG/1
81 TABLET ORAL DAILY
Status: DISCONTINUED | OUTPATIENT
Start: 2020-01-01 | End: 2020-01-01 | Stop reason: HOSPADM

## 2020-01-01 RX ORDER — BISACODYL 10 MG
10 SUPPOSITORY, RECTAL RECTAL ONCE
Status: DISCONTINUED | OUTPATIENT
Start: 2020-01-01 | End: 2020-01-01

## 2020-01-01 RX ORDER — BUDESONIDE 0.5 MG/2ML
0.5 INHALANT ORAL
Qty: 120 ML | Refills: 0 | Status: ON HOLD | OUTPATIENT
Start: 2020-01-01 | End: 2020-01-01

## 2020-01-01 RX ORDER — L.ACID,PARA/B.BIFIDUM/S.THERM 8B CELL
1 CAPSULE ORAL 2 TIMES DAILY
Status: DISCONTINUED | OUTPATIENT
Start: 2020-01-01 | End: 2020-01-01 | Stop reason: HOSPADM

## 2020-01-01 RX ORDER — BUMETANIDE 0.25 MG/ML
4 INJECTION INTRAMUSCULAR; INTRAVENOUS ONCE
Status: COMPLETED | OUTPATIENT
Start: 2020-01-01 | End: 2020-01-01

## 2020-01-01 RX ORDER — ALBUMIN (HUMAN) 12.5 G/50ML
12.5 SOLUTION INTRAVENOUS AS NEEDED
Status: DISPENSED | OUTPATIENT
Start: 2020-01-01 | End: 2020-01-01

## 2020-01-01 RX ORDER — SODIUM CHLORIDE 0.9 % (FLUSH) 0.9 %
10 SYRINGE (ML) INJECTION EVERY 12 HOURS SCHEDULED
Status: DISCONTINUED | OUTPATIENT
Start: 2020-01-01 | End: 2020-01-01 | Stop reason: HOSPADM

## 2020-01-01 RX ORDER — PANTOPRAZOLE SODIUM 40 MG/1
40 TABLET, DELAYED RELEASE ORAL
Status: DISCONTINUED | OUTPATIENT
Start: 2020-01-01 | End: 2020-01-01 | Stop reason: HOSPADM

## 2020-01-01 RX ORDER — METHYLPREDNISOLONE SODIUM SUCCINATE 125 MG/2ML
125 INJECTION, POWDER, LYOPHILIZED, FOR SOLUTION INTRAMUSCULAR; INTRAVENOUS ONCE
Status: COMPLETED | OUTPATIENT
Start: 2020-01-01 | End: 2020-01-01

## 2020-01-01 RX ORDER — RANOLAZINE 500 MG/1
500 TABLET, EXTENDED RELEASE ORAL EVERY 12 HOURS SCHEDULED
Start: 2020-01-01 | End: 2020-01-01 | Stop reason: SDUPTHER

## 2020-01-01 RX ORDER — SPIRONOLACTONE 25 MG/1
12.5 TABLET ORAL DAILY
Status: DISCONTINUED | OUTPATIENT
Start: 2020-01-01 | End: 2020-01-01

## 2020-01-01 RX ORDER — ALBUTEROL SULFATE 2.5 MG/3ML
2.5 SOLUTION RESPIRATORY (INHALATION) EVERY 4 HOURS PRN
Status: DISCONTINUED | OUTPATIENT
Start: 2020-01-01 | End: 2020-01-01 | Stop reason: HOSPADM

## 2020-01-01 RX ORDER — POTASSIUM CHLORIDE 750 MG/1
10 TABLET, FILM COATED, EXTENDED RELEASE ORAL DAILY
Start: 2020-01-01 | End: 2020-01-01 | Stop reason: HOSPADM

## 2020-01-01 RX ORDER — BUMETANIDE 0.25 MG/ML
2 INJECTION INTRAMUSCULAR; INTRAVENOUS EVERY 6 HOURS
Status: COMPLETED | OUTPATIENT
Start: 2020-01-01 | End: 2020-01-01

## 2020-01-01 RX ORDER — PREDNISONE 1 MG/1
5 TABLET ORAL DAILY
Qty: 90 TABLET | Refills: 1 | Status: SHIPPED | OUTPATIENT
Start: 2020-01-01

## 2020-01-01 RX ORDER — LORAZEPAM 2 MG/ML
0.5 CONCENTRATE ORAL EVERY 4 HOURS PRN
Qty: 30 ML | Refills: 0 | Status: SHIPPED | OUTPATIENT
Start: 2020-01-01

## 2020-01-01 RX ORDER — ASPIRIN 300 MG/1
300 SUPPOSITORY RECTAL DAILY
Status: DISCONTINUED | OUTPATIENT
Start: 2020-01-01 | End: 2020-01-01

## 2020-01-01 RX ORDER — DEXAMETHASONE 4 MG/1
4 TABLET ORAL
Qty: 3 TABLET | Refills: 0 | Status: SHIPPED | OUTPATIENT
Start: 2020-01-01 | End: 2020-01-01 | Stop reason: HOSPADM

## 2020-01-01 RX ORDER — BUDESONIDE 0.5 MG/2ML
INHALANT ORAL
COMMUNITY
Start: 2020-01-01

## 2020-01-01 RX ORDER — PREDNISONE 10 MG/1
10 TABLET ORAL DAILY
Status: DISCONTINUED | OUTPATIENT
Start: 2020-01-01 | End: 2020-01-01

## 2020-01-01 RX ORDER — FERROUS SULFATE 325(65) MG
TABLET ORAL
Qty: 24 TABLET | Refills: 5 | Status: SHIPPED | OUTPATIENT
Start: 2020-01-01 | End: 2020-01-01 | Stop reason: HOSPADM

## 2020-01-01 RX ORDER — BUDESONIDE 0.5 MG/2ML
0.5 INHALANT ORAL
Qty: 120 ML | Refills: 0 | Status: SHIPPED | OUTPATIENT
Start: 2020-01-01 | End: 2020-01-01

## 2020-01-01 RX ORDER — PANTOPRAZOLE SODIUM 40 MG/1
40 TABLET, DELAYED RELEASE ORAL DAILY
Qty: 90 TABLET | Refills: 3 | Status: SHIPPED | OUTPATIENT
Start: 2020-01-01 | End: 2020-01-01 | Stop reason: SDUPTHER

## 2020-01-01 RX ORDER — SODIUM CHLORIDE 0.9 % (FLUSH) 0.9 %
10 SYRINGE (ML) INJECTION AS NEEDED
Status: DISCONTINUED | OUTPATIENT
Start: 2020-01-01 | End: 2020-01-01 | Stop reason: HOSPADM

## 2020-01-01 RX ORDER — DILTIAZEM HYDROCHLORIDE 60 MG/1
60 TABLET, FILM COATED ORAL ONCE
Status: COMPLETED | OUTPATIENT
Start: 2020-01-01 | End: 2020-01-01

## 2020-01-01 RX ORDER — CLOPIDOGREL BISULFATE 75 MG/1
75 TABLET ORAL DAILY
Status: DISCONTINUED | OUTPATIENT
Start: 2020-01-01 | End: 2020-01-01

## 2020-01-01 RX ORDER — DILTIAZEM HYDROCHLORIDE 180 MG/1
180 CAPSULE, COATED, EXTENDED RELEASE ORAL DAILY
Qty: 30 CAPSULE | Refills: 0 | Status: SHIPPED | OUTPATIENT
Start: 2020-01-01 | End: 2020-01-01 | Stop reason: SDUPTHER

## 2020-01-01 RX ORDER — PREDNISONE 1 MG/1
5 TABLET ORAL DAILY
Status: DISCONTINUED | OUTPATIENT
Start: 2020-01-01 | End: 2020-01-01 | Stop reason: HOSPADM

## 2020-01-01 RX ORDER — ALBUMIN (HUMAN) 12.5 G/50ML
12.5 SOLUTION INTRAVENOUS AS NEEDED
Status: DISCONTINUED | OUTPATIENT
Start: 2020-01-01 | End: 2020-01-01 | Stop reason: HOSPADM

## 2020-01-01 RX ORDER — BISOPROLOL FUMARATE 5 MG/1
5 TABLET, FILM COATED ORAL 2 TIMES DAILY
Status: DISCONTINUED | OUTPATIENT
Start: 2020-01-01 | End: 2020-01-01 | Stop reason: HOSPADM

## 2020-01-01 RX ORDER — LEVOTHYROXINE SODIUM 0.07 MG/1
75 TABLET ORAL DAILY
Qty: 90 TABLET | Refills: 2 | Status: SHIPPED | OUTPATIENT
Start: 2020-01-01

## 2020-01-01 RX ORDER — BUDESONIDE 0.5 MG/2ML
0.5 INHALANT ORAL
Status: DISCONTINUED | OUTPATIENT
Start: 2020-01-01 | End: 2020-01-01 | Stop reason: HOSPADM

## 2020-01-01 RX ORDER — METHYLPREDNISOLONE SODIUM SUCCINATE 40 MG/ML
40 INJECTION, POWDER, LYOPHILIZED, FOR SOLUTION INTRAMUSCULAR; INTRAVENOUS EVERY 8 HOURS
Status: DISCONTINUED | OUTPATIENT
Start: 2020-01-01 | End: 2020-01-01

## 2020-01-01 RX ORDER — DILTIAZEM HYDROCHLORIDE 180 MG/1
180 CAPSULE, COATED, EXTENDED RELEASE ORAL
Qty: 30 CAPSULE | Refills: 0 | Status: SHIPPED | OUTPATIENT
Start: 2020-01-01 | End: 2020-01-01 | Stop reason: SDUPTHER

## 2020-01-01 RX ORDER — ACETAMINOPHEN 650 MG/1
650 SUPPOSITORY RECTAL EVERY 4 HOURS PRN
Status: DISCONTINUED | OUTPATIENT
Start: 2020-01-01 | End: 2020-01-01 | Stop reason: HOSPADM

## 2020-01-01 RX ORDER — MIRTAZAPINE 15 MG/1
15 TABLET, FILM COATED ORAL NIGHTLY
Qty: 30 TABLET | Refills: 3 | Status: SHIPPED | OUTPATIENT
Start: 2020-01-01

## 2020-01-01 RX ORDER — MAGNESIUM HYDROXIDE 1200 MG/15ML
LIQUID ORAL AS NEEDED
Status: DISCONTINUED | OUTPATIENT
Start: 2020-01-01 | End: 2020-01-01 | Stop reason: HOSPADM

## 2020-01-01 RX ORDER — BUMETANIDE 2 MG/1
2 TABLET ORAL 2 TIMES DAILY
Qty: 60 TABLET | Refills: 0
Start: 2020-01-01 | End: 2020-01-01 | Stop reason: HOSPADM

## 2020-01-01 RX ORDER — FUROSEMIDE 40 MG/1
40 TABLET ORAL 2 TIMES DAILY
Status: DISCONTINUED | OUTPATIENT
Start: 2020-01-01 | End: 2020-01-01

## 2020-01-01 RX ORDER — POTASSIUM CHLORIDE 750 MG/1
20 CAPSULE, EXTENDED RELEASE ORAL DAILY
Status: DISCONTINUED | OUTPATIENT
Start: 2020-01-01 | End: 2020-01-01 | Stop reason: HOSPADM

## 2020-01-01 RX ORDER — MORPHINE SULFATE 100 MG/5ML
5 SOLUTION ORAL
Qty: 30 ML | Refills: 0 | Status: SHIPPED | OUTPATIENT
Start: 2020-01-01 | End: 2020-01-01 | Stop reason: SDUPTHER

## 2020-01-01 RX ORDER — ISOSORBIDE MONONITRATE 60 MG/1
60 TABLET, EXTENDED RELEASE ORAL DAILY
Status: DISCONTINUED | OUTPATIENT
Start: 2020-01-01 | End: 2020-01-01

## 2020-01-01 RX ORDER — PREDNISONE 1 MG/1
5 TABLET ORAL DAILY
Qty: 90 TABLET | Refills: 1 | Status: SHIPPED | OUTPATIENT
Start: 2020-01-01 | End: 2020-01-01 | Stop reason: SDUPTHER

## 2020-01-01 RX ORDER — LIDOCAINE 50 MG/G
1 PATCH TOPICAL
Status: DISCONTINUED | OUTPATIENT
Start: 2020-01-01 | End: 2020-01-01 | Stop reason: HOSPADM

## 2020-01-01 RX ORDER — DILTIAZEM HYDROCHLORIDE 180 MG/1
180 CAPSULE, COATED, EXTENDED RELEASE ORAL
Status: DISCONTINUED | OUTPATIENT
Start: 2020-01-01 | End: 2020-01-01 | Stop reason: HOSPADM

## 2020-01-01 RX ORDER — ASPIRIN 300 MG/1
300 SUPPOSITORY RECTAL ONCE
Status: COMPLETED | OUTPATIENT
Start: 2020-01-01 | End: 2020-01-01

## 2020-01-01 RX ORDER — SODIUM POLYSTYRENE SULFONATE 15 G/60ML
30 SUSPENSION ORAL; RECTAL ONCE
Status: COMPLETED | OUTPATIENT
Start: 2020-01-01 | End: 2020-01-01

## 2020-01-01 RX ORDER — DILTIAZEM HYDROCHLORIDE 120 MG/1
120 CAPSULE, COATED, EXTENDED RELEASE ORAL
Start: 2020-01-01 | End: 2020-01-01 | Stop reason: SDUPTHER

## 2020-01-01 RX ORDER — SODIUM CHLORIDE FOR INHALATION 3 %
4 VIAL, NEBULIZER (ML) INHALATION ONCE
Status: COMPLETED | OUTPATIENT
Start: 2020-01-01 | End: 2020-01-01

## 2020-01-01 RX ORDER — FERROUS SULFATE 325(65) MG
325 TABLET ORAL
Status: DISCONTINUED | OUTPATIENT
Start: 2020-01-01 | End: 2020-01-01

## 2020-01-01 RX ORDER — ONDANSETRON 4 MG/1
4 TABLET, FILM COATED ORAL EVERY 6 HOURS PRN
Status: DISCONTINUED | OUTPATIENT
Start: 2020-01-01 | End: 2020-01-01 | Stop reason: HOSPADM

## 2020-01-01 RX ORDER — HEPARIN SODIUM 1000 [USP'U]/ML
INJECTION, SOLUTION INTRAVENOUS; SUBCUTANEOUS
COMMUNITY
Start: 2020-01-01 | End: 2021-02-17

## 2020-01-01 RX ORDER — LIDOCAINE HYDROCHLORIDE 10 MG/ML
INJECTION, SOLUTION INFILTRATION; PERINEURAL AS NEEDED
Status: DISCONTINUED | OUTPATIENT
Start: 2020-01-01 | End: 2020-01-01 | Stop reason: HOSPADM

## 2020-01-01 RX ORDER — POLYETHYLENE GLYCOL 3350 17 G/17G
17 POWDER, FOR SOLUTION ORAL DAILY PRN
Status: DISCONTINUED | OUTPATIENT
Start: 2020-01-01 | End: 2020-01-01 | Stop reason: HOSPADM

## 2020-01-01 RX ORDER — DILTIAZEM HYDROCHLORIDE 60 MG/1
30 TABLET, FILM COATED ORAL EVERY 6 HOURS SCHEDULED
Status: DISCONTINUED | OUTPATIENT
Start: 2020-01-01 | End: 2020-01-01 | Stop reason: HOSPADM

## 2020-01-01 RX ORDER — DILTIAZEM HYDROCHLORIDE 5 MG/ML
10 INJECTION INTRAVENOUS ONCE
Status: COMPLETED | OUTPATIENT
Start: 2020-01-01 | End: 2020-01-01

## 2020-01-01 RX ORDER — RANOLAZINE 500 MG/1
500 TABLET, EXTENDED RELEASE ORAL EVERY 12 HOURS SCHEDULED
Qty: 180 TABLET | Refills: 2 | Status: SHIPPED | OUTPATIENT
Start: 2020-01-01

## 2020-01-01 RX ORDER — LEVOTHYROXINE SODIUM 0.07 MG/1
75 TABLET ORAL DAILY
Qty: 90 TABLET | Refills: 2 | Status: SHIPPED | OUTPATIENT
Start: 2020-01-01 | End: 2020-01-01 | Stop reason: SDUPTHER

## 2020-01-01 RX ORDER — IRON POLYSACCHARIDE COMPLEX 150 MG
150 CAPSULE ORAL DAILY
Start: 2020-01-01 | End: 2020-01-01

## 2020-01-01 RX ORDER — POLYETHYLENE GLYCOL 3350 17 G/17G
17 POWDER, FOR SOLUTION ORAL DAILY PRN
Qty: 7 EACH
Start: 2020-01-01 | End: 2020-01-01

## 2020-01-01 RX ORDER — DEXTROSE MONOHYDRATE 25 G/50ML
50 INJECTION, SOLUTION INTRAVENOUS ONCE
Status: COMPLETED | OUTPATIENT
Start: 2020-01-01 | End: 2020-01-01

## 2020-01-01 RX ORDER — PREDNISONE 20 MG/1
40 TABLET ORAL
Status: DISCONTINUED | OUTPATIENT
Start: 2020-01-01 | End: 2020-01-01 | Stop reason: HOSPADM

## 2020-01-01 RX ORDER — RANOLAZINE 500 MG/1
500 TABLET, EXTENDED RELEASE ORAL EVERY 12 HOURS SCHEDULED
Qty: 180 TABLET | Refills: 0 | Status: SHIPPED | OUTPATIENT
Start: 2020-01-01 | End: 2020-01-01 | Stop reason: SDUPTHER

## 2020-01-01 RX ORDER — IPRATROPIUM BROMIDE AND ALBUTEROL SULFATE 2.5; .5 MG/3ML; MG/3ML
3 SOLUTION RESPIRATORY (INHALATION) EVERY 4 HOURS PRN
Qty: 120 ML | Refills: 0 | Status: SHIPPED | OUTPATIENT
Start: 2020-01-01 | End: 2020-01-01

## 2020-01-01 RX ORDER — BUMETANIDE 1 MG/1
2 TABLET ORAL
Status: DISCONTINUED | OUTPATIENT
Start: 2020-01-01 | End: 2020-01-01

## 2020-01-01 RX ORDER — DILTIAZEM HYDROCHLORIDE 120 MG/1
120 CAPSULE, COATED, EXTENDED RELEASE ORAL
Status: DISCONTINUED | OUTPATIENT
Start: 2020-01-01 | End: 2020-01-01

## 2020-01-01 RX ORDER — IRON POLYSACCHARIDE COMPLEX 150 MG
150 CAPSULE ORAL DAILY
Status: DISCONTINUED | OUTPATIENT
Start: 2020-01-01 | End: 2020-01-01 | Stop reason: HOSPADM

## 2020-01-01 RX ORDER — BENZONATATE 200 MG/1
200 CAPSULE ORAL 3 TIMES DAILY PRN
Qty: 30 CAPSULE | Refills: 0 | Status: ON HOLD | OUTPATIENT
Start: 2020-01-01 | End: 2020-01-01

## 2020-01-01 RX ORDER — PREDNISONE 20 MG/1
20 TABLET ORAL DAILY
Status: DISCONTINUED | OUTPATIENT
Start: 2020-01-01 | End: 2020-01-01

## 2020-01-01 RX ORDER — METHYLPREDNISOLONE SODIUM SUCCINATE 40 MG/ML
40 INJECTION, POWDER, LYOPHILIZED, FOR SOLUTION INTRAMUSCULAR; INTRAVENOUS EVERY 8 HOURS
Status: COMPLETED | OUTPATIENT
Start: 2020-01-01 | End: 2020-01-01

## 2020-01-01 RX ORDER — CEFTRIAXONE 1 G/50ML
1 INJECTION, SOLUTION INTRAVENOUS ONCE
Status: COMPLETED | OUTPATIENT
Start: 2020-01-01 | End: 2020-01-01

## 2020-01-01 RX ORDER — DILTIAZEM HYDROCHLORIDE 120 MG/1
120 CAPSULE, COATED, EXTENDED RELEASE ORAL
Status: DISCONTINUED | OUTPATIENT
Start: 2020-01-01 | End: 2020-01-01 | Stop reason: HOSPADM

## 2020-01-01 RX ORDER — BISACODYL 10 MG
10 SUPPOSITORY, RECTAL RECTAL DAILY PRN
Status: DISCONTINUED | OUTPATIENT
Start: 2020-01-01 | End: 2020-01-01 | Stop reason: HOSPADM

## 2020-01-01 RX ORDER — BISOPROLOL FUMARATE 5 MG/1
5 TABLET, FILM COATED ORAL
Status: DISCONTINUED | OUTPATIENT
Start: 2020-01-01 | End: 2020-01-01

## 2020-01-01 RX ORDER — PANTOPRAZOLE SODIUM 40 MG/1
40 TABLET, DELAYED RELEASE ORAL
Status: DISCONTINUED | OUTPATIENT
Start: 2020-01-01 | End: 2020-01-01

## 2020-01-01 RX ORDER — ALBUMIN (HUMAN) 12.5 G/50ML
12.5 SOLUTION INTRAVENOUS AS NEEDED
Status: CANCELLED | OUTPATIENT
Start: 2020-01-01 | End: 2020-01-01

## 2020-01-01 RX ORDER — PREDNISONE 20 MG/1
60 TABLET ORAL
Status: DISCONTINUED | OUTPATIENT
Start: 2020-01-01 | End: 2020-01-01

## 2020-01-01 RX ORDER — BISOPROLOL FUMARATE 5 MG/1
20 TABLET, FILM COATED ORAL
Status: DISCONTINUED | OUTPATIENT
Start: 2020-01-01 | End: 2020-01-01

## 2020-01-01 RX ORDER — DILTIAZEM HYDROCHLORIDE 120 MG/1
120 CAPSULE, COATED, EXTENDED RELEASE ORAL
Qty: 90 CAPSULE | Refills: 0 | Status: SHIPPED | OUTPATIENT
Start: 2020-01-01 | End: 2020-01-01 | Stop reason: SDUPTHER

## 2020-01-01 RX ORDER — SODIUM CHLORIDE, SODIUM LACTATE, POTASSIUM CHLORIDE, CALCIUM CHLORIDE 600; 310; 30; 20 MG/100ML; MG/100ML; MG/100ML; MG/100ML
50 INJECTION, SOLUTION INTRAVENOUS CONTINUOUS
Status: ACTIVE | OUTPATIENT
Start: 2020-01-01 | End: 2020-01-01

## 2020-01-01 RX ORDER — ATORVASTATIN CALCIUM 40 MG/1
40 TABLET, FILM COATED ORAL NIGHTLY
Status: DISCONTINUED | OUTPATIENT
Start: 2020-01-01 | End: 2020-01-01

## 2020-01-01 RX ORDER — POTASSIUM CHLORIDE 750 MG/1
20 CAPSULE, EXTENDED RELEASE ORAL DAILY
Qty: 30 CAPSULE | Refills: 0 | Status: SHIPPED | OUTPATIENT
Start: 2020-01-01 | End: 2020-01-01 | Stop reason: HOSPADM

## 2020-01-01 RX ORDER — LEVOTHYROXINE SODIUM 0.07 MG/1
75 TABLET ORAL
Status: DISCONTINUED | OUTPATIENT
Start: 2020-01-01 | End: 2020-01-01 | Stop reason: HOSPADM

## 2020-01-01 RX ORDER — FUROSEMIDE 10 MG/ML
40 INJECTION INTRAMUSCULAR; INTRAVENOUS EVERY 12 HOURS
Status: DISCONTINUED | OUTPATIENT
Start: 2020-01-01 | End: 2020-01-01

## 2020-01-01 RX ORDER — METHYLPREDNISOLONE ACETATE 80 MG/ML
80 INJECTION, SUSPENSION INTRA-ARTICULAR; INTRALESIONAL; INTRAMUSCULAR; SOFT TISSUE ONCE
Status: COMPLETED | OUTPATIENT
Start: 2020-01-01 | End: 2020-01-01

## 2020-01-01 RX ORDER — SPIRONOLACTONE 25 MG/1
25 TABLET ORAL DAILY
Status: DISCONTINUED | OUTPATIENT
Start: 2020-01-01 | End: 2020-01-01 | Stop reason: HOSPADM

## 2020-01-01 RX ORDER — ALBUTEROL SULFATE 2.5 MG/3ML
2.5 SOLUTION RESPIRATORY (INHALATION) ONCE
Status: COMPLETED | OUTPATIENT
Start: 2020-01-01 | End: 2020-01-01

## 2020-01-01 RX ORDER — ALBUMIN (HUMAN) 12.5 G/50ML
25 SOLUTION INTRAVENOUS ONCE
Status: COMPLETED | OUTPATIENT
Start: 2020-01-01 | End: 2020-01-01

## 2020-01-01 RX ORDER — PREDNISONE 20 MG/1
40 TABLET ORAL
Qty: 28 TABLET | Refills: 0 | Status: SHIPPED | OUTPATIENT
Start: 2020-01-01 | End: 2020-01-01 | Stop reason: HOSPADM

## 2020-01-01 RX ORDER — MIDAZOLAM HYDROCHLORIDE 2 MG/2ML
INJECTION, SOLUTION INTRAMUSCULAR; INTRAVENOUS AS NEEDED
Status: DISCONTINUED | OUTPATIENT
Start: 2020-01-01 | End: 2020-01-01 | Stop reason: SURG

## 2020-01-01 RX ORDER — MORPHINE SULFATE 100 MG/5ML
5 SOLUTION ORAL
Qty: 30 ML | Refills: 0 | Status: SHIPPED | OUTPATIENT
Start: 2020-01-01

## 2020-01-01 RX ORDER — IPRATROPIUM BROMIDE AND ALBUTEROL SULFATE 2.5; .5 MG/3ML; MG/3ML
6 SOLUTION RESPIRATORY (INHALATION) ONCE
Status: COMPLETED | OUTPATIENT
Start: 2020-01-01 | End: 2020-01-01

## 2020-01-01 RX ORDER — ACETAMINOPHEN 650 MG/1
650 SUPPOSITORY RECTAL EVERY 6 HOURS PRN
Status: DISCONTINUED | OUTPATIENT
Start: 2020-01-01 | End: 2020-01-01 | Stop reason: HOSPADM

## 2020-01-01 RX ORDER — SULFAMETHOXAZOLE AND TRIMETHOPRIM 800; 160 MG/1; MG/1
TABLET ORAL
Qty: 9 TABLET | Refills: 0 | Status: SHIPPED | OUTPATIENT
Start: 2020-01-01 | End: 2020-01-01

## 2020-01-01 RX ORDER — CALCIUM ACETATE 667 MG/1
1334 CAPSULE ORAL
Qty: 180 CAPSULE
Start: 2020-01-01 | End: 2020-01-01 | Stop reason: SDUPTHER

## 2020-01-01 RX ORDER — PREDNISONE 20 MG/1
60 TABLET ORAL
Status: DISCONTINUED | OUTPATIENT
Start: 2020-01-01 | End: 2020-01-01 | Stop reason: SDUPTHER

## 2020-01-01 RX ORDER — SODIUM CHLORIDE 9 MG/ML
500 INJECTION, SOLUTION INTRAVENOUS CONTINUOUS
Status: DISCONTINUED | OUTPATIENT
Start: 2020-01-01 | End: 2020-01-01

## 2020-01-01 RX ORDER — DILTIAZEM HYDROCHLORIDE 120 MG/1
120 CAPSULE, COATED, EXTENDED RELEASE ORAL
Qty: 90 CAPSULE | Refills: 2 | Status: ON HOLD | OUTPATIENT
Start: 2020-01-01 | End: 2020-01-01 | Stop reason: SDUPTHER

## 2020-01-01 RX ORDER — CALCIUM ACETATE 667 MG/1
1334 CAPSULE ORAL
Status: DISCONTINUED | OUTPATIENT
Start: 2020-01-01 | End: 2020-01-01 | Stop reason: HOSPADM

## 2020-01-01 RX ORDER — ASPIRIN 81 MG/1
81 TABLET ORAL DAILY
Status: DISCONTINUED | OUTPATIENT
Start: 2020-01-01 | End: 2020-01-01

## 2020-01-01 RX ORDER — PREDNISONE 10 MG/1
10 TABLET ORAL DAILY
Qty: 30 TABLET | Refills: 0 | Status: SHIPPED | OUTPATIENT
Start: 2020-01-01 | End: 2020-01-01 | Stop reason: HOSPADM

## 2020-01-01 RX ORDER — MIRTAZAPINE 15 MG/1
15 TABLET, FILM COATED ORAL NIGHTLY
Status: DISCONTINUED | OUTPATIENT
Start: 2020-01-01 | End: 2020-01-01 | Stop reason: HOSPADM

## 2020-01-01 RX ORDER — DILTIAZEM HYDROCHLORIDE 180 MG/1
180 CAPSULE, COATED, EXTENDED RELEASE ORAL
Status: DISCONTINUED | OUTPATIENT
Start: 2020-01-01 | End: 2020-01-01

## 2020-01-01 RX ORDER — PREDNISONE 1 MG/1
5 TABLET ORAL DAILY
Qty: 30 TABLET | Refills: 0 | Status: SHIPPED | OUTPATIENT
Start: 2020-01-01 | End: 2020-01-01 | Stop reason: SDUPTHER

## 2020-01-01 RX ORDER — ASPIRIN 81 MG/1
81 TABLET, CHEWABLE ORAL DAILY
Status: DISCONTINUED | OUTPATIENT
Start: 2020-01-01 | End: 2020-01-01 | Stop reason: HOSPADM

## 2020-01-01 RX ORDER — DILTIAZEM HYDROCHLORIDE 180 MG/1
180 CAPSULE, COATED, EXTENDED RELEASE ORAL
Qty: 90 CAPSULE | Refills: 2 | Status: SHIPPED | OUTPATIENT
Start: 2020-01-01

## 2020-01-01 RX ORDER — FUROSEMIDE 10 MG/ML
40 INJECTION INTRAMUSCULAR; INTRAVENOUS DAILY
Status: DISCONTINUED | OUTPATIENT
Start: 2020-01-01 | End: 2020-01-01

## 2020-01-01 RX ORDER — BENZONATATE 200 MG/1
200 CAPSULE ORAL 3 TIMES DAILY PRN
COMMUNITY
End: 2020-01-01

## 2020-01-01 RX ORDER — FERROUS SULFATE 325(65) MG
325 TABLET ORAL 2 TIMES DAILY WITH MEALS
Status: DISCONTINUED | OUTPATIENT
Start: 2020-01-01 | End: 2020-01-01 | Stop reason: HOSPADM

## 2020-01-01 RX ORDER — FUROSEMIDE 40 MG/1
40 TABLET ORAL DAILY
Status: DISCONTINUED | OUTPATIENT
Start: 2020-01-01 | End: 2020-01-01 | Stop reason: HOSPADM

## 2020-01-01 RX ORDER — PREDNISONE 1 MG/1
5 TABLET ORAL
Status: DISCONTINUED | OUTPATIENT
Start: 2020-01-01 | End: 2020-01-01 | Stop reason: HOSPADM

## 2020-01-01 RX ORDER — ATORVASTATIN CALCIUM 40 MG/1
40 TABLET, FILM COATED ORAL NIGHTLY
Qty: 90 TABLET | Refills: 3 | Status: SHIPPED | OUTPATIENT
Start: 2020-01-01 | End: 2020-01-01

## 2020-01-01 RX ORDER — CEFAZOLIN SODIUM 2 G/50ML
2 SOLUTION INTRAVENOUS ONCE
Status: COMPLETED | OUTPATIENT
Start: 2020-01-01 | End: 2020-01-01

## 2020-01-01 RX ORDER — IPRATROPIUM BROMIDE AND ALBUTEROL SULFATE 2.5; .5 MG/3ML; MG/3ML
3 SOLUTION RESPIRATORY (INHALATION) EVERY 4 HOURS PRN
Status: DISCONTINUED | OUTPATIENT
Start: 2020-01-01 | End: 2020-01-01

## 2020-01-01 RX ORDER — ASPIRIN 81 MG/1
81 TABLET ORAL DAILY
Qty: 90 TABLET | Refills: 3 | Status: SHIPPED | OUTPATIENT
Start: 2020-01-01

## 2020-01-01 RX ORDER — ACETAMINOPHEN 325 MG/1
650 TABLET ORAL EVERY 6 HOURS PRN
Status: DISCONTINUED | OUTPATIENT
Start: 2020-01-01 | End: 2020-01-01 | Stop reason: HOSPADM

## 2020-01-01 RX ORDER — FUROSEMIDE 40 MG/1
40 TABLET ORAL DAILY
Qty: 60 TABLET | Refills: 0
Start: 2020-01-01 | End: 2020-01-01 | Stop reason: HOSPADM

## 2020-01-01 RX ORDER — METOPROLOL SUCCINATE 25 MG/1
25 TABLET, EXTENDED RELEASE ORAL
Status: DISCONTINUED | OUTPATIENT
Start: 2020-01-01 | End: 2020-01-01

## 2020-01-01 RX ORDER — ALBUTEROL SULFATE 2.5 MG/3ML
10 SOLUTION RESPIRATORY (INHALATION) ONCE
Status: COMPLETED | OUTPATIENT
Start: 2020-01-01 | End: 2020-01-01

## 2020-01-01 RX ORDER — CEFTRIAXONE 1 G/50ML
1 INJECTION, SOLUTION INTRAVENOUS EVERY 24 HOURS
Status: COMPLETED | OUTPATIENT
Start: 2020-01-01 | End: 2020-01-01

## 2020-01-01 RX ORDER — CHOLECALCIFEROL (VITAMIN D3) 125 MCG
5 CAPSULE ORAL NIGHTLY PRN
Status: DISCONTINUED | OUTPATIENT
Start: 2020-01-01 | End: 2020-01-01 | Stop reason: HOSPADM

## 2020-01-01 RX ORDER — PANTOPRAZOLE SODIUM 40 MG/1
40 TABLET, DELAYED RELEASE ORAL DAILY
Qty: 30 TABLET | Refills: 0 | Status: SHIPPED | OUTPATIENT
Start: 2020-01-01

## 2020-01-01 RX ORDER — LORAZEPAM 2 MG/ML
0.5 INJECTION INTRAMUSCULAR ONCE
Status: COMPLETED | OUTPATIENT
Start: 2020-01-01 | End: 2020-01-01

## 2020-01-01 RX ADMIN — SPIRONOLACTONE 25 MG: 25 TABLET ORAL at 08:27

## 2020-01-01 RX ADMIN — BUDESONIDE 0.5 MG: 0.5 INHALANT RESPIRATORY (INHALATION) at 09:25

## 2020-01-01 RX ADMIN — IPRATROPIUM BROMIDE AND ALBUTEROL SULFATE 3 ML: .5; 3 SOLUTION RESPIRATORY (INHALATION) at 12:00

## 2020-01-01 RX ADMIN — DILTIAZEM HYDROCHLORIDE 120 MG: 120 CAPSULE, COATED, EXTENDED RELEASE ORAL at 08:32

## 2020-01-01 RX ADMIN — DILTIAZEM HYDROCHLORIDE 30 MG: 30 TABLET, FILM COATED ORAL at 18:17

## 2020-01-01 RX ADMIN — PANTOPRAZOLE SODIUM 40 MG: 40 TABLET, DELAYED RELEASE ORAL at 13:04

## 2020-01-01 RX ADMIN — FUROSEMIDE 40 MG: 10 INJECTION, SOLUTION INTRAMUSCULAR; INTRAVENOUS at 05:13

## 2020-01-01 RX ADMIN — RANOLAZINE 500 MG: 500 TABLET, FILM COATED, EXTENDED RELEASE ORAL at 22:08

## 2020-01-01 RX ADMIN — RANOLAZINE 500 MG: 500 TABLET, FILM COATED, EXTENDED RELEASE ORAL at 21:40

## 2020-01-01 RX ADMIN — ATORVASTATIN CALCIUM 40 MG: 40 TABLET, FILM COATED ORAL at 20:10

## 2020-01-01 RX ADMIN — SODIUM CHLORIDE, PRESERVATIVE FREE 10 ML: 5 INJECTION INTRAVENOUS at 21:04

## 2020-01-01 RX ADMIN — IPRATROPIUM BROMIDE AND ALBUTEROL SULFATE 3 ML: 2.5; .5 SOLUTION RESPIRATORY (INHALATION) at 19:25

## 2020-01-01 RX ADMIN — IPRATROPIUM BROMIDE AND ALBUTEROL SULFATE 3 ML: .5; 3 SOLUTION RESPIRATORY (INHALATION) at 03:30

## 2020-01-01 RX ADMIN — BUDESONIDE 0.5 MG: 0.5 INHALANT RESPIRATORY (INHALATION) at 06:44

## 2020-01-01 RX ADMIN — SENNOSIDES 2 TABLET: 8.6 TABLET, FILM COATED ORAL at 21:48

## 2020-01-01 RX ADMIN — DILTIAZEM HYDROCHLORIDE 30 MG: 60 TABLET, FILM COATED ORAL at 17:44

## 2020-01-01 RX ADMIN — FUROSEMIDE 40 MG: 40 TABLET ORAL at 17:42

## 2020-01-01 RX ADMIN — PANTOPRAZOLE SODIUM 40 MG: 40 TABLET, DELAYED RELEASE ORAL at 12:44

## 2020-01-01 RX ADMIN — SODIUM CHLORIDE, PRESERVATIVE FREE 10 ML: 5 INJECTION INTRAVENOUS at 21:50

## 2020-01-01 RX ADMIN — BUDESONIDE 0.5 MG: 0.5 INHALANT RESPIRATORY (INHALATION) at 19:25

## 2020-01-01 RX ADMIN — DILTIAZEM HYDROCHLORIDE 20 MG: 5 INJECTION INTRAVENOUS at 21:40

## 2020-01-01 RX ADMIN — LEVOTHYROXINE SODIUM 75 MCG: 0.07 TABLET ORAL at 05:13

## 2020-01-01 RX ADMIN — LEVOTHYROXINE SODIUM 75 MCG: 0.07 TABLET ORAL at 05:54

## 2020-01-01 RX ADMIN — ISOSORBIDE MONONITRATE 60 MG: 60 TABLET, EXTENDED RELEASE ORAL at 08:56

## 2020-01-01 RX ADMIN — FUROSEMIDE 40 MG: 10 INJECTION, SOLUTION INTRAMUSCULAR; INTRAVENOUS at 17:22

## 2020-01-01 RX ADMIN — TAZOBACTAM SODIUM AND PIPERACILLIN SODIUM 4.5 G: 500; 4 INJECTION, SOLUTION INTRAVENOUS at 08:09

## 2020-01-01 RX ADMIN — RANOLAZINE 500 MG: 500 TABLET, FILM COATED, EXTENDED RELEASE ORAL at 09:42

## 2020-01-01 RX ADMIN — PANTOPRAZOLE SODIUM 40 MG: 40 TABLET, DELAYED RELEASE ORAL at 09:27

## 2020-01-01 RX ADMIN — RANOLAZINE 500 MG: 500 TABLET, FILM COATED, EXTENDED RELEASE ORAL at 09:03

## 2020-01-01 RX ADMIN — HEPARIN SODIUM 5000 UNITS: 5000 INJECTION INTRAVENOUS; SUBCUTANEOUS at 21:47

## 2020-01-01 RX ADMIN — ATORVASTATIN CALCIUM 40 MG: 40 TABLET, FILM COATED ORAL at 22:34

## 2020-01-01 RX ADMIN — APIXABAN 2.5 MG: 2.5 TABLET, FILM COATED ORAL at 20:18

## 2020-01-01 RX ADMIN — PANTOPRAZOLE SODIUM 40 MG: 40 TABLET, DELAYED RELEASE ORAL at 09:31

## 2020-01-01 RX ADMIN — SODIUM CHLORIDE, PRESERVATIVE FREE 10 ML: 5 INJECTION INTRAVENOUS at 08:43

## 2020-01-01 RX ADMIN — IPRATROPIUM BROMIDE AND ALBUTEROL SULFATE 3 ML: .5; 3 SOLUTION RESPIRATORY (INHALATION) at 07:25

## 2020-01-01 RX ADMIN — APIXABAN 2.5 MG: 2.5 TABLET, FILM COATED ORAL at 21:49

## 2020-01-01 RX ADMIN — PANTOPRAZOLE SODIUM 40 MG: 40 TABLET, DELAYED RELEASE ORAL at 13:51

## 2020-01-01 RX ADMIN — SODIUM CHLORIDE, PRESERVATIVE FREE 10 ML: 5 INJECTION INTRAVENOUS at 20:09

## 2020-01-01 RX ADMIN — METHYLPREDNISOLONE SODIUM SUCCINATE 40 MG: 40 INJECTION, POWDER, FOR SOLUTION INTRAMUSCULAR; INTRAVENOUS at 16:18

## 2020-01-01 RX ADMIN — POTASSIUM CHLORIDE 40 MEQ: 750 CAPSULE, EXTENDED RELEASE ORAL at 11:47

## 2020-01-01 RX ADMIN — NIFEDIPINE 120 MG: 10 CAPSULE ORAL at 12:53

## 2020-01-01 RX ADMIN — RANOLAZINE 500 MG: 500 TABLET, FILM COATED, EXTENDED RELEASE ORAL at 09:17

## 2020-01-01 RX ADMIN — SODIUM CHLORIDE, PRESERVATIVE FREE 10 ML: 5 INJECTION INTRAVENOUS at 20:55

## 2020-01-01 RX ADMIN — ATORVASTATIN CALCIUM 40 MG: 40 TABLET, FILM COATED ORAL at 20:18

## 2020-01-01 RX ADMIN — IPRATROPIUM BROMIDE AND ALBUTEROL SULFATE 3 ML: .5; 3 SOLUTION RESPIRATORY (INHALATION) at 06:53

## 2020-01-01 RX ADMIN — LEVOTHYROXINE SODIUM 75 MCG: 75 TABLET ORAL at 05:50

## 2020-01-01 RX ADMIN — SODIUM CHLORIDE 500 ML: 9 INJECTION, SOLUTION INTRAVENOUS at 12:53

## 2020-01-01 RX ADMIN — ACETAMINOPHEN 650 MG: 325 TABLET, FILM COATED ORAL at 10:42

## 2020-01-01 RX ADMIN — ATORVASTATIN CALCIUM 40 MG: 40 TABLET, FILM COATED ORAL at 20:44

## 2020-01-01 RX ADMIN — PREDNISONE 5 MG: 5 TABLET ORAL at 09:03

## 2020-01-01 RX ADMIN — PANTOPRAZOLE SODIUM 40 MG: 40 TABLET, DELAYED RELEASE ORAL at 08:42

## 2020-01-01 RX ADMIN — HEPARIN SODIUM 5000 UNITS: 5000 INJECTION INTRAVENOUS; SUBCUTANEOUS at 16:20

## 2020-01-01 RX ADMIN — POTASSIUM CHLORIDE 20 MEQ: 750 CAPSULE, EXTENDED RELEASE ORAL at 08:18

## 2020-01-01 RX ADMIN — CALCIUM ACETATE 1334 MG: 667 CAPSULE ORAL at 18:29

## 2020-01-01 RX ADMIN — ATORVASTATIN CALCIUM 40 MG: 40 TABLET, FILM COATED ORAL at 20:08

## 2020-01-01 RX ADMIN — FERROUS SULFATE TAB 325 MG (65 MG ELEMENTAL FE) 325 MG: 325 (65 FE) TAB at 17:42

## 2020-01-01 RX ADMIN — DILTIAZEM HYDROCHLORIDE 180 MG: 180 CAPSULE, COATED, EXTENDED RELEASE ORAL at 09:53

## 2020-01-01 RX ADMIN — RANOLAZINE 500 MG: 500 TABLET, FILM COATED, EXTENDED RELEASE ORAL at 08:18

## 2020-01-01 RX ADMIN — BUDESONIDE 0.5 MG: 0.5 INHALANT RESPIRATORY (INHALATION) at 19:02

## 2020-01-01 RX ADMIN — CEFTRIAXONE 1 G: 1 INJECTION, SOLUTION INTRAVENOUS at 22:33

## 2020-01-01 RX ADMIN — TAZOBACTAM SODIUM AND PIPERACILLIN SODIUM 3.38 G: 375; 3 INJECTION, SOLUTION INTRAVENOUS at 12:30

## 2020-01-01 RX ADMIN — BUDESONIDE 0.5 MG: 0.5 INHALANT RESPIRATORY (INHALATION) at 19:59

## 2020-01-01 RX ADMIN — IPRATROPIUM BROMIDE AND ALBUTEROL SULFATE 3 ML: .5; 3 SOLUTION RESPIRATORY (INHALATION) at 19:44

## 2020-01-01 RX ADMIN — BISOPROLOL FUMARATE 10 MG: 5 TABLET ORAL at 21:50

## 2020-01-01 RX ADMIN — POTASSIUM CHLORIDE 20 MEQ: 750 CAPSULE, EXTENDED RELEASE ORAL at 17:42

## 2020-01-01 RX ADMIN — HEPARIN SODIUM 5000 UNITS: 5000 INJECTION INTRAVENOUS; SUBCUTANEOUS at 06:16

## 2020-01-01 RX ADMIN — BISOPROLOL FUMARATE 10 MG: 5 TABLET ORAL at 21:00

## 2020-01-01 RX ADMIN — BUMETANIDE 4 MG: 0.25 INJECTION INTRAMUSCULAR; INTRAVENOUS at 14:14

## 2020-01-01 RX ADMIN — ASPIRIN 81 MG: 81 TABLET, COATED ORAL at 13:05

## 2020-01-01 RX ADMIN — SODIUM CHLORIDE, PRESERVATIVE FREE 10 ML: 5 INJECTION INTRAVENOUS at 08:32

## 2020-01-01 RX ADMIN — DILTIAZEM HYDROCHLORIDE 30 MG: 60 TABLET, FILM COATED ORAL at 06:05

## 2020-01-01 RX ADMIN — RANOLAZINE 500 MG: 500 TABLET, FILM COATED, EXTENDED RELEASE ORAL at 21:02

## 2020-01-01 RX ADMIN — BUMETANIDE 2 MG: 1 TABLET ORAL at 09:15

## 2020-01-01 RX ADMIN — DILTIAZEM HYDROCHLORIDE 120 MG: 120 CAPSULE, COATED, EXTENDED RELEASE ORAL at 12:43

## 2020-01-01 RX ADMIN — ASPIRIN 81 MG: 81 TABLET, COATED ORAL at 08:42

## 2020-01-01 RX ADMIN — ATORVASTATIN CALCIUM 40 MG: 40 TABLET, FILM COATED ORAL at 21:26

## 2020-01-01 RX ADMIN — SODIUM CHLORIDE, PRESERVATIVE FREE 10 ML: 5 INJECTION INTRAVENOUS at 21:01

## 2020-01-01 RX ADMIN — FUROSEMIDE 40 MG: 40 TABLET ORAL at 08:53

## 2020-01-01 RX ADMIN — BARIUM SULFATE 20 ML: 400 PASTE ORAL at 12:16

## 2020-01-01 RX ADMIN — BISOPROLOL FUMARATE 5 MG: 5 TABLET, FILM COATED ORAL at 08:44

## 2020-01-01 RX ADMIN — RANOLAZINE 500 MG: 500 TABLET, FILM COATED, EXTENDED RELEASE ORAL at 20:31

## 2020-01-01 RX ADMIN — PANTOPRAZOLE SODIUM 40 MG: 40 TABLET, DELAYED RELEASE ORAL at 08:18

## 2020-01-01 RX ADMIN — APIXABAN 2.5 MG: 2.5 TABLET, FILM COATED ORAL at 09:17

## 2020-01-01 RX ADMIN — ASPIRIN 81 MG: 81 TABLET, COATED ORAL at 09:37

## 2020-01-01 RX ADMIN — PANTOPRAZOLE SODIUM 40 MG: 40 TABLET, DELAYED RELEASE ORAL at 08:12

## 2020-01-01 RX ADMIN — BUDESONIDE 0.5 MG: 0.5 INHALANT RESPIRATORY (INHALATION) at 20:20

## 2020-01-01 RX ADMIN — IPRATROPIUM BROMIDE AND ALBUTEROL SULFATE 3 ML: .5; 3 SOLUTION RESPIRATORY (INHALATION) at 18:53

## 2020-01-01 RX ADMIN — BISOPROLOL FUMARATE 5 MG: 5 TABLET, FILM COATED ORAL at 20:49

## 2020-01-01 RX ADMIN — HEPARIN SODIUM 5000 UNITS: 5000 INJECTION INTRAVENOUS; SUBCUTANEOUS at 21:17

## 2020-01-01 RX ADMIN — FERROUS SULFATE TAB 325 MG (65 MG ELEMENTAL FE) 325 MG: 325 (65 FE) TAB at 09:30

## 2020-01-01 RX ADMIN — DILTIAZEM HYDROCHLORIDE 30 MG: 30 TABLET, FILM COATED ORAL at 06:54

## 2020-01-01 RX ADMIN — IPRATROPIUM BROMIDE AND ALBUTEROL SULFATE 3 ML: .5; 3 SOLUTION RESPIRATORY (INHALATION) at 19:07

## 2020-01-01 RX ADMIN — PANTOPRAZOLE SODIUM 40 MG: 40 TABLET, DELAYED RELEASE ORAL at 08:44

## 2020-01-01 RX ADMIN — METHYLPREDNISOLONE SODIUM SUCCINATE 40 MG: 40 INJECTION, POWDER, FOR SOLUTION INTRAMUSCULAR; INTRAVENOUS at 03:05

## 2020-01-01 RX ADMIN — TAZOBACTAM SODIUM AND PIPERACILLIN SODIUM 3.38 G: 375; 3 INJECTION, SOLUTION INTRAVENOUS at 15:43

## 2020-01-01 RX ADMIN — PANTOPRAZOLE SODIUM 40 MG: 40 TABLET, DELAYED RELEASE ORAL at 08:13

## 2020-01-01 RX ADMIN — HEPARIN SODIUM 5000 UNITS: 5000 INJECTION INTRAVENOUS; SUBCUTANEOUS at 06:39

## 2020-01-01 RX ADMIN — FUROSEMIDE 40 MG: 40 TABLET ORAL at 08:08

## 2020-01-01 RX ADMIN — PANTOPRAZOLE SODIUM 40 MG: 40 TABLET, DELAYED RELEASE ORAL at 08:56

## 2020-01-01 RX ADMIN — LEVOTHYROXINE SODIUM 75 MCG: 0.07 TABLET ORAL at 05:52

## 2020-01-01 RX ADMIN — PANTOPRAZOLE SODIUM 40 MG: 40 TABLET, DELAYED RELEASE ORAL at 05:00

## 2020-01-01 RX ADMIN — CALCIUM ACETATE 1334 MG: 667 CAPSULE ORAL at 17:47

## 2020-01-01 RX ADMIN — PREDNISONE 10 MG: 10 TABLET ORAL at 13:30

## 2020-01-01 RX ADMIN — RANOLAZINE 500 MG: 500 TABLET, FILM COATED, EXTENDED RELEASE ORAL at 20:21

## 2020-01-01 RX ADMIN — ASPIRIN 81 MG: 81 TABLET, COATED ORAL at 13:27

## 2020-01-01 RX ADMIN — CALCIUM ACETATE 1334 MG: 667 CAPSULE ORAL at 12:30

## 2020-01-01 RX ADMIN — APIXABAN 2.5 MG: 2.5 TABLET, FILM COATED ORAL at 21:00

## 2020-01-01 RX ADMIN — SODIUM CHLORIDE, PRESERVATIVE FREE 10 ML: 5 INJECTION INTRAVENOUS at 08:13

## 2020-01-01 RX ADMIN — METHYLPREDNISOLONE SODIUM SUCCINATE 40 MG: 40 INJECTION, POWDER, FOR SOLUTION INTRAMUSCULAR; INTRAVENOUS at 09:21

## 2020-01-01 RX ADMIN — BUMETANIDE 2 MG: 1 TABLET ORAL at 09:54

## 2020-01-01 RX ADMIN — ATORVASTATIN CALCIUM 40 MG: 40 TABLET, FILM COATED ORAL at 20:55

## 2020-01-01 RX ADMIN — SODIUM CHLORIDE, PRESERVATIVE FREE 10 ML: 5 INJECTION INTRAVENOUS at 09:58

## 2020-01-01 RX ADMIN — BUMETANIDE 2 MG: 0.25 INJECTION INTRAMUSCULAR; INTRAVENOUS at 05:45

## 2020-01-01 RX ADMIN — DILTIAZEM HYDROCHLORIDE 30 MG: 60 TABLET, FILM COATED ORAL at 18:32

## 2020-01-01 RX ADMIN — FERROUS SULFATE TAB EC 324 MG (65 MG FE EQUIVALENT) 324 MG: 324 (65 FE) TABLET DELAYED RESPONSE at 08:08

## 2020-01-01 RX ADMIN — LEVOTHYROXINE SODIUM 75 MCG: 75 TABLET ORAL at 08:56

## 2020-01-01 RX ADMIN — AMIODARONE HYDROCHLORIDE 1 MG/MIN: 1.8 INJECTION, SOLUTION INTRAVENOUS at 08:46

## 2020-01-01 RX ADMIN — IPRATROPIUM BROMIDE AND ALBUTEROL SULFATE 3 ML: 2.5; .5 SOLUTION RESPIRATORY (INHALATION) at 19:59

## 2020-01-01 RX ADMIN — APIXABAN 2.5 MG: 2.5 TABLET, FILM COATED ORAL at 08:44

## 2020-01-01 RX ADMIN — LEVOTHYROXINE SODIUM 75 MCG: 75 TABLET ORAL at 08:50

## 2020-01-01 RX ADMIN — BISOPROLOL FUMARATE 5 MG: 5 TABLET ORAL at 08:38

## 2020-01-01 RX ADMIN — PREDNISONE 5 MG: 5 TABLET ORAL at 12:51

## 2020-01-01 RX ADMIN — NIFEDIPINE 120 MG: 10 CAPSULE ORAL at 11:44

## 2020-01-01 RX ADMIN — FUROSEMIDE 40 MG: 40 TABLET ORAL at 08:56

## 2020-01-01 RX ADMIN — POLYSACCHARIDE-IRON COMPLEX 150 MG: 150 CAPSULE ORAL at 11:05

## 2020-01-01 RX ADMIN — RANOLAZINE 500 MG: 500 TABLET, FILM COATED, EXTENDED RELEASE ORAL at 08:42

## 2020-01-01 RX ADMIN — ATORVASTATIN CALCIUM 40 MG: 40 TABLET, FILM COATED ORAL at 20:22

## 2020-01-01 RX ADMIN — GUAIFENESIN 600 MG: 600 TABLET, EXTENDED RELEASE ORAL at 08:53

## 2020-01-01 RX ADMIN — IPRATROPIUM BROMIDE AND ALBUTEROL SULFATE 3 ML: .5; 3 SOLUTION RESPIRATORY (INHALATION) at 20:00

## 2020-01-01 RX ADMIN — POTASSIUM CHLORIDE 40 MEQ: 10 CAPSULE, COATED, EXTENDED RELEASE ORAL at 12:50

## 2020-01-01 RX ADMIN — Medication 1 CAPSULE: at 20:55

## 2020-01-01 RX ADMIN — DILTIAZEM HYDROCHLORIDE 30 MG: 30 TABLET, FILM COATED ORAL at 01:41

## 2020-01-01 RX ADMIN — PANTOPRAZOLE SODIUM 40 MG: 40 TABLET, DELAYED RELEASE ORAL at 06:00

## 2020-01-01 RX ADMIN — IPRATROPIUM BROMIDE AND ALBUTEROL SULFATE 3 ML: .5; 3 SOLUTION RESPIRATORY (INHALATION) at 06:54

## 2020-01-01 RX ADMIN — SODIUM CHLORIDE, PRESERVATIVE FREE 10 ML: 5 INJECTION INTRAVENOUS at 03:11

## 2020-01-01 RX ADMIN — METHYLPREDNISOLONE SODIUM SUCCINATE 40 MG: 40 INJECTION, POWDER, FOR SOLUTION INTRAMUSCULAR; INTRAVENOUS at 00:48

## 2020-01-01 RX ADMIN — ATORVASTATIN CALCIUM 40 MG: 40 TABLET, FILM COATED ORAL at 20:29

## 2020-01-01 RX ADMIN — LEVOTHYROXINE SODIUM 75 MCG: 75 TABLET ORAL at 08:53

## 2020-01-01 RX ADMIN — PREDNISONE 20 MG: 20 TABLET ORAL at 08:13

## 2020-01-01 RX ADMIN — TAZOBACTAM SODIUM AND PIPERACILLIN SODIUM 3.38 G: 375; 3 INJECTION, SOLUTION INTRAVENOUS at 13:29

## 2020-01-01 RX ADMIN — GUAIFENESIN 600 MG: 600 TABLET, EXTENDED RELEASE ORAL at 08:28

## 2020-01-01 RX ADMIN — LEVOTHYROXINE SODIUM 75 MCG: 0.07 TABLET ORAL at 05:17

## 2020-01-01 RX ADMIN — CALCIUM ACETATE 1334 MG: 667 CAPSULE ORAL at 09:16

## 2020-01-01 RX ADMIN — CALCIUM ACETATE 1334 MG: 667 CAPSULE ORAL at 13:04

## 2020-01-01 RX ADMIN — LORAZEPAM 0.5 MG: 2 INJECTION, SOLUTION INTRAMUSCULAR; INTRAVENOUS at 17:21

## 2020-01-01 RX ADMIN — IPRATROPIUM BROMIDE AND ALBUTEROL SULFATE 3 ML: 2.5; .5 SOLUTION RESPIRATORY (INHALATION) at 16:11

## 2020-01-01 RX ADMIN — LEVOTHYROXINE SODIUM 75 MCG: 75 TABLET ORAL at 08:43

## 2020-01-01 RX ADMIN — CEFTRIAXONE 1 G: 1 INJECTION, SOLUTION INTRAVENOUS at 15:55

## 2020-01-01 RX ADMIN — PREDNISONE 60 MG: 10 TABLET ORAL at 08:28

## 2020-01-01 RX ADMIN — METOPROLOL SUCCINATE 25 MG: 25 TABLET, EXTENDED RELEASE ORAL at 09:39

## 2020-01-01 RX ADMIN — GUAIFENESIN 600 MG: 600 TABLET, EXTENDED RELEASE ORAL at 21:50

## 2020-01-01 RX ADMIN — AZITHROMYCIN 500 MG: 500 INJECTION, POWDER, LYOPHILIZED, FOR SOLUTION INTRAVENOUS at 23:50

## 2020-01-01 RX ADMIN — ASPIRIN 81 MG: 81 TABLET, COATED ORAL at 15:57

## 2020-01-01 RX ADMIN — METHYLPREDNISOLONE SODIUM SUCCINATE 40 MG: 40 INJECTION, POWDER, FOR SOLUTION INTRAMUSCULAR; INTRAVENOUS at 03:56

## 2020-01-01 RX ADMIN — POLYSACCHARIDE-IRON COMPLEX 150 MG: 150 CAPSULE ORAL at 09:36

## 2020-01-01 RX ADMIN — BISOPROLOL FUMARATE 5 MG: 5 TABLET, FILM COATED ORAL at 08:48

## 2020-01-01 RX ADMIN — BUMETANIDE 2 MG: 1 TABLET ORAL at 18:45

## 2020-01-01 RX ADMIN — PREDNISONE 5 MG: 5 TABLET ORAL at 12:43

## 2020-01-01 RX ADMIN — FUROSEMIDE 40 MG: 40 TABLET ORAL at 08:28

## 2020-01-01 RX ADMIN — LEVOTHYROXINE SODIUM 75 MCG: 75 TABLET ORAL at 09:27

## 2020-01-01 RX ADMIN — ASPIRIN 81 MG: 81 TABLET, COATED ORAL at 09:55

## 2020-01-01 RX ADMIN — DILTIAZEM HYDROCHLORIDE 30 MG: 30 TABLET, FILM COATED ORAL at 12:30

## 2020-01-01 RX ADMIN — BUDESONIDE 0.5 MG: 0.5 INHALANT RESPIRATORY (INHALATION) at 20:12

## 2020-01-01 RX ADMIN — APIXABAN 2.5 MG: 2.5 TABLET, FILM COATED ORAL at 20:44

## 2020-01-01 RX ADMIN — PREDNISONE 60 MG: 20 TABLET ORAL at 08:48

## 2020-01-01 RX ADMIN — BISOPROLOL FUMARATE 5 MG: 5 TABLET, FILM COATED ORAL at 20:25

## 2020-01-01 RX ADMIN — DILTIAZEM HYDROCHLORIDE 30 MG: 30 TABLET, FILM COATED ORAL at 06:27

## 2020-01-01 RX ADMIN — ENOXAPARIN SODIUM 30 MG: 30 INJECTION SUBCUTANEOUS at 22:00

## 2020-01-01 RX ADMIN — BUDESONIDE 0.5 MG: 0.5 INHALANT RESPIRATORY (INHALATION) at 07:12

## 2020-01-01 RX ADMIN — POLYSACCHARIDE-IRON COMPLEX 150 MG: 150 CAPSULE ORAL at 09:16

## 2020-01-01 RX ADMIN — BUMETANIDE 2 MG: 0.25 INJECTION INTRAMUSCULAR; INTRAVENOUS at 15:46

## 2020-01-01 RX ADMIN — TAZOBACTAM SODIUM AND PIPERACILLIN SODIUM 3.38 G: 375; 3 INJECTION, SOLUTION INTRAVENOUS at 14:46

## 2020-01-01 RX ADMIN — METOPROLOL SUCCINATE 25 MG: 25 TABLET, EXTENDED RELEASE ORAL at 11:44

## 2020-01-01 RX ADMIN — RANOLAZINE 500 MG: 500 TABLET, FILM COATED, EXTENDED RELEASE ORAL at 21:17

## 2020-01-01 RX ADMIN — BISOPROLOL FUMARATE 10 MG: 5 TABLET ORAL at 09:11

## 2020-01-01 RX ADMIN — BUDESONIDE 0.5 MG: 0.5 INHALANT RESPIRATORY (INHALATION) at 21:02

## 2020-01-01 RX ADMIN — IPRATROPIUM BROMIDE AND ALBUTEROL SULFATE 3 ML: .5; 3 SOLUTION RESPIRATORY (INHALATION) at 11:47

## 2020-01-01 RX ADMIN — DEXTROSE 50 % IN WATER (D50W) INTRAVENOUS SYRINGE 50 ML: at 12:53

## 2020-01-01 RX ADMIN — DILTIAZEM HYDROCHLORIDE 180 MG: 180 CAPSULE, COATED, EXTENDED RELEASE ORAL at 10:39

## 2020-01-01 RX ADMIN — POLYSACCHARIDE-IRON COMPLEX 150 MG: 150 CAPSULE ORAL at 09:54

## 2020-01-01 RX ADMIN — ASPIRIN 81 MG: 81 TABLET, COATED ORAL at 09:27

## 2020-01-01 RX ADMIN — RANOLAZINE 500 MG: 500 TABLET, FILM COATED, EXTENDED RELEASE ORAL at 13:05

## 2020-01-01 RX ADMIN — SODIUM POLYSTYRENE SULFONATE 30 G: 15 SUSPENSION ORAL; RECTAL at 18:45

## 2020-01-01 RX ADMIN — IPRATROPIUM BROMIDE AND ALBUTEROL SULFATE 3 ML: .5; 3 SOLUTION RESPIRATORY (INHALATION) at 19:23

## 2020-01-01 RX ADMIN — ATORVASTATIN CALCIUM 40 MG: 40 TABLET, FILM COATED ORAL at 20:27

## 2020-01-01 RX ADMIN — DILTIAZEM HYDROCHLORIDE 30 MG: 30 TABLET, FILM COATED ORAL at 00:26

## 2020-01-01 RX ADMIN — FERROUS SULFATE TAB EC 324 MG (65 MG FE EQUIVALENT) 324 MG: 324 (65 FE) TABLET DELAYED RESPONSE at 08:28

## 2020-01-01 RX ADMIN — METOPROLOL SUCCINATE 25 MG: 25 TABLET, EXTENDED RELEASE ORAL at 13:05

## 2020-01-01 RX ADMIN — CALCIUM ACETATE 1334 MG: 667 CAPSULE ORAL at 08:50

## 2020-01-01 RX ADMIN — PANTOPRAZOLE SODIUM 40 MG: 40 TABLET, DELAYED RELEASE ORAL at 06:17

## 2020-01-01 RX ADMIN — ACETAMINOPHEN 650 MG: 325 TABLET, FILM COATED ORAL at 23:02

## 2020-01-01 RX ADMIN — DILTIAZEM HYDROCHLORIDE 30 MG: 60 TABLET, FILM COATED ORAL at 12:10

## 2020-01-01 RX ADMIN — PREDNISONE 20 MG: 20 TABLET ORAL at 09:17

## 2020-01-01 RX ADMIN — ATORVASTATIN CALCIUM 40 MG: 40 TABLET, FILM COATED ORAL at 20:49

## 2020-01-01 RX ADMIN — BUDESONIDE 0.5 MG: 0.5 INHALANT RESPIRATORY (INHALATION) at 19:07

## 2020-01-01 RX ADMIN — SODIUM CHLORIDE, PRESERVATIVE FREE 10 ML: 5 INJECTION INTRAVENOUS at 09:34

## 2020-01-01 RX ADMIN — SODIUM CHLORIDE, PRESERVATIVE FREE 10 ML: 5 INJECTION INTRAVENOUS at 20:11

## 2020-01-01 RX ADMIN — ASPIRIN 81 MG: 81 TABLET, CHEWABLE ORAL at 08:18

## 2020-01-01 RX ADMIN — SODIUM CHLORIDE, PRESERVATIVE FREE 10 ML: 5 INJECTION INTRAVENOUS at 08:57

## 2020-01-01 RX ADMIN — RANOLAZINE 500 MG: 500 TABLET, FILM COATED, EXTENDED RELEASE ORAL at 20:55

## 2020-01-01 RX ADMIN — ISOSORBIDE MONONITRATE 60 MG: 60 TABLET, EXTENDED RELEASE ORAL at 09:56

## 2020-01-01 RX ADMIN — CALCIUM ACETATE 1334 MG: 667 CAPSULE ORAL at 18:37

## 2020-01-01 RX ADMIN — APIXABAN 2.5 MG: 2.5 TABLET, FILM COATED ORAL at 08:38

## 2020-01-01 RX ADMIN — GUAIFENESIN 600 MG: 600 TABLET, EXTENDED RELEASE ORAL at 21:19

## 2020-01-01 RX ADMIN — SODIUM CHLORIDE, PRESERVATIVE FREE 10 ML: 5 INJECTION INTRAVENOUS at 22:15

## 2020-01-01 RX ADMIN — IPRATROPIUM BROMIDE AND ALBUTEROL SULFATE 3 ML: 2.5; .5 SOLUTION RESPIRATORY (INHALATION) at 16:50

## 2020-01-01 RX ADMIN — DILTIAZEM HYDROCHLORIDE 30 MG: 30 TABLET, FILM COATED ORAL at 17:59

## 2020-01-01 RX ADMIN — NIFEDIPINE 120 MG: 10 CAPSULE ORAL at 08:42

## 2020-01-01 RX ADMIN — IPRATROPIUM BROMIDE AND ALBUTEROL SULFATE 3 ML: 2.5; .5 SOLUTION RESPIRATORY (INHALATION) at 20:48

## 2020-01-01 RX ADMIN — METHYLPREDNISOLONE SODIUM SUCCINATE 40 MG: 40 INJECTION, POWDER, FOR SOLUTION INTRAMUSCULAR; INTRAVENOUS at 20:44

## 2020-01-01 RX ADMIN — IPRATROPIUM BROMIDE AND ALBUTEROL SULFATE 3 ML: .5; 3 SOLUTION RESPIRATORY (INHALATION) at 16:16

## 2020-01-01 RX ADMIN — FERROUS SULFATE TAB EC 324 MG (65 MG FE EQUIVALENT) 324 MG: 324 (65 FE) TABLET DELAYED RESPONSE at 08:53

## 2020-01-01 RX ADMIN — METHYLPREDNISOLONE SODIUM SUCCINATE 40 MG: 40 INJECTION, POWDER, FOR SOLUTION INTRAMUSCULAR; INTRAVENOUS at 08:57

## 2020-01-01 RX ADMIN — NIFEDIPINE 120 MG: 10 CAPSULE ORAL at 09:56

## 2020-01-01 RX ADMIN — ASPIRIN 81 MG: 81 TABLET, COATED ORAL at 13:30

## 2020-01-01 RX ADMIN — SODIUM CHLORIDE 500 ML: 9 INJECTION, SOLUTION INTRAVENOUS at 21:45

## 2020-01-01 RX ADMIN — BUMETANIDE 2 MG: 1 TABLET ORAL at 08:18

## 2020-01-01 RX ADMIN — RANOLAZINE 500 MG: 500 TABLET, FILM COATED, EXTENDED RELEASE ORAL at 20:25

## 2020-01-01 RX ADMIN — BARIUM SULFATE 50 ML: 400 SUSPENSION ORAL at 12:16

## 2020-01-01 RX ADMIN — CALCIUM ACETATE 1334 MG: 667 CAPSULE ORAL at 17:46

## 2020-01-01 RX ADMIN — ISOSORBIDE MONONITRATE 60 MG: 60 TABLET, EXTENDED RELEASE ORAL at 08:13

## 2020-01-01 RX ADMIN — LEVOTHYROXINE SODIUM 75 MCG: 75 TABLET ORAL at 08:28

## 2020-01-01 RX ADMIN — APIXABAN 2.5 MG: 2.5 TABLET, FILM COATED ORAL at 20:55

## 2020-01-01 RX ADMIN — ATORVASTATIN CALCIUM 40 MG: 40 TABLET, FILM COATED ORAL at 21:06

## 2020-01-01 RX ADMIN — BUMETANIDE 2 MG: 1 TABLET ORAL at 18:01

## 2020-01-01 RX ADMIN — APIXABAN 2.5 MG: 2.5 TABLET, FILM COATED ORAL at 21:04

## 2020-01-01 RX ADMIN — BUMETANIDE 2 MG: 0.25 INJECTION INTRAMUSCULAR; INTRAVENOUS at 05:24

## 2020-01-01 RX ADMIN — PREDNISONE 60 MG: 20 TABLET ORAL at 08:56

## 2020-01-01 RX ADMIN — RANOLAZINE 500 MG: 500 TABLET, FILM COATED, EXTENDED RELEASE ORAL at 20:15

## 2020-01-01 RX ADMIN — HEPARIN SODIUM 5000 UNITS: 5000 INJECTION INTRAVENOUS; SUBCUTANEOUS at 22:22

## 2020-01-01 RX ADMIN — SODIUM CHLORIDE, PRESERVATIVE FREE 10 ML: 5 INJECTION INTRAVENOUS at 21:00

## 2020-01-01 RX ADMIN — METOPROLOL SUCCINATE 25 MG: 25 TABLET, EXTENDED RELEASE ORAL at 08:42

## 2020-01-01 RX ADMIN — POTASSIUM CHLORIDE 20 MEQ: 750 CAPSULE, EXTENDED RELEASE ORAL at 09:30

## 2020-01-01 RX ADMIN — RANOLAZINE 500 MG: 500 TABLET, FILM COATED, EXTENDED RELEASE ORAL at 21:48

## 2020-01-01 RX ADMIN — METHYLPREDNISOLONE SODIUM SUCCINATE 40 MG: 40 INJECTION, POWDER, FOR SOLUTION INTRAMUSCULAR; INTRAVENOUS at 17:37

## 2020-01-01 RX ADMIN — CALCIUM ACETATE 1334 MG: 667 CAPSULE ORAL at 09:27

## 2020-01-01 RX ADMIN — GUAIFENESIN 600 MG: 600 TABLET, EXTENDED RELEASE ORAL at 21:01

## 2020-01-01 RX ADMIN — IPRATROPIUM BROMIDE AND ALBUTEROL SULFATE 3 ML: 2.5; .5 SOLUTION RESPIRATORY (INHALATION) at 11:49

## 2020-01-01 RX ADMIN — NIFEDIPINE 120 MG: 10 CAPSULE ORAL at 13:30

## 2020-01-01 RX ADMIN — SODIUM CHLORIDE, PRESERVATIVE FREE 10 ML: 5 INJECTION INTRAVENOUS at 20:28

## 2020-01-01 RX ADMIN — HEPARIN SODIUM 5000 UNITS: 5000 INJECTION INTRAVENOUS; SUBCUTANEOUS at 14:13

## 2020-01-01 RX ADMIN — ATORVASTATIN CALCIUM 40 MG: 40 TABLET, FILM COATED ORAL at 22:08

## 2020-01-01 RX ADMIN — APIXABAN 2.5 MG: 2.5 TABLET, FILM COATED ORAL at 21:19

## 2020-01-01 RX ADMIN — POLYSACCHARIDE-IRON COMPLEX 150 MG: 150 CAPSULE ORAL at 13:30

## 2020-01-01 RX ADMIN — SODIUM CHLORIDE, PRESERVATIVE FREE 10 ML: 5 INJECTION INTRAVENOUS at 08:18

## 2020-01-01 RX ADMIN — POLYSACCHARIDE-IRON COMPLEX 150 MG: 150 CAPSULE ORAL at 09:27

## 2020-01-01 RX ADMIN — PREDNISONE 10 MG: 10 TABLET ORAL at 09:41

## 2020-01-01 RX ADMIN — SODIUM CHLORIDE, PRESERVATIVE FREE 10 ML: 5 INJECTION INTRAVENOUS at 09:16

## 2020-01-01 RX ADMIN — ASPIRIN 81 MG: 81 TABLET, CHEWABLE ORAL at 10:38

## 2020-01-01 RX ADMIN — PREDNISONE 5 MG: 5 TABLET ORAL at 08:50

## 2020-01-01 RX ADMIN — BUDESONIDE 0.5 MG: 0.5 INHALANT RESPIRATORY (INHALATION) at 07:44

## 2020-01-01 RX ADMIN — METOPROLOL SUCCINATE 25 MG: 25 TABLET, EXTENDED RELEASE ORAL at 06:00

## 2020-01-01 RX ADMIN — BUDESONIDE 0.5 MG: 0.5 INHALANT RESPIRATORY (INHALATION) at 07:25

## 2020-01-01 RX ADMIN — LEVOTHYROXINE SODIUM 75 MCG: 75 TABLET ORAL at 08:42

## 2020-01-01 RX ADMIN — ISOSORBIDE MONONITRATE 60 MG: 60 TABLET, EXTENDED RELEASE ORAL at 09:16

## 2020-01-01 RX ADMIN — ISOSORBIDE MONONITRATE 60 MG: 60 TABLET, EXTENDED RELEASE ORAL at 08:08

## 2020-01-01 RX ADMIN — DILTIAZEM HYDROCHLORIDE 30 MG: 60 TABLET, FILM COATED ORAL at 12:51

## 2020-01-01 RX ADMIN — LEVOTHYROXINE SODIUM 75 MCG: 75 TABLET ORAL at 08:38

## 2020-01-01 RX ADMIN — CALCIUM ACETATE 1334 MG: 667 CAPSULE ORAL at 17:59

## 2020-01-01 RX ADMIN — BISOPROLOL FUMARATE 5 MG: 5 TABLET ORAL at 09:57

## 2020-01-01 RX ADMIN — LEVOTHYROXINE SODIUM 75 MCG: 75 TABLET ORAL at 09:57

## 2020-01-01 RX ADMIN — APIXABAN 2.5 MG: 2.5 TABLET, FILM COATED ORAL at 08:18

## 2020-01-01 RX ADMIN — IPRATROPIUM BROMIDE AND ALBUTEROL SULFATE 3 ML: .5; 3 SOLUTION RESPIRATORY (INHALATION) at 06:59

## 2020-01-01 RX ADMIN — EPOETIN ALFA-EPBX 20000 UNITS: 10000 INJECTION, SOLUTION INTRAVENOUS; SUBCUTANEOUS at 12:11

## 2020-01-01 RX ADMIN — LEVOTHYROXINE SODIUM 75 MCG: 75 TABLET ORAL at 13:05

## 2020-01-01 RX ADMIN — ATORVASTATIN CALCIUM 40 MG: 40 TABLET, FILM COATED ORAL at 21:17

## 2020-01-01 RX ADMIN — BISOPROLOL FUMARATE 10 MG: 5 TABLET ORAL at 08:27

## 2020-01-01 RX ADMIN — ENOXAPARIN SODIUM 30 MG: 30 INJECTION SUBCUTANEOUS at 21:25

## 2020-01-01 RX ADMIN — BISOPROLOL FUMARATE 10 MG: 5 TABLET ORAL at 20:55

## 2020-01-01 RX ADMIN — BUMETANIDE 2 MG: 0.25 INJECTION INTRAMUSCULAR; INTRAVENOUS at 11:44

## 2020-01-01 RX ADMIN — CEFTRIAXONE 1 G: 1 INJECTION, SOLUTION INTRAVENOUS at 15:36

## 2020-01-01 RX ADMIN — SODIUM CHLORIDE, PRESERVATIVE FREE 10 ML: 5 INJECTION INTRAVENOUS at 03:05

## 2020-01-01 RX ADMIN — LEVOTHYROXINE SODIUM 75 MCG: 75 TABLET ORAL at 08:11

## 2020-01-01 RX ADMIN — FUROSEMIDE 40 MG: 40 TABLET ORAL at 21:00

## 2020-01-01 RX ADMIN — FERROUS SULFATE TAB EC 324 MG (65 MG FE EQUIVALENT) 324 MG: 324 (65 FE) TABLET DELAYED RESPONSE at 08:43

## 2020-01-01 RX ADMIN — GUAIFENESIN 600 MG: 600 TABLET, EXTENDED RELEASE ORAL at 09:12

## 2020-01-01 RX ADMIN — BUDESONIDE 0.5 MG: 0.5 INHALANT RESPIRATORY (INHALATION) at 07:22

## 2020-01-01 RX ADMIN — ALBUMIN HUMAN 25 G: 0.25 SOLUTION INTRAVENOUS at 11:02

## 2020-01-01 RX ADMIN — CLOPIDOGREL BISULFATE 75 MG: 75 TABLET ORAL at 11:58

## 2020-01-01 RX ADMIN — CALCIUM ACETATE 1334 MG: 667 CAPSULE ORAL at 13:29

## 2020-01-01 RX ADMIN — BISOPROLOL FUMARATE 20 MG: 5 TABLET ORAL at 08:13

## 2020-01-01 RX ADMIN — BUDESONIDE 0.5 MG: 0.5 INHALANT RESPIRATORY (INHALATION) at 07:38

## 2020-01-01 RX ADMIN — SODIUM CHLORIDE, PRESERVATIVE FREE 10 ML: 5 INJECTION INTRAVENOUS at 08:38

## 2020-01-01 RX ADMIN — ASPIRIN 81 MG: 81 TABLET, COATED ORAL at 08:50

## 2020-01-01 RX ADMIN — BISOPROLOL FUMARATE 5 MG: 5 TABLET, FILM COATED ORAL at 20:55

## 2020-01-01 RX ADMIN — PREDNISONE 10 MG: 10 TABLET ORAL at 09:27

## 2020-01-01 RX ADMIN — Medication 1 CAPSULE: at 11:48

## 2020-01-01 RX ADMIN — AZITHROMYCIN 500 MG: 500 INJECTION, POWDER, LYOPHILIZED, FOR SOLUTION INTRAVENOUS at 23:13

## 2020-01-01 RX ADMIN — BUDESONIDE 0.5 MG: 0.5 INHALANT RESPIRATORY (INHALATION) at 06:30

## 2020-01-01 RX ADMIN — LIDOCAINE 1 PATCH: 50 PATCH CUTANEOUS at 10:41

## 2020-01-01 RX ADMIN — ATORVASTATIN CALCIUM 40 MG: 40 TABLET, FILM COATED ORAL at 21:19

## 2020-01-01 RX ADMIN — PANTOPRAZOLE SODIUM 40 MG: 40 TABLET, DELAYED RELEASE ORAL at 13:30

## 2020-01-01 RX ADMIN — BUMETANIDE 2 MG: 1 TABLET ORAL at 17:42

## 2020-01-01 RX ADMIN — IPRATROPIUM BROMIDE AND ALBUTEROL SULFATE 3 ML: .5; 3 SOLUTION RESPIRATORY (INHALATION) at 20:06

## 2020-01-01 RX ADMIN — APIXABAN 2.5 MG: 2.5 TABLET, FILM COATED ORAL at 09:31

## 2020-01-01 RX ADMIN — CEFAZOLIN SODIUM 2 G: 2 SOLUTION INTRAVENOUS at 07:22

## 2020-01-01 RX ADMIN — IOPAMIDOL 110 ML: 755 INJECTION, SOLUTION INTRAVENOUS at 21:54

## 2020-01-01 RX ADMIN — IPRATROPIUM BROMIDE AND ALBUTEROL SULFATE 3 ML: .5; 3 SOLUTION RESPIRATORY (INHALATION) at 11:15

## 2020-01-01 RX ADMIN — TAZOBACTAM SODIUM AND PIPERACILLIN SODIUM 3.38 G: 375; 3 INJECTION, SOLUTION INTRAVENOUS at 01:02

## 2020-01-01 RX ADMIN — VANCOMYCIN HYDROCHLORIDE 1250 MG: 500 INJECTION, POWDER, LYOPHILIZED, FOR SOLUTION INTRAVENOUS at 04:21

## 2020-01-01 RX ADMIN — BUDESONIDE 0.5 MG: 0.5 INHALANT RESPIRATORY (INHALATION) at 08:13

## 2020-01-01 RX ADMIN — TAZOBACTAM SODIUM AND PIPERACILLIN SODIUM 4.5 G: 500; 4 INJECTION, SOLUTION INTRAVENOUS at 21:01

## 2020-01-01 RX ADMIN — FERROUS SULFATE TAB EC 324 MG (65 MG FE EQUIVALENT) 324 MG: 324 (65 FE) TABLET DELAYED RESPONSE at 09:12

## 2020-01-01 RX ADMIN — IPRATROPIUM BROMIDE AND ALBUTEROL SULFATE 3 ML: .5; 3 SOLUTION RESPIRATORY (INHALATION) at 11:29

## 2020-01-01 RX ADMIN — BARIUM SULFATE 10 ML: 400 SUSPENSION ORAL at 12:16

## 2020-01-01 RX ADMIN — AZITHROMYCIN 500 MG: 500 INJECTION, POWDER, LYOPHILIZED, FOR SOLUTION INTRAVENOUS at 00:39

## 2020-01-01 RX ADMIN — SPIRONOLACTONE 25 MG: 25 TABLET ORAL at 08:56

## 2020-01-01 RX ADMIN — SODIUM CHLORIDE SOLN NEBU 3% 4 ML: 3 NEBU SOLN at 06:59

## 2020-01-01 RX ADMIN — GUAIFENESIN 600 MG: 600 TABLET, EXTENDED RELEASE ORAL at 20:44

## 2020-01-01 RX ADMIN — POLYSACCHARIDE-IRON COMPLEX 150 MG: 150 CAPSULE ORAL at 13:05

## 2020-01-01 RX ADMIN — Medication 1 CAPSULE: at 08:43

## 2020-01-01 RX ADMIN — PANTOPRAZOLE SODIUM 40 MG: 40 TABLET, DELAYED RELEASE ORAL at 09:16

## 2020-01-01 RX ADMIN — APIXABAN 2.5 MG: 2.5 TABLET, FILM COATED ORAL at 08:56

## 2020-01-01 RX ADMIN — METOPROLOL SUCCINATE 25 MG: 25 TABLET, EXTENDED RELEASE ORAL at 09:16

## 2020-01-01 RX ADMIN — Medication 5 MG: at 21:02

## 2020-01-01 RX ADMIN — SPIRONOLACTONE 25 MG: 25 TABLET ORAL at 08:42

## 2020-01-01 RX ADMIN — BUDESONIDE 0.5 MG: 0.5 INHALANT RESPIRATORY (INHALATION) at 20:48

## 2020-01-01 RX ADMIN — CALCIUM ACETATE 1334 MG: 667 CAPSULE ORAL at 08:07

## 2020-01-01 RX ADMIN — ISOSORBIDE MONONITRATE 60 MG: 60 TABLET, EXTENDED RELEASE ORAL at 08:28

## 2020-01-01 RX ADMIN — SPIRONOLACTONE 12.5 MG: 25 TABLET ORAL at 08:53

## 2020-01-01 RX ADMIN — METHYLPREDNISOLONE SODIUM SUCCINATE 40 MG: 40 INJECTION, POWDER, FOR SOLUTION INTRAMUSCULAR; INTRAVENOUS at 08:43

## 2020-01-01 RX ADMIN — LEVOTHYROXINE SODIUM 75 MCG: 75 TABLET ORAL at 09:39

## 2020-01-01 RX ADMIN — METHYLPREDNISOLONE SODIUM SUCCINATE 40 MG: 40 INJECTION, POWDER, FOR SOLUTION INTRAMUSCULAR; INTRAVENOUS at 15:54

## 2020-01-01 RX ADMIN — APIXABAN 2.5 MG: 2.5 TABLET, FILM COATED ORAL at 08:28

## 2020-01-01 RX ADMIN — PANTOPRAZOLE SODIUM 40 MG: 40 TABLET, DELAYED RELEASE ORAL at 08:50

## 2020-01-01 RX ADMIN — FUROSEMIDE 40 MG: 40 TABLET ORAL at 09:31

## 2020-01-01 RX ADMIN — RANOLAZINE 500 MG: 500 TABLET, FILM COATED, EXTENDED RELEASE ORAL at 10:42

## 2020-01-01 RX ADMIN — IPRATROPIUM BROMIDE AND ALBUTEROL SULFATE 3 ML: .5; 3 SOLUTION RESPIRATORY (INHALATION) at 03:07

## 2020-01-01 RX ADMIN — Medication 1 CAPSULE: at 08:56

## 2020-01-01 RX ADMIN — PANTOPRAZOLE SODIUM 40 MG: 40 TABLET, DELAYED RELEASE ORAL at 09:17

## 2020-01-01 RX ADMIN — BUMETANIDE 2 MG: 1 TABLET ORAL at 09:17

## 2020-01-01 RX ADMIN — GUAIFENESIN 600 MG: 600 TABLET, EXTENDED RELEASE ORAL at 08:56

## 2020-01-01 RX ADMIN — METHYLPREDNISOLONE SODIUM SUCCINATE 125 MG: 125 INJECTION, POWDER, FOR SOLUTION INTRAMUSCULAR; INTRAVENOUS at 20:45

## 2020-01-01 RX ADMIN — DILTIAZEM HYDROCHLORIDE 30 MG: 30 TABLET, FILM COATED ORAL at 13:46

## 2020-01-01 RX ADMIN — BUDESONIDE 0.5 MG: 0.5 INHALANT RESPIRATORY (INHALATION) at 07:00

## 2020-01-01 RX ADMIN — BISOPROLOL FUMARATE 10 MG: 5 TABLET ORAL at 20:44

## 2020-01-01 RX ADMIN — IPRATROPIUM BROMIDE AND ALBUTEROL SULFATE 3 ML: .5; 3 SOLUTION RESPIRATORY (INHALATION) at 04:24

## 2020-01-01 RX ADMIN — APIXABAN 2.5 MG: 2.5 TABLET, FILM COATED ORAL at 08:43

## 2020-01-01 RX ADMIN — SODIUM CHLORIDE, PRESERVATIVE FREE 10 ML: 5 INJECTION INTRAVENOUS at 21:17

## 2020-01-01 RX ADMIN — BUDESONIDE 0.5 MG: 0.5 INHALANT RESPIRATORY (INHALATION) at 18:55

## 2020-01-01 RX ADMIN — RANOLAZINE 500 MG: 500 TABLET, FILM COATED, EXTENDED RELEASE ORAL at 08:13

## 2020-01-01 RX ADMIN — MIDAZOLAM HYDROCHLORIDE 0.5 MG: 1 INJECTION, SOLUTION INTRAMUSCULAR; INTRAVENOUS at 07:35

## 2020-01-01 RX ADMIN — IPRATROPIUM BROMIDE AND ALBUTEROL SULFATE 3 ML: .5; 3 SOLUTION RESPIRATORY (INHALATION) at 20:07

## 2020-01-01 RX ADMIN — Medication 1 CAPSULE: at 21:19

## 2020-01-01 RX ADMIN — LEVOTHYROXINE SODIUM 75 MCG: 75 TABLET ORAL at 06:16

## 2020-01-01 RX ADMIN — PANTOPRAZOLE SODIUM 40 MG: 40 TABLET, DELAYED RELEASE ORAL at 09:15

## 2020-01-01 RX ADMIN — SODIUM CHLORIDE, PRESERVATIVE FREE 10 ML: 5 INJECTION INTRAVENOUS at 20:49

## 2020-01-01 RX ADMIN — MICAFUNGIN SODIUM 100 MG: 20 INJECTION, POWDER, LYOPHILIZED, FOR SOLUTION INTRAVENOUS at 11:00

## 2020-01-01 RX ADMIN — RANOLAZINE 500 MG: 500 TABLET, FILM COATED, EXTENDED RELEASE ORAL at 22:15

## 2020-01-01 RX ADMIN — PREDNISONE 5 MG: 5 TABLET ORAL at 09:37

## 2020-01-01 RX ADMIN — SODIUM CHLORIDE, PRESERVATIVE FREE 10 ML: 5 INJECTION INTRAVENOUS at 21:40

## 2020-01-01 RX ADMIN — NIFEDIPINE 120 MG: 10 CAPSULE ORAL at 13:05

## 2020-01-01 RX ADMIN — HEPARIN SODIUM 5000 UNITS: 5000 INJECTION INTRAVENOUS; SUBCUTANEOUS at 17:03

## 2020-01-01 RX ADMIN — PREDNISONE 60 MG: 20 TABLET ORAL at 09:31

## 2020-01-01 RX ADMIN — LIDOCAINE 1 PATCH: 50 PATCH CUTANEOUS at 21:20

## 2020-01-01 RX ADMIN — LIDOCAINE 1 PATCH: 50 PATCH CUTANEOUS at 20:24

## 2020-01-01 RX ADMIN — PREDNISONE 5 MG: 5 TABLET ORAL at 20:00

## 2020-01-01 RX ADMIN — BISOPROLOL FUMARATE 10 MG: 5 TABLET ORAL at 08:56

## 2020-01-01 RX ADMIN — METHYLPREDNISOLONE SODIUM SUCCINATE 40 MG: 40 INJECTION, POWDER, FOR SOLUTION INTRAMUSCULAR; INTRAVENOUS at 20:55

## 2020-01-01 RX ADMIN — ATORVASTATIN CALCIUM 40 MG: 40 TABLET, FILM COATED ORAL at 21:05

## 2020-01-01 RX ADMIN — ISOSORBIDE MONONITRATE 60 MG: 60 TABLET, EXTENDED RELEASE ORAL at 08:44

## 2020-01-01 RX ADMIN — GUAIFENESIN 600 MG: 600 TABLET, EXTENDED RELEASE ORAL at 20:55

## 2020-01-01 RX ADMIN — ERYTHROPOIETIN 5000 UNITS: 3000 INJECTION, SOLUTION INTRAVENOUS; SUBCUTANEOUS at 17:48

## 2020-01-01 RX ADMIN — ISOSORBIDE MONONITRATE 60 MG: 60 TABLET, EXTENDED RELEASE ORAL at 08:53

## 2020-01-01 RX ADMIN — BISOPROLOL FUMARATE 10 MG: 5 TABLET ORAL at 21:18

## 2020-01-01 RX ADMIN — IPRATROPIUM BROMIDE AND ALBUTEROL SULFATE 3 ML: .5; 3 SOLUTION RESPIRATORY (INHALATION) at 20:20

## 2020-01-01 RX ADMIN — BISOPROLOL FUMARATE 10 MG: 5 TABLET ORAL at 08:43

## 2020-01-01 RX ADMIN — METHYLPREDNISOLONE SODIUM SUCCINATE 40 MG: 40 INJECTION, POWDER, FOR SOLUTION INTRAMUSCULAR; INTRAVENOUS at 15:45

## 2020-01-01 RX ADMIN — IPRATROPIUM BROMIDE AND ALBUTEROL SULFATE 3 ML: .5; 3 SOLUTION RESPIRATORY (INHALATION) at 07:00

## 2020-01-01 RX ADMIN — PANTOPRAZOLE SODIUM 40 MG: 40 TABLET, DELAYED RELEASE ORAL at 05:54

## 2020-01-01 RX ADMIN — PANTOPRAZOLE SODIUM 40 MG: 40 TABLET, DELAYED RELEASE ORAL at 09:57

## 2020-01-01 RX ADMIN — APIXABAN 2.5 MG: 2.5 TABLET, FILM COATED ORAL at 09:57

## 2020-01-01 RX ADMIN — IPRATROPIUM BROMIDE AND ALBUTEROL SULFATE 3 ML: .5; 3 SOLUTION RESPIRATORY (INHALATION) at 15:28

## 2020-01-01 RX ADMIN — ASPIRIN 300 MG: 300 SUPPOSITORY RECTAL at 00:42

## 2020-01-01 RX ADMIN — LEVOTHYROXINE SODIUM 75 MCG: 75 TABLET ORAL at 09:17

## 2020-01-01 RX ADMIN — ALBUTEROL SULFATE 10 MG: 2.5 SOLUTION RESPIRATORY (INHALATION) at 13:50

## 2020-01-01 RX ADMIN — BUMETANIDE 2 MG: 0.25 INJECTION INTRAMUSCULAR; INTRAVENOUS at 17:59

## 2020-01-01 RX ADMIN — GUAIFENESIN 600 MG: 600 TABLET, EXTENDED RELEASE ORAL at 08:43

## 2020-01-01 RX ADMIN — PANTOPRAZOLE SODIUM 40 MG: 40 TABLET, DELAYED RELEASE ORAL at 08:48

## 2020-01-01 RX ADMIN — BUDESONIDE 0.5 MG: 0.5 INHALANT RESPIRATORY (INHALATION) at 20:00

## 2020-01-01 RX ADMIN — BISOPROLOL FUMARATE 20 MG: 5 TABLET ORAL at 08:12

## 2020-01-01 RX ADMIN — SODIUM CHLORIDE, PRESERVATIVE FREE 10 ML: 5 INJECTION INTRAVENOUS at 08:14

## 2020-01-01 RX ADMIN — CALCIUM ACETATE 1334 MG: 667 CAPSULE ORAL at 17:38

## 2020-01-01 RX ADMIN — PREDNISONE 10 MG: 10 TABLET ORAL at 09:54

## 2020-01-01 RX ADMIN — APIXABAN 2.5 MG: 2.5 TABLET, FILM COATED ORAL at 01:44

## 2020-01-01 RX ADMIN — SODIUM CHLORIDE, PRESERVATIVE FREE 10 ML: 5 INJECTION INTRAVENOUS at 09:17

## 2020-01-01 RX ADMIN — APIXABAN 2.5 MG: 2.5 TABLET, FILM COATED ORAL at 09:12

## 2020-01-01 RX ADMIN — HUMAN INSULIN 10 UNITS: 100 INJECTION, SOLUTION SUBCUTANEOUS at 12:53

## 2020-01-01 RX ADMIN — CALCIUM ACETATE 1334 MG: 667 CAPSULE ORAL at 11:48

## 2020-01-01 RX ADMIN — AZITHROMYCIN 500 MG: 500 INJECTION, POWDER, LYOPHILIZED, FOR SOLUTION INTRAVENOUS at 23:27

## 2020-01-01 RX ADMIN — ISOSORBIDE MONONITRATE 60 MG: 60 TABLET, EXTENDED RELEASE ORAL at 08:18

## 2020-01-01 RX ADMIN — BUDESONIDE 0.5 MG: 0.5 INHALANT RESPIRATORY (INHALATION) at 08:18

## 2020-01-01 RX ADMIN — IPRATROPIUM BROMIDE AND ALBUTEROL SULFATE 6 ML: .5; 3 SOLUTION RESPIRATORY (INHALATION) at 20:21

## 2020-01-01 RX ADMIN — PREDNISONE 5 MG: 5 TABLET ORAL at 08:42

## 2020-01-01 RX ADMIN — Medication 1 CAPSULE: at 20:44

## 2020-01-01 RX ADMIN — ASPIRIN 300 MG: 300 SUPPOSITORY RECTAL at 09:03

## 2020-01-01 RX ADMIN — ISOSORBIDE MONONITRATE 60 MG: 60 TABLET, EXTENDED RELEASE ORAL at 09:17

## 2020-01-01 RX ADMIN — SODIUM CHLORIDE, PRESERVATIVE FREE 10 ML: 5 INJECTION INTRAVENOUS at 09:00

## 2020-01-01 RX ADMIN — PANTOPRAZOLE SODIUM 40 MG: 40 TABLET, DELAYED RELEASE ORAL at 12:16

## 2020-01-01 RX ADMIN — HEPARIN SODIUM 5000 UNITS: 5000 INJECTION, SOLUTION INTRAVENOUS; SUBCUTANEOUS at 12:23

## 2020-01-01 RX ADMIN — SODIUM CHLORIDE 1000 ML: 9 INJECTION, SOLUTION INTRAVENOUS at 23:11

## 2020-01-01 RX ADMIN — Medication 1 CAPSULE: at 08:28

## 2020-01-01 RX ADMIN — SENNOSIDES 2 TABLET: 8.6 TABLET, FILM COATED ORAL at 20:27

## 2020-01-01 RX ADMIN — LEVOTHYROXINE SODIUM 75 MCG: 75 TABLET ORAL at 10:38

## 2020-01-01 RX ADMIN — ASPIRIN 81 MG: 81 TABLET, COATED ORAL at 12:50

## 2020-01-01 RX ADMIN — LEVOTHYROXINE SODIUM 75 MCG: 75 TABLET ORAL at 06:05

## 2020-01-01 RX ADMIN — IPRATROPIUM BROMIDE AND ALBUTEROL SULFATE 3 ML: .5; 3 SOLUTION RESPIRATORY (INHALATION) at 19:36

## 2020-01-01 RX ADMIN — Medication 1 CAPSULE: at 08:53

## 2020-01-01 RX ADMIN — SPIRONOLACTONE 12.5 MG: 25 TABLET ORAL at 08:08

## 2020-01-01 RX ADMIN — HEPARIN SODIUM 5000 UNITS: 5000 INJECTION INTRAVENOUS; SUBCUTANEOUS at 15:57

## 2020-01-01 RX ADMIN — DILTIAZEM HYDROCHLORIDE 180 MG: 180 CAPSULE, COATED, EXTENDED RELEASE ORAL at 08:18

## 2020-01-01 RX ADMIN — BUDESONIDE 0.5 MG: 0.5 INHALANT RESPIRATORY (INHALATION) at 19:23

## 2020-01-01 RX ADMIN — RANOLAZINE 500 MG: 500 TABLET, FILM COATED, EXTENDED RELEASE ORAL at 09:15

## 2020-01-01 RX ADMIN — SODIUM CHLORIDE, POTASSIUM CHLORIDE, SODIUM LACTATE AND CALCIUM CHLORIDE 50 ML/HR: 600; 310; 30; 20 INJECTION, SOLUTION INTRAVENOUS at 02:32

## 2020-01-01 RX ADMIN — ISOSORBIDE MONONITRATE 60 MG: 60 TABLET, EXTENDED RELEASE ORAL at 09:31

## 2020-01-01 RX ADMIN — METHYLPREDNISOLONE SODIUM SUCCINATE 40 MG: 40 INJECTION, POWDER, FOR SOLUTION INTRAMUSCULAR; INTRAVENOUS at 01:26

## 2020-01-01 RX ADMIN — METHYLPREDNISOLONE SODIUM SUCCINATE 40 MG: 40 INJECTION, POWDER, FOR SOLUTION INTRAMUSCULAR; INTRAVENOUS at 21:52

## 2020-01-01 RX ADMIN — BISOPROLOL FUMARATE 20 MG: 5 TABLET ORAL at 09:16

## 2020-01-01 RX ADMIN — PREDNISONE 60 MG: 20 TABLET ORAL at 08:44

## 2020-01-01 RX ADMIN — DILTIAZEM HYDROCHLORIDE 120 MG: 120 CAPSULE, COATED, EXTENDED RELEASE ORAL at 09:03

## 2020-01-01 RX ADMIN — IPRATROPIUM BROMIDE AND ALBUTEROL SULFATE 3 ML: .5; 3 SOLUTION RESPIRATORY (INHALATION) at 00:45

## 2020-01-01 RX ADMIN — RANOLAZINE 500 MG: 500 TABLET, FILM COATED, EXTENDED RELEASE ORAL at 13:30

## 2020-01-01 RX ADMIN — CALCIUM ACETATE 1334 MG: 667 CAPSULE ORAL at 08:05

## 2020-01-01 RX ADMIN — TAZOBACTAM SODIUM AND PIPERACILLIN SODIUM 3.38 G: 375; 3 INJECTION, SOLUTION INTRAVENOUS at 01:06

## 2020-01-01 RX ADMIN — IPRATROPIUM BROMIDE AND ALBUTEROL SULFATE 3 ML: 2.5; .5 SOLUTION RESPIRATORY (INHALATION) at 08:12

## 2020-01-01 RX ADMIN — ATORVASTATIN CALCIUM 40 MG: 40 TABLET, FILM COATED ORAL at 21:04

## 2020-01-01 RX ADMIN — CALCIUM ACETATE 1334 MG: 667 CAPSULE ORAL at 17:48

## 2020-01-01 RX ADMIN — DILTIAZEM HYDROCHLORIDE 30 MG: 60 TABLET, FILM COATED ORAL at 23:18

## 2020-01-01 RX ADMIN — METHYLPREDNISOLONE ACETATE 80 MG: 80 INJECTION, SUSPENSION INTRA-ARTICULAR; INTRALESIONAL; INTRAMUSCULAR; SOFT TISSUE at 16:18

## 2020-01-01 RX ADMIN — ENOXAPARIN SODIUM 30 MG: 30 INJECTION SUBCUTANEOUS at 21:02

## 2020-01-01 RX ADMIN — BUDESONIDE 0.5 MG: 0.5 INHALANT RESPIRATORY (INHALATION) at 07:15

## 2020-01-01 RX ADMIN — TAZOBACTAM SODIUM AND PIPERACILLIN SODIUM 3.38 G: 375; 3 INJECTION, SOLUTION INTRAVENOUS at 14:10

## 2020-01-01 RX ADMIN — MIRTAZAPINE 15 MG: 15 TABLET, FILM COATED ORAL at 21:26

## 2020-01-01 RX ADMIN — ATORVASTATIN CALCIUM 40 MG: 40 TABLET, FILM COATED ORAL at 21:02

## 2020-01-01 RX ADMIN — APIXABAN 2.5 MG: 2.5 TABLET, FILM COATED ORAL at 08:09

## 2020-01-01 RX ADMIN — SODIUM CHLORIDE, PRESERVATIVE FREE 10 ML: 5 INJECTION INTRAVENOUS at 22:51

## 2020-01-01 RX ADMIN — Medication 1 CAPSULE: at 21:50

## 2020-01-01 RX ADMIN — FERROUS SULFATE TAB 325 MG (65 MG ELEMENTAL FE) 325 MG: 325 (65 FE) TAB at 08:18

## 2020-01-01 RX ADMIN — RANOLAZINE 500 MG: 500 TABLET, FILM COATED, EXTENDED RELEASE ORAL at 20:43

## 2020-01-01 RX ADMIN — BUDESONIDE 0.5 MG: 0.5 INHALANT RESPIRATORY (INHALATION) at 19:20

## 2020-01-01 RX ADMIN — IPRATROPIUM BROMIDE AND ALBUTEROL SULFATE 3 ML: .5; 3 SOLUTION RESPIRATORY (INHALATION) at 23:29

## 2020-01-01 RX ADMIN — RANOLAZINE 500 MG: 500 TABLET, FILM COATED, EXTENDED RELEASE ORAL at 20:27

## 2020-01-01 RX ADMIN — AZITHROMYCIN 500 MG: 500 INJECTION, POWDER, LYOPHILIZED, FOR SOLUTION INTRAVENOUS at 22:00

## 2020-01-01 RX ADMIN — LEVOTHYROXINE SODIUM 75 MCG: 75 TABLET ORAL at 13:30

## 2020-01-01 RX ADMIN — CALCIUM ACETATE 1334 MG: 667 CAPSULE ORAL at 18:17

## 2020-01-01 RX ADMIN — SENNOSIDES 2 TABLET: 8.6 TABLET, FILM COATED ORAL at 21:16

## 2020-01-01 RX ADMIN — RANOLAZINE 500 MG: 500 TABLET, FILM COATED, EXTENDED RELEASE ORAL at 08:50

## 2020-01-01 RX ADMIN — RANOLAZINE 500 MG: 500 TABLET, FILM COATED, EXTENDED RELEASE ORAL at 21:06

## 2020-01-01 RX ADMIN — BUMETANIDE 2 MG: 0.25 INJECTION INTRAMUSCULAR; INTRAVENOUS at 23:10

## 2020-01-01 RX ADMIN — TAZOBACTAM SODIUM AND PIPERACILLIN SODIUM 3.38 G: 375; 3 INJECTION, SOLUTION INTRAVENOUS at 01:00

## 2020-01-01 RX ADMIN — SODIUM CHLORIDE, PRESERVATIVE FREE 10 ML: 5 INJECTION INTRAVENOUS at 21:06

## 2020-01-01 RX ADMIN — BISOPROLOL FUMARATE 5 MG: 5 TABLET, FILM COATED ORAL at 08:18

## 2020-01-01 RX ADMIN — DILTIAZEM HYDROCHLORIDE 30 MG: 60 TABLET, FILM COATED ORAL at 06:17

## 2020-01-01 RX ADMIN — IPRATROPIUM BROMIDE AND ALBUTEROL SULFATE 3 ML: .5; 3 SOLUTION RESPIRATORY (INHALATION) at 23:34

## 2020-01-01 RX ADMIN — FUROSEMIDE 40 MG: 10 INJECTION, SOLUTION INTRAMUSCULAR; INTRAVENOUS at 17:06

## 2020-01-01 RX ADMIN — BUDESONIDE 0.5 MG: 0.5 INHALANT RESPIRATORY (INHALATION) at 09:26

## 2020-01-01 RX ADMIN — BUDESONIDE 0.5 MG: 0.5 INHALANT RESPIRATORY (INHALATION) at 19:34

## 2020-01-01 RX ADMIN — FERROUS SULFATE TAB 325 MG (65 MG ELEMENTAL FE) 325 MG: 325 (65 FE) TAB at 08:48

## 2020-01-01 RX ADMIN — ISOSORBIDE MONONITRATE 60 MG: 60 TABLET, EXTENDED RELEASE ORAL at 09:41

## 2020-01-01 RX ADMIN — ISOSORBIDE MONONITRATE 60 MG: 60 TABLET, EXTENDED RELEASE ORAL at 08:49

## 2020-01-01 RX ADMIN — IPRATROPIUM BROMIDE AND ALBUTEROL SULFATE 3 ML: .5; 3 SOLUTION RESPIRATORY (INHALATION) at 20:12

## 2020-01-01 RX ADMIN — VANCOMYCIN HYDROCHLORIDE 1000 MG: 1 INJECTION, POWDER, LYOPHILIZED, FOR SOLUTION INTRAVENOUS at 09:21

## 2020-01-01 RX ADMIN — RANOLAZINE 500 MG: 500 TABLET, FILM COATED, EXTENDED RELEASE ORAL at 09:37

## 2020-01-01 RX ADMIN — IPRATROPIUM BROMIDE AND ALBUTEROL SULFATE 3 ML: .5; 3 SOLUTION RESPIRATORY (INHALATION) at 12:50

## 2020-01-01 RX ADMIN — MIRTAZAPINE 15 MG: 15 TABLET, FILM COATED ORAL at 21:02

## 2020-01-01 RX ADMIN — TAZOBACTAM SODIUM AND PIPERACILLIN SODIUM 3.38 G: 375; 3 INJECTION, SOLUTION INTRAVENOUS at 01:40

## 2020-01-01 RX ADMIN — ATORVASTATIN CALCIUM 40 MG: 40 TABLET, FILM COATED ORAL at 21:50

## 2020-01-01 RX ADMIN — SODIUM CHLORIDE, PRESERVATIVE FREE 10 ML: 5 INJECTION INTRAVENOUS at 09:04

## 2020-01-01 RX ADMIN — DILTIAZEM HYDROCHLORIDE 60 MG: 60 TABLET, FILM COATED ORAL at 18:17

## 2020-01-01 RX ADMIN — BUMETANIDE 2 MG: 0.25 INJECTION INTRAMUSCULAR; INTRAVENOUS at 12:30

## 2020-01-01 RX ADMIN — RANOLAZINE 500 MG: 500 TABLET, FILM COATED, EXTENDED RELEASE ORAL at 20:08

## 2020-01-01 RX ADMIN — RANOLAZINE 500 MG: 500 TABLET, FILM COATED, EXTENDED RELEASE ORAL at 22:35

## 2020-01-01 RX ADMIN — ACETAMINOPHEN 650 MG: 325 TABLET, FILM COATED ORAL at 20:43

## 2020-01-01 RX ADMIN — Medication 1 CAPSULE: at 09:12

## 2020-01-01 RX ADMIN — CLOPIDOGREL BISULFATE 75 MG: 75 TABLET ORAL at 09:17

## 2020-01-01 RX ADMIN — IPRATROPIUM BROMIDE AND ALBUTEROL SULFATE 3 ML: .5; 3 SOLUTION RESPIRATORY (INHALATION) at 15:09

## 2020-01-01 RX ADMIN — LEVOTHYROXINE SODIUM 75 MCG: 75 TABLET ORAL at 08:09

## 2020-01-01 RX ADMIN — MIDAZOLAM HYDROCHLORIDE 0.5 MG: 1 INJECTION, SOLUTION INTRAMUSCULAR; INTRAVENOUS at 07:31

## 2020-01-01 RX ADMIN — BUDESONIDE 0.5 MG: 0.5 INHALANT RESPIRATORY (INHALATION) at 08:10

## 2020-01-01 RX ADMIN — PREDNISONE 10 MG: 10 TABLET ORAL at 09:16

## 2020-01-01 RX ADMIN — PANTOPRAZOLE SODIUM 40 MG: 40 TABLET, DELAYED RELEASE ORAL at 12:43

## 2020-01-01 RX ADMIN — DILTIAZEM HYDROCHLORIDE 10 MG: 5 INJECTION, SOLUTION INTRAVENOUS at 13:27

## 2020-01-01 RX ADMIN — TAZOBACTAM SODIUM AND PIPERACILLIN SODIUM 4.5 G: 500; 4 INJECTION, SOLUTION INTRAVENOUS at 09:03

## 2020-01-01 RX ADMIN — SODIUM CHLORIDE, PRESERVATIVE FREE 10 ML: 5 INJECTION INTRAVENOUS at 20:23

## 2020-01-01 RX ADMIN — BUDESONIDE 0.5 MG: 0.5 INHALANT RESPIRATORY (INHALATION) at 20:07

## 2020-01-01 RX ADMIN — HEPARIN SODIUM 5000 UNITS: 5000 INJECTION INTRAVENOUS; SUBCUTANEOUS at 20:25

## 2020-01-01 RX ADMIN — HEPARIN SODIUM 5000 UNITS: 5000 INJECTION INTRAVENOUS; SUBCUTANEOUS at 13:42

## 2020-01-01 RX ADMIN — CALCIUM ACETATE 1334 MG: 667 CAPSULE ORAL at 13:46

## 2020-01-01 RX ADMIN — HEPARIN SODIUM 5000 UNITS: 5000 INJECTION INTRAVENOUS; SUBCUTANEOUS at 05:50

## 2020-01-01 RX ADMIN — FERROUS SULFATE TAB 325 MG (65 MG ELEMENTAL FE) 325 MG: 325 (65 FE) TAB at 08:55

## 2020-01-01 RX ADMIN — IPRATROPIUM BROMIDE AND ALBUTEROL SULFATE 3 ML: 2.5; .5 SOLUTION RESPIRATORY (INHALATION) at 07:11

## 2020-01-01 RX ADMIN — TAZOBACTAM SODIUM AND PIPERACILLIN SODIUM 4.5 G: 500; 4 INJECTION, SOLUTION INTRAVENOUS at 03:12

## 2020-01-01 RX ADMIN — POLYSACCHARIDE-IRON COMPLEX 150 MG: 150 CAPSULE ORAL at 08:42

## 2020-01-01 RX ADMIN — APIXABAN 2.5 MG: 2.5 TABLET, FILM COATED ORAL at 22:35

## 2020-01-01 RX ADMIN — SPIRONOLACTONE 25 MG: 25 TABLET ORAL at 09:12

## 2020-01-01 RX ADMIN — CALCIUM ACETATE 1334 MG: 667 CAPSULE ORAL at 11:43

## 2020-01-01 RX ADMIN — APIXABAN 2.5 MG: 2.5 TABLET, FILM COATED ORAL at 08:57

## 2020-01-01 RX ADMIN — ASPIRIN 81 MG: 81 TABLET, COATED ORAL at 09:16

## 2020-01-01 RX ADMIN — SODIUM CHLORIDE 500 ML: 9 INJECTION, SOLUTION INTRAVENOUS at 11:29

## 2020-01-01 RX ADMIN — PREDNISONE 5 MG: 5 TABLET ORAL at 08:32

## 2020-01-01 RX ADMIN — BUMETANIDE 2 MG: 1 TABLET ORAL at 08:11

## 2020-01-01 RX ADMIN — BISOPROLOL FUMARATE 5 MG: 5 TABLET, FILM COATED ORAL at 09:30

## 2020-01-01 RX ADMIN — LEVOTHYROXINE SODIUM 75 MCG: 75 TABLET ORAL at 09:12

## 2020-01-01 RX ADMIN — SODIUM CHLORIDE, PRESERVATIVE FREE 10 ML: 5 INJECTION INTRAVENOUS at 08:50

## 2020-01-01 RX ADMIN — PREDNISONE 5 MG: 5 TABLET ORAL at 08:18

## 2020-01-01 RX ADMIN — Medication 1 CAPSULE: at 08:50

## 2020-01-01 RX ADMIN — POLYSACCHARIDE-IRON COMPLEX 150 MG: 150 CAPSULE ORAL at 08:50

## 2020-01-01 RX ADMIN — BUDESONIDE 0.5 MG: 0.5 INHALANT RESPIRATORY (INHALATION) at 09:05

## 2020-01-01 RX ADMIN — FUROSEMIDE 40 MG: 10 INJECTION, SOLUTION INTRAMUSCULAR; INTRAVENOUS at 08:51

## 2020-01-01 RX ADMIN — LIDOCAINE 1 PATCH: 50 PATCH CUTANEOUS at 21:48

## 2020-01-01 RX ADMIN — BISOPROLOL FUMARATE 5 MG: 5 TABLET, FILM COATED ORAL at 08:56

## 2020-01-01 RX ADMIN — METHYLPREDNISOLONE SODIUM SUCCINATE 40 MG: 40 INJECTION, POWDER, FOR SOLUTION INTRAMUSCULAR; INTRAVENOUS at 14:52

## 2020-01-01 RX ADMIN — IPRATROPIUM BROMIDE AND ALBUTEROL SULFATE 3 ML: .5; 3 SOLUTION RESPIRATORY (INHALATION) at 07:15

## 2020-01-01 RX ADMIN — SODIUM CHLORIDE, PRESERVATIVE FREE 10 ML: 5 INJECTION INTRAVENOUS at 20:26

## 2020-01-01 RX ADMIN — RANOLAZINE 500 MG: 500 TABLET, FILM COATED, EXTENDED RELEASE ORAL at 13:27

## 2020-01-01 RX ADMIN — AMIODARONE HYDROCHLORIDE 150 MG: 1.5 INJECTION, SOLUTION INTRAVENOUS at 08:28

## 2020-01-01 RX ADMIN — CEFTRIAXONE 1 G: 1 INJECTION, SOLUTION INTRAVENOUS at 15:45

## 2020-01-01 RX ADMIN — PANTOPRAZOLE SODIUM 40 MG: 40 TABLET, DELAYED RELEASE ORAL at 06:05

## 2020-01-01 RX ADMIN — ISOSORBIDE MONONITRATE 60 MG: 60 TABLET, EXTENDED RELEASE ORAL at 08:38

## 2020-01-01 RX ADMIN — RANOLAZINE 500 MG: 500 TABLET, FILM COATED, EXTENDED RELEASE ORAL at 09:55

## 2020-01-01 RX ADMIN — BISOPROLOL FUMARATE 5 MG: 5 TABLET, FILM COATED ORAL at 20:18

## 2020-01-01 RX ADMIN — ASPIRIN 81 MG: 81 TABLET, COATED ORAL at 09:57

## 2020-01-01 RX ADMIN — BUMETANIDE 2 MG: 1 TABLET ORAL at 17:37

## 2020-01-01 RX ADMIN — HEPARIN SODIUM 5000 UNITS: 5000 INJECTION INTRAVENOUS; SUBCUTANEOUS at 10:39

## 2020-01-01 RX ADMIN — CALCIUM ACETATE 1334 MG: 667 CAPSULE ORAL at 08:42

## 2020-01-01 RX ADMIN — HEPARIN SODIUM 5000 UNITS: 5000 INJECTION INTRAVENOUS; SUBCUTANEOUS at 09:26

## 2020-01-01 RX ADMIN — ATORVASTATIN CALCIUM 40 MG: 40 TABLET, FILM COATED ORAL at 20:26

## 2020-01-01 RX ADMIN — BARIUM SULFATE 100 ML: 0.81 POWDER, FOR SUSPENSION ORAL at 12:16

## 2020-01-01 RX ADMIN — ATORVASTATIN CALCIUM 40 MG: 40 TABLET, FILM COATED ORAL at 21:48

## 2020-01-01 RX ADMIN — BUMETANIDE 2 MG: 0.25 INJECTION INTRAMUSCULAR; INTRAVENOUS at 18:57

## 2020-01-01 RX ADMIN — SODIUM CHLORIDE SOLN NEBU 3% 4 ML: 3 NEBU SOLN at 19:36

## 2020-01-01 RX ADMIN — SODIUM CHLORIDE, PRESERVATIVE FREE 10 ML: 5 INJECTION INTRAVENOUS at 09:57

## 2020-01-01 RX ADMIN — LEVOTHYROXINE SODIUM 75 MCG: 75 TABLET ORAL at 06:39

## 2020-01-01 RX ADMIN — Medication 5 MG: at 21:26

## 2020-01-01 RX ADMIN — FERROUS SULFATE TAB 325 MG (65 MG ELEMENTAL FE) 325 MG: 325 (65 FE) TAB at 08:44

## 2020-01-01 RX ADMIN — SODIUM CHLORIDE, PRESERVATIVE FREE 10 ML: 5 INJECTION INTRAVENOUS at 22:36

## 2020-01-01 RX ADMIN — BISOPROLOL FUMARATE 10 MG: 5 TABLET ORAL at 08:53

## 2020-01-01 RX ADMIN — PANTOPRAZOLE SODIUM 40 MG: 40 TABLET, DELAYED RELEASE ORAL at 08:38

## 2020-01-01 RX ADMIN — LEVOTHYROXINE SODIUM 75 MCG: 75 TABLET ORAL at 09:16

## 2020-01-01 RX ADMIN — SODIUM CHLORIDE, PRESERVATIVE FREE 10 ML: 5 INJECTION INTRAVENOUS at 20:25

## 2020-01-01 RX ADMIN — PREDNISONE 40 MG: 20 TABLET ORAL at 08:18

## 2020-01-01 RX ADMIN — ALBUTEROL SULFATE 2.5 MG: 2.5 SOLUTION RESPIRATORY (INHALATION) at 10:56

## 2020-01-01 RX ADMIN — ISOSORBIDE MONONITRATE 60 MG: 60 TABLET, EXTENDED RELEASE ORAL at 09:55

## 2020-01-01 RX ADMIN — RANOLAZINE 500 MG: 500 TABLET, FILM COATED, EXTENDED RELEASE ORAL at 21:26

## 2020-01-01 RX ADMIN — IPRATROPIUM BROMIDE AND ALBUTEROL SULFATE 3 ML: .5; 3 SOLUTION RESPIRATORY (INHALATION) at 07:22

## 2020-01-01 RX ADMIN — PANTOPRAZOLE SODIUM 40 MG: 40 TABLET, DELAYED RELEASE ORAL at 10:46

## 2020-01-01 RX ADMIN — BUDESONIDE 0.5 MG: 0.5 INHALANT RESPIRATORY (INHALATION) at 20:22

## 2020-01-01 RX ADMIN — RANOLAZINE 500 MG: 500 TABLET, FILM COATED, EXTENDED RELEASE ORAL at 10:38

## 2020-01-01 RX ADMIN — LEVOTHYROXINE SODIUM 75 MCG: 75 TABLET ORAL at 08:13

## 2020-01-01 RX ADMIN — ASPIRIN 81 MG: 81 TABLET, CHEWABLE ORAL at 08:32

## 2020-01-01 RX ADMIN — TAZOBACTAM SODIUM AND PIPERACILLIN SODIUM 3.38 G: 375; 3 INJECTION, SOLUTION INTRAVENOUS at 18:07

## 2020-01-01 RX ADMIN — PANTOPRAZOLE SODIUM 40 MG: 40 TABLET, DELAYED RELEASE ORAL at 09:41

## 2020-01-01 RX ADMIN — PREDNISONE 30 MG: 10 TABLET ORAL at 09:57

## 2020-01-01 RX ADMIN — TAZOBACTAM SODIUM AND PIPERACILLIN SODIUM 3.38 G: 375; 3 INJECTION, SOLUTION INTRAVENOUS at 00:25

## 2020-01-01 RX ADMIN — LIDOCAINE 1 PATCH: 50 PATCH CUTANEOUS at 20:27

## 2020-01-01 RX ADMIN — ASPIRIN 300 MG: 300 SUPPOSITORY RECTAL at 09:25

## 2020-01-01 RX ADMIN — RANOLAZINE 500 MG: 500 TABLET, FILM COATED, EXTENDED RELEASE ORAL at 12:51

## 2020-01-01 RX ADMIN — PANTOPRAZOLE SODIUM 40 MG: 40 TABLET, DELAYED RELEASE ORAL at 09:55

## 2020-01-01 RX ADMIN — APIXABAN 2.5 MG: 2.5 TABLET, FILM COATED ORAL at 20:10

## 2020-01-01 RX ADMIN — SODIUM CHLORIDE, PRESERVATIVE FREE 10 ML: 5 INJECTION INTRAVENOUS at 08:53

## 2020-01-01 RX ADMIN — FERROUS SULFATE TAB EC 324 MG (65 MG FE EQUIVALENT) 324 MG: 324 (65 FE) TABLET DELAYED RESPONSE at 08:56

## 2020-01-01 RX ADMIN — RANOLAZINE 500 MG: 500 TABLET, FILM COATED, EXTENDED RELEASE ORAL at 12:43

## 2020-01-01 RX ADMIN — METHYLPREDNISOLONE SODIUM SUCCINATE 40 MG: 40 INJECTION, POWDER, FOR SOLUTION INTRAMUSCULAR; INTRAVENOUS at 04:18

## 2020-01-01 RX ADMIN — METHYLPREDNISOLONE SODIUM SUCCINATE 40 MG: 40 INJECTION, POWDER, FOR SOLUTION INTRAMUSCULAR; INTRAVENOUS at 09:11

## 2020-01-01 RX ADMIN — PREDNISONE 10 MG: 10 TABLET ORAL at 08:38

## 2020-01-01 RX ADMIN — APIXABAN 2.5 MG: 2.5 TABLET, FILM COATED ORAL at 08:49

## 2020-01-01 RX ADMIN — SODIUM CHLORIDE, PRESERVATIVE FREE 10 ML: 5 INJECTION INTRAVENOUS at 20:18

## 2020-01-01 RX ADMIN — SODIUM CHLORIDE: 9 INJECTION, SOLUTION INTRAVENOUS at 07:22

## 2020-01-01 RX ADMIN — SODIUM CHLORIDE, PRESERVATIVE FREE 10 ML: 5 INJECTION INTRAVENOUS at 08:09

## 2020-01-01 RX ADMIN — BISOPROLOL FUMARATE 5 MG: 5 TABLET, FILM COATED ORAL at 20:10

## 2020-01-01 RX ADMIN — SODIUM CHLORIDE 250 ML: 9 INJECTION, SOLUTION INTRAVENOUS at 13:29

## 2020-01-01 RX ADMIN — IPRATROPIUM BROMIDE AND ALBUTEROL SULFATE 3 ML: 2.5; .5 SOLUTION RESPIRATORY (INHALATION) at 07:44

## 2020-01-01 RX ADMIN — SODIUM CHLORIDE, PRESERVATIVE FREE 10 ML: 5 INJECTION INTRAVENOUS at 20:31

## 2020-01-01 RX ADMIN — ISOSORBIDE MONONITRATE 60 MG: 60 TABLET, EXTENDED RELEASE ORAL at 09:15

## 2020-01-01 RX ADMIN — APIXABAN 2.5 MG: 2.5 TABLET, FILM COATED ORAL at 20:26

## 2020-01-01 RX ADMIN — MICAFUNGIN SODIUM 100 MG: 20 INJECTION, POWDER, LYOPHILIZED, FOR SOLUTION INTRAVENOUS at 09:59

## 2020-01-01 RX ADMIN — APIXABAN 2.5 MG: 2.5 TABLET, FILM COATED ORAL at 20:49

## 2020-01-01 RX ADMIN — ACETAMINOPHEN 650 MG: 325 TABLET, FILM COATED ORAL at 16:00

## 2020-01-01 RX ADMIN — METOPROLOL SUCCINATE 25 MG: 25 TABLET, EXTENDED RELEASE ORAL at 13:29

## 2020-01-01 RX ADMIN — MICAFUNGIN SODIUM 100 MG: 20 INJECTION, POWDER, LYOPHILIZED, FOR SOLUTION INTRAVENOUS at 11:21

## 2020-01-01 RX ADMIN — BUDESONIDE 0.5 MG: 0.5 INHALANT RESPIRATORY (INHALATION) at 18:32

## 2020-01-01 RX ADMIN — LEVOTHYROXINE SODIUM 75 MCG: 75 TABLET ORAL at 09:56

## 2020-01-01 RX ADMIN — ASPIRIN 81 MG: 81 TABLET, COATED ORAL at 09:17

## 2020-01-01 RX ADMIN — NIFEDIPINE 120 MG: 10 CAPSULE ORAL at 08:50

## 2020-01-01 RX ADMIN — BISOPROLOL FUMARATE 10 MG: 5 TABLET ORAL at 08:08

## 2020-01-01 RX ADMIN — APIXABAN 2.5 MG: 2.5 TABLET, FILM COATED ORAL at 08:53

## 2020-01-01 RX ADMIN — BUDESONIDE 0.5 MG: 0.5 INHALANT RESPIRATORY (INHALATION) at 07:11

## 2020-01-01 RX ADMIN — RANOLAZINE 500 MG: 500 TABLET, FILM COATED, EXTENDED RELEASE ORAL at 11:58

## 2020-01-01 RX ADMIN — APIXABAN 2.5 MG: 2.5 TABLET, FILM COATED ORAL at 21:05

## 2020-01-01 RX ADMIN — ISOSORBIDE MONONITRATE 60 MG: 60 TABLET, EXTENDED RELEASE ORAL at 08:43

## 2020-01-01 RX ADMIN — DILTIAZEM HYDROCHLORIDE 30 MG: 60 TABLET, FILM COATED ORAL at 00:43

## 2020-01-02 NOTE — PROGRESS NOTES
Subjective   Pat Morel is a 80 y.o. female.     She is here today with daughter with c/o worsening cough.    Cough   This is a new problem. The current episode started in the past 7 days. The problem has been rapidly worsening. The problem occurs every few minutes. The cough is non-productive. Associated symptoms include chills, headaches, myalgias, nasal congestion, shortness of breath and wheezing. Pertinent negatives include no chest pain, ear congestion, ear pain, eye redness, fever, hemoptysis, rash, rhinorrhea or sore throat. The symptoms are aggravated by exercise, cold air and lying down. She has tried nothing for the symptoms.      Seen in clinic 12/31/19 for hospital f/u. Had been admitted for CHF exacerbation. Had dry intermittent cough at that time.    She denies increased swelling.    Family members with acute resp illness. No known flu exposure.    Did have flu shot.    Notably low oxygen at time of triage. Up to 99% with 2 L. Has had hypoxia with activity for several months. No hypoxia at rest. Not currently on oxygen. Not dc'd home on oxygen from hospital. Prior to leaving today, o2 sat 98% on room air at rest.    Having increased weakness whenever she is ill. Has trouble ambulating when ill, family requesting wheelchair for these times. When weak she does not feel she can handle walker/cane.    The following portions of the patient's history were reviewed and updated as appropriate: allergies, current medications, past family history, past medical history, past social history, past surgical history and problem list.    Review of Systems   Constitutional: Positive for chills and fatigue. Negative for fever and unexpected weight change.   HENT: Positive for congestion. Negative for ear pain, mouth sores, nosebleeds, rhinorrhea and sore throat.    Eyes: Negative for pain, discharge and redness.   Respiratory: Positive for cough, shortness of breath and wheezing. Negative for hemoptysis.     Cardiovascular: Positive for leg swelling (stable). Negative for chest pain and palpitations.   Gastrointestinal: Negative for abdominal pain, diarrhea, nausea and vomiting.   Genitourinary: Negative for dysuria and hematuria.   Musculoskeletal: Positive for arthralgias and myalgias.   Skin: Negative for rash.   Neurological: Positive for weakness (generalized) and headaches.   Hematological: Negative for adenopathy. Bruises/bleeds easily.   Psychiatric/Behavioral: Positive for sleep disturbance. Negative for confusion.       Objective    Vitals:    01/02/20 0933   BP: 156/80   Pulse: 88   Temp: 98.8 °F (37.1 °C)   SpO2: 99%     Body mass index is 28.22 kg/m².      01/02/20  0933   Weight: 61.2 kg (135 lb)       Physical Exam   Constitutional: She is oriented to person, place, and time. She appears well-developed and well-nourished. She is cooperative. She appears ill. No distress.   HENT:   Head: Normocephalic and atraumatic.   Nose: No rhinorrhea.   Mouth/Throat: Mucous membranes are normal. Mucous membranes are not dry.   Eyes: Conjunctivae and lids are normal.   Neck: Neck supple.   Cardiovascular: Normal rate, regular rhythm and intact distal pulses.  Occasional extrasystoles are present. Exam reveals distant heart sounds.   Pulmonary/Chest: She is in respiratory distress (mild with exertion). She has decreased breath sounds. She has wheezes. She has rhonchi. She has no rales.     Vascular Status -  Her right foot exhibits abnormal foot edema (1+ to  mid shin). Her left foot exhibits abnormal foot edema (1+ to mid shin).  Neurological: She is alert and oriented to person, place, and time.   Skin: Skin is warm and dry. No rash noted. No cyanosis. Nails show no clubbing.   Psychiatric: She has a normal mood and affect. Her behavior is normal.   Nursing note and vitals reviewed.    INFLUENZA neg    Assessment/Plan   Pat was seen today for cough, wheezing and shortness of breath.    Diagnoses and all orders  for this visit:    Cough  -     albuterol (PROVENTIL) nebulizer solution 0.083% 2.5 mg/3mL  -     XR Chest PA & Lateral; Future  -     POC Influenza A / B    Wheeze  -     albuterol (PROVENTIL) nebulizer solution 0.083% 2.5 mg/3mL  -     XR Chest PA & Lateral; Future    Hypoxia  -     XR Chest PA & Lateral; Future  -     Wheelchair    Acute bronchitis, unspecified organism  -     XR Chest PA & Lateral; Future    Weakness  -     Wheelchair    Debilitated  -     Wheelchair    Other orders  -     benzonatate (TESSALON) 200 MG capsule; Take 1 capsule by mouth 3 (Three) Times a Day As Needed for Cough.  -     ipratropium-albuterol (DUO-NEB) 0.5-2.5 mg/3 ml nebulizer; Take 3 mL by nebulization Every 4 (Four) Hours As Needed for Wheezing or Shortness of Air (cough).  -     dexamethasone (DECADRON) 4 MG tablet; Take 1 tablet by mouth Daily With Breakfast for 3 days.       Symptoms appear more consistent with acute bronchitis rather than CHF at this time. tx options reviewed. She was given albuterol neb and had good improvement in stympoms. Sent home with neb machine. She does not wish to be admitted or have eval in ER. Her o2 sat back to normal on RA prior to leaving clinic today. Nebs, anti-tussives, oral corticosteroids as above. Strongly encouraged seeking emergent care if she has any decline in status. She and daughter voice understanding.    Likely needs full eval for possible oxygen.    F/u with Dr. Sweet, her PCP, in 3-5 days, sooner as needed.  Patient was encouraged to keep me informed of any acute changes, lack of improvement, or any new concerning symptoms.  Pt is aware of reasons to seek emergent care.  Patient voiced understanding of all instructions and denied further questions.

## 2020-01-03 NOTE — OUTREACH NOTE
CHF Week 2 Survey      Responses   Facility patient discharged from?  [facility]   Does the patient have one of the following disease processes/diagnoses(primary or secondary)?  CHF   Week 2 attempt successful?  Yes   Call start time  0952   Call end time  [phone]   Discharge diagnosis  CHF   Is patient permission given to speak with other caregiver?  Yes   List who call center can speak with  spouse- Dell   Person spoke with today (if not patient) and relationship  spouse- Dell/ Patient   Meds reviewed with patient/caregiver?  Yes   Is the patient having any side effects they believe may be caused by any medication additions or changes?  No   Does the patient have all medications ordered at discharge?  Yes   Is the patient taking all medications as directed (includes completed medication regime)?  Yes   Has the patient kept scheduled appointments due by today?  Yes   Has home health visited the patient within 72 hours of discharge?  N/A   Psychosocial issues?  No   What is the patient's perception of their health status since discharge?  Improving   Nursing interventions  Nurse provided patient education   Is the patient weighing daily?  Yes   Does the patient have scales?  Yes   Daily weight interventions  Education provided on importance of daily weight   Is the patient able to teach back Heart Failure Zones?  Yes   Is the patient able to teach back signs and symptoms of worsening condition? (i.e. weight gain, shortness of air, etc.)  Yes   Is the patient/caregiver able to teach back the hierarchy of who to call/visit for symptoms/problems? PCP, Specialist, Home health nurse, Urgent Care, ED, 911  Yes   Additional teach back comments  Pt says she has some congestion from a cold but saw her PCP yesterday, she says she is weighing herself daily, no questions or concerns at this time.   CHF Week 2 call completed?  Yes          Torrie Muhammad RN

## 2020-01-05 PROBLEM — J18.9 PNEUMONIA OF BOTH LUNGS DUE TO INFECTIOUS ORGANISM: Status: ACTIVE | Noted: 2020-01-01

## 2020-01-05 NOTE — H&P
[facility]   HISTORY AND PHYSICAL      Name:  Pat Morel   Age:  80 y.o.  Sex:  female  :  1940  MRN:  [id number]   Visit Number:  [id number]  Admission Date:  2020  Date Of Service:  20  Primary Care Physician:  Anthony Sweet DO    Chief Complaint:     Cough, shortness of breath, wheezes    History Of Presenting Illness:      Patient is a 80-year-old female who presents emergency room with multiple complaints including shortness of breath and cough.  Difficult medical history including CAD with CABG, atrial fibrillation, chronic kidney disease stage III, history of CVA, diastolic CHF, iron deficiency anemia, hypersensitivity pneumonitis, interstitial lung disease, hypertension, osteoporosis.  Patient admits to a one-week history of increased shortness of breath, productive cough, wheezing, and subjective fevers.  She tells me that she had recently been in the hospital on [place] for heart failure exacerbation.  She states she had done well for a couple days, but then started to have the increased cough and fevers.  She generally feels unwell.  She states she had seen her primary care doctor on 2020 where she was hypoxic with ambulation in the clinic, but improved at rest.  She was started on Tessalon Perles, DuoNeb treatments, and given Decadron prescription.  She states since then, she has continued to decline.  She denies any chest pains or palpitations.  No falls or trauma.  She follows with Dr. Laura as her pulmonologist.    In the ER, she presented hypertensive, atrial fibrillation with RVR, and hypoxic on room air.  CBC with white blood cell count of 8000, hemoglobin 10.2, platelets 222.  CMP with creatinine 2.01, sodium 135, chloride 92, total bili 3.1.  proBNP of 28,000.  Troponin 0 0.012.  Procalcitonin 0.25.  Lactic acid 6.6.  ABG with pH 7.42, PCO2 of 30, PO2 of 243, on 100% nonrebreather.  Chest x-ray demonstrated an irregular nodule  opacity in the right upper lung field, recommended CT.  CT scan demonstrated multiple bilateral irregular nodule opacities most likely due to infectious disease.  Blood cultures were obtained.  She was started on antibiotics and we were asked to admit thereafter.    Review Of Systems:     General: Positive fevers, generalized malaise  Psych: No history of any hallucinations and delusions.  Ophth: No history of any diplopia or transient loss of vision.  ENT: No history of sore throat, nasal congestion or ear pain.   Allergy/immuno: No history of rash or itching.  Hem/lymph: No history of neck swelling or easy bleeding.  Endo: No history of any recent unintentional weight gain or loss.  Resp: Productive cough and shortness of breath  Card: No history of chest pain or palpitations.   GI: No history of nausea, vomiting, diarrhea. Denies any abdominal pain.   : No history of dysuria or hematuria.  MSK: No muscle pain. No calf pain.   Neuro: No history of any focal weakness. No loss of consciousness. Denies any numbness.  Derm:: No history of any redness or pruritis.     Past Medical History:    Past Medical History:   Diagnosis Date   • Acute on chronic diastolic congestive heart failure (CMS/HCC) 12/20/2019   • Anemia    • Angina at rest (CMS/HCC)    • Arthritis    • Atrial fibrillation (CMS/HCC)    • Coronary artery disease    • Disease of thyroid gland    • Elevated cholesterol    • GERD without esophagitis 12/2/2019   • History of blood transfusion    • History of stomach ulcers    • Hypertension    • Osteoporosis    • Positive TB test    • Stroke (CMS/HCC)        Past Surgical history:    Past Surgical History:   Procedure Laterality Date   • BRONCHOSCOPY Bilateral 4/12/2019    Procedure: BRONCHOSCOPY;  Surgeon: Jeff Laura MD;  Location:  [facility];  Service: Pulmonary   • BYPASS GRAFT     • COLONOSCOPY N/A 10/15/2019    Procedure: COLONOSCOPY;  Surgeon: Fredi Aaron MD;  Location:   [facility];  Service: Gastroenterology   • CORONARY ANGIOPLASTY WITH STENT PLACEMENT     • CORONARY ARTERY BYPASS GRAFT  10/18/2010   • ENDOSCOPY N/A 10/14/2019    Procedure: ESOPHAGOGASTRODUODENOSCOPY;  Surgeon: Fredi Aaron MD;  Location:  [facility];  Service: Gastroenterology   • HYSTERECTOMY     • STOMACH SURGERY  08/11/2019    Upper GI bleed    • STOMACH SURGERY  10/13/2019       Social History:    Social History     Socioeconomic History   • Marital status:      Spouse name: Not on file   • Number of children: Not on file   • Years of education: Not on file   • Highest education level: Not on file   Tobacco Use   • Smoking status: Never Smoker   • Smokeless tobacco: Never Used   Substance and Sexual Activity   • Alcohol use: No   • Drug use: No   • Sexual activity: Defer       Family History:    Family History   Problem Relation Age of Onset   • Cancer Mother    • Arthritis Mother    • No Known Problems Father    • Liver disease Sister        Allergies:      Tuberculin tests    Home Medications:    Prior to Admission Medications     Prescriptions Last Dose Informant Patient Reported? Taking?    apixaban (ELIQUIS) 2.5 MG tablet tablet 1/5/2020  Yes Yes    Take 2.5 mg by mouth 2 (Two) Times a Day.    atorvastatin (LIPITOR) 40 MG tablet 1/4/2020  No Yes    Take 1 tablet by mouth Every Night.    benzonatate (TESSALON) 200 MG capsule 1/4/2020  No Yes    Take 1 capsule by mouth 3 (Three) Times a Day As Needed for Cough.    bisoprolol (ZEBETA) 5 MG tablet 1/5/2020  No Yes    Take 1.5 tablets by mouth 2 (Two) Times a Day.    dexamethasone (DECADRON) 4 MG tablet 1/5/2020  No Yes    Take 1 tablet by mouth Daily With Breakfast for 3 days.    ferrous gluconate (FERGON) 324 MG tablet 1/5/2020  No Yes    Take 1 tablet by mouth Daily With Breakfast.    furosemide (LASIX) 40 MG tablet 1/5/2020  No Yes    Take 1 tablet by mouth 2 (Two) Times a Day.    isosorbide mononitrate (IMDUR) 120 MG 24 hr tablet  1/4/2020  No Yes    Take 0.5 tablets by mouth Daily.    levothyroxine (SYNTHROID, LEVOTHROID) 75 MCG tablet 1/5/2020  Yes Yes    Take 75 mcg by mouth Daily.    potassium chloride (K-DUR) 10 MEQ CR tablet Past Month  No Yes    Take 1 tablet by mouth 2 (Two) Times a Day.    spironolactone (ALDACTONE) 25 MG tablet 1/4/2020  No Yes    Take 0.5 tablets by mouth Daily.    Vitamin D, Cholecalciferol, 50 MCG (2000 UT) capsule 1/4/2020  Yes Yes    Take 1 capsule by mouth Daily.    ipratropium-albuterol (DUO-NEB) 0.5-2.5 mg/3 ml nebulizer Unknown  No No    Take 3 mL by nebulization Every 4 (Four) Hours As Needed for Wheezing or Shortness of Air (cough).    nitroglycerin (NITROSTAT) 0.4 MG SL tablet Unknown  Yes No    Place 0.4 mg under the tongue Every 5 (Five) Minutes As Needed.    pantoprazole (PROTONIX) 40 MG EC tablet More than a month  No No    Take 1 tablet by mouth Daily.             ED Medications:    Medications   sodium chloride 0.9 % flush 10 mL (has no administration in time range)   atorvastatin (LIPITOR) tablet 40 mg (has no administration in time range)   apixaban (ELIQUIS) tablet 2.5 mg (has no administration in time range)   benzonatate (TESSALON) capsule 200 mg (has no administration in time range)   bisoprolol (ZEBeta) tablet 10 mg (has no administration in time range)   ferrous sulfate EC tablet 324 mg (has no administration in time range)   ipratropium-albuterol (DUO-NEB) nebulizer solution 3 mL (has no administration in time range)   isosorbide mononitrate (IMDUR) 24 hr tablet 60 mg (has no administration in time range)   levothyroxine (SYNTHROID, LEVOTHROID) tablet 75 mcg (has no administration in time range)   spironolactone (ALDACTONE) tablet 12.5 mg (has no administration in time range)   furosemide (LASIX) tablet 40 mg (has no administration in time range)   sodium chloride 0.9 % flush 10 mL (has no administration in time range)   sodium chloride 0.9 % flush 10 mL (has no administration in time  range)   acetaminophen (TYLENOL) tablet 650 mg (has no administration in time range)     Or   acetaminophen (TYLENOL) 160 MG/5ML solution 650 mg (has no administration in time range)     Or   acetaminophen (TYLENOL) suppository 650 mg (has no administration in time range)   albuterol (PROVENTIL) nebulizer solution 0.083% 2.5 mg/3mL (has no administration in time range)   ondansetron (ZOFRAN) tablet 4 mg (has no administration in time range)   Pharmacy to dose vancomycin (has no administration in time range)   Pharmacy to Dose Zosyn (has no administration in time range)   ipratropium-albuterol (DUO-NEB) nebulizer solution 6 mL (6 mL Nebulization Given 1/4/20 2021)   methylPREDNISolone sodium succinate (SOLU-Medrol) injection 125 mg (125 mg Intravenous Given 1/4/20 2045)   dilTIAZem (CARDIZEM) injection 20 mg (20 mg Intravenous Given 1/4/20 2140)   cefTRIAXone (ROCEPHIN) IVPB 1 g/50ml dextrose (premix) (0 g Intravenous Stopped 1/4/20 2310)   sodium chloride 0.9 % bolus 1,000 mL (1,000 mL Intravenous New Bag 1/4/20 2311)   AZITHROMYCIN 500 MG/250 ML 0.9% NS IVPB (vial-mate) (0 mg Intravenous Stopped 1/5/20 0036)       Vital Signs:    Temp:  [98.8 °F (37.1 °C)] 98.8 °F (37.1 °C)  Heart Rate:  [] 88  Resp:  [20-30] 20  BP: (131-166)/() 131/94        01/04/20 1937   Weight: 61.2 kg (135 lb)       Body mass index is 28.22 kg/m².    Physical Exam:    General Appearance:  Alert and cooperative, not in any acute distress.   Head:  Atraumatic and normocephalic, without obvious abnormality.   Eyes:          PERRLA, conjunctivae and sclerae normal, no Icterus. No pallor. Extraocular movements are within normal limits.   Ears:  Ears appear intact with no abnormalities noted.   Throat: No oral lesions, no thrush, oral mucosa moist.   Neck: Supple, trachea midline, no thyromegaly, no carotid bruit.   Back:   No tenderness to palpation, range of motion normal.   Lungs:   Breath sounds heard bilaterally equally.   Bilateral wheezes and crackles   Heart:   Irregularly irregular, normal S1 and S2, no murmur, no gallop, no rub. No JVD.   Abdomen:   Normal bowel sounds, no masses, no organomegaly. Soft, non-tender, non-distended, no guarding, no rebound tenderness.   Extremities: Moves all extremities well, trace to 1+ bilateral edema, no cyanosis, no clubbing.   Pulses: Pulses palpable and equal bilaterally.   Skin: No bleeding, bruising or rash.   Neurologic: Alert and oriented x 3. Moves all four limbs equally. No tremors. No facial asymmetry.     Laboratory data:    I have reviewed the labs done in the emergency room.    Results from last 7 days   Lab Units 01/04/20 1949 12/31/19  1043   SODIUM mmol/L 135* 141   POTASSIUM mmol/L 4.9 3.6   CHLORIDE mmol/L 92* 99   TOTAL CO2 mmol/L  --  24.0   CO2 mmol/L 18.4*  --    BUN mg/dL 41* 37*   CREATININE mg/dL 2.01* 1.99*   CALCIUM mg/dL 9.8 9.5   BILIRUBIN mg/dL 3.1*  --    ALK PHOS U/L 96  --    ALT (SGPT) U/L 26  --    AST (SGOT) U/L 55*  --    GLUCOSE mg/dL 179*  --      Results from last 7 days   Lab Units 01/04/20 1949   WBC 10*3/mm3 8.48   HEMOGLOBIN g/dL 10.2*   HEMATOCRIT % 33.4*   PLATELETS 10*3/mm3 222         Results from last 7 days   Lab Units 01/04/20 1950   TROPONIN T ng/mL 0.012     Results from last 7 days   Lab Units 01/04/20  1950   PROBNP pg/mL 28,142.0*             Results from last 7 days   Lab Units 01/04/20 2034   PH, ARTERIAL pH units 7.422   PO2 ART mm Hg 243.0*   PCO2, ARTERIAL mm Hg 30.9*   HCO3 ART mmol/L 20.1*           Invalid input(s): USDES,  BLOODU, NITRITITE, BACT, EP  Pain Management Panel     There is no flowsheet data to display.              EKG:      A. fib with RVR, nonspecific ST abnormalities, no acute ischemia my interpretation    Radiology:    Imaging Results (Last 72 Hours)     Procedure Component Value Units Date/Time    CT Chest Without Contrast [[id number]] Collected:  01/04/20 2313     Updated:  01/04/20 2314    Narrative:        FINAL REPORT    TECHNIQUE:  Routine axial images were obtained from the lung apices to below  the diaphragm without contrast.    CLINICAL HISTORY:  sob, hypoxia, abnormal CXR    FINDINGS:  There are multiple small bilateral irregular nodular opacities  in the lungs bilaterally, including in the right upper lobe.  No  clear pulmonary mass or nodule is seen.  There is no pleural  disease.  There is borderline right paratracheal and mediastinal  adenopathy.  There is a small hiatal hernia of the stomach.  There is no acute osseous abnormality.      Impression:       Multiple bilateral irregular nodular opacities most likely due  to infectious disease including mycobacterial and fungal  infection.  Metastatic disease much less likely.    Authenticated by Ran Nathan M.D. on 01/04/2020 11:13:21 PM    XR Chest 1 View [[id number]] Collected:  01/04/20 2133     Updated:  01/04/20 2134    Narrative:       FINAL REPORT    TECHNIQUE:  An AP portable view of the chest was obtained.    CLINICAL HISTORY:  Simple Sepsis Triage Protocol    FINDINGS:  There is an apparent small irregular nodular opacity in the  right upper lobe superimposed on the right posterior seventh  rib.  CT is recommended for further evaluation.  The lungs are  otherwise clear.  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 There  is no pleural disease, adenopathy, or significant osseous  abnormality.  There is a small hiatal hernia.      Impression:       Irregular nodular opacity in the right upper lung field.  Recommend CT for further evaluation.  No acute disease.    Authenticated by Ran Nathan M.D. on 01/04/2020 09:33:57 PM            Pneumonia of both lungs due to infectious organism      Assessment:    1.  Healthcare associated pneumonia, present on admission, acute  2.  Acute hypoxic respiratory failure, present on admission, acute  3.  Chronic diastolic heart failure, present on admission  4.  History of hypersensitivity pneumonitis, present on admission  5.   Chronic kidney disease stage III, present admission, chronic and stable  6.  Atrial fibrillation with RVR, on anticoagulation, rate controlled  7.  Lactic acid elevated, present on admission, acute  8.  CAD status post CABG, present on admission  9.  Interstitial lung disease, present on admission  10.  Pulmonary hypertension, present on admission  11.  Acquired hypothyroidism, present on admission    Plan:    Patient will be admitted to telemetry.  Patient is very complicated with multiple comorbidities likely contributing to acute hypoxia.  With recent hospitalization, subjective fevers, productive cough, and CT findings, will treat broadly with vancomycin and Zosyn, but consider quick de-escalation if improvement.  Blood cultures have been obtained.  Suspect lactic acid is more related to acute hypoxia versus sepsis.  Will hold off on IV fluids as blood pressure is stable and she has significant CHF/CKD history.  We will have DuoNeb treatments, will hold on steroids as this may only worsen her heart failure.    Complicating the issue is history of interstitial lung disease and hypersensitivity pneumonitis for which she follows with Dr. Laura.  Her CT findings could likely be chronic in nature, however difficult to tell at this point.  Consider inpatient Pulm consult.    I am unsure of her overall volume status from her baseline, however clinically she does not necessarily seem to be significantly overloaded on exam.  She has had significant difficulty with diuretic therapy in regards to her renal function and has been followed by cardiologist and is scheduled to follow-up with nephrologist.  I will restart home regimen in the morning and monitor her response following renal function closely.  More aggressive diuresis may be needed.  Low threshold to consult nephrology and/or cardiology if necessary.    In regards to the A. fib, she has missed home medications from yesterday and these will be restarted.  Heart  rate is been controlled with Cardizem given in the ER.  We will continue anticoagulation.    Patient is chronically ill with multiple medical comorbidities.  Anticipate greater than 2 midnight stay, likely 3 to 4-day hospitalization depending upon improvement in clinical condition.  Patient is agreeable to plan of care.    Tania Gupta DO  01/05/20  1:18 AM    Dictated utilizing Dragon dictation.

## 2020-01-05 NOTE — PROGRESS NOTES
[facility]    Name:  Pat Morel   Age:  80 y.o.  Sex:  female  :  1940  MRN:  [id number]   Visit Number:  [id number]  Admission Date:  2020  Date Of Service:  20  Primary Care Physician:  Anthony Sweet DO     LOS: 0 days :  Patient Care Team:  Anthony Sweet DO as PCP - General (Family Medicine)  Tine Archer MD as Consulting Physician (Cardiac Electrophysiology)  Xu Block MD (Cardiology):    History taken from:     patient    Chief Complaint:      Dyspnea    Subjective:    Interval History:     Patient seen and examined today.  Reviewed history and physical, lab work, x-rays, chart.    Patient is 80-year-old female with shortness of breath and cough.  She has coronary artery disease with CABG, A. fib, chronic kidney disease stage III, history of CVA, diastolic CHF, hypersensitivity pneumonitis, interstitial lung disease, hypertension, osteoporosis.  She has a one-week history of shortness of breath, productive cough, wheezing and fevers.  She follows with Dr. Laura as her pulmonologist.  She was noted to have elevated lactic acid, however has a history of congestive heart failure with chronic kidney disease and edema.  Due to this she was not aggressively hydrated.  CT scan of the chest showed multiple bilateral irregular nodular opacities most likely due to infectious disease with possible atypical mycoplasma infection or fungal infection per radiology.    Due to this, have added Zithromax and micafungin.  Patient was also placed on vancomycin and Zosyn.  She still feels hypoxic with chest pain which is worse on cough.  She has a history of coronary artery disease with CABG and stents.  She follows with Dr. Higgins.  Troponin was negative last night.  Respiratory panel revealed the patient to have an RSV infection.    Review of Systems:     All systems were reviewed and negative except for:  Respiratory: positive for   shortness of air    Objective:    Vital Signs:    Temp:  [97.6 °F (36.4 °C)-98.8 °F (37.1 °C)] 98.2 °F (36.8 °C)  Heart Rate:  [] 83  Resp:  [19-30] 20  BP: (128-166)/() 136/98    Physical Exam:    General: Alert and oriented x4, moderate distress noted.  Heart: Controlled rate, irregular rhythm without murmur rub or thrill  Lungs: Diffuse wheezes bilaterally without use of accessory muscles or respiration  Abdomen: Soft/nontender/nondistended.  No hepatosplenomegaly is noted.  MSK: 4/5 strength in upper/lower extremities bilaterally.  Bilateral edema noted.     Results Review:      I reviewed the patient's new clinical results.    Labs:    Lab Results (last 24 hours)     Procedure Component Value Units Date/Time    Respiratory Panel, PCR - Swab, Nasopharynx [[id number]]  (Abnormal) Collected:  01/05/20 0146    Specimen:  Swab from Nasopharynx Updated:  01/05/20 1321     ADENOVIRUS, PCR Not Detected     Coronavirus 229E Not Detected     Coronavirus HKU1 Not Detected     Coronavirus NL63 Not Detected     Coronavirus OC43 Not Detected     Human Metapneumovirus Not Detected     Human Rhinovirus/Enterovirus Not Detected     Influenza B PCR Not Detected     Parainfluenza Virus 1 Not Detected     Parainfluenza Virus 2 Not Detected     Parainfluenza Virus 3 Not Detected     Parainfluenza Virus 4 Not Detected     Bordetella pertussis pcr Not Detected     Influenza A H1 2009 PCR Not Detected     Chlamydophila pneumoniae PCR Not Detected     Mycoplasma pneumo by PCR Not Detected     Influenza A PCR Not Detected     Influenza A H3 Not Detected     Influenza A H1 Not Detected     RSV, PCR Detected     Bordetella parapertussis PCR Not Detected    Legionella Antigen, Urine - Urine, Urine, Clean Catch [[id number]]  (Normal) Collected:  01/05/20 0544    Specimen:  Urine, Clean Catch Updated:  01/05/20 1302     LEGIONELLA ANTIGEN, URINE Negative    S. Pneumo Ag Urine or CSF - Urine, Urine, Clean Catch [[id number]]  (Normal)  Collected:  01/05/20 0544    Specimen:  Urine, Clean Catch Updated:  01/05/20 1254     Strep Pneumo Ag Negative    CBC Auto Differential [[id number]]  (Abnormal) Collected:  01/05/20 0528    Specimen:  Blood Updated:  01/05/20 0654     WBC 7.14 10*3/mm3      RBC 2.95 10*6/mm3      Hemoglobin 8.5 g/dL      Hematocrit 27.5 %      MCV 93.2 fL      MCH 28.8 pg      MCHC 30.9 g/dL      RDW 16.7 %      RDW-SD 57.8 fl      MPV 9.5 fL      Platelets 184 10*3/mm3      Neutrophil % 89.3 %      Lymphocyte % 3.8 %      Monocyte % 6.2 %      Eosinophil % 0.0 %      Basophil % 0.3 %      Immature Grans % 0.4 %      Neutrophils, Absolute 6.38 10*3/mm3      Lymphocytes, Absolute 0.27 10*3/mm3      Monocytes, Absolute 0.44 10*3/mm3      Eosinophils, Absolute 0.00 10*3/mm3      Basophils, Absolute 0.02 10*3/mm3      Immature Grans, Absolute 0.03 10*3/mm3      nRBC 0.0 /100 WBC     Scan Slide [[id number]] Collected:  01/05/20 0528    Specimen:  Blood Updated:  01/05/20 0654     Anisocytosis Slight/1+     Hypochromia Slight/1+     Poikilocytes Slight/1+     WBC Morphology Normal     Platelet Estimate Adequate    Basic Metabolic Panel [[id number]]  (Abnormal) Collected:  01/05/20 0528    Specimen:  Blood Updated:  01/05/20 0642     Glucose 176 mg/dL      BUN 44 mg/dL      Creatinine 1.77 mg/dL      Sodium 139 mmol/L      Potassium 3.5 mmol/L      Chloride 99 mmol/L      CO2 20.9 mmol/L      Calcium 8.5 mg/dL      eGFR Non African Amer 28 mL/min/1.73      BUN/Creatinine Ratio 24.9     Anion Gap 19.1 mmol/L     Narrative:       GFR Normal >60  Chronic Kidney Disease <60  Kidney Failure <15      Lactic Acid, Reflex [[id number]]  (Abnormal) Collected:  01/05/20 0108    Specimen:  Blood Updated:  01/05/20 0224     Lactate 3.9 mmol/L     Lactic Acid, Reflex Timer (This will reflex a repeat order 3-3:15 hours after ordered.) [[id number]] Collected:  01/04/20 2005    Specimen:  Blood from Arm, Left Updated:  01/05/20 0001     Extra Tube Hold for  add-ons.     Comment: Auto resulted.       [place] Draw [[id number]] Collected:  01/04/20 1949    Specimen:  Blood Updated:  01/04/20 2150    Narrative:       The following orders were created for panel order [place] Draw.  Procedure                               Abnormality         Status                     ---------                               -----------         ------                     Light Blue Top[[id number]]                                   Final result               Green Top (Gel)[[id number]]                                  Final result               Lavender Top[[id number]]                                     Final result               Gold Top - SST[[id number]]                                   Final result               Green Top (No Gel)[[id number]]                                                            Please view results for these tests on the individual orders.    Light Blue Top [[id number]] Collected:  01/04/20 1949    Specimen:  Blood Updated:  01/04/20 2102     Extra Tube hold for add-on     Comment: Auto resulted       Green Top (Gel) [[id number]] Collected:  01/04/20 1949    Specimen:  Blood Updated:  01/04/20 2102     Extra Tube Hold for add-ons.     Comment: Auto resulted.       Lavender Top [[id number]] Collected:  01/04/20 1949    Specimen:  Blood Updated:  01/04/20 2102     Extra Tube hold for add-on     Comment: Auto resulted       Gold Top - SST [[id number]] Collected:  01/04/20 1949    Specimen:  Blood Updated:  01/04/20 2102     Extra Tube Hold for add-ons.     Comment: Auto resulted.       Lactic Acid, Plasma [[id number]]  (Abnormal) Collected:  01/04/20 2005    Specimen:  Blood from Arm, Left Updated:  01/04/20 2056     Lactate 6.6 mmol/L     Blood Culture - Blood, Arm, Right [[id number]] Collected:  01/04/20 1959    Specimen:  Blood from Arm, Right Updated:  01/04/20 2052    Blood Culture - Blood, Arm, Left [[id number]] Collected:  01/04/20 2005    Specimen:  Blood from Arm, Left  "Updated:  01/04/20 2052    Procalcitonin [[id number]]  (Normal) Collected:  01/04/20 1950    Specimen:  Blood Updated:  01/04/20 2051     Procalcitonin 0.25 ng/mL     Narrative:       As a Marker for Sepsis (Non-Neonates):   1. <0.5 ng/mL represents a low risk of severe sepsis and/or septic shock.  1. >2 ng/mL represents a high risk of severe sepsis and/or septic shock.    As a Marker for Lower Respiratory Tract Infections that require antibiotic therapy:  PCT on Admission     Antibiotic Therapy             6-12 Hrs later  > 0.5                Strongly Recommended            >0.25 - <0.5         Recommended  0.1 - 0.25           Discouraged                   Remeasure/reassess PCT  <0.1                 Strongly Discouraged          Remeasure/reassess PCT      As 28 day mortality risk marker: \"Change in Procalcitonin Result\" (> 80 % or <=80 %) if Day 0 (or Day 1) and Day 4 values are available. Refer to [email or web address]   Change in PCT <=80 %   A decrease of PCT levels below or equal to 80 % defines a positive change in PCT test result representing a higher risk for 28-day all-cause mortality of patients diagnosed with severe sepsis or septic shock.  Change in PCT > 80 %   A decrease of PCT levels of more than 80 % defines a negative change in PCT result representing a lower risk for 28-day all-cause mortality of patients diagnosed with severe sepsis or septic shock.                  BNP [[id number]]  (Abnormal) Collected:  01/04/20 1950    Specimen:  Blood Updated:  01/04/20 2051     proBNP 28,142.0 pg/mL     Narrative:       Among patients with dyspnea, NT-proBNP is highly sensitive for the detection of acute congestive heart failure. In addition NT-proBNP of <300 pg/ml effectively rules out acute congestive heart failure with 99% negative predictive value.      Troponin [[id number]]  (Normal) Collected:  01/04/20 1950    Specimen:  Blood Updated:  01/04/20 2045     Troponin T 0.012 ng/mL     " Narrative:       Troponin T Reference Range:  <= 0.03 ng/mL-   Negative for AMI  >0.03 ng/mL-     Abnormal for myocardial necrosis.  Clinicians would have to utilize clinical acumen, EKG, Troponin and serial changes to determine if it is an Acute Myocardial Infarction or myocardial injury due to an underlying chronic condition.     Influenza Antigen, Rapid - Swab, Nasopharynx [[id number]]  (Normal) Collected:  01/04/20 2019    Specimen:  Swab from Nasopharynx Updated:  01/04/20 2036     Influenza A Ag, EIA Negative     Influenza B Ag, EIA Negative    CBC & Differential [[id number]] Collected:  01/04/20 1949    Specimen:  Blood Updated:  01/04/20 2035    Narrative:       The following orders were created for panel order CBC & Differential.  Procedure                               Abnormality         Status                     ---------                               -----------         ------                     CBC Auto Differential[[id number]]        Abnormal            Final result                 Please view results for these tests on the individual orders.    CBC Auto Differential [[id number]]  (Abnormal) Collected:  01/04/20 1949    Specimen:  Blood Updated:  01/04/20 2035     WBC 8.48 10*3/mm3      RBC 3.51 10*6/mm3      Hemoglobin 10.2 g/dL      Hematocrit 33.4 %      MCV 95.2 fL      MCH 29.1 pg      MCHC 30.5 g/dL      RDW 17.3 %      RDW-SD 60.4 fl      MPV 9.4 fL      Platelets 222 10*3/mm3     Scan Slide [[id number]] Collected:  01/04/20 1949    Specimen:  Blood Updated:  01/04/20 2035     Anisocytosis Slight/1+     Hypochromia Slight/1+     Platelet Estimate Adequate     Scan Slide --     Comment: See Manual Differential Results       Manual Differential [[id number]]  (Abnormal) Collected:  01/04/20 1949    Specimen:  Blood Updated:  01/04/20 2035     Neutrophil % 75.0 %      Lymphocyte % 5.0 %      Monocyte % 8.0 %      Bands %  12.0 %      Neutrophils Absolute 7.38 10*3/mm3      Lymphocytes Absolute 0.42  10*3/mm3      Monocytes Absolute 0.68 10*3/mm3      RBC Morphology Normal     WBC Morphology Normal     Platelet Morphology Normal    Blood Gas, Arterial With Co-Ox [[id number]]  (Abnormal) Collected:  01/04/20 2034    Specimen:  Arterial Blood Updated:  01/04/20 2034     Site Left Radial     Tad's Test Positive     pH, Arterial 7.422 pH units      pCO2, Arterial 30.9 mm Hg      Comment: 84 Value below reference range        pO2, Arterial 243.0 mm Hg      Comment: 83 Value above reference range        HCO3, Arterial 20.1 mmol/L      Base Excess, Arterial -3.6 mmol/L      Comment: 84 Value below reference range        O2 Saturation, Arterial 99.8 %      Comment: 83 Value above reference range        Hemoglobin, Blood Gas 9.8 g/dL      Comment: 84 Value below reference range        Hematocrit, Blood Gas 29.9 %      Comment: 84 Value below reference range        Oxyhemoglobin 97.9 %      Methemoglobin 1.20 %      Carboxyhemoglobin 0.7 %      A-a Gradiant 415.9 mmHg      Barometric Pressure for Blood Gas 737 mmHg      Modality NRB     FIO2 100 %      Ventilator Mode NA     Comment: Meter: [id number]     :  [id number]        Note --     pH, Temp Corrected --     pCO2, Temperature Corrected --     pO2, Temperature Corrected --    Comprehensive Metabolic Panel [[id number]]  (Abnormal) Collected:  01/04/20 1949    Specimen:  Blood Updated:  01/04/20 2033     Glucose 179 mg/dL      BUN 41 mg/dL      Creatinine 2.01 mg/dL      Sodium 135 mmol/L      Potassium 4.9 mmol/L      Comment: Specimen hemolyzed.  Results may be affected.        Chloride 92 mmol/L      CO2 18.4 mmol/L      Calcium 9.8 mg/dL      Total Protein 8.6 g/dL      Albumin 4.60 g/dL      ALT (SGPT) 26 U/L      Comment: Specimen hemolyzed.  Results may be affected.        AST (SGOT) 55 U/L      Comment: Specimen hemolyzed.  Results may be affected.        Alkaline Phosphatase 96 U/L      Total Bilirubin 3.1 mg/dL      eGFR Non  Amer 24  mL/min/1.73      Globulin 4.0 gm/dL      A/G Ratio 1.2 g/dL      BUN/Creatinine Ratio 20.4     Anion Gap 24.6 mmol/L     Narrative:       GFR Normal >60  Chronic Kidney Disease <60  Kidney Failure <15             Radiology:    Imaging Results (Last 24 Hours)     Procedure Component Value Units Date/Time    CT Chest Without Contrast [[id number]] Collected:  01/04/20 2313     Updated:  01/04/20 2314    Narrative:       FINAL REPORT    TECHNIQUE:  Routine axial images were obtained from the lung apices to below  the diaphragm without contrast.    CLINICAL HISTORY:  sob, hypoxia, abnormal CXR    FINDINGS:  There are multiple small bilateral irregular nodular opacities  in the lungs bilaterally, including in the right upper lobe.  No  clear pulmonary mass or nodule is seen.  There is no pleural  disease.  There is borderline right paratracheal and mediastinal  adenopathy.  There is a small hiatal hernia of the stomach.  There is no acute osseous abnormality.      Impression:       Multiple bilateral irregular nodular opacities most likely due  to infectious disease including mycobacterial and fungal  infection.  Metastatic disease much less likely.    Authenticated by Ran Nathan M.D. on 01/04/2020 11:13:21 PM    XR Chest 1 View [[id number]] Collected:  01/04/20 2133     Updated:  01/04/20 2134    Narrative:       FINAL REPORT    TECHNIQUE:  An AP portable view of the chest was obtained.    CLINICAL HISTORY:  Simple Sepsis Triage Protocol    FINDINGS:  There is an apparent small irregular nodular opacity in the  right upper lobe superimposed on the right posterior seventh  rib.  CT is recommended for further evaluation.  The lungs are  otherwise clear.  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 There  is no pleural disease, adenopathy, or significant osseous  abnormality.  There is a small hiatal hernia.      Impression:       Irregular nodular opacity in the right upper lung field.  Recommend CT for further evaluation.  No  acute disease.    Authenticated by Ran Nathan M.D. on 01/04/2020 09:33:57 PM          Medication Review:       apixaban 2.5 mg Oral Q12H   [START ON 1/6/2020] atorvastatin 40 mg Oral Nightly   azithromycin 500 mg Intravenous Q24H   bisoprolol 10 mg Oral BID   ferrous sulfate 324 mg Oral Daily With Breakfast   furosemide 40 mg Oral BID   isosorbide mononitrate 60 mg Oral Daily   levothyroxine 75 mcg Oral Daily   micafungin (MYCAMINE)  mg Intravenous Q24H   piperacillin-tazobactam 4.5 g Intravenous Q12H   sodium chloride 10 mL Intravenous Q12H   spironolactone 12.5 mg Oral Daily   [START ON 1/8/2020] vancomycin 1,000 mg Intravenous Q72H         Pharmacy to dose vancomycin    Pharmacy to Dose Zosyn        Assessment:    Problem List Items Addressed This Visit        Respiratory    Pneumonia of both lungs due to infectious organism - Primary    Relevant Medications    benzonatate (TESSALON) 200 MG capsule    ipratropium-albuterol (DUO-NEB) 0.5-2.5 mg/3 ml nebulizer      Other Visit Diagnoses     Acute respiratory failure with hypoxia (CMS/HCC)        Lactic acidosis        Atrial fibrillation with RVR (CMS/HCC)              1.  Healthcare associated pneumonia, present on admission, acute  2.  Acute hypoxic respiratory failure, present on admission, acute  3.  Chronic diastolic heart failure, present on admission  4.  History of hypersensitivity pneumonitis, present on admission  5.  Chronic kidney disease stage III, present admission, chronic and stable  6.  Atrial fibrillation , on anticoagulation, rate controlled  7.  Lactic acid elevated, present on admission, acute  8.  CAD status post stents and CABG, present on admission  9.  Interstitial lung disease, present on admission  10.  Pulmonary hypertension, present on admission  11.  Acquired hypothyroidism, present on admission  12.  RSV infection, present on admission.    Plan:    Given results of CT, will add Zithromax and micafungin.  Continue with oral Lasix.   Will consult nephrology as she was going to see nephrology as an outpatient.  Consult Dr. aGrcia from pulmonology in the a.m. regarding the hypersensitivity pneumonitis as well as the atypical pneumonia.  Will add Solu-Medrol 40 mg every 8 hours.  Will check sputum culture.  Add DuoNeb and guaifenesin.  Check chest x-ray in the a.m. continue Eliquis for DVT and stroke prophylaxis.  Will check stat troponin and 1 in the AM..  If elevated would consult cardiology.  Check fungal studies.  Further recommendations will depend on the clinical course.    Jamel Myrick,   01/05/20  4:00 PM

## 2020-01-05 NOTE — PLAN OF CARE
Problem: Patient Care Overview  Goal: Plan of Care Review  Outcome: Ongoing (interventions implemented as appropriate)  Flowsheets (Taken 1/5/2020 1314)  Outcome Summary: Patient participates well in skilled PT and demonstrates  decreased activity tolerance, balance, transfers and gait.  She is expected to benefit from additional PT services while hospitalized and upon discharge to home with home health care services.

## 2020-01-05 NOTE — PAYOR COMM NOTE
"DANIEL Hylton RN  Case Management  Phone:  [phone]; Fax: [phone]    Requesting inpatient authorization.  Please review attached clinical information.    Vilma Morel (80 y.o. Female)     Date of Birth Social Security Number Address Home Phone MRN    1940  [address] [address] [place], [place] [address] [phone] [id number]    Hoahaoism Marital Status          Taoist        Admission Date Admission Type Admitting Provider Attending Provider Department, Room/Bed    20 Emergency Tania Gupta DO Hinsberg, Francis J, DO [facility] MED SURG  3, 306/1    Discharge Date Discharge Disposition Discharge Destination                       Attending Provider:  Jamel Myrick DO    Allergies:  Tuberculin Tests    Isolation:  None   Infection:  None   Code Status:  No CPR    Ht:  147.3 cm (58\")   Wt:  61.9 kg (136 lb 6.4 oz)    Admission Cmt:  None   Principal Problem:  None                Active Insurance as of 2020     Primary Coverage     Payor Plan Insurance Group Employer/Plan Group    HUMANA MEDICARE REPLACEMENT HUMANA MEDICARE REPLACEMENT [id number]     Payor Plan Address Payor Plan Phone Number Payor Plan Fax Number Effective Dates    [address] [phone]  2018 - None Entered    [facility] [id number]       Subscriber Name Subscriber Birth Date Member ID       VILMA MOREL 1940 [id number]                 Emergency Contacts      (Rel.) Home Phone Work Phone Mobile Phone    Dell Morel (Spouse) -- -- [phone]    DeshaunYary (Daughter) -- -- [phone]               History & Physical      Tania Gupta DO at 20 0118              [facility]   HISTORY AND PHYSICAL      Name:  Vilma Morel   Age:  80 y.o.  Sex:  female  :  1940  MRN:  [id number]   Visit Number:  [id number]  Admission Date:  2020  Date Of Service:  20  Primary Care Physician:  Micki" Anthony ROUES DO    Chief Complaint:     Cough, shortness of breath, wheezes    History Of Presenting Illness:      Patient is a 80-year-old female who presents emergency room with multiple complaints including shortness of breath and cough.  Difficult medical history including CAD with CABG, atrial fibrillation, chronic kidney disease stage III, history of CVA, diastolic CHF, iron deficiency anemia, hypersensitivity pneumonitis, interstitial lung disease, hypertension, osteoporosis.  Patient admits to a one-week history of increased shortness of breath, productive cough, wheezing, and subjective fevers.  She tells me that she had recently been in the hospital on [place] for heart failure exacerbation.  She states she had done well for a couple days, but then started to have the increased cough and fevers.  She generally feels unwell.  She states she had seen her primary care doctor on 1/2/2020 where she was hypoxic with ambulation in the clinic, but improved at rest.  She was started on Tessalon Perles, DuoNeb treatments, and given Decadron prescription.  She states since then, she has continued to decline.  She denies any chest pains or palpitations.  No falls or trauma.  She follows with Dr. Laura as her pulmonologist.    In the ER, she presented hypertensive, atrial fibrillation with RVR, and hypoxic on room air.  CBC with white blood cell count of 8000, hemoglobin 10.2, platelets 222.  CMP with creatinine 2.01, sodium 135, chloride 92, total bili 3.1.  proBNP of 28,000.  Troponin 0 0.012.  Procalcitonin 0.25.  Lactic acid 6.6.  ABG with pH 7.42, PCO2 of 30, PO2 of 243, on 100% nonrebreather.  Chest x-ray demonstrated an irregular nodule opacity in the right upper lung field, recommended CT.  CT scan demonstrated multiple bilateral irregular nodule opacities most likely due to infectious disease.  Blood cultures were obtained.  She was started on antibiotics and we were asked to admit thereafter.    Review Of  Systems:     General: Positive fevers, generalized malaise  Psych: No history of any hallucinations and delusions.  Ophth: No history of any diplopia or transient loss of vision.  ENT: No history of sore throat, nasal congestion or ear pain.   Allergy/immuno: No history of rash or itching.  Hem/lymph: No history of neck swelling or easy bleeding.  Endo: No history of any recent unintentional weight gain or loss.  Resp: Productive cough and shortness of breath  Card: No history of chest pain or palpitations.   GI: No history of nausea, vomiting, diarrhea. Denies any abdominal pain.   : No history of dysuria or hematuria.  MSK: No muscle pain. No calf pain.   Neuro: No history of any focal weakness. No loss of consciousness. Denies any numbness.  Derm:: No history of any redness or pruritis.     Past Medical History:    Past Medical History:   Diagnosis Date   • Acute on chronic diastolic congestive heart failure (CMS/HCC) 12/20/2019   • Anemia    • Angina at rest (CMS/HCC)    • Arthritis    • Atrial fibrillation (CMS/HCC)    • Coronary artery disease    • Disease of thyroid gland    • Elevated cholesterol    • GERD without esophagitis 12/2/2019   • History of blood transfusion    • History of stomach ulcers    • Hypertension    • Osteoporosis    • Positive TB test    • Stroke (CMS/HCC)        Past Surgical history:    Past Surgical History:   Procedure Laterality Date   • BRONCHOSCOPY Bilateral 4/12/2019    Procedure: BRONCHOSCOPY;  Surgeon: Jeff Laura MD;  Location:  [facility];  Service: Pulmonary   • BYPASS GRAFT     • COLONOSCOPY N/A 10/15/2019    Procedure: COLONOSCOPY;  Surgeon: Fredi Aaron MD;  Location:  [facility];  Service: Gastroenterology   • CORONARY ANGIOPLASTY WITH STENT PLACEMENT     • CORONARY ARTERY BYPASS GRAFT  10/18/2010   • ENDOSCOPY N/A 10/14/2019    Procedure: ESOPHAGOGASTRODUODENOSCOPY;  Surgeon: Fredi Aaron MD;  Location:  [facility];  Service:  Gastroenterology   • HYSTERECTOMY     • STOMACH SURGERY  08/11/2019    Upper GI bleed    • STOMACH SURGERY  10/13/2019       Social History:    Social History     Socioeconomic History   • Marital status:      Spouse name: Not on file   • Number of children: Not on file   • Years of education: Not on file   • Highest education level: Not on file   Tobacco Use   • Smoking status: Never Smoker   • Smokeless tobacco: Never Used   Substance and Sexual Activity   • Alcohol use: No   • Drug use: No   • Sexual activity: Defer       Family History:    Family History   Problem Relation Age of Onset   • Cancer Mother    • Arthritis Mother    • No Known Problems Father    • Liver disease Sister        Allergies:      Tuberculin tests    Home Medications:    Prior to Admission Medications     Prescriptions Last Dose Informant Patient Reported? Taking?    apixaban (ELIQUIS) 2.5 MG tablet tablet 1/5/2020  Yes Yes    Take 2.5 mg by mouth 2 (Two) Times a Day.    atorvastatin (LIPITOR) 40 MG tablet 1/4/2020  No Yes    Take 1 tablet by mouth Every Night.    benzonatate (TESSALON) 200 MG capsule 1/4/2020  No Yes    Take 1 capsule by mouth 3 (Three) Times a Day As Needed for Cough.    bisoprolol (ZEBETA) 5 MG tablet 1/5/2020  No Yes    Take 1.5 tablets by mouth 2 (Two) Times a Day.    dexamethasone (DECADRON) 4 MG tablet 1/5/2020  No Yes    Take 1 tablet by mouth Daily With Breakfast for 3 days.    ferrous gluconate (FERGON) 324 MG tablet 1/5/2020  No Yes    Take 1 tablet by mouth Daily With Breakfast.    furosemide (LASIX) 40 MG tablet 1/5/2020  No Yes    Take 1 tablet by mouth 2 (Two) Times a Day.    isosorbide mononitrate (IMDUR) 120 MG 24 hr tablet 1/4/2020  No Yes    Take 0.5 tablets by mouth Daily.    levothyroxine (SYNTHROID, LEVOTHROID) 75 MCG tablet 1/5/2020  Yes Yes    Take 75 mcg by mouth Daily.    potassium chloride (K-DUR) 10 MEQ CR tablet Past Month  No Yes    Take 1 tablet by mouth 2 (Two) Times a Day.     spironolactone (ALDACTONE) 25 MG tablet 1/4/2020  No Yes    Take 0.5 tablets by mouth Daily.    Vitamin D, Cholecalciferol, 50 MCG (2000 UT) capsule 1/4/2020  Yes Yes    Take 1 capsule by mouth Daily.    ipratropium-albuterol (DUO-NEB) 0.5-2.5 mg/3 ml nebulizer Unknown  No No    Take 3 mL by nebulization Every 4 (Four) Hours As Needed for Wheezing or Shortness of Air (cough).    nitroglycerin (NITROSTAT) 0.4 MG SL tablet Unknown  Yes No    Place 0.4 mg under the tongue Every 5 (Five) Minutes As Needed.    pantoprazole (PROTONIX) 40 MG EC tablet More than a month  No No    Take 1 tablet by mouth Daily.             ED Medications:    Medications   sodium chloride 0.9 % flush 10 mL (has no administration in time range)   atorvastatin (LIPITOR) tablet 40 mg (has no administration in time range)   apixaban (ELIQUIS) tablet 2.5 mg (has no administration in time range)   benzonatate (TESSALON) capsule 200 mg (has no administration in time range)   bisoprolol (ZEBeta) tablet 10 mg (has no administration in time range)   ferrous sulfate EC tablet 324 mg (has no administration in time range)   ipratropium-albuterol (DUO-NEB) nebulizer solution 3 mL (has no administration in time range)   isosorbide mononitrate (IMDUR) 24 hr tablet 60 mg (has no administration in time range)   levothyroxine (SYNTHROID, LEVOTHROID) tablet 75 mcg (has no administration in time range)   spironolactone (ALDACTONE) tablet 12.5 mg (has no administration in time range)   furosemide (LASIX) tablet 40 mg (has no administration in time range)   sodium chloride 0.9 % flush 10 mL (has no administration in time range)   sodium chloride 0.9 % flush 10 mL (has no administration in time range)   acetaminophen (TYLENOL) tablet 650 mg (has no administration in time range)     Or   acetaminophen (TYLENOL) 160 MG/5ML solution 650 mg (has no administration in time range)     Or   acetaminophen (TYLENOL) suppository 650 mg (has no administration in time range)      albuterol (PROVENTIL) nebulizer solution 0.083% 2.5 mg/3mL (has no administration in time range)   ondansetron (ZOFRAN) tablet 4 mg (has no administration in time range)   Pharmacy to dose vancomycin (has no administration in time range)   Pharmacy to Dose Zosyn (has no administration in time range)   ipratropium-albuterol (DUO-NEB) nebulizer solution 6 mL (6 mL Nebulization Given 1/4/20 2021)   methylPREDNISolone sodium succinate (SOLU-Medrol) injection 125 mg (125 mg Intravenous Given 1/4/20 2045)   dilTIAZem (CARDIZEM) injection 20 mg (20 mg Intravenous Given 1/4/20 2140)   cefTRIAXone (ROCEPHIN) IVPB 1 g/50ml dextrose (premix) (0 g Intravenous Stopped 1/4/20 2310)   sodium chloride 0.9 % bolus 1,000 mL (1,000 mL Intravenous New Bag 1/4/20 2311)   AZITHROMYCIN 500 MG/250 ML 0.9% NS IVPB (vial-mate) (0 mg Intravenous Stopped 1/5/20 0036)       Vital Signs:    Temp:  [98.8 °F (37.1 °C)] 98.8 °F (37.1 °C)  Heart Rate:  [] 88  Resp:  [20-30] 20  BP: (131-166)/() 131/94        01/04/20 1937   Weight: 61.2 kg (135 lb)       Body mass index is 28.22 kg/m².    Physical Exam:    General Appearance:  Alert and cooperative, not in any acute distress.   Head:  Atraumatic and normocephalic, without obvious abnormality.   Eyes:          PERRLA, conjunctivae and sclerae normal, no Icterus. No pallor. Extraocular movements are within normal limits.   Ears:  Ears appear intact with no abnormalities noted.   Throat: No oral lesions, no thrush, oral mucosa moist.   Neck: Supple, trachea midline, no thyromegaly, no carotid bruit.   Back:   No tenderness to palpation, range of motion normal.   Lungs:   Breath sounds heard bilaterally equally.  Bilateral wheezes and crackles   Heart:   Irregularly irregular, normal S1 and S2, no murmur, no gallop, no rub. No JVD.   Abdomen:   Normal bowel sounds, no masses, no organomegaly. Soft, non-tender, non-distended, no guarding, no rebound tenderness.   Extremities: Moves all  extremities well, trace to 1+ bilateral edema, no cyanosis, no clubbing.   Pulses: Pulses palpable and equal bilaterally.   Skin: No bleeding, bruising or rash.   Neurologic: Alert and oriented x 3. Moves all four limbs equally. No tremors. No facial asymmetry.     Laboratory data:    I have reviewed the labs done in the emergency room.    Results from last 7 days   Lab Units 01/04/20 1949 12/31/19  1043   SODIUM mmol/L 135* 141   POTASSIUM mmol/L 4.9 3.6   CHLORIDE mmol/L 92* 99   TOTAL CO2 mmol/L  --  24.0   CO2 mmol/L 18.4*  --    BUN mg/dL 41* 37*   CREATININE mg/dL 2.01* 1.99*   CALCIUM mg/dL 9.8 9.5   BILIRUBIN mg/dL 3.1*  --    ALK PHOS U/L 96  --    ALT (SGPT) U/L 26  --    AST (SGOT) U/L 55*  --    GLUCOSE mg/dL 179*  --      Results from last 7 days   Lab Units 01/04/20 1949   WBC 10*3/mm3 8.48   HEMOGLOBIN g/dL 10.2*   HEMATOCRIT % 33.4*   PLATELETS 10*3/mm3 222         Results from last 7 days   Lab Units 01/04/20 1950   TROPONIN T ng/mL 0.012     Results from last 7 days   Lab Units 01/04/20 1950   PROBNP pg/mL 28,142.0*             Results from last 7 days   Lab Units 01/04/20 2034   PH, ARTERIAL pH units 7.422   PO2 ART mm Hg 243.0*   PCO2, ARTERIAL mm Hg 30.9*   HCO3 ART mmol/L 20.1*           Invalid input(s): USDES,  BLOODU, NITRITITE, BACT, EP  Pain Management Panel     There is no flowsheet data to display.              EKG:      A. fib with RVR, nonspecific ST abnormalities, no acute ischemia my interpretation    Radiology:    Imaging Results (Last 72 Hours)     Procedure Component Value Units Date/Time    CT Chest Without Contrast [[id number]] Collected:  01/04/20 2313     Updated:  01/04/20 2314    Narrative:       FINAL REPORT    TECHNIQUE:  Routine axial images were obtained from the lung apices to below  the diaphragm without contrast.    CLINICAL HISTORY:  sob, hypoxia, abnormal CXR    FINDINGS:  There are multiple small bilateral irregular nodular opacities  in the lungs bilaterally,  including in the right upper lobe.  No  clear pulmonary mass or nodule is seen.  There is no pleural  disease.  There is borderline right paratracheal and mediastinal  adenopathy.  There is a small hiatal hernia of the stomach.  There is no acute osseous abnormality.      Impression:       Multiple bilateral irregular nodular opacities most likely due  to infectious disease including mycobacterial and fungal  infection.  Metastatic disease much less likely.    Authenticated by Ran Nathan M.D. on 01/04/2020 11:13:21 PM    XR Chest 1 View [[id number]] Collected:  01/04/20 2133     Updated:  01/04/20 2134    Narrative:       FINAL REPORT    TECHNIQUE:  An AP portable view of the chest was obtained.    CLINICAL HISTORY:  Simple Sepsis Triage Protocol    FINDINGS:  There is an apparent small irregular nodular opacity in the  right upper lobe superimposed on the right posterior seventh  rib.  CT is recommended for further evaluation.  The lungs are  otherwise clear.  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 There  is no pleural disease, adenopathy, or significant osseous  abnormality.  There is a small hiatal hernia.      Impression:       Irregular nodular opacity in the right upper lung field.  Recommend CT for further evaluation.  No acute disease.    Authenticated by Ran Nathan M.D. on 01/04/2020 09:33:57 PM            Pneumonia of both lungs due to infectious organism      Assessment:    1.  Healthcare associated pneumonia, present on admission, acute  2.  Acute hypoxic respiratory failure, present on admission, acute  3.  Chronic diastolic heart failure, present on admission  4.  History of hypersensitivity pneumonitis, present on admission  5.  Chronic kidney disease stage III, present admission, chronic and stable  6.  Atrial fibrillation with RVR, on anticoagulation, rate controlled  7.  Lactic acid elevated, present on admission, acute  8.  CAD status post CABG, present on admission  9.  Interstitial lung disease,  present on admission  10.  Pulmonary hypertension, present on admission  11.  Acquired hypothyroidism, present on admission    Plan:    Patient will be admitted to telemetry.  Patient is very complicated with multiple comorbidities likely contributing to acute hypoxia.  With recent hospitalization, subjective fevers, productive cough, and CT findings, will treat broadly with vancomycin and Zosyn, but consider quick de-escalation if improvement.  Blood cultures have been obtained.  Suspect lactic acid is more related to acute hypoxia versus sepsis.  Will hold off on IV fluids as blood pressure is stable and she has significant CHF/CKD history.  We will have DuoNeb treatments, will hold on steroids as this may only worsen her heart failure.    Complicating the issue is history of interstitial lung disease and hypersensitivity pneumonitis for which she follows with Dr. Laura.  Her CT findings could likely be chronic in nature, however difficult to tell at this point.  Consider inpatient Pulm consult.    I am unsure of her overall volume status from her baseline, however clinically she does not necessarily seem to be significantly overloaded on exam.  She has had significant difficulty with diuretic therapy in regards to her renal function and has been followed by cardiologist and is scheduled to follow-up with nephrologist.  I will restart home regimen in the morning and monitor her response following renal function closely.  More aggressive diuresis may be needed.  Low threshold to consult nephrology and/or cardiology if necessary.    In regards to the A. fib, she has missed home medications from yesterday and these will be restarted.  Heart rate is been controlled with Cardizem given in the ER.  We will continue anticoagulation.    Patient is chronically ill with multiple medical comorbidities.  Anticipate greater than 2 midnight stay, likely 3 to 4-day hospitalization depending upon improvement in clinical  condition.  Patient is agreeable to plan of care.    Tania Gupta DO  01/05/20  1:18 AM    Dictated utilizing Dragon dictation.    Electronically signed by Tania Gupta DO at 01/05/20 0209          Emergency Department Notes      Gurwinder Tierney MD at 01/04/20 2006          Subjective   80-year-old female presenting with shortness of breath.  She brought in by her son who helps provide the history.  For the last several days patient has had increased shortness of breath, cough, wheezing.  Apparently she went to her primary doctor at the onset of symptoms, she was told her oxygen saturation was in the 70s when she checked them.  This apparently improved into the 90s, she was given Tessalon Perles and steroids.  She continues to feel poorly, increased cough largely nonproductive, increased shortness of breath, subjective fevers.  No nausea, vomiting, diarrhea, abdominal pain.          Review of Systems   Constitutional: Negative.    HENT: Negative.    Eyes: Negative.    Respiratory: Positive for cough and shortness of breath.    Cardiovascular: Negative.    Gastrointestinal: Negative.    Genitourinary: Negative.    Musculoskeletal: Negative.    Skin: Negative.    Neurological: Negative.    Psychiatric/Behavioral: Negative.        Past Medical History:   Diagnosis Date   • Acute on chronic diastolic congestive heart failure (CMS/HCC) 12/20/2019   • Anemia    • Angina at rest (CMS/HCC)    • Arthritis    • Atrial fibrillation (CMS/HCC)    • Coronary artery disease    • Disease of thyroid gland    • Elevated cholesterol    • GERD without esophagitis 12/2/2019   • History of blood transfusion    • History of stomach ulcers    • Hypertension    • Osteoporosis    • Positive TB test    • Stroke (CMS/HCC)        Allergies   Allergen Reactions   • Tuberculin Tests Rash       Past Surgical History:   Procedure Laterality Date   • BRONCHOSCOPY Bilateral 4/12/2019    Procedure: BRONCHOSCOPY;  Surgeon:  Jeff Laura MD;  Location:  [facility];  Service: Pulmonary   • BYPASS GRAFT     • COLONOSCOPY N/A 10/15/2019    Procedure: COLONOSCOPY;  Surgeon: Fredi Aaron MD;  Location:  [facility];  Service: Gastroenterology   • CORONARY ANGIOPLASTY WITH STENT PLACEMENT     • CORONARY ARTERY BYPASS GRAFT  10/18/2010   • ENDOSCOPY N/A 10/14/2019    Procedure: ESOPHAGOGASTRODUODENOSCOPY;  Surgeon: Fredi Aaron MD;  Location:  [facility];  Service: Gastroenterology   • HYSTERECTOMY     • STOMACH SURGERY  08/11/2019    Upper GI bleed    • STOMACH SURGERY  10/13/2019       Family History   Problem Relation Age of Onset   • Cancer Mother    • Arthritis Mother    • No Known Problems Father    • Liver disease Sister        Social History     Socioeconomic History   • Marital status:      Spouse name: Not on file   • Number of children: Not on file   • Years of education: Not on file   • Highest education level: Not on file   Tobacco Use   • Smoking status: Never Smoker   • Smokeless tobacco: Never Used   Substance and Sexual Activity   • Alcohol use: No   • Drug use: No   • Sexual activity: Defer           Objective   Physical Exam   Constitutional: She is oriented to person, place, and time. She appears well-developed and well-nourished.   Distressed   HENT:   Head: Normocephalic and atraumatic.   Right Ear: External ear normal.   Left Ear: External ear normal.   Nose: Nose normal.   Mouth/Throat: Oropharynx is clear and moist.   Eyes: Pupils are equal, round, and reactive to light. Conjunctivae and EOM are normal.   Neck: Normal range of motion. Neck supple.   Cardiovascular: Normal heart sounds and intact distal pulses.   Irregularly irregular, rapid rate   Pulmonary/Chest:   Increased work of breathing, tachypnea, diffuse wheezes   Abdominal: Soft. Bowel sounds are normal. She exhibits no distension. There is no tenderness. There is no rebound and no guarding.   Musculoskeletal:  Normal range of motion. She exhibits no tenderness or deformity.   Trace lower extremity edema   Neurological: She is alert and oriented to person, place, and time.   Skin: Skin is warm and dry. No rash noted.   Psychiatric: She has a normal mood and affect. Her behavior is normal.   Nursing note and vitals reviewed.      Procedures          ED Course                                               MDM  Number of Diagnoses or Management Options  Acute respiratory failure with hypoxia (CMS/HCC):   Lactic acidosis:   Pneumonia of both lungs due to infectious organism, unspecified part of lung:   Diagnosis management comments: 80-year-old female with shortness of breath.  Well-developed, well-nourished elderly lady who is distressed, increased work of breathing, wheezes throughout.  She is notably hypoxic on 3 L nasal cannula.  Will check labs, EKG, chest x-ray.  We will give nebulizer and steroids.  Disposition is likely to the hospital.    DDX: COPD, CHF, pneumonia, influenza    EKG interpreted by me: Atrial fibrillation, rapid ventricular response, some nonspecific ST/T changes, this is an abnormal EKG    9:52 PM Lab work thus far notable for lactic acidosis, elevated BNP.  Her heart rate has increased into the 120s to 140s sustained in atrial fibrillation.  I ordered some diltiazem.  Her chest x-ray per radiology reveals a nonspecific opacity in the right upper lung, they have recommended CT scan which I have ordered.  She does look much more comfortable from a breathing standpoint.  Disposition pending.    12:01 AM CT scan reveals bilateral nodular opacities consistent with infection.  Have started azithromycin and Rocephin.  Discussed with Dr. Gupta for admission.       Amount and/or Complexity of Data Reviewed  Clinical lab tests: reviewed  Decide to obtain previous medical records or to obtain history from someone other than the patient: yes        Final diagnoses:   Pneumonia of both lungs due to infectious  organism, unspecified part of lung   Acute respiratory failure with hypoxia (CMS/HCC)   Lactic acidosis   Atrial fibrillation with RVR (CMS/HCC)            Gurwinder Tierney MD  01/05/20 0002      Electronically signed by Gurwinder Tierney MD at 01/05/20 0002     Ivan Charles at 01/05/20 0004        House Supervisor called for bed assignment. Patient will be going to room 306. Rn notified     Ivan Charels  01/05/20 0004      Electronically signed by Ivan Charles at 01/05/20 0004     Carmel Cool RN at 01/05/20 0022        Report to [facility] 3rd floor     Carmel Cool RN  01/05/20 0023      Electronically signed by Carmel Cool RN at 01/05/20 0023       Vital Signs (last day)     Date/Time   Temp   Temp src   Pulse   Resp   BP   Patient Position   SpO2    01/05/20 0713   97.7 (36.5)   Oral   82   19   147/94   Lying   96    01/05/20 0317   97.6 (36.4)   Oral   --   20   146/86   Lying   --    01/05/20 0053   98 (36.7)   Oral   98   26   --   Lying   --    01/05/20 00:17:35   --   --   88   20   131/94   Sitting   96    01/05/20 0000   --   --   --   --   131/94   --   96    01/04/20 2310   --   --   92   --   --   --   96    01/04/20 23:04:57   --   --   --   22   --   --   --    01/04/20 2300   --   --   84   --   140/88   --   96    01/04/20 2241   --   --   92   --   --   --   95    01/04/20 2230   --   --   86   --   138/85   --   94    01/04/20 2151   --   --   92   --   --   --   94    01/04/20 2150   --   --   78   --   --   --   95    01/04/20 2149   --   --   85   --   --   --   94    01/04/20 2142   --   --   105   --   --   --   96    01/04/20 2140   --   --   (!) 130   --   147/86   --   96    01/04/20 2139   --   --   112   --   --   --   96 01/04/20 2130   --   --   --   --   147/86   --   --    01/04/20 2124   --   --   (!) 121   --   --   --   96 01/04/20 2100   --   --   117   --   156/92   --   95    01/04/20 2049   --   --   (!) 140   (!) 30   --   --    97    01/04/20 2046   --   --   113   --   --   --   98    01/04/20 2030   --   --   112   --   (!) 148/101   --   99    01/04/20 2025   --   --   105   (!) 30   --   --   99    01/04/20 2020   --   --   105   --   145/92   --   99    01/04/20 2017   --   --   106   --   --   --   100    01/04/20 1955   --   --   112   --   (!) 149/113   --   100    01/04/20 1937   98.8 (37.1)   Oral   (!) 133   24   (!) 166/112   Sitting   (!) 87              Oxygen Therapy (last day)     Date/Time   SpO2   Device (Oxygen Therapy)   Flow (L/min)   Oxygen Concentration (%)   ETCO2 (mmHg)    01/05/20 0810   --   nasal cannula   3.5   --   --    01/05/20 0713   96   nasal cannula   --   --   --    01/05/20 0400   --   nasal cannula   4.5   --   --    01/05/20 0045   --   nasal cannula   4.5   --   --    01/05/20 00:17:35   96   nasal cannula   4.5   --   --    01/05/20 0000   96   --   --   --   --    01/04/20 2310   96   --   --   --   --    01/04/20 2300   96   --   --   --   --    01/04/20 2241   95   --   --   --   --    01/04/20 2230   94   --   --   --   --    01/04/20 21:53:41   --   nasal cannula   4.5   --   --    01/04/20 2151   94   --   --   --   --    01/04/20 2150   95   --   --   --   --    01/04/20 2149   94   --   --   --   --    01/04/20 2142   96   --   --   --   --    01/04/20 2140   96   --   --   --   --    01/04/20 2139   96   --   --   --   --    01/04/20 2124   96   --   --   --   --    01/04/20 2100   95   --   --   --   --    01/04/20 2049   97   --   --   --   --    01/04/20 2046   98   --   --   --   --    01/04/20 2030   99   --   --   --   --    01/04/20 2025   99   nonrebreather mask   --   100   --    01/04/20 20:23:37   --   nonrebreather mask   15   --   --    01/04/20 2020   99   --   --   --   --    01/04/20 2017   100   --   --   --   --    01/04/20 1955   100   --   --   --   --    01/04/20 1937   (!) 87   room air   --   --   --                Current Facility-Administered Medications    Medication Dose Route Frequency Provider Last Rate Last Dose   • acetaminophen (TYLENOL) tablet 650 mg  650 mg Oral Q4H PRN Tania Gupta DO        Or   • acetaminophen (TYLENOL) 160 MG/5ML solution 650 mg  650 mg Oral Q4H PRN Tania Gupta DO        Or   • acetaminophen (TYLENOL) suppository 650 mg  650 mg Rectal Q4H PRN Tania Gupta DO       • albuterol (PROVENTIL) nebulizer solution 0.083% 2.5 mg/3mL  2.5 mg Nebulization Q4H PRN Tania Gupta DO       • apixaban (ELIQUIS) tablet 2.5 mg  2.5 mg Oral Q12H Tania Gupta DO   2.5 mg at 01/05/20 0809   • [START ON 1/6/2020] atorvastatin (LIPITOR) tablet 40 mg  40 mg Oral Nightly Tania Gupta DO       • AZITHROMYCIN 500 MG/250 ML 0.9% NS IVPB (vial-mate)  500 mg Intravenous Q24H Jamel Myrick, DO       • benzonatate (TESSALON) capsule 200 mg  200 mg Oral TID PRN Tania Gupta DO       • bisoprolol (ZEBeta) tablet 10 mg  10 mg Oral BID Tania Gupta DO   10 mg at 01/05/20 0808   • ferrous sulfate EC tablet 324 mg  324 mg Oral Daily With Breakfast Tania Gupta DO   324 mg at 01/05/20 0808   • furosemide (LASIX) tablet 40 mg  40 mg Oral BID Tania Gupta DO   40 mg at 01/05/20 0808   • ipratropium-albuterol (DUO-NEB) nebulizer solution 3 mL  3 mL Nebulization Q4H PRN Tania Gupta DO       • isosorbide mononitrate (IMDUR) 24 hr tablet 60 mg  60 mg Oral Daily Tania Gupta DO   60 mg at 01/05/20 0808   • levothyroxine (SYNTHROID, LEVOTHROID) tablet 75 mcg  75 mcg Oral Daily Tania Gupta DO   75 mcg at 01/05/20 0809   • micafungin 100 mg/100 mL 0.9% NS IVPB (mbp)  100 mg Intravenous Q24H Jamel Myrick, DO       • ondansetron (ZOFRAN) tablet 4 mg  4 mg Oral Q6H PRN Tania Gupta DO       • Pharmacy to dose vancomycin   Does not apply Continuous PRN Tania Gupta DO       • Pharmacy to Dose Zosyn   Does not  apply Continuous PRN Tania Gupta DO       • piperacillin-tazobactam (ZOSYN) 4.5 g in iso-osmotic dextrose 100 mL IVPB (premix)  4.5 g Intravenous Q12H Tania Gupta DO   4.5 g at 01/05/20 0809   • sodium chloride 0.9 % flush 10 mL  10 mL Intravenous PRN Tania Gupta DO       • sodium chloride 0.9 % flush 10 mL  10 mL Intravenous Q12H Tania Gupta DO   10 mL at 01/05/20 0809   • sodium chloride 0.9 % flush 10 mL  10 mL Intravenous PRN Tania Gupta DO       • spironolactone (ALDACTONE) tablet 12.5 mg  12.5 mg Oral Daily Tania Gupta DO   12.5 mg at 01/05/20 0808   • [START ON 1/8/2020] vancomycin 1000 mg in sodium chloride 0.9% 250 mL IVPB  1,000 mg Intravenous Q72H Tania Gupta DO           Lab Results (last 24 hours)     Procedure Component Value Units Date/Time    CBC Auto Differential [[id number]]  (Abnormal) Collected:  01/05/20 0528    Specimen:  Blood Updated:  01/05/20 0654     WBC 7.14 10*3/mm3      RBC 2.95 10*6/mm3      Hemoglobin 8.5 g/dL      Hematocrit 27.5 %      MCV 93.2 fL      MCH 28.8 pg      MCHC 30.9 g/dL      RDW 16.7 %      RDW-SD 57.8 fl      MPV 9.5 fL      Platelets 184 10*3/mm3      Neutrophil % 89.3 %      Lymphocyte % 3.8 %      Monocyte % 6.2 %      Eosinophil % 0.0 %      Basophil % 0.3 %      Immature Grans % 0.4 %      Neutrophils, Absolute 6.38 10*3/mm3      Lymphocytes, Absolute 0.27 10*3/mm3      Monocytes, Absolute 0.44 10*3/mm3      Eosinophils, Absolute 0.00 10*3/mm3      Basophils, Absolute 0.02 10*3/mm3      Immature Grans, Absolute 0.03 10*3/mm3      nRBC 0.0 /100 WBC     Scan Slide [[id number]] Collected:  01/05/20 0528    Specimen:  Blood Updated:  01/05/20 0654     Anisocytosis Slight/1+     Hypochromia Slight/1+     Poikilocytes Slight/1+     WBC Morphology Normal     Platelet Estimate Adequate    Basic Metabolic Panel [[id number]]  (Abnormal) Collected:  01/05/20 0528    Specimen:  Blood Updated:   01/05/20 0642     Glucose 176 mg/dL      BUN 44 mg/dL      Creatinine 1.77 mg/dL      Sodium 139 mmol/L      Potassium 3.5 mmol/L      Chloride 99 mmol/L      CO2 20.9 mmol/L      Calcium 8.5 mg/dL      eGFR Non African Amer 28 mL/min/1.73      BUN/Creatinine Ratio 24.9     Anion Gap 19.1 mmol/L     Narrative:       GFR Normal >60  Chronic Kidney Disease <60  Kidney Failure <15      Legionella Antigen, Urine - Urine, Urine, Clean Catch [[id number]] Collected:  01/05/20 0544    Specimen:  Urine, Clean Catch Updated:  01/05/20 0550    S. Pneumo Ag Urine or CSF - Urine, Urine, Clean Catch [[id number]] Collected:  01/05/20 0544    Specimen:  Urine, Clean Catch Updated:  01/05/20 0550    Respiratory Panel, PCR - Swab, Nasopharynx [[id number]] Collected:  01/05/20 0146    Specimen:  Swab from Nasopharynx Updated:  01/05/20 0247    Lactic Acid, Reflex [[id number]]  (Abnormal) Collected:  01/05/20 0108    Specimen:  Blood Updated:  01/05/20 0224     Lactate 3.9 mmol/L     Lactic Acid, Reflex Timer (This will reflex a repeat order 3-3:15 hours after ordered.) [[id number]] Collected:  01/04/20 2005    Specimen:  Blood from Arm, Left Updated:  01/05/20 0001     Extra Tube Hold for add-ons.     Comment: Auto resulted.       [place] Draw [[id number]] Collected:  01/04/20 1949    Specimen:  Blood Updated:  01/04/20 2150    Narrative:       The following orders were created for panel order [place] [facility].  Procedure                               Abnormality         Status                     ---------                               -----------         ------                     Light Blue Top[[id number]]                                   Final result               Green Top (Gel)[[id number]]                                  Final result               Lavender Top[[id number]]                                     Final result               Gold Top - SST[[id number]]                                   Final result               Green Top (No  Gel)[[id number]]                                                            Please view results for these tests on the individual orders.    Light Blue Top [[id number]] Collected:  01/04/20 1949    Specimen:  Blood Updated:  01/04/20 2102     Extra Tube hold for add-on     Comment: Auto resulted       Green Top (Gel) [[id number]] Collected:  01/04/20 1949    Specimen:  Blood Updated:  01/04/20 2102     Extra Tube Hold for add-ons.     Comment: Auto resulted.       Lavender Top [[id number]] Collected:  01/04/20 1949    Specimen:  Blood Updated:  01/04/20 2102     Extra Tube hold for add-on     Comment: Auto resulted       Gold Top - SST [[id number]] Collected:  01/04/20 1949    Specimen:  Blood Updated:  01/04/20 2102     Extra Tube Hold for add-ons.     Comment: Auto resulted.       Lactic Acid, Plasma [[id number]]  (Abnormal) Collected:  01/04/20 2005    Specimen:  Blood from Arm, Left Updated:  01/04/20 2056     Lactate 6.6 mmol/L     Blood Culture - Blood, Arm, Right [[id number]] Collected:  01/04/20 1959    Specimen:  Blood from Arm, Right Updated:  01/04/20 2052    Blood Culture - Blood, Arm, Left [[id number]] Collected:  01/04/20 2005    Specimen:  Blood from Arm, Left Updated:  01/04/20 2052    Procalcitonin [[id number]]  (Normal) Collected:  01/04/20 1950    Specimen:  Blood Updated:  01/04/20 2051     Procalcitonin 0.25 ng/mL     Narrative:       As a Marker for Sepsis (Non-Neonates):   1. <0.5 ng/mL represents a low risk of severe sepsis and/or septic shock.  1. >2 ng/mL represents a high risk of severe sepsis and/or septic shock.    As a Marker for Lower Respiratory Tract Infections that require antibiotic therapy:  PCT on Admission     Antibiotic Therapy             6-12 Hrs later  > 0.5                Strongly Recommended            >0.25 - <0.5         Recommended  0.1 - 0.25           Discouraged                   Remeasure/reassess PCT  <0.1                 Strongly Discouraged          Remeasure/reassess PCT  "     As 28 day mortality risk marker: \"Change in Procalcitonin Result\" (> 80 % or <=80 %) if Day 0 (or Day 1) and Day 4 values are available. Refer to http://www.[facility]-pct-calculator.com/   Change in PCT <=80 %   A decrease of PCT levels below or equal to 80 % defines a positive change in PCT test result representing a higher risk for 28-day all-cause mortality of patients diagnosed with severe sepsis or septic shock.  Change in PCT > 80 %   A decrease of PCT levels of more than 80 % defines a negative change in PCT result representing a lower risk for 28-day all-cause mortality of patients diagnosed with severe sepsis or septic shock.                  BNP [[id number]]  (Abnormal) Collected:  01/04/20 1950    Specimen:  Blood Updated:  01/04/20 2051     proBNP 28,142.0 pg/mL     Narrative:       Among patients with dyspnea, NT-proBNP is highly sensitive for the detection of acute congestive heart failure. In addition NT-proBNP of <300 pg/ml effectively rules out acute congestive heart failure with 99% negative predictive value.      Troponin [[id number]]  (Normal) Collected:  01/04/20 1950    Specimen:  Blood Updated:  01/04/20 2045     Troponin T 0.012 ng/mL     Narrative:       Troponin T Reference Range:  <= 0.03 ng/mL-   Negative for AMI  >0.03 ng/mL-     Abnormal for myocardial necrosis.  Clinicians would have to utilize clinical acumen, EKG, Troponin and serial changes to determine if it is an Acute Myocardial Infarction or myocardial injury due to an underlying chronic condition.     Influenza Antigen, Rapid - Swab, Nasopharynx [[id number]]  (Normal) Collected:  01/04/20 2019    Specimen:  Swab from Nasopharynx Updated:  01/04/20 2036     Influenza A Ag, EIA Negative     Influenza B Ag, EIA Negative    CBC & Differential [[id number]] Collected:  01/04/20 1949    Specimen:  Blood Updated:  01/04/20 2035    Narrative:       The following orders were created for panel order CBC & Differential.  Procedure            "                    Abnormality         Status                     ---------                               -----------         ------                     CBC Auto Differential[[id number]]        Abnormal            Final result                 Please view results for these tests on the individual orders.    CBC Auto Differential [[id number]]  (Abnormal) Collected:  01/04/20 1949    Specimen:  Blood Updated:  01/04/20 2035     WBC 8.48 10*3/mm3      RBC 3.51 10*6/mm3      Hemoglobin 10.2 g/dL      Hematocrit 33.4 %      MCV 95.2 fL      MCH 29.1 pg      MCHC 30.5 g/dL      RDW 17.3 %      RDW-SD 60.4 fl      MPV 9.4 fL      Platelets 222 10*3/mm3     Scan Slide [[id number]] Collected:  01/04/20 1949    Specimen:  Blood Updated:  01/04/20 2035     Anisocytosis Slight/1+     Hypochromia Slight/1+     Platelet Estimate Adequate     Scan Slide --     Comment: See Manual Differential Results       Manual Differential [[id number]]  (Abnormal) Collected:  01/04/20 1949    Specimen:  Blood Updated:  01/04/20 2035     Neutrophil % 75.0 %      Lymphocyte % 5.0 %      Monocyte % 8.0 %      Bands %  12.0 %      Neutrophils Absolute 7.38 10*3/mm3      Lymphocytes Absolute 0.42 10*3/mm3      Monocytes Absolute 0.68 10*3/mm3      RBC Morphology Normal     WBC Morphology Normal     Platelet Morphology Normal    Blood Gas, Arterial With Co-Ox [[id number]]  (Abnormal) Collected:  01/04/20 2034    Specimen:  Arterial Blood Updated:  01/04/20 2034     Site Left Radial     Tad's Test Positive     pH, Arterial 7.422 pH units      pCO2, Arterial 30.9 mm Hg      Comment: 84 Value below reference range        pO2, Arterial 243.0 mm Hg      Comment: 83 Value above reference range        HCO3, Arterial 20.1 mmol/L      Base Excess, Arterial -3.6 mmol/L      Comment: 84 Value below reference range        O2 Saturation, Arterial 99.8 %      Comment: 83 Value above reference range        Hemoglobin, Blood Gas 9.8 g/dL      Comment: 84 Value below  reference range        Hematocrit, Blood Gas 29.9 %      Comment: 84 Value below reference range        Oxyhemoglobin 97.9 %      Methemoglobin 1.20 %      Carboxyhemoglobin 0.7 %      A-a Gradiant 415.9 mmHg      Barometric Pressure for Blood Gas 737 mmHg      Modality NRB     FIO2 100 %      Ventilator Mode NA     Comment: Meter: [id number]     :  [id number]        Note --     pH, Temp Corrected --     pCO2, Temperature Corrected --     pO2, Temperature Corrected --    Comprehensive Metabolic Panel [[id number]]  (Abnormal) Collected:  01/04/20 1949    Specimen:  Blood Updated:  01/04/20 2033     Glucose 179 mg/dL      BUN 41 mg/dL      Creatinine 2.01 mg/dL      Sodium 135 mmol/L      Potassium 4.9 mmol/L      Comment: Specimen hemolyzed.  Results may be affected.        Chloride 92 mmol/L      CO2 18.4 mmol/L      Calcium 9.8 mg/dL      Total Protein 8.6 g/dL      Albumin 4.60 g/dL      ALT (SGPT) 26 U/L      Comment: Specimen hemolyzed.  Results may be affected.        AST (SGOT) 55 U/L      Comment: Specimen hemolyzed.  Results may be affected.        Alkaline Phosphatase 96 U/L      Total Bilirubin 3.1 mg/dL      eGFR Non African Amer 24 mL/min/1.73      Globulin 4.0 gm/dL      A/G Ratio 1.2 g/dL      BUN/Creatinine Ratio 20.4     Anion Gap 24.6 mmol/L     Narrative:       GFR Normal >60  Chronic Kidney Disease <60  Kidney Failure <15          Imaging Results (Last 24 Hours)     Procedure Component Value Units Date/Time    CT Chest Without Contrast [[id number]] Collected:  01/04/20 2313     Updated:  01/04/20 2314    Narrative:       FINAL REPORT    TECHNIQUE:  Routine axial images were obtained from the lung apices to below  the diaphragm without contrast.    CLINICAL HISTORY:  sob, hypoxia, abnormal CXR    FINDINGS:  There are multiple small bilateral irregular nodular opacities  in the lungs bilaterally, including in the right upper lobe.  No  clear pulmonary mass or nodule is seen.  There is no  pleural  disease.  There is borderline right paratracheal and mediastinal  adenopathy.  There is a small hiatal hernia of the stomach.  There is no acute osseous abnormality.      Impression:       Multiple bilateral irregular nodular opacities most likely due  to infectious disease including mycobacterial and fungal  infection.  Metastatic disease much less likely.    Authenticated by Ran Nathan M.D. on 01/04/2020 11:13:21 PM    XR Chest 1 View [[id number]] Collected:  01/04/20 2133     Updated:  01/04/20 2134    Narrative:       FINAL REPORT    TECHNIQUE:  An AP portable view of the chest was obtained.    CLINICAL HISTORY:  Simple Sepsis Triage Protocol    FINDINGS:  There is an apparent small irregular nodular opacity in the  right upper lobe superimposed on the right posterior seventh  rib.  CT is recommended for further evaluation.  The lungs are  otherwise clear.  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 There  is no pleural disease, adenopathy, or significant osseous  abnormality.  There is a small hiatal hernia.      Impression:       Irregular nodular opacity in the right upper lung field.  Recommend CT for further evaluation.  No acute disease.    Authenticated by Ran Nathan M.D. on 01/04/2020 09:33:57 PM        Physician Progress Notes (last 24 hours) (Notes from 01/04/20 0948 through 01/05/20 0948)    No notes of this type exist for this encounter.

## 2020-01-05 NOTE — PLAN OF CARE
Patient A+Ox4. Audible wheezing from across room. Bilateral lower lobe crackles. 3.5L NC. Receiving ABX IV. Pulmonology consult in place, Dr. Garcia will be contacted 1/6. Nephrology consult in place. A-Fib on cardiac telemetry. BLE edema +2. + Sepsis screen. Patient pleasant and compliant with plan of care. +RSV. Contact and droplet precautions initiated and maintained.

## 2020-01-05 NOTE — ED NOTES
House Supervisor called for bed assignment. Patient will be going to room 306. Rn notified     Ivan Charles  01/05/20 0004

## 2020-01-05 NOTE — OUTREACH NOTE
CHF Week 3 Survey      Responses   Facility patient discharged from?  [facility]   Does the patient have one of the following disease processes/diagnoses(primary or secondary)?  CHF   Week 3 attempt successful?  No   Revoke  Readmitted          Sayra Guzman RN

## 2020-01-05 NOTE — PLAN OF CARE
Problem: Patient Care Overview  Goal: Plan of Care Review  Outcome: Ongoing (interventions implemented as appropriate)  Flowsheets (Taken 1/5/2020 8297)  Progress: no change  Plan of Care Reviewed With: patient  Outcome Summary: New admit from ED.  VSS on 4L O2 NC.  IV antibiotics initiated per orders.

## 2020-01-05 NOTE — ED PROVIDER NOTES
Subjective   80-year-old female presenting with shortness of breath.  She brought in by her son who helps provide the history.  For the last several days patient has had increased shortness of breath, cough, wheezing.  Apparently she went to her primary doctor at the onset of symptoms, she was told her oxygen saturation was in the 70s when she checked them.  This apparently improved into the 90s, she was given Tessalon Perles and steroids.  She continues to feel poorly, increased cough largely nonproductive, increased shortness of breath, subjective fevers.  No nausea, vomiting, diarrhea, abdominal pain.          Review of Systems   Constitutional: Negative.    HENT: Negative.    Eyes: Negative.    Respiratory: Positive for cough and shortness of breath.    Cardiovascular: Negative.    Gastrointestinal: Negative.    Genitourinary: Negative.    Musculoskeletal: Negative.    Skin: Negative.    Neurological: Negative.    Psychiatric/Behavioral: Negative.        Past Medical History:   Diagnosis Date   • Acute on chronic diastolic congestive heart failure (CMS/HCC) 12/20/2019   • Anemia    • Angina at rest (CMS/HCC)    • Arthritis    • Atrial fibrillation (CMS/HCC)    • Coronary artery disease    • Disease of thyroid gland    • Elevated cholesterol    • GERD without esophagitis 12/2/2019   • History of blood transfusion    • History of stomach ulcers    • Hypertension    • Osteoporosis    • Positive TB test    • Stroke (CMS/HCC)        Allergies   Allergen Reactions   • Tuberculin Tests Rash       Past Surgical History:   Procedure Laterality Date   • BRONCHOSCOPY Bilateral 4/12/2019    Procedure: BRONCHOSCOPY;  Surgeon: Jeff Laura MD;  Location:  [facility];  Service: Pulmonary   • BYPASS GRAFT     • COLONOSCOPY N/A 10/15/2019    Procedure: COLONOSCOPY;  Surgeon: Fredi Aaron MD;  Location:  [facility];  Service: Gastroenterology   • CORONARY ANGIOPLASTY WITH STENT PLACEMENT     •  CORONARY ARTERY BYPASS GRAFT  10/18/2010   • ENDOSCOPY N/A 10/14/2019    Procedure: ESOPHAGOGASTRODUODENOSCOPY;  Surgeon: Fredi Aaron MD;  Location: [facility];  Service: Gastroenterology   • HYSTERECTOMY     • STOMACH SURGERY  08/11/2019    Upper GI bleed    • STOMACH SURGERY  10/13/2019       Family History   Problem Relation Age of Onset   • Cancer Mother    • Arthritis Mother    • No Known Problems Father    • Liver disease Sister        Social History     Socioeconomic History   • Marital status:      Spouse name: Not on file   • Number of children: Not on file   • Years of education: Not on file   • Highest education level: Not on file   Tobacco Use   • Smoking status: Never Smoker   • Smokeless tobacco: Never Used   Substance and Sexual Activity   • Alcohol use: No   • Drug use: No   • Sexual activity: Defer           Objective   Physical Exam   Constitutional: She is oriented to person, place, and time. She appears well-developed and well-nourished.   Distressed   HENT:   Head: Normocephalic and atraumatic.   Right Ear: External ear normal.   Left Ear: External ear normal.   Nose: Nose normal.   Mouth/Throat: Oropharynx is clear and moist.   Eyes: Pupils are equal, round, and reactive to light. Conjunctivae and EOM are normal.   Neck: Normal range of motion. Neck supple.   Cardiovascular: Normal heart sounds and intact distal pulses.   Irregularly irregular, rapid rate   Pulmonary/Chest:   Increased work of breathing, tachypnea, diffuse wheezes   Abdominal: Soft. Bowel sounds are normal. She exhibits no distension. There is no tenderness. There is no rebound and no guarding.   Musculoskeletal: Normal range of motion. She exhibits no tenderness or deformity.   Trace lower extremity edema   Neurological: She is alert and oriented to person, place, and time.   Skin: Skin is warm and dry. No rash noted.   Psychiatric: She has a normal mood and affect. Her behavior is normal.   Nursing note and  vitals reviewed.      Procedures           ED Course                                               MDM  Number of Diagnoses or Management Options  Acute respiratory failure with hypoxia (CMS/HCC):   Lactic acidosis:   Pneumonia of both lungs due to infectious organism, unspecified part of lung:   Diagnosis management comments: 80-year-old female with shortness of breath.  Well-developed, well-nourished elderly lady who is distressed, increased work of breathing, wheezes throughout.  She is notably hypoxic on 3 L nasal cannula.  Will check labs, EKG, chest x-ray.  We will give nebulizer and steroids.  Disposition is likely to the hospital.    DDX: COPD, CHF, pneumonia, influenza    EKG interpreted by me: Atrial fibrillation, rapid ventricular response, some nonspecific ST/T changes, this is an abnormal EKG    9:52 PM Lab work thus far notable for lactic acidosis, elevated BNP.  Her heart rate has increased into the 120s to 140s sustained in atrial fibrillation.  I ordered some diltiazem.  Her chest x-ray per radiology reveals a nonspecific opacity in the right upper lung, they have recommended CT scan which I have ordered.  She does look much more comfortable from a breathing standpoint.  Disposition pending.    12:01 AM CT scan reveals bilateral nodular opacities consistent with infection.  Have started azithromycin and Rocephin.  Discussed with Dr. Gupta for admission.       Amount and/or Complexity of Data Reviewed  Clinical lab tests: reviewed  Decide to obtain previous medical records or to obtain history from someone other than the patient: yes        Final diagnoses:   Pneumonia of both lungs due to infectious organism, unspecified part of lung   Acute respiratory failure with hypoxia (CMS/HCC)   Lactic acidosis   Atrial fibrillation with RVR (CMS/HCC)            Gurwinder Tierney MD  01/05/20 0002

## 2020-01-05 NOTE — PROGRESS NOTES
Discharge Planning Assessment  [facility]     Patient Name: Pat Morel  MRN: [id number]  Today's Date: 1/5/2020    Admit Date: 1/4/2020    Discharge Needs Assessment     Row Name 01/05/20 1114       Living Environment    Lives With  spouse    Name(s) of Who Lives With Patient  Dell Morel, spouse    Current Living Arrangements  home/apartment/condo    Duration at Residence  2 months  They moved from [place] to be around family members    Primary Care Provided by  self    Provides Primary Care For  no one    Family Caregiver if Needed  none    Quality of Family Relationships  supportive    Able to Return to Prior Arrangements  yes       Resource/Environmental Concerns    Resource/Environmental Concerns  none       Transition Planning    Patient/Family Anticipates Transition to  home    Transportation Anticipated  family or friend will provide       Discharge Needs Assessment    Equipment Currently Used at Home  walker, rolling;cane, quad;bath bench Pt has order for a wheelchair but has not been evaluated yet        Discharge Plan     Row Name 01/05/20 1115       Plan    Plan  Spoke with pt about discharge plans  Confirmed current address and phone contacts  Dell Morel, spouse [phone] and Yary Meade, daughter [phone]  No POA  PCP Anthony Sweet DO  Pt states she can perform ADL fair  DME rolling walker, quad cane and shower bench  Pt states she has never used HH services in the past  Will continue to assess pt for any further needs prior to being discharged        Destination      Coordination has not been started for this encounter.      Durable Medical Equipment      Coordination has not been started for this encounter.      Dialysis/Infusion      Coordination has not been started for this encounter.      Home Medical Care      Coordination has not been started for this encounter.      Therapy      Coordination has not been started for this encounter.      Community Resources      Coordination  has not been started for this encounter.        Expected Discharge Date and Time     Expected Discharge Date Expected Discharge Time    Jan 7, 2020         Demographic Summary     Row Name 01/05/20 1100       General Information    Admission Type  inpatient    Arrived From  emergency department    Referral Source  admission list    Reason for Consult  discharge planning    Preferred Language  English       Contact Information    Permission Granted to Share Info With      Contact Information Obtained for          Functional Status     Row Name 01/05/20 1102       Functional Status    Usual Activity Tolerance  fair    Functional Status Comments  Pt states she can perform ADL fair       Functional Status, IADL    Medications  independent    Meal Preparation  -- Pt and her spouse cook    IADL Comments  Pt and spouse share household duties       Employment/    Employment Status  retired        Psychosocial    No documentation.       Abuse/Neglect    No documentation.       Legal    No documentation.       Substance Abuse    No documentation.       Patient Forms    No documentation.           Dona Godoy RN

## 2020-01-05 NOTE — THERAPY EVALUATION
Patient Name: Pat Morel  : 1940    MRN: [id number]                              Today's Date: 2020       Admit Date: 2020    Visit Dx:     ICD-10-CM ICD-9-CM   1. Pneumonia of both lungs due to infectious organism, unspecified part of lung J18.9 483.8   2. Acute respiratory failure with hypoxia (CMS/[facility]) J96.01 518.81   3. Lactic acidosis E87.2 276.2   4. Atrial fibrillation with RVR (CMS/[facility]) I48.91 427.31     Patient Active Problem List   Diagnosis   • Persistent atrial fibrillation   • Coronary artery disease involving native coronary artery of native heart without angina pectoris   • Essential hypertension   • Cerebrovascular accident (CVA) due to embolism (CMS/[facility])   • Dyspnea on exertion   • Pulmonary arterial hypertension (CMS/[facility])   • Interstitial lung disease (CMS/[facility])   • Hypersensitivity pneumonitis (CMS/[facility])   • GI bleed   • RENEE (acute kidney injury) (CMS/[facility])   • Gastrointestinal hemorrhage with melena   • Gastric ulcer   • History of melena   • History of colonoscopy   • Coagulation disorder due to circulating anticoagulants (CMS/[facility])   • Dyslipidemia   • Acquired hypothyroidism   • Chronic kidney disease, stage 3 (CMS/[facility])   • GERD without esophagitis   • Iron deficiency anemia   • Volume overload   • Acute on chronic diastolic congestive heart failure (CMS/[facility])   • Acute on chronic diastolic (congestive) heart failure (CMS/[facility])   • Pneumonia of both lungs due to infectious organism     Past Medical History:   Diagnosis Date   • Acute on chronic diastolic congestive heart failure (CMS/[facility]) 2019   • Anemia    • Angina at rest (CMS/[facility])    • Arthritis    • Atrial fibrillation (CMS/[facility])    • Coronary artery disease    • Disease of thyroid gland    • Elevated cholesterol    • GERD without esophagitis 2019   • History of blood transfusion    • History of stomach ulcers    • Hypertension    • Impaired functional mobility, balance, gait, and endurance    • Osteoporosis    •  Positive TB test    • Stroke (CMS/HCC)      Past Surgical History:   Procedure Laterality Date   • BRONCHOSCOPY Bilateral 4/12/2019    Procedure: BRONCHOSCOPY;  Surgeon: Jeff Laura MD;  Location:  [facility];  Service: Pulmonary   • BYPASS GRAFT     • COLONOSCOPY N/A 10/15/2019    Procedure: COLONOSCOPY;  Surgeon: Fredi Aaron MD;  Location:  [facility];  Service: Gastroenterology   • CORONARY ANGIOPLASTY WITH STENT PLACEMENT     • CORONARY ARTERY BYPASS GRAFT  10/18/2010   • ENDOSCOPY N/A 10/14/2019    Procedure: ESOPHAGOGASTRODUODENOSCOPY;  Surgeon: Fredi Aaron MD;  Location:  [facility];  Service: Gastroenterology   • HYSTERECTOMY     • STOMACH SURGERY  08/11/2019    Upper GI bleed    • STOMACH SURGERY  10/13/2019     General Information     [facility] Name 01/05/20 1314          PT Evaluation Time/Intention    Document Type  evaluation  -     Mode of Treatment  physical therapy  -[facility] Name 01/05/20 1314          General Information    Patient Profile Reviewed?  yes  -JR     Prior Level of Function  independent:;community mobility  -     Existing Precautions/Restrictions  fall;oxygen therapy device and L/min  -     Barriers to Rehab  none identified  -[facility] Name 01/05/20 1314          Relationship/Environment    Lives With  spouse  -[facility] Name 01/05/20 1314          Resource/Environmental Concerns    Current Living Arrangements  home/apartment/condo  -[facility] Name 01/05/20 1314          Home Main Entrance    Number of Stairs, Main Entrance  none  -[facility] Name 01/05/20 1314          Stairs Within Home, Primary    Number of Stairs, Within Home, Primary  none  -[facility] Name 01/05/20 1314          Cognitive Assessment/Intervention- PT/OT    Orientation Status (Cognition)  oriented x 4  -[facility] Name 01/05/20 1314          Safety Issues, Functional Mobility    Safety Issues Affecting Function (Mobility)  safety precautions  follow-through/compliance;awareness of need for assistance;insight into deficits/self awareness  -JR     Impairments Affecting Function (Mobility)  shortness of breath;endurance/activity tolerance;balance;postural/trunk control  -JR       User Key  (r) = Recorded By, (t) = Taken By, (c) = Cosigned By    Initials Name Provider Type    Tamara Coronel PT Physical Therapist        Mobility     Row Name 01/05/20 1314          Bed Mobility Assessment/Treatment    Bed Mobility Assessment/Treatment  supine-sit  -JR     Supine-Sit [place] (Bed Mobility)  minimum assist (75% patient effort)  -JR     Assistive Device (Bed Mobility)  head of bed elevated;bed rails  -JR     Row Name 01/05/20 1314          Transfer Assessment/Treatment    Comment (Transfers)  Toilet transfer with SC and grab bar and minimal assistance  -JR     Row Name 01/05/20 1314          Sit-Stand Transfer    Sit-Stand [place] (Transfers)  minimum assist (75% patient effort)  -JR     Assistive Device (Sit-Stand Transfers)  cane, straight  -JR     Row Name 01/05/20 1314          Gait/Stairs Assessment/Training    Gait/Stairs Assessment/Training  gait/ambulation assistive device  -     [place] Level (Gait)  minimum assist (75% patient effort)  -JR     Assistive Device (Gait)  cane, straight  -JR     Distance in Feet (Gait)  16 feet  -JR     Pattern (Gait)  step-to  -JR     Deviations/Abnormal Patterns (Gait)  festinating/shuffling;stride length decreased;chivo decreased  -JR     Bilateral Gait Deviations  forward flexed posture;heel strike decreased  -JR       User Key  (r) = Recorded By, (t) = Taken By, (c) = Cosigned By    Initials Name Provider Type    Tamara Coronel PT Physical Therapist        Obj/Interventions     Row Name 01/05/20 1314          General ROM    GENERAL ROM COMMENTS  grossly WFL  -JR     Row Name 01/05/20 1314          MMT (Manual Muscle Testing)    General MMT Comments  grossly = 3+/5 with quick fatigue and SOA  -JR      Row Name 01/05/20 1314          Static Sitting Balance    Level of [place] (Unsupported Sitting, Static Balance)  conditional independence  -JR     Sitting Position (Unsupported Sitting, Static Balance)  sitting on edge of bed  -JR     Time Able to Maintain Position (Unsupported Sitting, Static Balance)  1 to 2 minutes  -JR     Row Name 01/05/20 1314          Dynamic Sitting Balance    Level of [place], Reaches Outside Midline (Sitting, Dynamic Balance)  supervision  -JR     Sitting Position, Reaches Outside Midline (Sitting, Dynamic Balance)  sitting on edge of bed  -JR     Row Name 01/05/20 1314          Static Standing Balance    Level of [place] (Supported Standing, Static Balance)  contact guard assist  -JR     Time Able to Maintain Position (Supported Standing, Static Balance)  1 to 2 minutes  -JR     Assistive Device Utilized (Supported Standing, Static Balance)  cane, straight  -JR     Comment (Supported Standing, Static Balance)  may try RW for increased safety  -JR     Row Name 01/05/20 1314          Dynamic Standing Balance    Level of [place], Reaches Outside Midline (Standing, Dynamic Balance)  minimal assist, 75% patient effort  -JR     Assistive Device Utilized (Supported Standing, Dynamic Balance)  cane, straight  -JR     Comment, Reaches Outside Midline (Standing, Dynamic Balance)  may try RW for increased safety  -JR       User Key  (r) = Recorded By, (t) = Taken By, (c) = Cosigned By    Initials Name Provider Type    JR Tamara Gibson, PT Physical Therapist        Goals/Plan     Row Name 01/05/20 1314          Bed Mobility Goal 1 (PT)    Activity/Assistive Device (Bed Mobility Goal 1, PT)  bed mobility activities, all  -JR     [place] Level/Cues Needed (Bed Mobility Goal 1, PT)  conditional independence  -JR     Time Frame (Bed Mobility Goal 1, PT)  1 week  -JR     Progress/Outcomes (Bed Mobility Goal 1, PT)  goal ongoing  -JR     Row Name 01/05/20 1314          Transfer  Goal 1 (PT)    Activity/Assistive Device (Transfer Goal 1, PT)  sit-to-stand/stand-to-sit  -JR     [place] Level/Cues Needed (Transfer Goal 1, PT)  supervision required  -JR     Time Frame (Transfer Goal 1, PT)  1 week  -JR     Progress/Outcome (Transfer Goal 1, PT)  goal ongoing  -JR     Row Name 01/05/20 1314          Gait Training Goal 1 (PT)    Activity/Assistive Device (Gait Training Goal 1, PT)  gait (walking locomotion);assistive device use;cane, straight;walker, rolling  -JR     [place] Level (Gait Training Goal 1, PT)  supervision required  -JR     Time Frame (Gait Training Goal 1, PT)  2 weeks  -JR     Progress/Outcome (Gait Training Goal 1, PT)  goal ongoing  -JR     Row Name 01/05/20 1314          Patient Education Goal (PT)    Activity (Patient Education Goal, PT)  Perform BLE exercises x 15 reps without shortness of breath  -JR     [place]/Cues/Accuracy (Memory Goal 2, PT)  demonstrates adequately  -JR     Time Frame (Patient Education Goal, PT)  2 weeks  -JR     Progress/Outcome (Patient Education Goal, PT)  goal ongoing  -       User Key  (r) = Recorded By, (t) = Taken By, (c) = Cosigned By    Initials Name Provider Type    Tamara Coronel, PT Physical Therapist        Clinical Impression     Row Name 01/05/20 1314          Pain Assessment    Additional Documentation  Pain Scale: Numbers Pre/Post-Treatment (Group)  -[facility] Name 01/05/20 1314          Pain Scale: Numbers Pre/Post-Treatment    Pain Scale: Numbers, Pretreatment  0/10 - no pain  -     Pain Scale: Numbers, Post-Treatment  0/10 - no pain  -[facility] Name 01/05/20 1314          Plan of Care Review    Plan of Care Reviewed With  patient  -[facility] Name 01/05/20 1314          Physical Therapy Clinical Impression    Patient/Family Goals Statement (PT Clinical Impression)  Patient reports she wants to go home  -JR     Criteria for Skilled Interventions Met (PT Clinical Impression)  yes;treatment indicated  -JR      Rehab Potential (PT Clinical Summary)  good, to achieve stated therapy goals  -     Row Name 01/05/20 1314          Vital Signs    Pre Patient Position  Supine  -JR     Intra Patient Position  Standing  -JR     Post Patient Position  Sitting  -JR     Row Name 01/05/20 1314          Positioning and Restraints    Pre-Treatment Position  in bed  -JR     Post Treatment Position  chair  -JR     In Chair  sitting;call light within reach;encouraged to call for assist;with family/caregiver  -       User Key  (r) = Recorded By, (t) = Taken By, (c) = Cosigned By    Initials Name Provider Type    Tamara Coronel, PT Physical Therapist        Outcome Measures     Row Name 01/05/20 1314          How much help from another person do you currently need...    Turning from your back to your side while in flat bed without using bedrails?  3  -JR     Moving from lying on back to sitting on the side of a flat bed without bedrails?  3  -JR     Moving to and from a bed to a chair (including a wheelchair)?  3  -JR     Standing up from a chair using your arms (e.g., wheelchair, bedside chair)?  3  -JR     Climbing 3-5 steps with a railing?  2  -JR     To walk in hospital room?  3  -JR     AM-PAC 6 Clicks Score (PT)  17  -     Row Name 01/05/20 1314          Functional Assessment    Outcome Measure Options  AM-PAC 6 Clicks Basic Mobility (PT)  -       User Key  (r) = Recorded By, (t) = Taken By, (c) = Cosigned By    Initials Name Provider Type    Tamara Coronel, PT Physical Therapist          PT Recommendation and Plan     Outcome Summary/Treatment Plan (PT)  Anticipated Discharge Disposition (PT): home with home health  Plan of Care Reviewed With: patient  Outcome Summary: Patient participates well in skilled PT and demonstrates  decreased activity tolerance, balance, transfers and gait.  She is expected to benefit from additional PT services while hospitalized and upon discharge to home with home health care services.     Time  Calculation:   PT Charges     Row Name 01/05/20 1724             Time Calculation    Start Time  1314  -JR      PT Received On  01/05/20  -      PT Goal Re-Cert Due Date  01/15/20  -        User Key  (r) = Recorded By, (t) = Taken By, (c) = Cosigned By    Initials Name Provider Type    Tamara Coronel, PT Physical Therapist        Therapy Charges for Today     Code Description Service Date Service Provider Modifiers Qty    61995750884 HC PT EVAL LOW COMPLEXITY 4 1/5/2020 Tamara Gibson, PT GP 1          PT G-Codes  Outcome Measure Options: AM-PAC 6 Clicks Basic Mobility (PT)  AM-PAC 6 Clicks Score (PT): 17    Tamara Gibson, PT  1/5/2020

## 2020-01-06 PROBLEM — J96.01 ACUTE RESPIRATORY FAILURE WITH HYPOXIA (HCC): Status: ACTIVE | Noted: 2020-01-01

## 2020-01-06 NOTE — THERAPY TREATMENT NOTE
Acute Care - Physical Therapy Treatment Note   Jose     Patient Name: Pat Morel  : 1940  MRN: [id number]  Today's Date: 2020  Onset of Illness/Injury or Date of Surgery: 20     Referring Physician: Dr. Gupta    Admit Date: 2020    Visit Dx:    ICD-10-CM ICD-9-CM   1. Pneumonia of both lungs due to infectious organism, unspecified part of lung J18.9 483.8   2. Acute respiratory failure with hypoxia (CMS/[facility]) J96.01 518.81   3. Lactic acidosis E87.2 276.2   4. Atrial fibrillation with RVR (CMS/[facility]) I48.91 427.31   5. Impaired mobility and ADLs Z74.09 799.89     Patient Active Problem List   Diagnosis   • Persistent atrial fibrillation   • Coronary artery disease involving native coronary artery of native heart without angina pectoris   • Essential hypertension   • Cerebrovascular accident (CVA) due to embolism (CMS/[facility])   • Dyspnea on exertion   • Pulmonary arterial hypertension (CMS/[facility])   • Interstitial lung disease (CMS/[facility])   • Hypersensitivity pneumonitis (CMS/[facility])   • GI bleed   • RENEE (acute kidney injury) (CMS/[facility])   • Gastrointestinal hemorrhage with melena   • Gastric ulcer   • History of melena   • History of colonoscopy   • Coagulation disorder due to circulating anticoagulants (CMS/[facility])   • Dyslipidemia   • Acquired hypothyroidism   • Chronic kidney disease, stage 3 (CMS/[facility])   • GERD without esophagitis   • Iron deficiency anemia   • Volume overload   • Acute on chronic diastolic congestive heart failure (CMS/HCC)   • Acute on chronic diastolic (congestive) heart failure (CMS/[facility])   • Pneumonia of both lungs due to infectious organism       Therapy Treatment    Rehabilitation Treatment Summary     Row Name 20 1517             Treatment Time/Intention    Discipline  physical therapy assistant  -RM      Document Type  therapy note (daily note)  -RM      Subjective Information  complains of;weakness;dyspnea  -RM      Mode of Treatment  physical therapy  -RM       Patient Effort  good  -RM      Existing Precautions/Restrictions  fall;oxygen therapy device and L/min  -RM      Treatment Considerations/Comments  3 lpm pnc   -RM      Recorded by [] Matt Renee, PTA 01/06/20 1606      Row Name 01/06/20 1517             Vital Signs    Pre SpO2 (%)  98  -RM      O2 Delivery Pre Treatment  supplemental O2  -RM      Intra SpO2 (%)  94  -RM      O2 Delivery Intra Treatment  supplemental O2  -RM      Post SpO2 (%)  96  -RM      O2 Delivery Post Treatment  supplemental O2  -RM      Pre Patient Position  Sitting  -RM      Intra Patient Position  Standing  -RM      Post Patient Position  Sitting  -RM      Recorded by [] Matt Renee, PTA 01/06/20 1606      Row Name 01/06/20 1517             Safety Issues, Functional Mobility    Safety Issues Affecting Function (Mobility)  positioning of assistive device;safety precaution awareness  -RM      Impairments Affecting Function (Mobility)  balance;endurance/activity tolerance;shortness of breath;strength  -RM      Recorded by [] Matt Renee, PTA 01/06/20 1606      Row Name 01/06/20 1517             Sit-Stand Transfer    Sit-Stand [place] (Transfers)  contact guard;verbal cues  -RM      Assistive Device (Sit-Stand Transfers)  cane, straight  -RM      Recorded by [] Matt Renee, PTA 01/06/20 1606      Row Name 01/06/20 1517             Stand-Sit Transfer    Stand-Sit [place] (Transfers)  contact guard;verbal cues  -RM      Assistive Device (Stand-Sit Transfers)  cane, straight  -RM      Recorded by [] Matt Renee, PTA 01/06/20 1606      Row Name 01/06/20 1517             Gait/Stairs Assessment/Training    [place] Level (Gait)  contact guard;minimum assist (75% patient effort);verbal cues  -RM      Assistive Device (Gait)  cane, straight  -RM      Distance in Feet (Gait)  1x24',1x30  -RM      Pattern (Gait)  step-to;step-through  -RM      Deviations/Abnormal Patterns (Gait)  base of support,  wide;antalgic;gait speed decreased;stride length decreased  -RM      Right Sided Gait Deviations  weight shift ability decreased;heel strike decreased;forward flexed posture  -RM      Recorded by [] Matt Renee, PTA 01/06/20 1606      Row Name 01/06/20 1517             Positioning and Restraints    Pre-Treatment Position  sitting in chair/recliner  -RM      Post Treatment Position  chair  -RM      In Chair  reclined;call light within reach;encouraged to call for assist;notified nsg  -RM      Recorded by [] Matt Renee, PTA 01/06/20 1606      Row Name 01/06/20 1517             Pain Scale: Numbers Pre/Post-Treatment    Pain Scale: Numbers, Pretreatment  2/10  -RM      Pain Scale: Numbers, Post-Treatment  1/10  -RM      Pain Location - Side  -- sore abdomen   -RM      Recorded by [] Matt Renee, PTA 01/06/20 1606      Row Name 01/06/20 1517             Plan of Care Review    Plan of Care Reviewed With  patient  -      Progress  improving  -      Outcome Summary  Pt tolerates treatment well advancing gait distance to 1x24' standing rest 1x30' cga with streaight cane.  Pt did not fall below 94% O2 saturation with gait training but was veru SOA. PT would benefit from use of rw rather than cane for improved balance and safety as well as energu conservation.  See flowsheet for details.   -RM      Recorded by [] Matt Renee, PTA 01/06/20 1606      Row Name 01/06/20 1517             Outcome Summary/Treatment Plan (PT)    Daily Summary of Progress (PT)  progress towards functional goals is fair  -      Plan for Continued Treatment (PT)  Cont PT per POC.   -RM      Recorded by [] Matt Renee, PTA 01/06/20 1606        User Key  (r) = Recorded By, (t) = Taken By, (c) = Cosigned By    Initials Name Effective Dates Discipline     Matt Renee, PTA 03/07/18 -  PT               Rehab Goal Summary     Row Name 01/06/20 0961             Occupational Therapy Goals    Dressing Goal  Selection (OT)  dressing, OT goal 1  -SD      Toileting Goal Selection (OT)  toileting, OT goal 1  -SD      Strength Goal Selection (OT)  strength, OT goal 1  -SD      Functional Mobility Goal Selection (OT)  functional mobility, OT goal 1  -SD         Dressing Goal 1 (OT)    Activity/Assistive Device (Dressing Goal 1, OT)  lower body dressing;reacher;sock-aid  -SD      [place]/Cues Needed (Dressing Goal 1, OT)  contact guard assist  -SD      Time Frame (Dressing Goal 1, OT)  2 weeks  -SD      Progress/Outcome (Dressing Goal 1, OT)  goal ongoing  -SD         Toileting Goal 1 (OT)    Activity/Device (Toileting Goal 1, OT)  toileting skills, all;commode  -SD      [place] Level/Cues Needed (Toileting Goal 1, OT)  standby assist  -SD      Time Frame (Toileting Goal 1, OT)  2 weeks  -SD      Progress/Outcome (Toileting Goal 1, OT)  goal ongoing  -SD         Strength Goal 1 (OT)    Strength Goal 1 (OT)  Patient to perform UB ther ex as tolerated  -SD      Time Frame (Strength Goal 1, OT)  long term goal (LTG)  -SD      Progress/Outcome (Strength Goal 1, OT)  goal ongoing  -SD         Functional Mobility Goal 1 (OT)    Activity/Assistive Device (Functional Mobility Goal 1, OT)  cane, straight;walker, rolling  -SD      [place] Level/Cues Needed (Functional Mobility Goal 1, OT)  contact guard assist  -SD      Distance Goal 1 (Functional Mobility, OT)  100  -SD      Time Frame (Functional Mobility Goal 1, OT)  long term goal (LTG)  -SD      Progress/Outcome (Functional Mobility Goal 1, OT)  goal ongoing  -SD        User Key  (r) = Recorded By, (t) = Taken By, (c) = Cosigned By    Initials Name Provider Type Discipline    SD Italia Schumacher, OT Occupational Therapist OT              PT Recommendation and Plan     Outcome Summary/Treatment Plan (PT)  Daily Summary of Progress (PT): progress towards functional goals is fair  Plan for Continued Treatment (PT): Cont PT per POC.   Plan of Care Reviewed With:  patient  Progress: improving  Outcome Summary: Pt tolerates treatment well advancing gait distance to 1x24' standing rest 1x30' cga with streaight cane.  Pt did not fall below 94% O2 saturation with gait training but was veru SOA. PT would benefit from use of rw rather than cane for improved balance and safety as well as energu conservation.  See flowsheet for details.   Outcome Measures     Row Name 01/06/20 1517 01/06/20 0937          How much help from another person do you currently need...    Turning from your back to your side while in flat bed without using bedrails?  3  -RM  --     Moving from lying on back to sitting on the side of a flat bed without bedrails?  3  -RM  --     Moving to and from a bed to a chair (including a wheelchair)?  3  -RM  --     Standing up from a chair using your arms (e.g., wheelchair, bedside chair)?  3  -RM  --     Climbing 3-5 steps with a railing?  2  -RM  --     To walk in hospital room?  3  -RM  --     AM-PAC 6 Clicks Score (PT)  17  -RM  --        How much help from another is currently needed...    Putting on and taking off regular lower body clothing?  --  3  -SD     Bathing (including washing, rinsing, and drying)  --  3  -SD     Toileting (which includes using toilet bed pan or urinal)  --  3  -SD     Putting on and taking off regular upper body clothing  --  3  -SD     Taking care of personal grooming (such as brushing teeth)  --  3  -SD     Eating meals  --  3  -SD     AM-PAC 6 Clicks Score (OT)  --  18  -SD        Functional Assessment    Outcome Measure Options  AM-PAC 6 Clicks Basic Mobility (PT)  -RM  AM-PAC 6 Clicks Daily Activity (OT)  -SD       User Key  (r) = Recorded By, (t) = Taken By, (c) = Cosigned By    Initials Name Provider Type    RM Matt Renee, PTA Physical Therapy Assistant    Italia Norris, OT Occupational Therapist         Time Calculation:   PT Charges     Row Name 01/06/20 1607             Time Calculation    Start Time  1517  -RM       Stop Time  1531  -RM      Time Calculation (min)  14 min  -RM      PT Received On  01/06/20  -RM      PT Goal Re-Cert Due Date  01/15/20  -RM         Time Calculation- PT    Total Timed Code Minutes- PT  14 minute(s)  -RM         Timed Charges    06631 - Gait Training Minutes   14  -RM        User Key  (r) = Recorded By, (t) = Taken By, (c) = Cosigned By    Initials Name Provider Type    Matt Verma, PTA Physical Therapy Assistant        Therapy Charges for Today     Code Description Service Date Service Provider Modifiers Qty    33541285661 HC GAIT TRAINING EA 15 MIN 1/6/2020 Matt Renee, HAKEEM GP 1          PT G-Codes  Outcome Measure Options: AM-PAC 6 Clicks Basic Mobility (PT)  AM-PAC 6 Clicks Score (PT): 17  AM-PAC 6 Clicks Score (OT): 18    Matt Renee PTA  1/6/2020

## 2020-01-06 NOTE — CONSULTS
[facility]      Nephrology Consultation    Referring Provider:   Gurwinder Tierney, *    Reason for Consultation:  Acute Kidney Injury and associated problems.  CKD stage 4      Subjective:  Chief complaint   Chief Complaint   Patient presents with   • Shortness of Breath     History of present illness:    Patient is 80-year-old  female with multi-medical problems as listed below in the past medical history.  She presented to the emergency department after having gradual worsening of her shortness of breath for about 1 week.  She also complained of cough and wheezing as well as subjective fevers.  She was recently admitted and discharged around [place] secondary to congestive heart failure.  She states she has been told for about 2 to 3 years that she had chronic kidney disease and was encouraged to follow-up with nephrology.  She had other appointments and could not make appointment with the nephrologist in [facility].  She has recently moved to [facility] and Dr. Sweet is primary care, he has also mentioned her to make an appointment.  He was admitted through the emergency department after he was noted to have pneumonia.  I have reviewed labs/imaging/records from this hospitalization, including ER staff and admitting/attending physicians H/P's and progress notes to establish a comprehensive understanding of this patient's clinical hospital course, as well as to establish plan of care appropriately.   Past Medical History:   Diagnosis Date   • Acute on chronic diastolic congestive heart failure (CMS/HCC) 12/20/2019   • Anemia    • Angina at rest (CMS/HCC)    • Arthritis    • Atrial fibrillation (CMS/HCC)    • Coronary artery disease    • Disease of thyroid gland    • Elevated cholesterol    • GERD without esophagitis 12/2/2019   • History of blood transfusion    • History of stomach ulcers    • Hypertension    • Impaired functional mobility, balance, gait, and  endurance    • Osteoporosis    • Positive TB test    • Stroke (CMS/HCC)        Past Surgical History:   Procedure Laterality Date   • BRONCHOSCOPY Bilateral 4/12/2019    Procedure: BRONCHOSCOPY;  Surgeon: Jeff Laura MD;  Location:  [facility];  Service: Pulmonary   • BYPASS GRAFT     • COLONOSCOPY N/A 10/15/2019    Procedure: COLONOSCOPY;  Surgeon: Fredi Aaron MD;  Location:  [facility];  Service: Gastroenterology   • CORONARY ANGIOPLASTY WITH STENT PLACEMENT     • CORONARY ARTERY BYPASS GRAFT  10/18/2010   • ENDOSCOPY N/A 10/14/2019    Procedure: ESOPHAGOGASTRODUODENOSCOPY;  Surgeon: Fredi Aaron MD;  Location:  [facility];  Service: Gastroenterology   • HYSTERECTOMY     • STOMACH SURGERY  08/11/2019    Upper GI bleed    • STOMACH SURGERY  10/13/2019     Family History   Problem Relation Age of Onset   • Cancer Mother    • Arthritis Mother    • No Known Problems Father    • Liver disease Sister      negative h/o ESRD     Social History     Tobacco Use   • Smoking status: Never Smoker   • Smokeless tobacco: Never Used   Substance Use Topics   • Alcohol use: No   • Drug use: No     Home medications:   Prior to Admission Medications     Prescriptions Last Dose Informant Patient Reported? Taking?    apixaban (ELIQUIS) 2.5 MG tablet tablet 1/5/2020  Yes Yes    Take 2.5 mg by mouth 2 (Two) Times a Day.    atorvastatin (LIPITOR) 40 MG tablet 1/4/2020  No Yes    Take 1 tablet by mouth Every Night.    benzonatate (TESSALON) 200 MG capsule 1/4/2020  No Yes    Take 1 capsule by mouth 3 (Three) Times a Day As Needed for Cough.    bisoprolol (ZEBETA) 5 MG tablet 1/5/2020  No Yes    Take 1.5 tablets by mouth 2 (Two) Times a Day.    dexamethasone (DECADRON) 4 MG tablet 1/5/2020  No Yes    Take 1 tablet by mouth Daily With Breakfast for 3 days.    ferrous gluconate (FERGON) 324 MG tablet 1/5/2020  No Yes    Take 1 tablet by mouth Daily With Breakfast.    furosemide (LASIX) 40 MG tablet  1/5/2020  No Yes    Take 1 tablet by mouth 2 (Two) Times a Day.    isosorbide mononitrate (IMDUR) 120 MG 24 hr tablet 1/4/2020  No Yes    Take 0.5 tablets by mouth Daily.    levothyroxine (SYNTHROID, LEVOTHROID) 75 MCG tablet 1/5/2020  Yes Yes    Take 75 mcg by mouth Daily.    potassium chloride (K-DUR) 10 MEQ CR tablet Past Month  No Yes    Take 1 tablet by mouth 2 (Two) Times a Day.    spironolactone (ALDACTONE) 25 MG tablet 1/4/2020  No Yes    Take 0.5 tablets by mouth Daily.    Vitamin D, Cholecalciferol, 50 MCG (2000 UT) capsule 1/4/2020  Yes Yes    Take 1 capsule by mouth Daily.    ipratropium-albuterol (DUO-NEB) 0.5-2.5 mg/3 ml nebulizer Unknown  No No    Take 3 mL by nebulization Every 4 (Four) Hours As Needed for Wheezing or Shortness of Air (cough).    nitroglycerin (NITROSTAT) 0.4 MG SL tablet Unknown  Yes No    Place 0.4 mg under the tongue Every 5 (Five) Minutes As Needed.    pantoprazole (PROTONIX) 40 MG EC tablet More than a month  No No    Take 1 tablet by mouth Daily.        Emergency department medications: Medications   sodium chloride 0.9 % flush 10 mL (has no administration in time range)   atorvastatin (LIPITOR) tablet 40 mg (has no administration in time range)   apixaban (ELIQUIS) tablet 2.5 mg (2.5 mg Oral Given 1/6/20 0853)   benzonatate (TESSALON) capsule 200 mg (has no administration in time range)   bisoprolol (ZEBeta) tablet 10 mg (10 mg Oral Given 1/6/20 0853)   ferrous sulfate EC tablet 324 mg (324 mg Oral Given 1/6/20 0853)   ipratropium-albuterol (DUO-NEB) nebulizer solution 3 mL (has no administration in time range)   isosorbide mononitrate (IMDUR) 24 hr tablet 60 mg (60 mg Oral Given 1/6/20 0853)   levothyroxine (SYNTHROID, LEVOTHROID) tablet 75 mcg (75 mcg Oral Given 1/6/20 0853)   spironolactone (ALDACTONE) tablet 12.5 mg (12.5 mg Oral Given 1/6/20 0853)   furosemide (LASIX) tablet 40 mg (40 mg Oral Given 1/6/20 0853)   sodium chloride 0.9 % flush 10 mL (10 mL Intravenous Given  1/6/20 0853)   sodium chloride 0.9 % flush 10 mL (has no administration in time range)   acetaminophen (TYLENOL) tablet 650 mg (has no administration in time range)     Or   acetaminophen (TYLENOL) 160 MG/5ML solution 650 mg (has no administration in time range)     Or   acetaminophen (TYLENOL) suppository 650 mg (has no administration in time range)   albuterol (PROVENTIL) nebulizer solution 0.083% 2.5 mg/3mL (has no administration in time range)   ondansetron (ZOFRAN) tablet 4 mg (has no administration in time range)   Pharmacy to dose vancomycin (has no administration in time range)   Pharmacy to Dose Zosyn (has no administration in time range)   AZITHROMYCIN 500 MG/250 ML 0.9% NS IVPB (vial-mate) (500 mg Intravenous New Bag 1/6/20 0039)   micafungin 100 mg/100 mL 0.9% NS IVPB (mbp) (100 mg Intravenous New Bag 1/6/20 1121)   methylPREDNISolone sodium succinate (SOLU-Medrol) injection 40 mg (40 mg Intravenous Given 1/6/20 0921)   ipratropium-albuterol (DUO-NEB) nebulizer solution 3 mL (3 mL Nebulization Given 1/6/20 1129)   guaiFENesin (MUCINEX) 12 hr tablet 600 mg (600 mg Oral Given 1/6/20 0853)   phenylephrine-mineral oil-petrolatum (PREPARATION H) 0.25-14-74.9 % hemorhoidal ointment (has no administration in time range)   lactobacillus acidophilus (RISAQUAD) capsule 1 capsule (1 capsule Oral Given 1/6/20 0853)   vancomycin 1000 mg in sodium chloride 0.9% 250 mL IVPB (1,000 mg Intravenous New Bag 1/6/20 0921)   piperacillin-tazobactam (ZOSYN) 4.5 g in sodium chloride 0.9 % 100 mL IVPB (has no administration in time range)   ipratropium-albuterol (DUO-NEB) nebulizer solution 6 mL (6 mL Nebulization Given 1/4/20 2021)   methylPREDNISolone sodium succinate (SOLU-Medrol) injection 125 mg (125 mg Intravenous Given 1/4/20 2045)   dilTIAZem (CARDIZEM) injection 20 mg (20 mg Intravenous Given 1/4/20 2140)   cefTRIAXone (ROCEPHIN) IVPB 1 g/50ml dextrose (premix) (0 g Intravenous Stopped 1/4/20 2310)   sodium chloride 0.9  "% bolus 1,000 mL (1,000 mL Intravenous New Bag 1/4/20 2311)   AZITHROMYCIN 500 MG/250 ML 0.9% NS IVPB (vial-mate) (0 mg Intravenous Stopped 1/5/20 0036)   vancomycin 1250 mg in sodium chloride 0.9% 250 mL IVPB (1,250 mg Intravenous New Bag 1/5/20 0421)   piperacillin-tazobactam (ZOSYN) 4.5 g in iso-osmotic dextrose 100 mL IVPB (premix) (4.5 g Intravenous New Bag 1/5/20 0312)   sodium chloride 3 % nebulizer solution 4 mL (4 mL Nebulization Given 1/5/20 1936)   sodium chloride 3 % nebulizer solution 4 mL (4 mL Nebulization Given 1/6/20 0659)       Allergies:  Tuberculin tests    Review of Systems  1. Constitutional: Positive for fever and chills.  Positive for diaphoresis.  Positive for fatigue and malaise and she denies any unexpected weight change.   2. HENT: Positive for congestion and hearing loss.   3. Eyes: Negative for redness and visual disturbance.   4. Respiratory: Positive for shortness of breath or cough. Negative for chest pain  5. Cardiovascular: Negative for chest pain and chest tightness or palpitations.  She does feel she has increased lower extremity edema.  6. Gastrointestinal: Negative for abdominal pain or distention, and blood in stool. Denies any Nausea, vomiting, diarrhea or constipation.  7. Endocrine: Negative for heat or cold intolerance.   8. Genitourinary: Negative for difficulty urinating, dysuria and frequency.   9. Musculoskeletal: Positive for arthralgias, back pain.  Denies any myalgias.   10. Skin: Negative for color change, rash and wound.   11. Neurological: Negative for syncope, weakness and headaches.   12. Hematological: Negative for adenopathy. Does not bruise/bleed easily.   13. Psychiatric/Behavioral: Negative for confusion. The patient is not nervous/anxious.     Objective:  Vital Signs  /73 (BP Location: Left arm, Patient Position: Lying)   Pulse 81   Temp 98 °F (36.7 °C) (Oral)   Resp 20   Ht 147.3 cm (58\")   Wt 61.9 kg (136 lb 6.4 oz)   LMP  (LMP Unknown)   " SpO2 99%   BMI 28.51 kg/m²          I/O this shift:  In: 360 [P.O.:360]  Out: -     Intake/Output Summary (Last 24 hours) at 1/6/2020 1222  Last data filed at 1/6/2020 0933  Gross per 24 hour   Intake 1420 ml   Output 500 ml   Net 920 ml       Physical Exam:  General Appearance:   Alert, cooperative, in no acute distress.     Head:   Normocephalic, without obvious abnormality, atraumatic.     Eyes:      Normal, conjunctivae and sclerae, no icterus, no pallor, corneas clear, PERRLA        Throat:   Oral mucosa dry      Neck:  No adenopathy, supple, trachea midline, no thyromegaly, no carotid bruit, no JVD      Back:   No CVA tenderness on Percussion.     Lungs:    Scattered rhonchi as well as bilateral basal crackles noted      Heart::   Irregular rhythm and normal rate       Abdomen:   Obese. Normal bowel sounds, no masses, no organomegaly, soft non-tender, non-distended, no guarding, no rebound tenderness      Genital urinary:   No urinary bladder palpable      Extremities:  Moves all extremities, 2 plus edema, no cyanosis, no redness.     Pulses:  Pulses palpable and equal bilaterally but weak.     Skin:  No bleeding, bruising or rash        Neurologic:  Cranial nerves grossly intact, move all extremities           Lab Results (last 7 days)     Procedure Component Value Units Date/Time    MRSA Screen Culture - Swab, Nares [[id number]] Collected:  01/06/20 0859    Specimen:  Swab from Nares Updated:  01/06/20 0922    CBC & Differential [[id number]] Collected:  01/06/20 0539    Specimen:  Blood Updated:  01/06/20 0648    Narrative:       The following orders were created for panel order CBC & Differential.  Procedure                               Abnormality         Status                     ---------                               -----------         ------                     CBC Auto Differential[[id number]]        Abnormal            Final result                 Please view results for these tests on the  individual orders.    CBC Auto Differential [[id number]]  (Abnormal) Collected:  01/06/20 0539    Specimen:  Blood Updated:  01/06/20 0648     WBC 7.29 10*3/mm3      RBC 3.16 10*6/mm3      Hemoglobin 8.9 g/dL      Hematocrit 29.9 %      MCV 94.6 fL      MCH 28.2 pg      MCHC 29.8 g/dL      RDW 16.7 %      RDW-SD 58.2 fl      MPV 9.7 fL      Platelets 200 10*3/mm3      Neutrophil % 91.3 %      Lymphocyte % 3.6 %      Monocyte % 4.0 %      Eosinophil % 0.0 %      Basophil % 0.1 %      Immature Grans % 1.0 %      Neutrophils, Absolute 6.66 10*3/mm3      Lymphocytes, Absolute 0.26 10*3/mm3      Monocytes, Absolute 0.29 10*3/mm3      Eosinophils, Absolute 0.00 10*3/mm3      Basophils, Absolute 0.01 10*3/mm3      Immature Grans, Absolute 0.07 10*3/mm3      nRBC 0.0 /100 WBC     Scan Slide [[id number]] Collected:  01/06/20 0539    Specimen:  Blood Updated:  01/06/20 0648     Anisocytosis Slight/1+     Elliptocytes Slight/1+     Hypochromia Slight/1+     WBC Morphology Normal     Platelet Estimate Adequate    Vancomycin, Trough [[id number]]  (Normal) Collected:  01/06/20 0539    Specimen:  Blood Updated:  01/06/20 0647     Vancomycin Trough 10.10 mcg/mL     Troponin [[id number]]  (Normal) Collected:  01/06/20 0539    Specimen:  Blood Updated:  01/06/20 0640     Troponin T <0.010 ng/mL     Narrative:       Troponin T Reference Range:  <= 0.03 ng/mL-   Negative for AMI  >0.03 ng/mL-     Abnormal for myocardial necrosis.  Clinicians would have to utilize clinical acumen, EKG, Troponin and serial changes to determine if it is an Acute Myocardial Infarction or myocardial injury due to an underlying chronic condition.     Comprehensive Metabolic Panel [[id number]]  (Abnormal) Collected:  01/06/20 0539    Specimen:  Blood Updated:  01/06/20 0637     Glucose 168 mg/dL      BUN 54 mg/dL      Creatinine 1.98 mg/dL      Sodium 140 mmol/L      Potassium 3.5 mmol/L      Chloride 100 mmol/L      CO2 23.7 mmol/L      Calcium 9.2 mg/dL       Total Protein 7.1 g/dL      Albumin 3.80 g/dL      ALT (SGPT) 28 U/L      AST (SGOT) 37 U/L      Alkaline Phosphatase 71 U/L      Total Bilirubin 1.4 mg/dL      eGFR Non African Amer 24 mL/min/1.73      Globulin 3.3 gm/dL      A/G Ratio 1.2 g/dL      BUN/Creatinine Ratio 27.3     Anion Gap 16.3 mmol/L     Narrative:       GFR Normal >60  Chronic Kidney Disease <60  Kidney Failure <15      Phosphorus [[id number]]  (Normal) Collected:  01/06/20 0539    Specimen:  Blood Updated:  01/06/20 0637     Phosphorus 4.2 mg/dL     Magnesium [[id number]]  (Abnormal) Collected:  01/06/20 0539    Specimen:  Blood Updated:  01/06/20 0637     Magnesium 2.6 mg/dL     Blood Culture - Blood, Arm, Right [[id number]] Collected:  01/04/20 1959    Specimen:  Blood from Arm, Right Updated:  01/05/20 2100     Blood Culture No growth at 24 hours    Blood Culture - Blood, Arm, Left [[id number]] Collected:  01/04/20 2005    Specimen:  Blood from Arm, Left Updated:  01/05/20 2100     Blood Culture No growth at 24 hours    Blastomyces Antibodies [[id number]] Collected:  01/05/20 0528    Specimen:  Blood Updated:  01/05/20 1736    Histoplasma Antibodies [[id number]] Collected:  01/05/20 0528    Specimen:  Blood Updated:  01/05/20 1736    Aspergillus Antibodies [[id number]] Collected:  01/05/20 0528    Specimen:  Blood Updated:  01/05/20 1736    Aspergillus Galactomannan Antigen [[id number]] Collected:  01/05/20 1635    Specimen:  Blood Updated:  01/05/20 1736    Troponin [[id number]]  (Normal) Collected:  01/05/20 1635    Specimen:  Blood from Arm, Left Updated:  01/05/20 1705     Troponin T <0.010 ng/mL     Narrative:       Troponin T Reference Range:  <= 0.03 ng/mL-   Negative for AMI  >0.03 ng/mL-     Abnormal for myocardial necrosis.  Clinicians would have to utilize clinical acumen, EKG, Troponin and serial changes to determine if it is an Acute Myocardial Infarction or myocardial injury due to an underlying chronic condition.     Respiratory Panel,  PCR - Swab, Nasopharynx [[id number]]  (Abnormal) Collected:  01/05/20 0146    Specimen:  Swab from Nasopharynx Updated:  01/05/20 1321     ADENOVIRUS, PCR Not Detected     Coronavirus 229E Not Detected     Coronavirus HKU1 Not Detected     Coronavirus NL63 Not Detected     Coronavirus OC43 Not Detected     Human Metapneumovirus Not Detected     Human Rhinovirus/Enterovirus Not Detected     Influenza B PCR Not Detected     Parainfluenza Virus 1 Not Detected     Parainfluenza Virus 2 Not Detected     Parainfluenza Virus 3 Not Detected     Parainfluenza Virus 4 Not Detected     Bordetella pertussis pcr Not Detected     Influenza A H1 2009 PCR Not Detected     Chlamydophila pneumoniae PCR Not Detected     Mycoplasma pneumo by PCR Not Detected     Influenza A PCR Not Detected     Influenza A H3 Not Detected     Influenza A H1 Not Detected     RSV, PCR Detected     Bordetella parapertussis PCR Not Detected    Legionella Antigen, Urine - Urine, Urine, Clean Catch [[id number]]  (Normal) Collected:  01/05/20 0544    Specimen:  Urine, Clean Catch Updated:  01/05/20 1302     LEGIONELLA ANTIGEN, URINE Negative    S. Pneumo Ag Urine or CSF - Urine, Urine, Clean Catch [[id number]]  (Normal) Collected:  01/05/20 0544    Specimen:  Urine, Clean Catch Updated:  01/05/20 1254     Strep Pneumo Ag Negative    CBC Auto Differential [[id number]]  (Abnormal) Collected:  01/05/20 0528    Specimen:  Blood Updated:  01/05/20 0654     WBC 7.14 10*3/mm3      RBC 2.95 10*6/mm3      Hemoglobin 8.5 g/dL      Hematocrit 27.5 %      MCV 93.2 fL      MCH 28.8 pg      MCHC 30.9 g/dL      RDW 16.7 %      RDW-SD 57.8 fl      MPV 9.5 fL      Platelets 184 10*3/mm3      Neutrophil % 89.3 %      Lymphocyte % 3.8 %      Monocyte % 6.2 %      Eosinophil % 0.0 %      Basophil % 0.3 %      Immature Grans % 0.4 %      Neutrophils, Absolute 6.38 10*3/mm3      Lymphocytes, Absolute 0.27 10*3/mm3      Monocytes, Absolute 0.44 10*3/mm3      Eosinophils, Absolute 0.00  10*3/mm3      Basophils, Absolute 0.02 10*3/mm3      Immature Grans, Absolute 0.03 10*3/mm3      nRBC 0.0 /100 WBC     Scan Slide [[id number]] Collected:  01/05/20 0528    Specimen:  Blood Updated:  01/05/20 0654     Anisocytosis Slight/1+     Hypochromia Slight/1+     Poikilocytes Slight/1+     WBC Morphology Normal     Platelet Estimate Adequate    Basic Metabolic Panel [[id number]]  (Abnormal) Collected:  01/05/20 0528    Specimen:  Blood Updated:  01/05/20 0642     Glucose 176 mg/dL      BUN 44 mg/dL      Creatinine 1.77 mg/dL      Sodium 139 mmol/L      Potassium 3.5 mmol/L      Chloride 99 mmol/L      CO2 20.9 mmol/L      Calcium 8.5 mg/dL      eGFR Non African Amer 28 mL/min/1.73      BUN/Creatinine Ratio 24.9     Anion Gap 19.1 mmol/L     Narrative:       GFR Normal >60  Chronic Kidney Disease <60  Kidney Failure <15      Lactic Acid, Reflex [[id number]]  (Abnormal) Collected:  01/05/20 0108    Specimen:  Blood Updated:  01/05/20 0224     Lactate 3.9 mmol/L     Lactic Acid, Reflex Timer (This will reflex a repeat order 3-3:15 hours after ordered.) [[id number]] Collected:  01/04/20 2005    Specimen:  Blood from Arm, Left Updated:  01/05/20 0001     Extra Tube Hold for add-ons.     Comment: Auto resulted.       Chaplin Draw [[id number]] Collected:  01/04/20 1949    Specimen:  Blood Updated:  01/04/20 2150    Narrative:       The following orders were created for panel order Cha[place]in Draw.  Procedure                               Abnormality         Status                     ---------                               -----------         ------                     Light Blue Top[[id number]]                                   Final result               Green Top (Gel)[[id number]]                                  Final result               Lavender Top[[id number]]                                     Final result               Gold Top - SST[[id number]]                                   Final result               Green Top (No  "Gel)[[id number]]                                                            Please view results for these tests on the individual orders.    Light Blue Top [[id number]] Collected:  01/04/20 1949    Specimen:  Blood Updated:  01/04/20 2102     Extra Tube hold for add-on     Comment: Auto resulted       Green Top (Gel) [[id number]] Collected:  01/04/20 1949    Specimen:  Blood Updated:  01/04/20 2102     Extra Tube Hold for add-ons.     Comment: Auto resulted.       Lavender Top [[id number]] Collected:  01/04/20 1949    Specimen:  Blood Updated:  01/04/20 2102     Extra Tube hold for add-on     Comment: Auto resulted       Gold Top - SST [[id number]] Collected:  01/04/20 1949    Specimen:  Blood Updated:  01/04/20 2102     Extra Tube Hold for add-ons.     Comment: Auto resulted.       Lactic Acid, Plasma [[id number]]  (Abnormal) Collected:  01/04/20 2005    Specimen:  Blood from Arm, Left Updated:  01/04/20 2056     Lactate 6.6 mmol/L     Procalcitonin [[id number]]  (Normal) Collected:  01/04/20 1950    Specimen:  Blood Updated:  01/04/20 2051     Procalcitonin 0.25 ng/mL     Narrative:       As a Marker for Sepsis (Non-Neonates):   1. <0.5 ng/mL represents a low risk of severe sepsis and/or septic shock.  1. >2 ng/mL represents a high risk of severe sepsis and/or septic shock.    As a Marker for Lower Respiratory Tract Infections that require antibiotic therapy:  PCT on Admission     Antibiotic Therapy             6-12 Hrs later  > 0.5                Strongly Recommended            >0.25 - <0.5         Recommended  0.1 - 0.25           Discouraged                   Remeasure/reassess PCT  <0.1                 Strongly Discouraged          Remeasure/reassess PCT      As 28 day mortality risk marker: \"Change in Procalcitonin Result\" (> 80 % or <=80 %) if Day 0 (or Day 1) and Day 4 values are available. Refer to http://www.[facility]-pct-calculator.com/   Change in PCT <=80 %   A decrease of PCT levels below or equal to 80 % " defines a positive change in PCT test result representing a higher risk for 28-day all-cause mortality of patients diagnosed with severe sepsis or septic shock.  Change in PCT > 80 %   A decrease of PCT levels of more than 80 % defines a negative change in PCT result representing a lower risk for 28-day all-cause mortality of patients diagnosed with severe sepsis or septic shock.                  BNP [[id number]]  (Abnormal) Collected:  01/04/20 1950    Specimen:  Blood Updated:  01/04/20 2051     proBNP 28,142.0 pg/mL     Narrative:       Among patients with dyspnea, NT-proBNP is highly sensitive for the detection of acute congestive heart failure. In addition NT-proBNP of <300 pg/ml effectively rules out acute congestive heart failure with 99% negative predictive value.      Troponin [[id number]]  (Normal) Collected:  01/04/20 1950    Specimen:  Blood Updated:  01/04/20 2045     Troponin T 0.012 ng/mL     Narrative:       Troponin T Reference Range:  <= 0.03 ng/mL-   Negative for AMI  >0.03 ng/mL-     Abnormal for myocardial necrosis.  Clinicians would have to utilize clinical acumen, EKG, Troponin and serial changes to determine if it is an Acute Myocardial Infarction or myocardial injury due to an underlying chronic condition.     Influenza Antigen, Rapid - Swab, Nasopharynx [[id number]]  (Normal) Collected:  01/04/20 2019    Specimen:  Swab from Nasopharynx Updated:  01/04/20 2036     Influenza A Ag, EIA Negative     Influenza B Ag, EIA Negative    CBC & Differential [[id number]] Collected:  01/04/20 1949    Specimen:  Blood Updated:  01/04/20 2035    Narrative:       The following orders were created for panel order CBC & Differential.  Procedure                               Abnormality         Status                     ---------                               -----------         ------                     CBC Auto Differential[[id number]]        Abnormal            Final result                 Please view results  for these tests on the individual orders.    CBC Auto Differential [[id number]]  (Abnormal) Collected:  01/04/20 1949    Specimen:  Blood Updated:  01/04/20 2035     WBC 8.48 10*3/mm3      RBC 3.51 10*6/mm3      Hemoglobin 10.2 g/dL      Hematocrit 33.4 %      MCV 95.2 fL      MCH 29.1 pg      MCHC 30.5 g/dL      RDW 17.3 %      RDW-SD 60.4 fl      MPV 9.4 fL      Platelets 222 10*3/mm3     Scan Slide [[id number]] Collected:  01/04/20 1949    Specimen:  Blood Updated:  01/04/20 2035     Anisocytosis Slight/1+     Hypochromia Slight/1+     Platelet Estimate Adequate     Scan Slide --     Comment: See Manual Differential Results       Manual Differential [[id number]]  (Abnormal) Collected:  01/04/20 1949    Specimen:  Blood Updated:  01/04/20 2035     Neutrophil % 75.0 %      Lymphocyte % 5.0 %      Monocyte % 8.0 %      Bands %  12.0 %      Neutrophils Absolute 7.38 10*3/mm3      Lymphocytes Absolute 0.42 10*3/mm3      Monocytes Absolute 0.68 10*3/mm3      RBC Morphology Normal     WBC Morphology Normal     Platelet Morphology Normal    Blood Gas, Arterial With Co-Ox [[id number]]  (Abnormal) Collected:  01/04/20 2034    Specimen:  Arterial Blood Updated:  01/04/20 2034     Site Left Radial     Tad's Test Positive     pH, Arterial 7.422 pH units      pCO2, Arterial 30.9 mm Hg      Comment: 84 Value below reference range        pO2, Arterial 243.0 mm Hg      Comment: 83 Value above reference range        HCO3, Arterial 20.1 mmol/L      Base Excess, Arterial -3.6 mmol/L      Comment: 84 Value below reference range        O2 Saturation, Arterial 99.8 %      Comment: 83 Value above reference range        Hemoglobin, Blood Gas 9.8 g/dL      Comment: 84 Value below reference range        Hematocrit, Blood Gas 29.9 %      Comment: 84 Value below reference range        Oxyhemoglobin 97.9 %      Methemoglobin 1.20 %      Carboxyhemoglobin 0.7 %      A-a Gradiant 415.9 mmHg      Barometric Pressure for Blood Gas 737 mmHg       Modality NRB     FIO2 100 %      Ventilator Mode NA     Comment: Meter: [id number]     :  [id number]        Note --     pH, Temp Corrected --     pCO2, Temperature Corrected --     pO2, Temperature Corrected --    Comprehensive Metabolic Panel [[id number]]  (Abnormal) Collected:  01/04/20 1949    Specimen:  Blood Updated:  01/04/20 2033     Glucose 179 mg/dL      BUN 41 mg/dL      Creatinine 2.01 mg/dL      Sodium 135 mmol/L      Potassium 4.9 mmol/L      Comment: Specimen hemolyzed.  Results may be affected.        Chloride 92 mmol/L      CO2 18.4 mmol/L      Calcium 9.8 mg/dL      Total Protein 8.6 g/dL      Albumin 4.60 g/dL      ALT (SGPT) 26 U/L      Comment: Specimen hemolyzed.  Results may be affected.        AST (SGOT) 55 U/L      Comment: Specimen hemolyzed.  Results may be affected.        Alkaline Phosphatase 96 U/L      Total Bilirubin 3.1 mg/dL      eGFR Non African Amer 24 mL/min/1.73      Globulin 4.0 gm/dL      A/G Ratio 1.2 g/dL      BUN/Creatinine Ratio 20.4     Anion Gap 24.6 mmol/L     Narrative:       GFR Normal >60  Chronic Kidney Disease <60  Kidney Failure <15          Imaging Results (Last 72 Hours)     Procedure Component Value Units Date/Time    XR Chest 1 View [[id number]] Collected:  01/06/20 0858     Updated:  01/06/20 0902    Narrative:       PROCEDURE: XR CHEST 1 VW-     HISTORY: DYSPNEA CHRONIC, NO XRAY     COMPARISON: 01/04/2020.     FINDINGS: The patient is status post median sternotomy and CABG  procedure. The heart is normal in size. The mediastinum is unremarkable.  A reticulonodular lung pattern is unchanged. No significant effusions  are evident. There is no pneumothorax.  There are no acute osseous  abnormalities.       Impression:       No change in the patient's reticulonodular lung pattern.     Continued followup is recommended.     This report was finalized on 1/6/2020 9:00 AM by Joaquín Shannon M.D..    CT Chest Without Contrast [[id number]] Collected:   01/04/20 2313     Updated:  01/04/20 2314    Narrative:       FINAL REPORT    TECHNIQUE:  Routine axial images were obtained from the lung apices to below  the diaphragm without contrast.    CLINICAL HISTORY:  sob, hypoxia, abnormal CXR    FINDINGS:  There are multiple small bilateral irregular nodular opacities  in the lungs bilaterally, including in the right upper lobe.  No  clear pulmonary mass or nodule is seen.  There is no pleural  disease.  There is borderline right paratracheal and mediastinal  adenopathy.  There is a small hiatal hernia of the stomach.  There is no acute osseous abnormality.      Impression:       Multiple bilateral irregular nodular opacities most likely due  to infectious disease including mycobacterial and fungal  infection.  Metastatic disease much less likely.    Authenticated by Ran Nathan M.D. on 01/04/2020 11:13:21 PM    XR Chest 1 View [[id number]] Collected:  01/04/20 2133     Updated:  01/04/20 2134    Narrative:       FINAL REPORT    TECHNIQUE:  An AP portable view of the chest was obtained.    CLINICAL HISTORY:  Simple Sepsis Triage Protocol    FINDINGS:  There is an apparent small irregular nodular opacity in the  right upper lobe superimposed on the right posterior seventh  rib.  CT is recommended for further evaluation.  The lungs are  otherwise clear.  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 There  is no pleural disease, adenopathy, or significant osseous  abnormality.  There is a small hiatal hernia.      Impression:       Irregular nodular opacity in the right upper lung field.  Recommend CT for further evaluation.  No acute disease.    Authenticated by Ran Nathan M.D. on 01/04/2020 09:33:57 PM        No results found for: UTPCR    apixaban 2.5 mg Oral Q12H   atorvastatin 40 mg Oral Nightly   azithromycin 500 mg Intravenous Q24H   bisoprolol 10 mg Oral BID   ferrous sulfate 324 mg Oral Daily With Breakfast   furosemide 40 mg Oral BID   guaiFENesin 600 mg Oral BID      ipratropium-albuterol 3 mL Nebulization 4x Daily - RT   isosorbide mononitrate 60 mg Oral Daily   lactobacillus acidophilus 1 capsule Oral BID   levothyroxine 75 mcg Oral Daily   methylPREDNISolone sodium succinate 40 mg Intravenous Q8H   micafungin (MYCAMINE)  mg Intravenous Q24H   piperacillin-tazobactam 4.5 g Intravenous Q12H   sodium chloride 10 mL Intravenous Q12H   spironolactone 12.5 mg Oral Daily   vancomycin 1,000 mg Intravenous Q72H       Pharmacy to dose vancomycin    Pharmacy to Dose Zosyn        Assessment/Plan:    1. Acute kidney injury: Patient may have a small component of worsening baseline chronic kidney disease.  She does not have any UA or renal ultrasound.  I will go ahead and get it done and try to establish a baseline.  2. Chronic kidney disease stage IV: Most of her GFR is has been below 30, she likely falls into stage IV chronic kidney disease.  Work-up as ordered.  3. Anemia of chronic kidney disease: She may have component of anemia of chronic kidney disease, check iron stores she has received 2 units of blood on her last hospitalization.  She also mentioned that she had a bleeding ulcer few years back.  4. Pneumonia of both lungs due to infectious organism:  5. 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  6. Atrial fibrillation with RVR: Rate controlled and anticoagulated.  7. Coronary artery disease: She has a significant vascular issues, may need further evaluation if there is any worsening of her symptoms.  8. Acute congestive heart failure: Clinically she does appear to have some congestive heart failure would benefit from optimizing medications.  9. Interstitial lung disease: She follows up regularly with her pulmonologist.  10. Pulmonary hypertension: Complications from primary pulmonary issues.  11. Hypothyroidism:      Plan:  · Continue with the current treatment plan.  · I will go ahead and start her on  IV diuretics.  · Surveillance labs.  · Details were discussed with the patient as well as family in the room.    · Details were also discussed with the hospitalist service.   · Further recommendations will depend on clinical course of the patient during the current hospitalization.    · I also discussed the details with the nursing staff.  · Rest as ordered.    In closing, I sincerely appreciate opportunity to participate in care of this patient. If I can be of any further assistance with the management of this patient, please don’t hesitate to contact me.    Sanford Tsang MD, JULIANN  01/06/20  12:22 PM    Dictated using Dragon.

## 2020-01-06 NOTE — PLAN OF CARE
Problem: Patient Care Overview  Goal: Plan of Care Review  Outcome: Ongoing (interventions implemented as appropriate)  Flowsheets (Taken 1/6/2020 1517)  Outcome Summary: Pt tolerates treatment well advancing gait distance to 1x24' standing rest 1x30' cga with streaight cane.  Pt did not fall below 94% O2 saturation with gait training but was veru SOA. PT would benefit from use of rw rather than cane for improved balance and safety as well as energu conservation.  See flowsheet for details.

## 2020-01-06 NOTE — PROGRESS NOTES
[facility]    Name:  Pat Morel   Age:  80 y.o.  Sex:  female  :  1940  MRN:  [id number]   Visit Number:  [id number]  Admission Date:  2020  Date Of Service:  20  Primary Care Physician:  Anthony Sweet DO     LOS: 1 day :  Patient Care Team:  Anthony Sweet DO as PCP - General (Family Medicine)  Tien Archer MD as Consulting Physician (Cardiac Electrophysiology)  Xu Block MD (Cardiology):    Chief Complaint:      Shortness of breath and coughing.    Subjective / Interval History:     Ms. Sandoval is currently sitting up on the chair and is comfortable at rest except for wheezing and chest congestion.  She was admitted day before yesterday with worsening shortness of breath and cough and was found to have bilateral extensive pneumonia.  She does have a pet bird at home.  She was placed on Zosyn, azithromycin, vancomycin and was subsequently added on micafungin.  Patient was seen by Dr. Garcia this morning and he seems to think that the patient may have hypersensitivity pneumonitis.  Patient states that she has improved since admission especially after steroid treatment.    Patient is 80-year-old female with shortness of breath and cough.  She has coronary artery disease with CABG, A. fib, chronic kidney disease stage III, history of CVA, diastolic CHF, hypersensitivity pneumonitis, interstitial lung disease, hypertension, osteoporosis.  She has a one-week history of shortness of breath, productive cough, wheezing and fevers.  She follows with Dr. Laura as her pulmonologist.  She was noted to have elevated lactic acid, however has a history of congestive heart failure with chronic kidney disease and edema.  Due to this she was not aggressively hydrated.  CT scan of the chest showed multiple bilateral irregular nodular opacities most likely due to infectious disease with possible atypical mycoplasma infection or fungal  infection per radiology.     Due to this Zithromax and micafungin were added.  Patient was also placed on vancomycin and Zosyn.  She has a history of coronary artery disease with CABG and stents.  She follows with Dr. Higgins.  Troponin have been negative.  Respiratory panel revealed the patient to have an RSV infection.    Review of Systems:     General ROS: Patient denies any fevers, chills or loss of consciousness.  Generalized weakness.  Respiratory ROS: Cough, chest congestion and wheezing.  Cardiovascular ROS: Denies chest pain or palpitations. No history of exertional chest pain.  Gastrointestinal ROS: Denies nausea and vomiting. Denies any abdominal pain. No diarrhea.  Neurological ROS: Denies any focal weakness. No loss of consciousness. Denies any numbness.  Dermatological ROS: Denies any redness or pruritis.    Vital Signs:    Temp:  [98 °F (36.7 °C)-98.2 °F (36.8 °C)] 98 °F (36.7 °C)  Heart Rate:  [79-95] 88  Resp:  [18-20] 19  BP: (126-144)/(73-89) 126/82    Intake and output:    I/O last 3 completed shifts:  In: 1840 [P.O.:1440; IV Piggyback:400]  Out: 800 [Urine:800]  I/O this shift:  In: 360 [P.O.:360]  Out: -     Physical Examination:    General Appearance:  Alert and cooperative, not in any acute distress.   Head:  Atraumatic and normocephalic, without obvious abnormality.   Eyes:          PERRLA, conjunctivae and sclerae normal, no Icterus. No pallor. Extraocular movements are within normal limits.   Neck: Supple, trachea midline, no thyromegaly, no carotid bruit.   Lungs:   Chest shape is normal. Breath sounds heard bilaterally equally.  Extensive bilateral wheezing and crackles heard. No Pleural rub or bronchial breathing.   Heart:  Normal S1 and S2, no murmur, no gallop, no rub. No JVD.  Midline CABG scar noted.   Abdomen:   Normal bowel sounds, no masses, no organomegaly. Soft, non-tender, obese, no guarding, no rebound tenderness.   Extremities: Moves all extremities well, 1+ edema, no cyanosis,  no clubbing.  Ecchymoses noted on the left arm and forearm.   Skin: No bleeding or rash.   Neurologic: Awake, alert and oriented times 3. Moves all 4 extremities equally.  No asterixis.     Laboratory results:    Results from last 7 days   Lab Units 01/06/20 0539 01/05/20 0528 01/04/20 1949   SODIUM mmol/L 140 139 135*   POTASSIUM mmol/L 3.5 3.5 4.9   CHLORIDE mmol/L 100 99 92*   CO2 mmol/L 23.7 20.9* 18.4*   BUN mg/dL 54* 44* 41*   CREATININE mg/dL 1.98* 1.77* 2.01*   CALCIUM mg/dL 9.2 8.5* 9.8   BILIRUBIN mg/dL 1.4*  --  3.1*   ALK PHOS U/L 71  --  96   ALT (SGPT) U/L 28  --  26   AST (SGOT) U/L 37*  --  55*   GLUCOSE mg/dL 168* 176* 179*     Results from last 7 days   Lab Units 01/06/20  0539 01/05/20 0528 01/04/20 1949   WBC 10*3/mm3 7.29 7.14 8.48   HEMOGLOBIN g/dL 8.9* 8.5* 10.2*   HEMATOCRIT % 29.9* 27.5* 33.4*   PLATELETS 10*3/mm3 200 184 222         Results from last 7 days   Lab Units 01/06/20 0539 01/05/20  1635 01/04/20  1950   TROPONIN T ng/mL <0.010 <0.010 0.012     Results from last 7 days   Lab Units 01/04/20 2005 01/04/20  1959   BLOODCX  No growth at 24 hours No growth at 24 hours     I have reviewed the patient's laboratory results.    Radiology results:    Imaging Results (Last 24 Hours)     Procedure Component Value Units Date/Time    XR Chest 1 View [[id number]] Collected:  01/06/20 0858     Updated:  01/06/20 0902    Narrative:       PROCEDURE: XR CHEST 1 VW-     HISTORY: DYSPNEA CHRONIC, NO XRAY     COMPARISON: 01/04/2020.     FINDINGS: The patient is status post median sternotomy and CABG  procedure. The heart is normal in size. The mediastinum is unremarkable.  A reticulonodular lung pattern is unchanged. No significant effusions  are evident. There is no pneumothorax.  There are no acute osseous  abnormalities.       Impression:       No change in the patient's reticulonodular lung pattern.     Continued followup is recommended.     This report was finalized on 1/6/2020 9:00 AM by  Joaquín Shannon M.D..        I have reviewed the patient's radiology reports.    Medication Review:     I have reviewed the patients active and prn medications.       Pneumonia of both lungs due to infectious organism    Assessment:    1.  Healthcare associated pneumonia, present on admission, acute  2.  Acute hypoxic respiratory failure, present on admission, acute  3.  Chronic diastolic heart failure, present on admission  4.  History of hypersensitivity pneumonitis, present on admission  5.  Chronic kidney disease stage III, present admission, chronic and stable  6.  Atrial fibrillation , on anticoagulation, rate controlled  7.  Lactic acid elevated, present on admission, acute  8.  CAD status post stents and CABG, present on admission  9.  Interstitial lung disease, present on admission  10.  Pulmonary hypertension, present on admission  11.  Acquired hypothyroidism, present on admission  12.  RSV infection, present on admission.    Plan:    Ms. Morel is currently feeling slightly better with regards to her shortness of breath and wheezing.  She will be continued on vancomycin and azithromycin.  We will also continue micafungin at this time.  I will change her Zosyn to Rocephin.  She was seen by Dr. Garcia this morning and I have discussed the patient's condition with him.  He has placed her on IV Bumex.  Her home medications will be continued including apixaban.  Recent echocardiogram done on 10/13/2019 showed hyperdynamic ejection fraction at 70% with mild to moderate mitral regurgitation and moderate tricuspid regurgitation.    Patient will be started on physical and occupational therapy.  Blood cultures are currently pending.  Legionella and streptococcal serology have been negative.  Further recommendations depend upon her clinical course.    Denis Mercado MD  01/06/20  4:50 PM    Dictated utilizing Dragon dictation.

## 2020-01-06 NOTE — PLAN OF CARE
Problem: Patient Care Overview  Goal: Plan of Care Review  Outcome: Ongoing (interventions implemented as appropriate)  Flowsheets  Taken 1/6/2020 1309  Progress: improving  Plan of Care Reviewed With: patient  Taken 1/6/2020 9093  Outcome Summary: OT eval completed. Patient completed transfers and mobility around bed with CGA; requires min A for LB self care d/t dyspnea. Patient is expected to benefit from continued OT services prior to DC.

## 2020-01-06 NOTE — THERAPY EVALUATION
Acute Care - Occupational Therapy Initial Evaluation   Jose     Patient Name: Pat Morel  : 1940  MRN: [id number]  Today's Date: 2020  Onset of Illness/Injury or Date of Surgery: 20  Date of Referral to OT: 20  Referring Physician: Dr. Gupta    Admit Date: 2020       ICD-10-CM ICD-9-CM   1. Pneumonia of both lungs due to infectious organism, unspecified part of lung J18.9 483.8   2. Acute respiratory failure with hypoxia (CMS/[facility]) J96.01 518.81   3. Lactic acidosis E87.2 276.2   4. Atrial fibrillation with RVR (CMS/[facility]) I48.91 427.31   5. Impaired mobility and ADLs Z74.09 799.89     Patient Active Problem List   Diagnosis   • Persistent atrial fibrillation   • Coronary artery disease involving native coronary artery of native heart without angina pectoris   • Essential hypertension   • Cerebrovascular accident (CVA) due to embolism (CMS/[facility])   • Dyspnea on exertion   • Pulmonary arterial hypertension (CMS/[facility])   • Interstitial lung disease (CMS/[facility])   • Hypersensitivity pneumonitis (CMS/[facility])   • GI bleed   • RENEE (acute kidney injury) (CMS/[facility])   • Gastrointestinal hemorrhage with melena   • Gastric ulcer   • History of melena   • History of colonoscopy   • Coagulation disorder due to circulating anticoagulants (CMS/[facility])   • Dyslipidemia   • Acquired hypothyroidism   • Chronic kidney disease, stage 3 (CMS/HCC)   • GERD without esophagitis   • Iron deficiency anemia   • Volume overload   • Acute on chronic diastolic congestive heart failure (CMS/HCC)   • Acute on chronic diastolic (congestive) heart failure (CMS/HCC)   • Pneumonia of both lungs due to infectious organism     Past Medical History:   Diagnosis Date   • Acute on chronic diastolic congestive heart failure (CMS/HCC) 2019   • Anemia    • Angina at rest (CMS/[facility])    • Arthritis    • Atrial fibrillation (CMS/HCC)    • Coronary artery disease    • Disease of thyroid gland    • Elevated cholesterol    • GERD  without esophagitis 12/2/2019   • History of blood transfusion    • History of stomach ulcers    • Hypertension    • Impaired functional mobility, balance, gait, and endurance    • Osteoporosis    • Positive TB test    • Stroke (CMS/HCC)      Past Surgical History:   Procedure Laterality Date   • BRONCHOSCOPY Bilateral 4/12/2019    Procedure: BRONCHOSCOPY;  Surgeon: Jeff Laura MD;  Location:  [facility];  Service: Pulmonary   • BYPASS GRAFT     • COLONOSCOPY N/A 10/15/2019    Procedure: COLONOSCOPY;  Surgeon: Fredi Aaron MD;  Location:  [facility];  Service: Gastroenterology   • CORONARY ANGIOPLASTY WITH STENT PLACEMENT     • CORONARY ARTERY BYPASS GRAFT  10/18/2010   • ENDOSCOPY N/A 10/14/2019    Procedure: ESOPHAGOGASTRODUODENOSCOPY;  Surgeon: Fredi Aaron MD;  Location:  [facility];  Service: Gastroenterology   • HYSTERECTOMY     • STOMACH SURGERY  08/11/2019    Upper GI bleed    • STOMACH SURGERY  10/13/2019          OT ASSESSMENT FLOWSHEET (last 12 hours)      Occupational Therapy Evaluation     Row Name 01/06/20 0937                   OT Evaluation Time/Intention    Subjective Information  complains of;weakness;dyspnea  -SD        Document Type  evaluation  -SD        Mode of Treatment  occupational therapy  -SD        Patient Effort  good  -SD        Symptoms Noted During/After Treatment  fatigue;shortness of breath  -SD           General Information    Patient Profile Reviewed?  yes  -SD        Onset of Illness/Injury or Date of Surgery  01/04/20  -SD        Referring Physician  Dr. Gupta  -SD        Patient Observations  alert;cooperative;agree to therapy  -SD        General Observations of Patient  supine, 3L O2  -SD        Prior Level of Function  independent:;all household mobility  -SD        Equipment Currently Used at Home  cane, straight;walker, rolling;shower chair  -SD        Pertinent History of Current Functional Problem  Bilateral pneumonia; CAD, Afib,  HTN, osteoporosis, hx of CVA, CHF, hypersensitivity pneumonitis  -SD        Existing Precautions/Restrictions  fall;oxygen therapy device and L/min  -SD        Risks Reviewed  patient:;increased discomfort  -SD        Benefits Reviewed  patient:;improve function;increase independence;increase strength;increase balance  -SD           Relationship/Environment    Primary Source of Support/Comfort  spouse  -SD        Lives With  spouse  -SD           Resource/Environmental Concerns    Current Living Arrangements  home/apartment/condo  -SD           Cognitive Assessment/Intervention- PT/OT    Orientation Status (Cognition)  oriented x 4  -SD           Safety Issues, Functional Mobility    Safety Issues Affecting Function (Mobility)  safety precautions follow-through/compliance;awareness of need for assistance;insight into deficits/self awareness  -SD        Impairments Affecting Function (Mobility)  balance;endurance/activity tolerance;shortness of breath  -SD           Bed Mobility Assessment/Treatment    Bed Mobility Assessment/Treatment  supine-sit  -SD        Supine-Sit [place] (Bed Mobility)  conditional independence  -SD        Assistive Device (Bed Mobility)  bed rails  -SD           Functional Mobility    Functional Mobility- Ind. Level  contact guard assist  -SD        Functional Mobility- Device  straight cane  -SD        Functional Mobility-Distance (Feet)  12  -SD        Functional Mobility- Safety Issues  balance decreased during turns;sequencing ability decreased;step length decreased;weight-shifting ability decreased;supplemental O2  -SD           Transfer Assessment/Treatment    Transfer Assessment/Treatment  sit-stand transfer;stand-sit transfer  -SD           Sit-Stand Transfer    Sit-Stand [place] (Transfers)  contact guard  -SD        Assistive Device (Sit-Stand Transfers)  cane, straight  -SD           Stand-Sit Transfer    Stand-Sit [place] (Transfers)  contact guard  -SD         Assistive Device (Stand-Sit Transfers)  cane, straight  -SD           ADL Assessment/Intervention    BADL Assessment/Intervention  bathing;upper body dressing;lower body dressing;grooming;feeding;toileting  -SD           Bathing Assessment/Intervention    Bathing [place] Level  minimum assist (75% patient effort)  -SD           Upper Body Dressing Assessment/Training    Upper Body Dressing [place] Level  supervision  -SD           Lower Body Dressing Assessment/Training    Lower Body Dressing [place] Level  minimum assist (75% patient effort)  -SD           Grooming Assessment/Training    [place] Level (Grooming)  supervision  -SD           Self-Feeding Assessment/Training    [place] Level (Feeding)  supervision  -SD           Toileting Assessment/Training    [place] Level (Toileting)  contact guard assist  -SD           BADL Safety/Performance    Impairments, BADL Safety/Performance  balance;endurance/activity tolerance;shortness of breath;strength  -SD           General ROM    GENERAL ROM COMMENTS  WFL  -SD           MMT (Manual Muscle Testing)    General MMT Comments  3+/5  -SD           Positioning and Restraints    Pre-Treatment Position  in bed  -SD        Post Treatment Position  chair  -SD        In Chair  reclined;call light within reach;encouraged to call for assist  -SD           Pain Assessment    Additional Documentation  Pain Scale: Numbers Pre/Post-Treatment (Group)  -SD           Pain Scale: Numbers Pre/Post-Treatment    Pain Scale: Numbers, Pretreatment  3/10  -SD        Pain Scale: Numbers, Post-Treatment  3/10  -SD        Pain Location - Side  -- chest from coughing so much  -SD           Coping    Observed Emotional State  calm;cooperative  -SD        Verbalized Emotional State  acceptance  -SD           Plan of Care Review    Plan of Care Reviewed With  patient  -SD        Progress  improving  -SD        Outcome Summary  OT eval completed. Patient completed  transfers and mobility around bed with CGA; requires min A for LB self care d/t dyspnea. Patient is expected to benefit from continued OT services prior to DC.   -SD           Clinical Impression (OT)    Date of Referral to OT  01/05/20  -SD        OT Diagnosis  ADL decline  -SD        Patient/Family Goals Statement (OT Eval)  Increase strength and mobility  -SD        Criteria for Skilled Therapeutic Interventions Met (OT Eval)  yes  -SD        Rehab Potential (OT Eval)  good, to achieve stated therapy goals  -SD        Therapy Frequency (OT Eval)  3 times/wk 5 times if indicated  -SD        Care Plan Review (OT)  evaluation/treatment results reviewed  -SD        Anticipated Discharge Disposition (OT)  anticipate therapy at next level of care;inpatient rehabilitation facility;home with home health  -SD           Vital Signs    Pre SpO2 (%)  96  -SD        O2 Delivery Pre Treatment  supplemental O2  -SD        Intra SpO2 (%)  89  -SD        O2 Delivery Intra Treatment  supplemental O2  -SD        Post SpO2 (%)  97  -SD        O2 Delivery Post Treatment  supplemental O2  -SD           Planned OT Interventions    Planned Therapy Interventions (OT Eval)  activity tolerance training;adaptive equipment training;BADL retraining;patient/caregiver education/training;strengthening exercise;transfer/mobility retraining  -SD           OT Goals    Dressing Goal Selection (OT)  dressing, OT goal 1  -SD        Toileting Goal Selection (OT)  toileting, OT goal 1  -SD        Strength Goal Selection (OT)  strength, OT goal 1  -SD        Functional Mobility Goal Selection (OT)  functional mobility, OT goal 1  -SD        Additional Documentation  Strength Goal Selection (OT) (Row);Functional Mobility Selection (OT) (Row)  -SD           Dressing Goal 1 (OT)    Activity/Assistive Device (Dressing Goal 1, OT)  lower body dressing;reacher;sock-aid  -SD        [place]/Cues Needed (Dressing Goal 1, OT)  contact guard assist  -SD         Time Frame (Dressing Goal 1, OT)  2 weeks  -SD        Progress/Outcome (Dressing Goal 1, OT)  goal ongoing  -SD           Toileting Goal 1 (OT)    Activity/Device (Toileting Goal 1, OT)  toileting skills, all;commode  -SD        [place] Level/Cues Needed (Toileting Goal 1, OT)  standby assist  -SD        Time Frame (Toileting Goal 1, OT)  2 weeks  -SD        Progress/Outcome (Toileting Goal 1, OT)  goal ongoing  -SD           Strength Goal 1 (OT)    Strength Goal 1 (OT)  Patient to perform UB ther ex as tolerated  -SD        Time Frame (Strength Goal 1, OT)  long term goal (LTG)  -SD        Progress/Outcome (Strength Goal 1, OT)  goal ongoing  -SD           Functional Mobility Goal 1 (OT)    Activity/Assistive Device (Functional Mobility Goal 1, OT)  cane, straight;walker, rolling  -SD        [place] Level/Cues Needed (Functional Mobility Goal 1, OT)  contact guard assist  -SD        Distance Goal 1 (Functional Mobility, OT)  100  -SD        Time Frame (Functional Mobility Goal 1, OT)  long term goal (LTG)  -SD        Progress/Outcome (Functional Mobility Goal 1, OT)  goal ongoing  -SD           Living Environment    Home Accessibility  wheelchair accessible  -SD          User Key  (r) = Recorded By, (t) = Taken By, (c) = Cosigned By    Initials Name Effective Dates    Italia Norris OT 03/07/18 -                OT Recommendation and Plan  Outcome Summary/Treatment Plan (OT)  Anticipated Discharge Disposition (OT): anticipate therapy at next level of care, inpatient rehabilitation facility, home with home health  Planned Therapy Interventions (OT Eval): activity tolerance training, adaptive equipment training, BADL retraining, patient/caregiver education/training, strengthening exercise, transfer/mobility retraining  Therapy Frequency (OT Eval): 3 times/wk(5 times if indicated)  Plan of Care Review  Plan of Care Reviewed With: patient  Plan of Care Reviewed With: patient  Outcome Summary: OT eval  completed. Patient completed transfers and mobility around bed with CGA; requires min A for LB self care d/t dyspnea. Patient is expected to benefit from continued OT services prior to DC.     Outcome Measures     Row Name 01/06/20 0937             How much help from another is currently needed...    Putting on and taking off regular lower body clothing?  3  -SD      Bathing (including washing, rinsing, and drying)  3  -SD      Toileting (which includes using toilet bed pan or urinal)  3  -SD      Putting on and taking off regular upper body clothing  3  -SD      Taking care of personal grooming (such as brushing teeth)  3  -SD      Eating meals  3  -SD      AM-PAC 6 Clicks Score (OT)  18  -SD         Functional Assessment    Outcome Measure Options  AM-PAC 6 Clicks Daily Activity (OT)  -SD        User Key  (r) = Recorded By, (t) = Taken By, (c) = Cosigned By    Initials Name Provider Type    Italia Norris OT Occupational Therapist          Time Calculation:   Time Calculation- OT     Row Name 01/06/20 1310             Time Calculation- OT    OT Start Time  0937  -SD      OT Received On  01/06/20  -SD      OT Goal Re-Cert Due Date  01/16/20  -SD        User Key  (r) = Recorded By, (t) = Taken By, (c) = Cosigned By    Initials Name Provider Type    Italia Norris OT Occupational Therapist        Therapy Charges for Today     Code Description Service Date Service Provider Modifiers Qty    [id number]  OT EVAL LOW COMPLEXITY 3 1/6/2020 Italia Schumacher OT GO 1               Italia Schumacher OT  1/6/2020

## 2020-01-06 NOTE — CONSULTS
"Date of consultation:   January 6, 2020    Requested by:   Hospitalist Service.     PCP: Anthony Sweet DO    Reason:  Cough, shortness of breath and abnormal CT of the chest.    History of Present Illness:  80 y.o. female with past medical history significant for coronary artery disease s/p CABG, A. fib, CKD stage III, H/O CVA, diastolic CHF, hypersensitivity pneumonitis who started having a cough and shortness of breath, that started a few days ago. Patient was complaining of subjective chills.  The patient denies any fever. Symptoms started somewhat gradually, especially since she has been off the CellCept which was discontinued because of lower extremity swelling and other symptoms suggesting side effects.  Over the past few days her symptoms have gradually worsened.    The patient says that the shortness of breath was also associated with cough and she started noticing occasional yellow sputum production.  Her symptoms are worse with exertion but even at rest.    she denies any sick contacts.     The patient states that she was diagnosed with hypersensitive pneumonitis, caused by her \"bird\" at home, in 2017.  She says that she was initially started on prednisone but later on switched to CellCept with fair control for many months.  She started having some side effects from CellCept including lower extremity swelling and was taken off the CellCept    The patient was admitted to the hospital and pulmonary consultation was requested for further recommendations.     Review of System: All other review of systems negative except indicated in HPI.    Past Medical History:  Past Medical History:   Diagnosis Date   • Acute on chronic diastolic congestive heart failure (CMS/HCC) 12/20/2019   • Anemia    • Angina at rest (CMS/HCC)    • Arthritis    • Atrial fibrillation (CMS/HCC)    • Coronary artery disease    • Disease of thyroid gland    • Elevated cholesterol    • GERD without esophagitis 12/2/2019   • History of " "blood transfusion    • History of stomach ulcers    • Hypertension    • Impaired functional mobility, balance, gait, and endurance    • Osteoporosis    • Positive TB test    • Stroke (CMS/HCC)          Past Surgical History:  Past Surgical History:   Procedure Laterality Date   • BRONCHOSCOPY Bilateral 4/12/2019    Procedure: BRONCHOSCOPY;  Surgeon: Jeff Laura MD;  Location:  [facility];  Service: Pulmonary   • BYPASS GRAFT     • COLONOSCOPY N/A 10/15/2019    Procedure: COLONOSCOPY;  Surgeon: Fredi Aaron MD;  Location:  [facility];  Service: Gastroenterology   • CORONARY ANGIOPLASTY WITH STENT PLACEMENT     • CORONARY ARTERY BYPASS GRAFT  10/18/2010   • ENDOSCOPY N/A 10/14/2019    Procedure: ESOPHAGOGASTRODUODENOSCOPY;  Surgeon: Fredi Araon MD;  Location:  [facility];  Service: Gastroenterology   • HYSTERECTOMY     • STOMACH SURGERY  08/11/2019    Upper GI bleed    • STOMACH SURGERY  10/13/2019         Family History:  Family History   Problem Relation Age of Onset   • Cancer Mother    • Arthritis Mother    • No Known Problems Father    • Liver disease Sister          Social History:  Social History     Socioeconomic History   • Marital status:      Spouse name: Not on file   • Number of children: Not on file   • Years of education: Not on file   • Highest education level: Not on file   Tobacco Use   • Smoking status: Never Smoker   • Smokeless tobacco: Never Used   Substance and Sexual Activity   • Alcohol use: No   • Drug use: No   • Sexual activity: Defer         Physical Exam:  /84 (BP Location: Left arm, Patient Position: Lying)   Pulse 93   Temp 98.1 °F (36.7 °C) (Oral)   Resp 19   Ht 147.3 cm (58\")   Wt 61.9 kg (136 lb 6.4 oz)   LMP  (LMP Unknown)   SpO2 100%   BMI 28.51 kg/m²      Constitutional:            Vital signs reviewed                     General: No acute distress noted. Able to communicate appropriately    Eyes:            Extraocular " movement was intact.            Pupils appeared equal            Conjunctiva: Pink    ENT:             Hearing was intact              No nasal erythema noted.              Oropharynx was moist. No lesions noted.     Neck:             Supple. +ve JVD noted.              Thyroid gland did not seem to be enlarged    Cardiovascular:              S1 + S2. Regular     Lungs/Respiratory:            Respiratory effort was labored             Normal to percussion.            Good Air Entry Bilaterally with wheezing heard.    Abdomen/GI:            Soft             Bowel sounds sluggishly positive            No obvious organomegaly noted     Musculoskeletal/Extremities:             Gait could not be assessed at this time.             No clubbing, cyanosis noted in the upper extremities.             1+ edema noted in the lower extremities bilaterally.    Neurologic:             Awake, alert and oriented x 3.             Able to follow simple commands.    Psychiatric:             Affect appeared fair.             Awake, alert and oriented x 3.    Skin:             No rash noted.             Warm and dry          Labs: Reviewed. Pertinent labs were noted.     Lab Results   Component Value Date    WBC 7.29 01/06/2020    HGB 8.9 (L) 01/06/2020    HCT 29.9 (L) 01/06/2020    MCV 94.6 01/06/2020     01/06/2020       Lab Results   Component Value Date    GLUCOSE 168 (H) 01/06/2020    CALCIUM 9.2 01/06/2020     01/06/2020    K 3.5 01/06/2020    CO2 23.7 01/06/2020     01/06/2020    BUN 54 (H) 01/06/2020    CREATININE 1.98 (H) 01/06/2020    EGFRIFAFRI 29 (L) 12/31/2019    EGFRIFNONA 24 (L) 01/06/2020    BCR 27.3 (H) 01/06/2020    ANIONGAP 16.3 (H) 01/06/2020    PROTEINTOT 7.1 01/06/2020    ALBUMIN 3.80 01/06/2020       Lab Results   Component Value Date    PROBNP 28,142.0 (H) 01/04/2020    PROBNP 10,610 (H) 12/20/2019    PROBNP 9,920.0 (H) 12/09/2019       No results found for: INR    Lab Results   Component Value  Date    PROCALCITO 0.25 01/04/2020       No results found for: CRP      ABG: Reviewed.  Lab Results   Component Value Date    PHART 7.422 01/04/2020    GRO4PXA 30.9 (L) 01/04/2020    PO2ART 243.0 (H) 01/04/2020    HGBBG 9.8 (L) 01/04/2020    K5TVHDRE 99.8 01/04/2020    CARBOXYHGB 0.7 01/04/2020         Micro: As of January 6, 2020   Lab Results   Component Value Date    RESPCX Heavy growth (4+) Normal Respiratory Kristine 04/12/2019     Lab Results   Component Value Date    BLOODCX No growth at 24 hours 01/04/2020    BLOODCX No growth at 24 hours 01/04/2020     Lab Results   Component Value Date    STREPPNEUAG Negative 01/05/2020     Lab Results   Component Value Date    FLU Negative 01/04/2020    FLU Negative 01/04/2020       Lab Results   Component Value Date    ADENOVIRUS Not Detected 01/05/2020     Lab Results   Component Value Date    GO105F Not Detected 01/05/2020     Lab Results   Component Value Date    CVHKU1 Not Detected 01/05/2020     Lab Results   Component Value Date    CVNL63 Not Detected 01/05/2020     Lab Results   Component Value Date    CVOC43 Not Detected 01/05/2020     Lab Results   Component Value Date    HUMETPNEVS Not Detected 01/05/2020     Lab Results   Component Value Date    HURVEV Not Detected 01/05/2020     Lab Results   Component Value Date    FLUBPCR Not Detected 01/05/2020     Lab Results   Component Value Date    PARAINFLUE Not Detected 01/05/2020     Lab Results   Component Value Date    PARAFLUV2 Not Detected 01/05/2020     Lab Results   Component Value Date    PARAFLUV3 Not Detected 01/05/2020     Lab Results   Component Value Date    PARAFLUV4 Not Detected 01/05/2020     Lab Results   Component Value Date    BPERTPCR Not Detected 01/05/2020     Lab Results   Component Value Date    DWYPW22612 Not Detected 01/05/2020     Lab Results   Component Value Date    CPNEUPCR Not Detected 01/05/2020     Lab Results   Component Value Date    MPNEUMO Not Detected 01/05/2020     Lab Results    Component Value Date    FLUAPCR Not Detected 01/05/2020     Lab Results   Component Value Date    FLUAH3 Not Detected 01/05/2020     Lab Results   Component Value Date    FLUAH1 Not Detected 01/05/2020     Lab Results   Component Value Date    RSV Detected (A) 01/05/2020     Lab Results   Component Value Date    BPARAPCR Not Detected 01/05/2020         Pharmacy to dose vancomycin    Pharmacy to Dose Zosyn        Imaging Study: Latest imaging studies was reviewed personally.   Imaging Results (Last 24 Hours)     Procedure Component Value Units Date/Time    XR Chest 1 View [[id number]] Collected:  01/06/20 0858     Updated:  01/06/20 0902    Narrative:       PROCEDURE: XR CHEST 1 VW-     HISTORY: DYSPNEA CHRONIC, NO XRAY     COMPARISON: 01/04/2020.     FINDINGS: The patient is status post median sternotomy and CABG  procedure. The heart is normal in size. The mediastinum is unremarkable.  A reticulonodular lung pattern is unchanged. No significant effusions  are evident. There is no pneumothorax.  There are no acute osseous  abnormalities.       Impression:       No change in the patient's reticulonodular lung pattern.     Continued followup is recommended.     This report was finalized on 1/6/2020 9:00 AM by Joaquín Shannon M.D..        Imaging Results (Last 72 Hours)     Procedure Component Value Units Date/Time    XR Chest 1 View [[id number]] Resulted:  01/06/20 0552     Updated:  01/06/20 0552    CT Chest Without Contrast [[id number]] Collected:  01/04/20 2313     Updated:  01/04/20 2314    Narrative:       FINAL REPORT    TECHNIQUE:  Routine axial images were obtained from the lung apices to below  the diaphragm without contrast.    CLINICAL HISTORY:  sob, hypoxia, abnormal CXR    FINDINGS:  There are multiple small bilateral irregular nodular opacities  in the lungs bilaterally, including in the right upper lobe.  No  clear pulmonary mass or nodule is seen.  There is no pleural  disease.  There is borderline  right paratracheal and mediastinal  adenopathy.  There is a small hiatal hernia of the stomach.  There is no acute osseous abnormality.      Impression:       Multiple bilateral irregular nodular opacities most likely due  to infectious disease including mycobacterial and fungal  infection.  Metastatic disease much less likely.    Authenticated by Ran Nathan M.D. on 01/04/2020 11:13:21 PM    XR Chest 1 View [[id number]] Collected:  01/04/20 2133     Updated:  01/04/20 2134    Narrative:       FINAL REPORT    TECHNIQUE:  An AP portable view of the chest was obtained.    CLINICAL HISTORY:  Simple Sepsis Triage Protocol    FINDINGS:  There is an apparent small irregular nodular opacity in the  right upper lobe superimposed on the right posterior seventh  rib.  CT is recommended for further evaluation.  The lungs are  otherwise clear.  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 There  is no pleural disease, adenopathy, or significant osseous  abnormality.  There is a small hiatal hernia.      Impression:       Irregular nodular opacity in the right upper lung field.  Recommend CT for further evaluation.  No acute disease.    Authenticated by Ran Nathan M.D. on 01/04/2020 09:33:57 PM            ECHO:  Results for orders placed during the hospital encounter of 10/13/19   Adult Transthoracic Echo Complete W/ Cont if Necessary Per Protocol    Narrative · The left atrium is enlarged.  · Global and segmental LV wall motion is normal. The estimated LV ejection   fraction is >70%.  · The aortic valve appears to be mildly sclerotic. AS and AI are absent.  · There is mild-moderate mitral regurgitation.  · There is moderate tricuspid regurgitation with a moderate elevation is   estimated PA pressure (45-55 mmHg)  · There are no other important findings on this study.              Assessment:  1.  Abnormal CT of the chest   2.  Hypersensitive pneumonitis.    3.  Heart failure with preserved ejection fraction   4.  RSV viral  "syndrome    Review of previous records.  I reviewed the patient's last pulmonology note from 9 December 2019.  It did mention hypersensitive pneumonitis and the fact that the patient was a prednisone since April 2019.  It also mentioned that she was recently taken off the CellCept because of concern for lower extremity edema.  As per my review, she underwent bronchoscopy in April 2019 with bronchoalveolar lavage.  The cytology results from that bronchoscopy did not reveal malignancy but showed benign bronchial cells and mixed inflammatory cells.  I also reviewed her previous laboratory work-up including alpha-1 antitrypsin levels, autoimmune panel, hypersensitivity panel which were all negative.    Discussion/Recommendations:   I have reviewed the patient's history and radiological data and does appear that the patient has pneumonia at this time.      I will continue antibiotics for now, although it is unclear if the patient does have bacterial pneumonia.  Her procalcitonin level was also unremarkable.      Repeat laboratory workup will be followed closely.    We will repeat procalcitonin level in the morning.    I will order hypersensitive panel once again.    I will also order CRP, which can guide us through therapy.    I will consider repeating chest x-ray, if clinically appropriate.    I will continue Solu-Medrol and will adjust the dose.    I will likely discontinue antifungal agents over the next 2 to 3 days, given lack of any suggestion of fungal infection.    Her CT findings could be from hypersensitive pneumonitis itself.    Although she has gotten rid of the \"bird\", she continues to have a dog at home.    Some parts of history, ROS & physical exam were obtained by and with the APRN.     All the relevant labs, radiology images, echocardiograms etc were reviewed with the APRN. Plan was also discussed in detail with APRN.     The plan was discussed with the patient .  I have also discussed the case with the " nursing staff.    Recommendations were also discussed with the referring provider.     I would like to thank you for the opportunity to participate in the care of this patient.  We will communicate changes and recommendations, if and when necessary.      This document was electronically signed by Sri Garcia MD on 01/06/20 at 7:54 AM      Dictated utilizing Dragon dictation.

## 2020-01-07 NOTE — PROGRESS NOTES
[facility]    Name:  Pat Morel   Age:  80 y.o.  Sex:  female  :  1940  MRN:  [id number]   Visit Number:  [id number]  Admission Date:  2020  Date Of Service:  20  Primary Care Physician:  Anthony Sweet DO     LOS: 2 days :  Patient Care Team:  Anthony Sweet DO as PCP - General (Family Medicine)  Tien Archer MD as Consulting Physician (Cardiac Electrophysiology)  Xu Block MD (Cardiology):    Chief Complaint:      Shortness of breath and coughing.    Subjective / Interval History:     Ms. Sandoval is currently sitting up on the bed and is comfortable at rest except for her wheezing.  Did walk with physical therapy in the hallway yesterday.  She however states that her shortness of breath has improved compared to yesterday.  She is being followed by Dr. Garcia.  Patient does have history of interstitial lung disease and has been following Dr. Laura in [place].  She states that she has been on prednisone and CellCept in the past but unfortunately could not tolerate these medications.  Denies any chest pain or fevers.  No significant overnight events.    Patient is 80-year-old female with shortness of breath and cough.  She has coronary artery disease with CABG, A. fib, chronic kidney disease stage III, history of CVA, diastolic CHF, hypersensitivity pneumonitis, interstitial lung disease, hypertension, osteoporosis.  She has a one-week history of shortness of breath, productive cough, wheezing and fevers.  She follows with Dr. Laura as her pulmonologist.  She was noted to have elevated lactic acid, however has a history of congestive heart failure with chronic kidney disease and edema.  Due to this she was not aggressively hydrated.  CT scan of the chest showed multiple bilateral irregular nodular opacities most likely due to infectious disease with possible atypical mycoplasma infection or fungal infection per  radiology.     Due to this Zithromax and micafungin were added.  Patient was also placed on vancomycin and Zosyn.  She has a history of coronary artery disease with CABG and stents.  She follows with Dr. Higgins.  Troponin have been negative.  Respiratory panel revealed the patient to have an RSV infection.    Review of Systems:     General ROS: Patient denies any fevers, chills or loss of consciousness.  Generalized weakness.  Respiratory ROS: Cough, chest congestion and wheezing.  Cardiovascular ROS: Denies chest pain or palpitations. No history of exertional chest pain.  Gastrointestinal ROS: Denies nausea and vomiting. Denies any abdominal pain. No diarrhea.  Neurological ROS: Denies any focal weakness. No loss of consciousness. Denies any numbness.  Dermatological ROS: Denies any redness or pruritis.    Vital Signs:    Temp:  [97.2 °F (36.2 °C)-98 °F (36.7 °C)] 97.2 °F (36.2 °C)  Heart Rate:  [] 102  Resp:  [18-20] 18  BP: (126-142)/(78-90) 142/86    Intake and output:    I/O last 3 completed shifts:  In: 840 [P.O.:840]  Out: 300 [Urine:300]  I/O this shift:  In: 360 [P.O.:360]  Out: -     Physical Examination:    General Appearance:  Alert and cooperative, not in any acute distress.   Head:  Atraumatic and normocephalic, without obvious abnormality.   Eyes:          PERRLA, conjunctivae and sclerae normal, no Icterus. No pallor. Extraocular movements are within normal limits.   Neck: Supple, trachea midline, no thyromegaly, no carotid bruit.   Lungs:   Chest shape is normal. Breath sounds heard bilaterally equally.  Extensive bilateral wheezing and crackles heard slightly improved from yesterday. No Pleural rub or bronchial breathing.   Heart:  Normal S1 and S2, no murmur, no gallop, no rub. No JVD.  Midline CABG scar noted.   Abdomen:   Normal bowel sounds, no masses, no organomegaly. Soft, non-tender, obese, no guarding, no rebound tenderness.   Extremities: Moves all extremities well, 1+ edema, no  cyanosis, no clubbing.  Ecchymoses noted on the left arm and forearm.   Skin: No bleeding or rash.   Neurologic: Awake, alert and oriented times 3. Moves all 4 extremities equally.  No asterixis.     Laboratory results:    Results from last 7 days   Lab Units 01/07/20  0707 01/06/20  0539 01/05/20  0528 01/04/20  1949   SODIUM mmol/L 140 140 139 135*   POTASSIUM mmol/L 3.3* 3.5 3.5 4.9   CHLORIDE mmol/L 100 100 99 92*   CO2 mmol/L 21.9* 23.7 20.9* 18.4*   BUN mg/dL 58* 54* 44* 41*   CREATININE mg/dL 1.81* 1.98* 1.77* 2.01*   CALCIUM mg/dL 9.0 9.2 8.5* 9.8   BILIRUBIN mg/dL  --  1.4*  --  3.1*   ALK PHOS U/L  --  71  --  96   ALT (SGPT) U/L  --  28  --  26   AST (SGOT) U/L  --  37*  --  55*   GLUCOSE mg/dL 154* 168* 176* 179*     Results from last 7 days   Lab Units 01/07/20  0707 01/06/20  0539 01/05/20  0528   WBC 10*3/mm3 8.51 7.29 7.14   HEMOGLOBIN g/dL 9.0* 8.9* 8.5*   HEMATOCRIT % 29.8* 29.9* 27.5*   PLATELETS 10*3/mm3 221 200 184         Results from last 7 days   Lab Units 01/06/20  0539 01/05/20  1635 01/04/20  1950   TROPONIN T ng/mL <0.010 <0.010 0.012     Results from last 7 days   Lab Units 01/06/20  0859 01/04/20  2005 01/04/20  1959   BLOODCX   --  No growth at 2 days No growth at 2 days   MRSA SCREEN CX  No Methicillin Resistant Staphylococcus aureus isolated  --   --      I have reviewed the patient's laboratory results.    Radiology results:    Imaging Results (Last 24 Hours)     Procedure Component Value Units Date/Time    US Renal Limited [[id number]] Collected:  01/06/20 2328     Updated:  01/06/20 2329    Narrative:       FINAL REPORT    TECHNIQUE:  Sonographic images of the kidneys were obtained.    CLINICAL HISTORY:  luna    FINDINGS:  The [id number] lengths of the right and left kidneys are 8.4  cm and 9.0 cm respectively.  There is normal renal parenchymal  echotexture and cortical thickness.  There is no mass or  hydronephrosis.      Impression:       Unremarkable.    Authenticated by Ran  TACHO Nathan. on 01/06/2020 11:28:20 PM        I have reviewed the patient's radiology reports.    Medication Review:     I have reviewed the patients active and prn medications.       Pneumonia of both lungs due to infectious organism    Persistent atrial fibrillation    Coronary artery disease involving native coronary artery of native heart without angina pectoris    Essential hypertension    Pulmonary arterial hypertension (CMS/HCC)    Acquired hypothyroidism    Chronic kidney disease, stage 3 (CMS/HCC)    GERD without esophagitis    Acute respiratory failure with hypoxia (CMS/HCC)    Assessment:    1.  Acute hypersensitivity pneumonitis, present on admission.  2.  Acute on chronic hypoxic respiratory failure, present on admission.  3.  Chronic diastolic heart failure, present on admission, no evidence of exacerbation.  4.  Chronic kidney disease stage III, present admission.  6.  Paroxysmal atrial fibrillation, on anticoagulation.  7.  CAD status post stents and CABG.  8.  Interstitial lung disease.  9.  Chronic pulmonary hypertension.  10.  Acquired hypothyroidism.  11.  RSV infection, present on admission.    Plan:    Ms. Morel continues to improve with regards to her wheezing and respiratory status.  She is being followed by Dr. Garcia and I have discussed the patient's condition with him.  We will start to de-escalate her antibiotic therapy.  We will continue Rocephin and azithromycin but I will discontinue micafungin.  Vancomycin was discontinued yesterday.  Blood cultures have been negative so far.  Legionella and streptococcal serologies were negative as well.  MRSA screen was negative as well.    Her home medications have been continued including apixaban.  Recent echocardiogram done on 10/13/2019 showed hyperdynamic ejection fraction at 70% with mild to moderate mitral regurgitation and moderate tricuspid regurgitation.    She will be continued on physical therapy.  She states that she lives with her   and does have home oxygen. Further recommendations depend upon her clinical course.    Denis Mercado MD  01/07/20  2:38 PM    Dictated utilizing Dragon dictation.

## 2020-01-07 NOTE — PLAN OF CARE
Problem: Patient Care Overview  Goal: Plan of Care Review  Outcome: Ongoing (interventions implemented as appropriate)  Flowsheets (Taken 1/7/2020 4426)  Outcome Summary: Pt tolerated treatment well with pt advancing gait distance to 84' cga/sba with rw. See flowsheet for details.

## 2020-01-07 NOTE — PLAN OF CARE
Problem: Patient Care Overview  Goal: Plan of Care Review  Outcome: Ongoing (interventions implemented as appropriate)  Flowsheets (Taken 1/7/2020 1102)  Progress: improving  Outcome Summary: Pt sitting up in chair upon OT entry and reports soreness, but no pain. Pt completed UE therex 2x10 with short rest breaks between all exs. Pt using PLB with vcs and improving O2 sat with ex and use of PLB. Pt ind with PLB by end of visit. Pt very motivated to improve function and return home. O2 at 100% after rx with report of fatigue but feeling better.

## 2020-01-07 NOTE — PROGRESS NOTES
"Nephrology Progress Note.    LOS: 2 days    Patient Care Team:  Anthony Sweet DO as PCP - General (Family Medicine)  Tien Archer MD as Consulting Physician (Cardiac Electrophysiology)  Xu Block MD (Cardiology)    Chief Complaint:    Chief Complaint   Patient presents with   • Shortness of Breath       Subjective:   Follow up for RENEE and Chronic Kidney disease stage 4 / Anemia.     Interval History:   Patient Complaints: none  Patient seen and examined this morning.  Events from last night noted.  Patient denies having any fevers chills.  No nausea or vomiting no abdominal pain.  Denies any chest pain, she is complaining of some shortness of breath as well as cough and sputum production.  Although she does think that it is better.  There still significant edema of the lower extremities   patient also denies having new onset weakness of numbness of either extremity.  History taken from: patient    Objective:    Vital Signs  /78 (BP Location: Left arm, Patient Position: Sitting)   Pulse 120   Temp 97.9 °F (36.6 °C) (Oral)   Resp 18   Ht 147.3 cm (58\")   Wt 61.9 kg (136 lb 6.4 oz)   LMP  (LMP Unknown)   SpO2 100%   BMI 28.51 kg/m²     I/O this shift:  In: 240 [P.O.:240]  Out: -     Intake/Output Summary (Last 24 hours) at 1/7/2020 1118  Last data filed at 1/7/2020 0811  Gross per 24 hour   Intake 720 ml   Output --   Net 720 ml       Physical Exam:  General Appearance: alert, oriented x 3, no acute distress,   HEENT: Oral mucosa dry, extra occular movements intact. Sclera clear.  Skin: Warm and dry  Neck: supple, no JVD, trachea midline  Lungs:Chest shape is normal. Breath sounds heard bilaterally with scattered rhonchi and wheezing.  Heart: Irregular rate and rhythm. normal S1 and S2, no S3, no rub, peripheral pulses weak but palpable.  Abdomen: Obese, soft, non-tender,  present bowel sounds to auscultation  : no palpable bladder.  Extremities: 2+ edema, no cyanosis or clubbing. "   Neuro: normal speech and mental status, grossly non focal.     Results Review:   Results from last 7 days   Lab Units 01/07/20  0707 01/06/20  0539 01/05/20 0528 01/04/20 1949   SODIUM mmol/L 140 140 139 135*   POTASSIUM mmol/L 3.3* 3.5 3.5 4.9   CHLORIDE mmol/L 100 100 99 92*   CO2 mmol/L 21.9* 23.7 20.9* 18.4*   BUN mg/dL 58* 54* 44* 41*   CREATININE mg/dL 1.81* 1.98* 1.77* 2.01*   CALCIUM mg/dL 9.0 9.2 8.5* 9.8   ALBUMIN g/dL 3.90 3.80  --  4.60   BILIRUBIN mg/dL  --  1.4*  --  3.1*   ALK PHOS U/L  --  71  --  96   ALT (SGPT) U/L  --  28  --  26   AST (SGOT) U/L  --  37*  --  55*   GLUCOSE mg/dL 154* 168* 176* 179*     Estimated Creatinine Clearance: 21.1 mL/min (A) (by C-G formula based on SCr of 1.81 mg/dL (H)).  Results from last 7 days   Lab Units 01/07/20  0707 01/06/20  0539   MAGNESIUM mg/dL  --  2.6*   PHOSPHORUS mg/dL 4.0 4.2         Results from last 7 days   Lab Units 01/07/20  0707 01/06/20  0539 01/05/20 0528 01/04/20 1949   WBC 10*3/mm3 8.51 7.29 7.14 8.48   HEMOGLOBIN g/dL 9.0* 8.9* 8.5* 10.2*   PLATELETS 10*3/mm3 221 200 184 222         Brief Urine Lab Results     None        No results found for: UTPCR  Imaging Results (Last 24 Hours)     Procedure Component Value Units Date/Time    US Renal Limited [[id number]] Collected:  01/06/20 2328     Updated:  01/06/20 2329    Narrative:       FINAL REPORT    TECHNIQUE:  Sonographic images of the kidneys were obtained.    CLINICAL HISTORY:  luna    FINDINGS:  The [id number] lengths of the right and left kidneys are 8.4  cm and 9.0 cm respectively.  There is normal renal parenchymal  echotexture and cortical thickness.  There is no mass or  hydronephrosis.      Impression:       Unremarkable.    Authenticated by Ran Nathan M.D. on 01/06/2020 11:28:20 PM          apixaban 2.5 mg Oral Q12H   atorvastatin 40 mg Oral Nightly   azithromycin 500 mg Intravenous Q24H   bisoprolol 10 mg Oral BID   bumetanide 2 mg Intravenous Q6H   cefTRIAXone 1 g Intravenous  Q24H   ferrous sulfate 324 mg Oral Daily With Breakfast   guaiFENesin 600 mg Oral BID   ipratropium-albuterol 3 mL Nebulization 4x Daily - RT   isosorbide mononitrate 60 mg Oral Daily   lactobacillus acidophilus 1 capsule Oral BID   levothyroxine 75 mcg Oral Daily   methylPREDNISolone sodium succinate 40 mg Intravenous Q6H   micafungin (MYCAMINE)  mg Intravenous Q24H   pantoprazole 40 mg Oral Q AM   sodium chloride 10 mL Intravenous Q12H   spironolactone 25 mg Oral Daily            Medication Review:   Current Facility-Administered Medications   Medication Dose Route Frequency Provider Last Rate Last Dose   • acetaminophen (TYLENOL) tablet 650 mg  650 mg Oral Q4H PRN Tania Gupta DO        Or   • acetaminophen (TYLENOL) 160 MG/5ML solution 650 mg  650 mg Oral Q4H PRN Tania Gupta DO        Or   • acetaminophen (TYLENOL) suppository 650 mg  650 mg Rectal Q4H PRN Tania Gupta DO       • albuterol (PROVENTIL) nebulizer solution 0.083% 2.5 mg/3mL  2.5 mg Nebulization Q4H PRN Tania Gupta DO       • apixaban (ELIQUIS) tablet 2.5 mg  2.5 mg Oral Q12H Tania Gupta DO   2.5 mg at 01/07/20 0912   • atorvastatin (LIPITOR) tablet 40 mg  40 mg Oral Nightly Tania Gupta DO   40 mg at 01/06/20 2044   • AZITHROMYCIN 500 MG/250 ML 0.9% NS IVPB (vial-mate)  500 mg Intravenous Q24H Jamel Myrick, DO   500 mg at 01/06/20 2350   • benzonatate (TESSALON) capsule 200 mg  200 mg Oral TID PRN Tania Gupta DO       • bisoprolol (ZEBeta) tablet 10 mg  10 mg Oral BID Tania Gupta DO   10 mg at 01/07/20 0911   • bumetanide (BUMEX) injection 2 mg  2 mg Intravenous Q6H Sanford Tsang MD, FASN   2 mg at 01/07/20 0524   • cefTRIAXone (ROCEPHIN) IVPB 1 g/50ml dextrose (premix)  1 g Intravenous Q24H Denis Mercado  mL/hr at 01/06/20 1536 1 g at 01/06/20 1536   • ferrous sulfate EC tablet 324 mg  324 mg Oral Daily With Breakfast Tania Gupta  DO [place]   324 mg at 01/07/20 0912   • guaiFENesin (MUCINEX) 12 hr tablet 600 mg  600 mg Oral BID Jamel Myrick DO   600 mg at 01/07/20 0912   • ipratropium-albuterol (DUO-NEB) nebulizer solution 3 mL  3 mL Nebulization Q4H PRN Tania Gupta DO       • ipratropium-albuterol (DUO-NEB) nebulizer solution 3 mL  3 mL Nebulization 4x Daily - RT Jamel Myrick DO   3 mL at 01/07/20 0725   • isosorbide mononitrate (IMDUR) 24 hr tablet 60 mg  60 mg Oral Daily Tania Gupta DO   60 mg at 01/07/20 0916   • lactobacillus acidophilus (RISAQUAD) capsule 1 capsule  1 capsule Oral BID Denis Mercado MD   1 capsule at 01/07/20 0912   • levothyroxine (SYNTHROID, LEVOTHROID) tablet 75 mcg  75 mcg Oral Daily Tania Gupta DO   75 mcg at 01/07/20 0912   • methylPREDNISolone sodium succinate (SOLU-Medrol) injection 40 mg  40 mg Intravenous Q6H Sri Garcia MD   40 mg at 01/07/20 0911   • micafungin 100 mg/100 mL 0.9% NS IVPB (mbp)  100 mg Intravenous Q24H Jamel Myrick DO   100 mg at 01/07/20 0959   • ondansetron (ZOFRAN) tablet 4 mg  4 mg Oral Q6H PRN Tania Gupta DO       • pantoprazole (PROTONIX) EC tablet 40 mg  40 mg Oral Q AM Denis Mercado MD   40 mg at 01/07/20 0916   • phenylephrine-mineral oil-petrolatum (PREPARATION H) 0.25-14-74.9 % hemorhoidal ointment   Rectal BID PRN Tamara Tomlin DO       • sodium chloride 0.9 % flush 10 mL  10 mL Intravenous PRN Tania Gupta DO       • sodium chloride 0.9 % flush 10 mL  10 mL Intravenous Q12H Tania Gupta DO   10 mL at 01/06/20 2100   • sodium chloride 0.9 % flush 10 mL  10 mL Intravenous PRN Tania Gupta DO       • spironolactone (ALDACTONE) tablet 25 mg  25 mg Oral Daily Sanford Tsang MD, FASN   25 mg at 01/07/20 0912       Assessment/Plan:    1. Acute kidney injury: Patient may have a small component of worsening baseline chronic kidney disease.  She does not have any UA or renal  ultrasound.  I will go ahead and get it done and try to establish a baseline.  2. Chronic kidney disease stage IV: Most of her GFR is has been below 30, she likely falls into stage IV chronic kidney disease.  Work-up as ordered.  3. Anemia of chronic kidney disease: She may have component of anemia of chronic kidney disease, check iron stores she has received 2 units of blood on her last hospitalization.  She also mentioned that she had a bleeding ulcer few years back.  Her iron stores are normal.  If her hemoglobin falls below 10 we will consider ANGELA.  4. Pneumonia of both lungs due to infectious organism:  5. 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  She has normal vitamin D levels, PTH is slightly elevated at 87.  She will need to be followed closely.  6. Atrial fibrillation with RVR: Rate controlled and anticoagulated.  7. Coronary artery disease: She has a significant vascular issues, may need further evaluation if there is any worsening of her symptoms.  8. Acute congestive heart failure: Clinically she does appear to have some congestive heart failure would benefit from optimizing medications.  9. Interstitial lung disease: She follows up regularly with her pulmonologist.  10. Pulmonary hypertension: Complications from primary pulmonary issues.  11. Hypothyroidism: Last TSH was within normal limits    Plan:  · Continue with the current treatment plan.  · Today will be the first day she will get IV diuretics.  · Low potassium noted we will give 40 mEq one-time dose.  I will also change spironolactone to 25 mg daily.  · Details were discussed with the patient no family in the room.    · Details were also discussed with the hospitalist service.   · Surveillance labs.  · Further recommendations will depend on clinical course of the patient during the current hospitalization.    · I also discussed the details with the nursing staff.  · Rest as  ordered.    Sanford Tsang MD, JULIANN  01/07/20  11:18 AM    Dictated utilizing Dragon dictation.

## 2020-01-07 NOTE — PROGRESS NOTES
Continued Stay Note   Jose     Patient Name: Pat Morel  MRN: [id number]  Today's Date: 1/7/2020    Admit Date: 1/4/2020    Discharge Plan     Row Name 01/07/20 1215       Plan    Plan Spoke to pt in room.  Plans to go home at discharge.  Children will transport.  Denies discharge needs.          Discharge Codes    No documentation.       Expected Discharge Date and Time     Expected Discharge Date Expected Discharge Time    Jan 8, 2020             Anushka Guzman RN

## 2020-01-07 NOTE — THERAPY TREATMENT NOTE
Acute Care - Occupational Therapy Treatment Note   Jose     Patient Name: Pat Morel  : 1940  MRN: [id number]  Today's Date: 2020  Onset of Illness/Injury or Date of Surgery: 20  Date of Referral to OT: 20  Referring Physician: Dr. Gupta    Admit Date: 2020       ICD-10-CM ICD-9-CM   1. Pneumonia of both lungs due to infectious organism, unspecified part of lung J18.9 483.8   2. Acute respiratory failure with hypoxia (CMS/[facility]) J96.01 518.81   3. Lactic acidosis E87.2 276.2   4. Atrial fibrillation with RVR (CMS/[facility]) I48.91 427.31   5. Impaired mobility and ADLs Z74.09 799.89     Patient Active Problem List   Diagnosis   • Persistent atrial fibrillation   • Coronary artery disease involving native coronary artery of native heart without angina pectoris   • Essential hypertension   • Cerebrovascular accident (CVA) due to embolism (CMS/[facility])   • Dyspnea on exertion   • Pulmonary arterial hypertension (CMS/[facility])   • Interstitial lung disease (CMS/[facility])   • Hypersensitivity pneumonitis (CMS/[facility])   • GI bleed   • RENEE (acute kidney injury) (CMS/[facility])   • Gastrointestinal hemorrhage with melena   • Gastric ulcer   • History of melena   • History of colonoscopy   • Coagulation disorder due to circulating anticoagulants (CMS/[facility])   • Dyslipidemia   • Acquired hypothyroidism   • Chronic kidney disease, stage 3 (CMS/HCC)   • GERD without esophagitis   • Iron deficiency anemia   • Volume overload   • Acute on chronic diastolic congestive heart failure (CMS/HCC)   • Acute on chronic diastolic (congestive) heart failure (CMS/HCC)   • Pneumonia of both lungs due to infectious organism   • Acute respiratory failure with hypoxia (CMS/HCC)     Past Medical History:   Diagnosis Date   • Acute on chronic diastolic congestive heart failure (CMS/HCC) 2019   • Anemia    • Angina at rest (CMS/HCC)    • Arthritis    • Atrial fibrillation (CMS/HCC)    • Coronary artery disease    • Disease of  thyroid gland    • Elevated cholesterol    • GERD without esophagitis 12/2/2019   • History of blood transfusion    • History of stomach ulcers    • Hypertension    • Impaired functional mobility, balance, gait, and endurance    • Osteoporosis    • Positive TB test    • Stroke (CMS/HCC)      Past Surgical History:   Procedure Laterality Date   • BRONCHOSCOPY Bilateral 4/12/2019    Procedure: BRONCHOSCOPY;  Surgeon: Jeff Laura MD;  Location:  [facility];  Service: Pulmonary   • BYPASS GRAFT     • COLONOSCOPY N/A 10/15/2019    Procedure: COLONOSCOPY;  Surgeon: Fredi Aaron MD;  Location:  [facility];  Service: Gastroenterology   • CORONARY ANGIOPLASTY WITH STENT PLACEMENT     • CORONARY ARTERY BYPASS GRAFT  10/18/2010   • ENDOSCOPY N/A 10/14/2019    Procedure: ESOPHAGOGASTRODUODENOSCOPY;  Surgeon: Fredi Aaron MD;  Location:  [facility];  Service: Gastroenterology   • HYSTERECTOMY     • STOMACH SURGERY  08/11/2019    Upper GI bleed    • STOMACH SURGERY  10/13/2019       Therapy Treatment    Rehabilitation Treatment Summary     Row Name 01/07/20 1102 01/07/20 0942          Treatment Time/Intention    Discipline  occupational therapist  -RM  physical therapy assistant  -RMA     Document Type  therapy note (daily note)  -RM  therapy note (daily note)  -RMA     Subjective Information  complains of;dyspnea soreness  -RM  no complaints  -RMA     Mode of Treatment  occupational therapy  -RM  physical therapy  -RMA     Patient Effort  good  -RM  good  -RMA     Existing Precautions/Restrictions  fall;oxygen therapy device and L/min  -RM  fall;oxygen therapy device and L/min  -RMA     Treatment Considerations/Comments  3 lmp  -RM  3 lpm   -RMA     Patient Response to Treatment  Pt tolerated OT rx well.  -RM  --     Recorded by [RM] Yamilka Hardin, OT 01/07/20 1325 [RMA] Matt Renee, PTA 01/07/20 1212     Row Name 01/07/20 1102 01/07/20 0942          Vital Signs    Pre SpO2 (%)  99   -RM  99  -RMA     O2 Delivery Pre Treatment  supplemental O2  -RM  supplemental O2  -RMA     Intra SpO2 (%)  95  -RM  94  -RMA     O2 Delivery Intra Treatment  supplemental O2  -RM  supplemental O2  -RMA     Post SpO2 (%)  100  -RM  96  -RMA     O2 Delivery Post Treatment  supplemental O2  -RM  supplemental O2  -RMA     Pre Patient Position  Sitting  -RM  Supine  -RMA     Intra Patient Position  Sitting  -RM  Standing  -RMA     Post Patient Position  Sitting  -RM  Sitting  -RMA     Recorded by [RM] Yamilka Hardin, OT 01/07/20 1325 [RMA] Matt Renee, PTA 01/07/20 1212     Row Name 01/07/20 0942             Safety Issues, Functional Mobility    Safety Issues Affecting Function (Mobility)  positioning of assistive device  -RMA      Impairments Affecting Function (Mobility)  endurance/activity tolerance;balance  -RMA      Recorded by [RMA] Matt Renee, PTA 01/07/20 1212      Row Name 01/07/20 0942             Bed Mobility Assessment/Treatment    Supine-Sit [place] (Bed Mobility)  conditional independence  -RMA      Assistive Device (Bed Mobility)  bed rails;head of bed elevated  -RMA      Recorded by [RMA] Matt Renee, PTA 01/07/20 1212      Row Name 01/07/20 0942             Sit-Stand Transfer    Sit-Stand [place] (Transfers)  contact guard;2 person assist;verbal cues  -RMA      Assistive Device (Sit-Stand Transfers)  walker, front-wheeled  -RMA      Recorded by [RMA] Matt Renee, PTA 01/07/20 1212      Row Name 01/07/20 0942             Stand-Sit Transfer    Stand-Sit [place] (Transfers)  contact guard;stand by assist;verbal cues  -RMA      Assistive Device (Stand-Sit Transfers)  walker, front-wheeled  -RMA      Recorded by [RMA] Matt Renee, PTA 01/07/20 1212      Row Name 01/07/20 0942             Gait/Stairs Assessment/Training    [place] Level (Gait)  contact guard;stand by assist;verbal cues  -RMA      Assistive Device (Gait)  walker, front-wheeled  -RMA       Distance in Feet (Gait)  84  -RMA      Pattern (Gait)  step-through  -RMA      Deviations/Abnormal Patterns (Gait)  base of support, wide;stride length decreased  -RMA      Right Sided Gait Deviations  weight shift ability decreased  -RMA      Recorded by [RMA] Matt Renee, PTA 01/07/20 1212      Row Name 01/07/20 1102             Motor Skills Assessment/Interventions    Additional Documentation  Therapeutic Exercise (Group)  -RM      Recorded by [RM] Yamilka Hardin OT 01/07/20 1325      Row Name 01/07/20 1102             Therapeutic Exercise    43813 - OT Therapeutic Exercise Minutes  34  -RM      Recorded by [RM] Yamilka Hardin, OT 01/07/20 1325      Row Name 01/07/20 1102             Therapeutic Exercise    Upper Extremity (Therapeutic Exercise)  bicep curl, bilateral;tricep extension, bilateral  -RM      Upper Extremity Range of Motion (Therapeutic Exercise)  shoulder flexion/extension, bilateral;shoulder abduction/adduction, bilateral;shoulder horizontal abduction/adduction, bilateral chest press, bilateral; shld overhead press, bilateral  -RM      Exercise Type (Therapeutic Exercise)  AROM (active range of motion)  -RM      Position (Therapeutic Exercise)  seated  -RM      Sets/Reps (Therapeutic Exercise)  2x10  -RM      Expected Outcome (Therapeutic Exercise)  improve functional stability;improve functional tolerance, self-care activity;improve performance, BADLs  -RM      Comment (Therapeutic Exercise)  rest breaks needed and SOA noted  -RM      Recorded by [RM] Yamilka Hardin OT 01/07/20 1325      Row Name 01/07/20 1102             Static Sitting Balance    Level of [place] (Unsupported Sitting, Static Balance)  independent  -RM      Sitting Position (Unsupported Sitting, Static Balance)  sitting in chair  -RM      Time Able to Maintain Position (Unsupported Sitting, Static Balance)  more than 5 minutes  -RM      Recorded by [RM] Yamilka Hardin OT 01/07/20 1325      Row Name 01/07/20 1102              Dynamic Sitting Balance    Level of [place], Reaches Outside Midline (Sitting, Dynamic Balance)  conditional independence  -RM      Sitting Position, Reaches Outside Midline (Sitting, Dynamic Balance)  sitting in chair  -RM      Recorded by [RM] Yamilka Hardin, OT 01/07/20 1325      Row Name 01/07/20 1102 01/07/20 0942          Positioning and Restraints    Pre-Treatment Position  sitting in chair/recliner  -RM  in bed  -RMA     Post Treatment Position  chair  -RM  chair  -RMA     In Chair  reclined;call light within reach;encouraged to call for assist  -RM  reclined;call light within reach;encouraged to call for assist;notified nsg  -RMA     Recorded by [RM] Yamilka Hardin, OT 01/07/20 1325 [RMA] Matt Renee, PTA 01/07/20 1212     Row Name 01/07/20 1102 01/07/20 0942          Pain Scale: Numbers Pre/Post-Treatment    Pain Scale: Numbers, Pretreatment  0/10 - no pain  -RM  0/10 - no pain  -RMA     Pain Scale: Numbers, Post-Treatment  0/10 - no pain  -RM  0/10 - no pain  -RMA     Recorded by [RM] Yamilka Hardin, OT 01/07/20 1325 [RMA] Matt Renee, PTA 01/07/20 1212     Row Name 01/07/20 1102             Coping    Observed Emotional State  accepting;calm;cooperative  -RM      Verbalized Emotional State  acceptance  -RM      Recorded by [RM] Yamilka Hardin, OT 01/07/20 1325      Row Name 01/07/20 1102 01/07/20 0942          Plan of Care Review    Plan of Care Reviewed With  patient  -RM  patient  -RMA     Progress  improving  -RM  improving  -RMA     Outcome Summary  Pt sitting up in chair upon OT entry and reports soreness, but no pain. Pt completed UE therex 2x10 with short rest breaks between all exs. Pt using PLB with vcs and improving O2 sat with ex and use of PLB. Pt ind with PLB by end of visit. Pt very motivated to improve function and return home. O2 at 100% after rx with report of fatigue but feeling better.  -RM  Pt tolerated treatment well with pt advancing gait distance to 84'  cga/sba with rw. See flowsheet for details.    -RMA2     Recorded by [RM] Yamilka Hardin, OT 01/07/20 1325 [RMA] Matt Renee, PTA 01/07/20 1212  [RMA2] Matt Renee, PTA 01/07/20 1244     Row Name 01/07/20 1102             Outcome Summary/Treatment Plan (OT)    Daily Summary of Progress (OT)  progress toward functional goals is good  -RM      Anticipated Discharge Disposition (OT)  inpatient rehabilitation facility;home with home health  -RM      Recorded by [RM] Yamilka Hardin, OT 01/07/20 1325      Row Name 01/07/20 0942             Outcome Summary/Treatment Plan (PT)    Daily Summary of Progress (PT)  progress toward functional goals is good  -RMA      Plan for Continued Treatment (PT)  Cont PT per POC.   -RMA      Recorded by [RMA] Matt Renee, PTA 01/07/20 1244        User Key  (r) = Recorded By, (t) = Taken By, (c) = Cosigned By    Initials Name Effective Dates Discipline    RMA Matt Renee, PTA 03/07/18 -  PT    RM Yamilka Hardin, OT 11/05/19 -  OT                 OT Recommendation and Plan  Outcome Summary/Treatment Plan (OT)  Daily Summary of Progress (OT): progress toward functional goals is good  Anticipated Discharge Disposition (OT): inpatient rehabilitation facility, home with home health  Daily Summary of Progress (OT): progress toward functional goals is good  Plan of Care Review  Plan of Care Reviewed With: patient  Plan of Care Reviewed With: patient  Outcome Summary: Pt sitting up in chair upon OT entry and reports soreness, but no pain. Pt completed UE therex 2x10 with short rest breaks between all exs. Pt using PLB with vcs and improving O2 sat with ex and use of PLB. Pt ind with PLB by end of visit. Pt very motivated to improve function and return home. O2 at 100% after rx with report of fatigue but feeling better.  Outcome Measures     Row Name 01/07/20 1102 01/07/20 0942 01/06/20 1517       How much help from another person do you currently need...    Turning from your  back to your side while in flat bed without using bedrails?  --  4  -RM  3  -RM    Moving from lying on back to sitting on the side of a flat bed without bedrails?  --  4  -RM  3  -RM    Moving to and from a bed to a chair (including a wheelchair)?  --  3  -RM  3  -RM    Standing up from a chair using your arms (e.g., wheelchair, bedside chair)?  --  4  -RM  3  -RM    Climbing 3-5 steps with a railing?  --  3  -RM  2  -RM    To walk in hospital room?  --  3  -RM  3  -RM    AM-PAC 6 Clicks Score (PT)  --  21  -RM  17  -RM       How much help from another is currently needed...    Putting on and taking off regular lower body clothing?  3  -RMA  --  --    Bathing (including washing, rinsing, and drying)  3  -RMA  --  --    Toileting (which includes using toilet bed pan or urinal)  3  -RMA  --  --    Putting on and taking off regular upper body clothing  3  -RMA  --  --    Taking care of personal grooming (such as brushing teeth)  3  -RMA  --  --    Eating meals  4  -RMA  --  --    AM-PAC 6 Clicks Score (OT)  19  -RMA  --  --       Functional Assessment    Outcome Measure Options  --  AM-PAC 6 Clicks Basic Mobility (PT)  -RM  AM-PAC 6 Clicks Basic Mobility (PT)  -    Row Name 01/06/20 0937             How much help from another is currently needed...    Putting on and taking off regular lower body clothing?  3  -SD      Bathing (including washing, rinsing, and drying)  3  -SD      Toileting (which includes using toilet bed pan or urinal)  3  -SD      Putting on and taking off regular upper body clothing  3  -SD      Taking care of personal grooming (such as brushing teeth)  3  -SD      Eating meals  3  -SD      AM-PAC 6 Clicks Score (OT)  18  -SD         Functional Assessment    Outcome Measure Options  AM-PAC 6 Clicks Daily Activity (OT)  -SD        User Key  (r) = Recorded By, (t) = Taken By, (c) = Cosigned By    Initials Name Provider Type    RM Matt Renee, PTA Physical Therapy Assistant    SARA Schumacher  VELMA Echavarria Occupational Therapist    Yamilka Archuleta OT Occupational Therapist           Time Calculation:   Time Calculation- OT     Row Name 01/07/20 1327 01/07/20 1246 01/07/20 1102       Time Calculation- OT    OT Start Time  1102  -RM  --  --    OT Stop Time  1136  -RM  --  --    OT Time Calculation (min)  34 min  -RM  --  --    OT Received On  01/07/20  -RM  --  --    OT Goal Re-Cert Due Date  01/16/20  -RM  --  --       Timed Charges    24615 - OT Therapeutic Exercise Minutes  34  -RM  --  34  -RM    47440 - Gait Training Minutes   --  20  -RMA  --      User Key  (r) = Recorded By, (t) = Taken By, (c) = Cosigned By    Initials Name Provider Type    Matt Bird, PTA Physical Therapy Assistant    Yamilka Martinez OT Occupational Therapist        Therapy Charges for Today     Code Description Service Date Service Provider Modifiers Qty    64627929972 HC OT THER PROC EA 15 MIN 1/7/2020 Yamilka Hardin OT GO 2               Yamilka Hardin OT  1/7/2020

## 2020-01-07 NOTE — PLAN OF CARE
Problem: Patient Care Overview  Goal: Plan of Care Review  Outcome: Ongoing (interventions implemented as appropriate)  Flowsheets  Taken 1/7/2020 0433  Progress: improving  Outcome Summary: pt rested well throughout the night. no acute events noted. expiratory wheezing audible. vss, will continue to monitor  Taken 1/6/2020 2034  Plan of Care Reviewed With: patient

## 2020-01-07 NOTE — THERAPY TREATMENT NOTE
Acute Care - Physical Therapy Treatment Note   [place]     Patient Name: Pat Morel  : 1940  MRN: [id number]  Today's Date: 2020  Onset of Illness/Injury or Date of Surgery: 20     Referring Physician: Dr. Gupta    Admit Date: 2020    Visit Dx:    ICD-10-CM ICD-9-CM   1. Pneumonia of both lungs due to infectious organism, unspecified part of lung J18.9 483.8   2. Acute respiratory failure with hypoxia (CMS/HCC) J96.01 518.81   3. Lactic acidosis E87.2 276.2   4. Atrial fibrillation with RVR (CMS/HCC) I48.91 427.31   5. Impaired mobility and ADLs Z74.09 799.89     Patient Active Problem List   Diagnosis   • Persistent atrial fibrillation   • Coronary artery disease involving native coronary artery of native heart without angina pectoris   • Essential hypertension   • Cerebrovascular accident (CVA) due to embolism (CMS/[facility])   • Dyspnea on exertion   • Pulmonary arterial hypertension (CMS/HCC)   • Interstitial lung disease (CMS/HCC)   • Hypersensitivity pneumonitis (CMS/HCC)   • GI bleed   • RENEE (acute kidney injury) (CMS/[facility])   • Gastrointestinal hemorrhage with melena   • Gastric ulcer   • History of melena   • History of colonoscopy   • Coagulation disorder due to circulating anticoagulants (CMS/HCC)   • Dyslipidemia   • Acquired hypothyroidism   • Chronic kidney disease, stage 3 (CMS/HCC)   • GERD without esophagitis   • Iron deficiency anemia   • Volume overload   • Acute on chronic diastolic congestive heart failure (CMS/HCC)   • Acute on chronic diastolic (congestive) heart failure (CMS/HCC)   • Pneumonia of both lungs due to infectious organism   • Acute respiratory failure with hypoxia (CMS/HCC)       Therapy Treatment    Rehabilitation Treatment Summary     Row Name 20 0942             Treatment Time/Intention    Discipline  physical therapy assistant  -RM      Document Type  therapy note (daily note)  -RM      Subjective Information  no complaints  -RM      Mode of  Treatment  physical therapy  -RM      Patient Effort  good  -RM      Existing Precautions/Restrictions  fall;oxygen therapy device and L/min  -RM      Treatment Considerations/Comments  3 lpm   -RM      Recorded by [RM] Matt Renee, PTA 01/07/20 1212      Row Name 01/07/20 0942             Vital Signs    Pre SpO2 (%)  99  -RM      O2 Delivery Pre Treatment  supplemental O2  -RM      Intra SpO2 (%)  94  -RM      O2 Delivery Intra Treatment  supplemental O2  -RM      Post SpO2 (%)  96  -RM      O2 Delivery Post Treatment  supplemental O2  -RM      Pre Patient Position  Supine  -RM      Intra Patient Position  Standing  -RM      Post Patient Position  Sitting  -RM      Recorded by [RM] Matt Renee, PTA 01/07/20 1212      Row Name 01/07/20 0942             Safety Issues, Functional Mobility    Safety Issues Affecting Function (Mobility)  positioning of assistive device  -RM      Impairments Affecting Function (Mobility)  endurance/activity tolerance;balance  -RM      Recorded by [] Matt Renee, PTA 01/07/20 1212      Row Name 01/07/20 0942             Bed Mobility Assessment/Treatment    Supine-Sit [place] (Bed Mobility)  conditional independence  -RM      Assistive Device (Bed Mobility)  bed rails;head of bed elevated  -RM      Recorded by [] Matt Renee, PTA 01/07/20 1212      Row Name 01/07/20 0942             Sit-Stand Transfer    Sit-Stand [place] (Transfers)  contact guard;2 person assist;verbal cues  -RM      Assistive Device (Sit-Stand Transfers)  walker, front-wheeled  -RM      Recorded by [RM] Matt Renee, PTA 01/07/20 1212      Row Name 01/07/20 0942             Stand-Sit Transfer    Stand-Sit [place] (Transfers)  contact guard;stand by assist;verbal cues  -RM      Assistive Device (Stand-Sit Transfers)  walker, front-wheeled  -RM      Recorded by [RM] Matt Renee, PTA 01/07/20 1212      Row Name 01/07/20 0942             Gait/Stairs Assessment/Training     [place] Level (Gait)  contact guard;stand by assist;verbal cues  -RM      Assistive Device (Gait)  walker, front-wheeled  -RM      Distance in Feet (Gait)  84  -RM      Pattern (Gait)  step-through  -RM      Deviations/Abnormal Patterns (Gait)  base of support, wide;stride length decreased  -RM      Right Sided Gait Deviations  weight shift ability decreased  -RM      Recorded by [] Matt Renee, PTA 01/07/20 1212      Row Name 01/07/20 0942             Positioning and Restraints    Pre-Treatment Position  in bed  -RM      Post Treatment Position  chair  -RM      In Chair  reclined;call light within reach;encouraged to call for assist;notified nsg  -RM      Recorded by [] Matt Renee, PTA 01/07/20 1212      Row Name 01/07/20 0942             Pain Scale: Numbers Pre/Post-Treatment    Pain Scale: Numbers, Pretreatment  0/10 - no pain  -RM      Pain Scale: Numbers, Post-Treatment  0/10 - no pain  -RM      Recorded by [] Matt Renee, PTA 01/07/20 1212      Row Name 01/07/20 0942             Plan of Care Review    Plan of Care Reviewed With  patient  -      Progress  improving  -      Outcome Summary  Pt tolerated treatment well with pt advancing gait distance to 84' cga/sba with rw. See flowsheet for details.    -RM2      Recorded by [] Matt Renee, PTA 01/07/20 1212  [2] Matt Renee, PTA 01/07/20 1244      Row Name 01/07/20 0942             Outcome Summary/Treatment Plan (PT)    Daily Summary of Progress (PT)  progress toward functional goals is good  -      Plan for Continued Treatment (PT)  Cont PT per POC.   -RM      Recorded by [] Matt Renee, PTA 01/07/20 1244        User Key  (r) = Recorded By, (t) = Taken By, (c) = Cosigned By    Initials Name Effective Dates Discipline     Matt Renee, PTA 03/07/18 -  PT                       PT Recommendation and Plan     Outcome Summary/Treatment Plan (PT)  Daily Summary of Progress (PT): progress toward  functional goals is good  Plan for Continued Treatment (PT): Cont PT per POC.   Plan of Care Reviewed With: patient  Progress: improving  Outcome Summary: Pt tolerated treatment well with pt advancing gait distance to 84' cga/sba with rw. See flowsheet for details.    Outcome Measures     Row Name 01/07/20 0942 01/06/20 1517 01/06/20 0937       How much help from another person do you currently need...    Turning from your back to your side while in flat bed without using bedrails?  4  -RM  3  -RM  --    Moving from lying on back to sitting on the side of a flat bed without bedrails?  4  -RM  3  -RM  --    Moving to and from a bed to a chair (including a wheelchair)?  3  -RM  3  -RM  --    Standing up from a chair using your arms (e.g., wheelchair, bedside chair)?  4  -RM  3  -RM  --    Climbing 3-5 steps with a railing?  3  -RM  2  -RM  --    To walk in hospital room?  3  -RM  3  -RM  --    AM-PAC 6 Clicks Score (PT)  21  -RM  17  -RM  --       How much help from another is currently needed...    Putting on and taking off regular lower body clothing?  --  --  3  -SD    Bathing (including washing, rinsing, and drying)  --  --  3  -SD    Toileting (which includes using toilet bed pan or urinal)  --  --  3  -SD    Putting on and taking off regular upper body clothing  --  --  3  -SD    Taking care of personal grooming (such as brushing teeth)  --  --  3  -SD    Eating meals  --  --  3  -SD    AM-PAC 6 Clicks Score (OT)  --  --  18  -SD       Functional Assessment    Outcome Measure Options  AM-PAC 6 Clicks Basic Mobility (PT)  -RM  AM-PAC 6 Clicks Basic Mobility (PT)  -RM  AM-PAC 6 Clicks Daily Activity (OT)  -SD      User Key  (r) = Recorded By, (t) = Taken By, (c) = Cosigned By    Initials Name Provider Type    RM Matt Renee, PTA Physical Therapy Assistant    Italia Norris, OT Occupational Therapist         Time Calculation:   PT Charges     Row Name 01/07/20 1246             Time Calculation    Start  Time  0942  -RM      Stop Time  1002  -RM      Time Calculation (min)  20 min  -RM      PT Received On  01/07/20  -RM      PT Goal Re-Cert Due Date  01/15/20  -RM         Time Calculation- PT    Total Timed Code Minutes- PT  20 minute(s)  -RM         Timed Charges    34810 - Gait Training Minutes   20  -RM        User Key  (r) = Recorded By, (t) = Taken By, (c) = Cosigned By    Initials Name Provider Type    Matt Verma, PTA Physical Therapy Assistant        Therapy Charges for Today     Code Description Service Date Service Provider Modifiers Qty    52822117577 HC GAIT TRAINING EA 15 MIN 1/6/2020 Matt Renee, PTA GP 1    02837908855 HC GAIT TRAINING EA 15 MIN 1/7/2020 Matt Renee, PTA GP 1          PT G-Codes  Outcome Measure Options: AM-PAC 6 Clicks Basic Mobility (PT)  AM-PAC 6 Clicks Score (PT): 21  AM-PAC 6 Clicks Score (OT): 18    Matt Renee PTA  1/7/2020

## 2020-01-07 NOTE — PLAN OF CARE
Problem: Patient Care Overview  Goal: Plan of Care Review  Outcome: Ongoing (interventions implemented as appropriate)  Flowsheets (Taken 1/7/2020 0856)  Progress: improving  Plan of Care Reviewed With: patient  Outcome Summary: Patient VSS this shift, telemetry consistant with atrial fib. Continue work with PT/OT, possible D/C in 1-2 days     Problem: Patient Care Overview  Goal: Individualization and Mutuality  Outcome: Ongoing (interventions implemented as appropriate)     Problem: Patient Care Overview  Goal: Discharge Needs Assessment  Outcome: Ongoing (interventions implemented as appropriate)

## 2020-01-07 NOTE — PROGRESS NOTES
"  CC: Abnormal CT of the chest.    S: Continues to have shortness of breath.  Continues to have cough.  Denies any fever.    ROS: Positive for cough & shortness of breath. Denies chest pain, diarrhea or fever.    O: /78 (BP Location: Left arm, Patient Position: Sitting)   Pulse 120   Temp 97.9 °F (36.6 °C) (Oral)   Resp 18   Ht 147.3 cm (58\")   Wt 61.9 kg (136 lb 6.4 oz)   LMP  (LMP Unknown)   SpO2 100%   BMI 28.51 kg/m²     Vital signs reviewed.  General/Constitutional: Mild respiratory distress noted.  Neck: No obvious JVD   Cardiovascular: S1 + S2.  Tachycardic.  Irregular  Lungs/Respiratory: Bilateral wheezing heard.  Minimal basal crackles noted  Musculoskeletal/Extremities: 1+ edema noted.  Neurologic: AAOx3. Was able to follow commands     Labs: Reviewed.     Results from last 7 days   Lab Units 01/07/20  0707 01/06/20  0539 01/05/20  0528   SODIUM mmol/L 140 140 139   POTASSIUM mmol/L 3.3* 3.5 3.5   CHLORIDE mmol/L 100 100 99   CO2 mmol/L 21.9* 23.7 20.9*   BUN mg/dL 58* 54* 44*   CREATININE mg/dL 1.81* 1.98* 1.77*   CALCIUM mg/dL 9.0 9.2 8.5*   GLUCOSE mg/dL 154* 168* 176*       Results from last 7 days   Lab Units 01/07/20  0707 01/06/20  0539   MAGNESIUM mg/dL  --  2.6*   PHOSPHORUS mg/dL 4.0 4.2       Results from last 7 days   Lab Units 01/07/20  0707 01/06/20  0539 01/05/20  0528   WBC 10*3/mm3 8.51 7.29 7.14   NEUTROPHIL % %  --  91.3* 89.3*   HEMOGLOBIN g/dL 9.0* 8.9* 8.5*   HEMATOCRIT % 29.8* 29.9* 27.5*   PLATELETS 10*3/mm3 221 200 184             Lab Results   Component Value Date    PROCALCITO 0.33 (H) 01/07/2020    PROCALCITO 0.25 01/04/2020       Lab Results   Component Value Date    PROBNP 13,304.0 (H) 01/07/2020    PROBNP 28,142.0 (H) 01/04/2020    PROBNP 10,610 (H) 12/20/2019       Lab Results   Component Value Date    CRP 7.13 (H) 01/07/2020    CRP 11.74 (H) 01/06/2020         Micro: As of January 7, 2020   Lab Results   Component Value Date    RESPCX Heavy growth (4+) Normal " Respiratory Kristine 04/12/2019     Lab Results   Component Value Date    BLOODCX No growth at 2 days 01/04/2020    BLOODCX No growth at 2 days 01/04/2020     No results found for: URINECX  No results found for: MRSACX  No results found for: URCX  No components found for: LOWRESPCF  No results found for: THROATCX  No results found for: CULTURES  No components found for: STREPBCX  Lab Results   Component Value Date    STREPPNEUAG Negative 01/05/2020     No results found for: LEGIONELLA  No results found for: MYCOPLASCX  No results found for: GCCX  No results found for: WOUNDCX  No results found for: BODYFLDCX    Lab Results   Component Value Date    FLU Negative 01/04/2020    FLU Negative 01/04/2020       Lab Results   Component Value Date    ADENOVIRUS Not Detected 01/05/2020     Lab Results   Component Value Date    RP406N Not Detected 01/05/2020     Lab Results   Component Value Date    CVHKU1 Not Detected 01/05/2020     Lab Results   Component Value Date    CVNL63 Not Detected 01/05/2020     Lab Results   Component Value Date    CVOC43 Not Detected 01/05/2020     Lab Results   Component Value Date    HUMETPNEVS Not Detected 01/05/2020     Lab Results   Component Value Date    HURVEV Not Detected 01/05/2020     Lab Results   Component Value Date    FLUBPCR Not Detected 01/05/2020     Lab Results   Component Value Date    PARAINFLUE Not Detected 01/05/2020     Lab Results   Component Value Date    PARAFLUV2 Not Detected 01/05/2020     Lab Results   Component Value Date    PARAFLUV3 Not Detected 01/05/2020     Lab Results   Component Value Date    PARAFLUV4 Not Detected 01/05/2020     Lab Results   Component Value Date    BPERTPCR Not Detected 01/05/2020     Lab Results   Component Value Date    [id number] Not Detected 01/05/2020     Lab Results   Component Value Date    CPNEUPCR Not Detected 01/05/2020     Lab Results   Component Value Date    MPNEUMO Not Detected 01/05/2020     Lab Results   Component Value Date     FLUAPCR Not Detected 01/05/2020     Lab Results   Component Value Date    FLUAH3 Not Detected 01/05/2020     Lab Results   Component Value Date    FLUAH1 Not Detected 01/05/2020     Lab Results   Component Value Date    RSV Detected (A) 01/05/2020     Lab Results   Component Value Date    BPARAPCR Not Detected 01/05/2020                CXRay: Latest imaging study was reviewed personally.   Imaging Results (Last 24 Hours)     Procedure Component Value Units Date/Time    US Renal Limited [[id number]] Collected:  01/06/20 2328     Updated:  01/06/20 2329    Narrative:       FINAL REPORT    TECHNIQUE:  Sonographic images of the kidneys were obtained.    CLINICAL HISTORY:  luna    FINDINGS:  The ozee-zm-vyzx lengths of the right and left kidneys are 8.4  cm and 9.0 cm respectively.  There is normal renal parenchymal  echotexture and cortical thickness.  There is no mass or  hydronephrosis.      Impression:       Unremarkable.    Authenticated by Ran Nathan M.D. on 01/06/2020 11:28:20 PM          Assessment & Recommendations/Plan:   1.  Abnormal CT of the chest   Likely from previous history of hypersensitivity pneumonitis.    2.  Hypersensitivity pneumonitis.    Was off medications, due to possible side effects from CellCept and prednisone.  We will continue Solu-Medrol.    3.  Heart failure with preserved ejection fraction   We will consider diuretic therapy.    4.  RSV viral syndrome  May have contributed to some of the symptoms.    5.  Chronic kidney disease    We will consider repeating chest x-ray in the next 24 hours.    We have reviewed patient's current orders and changes, if any, have been suggested to primary care team. Plan was also discussed with nursing staff, as necessary.     This document was electronically signed by Sri Garcia MD on 01/07/20 at 9:43 AM      Dictated utilizing Dragon dictation.

## 2020-01-08 NOTE — THERAPY TREATMENT NOTE
Acute Care - Occupational Therapy Treatment Note   Jose     Patient Name: Pat Morel  : 1940  MRN: [id number]  Today's Date: 2020  Onset of Illness/Injury or Date of Surgery: 20  Date of Referral to OT: 20  Referring Physician: Dr. Gupta    Admit Date: 2020       ICD-10-CM ICD-9-CM   1. Pneumonia of both lungs due to infectious organism, unspecified part of lung J18.9 483.8   2. Acute respiratory failure with hypoxia (CMS/[facility]) J96.01 518.81   3. Lactic acidosis E87.2 276.2   4. Atrial fibrillation with RVR (CMS/[facility]) I48.91 427.31   5. Impaired mobility and ADLs Z74.09 799.89     Patient Active Problem List   Diagnosis   • Persistent atrial fibrillation   • Coronary artery disease involving native coronary artery of native heart without angina pectoris   • Essential hypertension   • Cerebrovascular accident (CVA) due to embolism (CMS/[facility])   • Dyspnea on exertion   • Pulmonary arterial hypertension (CMS/[facility])   • Interstitial lung disease (CMS/[facility])   • Hypersensitivity pneumonitis (CMS/[facility])   • GI bleed   • RENEE (acute kidney injury) (CMS/[facility])   • Gastrointestinal hemorrhage with melena   • Gastric ulcer   • History of melena   • History of colonoscopy   • Coagulation disorder due to circulating anticoagulants (CMS/[facility])   • Dyslipidemia   • Acquired hypothyroidism   • Chronic kidney disease, stage 3 (CMS/HCC)   • GERD without esophagitis   • Iron deficiency anemia   • Volume overload   • Acute on chronic diastolic congestive heart failure (CMS/HCC)   • Acute on chronic diastolic (congestive) heart failure (CMS/HCC)   • Pneumonia of both lungs due to infectious organism   • Acute respiratory failure with hypoxia (CMS/HCC)     Past Medical History:   Diagnosis Date   • Acute on chronic diastolic congestive heart failure (CMS/HCC) 2019   • Anemia    • Angina at rest (CMS/HCC)    • Arthritis    • Atrial fibrillation (CMS/HCC)    • Coronary artery disease    • Disease of  thyroid gland    • Elevated cholesterol    • GERD without esophagitis 12/2/2019   • History of blood transfusion    • History of stomach ulcers    • Hypertension    • Impaired functional mobility, balance, gait, and endurance    • Osteoporosis    • Positive TB test    • Stroke (CMS/HCC)      Past Surgical History:   Procedure Laterality Date   • BRONCHOSCOPY Bilateral 4/12/2019    Procedure: BRONCHOSCOPY;  Surgeon: Jeff Laura MD;  Location: [facility] ENDOSCOPY;  Service: Pulmonary   • BYPASS GRAFT     • COLONOSCOPY N/A 10/15/2019    Procedure: COLONOSCOPY;  Surgeon: Fredi Aaron MD;  Location:  [facility];  Service: Gastroenterology   • CORONARY ANGIOPLASTY WITH STENT PLACEMENT     • CORONARY ARTERY BYPASS GRAFT  10/18/2010   • ENDOSCOPY N/A 10/14/2019    Procedure: ESOPHAGOGASTRODUODENOSCOPY;  Surgeon: Fredi Aaron MD;  Location:  [facility];  Service: Gastroenterology   • HYSTERECTOMY     • STOMACH SURGERY  08/11/2019    Upper GI bleed    • STOMACH SURGERY  10/13/2019       Therapy Treatment    Rehabilitation Treatment Summary     Row Name 01/08/20 1318 01/08/20 1114          Treatment Time/Intention    Discipline  occupational therapist  -SD  physical therapy assistant  -RM     Document Type  therapy note (daily note)  -SD  therapy note (daily note)  -RM     Subjective Information  no complaints  -SD  complains of;fatigue  -RM     Mode of Treatment  occupational therapy  -SD  physical therapy  -RM     Patient Effort  good  -SD  --     Existing Precautions/Restrictions  fall;oxygen therapy device and L/min  -SD  fall;oxygen therapy device and L/min  -RM     Treatment Considerations/Comments  --  2 lpm pnc   -RM     Recorded by [SD] Italia Schumacher, OT 01/08/20 1354 [RM] Matt Renee, HAKEEM 01/08/20 1153     Row Name 01/08/20 1318 01/08/20 1114          Vital Signs    Intratreatment Heart Rate (beats/min)  --  139  -RM     Posttreatment Heart Rate (beats/min)  --  84  -RM      Pre SpO2 (%)  94  -SD  98  -RM     O2 Delivery Pre Treatment  supplemental O2  -SD  supplemental O2  -RM     Intra SpO2 (%)  --  89  -RM     O2 Delivery Intra Treatment  supplemental O2  -SD  supplemental O2  -RM     Post SpO2 (%)  95  -SD  96  -RM     O2 Delivery Post Treatment  supplemental O2  -SD  supplemental O2  -RM     Pre Patient Position  --  Sitting  -RM     Intra Patient Position  --  Standing  -RM     Post Patient Position  --  Sitting  -RM     Recovery Time  --  1   -RM     Rest Breaks   --  1  -RM     Recorded by [SD] Italia Schumacher, OT 01/08/20 1354 [RM] Matt Renee, PTA 01/08/20 1153     Row Name 01/08/20 1114             Safety Issues, Functional Mobility    Impairments Affecting Function (Mobility)  endurance/activity tolerance;strength;shortness of breath  -RM      Recorded by [RM] Matt Renee, PTA 01/08/20 1153      Row Name 01/08/20 1114             Sit-Stand Transfer    Sit-Stand [place] (Transfers)  contact guard;stand by assist;verbal cues  -RM      Assistive Device (Sit-Stand Transfers)  walker, front-wheeled  -RM      Recorded by [RM] Matt Renee, PTA 01/08/20 1153      Row Name 01/08/20 1114             Stand-Sit Transfer    Stand-Sit [place] (Transfers)  contact guard;stand by assist;verbal cues  -RM      Assistive Device (Stand-Sit Transfers)  walker, front-wheeled  -RM      Recorded by [RM] Matt Renee, PTA 01/08/20 1153      Row Name 01/08/20 1114             Gait/Stairs Assessment/Training    [place] Level (Gait)  contact guard;verbal cues  -RM      Assistive Device (Gait)  walker, front-wheeled  -RM      Distance in Feet (Gait)  2x50 1 standing rest   -RM      Pattern (Gait)  step-through  -RM      Deviations/Abnormal Patterns (Gait)  base of support, wide;stride length decreased  -RM      Recorded by [RM] Matt Renee, PTA 01/08/20 1153      Row Name 01/08/20 1318             Lower Body Dressing Assessment/Training    Lower Body Dressing  [place] Level  don;doff;socks;minimum assist (75% patient effort) educated pt on AE for LB self care tasks to prevent dyspnea  -SD      Recorded by [SD] Italia Schumacher, OT 01/08/20 1354      Row Name 01/08/20 1318             Therapeutic Exercise    Upper Extremity Range of Motion (Therapeutic Exercise)  shoulder flexion/extension, bilateral;shoulder abduction/adduction, bilateral;shoulder horizontal abduction/adduction, bilateral;elbow flexion/extension, bilateral  -SD      Weight/Resistance (Therapeutic Exercise)  yellow  -SD      Core Strength (Therapeutic Exercise)  -- trunk flexion/rotation 2 x 5 ea  -SD      Exercise Type (Therapeutic Exercise)  resistive exercises;AROM (active range of motion)  -SD      Position (Therapeutic Exercise)  seated  -SD      Sets/Reps (Therapeutic Exercise)  2 x 10  -SD      Equipment (Therapeutic Exercise)  resistive bands  -SD      Expected Outcome (Therapeutic Exercise)  improve functional tolerance, self-care activity  -SD      Recorded by [SD] Italia Schumacher OT 01/08/20 1354      Row Name 01/08/20 1318 01/08/20 1114          Positioning and Restraints    Pre-Treatment Position  sitting in chair/recliner  -SD  sitting in chair/recliner  -RM     Post Treatment Position  chair  -SD  chair  -RM     In Chair  reclined;call light within reach;encouraged to call for assist  -SD  reclined;call light within reach;encouraged to call for assist;notified nsg  -RM     Recorded by [SD] Italia Schumacher, OT 01/08/20 1354 [RM] Matt Renee, [facility] 01/08/20 1153     Row Name 01/08/20 1318 01/08/20 1114          Pain Scale: Numbers Pre/Post-Treatment    Pain Scale: Numbers, Pretreatment  0/10 - no pain  -SD  2/10  -RM     Pain Scale: Numbers, Post-Treatment  0/10 - no pain  -SD  2/10  -RM     Pain Location - Side  --  -- throat   -RM     Recorded by [SD] Italia Scuhmacher, OT 01/08/20 1354 [RM] Matt Renee, [facility] 01/08/20 1153     Row Name 01/08/20 1318             Coping     Observed Emotional State  calm;cooperative  -SD      Verbalized Emotional State  acceptance  -SD      Recorded by [SD] Italia Schumacher OT 01/08/20 1354      Row Name 01/08/20 1318 01/08/20 1114          Plan of Care Review    Plan of Care Reviewed With  patient  -SD  patient  -RM     Progress  improving  -SD  improving  -RM     Outcome Summary  OT tx completed. Patient performed UB and trunk strengthening followed by LBD task requiring min A. Educated pt on AE to facilitate independence and prevent dyspnea. Continue OT POC  -SD  Pt tolerated treatment fair with pt ambulating 2x50' cga with rw. Pt used decreased O2 with pt being on 2 lpm pnc but )2 dropped to 89% and HR elevated to 139 Bpm. Pt recovered in approx 1 min to 86% and 84 bpm .  See flowsheet for details.   -RM     Recorded by [SD] Italia Schumacher OT 01/08/20 1354 [RM] Matt Renee, PTA 01/08/20 1153     Row Name 01/08/20 1318             Outcome Summary/Treatment Plan (OT)    Daily Summary of Progress (OT)  progress toward functional goals is good  -SD      Recorded by [SD] Italia Schumacher OT 01/08/20 1354      Row Name 01/08/20 1114             Outcome Summary/Treatment Plan (PT)    Daily Summary of Progress (PT)  progress toward functional goals is good  -      Plan for Continued Treatment (PT)  Cont PT per POC.   -RM      Recorded by [RM] Matt Renee, PTA 01/08/20 1152        User Key  (r) = Recorded By, (t) = Taken By, (c) = Cosigned By    Initials Name Effective Dates Discipline    RM Matt Renee, PTA 03/07/18 -  PT    SD Italia Schumacher OT 03/07/18 -  OT                 OT Recommendation and Plan  Outcome Summary/Treatment Plan (OT)  Daily Summary of Progress (OT): progress toward functional goals is good  Anticipated Discharge Disposition (OT): anticipate therapy at next level of care, inpatient rehabilitation facility, home with home health  Planned Therapy Interventions (OT Eval): activity tolerance training, adaptive  equipment training, BADL retraining, patient/caregiver education/training, strengthening exercise, transfer/mobility retraining  Therapy Frequency (OT Eval): 3 times/wk(5 times if indicated)  Daily Summary of Progress (OT): progress toward functional goals is good  Plan of Care Review  Plan of Care Reviewed With: patient  Plan of Care Reviewed With: patient  Outcome Summary: OT tx completed. Patient performed UB and trunk strengthening followed by LBD task requiring min A. Educated pt on AE to facilitate independence and prevent dyspnea. Continue OT POC  Outcome Measures     Row Name 01/08/20 1318 01/08/20 1114 01/07/20 1102       How much help from another person do you currently need...    Turning from your back to your side while in flat bed without using bedrails?  --  4  -RM  --    Moving from lying on back to sitting on the side of a flat bed without bedrails?  --  4  -RM  --    Moving to and from a bed to a chair (including a wheelchair)?  --  3  -RM  --    Standing up from a chair using your arms (e.g., wheelchair, bedside chair)?  --  4  -RM  --    Climbing 3-5 steps with a railing?  --  2  -RM  --    To walk in hospital room?  --  3  -RM  --    AM-PAC 6 Clicks Score (PT)  --  20  -RM  --       How much help from another is currently needed...    Putting on and taking off regular lower body clothing?  3  -SD  --  3  -RMA    Bathing (including washing, rinsing, and drying)  3  -SD  --  3  -RMA    Toileting (which includes using toilet bed pan or urinal)  3  -SD  --  3  -RMA    Putting on and taking off regular upper body clothing  3  -SD  --  3  -RMA    Taking care of personal grooming (such as brushing teeth)  4  -SD  --  3  -RMA    Eating meals  4  -SD  --  4  -RMA    AM-PAC 6 Clicks Score (OT)  20  -SD  --  19  -RMA       Functional Assessment    Outcome Measure Options  AM-PAC 6 Clicks Daily Activity (OT)  -SD  AM-PAC 6 Clicks Basic Mobility (PT)  -RM  --    Row Name 01/07/20 0942 01/06/20 1517 01/06/20  0937       How much help from another person do you currently need...    Turning from your back to your side while in flat bed without using bedrails?  4  -RM  3  -RM  --    Moving from lying on back to sitting on the side of a flat bed without bedrails?  4  -RM  3  -RM  --    Moving to and from a bed to a chair (including a wheelchair)?  3  -RM  3  -RM  --    Standing up from a chair using your arms (e.g., wheelchair, bedside chair)?  4  -RM  3  -RM  --    Climbing 3-5 steps with a railing?  3  -RM  2  -RM  --    To walk in hospital room?  3  -RM  3  -RM  --    AM-PAC 6 Clicks Score (PT)  21  -RM  17  -RM  --       How much help from another is currently needed...    Putting on and taking off regular lower body clothing?  --  --  3  -SD    Bathing (including washing, rinsing, and drying)  --  --  3  -SD    Toileting (which includes using toilet bed pan or urinal)  --  --  3  -SD    Putting on and taking off regular upper body clothing  --  --  3  -SD    Taking care of personal grooming (such as brushing teeth)  --  --  3  -SD    Eating meals  --  --  3  -SD    AM-PAC 6 Clicks Score (OT)  --  --  18  -SD       Functional Assessment    Outcome Measure Options  AM-PAC 6 Clicks Basic Mobility (PT)  -RM  AM-PAC 6 Clicks Basic Mobility (PT)  -RM  AM-PAC 6 Clicks Daily Activity (OT)  -SD      User Key  (r) = Recorded By, (t) = Taken By, (c) = Cosigned By    Initials Name Provider Type    RM Matt Renee, PTA Physical Therapy Assistant    Italia Norris, OT Occupational Therapist    Yamilka Archuleta, VELMA Occupational Therapist           Time Calculation:   Time Calculation- OT     Row Name 01/08/20 1355 01/08/20 1157          Time Calculation- OT    OT Start Time  1318  -SD  --     Total Timed Code Minutes- OT  29 minute(s)  -SD  --     OT Received On  01/08/20  -SD  --     OT Goal Re-Cert Due Date  01/16/20  -SD  --        Timed Charges    04632 - OT Therapeutic Exercise Minutes  20  -SD  --     23981 - Gait  Training Minutes   --  20  -     06822 - OT Self Care/Mgmt Minutes  9  -SD  --       User Key  (r) = Recorded By, (t) = Taken By, (c) = Cosigned By    Initials Name Provider Type    Matt Verma, PTA Physical Therapy Assistant    Italia Norris OT Occupational Therapist        Therapy Charges for Today     Code Description Service Date Service Provider Modifiers Qty    [id number]  OT THER PROC EA 15 MIN 1/8/2020 Italia Schumacher OT GO 1    [id number]  OT SELF CARE/MGMT/TRAIN EA 15 MIN 1/8/2020 Italia Schumacher OT GO 1               Italia Schumacher OT  1/8/2020

## 2020-01-08 NOTE — PLAN OF CARE
Problem: Patient Care Overview  Goal: Plan of Care Review  Outcome: Ongoing (interventions implemented as appropriate)  Flowsheets  Taken 1/8/2020 1354  Progress: improving  Plan of Care Reviewed With: patient  Taken 1/8/2020 1318  Outcome Summary: OT tx completed. Patient performed UB and trunk strengthening followed by LBD task requiring min A. Educated pt on AE to facilitate independence and prevent dyspnea. Continue OT POC

## 2020-01-08 NOTE — PLAN OF CARE
Problem: Patient Care Overview  Goal: Plan of Care Review  Outcome: Ongoing (interventions implemented as appropriate)  Flowsheets  Taken 1/7/2020 1726 by Chloe Bal RN  Progress: improving  Taken 1/7/2020 2036 by Rosario Suárez RN  Plan of Care Reviewed With: patient  Taken 1/8/2020 0345 by Rosario Suárez RN  Outcome Summary: pt rested well throughout the night. no acute events noted, telemetry consistant with atrial fib. iv antibiotics continued. will continue to monitor

## 2020-01-08 NOTE — PLAN OF CARE
Problem: Patient Care Overview  Goal: Plan of Care Review  Outcome: Ongoing (interventions implemented as appropriate)  Flowsheets (Taken 1/8/2020 1613)  Progress: no change  Plan of Care Reviewed With: patient  Outcome Summary: VSS.  No c/o pain.  Pt. up in chair for most of the day.  Lungs still sound decreased/coarse/wheezy.  Pt. has bright red blood from rectum when wiping.  Dr. Mercado was informed earlier today.  Pt. states she has hemorrhoids.

## 2020-01-08 NOTE — THERAPY TREATMENT NOTE
Acute Care - Physical Therapy Treatment Note   Garcia     Patient Name: Pat Morel  : 1940  MRN: [id number]  Today's Date: 2020  Onset of Illness/Injury or Date of Surgery: 20     Referring Physician: Dr. Gupta    Admit Date: 2020    Visit Dx:    ICD-10-CM ICD-9-CM   1. Pneumonia of both lungs due to infectious organism, unspecified part of lung J18.9 483.8   2. Acute respiratory failure with hypoxia (CMS/HCC) J96.01 518.81   3. Lactic acidosis E87.2 276.2   4. Atrial fibrillation with RVR (CMS/HCC) I48.91 427.31   5. Impaired mobility and ADLs Z74.09 799.89     Patient Active Problem List   Diagnosis   • Persistent atrial fibrillation   • Coronary artery disease involving native coronary artery of native heart without angina pectoris   • Essential hypertension   • Cerebrovascular accident (CVA) due to embolism (CMS/HCC)   • Dyspnea on exertion   • Pulmonary arterial hypertension (CMS/HCC)   • Interstitial lung disease (CMS/HCC)   • Hypersensitivity pneumonitis (CMS/HCC)   • GI bleed   • RENEE (acute kidney injury) (CMS/HCC)   • Gastrointestinal hemorrhage with melena   • Gastric ulcer   • History of melena   • History of colonoscopy   • Coagulation disorder due to circulating anticoagulants (CMS/HCC)   • Dyslipidemia   • Acquired hypothyroidism   • Chronic kidney disease, stage 3 (CMS/HCC)   • GERD without esophagitis   • Iron deficiency anemia   • Volume overload   • Acute on chronic diastolic congestive heart failure (CMS/HCC)   • Acute on chronic diastolic (congestive) heart failure (CMS/HCC)   • Pneumonia of both lungs due to infectious organism   • Acute respiratory failure with hypoxia (CMS/HCC)       Therapy Treatment    Rehabilitation Treatment Summary     Row Name 20 1114             Treatment Time/Intention    Discipline  physical therapy assistant  -RM      Document Type  therapy note (daily note)  -RM      Subjective Information  complains of;fatigue  -RM      Mode  of Treatment  physical therapy  -RM      Existing Precautions/Restrictions  fall;oxygen therapy device and L/min  -RM      Treatment Considerations/Comments  2 lpm pnc   -RM      Recorded by [] Matt Renee, [facility] 01/08/20 1153      Row Name 01/08/20 1114             Vital Signs    Intratreatment Heart Rate (beats/min)  139  -RM      Posttreatment Heart Rate (beats/min)  84  -RM      Pre SpO2 (%)  98  -RM      O2 Delivery Pre Treatment  supplemental O2  -RM      Intra SpO2 (%)  89  -RM      O2 Delivery Intra Treatment  supplemental O2  -RM      Post SpO2 (%)  96  -RM      O2 Delivery Post Treatment  supplemental O2  -RM      Pre Patient Position  Sitting  -RM      Intra Patient Position  Standing  -RM      Post Patient Position  Sitting  -RM      Recovery Time  1   -RM      Rest Breaks   1  -RM      Recorded by [] Matt Renee, PTA 01/08/20 1153      Row Name 01/08/20 1114             Safety Issues, Functional Mobility    Impairments Affecting Function (Mobility)  endurance/activity tolerance;strength;shortness of breath  -RM      Recorded by [] Matt Renee, PTA 01/08/20 1153      Row Name 01/08/20 1114             Sit-Stand Transfer    Sit-Stand [place] (Transfers)  contact guard;stand by assist;verbal cues  -RM      Assistive Device (Sit-Stand Transfers)  walker, front-wheeled  -RM      Recorded by [] Matt Renee, PTA 01/08/20 1153      Row Name 01/08/20 1114             Stand-Sit Transfer    Stand-Sit [place] (Transfers)  contact guard;stand by assist;verbal cues  -RM      Assistive Device (Stand-Sit Transfers)  walker, front-wheeled  -RM      Recorded by [] Matt Renee, PTA 01/08/20 1153      Row Name 01/08/20 1114             Gait/Stairs Assessment/Training    [place] Level (Gait)  contact guard;verbal cues  -RM      Assistive Device (Gait)  walker, front-wheeled  -RM      Distance in Feet (Gait)  2x50 1 standing rest   -RM      Pattern (Gait)  step-through   -RM      Deviations/Abnormal Patterns (Gait)  base of support, wide;stride length decreased  -RM      Recorded by [] Matt Renee, PTA 01/08/20 1153      Row Name 01/08/20 1114             Positioning and Restraints    Pre-Treatment Position  sitting in chair/recliner  -RM      Post Treatment Position  chair  -RM      In Chair  reclined;call light within reach;encouraged to call for assist;notified nsg  -RM      Recorded by [] Matt Renee, PTA 01/08/20 1153      Row Name 01/08/20 1114             Pain Scale: Numbers Pre/Post-Treatment    Pain Scale: Numbers, Pretreatment  2/10  -RM      Pain Scale: Numbers, Post-Treatment  2/10  -RM      Pain Location - Side  -- throat   -RM      Recorded by [] Matt Renee, PTA 01/08/20 1153      Row Name 01/08/20 1114             Plan of Care Review    Plan of Care Reviewed With  patient  -      Progress  improving  -      Outcome Summary  Pt tolerated treatment fair with pt ambulating 2x50' cga with rw. Pt used decreased O2 with pt being on 2 lpm pnc but )2 dropped to 89% and HR elevated to 139 Bpm. Pt recovered in approx 1 min to 86% and 84 bpm .  See flowsheet for details.   -RM      Recorded by [] Matt Renee, PTA 01/08/20 1153      Row Name 01/08/20 1114             Outcome Summary/Treatment Plan (PT)    Daily Summary of Progress (PT)  progress toward functional goals is good  -      Plan for Continued Treatment (PT)  Cont PT per POC.   -RM      Recorded by [] Matt Renee, PTA 01/08/20 1153        User Key  (r) = Recorded By, (t) = Taken By, (c) = Cosigned By    Initials Name Effective Dates Discipline     Matt Renee, PTA 03/07/18 -  PT                       PT Recommendation and Plan     Outcome Summary/Treatment Plan (PT)  Daily Summary of Progress (PT): progress toward functional goals is good  Plan for Continued Treatment (PT): Cont PT per POC.   Plan of Care Reviewed With: patient  Progress: improving  Outcome Summary:  Pt tolerated treatment fair with pt ambulating 2x50' cga with rw. Pt used decreased O2 with pt being on 2 lpm pnc but )2 dropped to 89% and HR elevated to 139 Bpm. Pt recovered in approx 1 min to 86% and 84 bpm .  See flowsheet for details.   Outcome Measures     Row Name 01/08/20 1114 01/07/20 1102 01/07/20 0942       How much help from another person do you currently need...    Turning from your back to your side while in flat bed without using bedrails?  4  -RM  --  4  -RM    Moving from lying on back to sitting on the side of a flat bed without bedrails?  4  -RM  --  4  -RM    Moving to and from a bed to a chair (including a wheelchair)?  3  -RM  --  3  -RM    Standing up from a chair using your arms (e.g., wheelchair, bedside chair)?  4  -RM  --  4  -RM    Climbing 3-5 steps with a railing?  2  -RM  --  3  -RM    To walk in hospital room?  3  -RM  --  3  -RM    AM-PAC 6 Clicks Score (PT)  20  -RM  --  21  -RM       How much help from another is currently needed...    Putting on and taking off regular lower body clothing?  --  3  -RMA  --    Bathing (including washing, rinsing, and drying)  --  3  -RMA  --    Toileting (which includes using toilet bed pan or urinal)  --  3  -RMA  --    Putting on and taking off regular upper body clothing  --  3  -RMA  --    Taking care of personal grooming (such as brushing teeth)  --  3  -RMA  --    Eating meals  --  4  -RMA  --    AM-PAC 6 Clicks Score (OT)  --  19  -RMA  --       Functional Assessment    Outcome Measure Options  AM-PAC 6 Clicks Basic Mobility (PT)  -RM  --  AM-PAC 6 Clicks Basic Mobility (PT)  -RM    Row Name 01/06/20 1517 01/06/20 0937          How much help from another person do you currently need...    Turning from your back to your side while in flat bed without using bedrails?  3  -RM  --     Moving from lying on back to sitting on the side of a flat bed without bedrails?  3  -RM  --     Moving to and from a bed to a chair (including a wheelchair)?  3   -RM  --     Standing up from a chair using your arms (e.g., wheelchair, bedside chair)?  3  -RM  --     Climbing 3-5 steps with a railing?  2  -RM  --     To walk in hospital room?  3  -RM  --     AM-PAC 6 Clicks Score (PT)  17  -RM  --        How much help from another is currently needed...    Putting on and taking off regular lower body clothing?  --  3  -SD     Bathing (including washing, rinsing, and drying)  --  3  -SD     Toileting (which includes using toilet bed pan or urinal)  --  3  -SD     Putting on and taking off regular upper body clothing  --  3  -SD     Taking care of personal grooming (such as brushing teeth)  --  3  -SD     Eating meals  --  3  -SD     AM-PAC 6 Clicks Score (OT)  --  18  -SD        Functional Assessment    Outcome Measure Options  AM-PAC 6 Clicks Basic Mobility (PT)  -RM  AM-PAC 6 Clicks Daily Activity (OT)  -SD       User Key  (r) = Recorded By, (t) = Taken By, (c) = Cosigned By    Initials Name Provider Type    Matt Verma, HAKEEM Physical Therapy Assistant    Italia Norris, OT Occupational Therapist    Yamilka Archuleta, OT Occupational Therapist         Time Calculation:   PT Charges     Row Name 01/08/20 1157             Time Calculation    Start Time  1114  -RM      Stop Time  1134  -RM      Time Calculation (min)  20 min  -RM      PT Received On  01/08/20  -RM      PT Goal Re-Cert Due Date  01/15/20  -RM         Time Calculation- PT    Total Timed Code Minutes- PT  20 minute(s)  -RM         Timed Charges    10904 - Gait Training Minutes   20  -RM        User Key  (r) = Recorded By, (t) = Taken By, (c) = Cosigned By    Initials Name Provider Type    Matt Verma, HAKEEM Physical Therapy Assistant        Therapy Charges for Today     Code Description Service Date Service Provider Modifiers Qty    62960645529 HC GAIT TRAINING EA 15 MIN 1/7/2020 Matt Renee, PTA GP 1    07444879552 HC GAIT TRAINING EA 15 MIN 1/8/2020 Matt Renee, PTA GP 1           PT G-Codes  Outcome Measure Options: AM-PAC 6 Clicks Basic Mobility (PT)  AM-PAC 6 Clicks Score (PT): 20  AM-PAC 6 Clicks Score (OT): 19    Matt Renee, PTA  1/8/2020

## 2020-01-08 NOTE — PROGRESS NOTES
"  CC: Abnormal CT of the chest.    S: Continues to have shortness of breath, although has improved compared to 2 days ago.  Says that she was able to walk in the hallway with only minimal shortness of breath.  She does however continue to have cough.  Denies any fever.    ROS: Positive for cough & shortness of breath. Denies chest pain, diarrhea or fever.    O: /92 (BP Location: Left arm, Patient Position: Lying)   Pulse 95   Temp 98 °F (36.7 °C) (Oral)   Resp 19   Ht 147.3 cm (58\")   Wt 61.9 kg (136 lb 6.4 oz)   LMP  (LMP Unknown)   SpO2 99%   BMI 28.51 kg/m²     Vital signs reviewed.  General/Constitutional: Mild respiratory distress noted.  Neck: No obvious JVD   Cardiovascular: S1 + S2.  Irregular  Lungs/Respiratory: Bilateral scattered wheezing heard.  Minimal basal crackles noted  Musculoskeletal/Extremities: Trace edema noted.  Neurologic: AAOx3. Was able to follow commands     Labs: Reviewed.     Results from last 7 days   Lab Units 01/08/20  0602 01/07/20  0707 01/06/20  0539   SODIUM mmol/L 141 140 140   POTASSIUM mmol/L 3.7 3.3* 3.5   CHLORIDE mmol/L 101 100 100   CO2 mmol/L 22.6 21.9* 23.7   BUN mg/dL 63* 58* 54*   CREATININE mg/dL 1.96* 1.81* 1.98*   CALCIUM mg/dL 9.0 9.0 9.2   GLUCOSE mg/dL 177* 154* 168*       Results from last 7 days   Lab Units 01/07/20  0707 01/06/20  0539   MAGNESIUM mg/dL  --  2.6*   PHOSPHORUS mg/dL 4.0 4.2       Results from last 7 days   Lab Units 01/07/20  0707 01/06/20  0539 01/05/20  0528   WBC 10*3/mm3 8.51 7.29 7.14   NEUTROPHIL % %  --  91.3* 89.3*   HEMOGLOBIN g/dL 9.0* 8.9* 8.5*   HEMATOCRIT % 29.8* 29.9* 27.5*   PLATELETS 10*3/mm3 221 200 184             Lab Results   Component Value Date    PROCALCITO 0.33 (H) 01/07/2020    PROCALCITO 0.25 01/04/2020       Lab Results   Component Value Date    PROBNP 13,304.0 (H) 01/07/2020    PROBNP 28,142.0 (H) 01/04/2020    PROBNP 10,610 (H) 12/20/2019       Lab Results   Component Value Date    CRP 7.13 (H) " 01/07/2020    CRP 11.74 (H) 01/06/2020       Micro: As of January 8, 2020   Lab Results   Component Value Date    RESPCX Heavy growth (4+) Normal Respiratory Kristine 04/12/2019     Lab Results   Component Value Date    BLOODCX No growth at 3 days 01/04/2020    BLOODCX No growth at 3 days 01/04/2020     No results found for: URINECX  Lab Results   Component Value Date    MRSACX  01/06/2020     No Methicillin Resistant Staphylococcus aureus isolated       Lab Results   Component Value Date    STREPPNEUAG Negative 01/05/2020     Lab Results   Component Value Date    FLU Negative 01/04/2020    FLU Negative 01/04/2020     Lab Results   Component Value Date    RSV Detected (A) 01/05/2020           CXRay: Latest imaging study was reviewed personally.   Imaging Results (Last 24 Hours)     ** No results found for the last 24 hours. **            Assessment & Recommendations/Plan:   1.  Abnormal CT of the chest   Likely from previous history of hypersensitivity pneumonitis.    2.  Hypersensitivity pneumonitis.    We will continue Solu-Medrol at the current dose  In reviewing her previous chart, hypersensitivity pneumonitis was based on clinical grounds.  Her panel was mostly unremarkable.  CRP has decreased.  We will consider repeating CRP levels in the next 24 hours.  Will discontinue duo nebs, as she seems to think that it causes her to have worse cough?  We will however, start her on Pulmicort.    3.  Heart failure with preserved ejection fraction.   We will consider diuretic therapy.    4.  RSV bronchiolitis.   Possible acute viral bronchiolitis/pneumonitis.  RSV is not known to cause significant pneumonitis in adults.    5.  Chronic kidney disease  Nephrology following.    We will consider repeating chest x-ray in the next 24 hours.    We have reviewed patient's current orders and changes, if any, have been suggested to primary care team. Plan was also discussed with nursing staff, as necessary.     This document was  electronically signed by Sri Garcia MD on 01/08/20 at 8:56 AM      Dictated utilizing Dragon dictation.

## 2020-01-08 NOTE — PLAN OF CARE
Problem: Patient Care Overview  Goal: Plan of Care Review  Outcome: Ongoing (interventions implemented as appropriate)  Flowsheets (Taken 1/8/2020 1114)  Outcome Summary: Pt tolerated treatment fair with pt ambulating 2x50' cga with rw. Pt used decreased O2 with pt being on 2 lpm pnc but )2 dropped to 89% and HR elevated to 139 Bpm. Pt recovered in approx 1 min to 86% and 84 bpm .  See flowsheet for details.

## 2020-01-08 NOTE — PROGRESS NOTES
[facility]    Name:  Pat Morel   Age:  80 y.o.  Sex:  female  :  1940  MRN:  [id number]   Visit Number:  [id number]  Admission Date:  2020  Date Of Service:  20  Primary Care Physician:  Anthony Sweet DO     LOS: 3 days :  Patient Care Team:  Anthony Sweet DO as PCP - General (Family Medicine)  Tien Archer MD as Consulting Physician (Cardiac Electrophysiology)  Xu Block MD (Cardiology):    Chief Complaint:      Shortness of breath and coughing.    Subjective / Interval History:     Ms. Sandoval is currently sitting up on the chair and is feeling better.  She continues to have cough and chest congestion.  She continues to have wheezing and crackles on auscultation of the chest which seems to be a chronic baseline finding.  She did walk with physical therapy today and did develop hypoxia into the upper 80s and tachycardia in the 130s.  She was seen by Dr. Garcia and he is contemplating bronchoscopy.  No significant overnight events.    Patient is 80-year-old female with shortness of breath and cough.  She has coronary artery disease with CABG, A. fib, chronic kidney disease stage III, history of CVA, diastolic CHF, hypersensitivity pneumonitis, interstitial lung disease, hypertension, osteoporosis.  She has a one-week history of shortness of breath, productive cough, wheezing and fevers.  She follows with Dr. Laura as her pulmonologist.  She was noted to have elevated lactic acid, however has a history of congestive heart failure with chronic kidney disease and edema.  Due to this she was not aggressively hydrated.  CT scan of the chest showed multiple bilateral irregular nodular opacities most likely due to infectious disease with possible atypical mycoplasma infection or fungal infection per radiology.     Due to this Zithromax and micafungin were added.  Patient was also placed on vancomycin and Zosyn.  She has a  history of coronary artery disease with CABG and stents.  She follows with Dr. Higgins.  Troponin have been negative.  Respiratory panel revealed the patient to have an RSV infection. Patient does have history of interstitial lung disease and has been following Dr. Laura in [place].  She states that she has been on prednisone and CellCept in the past but unfortunately could not tolerate these medications.     Review of Systems:     General ROS: Patient denies any fevers, chills or loss of consciousness.  Generalized weakness.  Respiratory ROS: Cough, chest congestion and wheezing.  Cardiovascular ROS: Denies chest pain or palpitations. No history of exertional chest pain.  Gastrointestinal ROS: Denies nausea and vomiting. Denies any abdominal pain. No diarrhea.  Neurological ROS: Denies any focal weakness. No loss of consciousness. Denies any numbness.  Dermatological ROS: Denies any redness or pruritis.    Vital Signs:    Temp:  [97.6 °F (36.4 °C)-98 °F (36.7 °C)] 98 °F (36.7 °C)  Heart Rate:  [] 89  Resp:  [18-20] 20  BP: (129-168)/() 129/96    Intake and output:    I/O last 3 completed shifts:  In: 480 [P.O.:480]  Out: 300 [Urine:300]  No intake/output data recorded.    Physical Examination:    General Appearance:  Alert and cooperative, not in any acute distress.   Head:  Atraumatic and normocephalic, without obvious abnormality.   Eyes:          PERRLA, conjunctivae and sclerae normal, no Icterus. No pallor. Extraocular movements are within normal limits.   Neck: Supple, trachea midline, no thyromegaly, no carotid bruit.   Lungs:   Chest shape is normal. Breath sounds heard bilaterally equally.  Extensive bilateral wheezing and crackles heard slightly improved from yesterday. No Pleural rub or bronchial breathing.   Heart:  Normal S1 and S2, no murmur, no gallop, no rub. No JVD.  Midline CABG scar noted.   Abdomen:   Normal bowel sounds, no masses, no organomegaly. Soft, non-tender, obese, no  guarding, no rebound tenderness.   Extremities: Moves all extremities well, 1+ edema, no cyanosis, no clubbing.  Ecchymoses noted on the left arm and forearm.   Skin: No bleeding or rash.   Neurologic: Awake, alert and oriented times 3. Moves all 4 extremities equally.  No asterixis.     Laboratory results:    Results from last 7 days   Lab Units 01/08/20  0602 01/07/20  0707 01/06/20  0539  01/04/20  1949   SODIUM mmol/L 141 140 140   < > 135*   POTASSIUM mmol/L 3.7 3.3* 3.5   < > 4.9   CHLORIDE mmol/L 101 100 100   < > 92*   CO2 mmol/L 22.6 21.9* 23.7   < > 18.4*   BUN mg/dL 63* 58* 54*   < > 41*   CREATININE mg/dL 1.96* 1.81* 1.98*   < > 2.01*   CALCIUM mg/dL 9.0 9.0 9.2   < > 9.8   BILIRUBIN mg/dL  --   --  1.4*  --  3.1*   ALK PHOS U/L  --   --  71  --  96   ALT (SGPT) U/L  --   --  28  --  26   AST (SGOT) U/L  --   --  37*  --  55*   GLUCOSE mg/dL 177* 154* 168*   < > 179*    < > = values in this interval not displayed.     Results from last 7 days   Lab Units 01/07/20  0707 01/06/20  0539 01/05/20  0528   WBC 10*3/mm3 8.51 7.29 7.14   HEMOGLOBIN g/dL 9.0* 8.9* 8.5*   HEMATOCRIT % 29.8* 29.9* 27.5*   PLATELETS 10*3/mm3 221 200 184         Results from last 7 days   Lab Units 01/06/20  0539 01/05/20  1635 01/04/20  1950   TROPONIN T ng/mL <0.010 <0.010 0.012     Results from last 7 days   Lab Units 01/06/20  0859 01/04/20 2005 01/04/20  1959   BLOODCX   --  No growth at 3 days No growth at 3 days   MRSA SCREEN CX  No Methicillin Resistant Staphylococcus aureus isolated  --   --      I have reviewed the patient's laboratory results.    Radiology results:    Imaging Results (Last 24 Hours)     ** No results found for the last 24 hours. **        I have reviewed the patient's radiology reports.    Medication Review:     I have reviewed the patients active and prn medications.       Pneumonia of both lungs due to infectious organism    Persistent atrial fibrillation    Coronary artery disease involving native  coronary artery of native heart without angina pectoris    Essential hypertension    Pulmonary arterial hypertension (CMS/HCC)    Acquired hypothyroidism    Chronic kidney disease, stage 3 (CMS/[facility])    GERD without esophagitis    Acute respiratory failure with hypoxia (CMS/[facility])    Assessment:    1.  Acute hypersensitivity pneumonitis, present on admission.  2.  Acute on chronic hypoxic respiratory failure, present on admission.  3.  Chronic diastolic heart failure, present on admission, no evidence of exacerbation.  4.  Chronic kidney disease stage III, present admission.  6.  Paroxysmal atrial fibrillation, on anticoagulation.  7.  CAD status post stents and CABG.  8.  Interstitial lung disease.  9.  Chronic pulmonary hypertension.  10.  Acquired hypothyroidism.  11.  RSV infection, present on admission.    Plan:    Ms. Mroel continues to improve with regards to her wheezing and respiratory status.  Unfortunately, lung auscultation findings are persistent with crackles and wheezing.  She is being followed by Dr. Garcia and I have discussed the patient's condition with him at the bedside.  He is contemplating on doing bronchoscopy.  We will continue Rocephin and azithromycin.  Blood cultures have been negative so far.  Legionella and streptococcal serologies were negative as well.  MRSA screen was negative as well.    Her home medications have been continued including apixaban.  Recent echocardiogram done on 10/13/2019 showed hyperdynamic ejection fraction at 70% with mild to moderate mitral regurgitation and moderate tricuspid regurgitation.    She will be continued on physical therapy.  She states that she lives with her  and does have home oxygen. Further recommendations depend upon her clinical course.    Denis Mercado MD  01/08/20  3:07 PM    Dictated utilizing Dragon dictation.

## 2020-01-08 NOTE — PROGRESS NOTES
"Nephrology Progress Note.    LOS: 3 days    Patient Care Team:  Anthony Sweet DO as PCP - General (Family Medicine)  Tien Archer MD as Consulting Physician (Cardiac Electrophysiology)  Xu Block MD (Cardiology)    Chief Complaint:    Chief Complaint   Patient presents with   • Shortness of Breath       Subjective:   Follow up for RENEE and Chronic Kidney disease stage 4 / Anemia.     Interval History:   Patient Complaints: none  Patient seen and examined this morning.  Events from last night noted.  Patient denies having any fevers chills.  No nausea or vomiting no abdominal pain.  Denies any chest pain, she is complaining of some shortness of breath as well as cough and sputum production.  Although she does think that it is better.  There still significant edema of the lower extremities   patient also denies having new onset weakness of numbness of either extremity.  History taken from: patient    Objective:    Vital Signs  /92 (BP Location: Left arm, Patient Position: Lying)   Pulse 95   Temp 98 °F (36.7 °C) (Oral)   Resp 19   Ht 147.3 cm (58\")   Wt 61.9 kg (136 lb 6.4 oz)   LMP  (LMP Unknown)   SpO2 99%   BMI 28.51 kg/m²     No intake/output data recorded.    Intake/Output Summary (Last 24 hours) at 1/8/2020 0856  Last data filed at 1/7/2020 2253  Gross per 24 hour   Intake 240 ml   Output 300 ml   Net -60 ml       Physical Exam:  General Appearance: alert, oriented x 3, no acute distress,   HEENT: Oral mucosa dry, extra occular movements intact. Sclera clear.  Skin: Warm and dry  Neck: supple, no JVD, trachea midline  Lungs:Chest shape is normal. Breath sounds heard bilaterally with scattered rhonchi and wheezing.  Heart: Irregular rate and rhythm. normal S1 and S2, no S3, no rub, peripheral pulses weak but palpable.  Abdomen: Obese, soft, non-tender,  present bowel sounds to auscultation  : no palpable bladder.  Extremities: 2+ edema, no cyanosis or clubbing.   Neuro: normal " speech and mental status, grossly non focal.     Results Review:   Results from last 7 days   Lab Units 01/08/20  0602 01/07/20  0707 01/06/20  0539  01/04/20 1949   SODIUM mmol/L 141 140 140   < > 135*   POTASSIUM mmol/L 3.7 3.3* 3.5   < > 4.9   CHLORIDE mmol/L 101 100 100   < > 92*   CO2 mmol/L 22.6 21.9* 23.7   < > 18.4*   BUN mg/dL 63* 58* 54*   < > 41*   CREATININE mg/dL 1.96* 1.81* 1.98*   < > 2.01*   CALCIUM mg/dL 9.0 9.0 9.2   < > 9.8   ALBUMIN g/dL  --  3.90 3.80  --  4.60   BILIRUBIN mg/dL  --   --  1.4*  --  3.1*   ALK PHOS U/L  --   --  71  --  96   ALT (SGPT) U/L  --   --  28  --  26   AST (SGOT) U/L  --   --  37*  --  55*   GLUCOSE mg/dL 177* 154* 168*   < > 179*    < > = values in this interval not displayed.     Estimated Creatinine Clearance: 19.5 mL/min (A) (by C-G formula based on SCr of 1.96 mg/dL (H)).  Results from last 7 days   Lab Units 01/07/20  0707 01/06/20  0539   MAGNESIUM mg/dL  --  2.6*   PHOSPHORUS mg/dL 4.0 4.2         Results from last 7 days   Lab Units 01/07/20  0707 01/06/20  0539 01/05/20  0528 01/04/20 1949   WBC 10*3/mm3 8.51 7.29 7.14 8.48   HEMOGLOBIN g/dL 9.0* 8.9* 8.5* 10.2*   PLATELETS 10*3/mm3 221 200 184 222     Results for CORONA ROSASHLELENA ROUSE (MRN [id number]) as of 1/8/2020 20:16   Ref. Range 12/2/2019 16:48   Iron Latest Ref Range: 27 - 139 ug/dL 68   Ferritin Latest Ref Range: 15 - 150 ng/mL 79   Iron Saturation Latest Ref Range: 15 - 55 % 18   TIBC Latest Ref Range: 250 - 450 ug/dL 375   UIBC Latest Ref Range: 118 - 369 ug/dL 307         Brief Urine Lab Results     None        No results found for: UTPCR  Imaging Results (Last 24 Hours)     ** No results found for the last 24 hours. **          apixaban 2.5 mg Oral Q12H   atorvastatin 40 mg Oral Nightly   azithromycin 500 mg Intravenous Q24H   bisoprolol 10 mg Oral BID   cefTRIAXone 1 g Intravenous Q24H   ferrous sulfate 324 mg Oral Daily With Breakfast   guaiFENesin 600 mg Oral BID   ipratropium-albuterol 3 mL  Nebulization 4x Daily - RT   isosorbide mononitrate 60 mg Oral Daily   lactobacillus acidophilus 1 capsule Oral BID   levothyroxine 75 mcg Oral Daily   methylPREDNISolone sodium succinate 40 mg Intravenous Q6H   pantoprazole 40 mg Oral Q AM   sodium chloride 10 mL Intravenous Q12H   spironolactone 25 mg Oral Daily            Medication Review:   Current Facility-Administered Medications   Medication Dose Route Frequency Provider Last Rate Last Dose   • acetaminophen (TYLENOL) tablet 650 mg  650 mg Oral Q4H PRN Tania Gupta DO   650 mg at 01/07/20 1600    Or   • acetaminophen (TYLENOL) 160 MG/5ML solution 650 mg  650 mg Oral Q4H PRN Tania Gupta DO        Or   • acetaminophen (TYLENOL) suppository 650 mg  650 mg Rectal Q4H PRN Tania Gupta DO       • albuterol (PROVENTIL) nebulizer solution 0.083% 2.5 mg/3mL  2.5 mg Nebulization Q4H PRN Tania Gupta DO       • apixaban (ELIQUIS) tablet 2.5 mg  2.5 mg Oral Q12H Tania Gupta DO   2.5 mg at 01/08/20 0843   • atorvastatin (LIPITOR) tablet 40 mg  40 mg Oral Nightly Tania Gupta DO   40 mg at 01/07/20 2150   • AZITHROMYCIN 500 MG/250 ML 0.9% NS IVPB (vial-mate)  500 mg Intravenous Q24H Jamel Myrick, DO   500 mg at 01/07/20 2313   • benzonatate (TESSALON) capsule 200 mg  200 mg Oral TID PRN Tania Gupta DO       • bisoprolol (ZEBeta) tablet 10 mg  10 mg Oral BID Tania Gupta DO   10 mg at 01/08/20 0843   • cefTRIAXone (ROCEPHIN) IVPB 1 g/50ml dextrose (premix)  1 g Intravenous Q24H Denis Mercado  mL/hr at 01/07/20 1555 1 g at 01/07/20 1555   • ferrous sulfate EC tablet 324 mg  324 mg Oral Daily With Breakfast Tania Gupta DO   324 mg at 01/08/20 0843   • guaiFENesin (MUCINEX) 12 hr tablet 600 mg  600 mg Oral BID Jamel Myrick, DO   600 mg at 01/08/20 0843   • ipratropium-albuterol (DUO-NEB) nebulizer solution 3 mL  3 mL Nebulization Q4H PRN Tania Gupta  DO Yoanthan       • ipratropium-albuterol (DUO-NEB) nebulizer solution 3 mL  3 mL Nebulization 4x Daily - RT Jamel Myrick, DO   3 mL at 01/08/20 0653   • isosorbide mononitrate (IMDUR) 24 hr tablet 60 mg  60 mg Oral Daily Tania Gupta DO   60 mg at 01/08/20 0843   • lactobacillus acidophilus (RISAQUAD) capsule 1 capsule  1 capsule Oral BID Denis Mercado MD   1 capsule at 01/08/20 0843   • levothyroxine (SYNTHROID, LEVOTHROID) tablet 75 mcg  75 mcg Oral Daily Tania Gupta DO   75 mcg at 01/08/20 0843   • methylPREDNISolone sodium succinate (SOLU-Medrol) injection 40 mg  40 mg Intravenous Q6H Sri Garcia MD   40 mg at 01/08/20 0843   • ondansetron (ZOFRAN) tablet 4 mg  4 mg Oral Q6H PRN Tania Gupta DO       • pantoprazole (PROTONIX) EC tablet 40 mg  40 mg Oral Q AM Denis Mercado MD   40 mg at 01/08/20 0500   • phenylephrine-mineral oil-petrolatum (PREPARATION H) 0.25-14-74.9 % hemorhoidal ointment   Rectal BID PRN Tamara Tomlin DO       • sodium chloride 0.9 % flush 10 mL  10 mL Intravenous PRN Tania Gupta DO       • sodium chloride 0.9 % flush 10 mL  10 mL Intravenous Q12H Tania Gupta DO   10 mL at 01/08/20 0843   • sodium chloride 0.9 % flush 10 mL  10 mL Intravenous PRN Tania Gupta DO       • spironolactone (ALDACTONE) tablet 25 mg  25 mg Oral Daily Sanford Tsang MD, FASN   25 mg at 01/08/20 0842       Assessment/Plan:    1. Acute kidney injury: Patient may have a small component of worsening baseline chronic kidney disease.  She does not have any UA or renal ultrasound.  I will go ahead and get it done and try to establish a baseline.  2. Chronic kidney disease stage IV: Most of her GFR is has been below 30, she likely falls into stage IV chronic kidney disease.  Work-up as ordered.  3. Anemia of chronic kidney disease: She may have component of anemia of chronic kidney disease, check iron stores she has received 2 units of  blood on her last hospitalization.  She also mentioned that she had a bleeding ulcer few years back.  Her iron stores are normal.  If her hemoglobin falls below 10 we will consider ANGELA.  4. Pneumonia of both lungs due to infectious organism:  5. 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  She has normal vitamin D levels, PTH is slightly elevated at 87.  She will need to be followed closely.  6. Atrial fibrillation with RVR: Rate controlled and anticoagulated.  7. Coronary artery disease: She has a significant vascular issues, may need further evaluation if there is any worsening of her symptoms.  8. Acute congestive heart failure: Clinically she does appear to have some congestive heart failure would benefit from optimizing medications.  9. Interstitial lung disease: She follows up regularly with her pulmonologist.  10. Pulmonary hypertension: Complications from primary pulmonary issues.  11. Hypothyroidism: Last TSH was within normal limits    Plan:  · Continue with the current treatment plan.  · I will go ahead and give 1 more dose of Bumex 2 mg IV 1 time today and then resume her 40 mg of Lasix orally twice a day starting tomorrow.  She will continue with spironolactone 25 mg daily.  · Potassium is back to normal.  · Hemoglobin is at 9 with normal iron stores, she is asymptomatic we will consider ANGELA as an outpatient.  · Details were discussed with the patient as well as her  in the room.    · Details were also discussed with the hospitalist service.  She will need a 2-week follow-up in the office at the time of discharge.  · Surveillance labs.  · Further recommendations will depend on clinical course of the patient during the current hospitalization.    · I also discussed the details with the nursing staff.  · Rest as ordered.    Sanford Tsang MD, JULIANN  01/08/20  8:56 AM    Dictated utilizing Dragon dictation.

## 2020-01-09 NOTE — PROGRESS NOTES
Continued Stay Note  TAYLOR Garcia     Patient Name: Pat Morel  MRN: [id number]  Today's Date: 1/9/2020    Admit Date: 1/4/2020    Discharge Plan     Row Name 01/09/20 0914       Plan    Plan  Spoke with pt in room and still plans to go home at discharge.  States the [employer] nurse visits one x weekly and does not feel she needs HH.          Discharge Codes    No documentation.       Expected Discharge Date and Time     Expected Discharge Date Expected Discharge Time    Jan 9, 2020             Anushka Guzman RN

## 2020-01-09 NOTE — PROGRESS NOTES
[facility]    Name:  Pat Morel   Age:  80 y.o.  Sex:  female  :  1940  MRN:  [id number]   Visit Number:  [id number]  Admission Date:  2020  Date Of Service:  20  Primary Care Physician:  Anthony Sweet DO     LOS: 4 days :  Patient Care Team:  Anthony Sweet DO as PCP - General (Family Medicine)  Tien Archer MD as Consulting Physician (Cardiac Electrophysiology)  Xu Block MD (Cardiology):    Chief Complaint:      Shortness of breath and coughing.    Subjective / Interval History:     Ms. Sandoval is currently lying down on the bed and is comfortable at rest.  She states that early this morning she had a bout of coughing spell and increased shortness of breath and needed breathing treatment.  She denies any chest pain.  She has been walking with physical therapy in the hallway using a walker.  She is being followed by Dr. Garcia but has not been scheduled for any procedure today.    Patient is 80-year-old female with shortness of breath and cough.  She has coronary artery disease with CABG, A. fib, chronic kidney disease stage III, history of CVA, diastolic CHF, hypersensitivity pneumonitis, interstitial lung disease, hypertension, osteoporosis.  She has a one-week history of shortness of breath, productive cough, wheezing and fevers.  She follows with Dr. Laura as her pulmonologist.  She was noted to have elevated lactic acid, however has a history of congestive heart failure with chronic kidney disease and edema.  Due to this she was not aggressively hydrated.  CT scan of the chest showed multiple bilateral irregular nodular opacities most likely due to infectious disease with possible atypical mycoplasma infection or fungal infection per radiology.     Patient was placed on broad-spectrum combination antibiotic therapy initially including Zosyn, vancomycin, azithromycin and micafungin.  However, it was felt that the  patient's pulmonary findings were related to hypersensitivity pneumonitis and her antibiotics were subsequently de-escalated and she was continued on Rocephin and azithromycin.  She has a history of coronary artery disease with CABG and stents.  She follows with Dr. Higgins.  Troponin have been negative.  Respiratory panel revealed the patient to have an RSV infection. Patient does have history of interstitial lung disease and has been following Dr. Laura in [place].  She states that she has been on prednisone and CellCept in the past but unfortunately could not tolerate these medications.     Review of Systems:     General ROS: Patient denies any fevers, chills or loss of consciousness.  Generalized weakness.  Respiratory ROS: Cough, chest congestion and wheezing.  Cardiovascular ROS: Denies chest pain or palpitations. No history of exertional chest pain.  Gastrointestinal ROS: Denies nausea and vomiting. Denies any abdominal pain. No diarrhea.  Neurological ROS: Denies any focal weakness. No loss of consciousness. Denies any numbness.  Dermatological ROS: Denies any redness or pruritis.    Vital Signs:    Temp:  [97.5 °F (36.4 °C)-97.8 °F (36.6 °C)] 97.8 °F (36.6 °C)  Heart Rate:  [] 85  Resp:  [18-20] 18  BP: (136-165)/() 165/97    Intake and output:    I/O last 3 completed shifts:  In: 1576.2 [P.O.:240; I.V.:1086.2; IV Piggyback:250]  Out: 1000 [Urine:1000]  I/O this shift:  In: 120 [P.O.:120]  Out: 75 [Urine:75]    Physical Examination:    General Appearance:  Alert and cooperative, not in any acute distress.   Head:  Atraumatic and normocephalic, without obvious abnormality.   Eyes:          PERRLA, conjunctivae and sclerae normal, no Icterus. No pallor. Extraocular movements are within normal limits.   Neck: Supple, trachea midline, no thyromegaly, no carotid bruit.   Lungs:   Chest shape is normal. Breath sounds heard bilaterally equally.  Occasional scattered crackles and wheezing heard.  Lung  auscultation findings are significantly improved compared to yesterday. No Pleural rub or bronchial breathing.   Heart:  Normal S1 and S2, no murmur, no gallop, no rub. No JVD.  Midline CABG scar noted.   Abdomen:   Normal bowel sounds, no masses, no organomegaly. Soft, non-tender, obese, no guarding, no rebound tenderness.   Extremities: Moves all extremities well, 1+ edema, no cyanosis, no clubbing.  Ecchymoses noted on the left arm and forearm.   Skin: No bleeding or rash.   Neurologic: Awake, alert and oriented times 3. Moves all 4 extremities equally.  No asterixis.     Laboratory results:    Results from last 7 days   Lab Units 01/09/20  0626 01/08/20  0602 01/07/20  0707 01/06/20  0539  01/04/20  1949   SODIUM mmol/L 141 141 140 140   < > 135*   POTASSIUM mmol/L 3.6 3.7 3.3* 3.5   < > 4.9   CHLORIDE mmol/L 101 101 100 100   < > 92*   CO2 mmol/L 23.3 22.6 21.9* 23.7   < > 18.4*   BUN mg/dL 65* 63* 58* 54*   < > 41*   CREATININE mg/dL 1.90* 1.96* 1.81* 1.98*   < > 2.01*   CALCIUM mg/dL 8.5* 9.0 9.0 9.2   < > 9.8   BILIRUBIN mg/dL  --   --   --  1.4*  --  3.1*   ALK PHOS U/L  --   --   --  71  --  96   ALT (SGPT) U/L  --   --   --  28  --  26   AST (SGOT) U/L  --   --   --  37*  --  55*   GLUCOSE mg/dL 189* 177* 154* 168*   < > 179*    < > = values in this interval not displayed.     Results from last 7 days   Lab Units 01/09/20  0626 01/07/20  0707 01/06/20  0539   WBC 10*3/mm3 7.28 8.51 7.29   HEMOGLOBIN g/dL 8.7* 9.0* 8.9*   HEMATOCRIT % 28.9* 29.8* 29.9*   PLATELETS 10*3/mm3 228 221 200         Results from last 7 days   Lab Units 01/06/20  0539 01/05/20  1635 01/04/20  1950   TROPONIN T ng/mL <0.010 <0.010 0.012     Results from last 7 days   Lab Units 01/06/20  0859 01/04/20 2005 01/04/20  1959   BLOODCX   --  No growth at 4 days No growth at 4 days   MRSA SCREEN CX  No Methicillin Resistant Staphylococcus aureus isolated  --   --      I have reviewed the patient's laboratory results.    Radiology  results:    Imaging Results (Last 24 Hours)     Procedure Component Value Units Date/Time    XR Chest 2 View [[id number]] Collected:  01/09/20 0900     Updated:  01/09/20 0903    Narrative:       PROCEDURE: XR CHEST 2 VW-        HISTORY: PNA; J18.9-Pneumonia, unspecified organism; J96.01-Acute  respiratory failure with hypoxia; E87.2-Acidosis; I48.91-Unspecified  atrial fibrillation; Z74.09-Other reduced mobility     COMPARISON: 01/06/2020.     FINDINGS: The patient is status post median sternotomy and CABG  procedure. The heart is enlarged. The mediastinum is unremarkable. There  has been interval improvement in the patients patchy right lung airspace  disease, however there are a few patchy opacities bilaterally which  persist. There is no pneumothorax. There are no acute osseous  abnormalities.       Impression:       Interval improvement in the patients patchy bilateral  airspace disease.           This report was finalized on 1/9/2020 9:01 AM by Joaquín Shannon M.D..        I have reviewed the patient's radiology reports.    Medication Review:     I have reviewed the patients active and prn medications.       Pneumonia of both lungs due to infectious organism    Persistent atrial fibrillation    Coronary artery disease involving native coronary artery of native heart without angina pectoris    Essential hypertension    Pulmonary arterial hypertension (CMS/HCC)    Acquired hypothyroidism    Chronic kidney disease, stage 3 (CMS/HCC)    GERD without esophagitis    Acute respiratory failure with hypoxia (CMS/HCC)    Assessment:    1.  Acute hypersensitivity pneumonitis, present on admission.  2.  Acute on chronic hypoxic respiratory failure, present on admission.  3.  Chronic diastolic heart failure, present on admission, no evidence of exacerbation.  4.  Chronic kidney disease stage III, present admission.  6.  Paroxysmal atrial fibrillation, on anticoagulation.  7.  CAD status post stents and CABG.  8.   Interstitial lung disease.  9.  Chronic pulmonary hypertension.  10.  Acquired hypothyroidism.  11.  RSV infection, present on admission.    Plan:    Ms. Morel continues to improve with regards to her wheezing and respiratory status.  She is currently on IV steroids, bronchodilators and Pulmicort nebulizations.  She will be continued on Rocephin and azithromycin.  Her blood cultures, Legionella, streptococcal serologies have been negative.  Nasal swab for MRSA has been negative.    Her home medications have been continued including apixaban.  Recent echocardiogram done on 10/13/2019 showed hyperdynamic ejection fraction at 70% with mild to moderate mitral regurgitation and moderate tricuspid regurgitation.    She will be continued on physical therapy.  She states that she lives with her  and does have home oxygen.  She declines home health services but would like to have a rolling walker.  Further recommendations depend upon her clinical course.    Denis Mercado MD  01/09/20  11:14 AM    Dictated utilizing Dragon dictation.

## 2020-01-09 NOTE — PLAN OF CARE
Problem: Patient Care Overview  Goal: Plan of Care Review  Outcome: Ongoing (interventions implemented as appropriate)  Flowsheets (Taken 1/9/2020 1342)  Outcome Summary: Pt tolerated treatment well despite reporting feeling more fatigued this date. PT was able to ambulat 102' sba  with rw.  See flowsheet for details.

## 2020-01-09 NOTE — THERAPY TREATMENT NOTE
Acute Care - Physical Therapy Treatment Note   [facility]     Patient Name: Pat Morel  : 1940  MRN: [id number]  Today's Date: 2020  Onset of Illness/Injury or Date of Surgery: 20     Referring Physician: Dr. Gupta    Admit Date: 2020    Visit Dx:    ICD-10-CM ICD-9-CM   1. Pneumonia of both lungs due to infectious organism, unspecified part of lung J18.9 483.8   2. Acute respiratory failure with hypoxia (CMS/HCC) J96.01 518.81   3. Lactic acidosis E87.2 276.2   4. Atrial fibrillation with RVR (CMS/HCC) I48.91 427.31   5. Impaired mobility and ADLs Z74.09 799.89     Patient Active Problem List   Diagnosis   • Persistent atrial fibrillation   • Coronary artery disease involving native coronary artery of native heart without angina pectoris   • Essential hypertension   • Cerebrovascular accident (CVA) due to embolism (CMS/HCC)   • Dyspnea on exertion   • Pulmonary arterial hypertension (CMS/HCC)   • Interstitial lung disease (CMS/HCC)   • Hypersensitivity pneumonitis (CMS/HCC)   • GI bleed   • RENEE (acute kidney injury) (CMS/HCC)   • Gastrointestinal hemorrhage with melena   • Gastric ulcer   • History of melena   • History of colonoscopy   • Coagulation disorder due to circulating anticoagulants (CMS/HCC)   • Dyslipidemia   • Acquired hypothyroidism   • Chronic kidney disease, stage 3 (CMS/HCC)   • GERD without esophagitis   • Iron deficiency anemia   • Volume overload   • Acute on chronic diastolic congestive heart failure (CMS/HCC)   • Acute on chronic diastolic (congestive) heart failure (CMS/HCC)   • Pneumonia of both lungs due to infectious organism   • Acute respiratory failure with hypoxia (CMS/HCC)       Therapy Treatment    Rehabilitation Treatment Summary     Row Name 20 1342             Treatment Time/Intention    Discipline  physical therapy assistant  -RM      Document Type  therapy note (daily note)  -RM      Subjective Information  complains of;fatigue;weakness  -RM       Mode of Treatment  physical therapy  -RM      Existing Precautions/Restrictions  fall;oxygen therapy device and L/min  -RM      Treatment Considerations/Comments  3 lpm   -RM      Recorded by [RM] Matt Renee, PTA 01/09/20 1520      Row Name 01/09/20 1342             Vital Signs    Pre SpO2 (%)  95  -RM      O2 Delivery Pre Treatment  supplemental O2  -RM      Post SpO2 (%)  95  -RM      O2 Delivery Post Treatment  supplemental O2  -RM      Pre Patient Position  Sitting  -RM      Intra Patient Position  Standing  -RM      Post Patient Position  Sitting  -RM      Recorded by [RM] Matt Renee, PTA 01/09/20 1520      Row Name 01/09/20 1342             Safety Issues, Functional Mobility    Safety Issues Affecting Function (Mobility)  positioning of assistive device;safety precautions follow-through/compliance  -RM      Impairments Affecting Function (Mobility)  endurance/activity tolerance;shortness of breath  -RM      Recorded by [RM] Matt Renee, PTA 01/09/20 1520      Row Name 01/09/20 1342             Sit-Stand Transfer    Sit-Stand [place] (Transfers)  stand by assist;verbal cues  -RM      Assistive Device (Sit-Stand Transfers)  walker, front-wheeled  -RM      Recorded by [RM] Matt Renee, PTA 01/09/20 1520      Row Name 01/09/20 1342             Stand-Sit Transfer    Stand-Sit [place] (Transfers)  stand by assist;verbal cues  -RM      Assistive Device (Stand-Sit Transfers)  walker, front-wheeled  -RM      Recorded by [RM] Matt Renee, PTA 01/09/20 1520      Row Name 01/09/20 1342             Gait/Stairs Assessment/Training    [place] Level (Gait)  stand by assist;verbal cues  -RM      Assistive Device (Gait)  walker, front-wheeled  -RM      Distance in Feet (Gait)  102  -RM      Pattern (Gait)  step-through  -RM      Deviations/Abnormal Patterns (Gait)  base of support, wide;gait speed decreased  -RM      Bilateral Gait Deviations  forward flexed posture  -RM       Recorded by [] Matt Renee, PTA 01/09/20 1520      Row Name 01/09/20 1342             Positioning and Restraints    Pre-Treatment Position  sitting in chair/recliner  -RM      Post Treatment Position  chair  -RM      In Chair  reclined;call light within reach;encouraged to call for assist;notified nsg  -RM      Recorded by [] Matt Renee, PTA 01/09/20 1520      Row Name 01/09/20 1342             Pain Scale: Numbers Pre/Post-Treatment    Pain Scale: Numbers, Pretreatment  0/10 - no pain  -RM      Pain Scale: Numbers, Post-Treatment  0/10 - no pain  -RM      Recorded by [] Matt Renee, PTA 01/09/20 1520      Row Name                Wound 01/09/20 0800 Bilateral groin Other (comment)    Wound - Properties Group Date first assessed: 01/09/20 [EB] Time first assessed: 0800 [EB] Side: Bilateral [EB] Location: groin [EB] Primary Wound Type: Other [EB], Excoriation  Additional Comments: Excoriation to groin area [EB] Recorded by:  [EB] Juliana Palmer RN 01/09/20 0932    Row Name 01/09/20 1342             Plan of Care Review    Plan of Care Reviewed With  patient  -RM      Progress  improving  -RM      Outcome Summary  Pt tolerated treatment well despite reporting feeling more fatigued this date. PT was able to ambulat 102' sba  with rw.  See flowsheet for details.   -RM      Recorded by [] Matt Renee, PTA 01/09/20 1520      Row Name 01/09/20 1342             Outcome Summary/Treatment Plan (PT)    Daily Summary of Progress (PT)  progress toward functional goals is good  -      Plan for Continued Treatment (PT)  Cont PT per POC.   -RM      Recorded by [] Matt Renee, PTA 01/09/20 1520        User Key  (r) = Recorded By, (t) = Taken By, (c) = Cosigned By    Initials Name Effective Dates Discipline     Matt Renee, PTA 03/07/18 -  PT    EB Juliana Palmer RN 01/29/18 -  Nurse          Wound 01/09/20 0800 Bilateral groin Other (comment) (Active)   Dressing Appearance open to air  1/9/2020 12:00 PM   Closure Open to air 1/9/2020 12:00 PM   Base bleeding;pink;red;blanchable 1/9/2020 12:00 PM   Periwound excoriated;redness;blanchable 1/9/2020 12:00 PM   Periwound Temperature warm 1/9/2020 12:00 PM   Drainage Characteristics/Odor bleeding controlled 1/9/2020 12:00 PM   Drainage Amount moderate 1/9/2020 12:00 PM   Care, Wound cleansed with;sterile normal saline;barrier applied 1/9/2020  8:00 AM   Periwound Care, Wound barrier ointment applied 1/9/2020  8:00 AM               PT Recommendation and Plan     Outcome Summary/Treatment Plan (PT)  Daily Summary of Progress (PT): progress toward functional goals is good  Plan for Continued Treatment (PT): Cont PT per POC.   Plan of Care Reviewed With: patient  Progress: improving  Outcome Summary: Pt tolerated treatment well despite reporting feeling more fatigued this date. PT was able to ambulat 102' sba  with rw.  See flowsheet for details.   Outcome Measures     Row Name 01/09/20 1342 01/08/20 1318 01/08/20 1114       How much help from another person do you currently need...    Turning from your back to your side while in flat bed without using bedrails?  4  -RM  --  4  -RM    Moving from lying on back to sitting on the side of a flat bed without bedrails?  4  -RM  --  4  -RM    Moving to and from a bed to a chair (including a wheelchair)?  4  -RM  --  3  -RM    Standing up from a chair using your arms (e.g., wheelchair, bedside chair)?  4  -RM  --  4  -RM    Climbing 3-5 steps with a railing?  2  -RM  --  2  -RM    To walk in hospital room?  3  -RM  --  3  -RM    AM-PAC 6 Clicks Score (PT)  21  -RM  --  20  -RM       How much help from another is currently needed...    Putting on and taking off regular lower body clothing?  --  3  -SD  --    Bathing (including washing, rinsing, and drying)  --  3  -SD  --    Toileting (which includes using toilet bed pan or urinal)  --  3  -SD  --    Putting on and taking off regular upper body clothing  --  3  -SD   --    Taking care of personal grooming (such as brushing teeth)  --  4  -SD  --    Eating meals  --  4  -SD  --    AM-PAC 6 Clicks Score (OT)  --  20  -SD  --       Functional Assessment    Outcome Measure Options  AM-PAC 6 Clicks Basic Mobility (PT)  -RM  AM-PAC 6 Clicks Daily Activity (OT)  -SD  AM-PAC 6 Clicks Basic Mobility (PT)  -RM    Row Name 01/07/20 1102 01/07/20 0942          How much help from another person do you currently need...    Turning from your back to your side while in flat bed without using bedrails?  --  4  -RM     Moving from lying on back to sitting on the side of a flat bed without bedrails?  --  4  -RM     Moving to and from a bed to a chair (including a wheelchair)?  --  3  -RM     Standing up from a chair using your arms (e.g., wheelchair, bedside chair)?  --  4  -RM     Climbing 3-5 steps with a railing?  --  3  -RM     To walk in hospital room?  --  3  -RM     AM-PAC 6 Clicks Score (PT)  --  21  -RM        How much help from another is currently needed...    Putting on and taking off regular lower body clothing?  3  -RMA  --     Bathing (including washing, rinsing, and drying)  3  -RMA  --     Toileting (which includes using toilet bed pan or urinal)  3  -RMA  --     Putting on and taking off regular upper body clothing  3  -RMA  --     Taking care of personal grooming (such as brushing teeth)  3  -RMA  --     Eating meals  4  -RMA  --     AM-PAC 6 Clicks Score (OT)  19  -RMA  --        Functional Assessment    Outcome Measure Options  --  AM-PAC 6 Clicks Basic Mobility (PT)  -RM       User Key  (r) = Recorded By, (t) = Taken By, (c) = Cosigned By    Initials Name Provider Type    Matt Verma, PTA Physical Therapy Assistant    Italia Norris, OT Occupational Therapist    Yamilka Archuleta, VELMA Occupational Therapist         Time Calculation:   PT Charges     Row Name 01/09/20 1521             Time Calculation    Start Time  1342  -RM      Stop Time  1400  -RM      Time  Calculation (min)  18 min  -RM      PT Received On  01/09/20  -RM      PT Goal Re-Cert Due Date  01/15/20  -RM         Time Calculation- PT    Total Timed Code Minutes- PT  18 minute(s)  -RM         Timed Charges    29485 - PT Therapeutic Exercise Minutes  18  -RM        User Key  (r) = Recorded By, (t) = Taken By, (c) = Cosigned By    Initials Name Provider Type    Matt Verma, PTA Physical Therapy Assistant        Therapy Charges for Today     Code Description Service Date Service Provider Modifiers Qty    78141713097 HC GAIT TRAINING EA 15 MIN 1/8/2020 Matt Renee, PTA GP 1    57976138589 HC PT THER PROC EA 15 MIN 1/9/2020 Matt Renee, PTA GP 1          PT G-Codes  Outcome Measure Options: AM-PAC 6 Clicks Basic Mobility (PT)  AM-PAC 6 Clicks Score (PT): 21  AM-PAC 6 Clicks Score (OT): 20    Matt Renee PTA  1/9/2020

## 2020-01-09 NOTE — THERAPY TREATMENT NOTE
Patient declined OT treatment this date d/t fatigue, states it hasn't been long since she worked with PT; will follow up tomorrow

## 2020-01-09 NOTE — PROGRESS NOTES
"Nephrology Progress Note.    LOS: 4 days    Patient Care Team:  Anthony Sweet DO as PCP - General (Family Medicine)  Tien Archer MD as Consulting Physician (Cardiac Electrophysiology)  Xu Block MD (Cardiology)    Chief Complaint:    Chief Complaint   Patient presents with   • Shortness of Breath       Subjective:   Follow up for RENEE and Chronic Kidney disease stage 4 / Anemia.     Interval History:   Patient Complaints: none  Patient seen and examined this morning.  Events from last night noted.  Patient denies having any fevers chills.  No nausea or vomiting no abdominal pain.  Denies any chest pain, she is complaining of some shortness of breath as well as cough and sputum production.  Although she does think that it is better.  There still significant edema of the lower extremities   patient also denies having new onset weakness of numbness of either extremity.  History taken from: patient    Objective:    Vital Signs  /97 (BP Location: Left arm, Patient Position: Lying)   Pulse 85   Temp 97.8 °F (36.6 °C) (Oral)   Resp 18   Ht 147.3 cm (58\")   Wt 65.7 kg (144 lb 12.8 oz)   LMP  (LMP Unknown)   SpO2 100%   BMI 30.26 kg/m²     I/O this shift:  In: 120 [P.O.:120]  Out: 75 [Urine:75]    Intake/Output Summary (Last 24 hours) at 1/9/2020 0953  Last data filed at 1/9/2020 0900  Gross per 24 hour   Intake 1696.22 ml   Output 775 ml   Net 921.22 ml       Physical Exam:  General Appearance: alert, oriented x 3, no acute distress,   HEENT: Oral mucosa dry, extra occular movements intact. Sclera clear.  Skin: Warm and dry  Neck: supple, no JVD, trachea midline  Lungs:Chest shape is normal. Breath sounds heard bilaterally with scattered rhonchi and wheezing.  Heart: Irregular rate and rhythm. normal S1 and S2, no S3, no rub, peripheral pulses weak but palpable.  Abdomen: Obese, soft, non-tender,  present bowel sounds to auscultation  : no palpable bladder.  Extremities: 1+ edema, no " cyanosis or clubbing.   Neuro: normal speech and mental status, grossly non focal.     Results Review:   Results from last 7 days   Lab Units 01/09/20  0626 01/08/20  0602 01/07/20  0707 01/06/20  0539 01/04/20 1949   SODIUM mmol/L 141 141 140 140   < > 135*   POTASSIUM mmol/L 3.6 3.7 3.3* 3.5   < > 4.9   CHLORIDE mmol/L 101 101 100 100   < > 92*   CO2 mmol/L 23.3 22.6 21.9* 23.7   < > 18.4*   BUN mg/dL 65* 63* 58* 54*   < > 41*   CREATININE mg/dL 1.90* 1.96* 1.81* 1.98*   < > 2.01*   CALCIUM mg/dL 8.5* 9.0 9.0 9.2   < > 9.8   ALBUMIN g/dL  --   --  3.90 3.80  --  4.60   BILIRUBIN mg/dL  --   --   --  1.4*  --  3.1*   ALK PHOS U/L  --   --   --  71  --  96   ALT (SGPT) U/L  --   --   --  28  --  26   AST (SGOT) U/L  --   --   --  37*  --  55*   GLUCOSE mg/dL 189* 177* 154* 168*   < > 179*    < > = values in this interval not displayed.     Estimated Creatinine Clearance: 20.7 mL/min (A) (by C-G formula based on SCr of 1.9 mg/dL (H)).  Results from last 7 days   Lab Units 01/07/20  0707 01/06/20  0539   MAGNESIUM mg/dL  --  2.6*   PHOSPHORUS mg/dL 4.0 4.2         Results from last 7 days   Lab Units 01/09/20  0626 01/07/20  0707 01/06/20  0539 01/05/20 0528 01/04/20 1949   WBC 10*3/mm3 7.28 8.51 7.29 7.14 8.48   HEMOGLOBIN g/dL 8.7* 9.0* 8.9* 8.5* 10.2*   PLATELETS 10*3/mm3 228 221 200 184 222     Results for VILMA ROSAS (MRN [id number]) as of 1/8/2020 20:16   Ref. Range 12/2/2019 16:48   Iron Latest Ref Range: 27 - 139 ug/dL 68   Ferritin Latest Ref Range: 15 - 150 ng/mL 79   Iron Saturation Latest Ref Range: 15 - 55 % 18   TIBC Latest Ref Range: 250 - 450 ug/dL 375   UIBC Latest Ref Range: 118 - 369 ug/dL 307         Brief Urine Lab Results  (Last result in the past 365 days)      Color   Clarity   Blood   Leuk Est   Nitrite   Protein   CREAT   Urine HCG        01/09/20 0759 Yellow Clear Negative Negative Negative Trace             No results found for: UTPCR  Imaging Results (Last 24 Hours)      Procedure Component Value Units Date/Time    XR Chest 2 View [[id number]] Collected:  01/09/20 0900     Updated:  01/09/20 0903    Narrative:       PROCEDURE: XR CHEST 2 VW-        HISTORY: PNA; J18.9-Pneumonia, unspecified organism; J96.01-Acute  respiratory failure with hypoxia; E87.2-Acidosis; I48.91-Unspecified  atrial fibrillation; Z74.09-Other reduced mobility     COMPARISON: 01/06/2020.     FINDINGS: The patient is status post median sternotomy and CABG  procedure. The heart is enlarged. The mediastinum is unremarkable. There  has been interval improvement in the patients patchy right lung airspace  disease, however there are a few patchy opacities bilaterally which  persist. There is no pneumothorax. There are no acute osseous  abnormalities.       Impression:       Interval improvement in the patients patchy bilateral  airspace disease.           This report was finalized on 1/9/2020 9:01 AM by Joaquín Shannon M.D..          apixaban 2.5 mg Oral Q12H   atorvastatin 40 mg Oral Nightly   bisoprolol 10 mg Oral BID   budesonide 0.5 mg Nebulization BID - RT   [START ON 1/13/2020] epoetin andria/andria-epbx 40,000 Units Subcutaneous Q30 Days   ferrous sulfate 324 mg Oral Daily With Breakfast   [START ON 1/10/2020] furosemide 40 mg Oral Daily   guaiFENesin 600 mg Oral BID   isosorbide mononitrate 60 mg Oral Daily   lactobacillus acidophilus 1 capsule Oral BID   levothyroxine 75 mcg Oral Daily   methylPREDNISolone sodium succinate 40 mg Intravenous Q6H   pantoprazole 40 mg Oral Q AM   sodium chloride 10 mL Intravenous Q12H   spironolactone 25 mg Oral Daily            Medication Review:   Current Facility-Administered Medications   Medication Dose Route Frequency Provider Last Rate Last Dose   • acetaminophen (TYLENOL) tablet 650 mg  650 mg Oral Q4H PRN Tania Gupta DO   650 mg at 01/07/20 1600    Or   • acetaminophen (TYLENOL) 160 MG/5ML solution 650 mg  650 mg Oral Q4H PRN Tania Gupta DO          Or   • acetaminophen (TYLENOL) suppository 650 mg  650 mg Rectal Q4H PRN Tania Gupta, DO       • albuterol (PROVENTIL) nebulizer solution 0.083% 2.5 mg/3mL  2.5 mg Nebulization Q4H PRN Tania Gupta DO       • apixaban (ELIQUIS) tablet 2.5 mg  2.5 mg Oral Q12H Tania Gupta DO   2.5 mg at 01/09/20 0857   • atorvastatin (LIPITOR) tablet 40 mg  40 mg Oral Nightly Tania Gupta DO   40 mg at 01/08/20 2055   • benzonatate (TESSALON) capsule 200 mg  200 mg Oral TID PRN Tania Gupta DO       • bisoprolol (ZEBeta) tablet 10 mg  10 mg Oral BID Tania Gupta DO   10 mg at 01/09/20 0856   • budesonide (PULMICORT) nebulizer solution 0.5 mg  0.5 mg Nebulization BID - RT Sri Garcia MD   0.5 mg at 01/09/20 0712   • [START ON 1/13/2020] epoetin andria-epbx (RETACRIT) injection 40,000 Units  40,000 Units Subcutaneous Q30 Days Sanford Tsang MD, JULIANN       • ferrous sulfate EC tablet 324 mg  324 mg Oral Daily With Breakfast Tania Gupta DO   324 mg at 01/09/20 0856   • [START ON 1/10/2020] furosemide (LASIX) tablet 40 mg  40 mg Oral Daily Sanford Tsang MD, FASN       • guaiFENesin (MUCINEX) 12 hr tablet 600 mg  600 mg Oral BID Jamel Myrick DO   600 mg at 01/09/20 0856   • ipratropium-albuterol (DUO-NEB) nebulizer solution 3 mL  3 mL Nebulization Q4H PRN Tania Gupta DO   3 mL at 01/08/20 1923   • isosorbide mononitrate (IMDUR) 24 hr tablet 60 mg  60 mg Oral Daily Tania Gupta DO   60 mg at 01/09/20 0856   • lactobacillus acidophilus (RISAQUAD) capsule 1 capsule  1 capsule Oral BID Denis Mercado MD   1 capsule at 01/09/20 0856   • levothyroxine (SYNTHROID, LEVOTHROID) tablet 75 mcg  75 mcg Oral Daily Tania Gupta DO   75 mcg at 01/09/20 0856   • methylPREDNISolone sodium succinate (SOLU-Medrol) injection 40 mg  40 mg Intravenous Q6H Sri Garcia MD   40 mg at 01/09/20 0857   • ondansetron (ZOFRAN) tablet  4 mg  4 mg Oral Q6H PRN Tania Gupta, DO       • pantoprazole (PROTONIX) EC tablet 40 mg  40 mg Oral Q AM Denis Mercado MD   40 mg at 01/09/20 0600   • phenylephrine-mineral oil-petrolatum (PREPARATION H) 0.25-14-74.9 % hemorhoidal ointment   Rectal BID PRN Tamara Tomlin, DO       • sodium chloride 0.9 % flush 10 mL  10 mL Intravenous PRN Tania Gupta, DO       • sodium chloride 0.9 % flush 10 mL  10 mL Intravenous Q12H Tania Gupta, DO   10 mL at 01/09/20 0857   • sodium chloride 0.9 % flush 10 mL  10 mL Intravenous PRN Tania Gupta, DO   10 mL at 01/09/20 0305   • spironolactone (ALDACTONE) tablet 25 mg  25 mg Oral Daily Sanford Tsang MD, FASN   25 mg at 01/09/20 0856       Assessment/Plan:    1. Acute kidney injury: Patient may have a small component of worsening baseline chronic kidney disease.  She does not have any UA or renal ultrasound.  I will go ahead and get it done and try to establish a baseline.  2. Chronic kidney disease stage IV: Most of her GFR is has been below 30, she likely falls into stage IV chronic kidney disease.  Work-up as ordered.  3. Anemia of chronic kidney disease: She may have component of anemia of chronic kidney disease, check iron stores she has received 2 units of blood on her last hospitalization.  She also mentioned that she had a bleeding ulcer few years back.  Her iron stores are normal.  If her hemoglobin falls below 10 we will consider ANGELA.  4. Pneumonia of both lungs due to infectious organism:  5. 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  She has normal vitamin D levels, PTH is slightly elevated at 87.  She will need to be followed closely.  6. Atrial fibrillation with RVR: Rate controlled and anticoagulated.  7. Coronary artery disease: She has a significant vascular issues, may need further evaluation if there is any worsening of her symptoms.  8. Acute  congestive heart failure: Clinically she does appear to have some congestive heart failure would benefit from optimizing medications.  9. Interstitial lung disease: She follows up regularly with her pulmonologist.  10. Pulmonary hypertension: Complications from primary pulmonary issues.  11. Hypothyroidism: Last TSH was within normal limits    Plan:  · Continue with the current treatment plan.  · Clinically appears to be improving.  · I will change her diuretics to Lasix 40 mg daily along with Aldactone 25 mg daily.  · I will go ahead and start her on Procrit 40,000 units once a month.  · Details were discussed with the patient as well as her  in the room.    · Details were also discussed with the hospitalist service.  She will need a 2-week follow-up in the office at the time of discharge.  · Surveillance labs.  · Further recommendations will depend on clinical course of the patient during the current hospitalization.    · I also discussed the details with the nursing staff.  · Rest as ordered.    Sanford Tsang MD, FASN  01/09/20  9:53 AM    Dictated utilizing Dragon dictation.

## 2020-01-09 NOTE — PLAN OF CARE
Pleasant patient with no complaint of pain-scheduled IV antibiotics-monitor labs and continue to monitor patient

## 2020-01-09 NOTE — PROGRESS NOTES
"  CC: Abnormal CT of the chest.    S: Continues to have shortness of breath, although has improved compared to 2 days ago.  Says that she was able to walk in the hallway with only minimal shortness of breath.  She does however continue to have cough.  Denies any fever.    ROS: Positive for cough & shortness of breath. Denies chest pain, diarrhea or fever.    O: /83 (BP Location: Left arm, Patient Position: Sitting)   Pulse 90   Temp 98.4 °F (36.9 °C) (Oral)   Resp 16   Ht 147.3 cm (58\")   Wt 65.7 kg (144 lb 12.8 oz)   LMP  (LMP Unknown)   SpO2 95%   BMI 30.26 kg/m²     Vital signs reviewed.  General/Constitutional: Mild respiratory distress noted.  Neck: No obvious JVD   Cardiovascular: S1 + S2.  Irregular  Lungs/Respiratory: Bilateral scattered wheezing heard.  Minimal basal crackles noted  Musculoskeletal/Extremities: Trace edema noted.  Neurologic: AAOx3. Was able to follow commands     Labs: Reviewed.     Results from last 7 days   Lab Units 01/09/20  0626 01/08/20  0602 01/07/20  0707   SODIUM mmol/L 141 141 140   POTASSIUM mmol/L 3.6 3.7 3.3*   CHLORIDE mmol/L 101 101 100   CO2 mmol/L 23.3 22.6 21.9*   BUN mg/dL 65* 63* 58*   CREATININE mg/dL 1.90* 1.96* 1.81*   CALCIUM mg/dL 8.5* 9.0 9.0   GLUCOSE mg/dL 189* 177* 154*       Results from last 7 days   Lab Units 01/07/20  0707 01/06/20  0539   MAGNESIUM mg/dL  --  2.6*   PHOSPHORUS mg/dL 4.0 4.2       Results from last 7 days   Lab Units 01/09/20  0626 01/07/20  0707 01/06/20  0539 01/05/20  0528   WBC 10*3/mm3 7.28 8.51 7.29 7.14   NEUTROPHIL % %  --   --  91.3* 89.3*   HEMOGLOBIN g/dL 8.7* 9.0* 8.9* 8.5*   HEMATOCRIT % 28.9* 29.8* 29.9* 27.5*   PLATELETS 10*3/mm3 228 221 200 184             Lab Results   Component Value Date    PROCALCITO 0.33 (H) 01/07/2020    PROCALCITO 0.25 01/04/2020       Lab Results   Component Value Date    PROBNP 13,304.0 (H) 01/07/2020    PROBNP 28,142.0 (H) 01/04/2020    PROBNP 10,610 (H) 12/20/2019       Lab Results "   Component Value Date    CRP 3.04 (H) 01/09/2020    CRP 7.13 (H) 01/07/2020    CRP 11.74 (H) 01/06/2020       Micro: As of January 9, 2020   Lab Results   Component Value Date    RESPCX Heavy growth (4+) Normal Respiratory Kristine 04/12/2019     Lab Results   Component Value Date    BLOODCX No growth at 4 days 01/04/2020    BLOODCX No growth at 4 days 01/04/2020     No results found for: URINECX  Lab Results   Component Value Date    MRSACX  01/06/2020     No Methicillin Resistant Staphylococcus aureus isolated       Lab Results   Component Value Date    STREPPNEUAG Negative 01/05/2020     Lab Results   Component Value Date    FLU Negative 01/04/2020    FLU Negative 01/04/2020     Lab Results   Component Value Date    RSV Detected (A) 01/05/2020           CXRay: Latest imaging study was reviewed personally.   Imaging Results (Last 24 Hours)     Procedure Component Value Units Date/Time    XR Chest 2 View [[id number]] Collected:  01/09/20 0900     Updated:  01/09/20 0903    Narrative:       PROCEDURE: XR CHEST 2 VW-        HISTORY: PNA; J18.9-Pneumonia, unspecified organism; J96.01-Acute  respiratory failure with hypoxia; E87.2-Acidosis; I48.91-Unspecified  atrial fibrillation; Z74.09-Other reduced mobility     COMPARISON: 01/06/2020.     FINDINGS: The patient is status post median sternotomy and CABG  procedure. The heart is enlarged. The mediastinum is unremarkable. There  has been interval improvement in the patients patchy right lung airspace  disease, however there are a few patchy opacities bilaterally which  persist. There is no pneumothorax. There are no acute osseous  abnormalities.       Impression:       Interval improvement in the patients patchy bilateral  airspace disease.           This report was finalized on 1/9/2020 9:01 AM by Joaquín Shannon M.D..            Assessment & Recommendations/Plan:   1.  Abnormal CT of the chest.   Likely from previous history of hypersensitivity pneumonitis.  Chest  x-ray appears to have improved, once again consistent with likely hypersensitive pneumonitis/infectious bronchiolitis.    2.  Hypersensitivity pneumonitis.    Will administer Depo-Medrol today.  We will decrease Solu-Medrol today and potentially start prednisone tomorrow.  In reviewing her previous chart, hypersensitivity pneumonitis was based on clinical grounds.  Her panel was mostly unremarkable.  Recent hypersensitivity panel is pending.  CRP continues to decrease.  Continue Pulmicort.    3.  Heart failure with preserved ejection fraction.   We will consider diuretic therapy.    4.  RSV bronchiolitis.   Possible acute viral bronchiolitis/pneumonitis.  RSV is not known to cause significant pneumonitis in adults.    5.  Chronic kidney disease  Nephrology following.      We have reviewed patient's current orders and changes, if any, have been suggested to primary care team. Plan was also discussed with nursing staff, as necessary.     This document was electronically signed by Sri Garcia MD on 01/09/20 at 1:31 PM      Dictated utilizing Dragon dictation.

## 2020-01-09 NOTE — PROGRESS NOTES
Adult Nutrition  Assessment/PES    Patient Name:  Pat Morel  YOB: 1940  MRN: [id number]  Admit Date:  1/4/2020    Assessment Date:  1/9/2020    Comments:  Rec. #1: Continue with diet as ordered. Pt. Consuming ~70% of meals on average per NSG over five meals. Rec. #2: Consider adding renal MVI daily. Consult RD PRN.     Reason for Assessment     Row Name 01/09/20 0926          Reason for Assessment    Reason For Assessment  diagnosis/disease state     Diagnosis  pulmonary disease;cardiac disease;renal disease PNA, CAD, HTN, DHF, CKD     Identified At Risk by Screening Criteria  BMI;MST SCORE 2+           Anthropometrics     Row Name 01/09/20 0500          Anthropometrics    Weight  65.7 kg (144 lb 12.8 oz)         Labs/Tests/Procedures/Meds     Row Name 01/09/20 0927          Labs/Procedures/Meds    Lab Results Reviewed  reviewed, pertinent     Lab Results Comments  High: Gluc, BUN, Cr, Mg        Medications    Pertinent Medications Reviewed  reviewed, pertinent     Pertinent Medications Comments  Solu-Medrol, Lasix, Lipitor         Physical Findings     Row Name 01/09/20 0927          Physical Findings    Overall Physical Appearance  obese         Estimated/Assessed Needs     Row Name 01/09/20 0928          Calculation Measurements    Weight Used For Calculations  43.6 kg (96 lb 1.9 oz)        Estimated/Assessed Needs    Additional Documentation  Ayden-St. Jeor Equation (Group);Calorie Requirements (Group);Fluid Requirements (Group);Protein Requirements (Group)        Calorie Requirements    Estimated Calorie Requirement Comment  [id number]        Ayden-St. Jeor Equation    RMR (Ayden-St. Jeor Equation)  800.75        Protein Requirements    Weight Used For Protein Calculations  43.6 kg (96 lb 1.9 oz)     Est Protein Requirement Amount (gms/kg)  0.75 gm protein 26-33 gm/day     Estimated Protein Requirements (gms/day)  32.7        Fluid Requirements    Estimated Fluid Requirement Method   -- output + 1000 ml     Gust[place]-Vicente Method (over 20 kg)  2372         Nutrition Prescription Ordered     Row Name 01/09/20 0928          Nutrition Prescription PO    Current PO Diet  Regular     Common Modifiers  Cardiac;Consistent Carbohydrate;Renal         Evaluation of Received Nutrient/Fluid Intake     Row Name 01/09/20 0928          PO Evaluation    Number of Days PO Intake Evaluated  3 days     Number of Meals  5     % PO Intake  70               Problem/Interventions:  Problem 1     Row Name 01/09/20 0929          Nutrition Diagnoses Problem 1    Problem 1  Overweight/Obesity     Etiology (related to)  Factors Affecting Nutrition     Food Habit/Preferences  Large Meals     Signs/Symptoms (evidenced by)  BMI     BMI  30 - 34.9         Problem 2     Row Name 01/09/20 0929          Nutrition Diagnoses Problem 2    Problem 2  Impaired Nutrient Utilization     Etiology (related to)  Medical Diagnosis     Renal  CKD     Signs/Symptoms (evidenced by)  Biochemical     Specific Labs Noted  BUN;Creatinine;Mg++         Problem 3     Row Name 01/09/20 0929          Nutrition Diagnoses Problem 3    Problem 3  Increased Nutrient Needs     Macronutrient  Kcal;Protein     Etiology (related to)  Medical Diagnosis     Pulmonary/Critical Care  Pneumonia     Signs/Symptoms (evidenced by)  Other (comment) pulmonary dysfunction           Intervention Goal     Row Name 01/09/20 0930          Intervention Goal    General  Meet nutritional needs for age/condition     PO  PO intake (%)     PO Intake %  -- 50-75     Weight  No significant weight loss         Nutrition Intervention     Row Name 01/09/20 0930          Nutrition Intervention    RD/Tech Action  Follow Tx progress         Nutrition Prescription     Row Name 01/09/20 0930          Nutrition Prescription PO    PO Prescription  -- continue with diet as ordered        Other Orders    Supplement  Vitamin mineral supplement renal MVI     Supplement Ordered?  No, recommended          Education/Evaluation     Row Name 01/09/20 0931          Education    Education  Will Instruct as appropriate        Monitor/Evaluation    Monitor  Per protocol;PO intake;Pertinent labs;Weight           Electronically signed by:  Kimberley Matthews RD  01/09/20 9:31 AM

## 2020-01-10 NOTE — PROGRESS NOTES
Exercise Oximetry    Patient Name:Pat Morel   MRN: [id number]   Date: 01/10/20             ROOM AIR BASELINE   SpO2% 87   Heart Rate 90   Blood Pressure      EXERCISE ON ROOM AIR SpO2% EXERCISE ON O2 @ 2 LPM SpO2%   1 MINUTE  1 MINUTE 90   2 MINUTES  2 MINUTES 91   3 MINUTES  3 MINUTES 90   4 MINUTES  4 MINUTES 91   5 MINUTES  5 MINUTES 90   6 MINUTES  6 MINUTES 90              Distance Walked   Distance Walked  100ft   Dyspnea (Rubens Scale)   Dyspnea (Rubens Scale)  2   Fatigue (Rubens Scale)   Fatigue (Rubens Scale)   4     SpO2% Post Exercise   SpO2% Post Exercise  92   HR Post Exercise   HR Post Exercise  94   Time to Recovery   Time to Recovery  2 minutes     Comments: Patient requires 2 liters of oxygen at rest and while ambulating.

## 2020-01-10 NOTE — PROGRESS NOTES
"Nephrology Progress Note.    LOS: 5 days    Patient Care Team:  Anthony Sweet DO as PCP - General (Family Medicine)  Tien Archer MD as Consulting Physician (Cardiac Electrophysiology)  Xu Block MD (Cardiology)    Chief Complaint:    Chief Complaint   Patient presents with   • Shortness of Breath       Subjective:   Follow up for RENEE and Chronic Kidney disease stage 4 / Anemia.     Interval History:   Patient Complaints: none  Patient seen and examined this morning.  Events from last night noted.  Patient denies having any fevers chills.  No nausea or vomiting no abdominal pain.  Denies any chest pain, she is complaining of some shortness of breath as well as cough and sputum production.  Although she does think that it is better.  There still significant edema of the lower extremities   patient also denies having new onset weakness of numbness of either extremity.  History taken from: patient    Objective:    Vital Signs  BP (!) 164/102 (BP Location: Left arm, Patient Position: Lying)   Pulse 95   Temp 98.8 °F (37.1 °C) (Oral)   Resp 20   Ht 147.3 cm (58\")   Wt 64.6 kg (142 lb 6.4 oz)   LMP  (LMP Unknown)   SpO2 98%   BMI 29.76 kg/m²     No intake/output data recorded.    Intake/Output Summary (Last 24 hours) at 1/10/2020 0850  Last data filed at 1/10/2020 0547  Gross per 24 hour   Intake 360 ml   Output 1000 ml   Net -640 ml       Physical Exam:  General Appearance: alert, oriented x 3, no acute distress,   HEENT: Oral mucosa dry, extra occular movements intact. Sclera clear.  Skin: Warm and dry  Neck: supple, no JVD, trachea midline  Lungs:Chest shape is normal. Breath sounds heard bilaterally with scattered rhonchi and wheezing.  Heart: Irregular rate and rhythm. normal S1 and S2, no S3, no rub, peripheral pulses weak but palpable.  Abdomen: Obese, soft, non-tender,  present bowel sounds to auscultation  : no palpable bladder.  Extremities: 1+ edema, no cyanosis or clubbing.   Neuro: " normal speech and mental status, grossly non focal.     Results Review:   Results from last 7 days   Lab Units 01/10/20  0528 01/09/20  0626 01/08/20  0602 01/07/20 0707 01/06/20 0539 01/04/20 1949   SODIUM mmol/L 144 141 141 140 140   < > 135*   POTASSIUM mmol/L 3.7 3.6 3.7 3.3* 3.5   < > 4.9   CHLORIDE mmol/L 105 101 101 100 100   < > 92*   CO2 mmol/L 24.1 23.3 22.6 21.9* 23.7   < > 18.4*   BUN mg/dL 59* 65* 63* 58* 54*   < > 41*   CREATININE mg/dL 1.64* 1.90* 1.96* 1.81* 1.98*   < > 2.01*   CALCIUM mg/dL 8.3* 8.5* 9.0 9.0 9.2   < > 9.8   ALBUMIN g/dL  --   --   --  3.90 3.80  --  4.60   BILIRUBIN mg/dL  --   --   --   --  1.4*  --  3.1*   ALK PHOS U/L  --   --   --   --  71  --  96   ALT (SGPT) U/L  --   --   --   --  28  --  26   AST (SGOT) U/L  --   --   --   --  37*  --  55*   GLUCOSE mg/dL 165* 189* 177* 154* 168*   < > 179*    < > = values in this interval not displayed.     Estimated Creatinine Clearance: 23.8 mL/min (A) (by C-G formula based on SCr of 1.64 mg/dL (H)).  Results from last 7 days   Lab Units 01/07/20  0707 01/06/20  0539   MAGNESIUM mg/dL  --  2.6*   PHOSPHORUS mg/dL 4.0 4.2         Results from last 7 days   Lab Units 01/09/20 0626 01/07/20  0707 01/06/20  0539 01/05/20 0528 01/04/20 1949   WBC 10*3/mm3 7.28 8.51 7.29 7.14 8.48   HEMOGLOBIN g/dL 8.7* 9.0* 8.9* 8.5* 10.2*   PLATELETS 10*3/mm3 228 221 200 184 222     Results for VILMA ROSAS (MRN [id number]) as of 1/8/2020 20:16   Ref. Range 12/2/2019 16:48   Iron Latest Ref Range: 27 - 139 ug/dL 68   Ferritin Latest Ref Range: 15 - 150 ng/mL 79   Iron Saturation Latest Ref Range: 15 - 55 % 18   TIBC Latest Ref Range: 250 - 450 ug/dL 375   UIBC Latest Ref Range: 118 - 369 ug/dL 307         Brief Urine Lab Results  (Last result in the past 365 days)      Color   Clarity   Blood   Leuk Est   Nitrite   Protein   CREAT   Urine HCG        01/09/20 0759             67.7       01/09/20 0759 Yellow Clear Negative Negative Negative Trace              Protein/Creatinine Ratio, Urine   Date Value Ref Range Status   01/09/2020 413.6 (H) 0.0 - 200.0 mg/G Crea Final     Imaging Results (Last 24 Hours)     Procedure Component Value Units Date/Time    XR Chest 2 View [[id number]] Collected:  01/09/20 0900     Updated:  01/09/20 0903    Narrative:       PROCEDURE: XR CHEST 2 VW-        HISTORY: PNA; J18.9-Pneumonia, unspecified organism; J96.01-Acute  respiratory failure with hypoxia; E87.2-Acidosis; I48.91-Unspecified  atrial fibrillation; Z74.09-Other reduced mobility     COMPARISON: 01/06/2020.     FINDINGS: The patient is status post median sternotomy and CABG  procedure. The heart is enlarged. The mediastinum is unremarkable. There  has been interval improvement in the patients patchy right lung airspace  disease, however there are a few patchy opacities bilaterally which  persist. There is no pneumothorax. There are no acute osseous  abnormalities.       Impression:       Interval improvement in the patients patchy bilateral  airspace disease.           This report was finalized on 1/9/2020 9:01 AM by Joaquín Shannon M.D..          apixaban 2.5 mg Oral Q12H   atorvastatin 40 mg Oral Nightly   bisoprolol 10 mg Oral BID   budesonide 0.5 mg Nebulization BID - RT   [START ON 1/13/2020] epoetin andria/andria-epbx 40,000 Units Subcutaneous Q30 Days   ferrous sulfate 324 mg Oral Daily With Breakfast   furosemide 40 mg Oral Daily   guaiFENesin 600 mg Oral BID   isosorbide mononitrate 60 mg Oral Daily   lactobacillus acidophilus 1 capsule Oral BID   levothyroxine 75 mcg Oral Daily   pantoprazole 40 mg Oral Q AM   predniSONE 60 mg Oral BID With Meals   sodium chloride 10 mL Intravenous Q12H   spironolactone 25 mg Oral Daily            Medication Review:   Current Facility-Administered Medications   Medication Dose Route Frequency Provider Last Rate Last Dose   • acetaminophen (TYLENOL) tablet 650 mg  650 mg Oral Q4H PRN Tania Gupta DO   650 mg at  01/07/20 1600    Or   • acetaminophen (TYLENOL) 160 MG/5ML solution 650 mg  650 mg Oral Q4H PRN Tania Gupta, DO        Or   • acetaminophen (TYLENOL) suppository 650 mg  650 mg Rectal Q4H PRN Tania Gupta, DO       • albuterol (PROVENTIL) nebulizer solution 0.083% 2.5 mg/3mL  2.5 mg Nebulization Q4H PRN Tania Gupta, DO       • apixaban (ELIQUIS) tablet 2.5 mg  2.5 mg Oral Q12H Tania Gupta DO   2.5 mg at 01/10/20 0828   • atorvastatin (LIPITOR) tablet 40 mg  40 mg Oral Nightly Tania Gupta DO   40 mg at 01/09/20 2119   • benzonatate (TESSALON) capsule 200 mg  200 mg Oral TID PRN Tania Gupta DO       • bisoprolol (ZEBeta) tablet 10 mg  10 mg Oral BID Tania Gupta DO   10 mg at 01/10/20 0827   • budesonide (PULMICORT) nebulizer solution 0.5 mg  0.5 mg Nebulization BID - RT Sri Garcia MD   0.5 mg at 01/10/20 0738   • [START ON 1/13/2020] epoetin andria-epbx (RETACRIT) injection 40,000 Units  40,000 Units Subcutaneous Q30 Days Safnord Tsang MD, FASN       • ferrous sulfate EC tablet 324 mg  324 mg Oral Daily With Breakfast Tania Gupta DO   324 mg at 01/10/20 0828   • furosemide (LASIX) tablet 40 mg  40 mg Oral Daily Sanford Tsang MD, FASN   40 mg at 01/10/20 0828   • guaiFENesin (MUCINEX) 12 hr tablet 600 mg  600 mg Oral BID Jamel Myrick DO   600 mg at 01/10/20 0828   • ipratropium-albuterol (DUO-NEB) nebulizer solution 3 mL  3 mL Nebulization Q4H PRN Tania Gupta DO   3 mL at 01/08/20 1923   • isosorbide mononitrate (IMDUR) 24 hr tablet 60 mg  60 mg Oral Daily Tania Gupta DO   60 mg at 01/10/20 0828   • lactobacillus acidophilus (RISAQUAD) capsule 1 capsule  1 capsule Oral BID Denis Mercado MD   1 capsule at 01/10/20 0828   • levothyroxine (SYNTHROID, LEVOTHROID) tablet 75 mcg  75 mcg Oral Daily Tania Gupta DO   75 mcg at 01/10/20 0828   • ondansetron (ZOFRAN) tablet 4 mg  4 mg  Oral Q6H PRN Tania Gupta, DO       • pantoprazole (PROTONIX) EC tablet 40 mg  40 mg Oral Q AM Denis Mercado MD   40 mg at 01/10/20 0554   • phenylephrine-mineral oil-petrolatum (PREPARATION H) 0.25-14-74.9 % hemorhoidal ointment   Rectal BID PRN Tamara Tomlin, DO       • predniSONE (DELTASONE) tablet 60 mg  60 mg Oral BID With Meals Sri Garcia MD   60 mg at 01/10/20 0828   • sodium chloride 0.9 % flush 10 mL  10 mL Intravenous PRN Tania Gupta, DO       • sodium chloride 0.9 % flush 10 mL  10 mL Intravenous Q12H Tania Gupta DO   10 mL at 01/10/20 0832   • sodium chloride 0.9 % flush 10 mL  10 mL Intravenous PRN Tania Gupta,    10 mL at 01/09/20 0305   • spironolactone (ALDACTONE) tablet 25 mg  25 mg Oral Daily Sanford Tsang MD, FASN   25 mg at 01/10/20 0827       Assessment/Plan:    1. Acute kidney injury: Patient may have a small component of worsening baseline chronic kidney disease.  She does not have any UA or renal ultrasound.  I will go ahead and get it done and try to establish a baseline.  2. Chronic kidney disease stage IV: Most of her GFR is has been below 30, she likely falls into stage IV chronic kidney disease.  Work-up as ordered and it has came back negative.  3. Anemia of chronic kidney disease: She may have component of anemia of chronic kidney disease, check iron stores she has received 2 units of blood on her last hospitalization.  She also mentioned that she had a bleeding ulcer few years back.  Her iron stores are normal.  If her hemoglobin falls below 10 we will consider ANGELA.  4. Pneumonia of both lungs due to infectious organism:  5. 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  She has normal vitamin D levels, PTH is slightly elevated at 87.  She will need to be followed closely.  6. Atrial fibrillation with RVR: Rate controlled and anticoagulated.  7. Coronary artery  disease: She has a significant vascular issues, may need further evaluation if there is any worsening of her symptoms.  8. Acute congestive heart failure: Clinically she does appear to have some congestive heart failure would benefit from optimizing medications.  9. Interstitial lung disease: She follows up regularly with her pulmonologist.  10. Pulmonary hypertension: Complications from primary pulmonary issues.  11. Hypothyroidism: Last TSH was within normal limits    Plan:  · Continue with the current treatment plan.  · Clinically appears to be improving.  · I will change her diuretics to Lasix 40 mg daily along with Aldactone 25 mg daily.  This is keeping her in negative fluid balance.  · I will go ahead and start her on Procrit 40,000 units once a month.  · Details were discussed with the patient as well as her  in the room.    · Details were also discussed with the hospitalist service.  She will need a 2-week follow-up in the office at the time of discharge.  · Surveillance labs.  · Further recommendations will depend on clinical course of the patient during the current hospitalization.    · I also discussed the details with the nursing staff.  · Rest as ordered.    Sanford Tsang MD, FASN  01/10/20  8:50 AM    Dictated utilizing Dragon dictation.

## 2020-01-10 NOTE — PROGRESS NOTES
"  CC: Abnormal CT of the chest.    S: Continues to improve.  Feels that the cough and shortness of breath are definitely better, compared to 4 to 5 days ago.  She actually feels that her cough is improved even compared to 2 days ago.  She is complaining of midepigastric pain.    ROS: Positive for mild cough & shortness of breath. Denies chest pain, diarrhea or fever.    O: /99 (BP Location: Left arm, Patient Position: Lying)   Pulse 83   Temp 98.9 °F (37.2 °C) (Oral)   Resp 18   Ht 147.3 cm (58\")   Wt 64.6 kg (142 lb 6.4 oz)   LMP  (LMP Unknown)   SpO2 99%   BMI 29.76 kg/m²     Vital signs reviewed.  General/Constitutional: Mild respiratory distress noted.  Neck: No obvious JVD   Cardiovascular: S1 + S2.  Irregular  Lungs/Respiratory: Bilateral scattered wheezing heard.  Minimal basal crackles noted  GI/abdomen: Somewhat tender in the midepigastric region.  Bowel sounds are positive.  Musculoskeletal/Extremities: Trace edema noted.  Neurologic: AAOx3. Was able to follow commands     Labs: Reviewed.     Results from last 7 days   Lab Units 01/10/20  0528 01/09/20  0626 01/08/20  0602   SODIUM mmol/L 144 141 141   POTASSIUM mmol/L 3.7 3.6 3.7   CHLORIDE mmol/L 105 101 101   CO2 mmol/L 24.1 23.3 22.6   BUN mg/dL 59* 65* 63*   CREATININE mg/dL 1.64* 1.90* 1.96*   CALCIUM mg/dL 8.3* 8.5* 9.0   GLUCOSE mg/dL 165* 189* 177*       Results from last 7 days   Lab Units 01/07/20  0707 01/06/20  0539   MAGNESIUM mg/dL  --  2.6*   PHOSPHORUS mg/dL 4.0 4.2       Results from last 7 days   Lab Units 01/09/20  0626 01/07/20  0707 01/06/20  0539 01/05/20  0528   WBC 10*3/mm3 7.28 8.51 7.29 7.14   NEUTROPHIL % %  --   --  91.3* 89.3*   HEMOGLOBIN g/dL 8.7* 9.0* 8.9* 8.5*   HEMATOCRIT % 28.9* 29.8* 29.9* 27.5*   PLATELETS 10*3/mm3 228 221 200 184             Lab Results   Component Value Date    PROCALCITO 0.33 (H) 01/07/2020    PROCALCITO 0.25 01/04/2020       Lab Results   Component Value Date    PROBNP 13,304.0 (H) " 01/07/2020    PROBNP 28,142.0 (H) 01/04/2020    PROBNP 10,610 (H) 12/20/2019       Lab Results   Component Value Date    CRP 3.04 (H) 01/09/2020    CRP 7.13 (H) 01/07/2020    CRP 11.74 (H) 01/06/2020       Micro: As of January 10, 2020   Lab Results   Component Value Date    RESPCX Heavy growth (4+) Normal Respiratory Kristine 04/12/2019     Lab Results   Component Value Date    BLOODCX No growth at 5 days 01/04/2020    BLOODCX No growth at 5 days 01/04/2020     No results found for: URINECX  Lab Results   Component Value Date    MRSACX  01/06/2020     No Methicillin Resistant Staphylococcus aureus isolated       Lab Results   Component Value Date    STREPPNEUAG Negative 01/05/2020     Lab Results   Component Value Date    FLU Negative 01/04/2020    FLU Negative 01/04/2020     Lab Results   Component Value Date    RSV Detected (A) 01/05/2020           CXRay: Latest imaging study was reviewed personally.   Imaging Results (Last 24 Hours)     Procedure Component Value Units Date/Time    XR Chest 2 View [[id number]] Collected:  01/09/20 0900     Updated:  01/09/20 0903    Narrative:       PROCEDURE: XR CHEST 2 VW-        HISTORY: PNA; J18.9-Pneumonia, unspecified organism; J96.01-Acute  respiratory failure with hypoxia; E87.2-Acidosis; I48.91-Unspecified  atrial fibrillation; Z74.09-Other reduced mobility     COMPARISON: 01/06/2020.     FINDINGS: The patient is status post median sternotomy and CABG  procedure. The heart is enlarged. The mediastinum is unremarkable. There  has been interval improvement in the patients patchy right lung airspace  disease, however there are a few patchy opacities bilaterally which  persist. There is no pneumothorax. There are no acute osseous  abnormalities.       Impression:       Interval improvement in the patients patchy bilateral  airspace disease.           This report was finalized on 1/9/2020 9:01 AM by Joaquín Shannon M.D..            Assessment & Recommendations/Plan:   1.   Abnormal CT of the chest.   Likely from previous history of hypersensitivity pneumonitis.  Chest x-ray appears to have improved, once again consistent with likely hypersensitive pneumonitis/infectious bronchiolitis.    2.  Hypersensitivity pneumonitis.    Will administer Depo-Medrol today.  In reviewing her previous chart, hypersensitivity pneumonitis was based on clinical grounds.  Her panel was mostly unremarkable.  Recent hypersensitivity panel is pending.  CRP continues to decrease.  Continue Pulmicort.    3.  Heart failure with preserved ejection fraction.   We will consider diuretic therapy, if clinically appropriate.    4.  RSV bronchiolitis.   Possible acute viral bronchiolitis/pneumonitis.  RSV is not known to cause significant pneumonitis in adults.    5.  Chronic kidney disease  Nephrology following.    6.  Possible GERD  Will recommend using PPI especially given the fact that she will need to be on prednisone for the foreseeable future.    If she is being discharged, she should be discharged on the following  Prednisone 60 mg daily for 2 weeks followed by prednisone 40 mg daily for 2 weeks, followed by of prednisone 30 mg daily for 2 weeks and then prednisone 20 mg thereafter.  She will need to be evaluated by our office in 6 weeks.  I will arrange for outpatient follow-up.    We have reviewed patient's current orders and changes, if any, have been suggested to primary care team. Plan was also discussed with nursing staff, as necessary.     We have updated the admitting attending and nursing staff, as appropriate, on the patient's current status and plan. I will be going off shift tonight and will be unavailable.       This document was electronically signed by Sri Garcia MD on 01/10/20 at 7:45 AM      Dictated utilizing Dragon dictation.

## 2020-01-10 NOTE — PLAN OF CARE
Problem: Patient Care Overview  Goal: Plan of Care Review  Outcome: Ongoing (interventions implemented as appropriate)  Flowsheets (Taken 1/10/2020 0104)  Progress: improving  Plan of Care Reviewed With: patient; daughter  Outcome Summary: Pt. was feeling better this evening stated she was very tired earlier today, noted pt. had purple brusing on left/ right side of abdomen, BM tonigtht hommorrhoids bleed a small amount, no complains of pain or SOA will continue to monitor.

## 2020-01-10 NOTE — PROGRESS NOTES
Continued Stay Note  TAYLOR Garcia     Patient Name: Pat Morel  MRN: [id number]  Today's Date: 1/10/2020    Admit Date: 1/4/2020    Discharge Plan     Row Name 01/10/20 1048       Plan    Plan Spoke to pt in room, states going home today and would like HH.  Chose from list, [facility].  States could use a Pediatric walker per recommendation of PT, has no preference for DME.  Chose [facility].  .  Received order for HH, called to [facility] and will call pt and set up visit for Monday.Pt states does not have o2 at home.  [facility] TROY informed, will remove o2 do RA sat.      Received call from Juliana Troy and pt sats are in the 80's without o2.  She is calling Dr Mercado for order.  Will agreeable to use [facility] for o2 as well.            Discharge Codes    No documentation.       Expected Discharge Date and Time     Expected Discharge Date Expected Discharge Time    Jan 9, 2020             Anushka Guzman RN

## 2020-01-10 NOTE — PROGRESS NOTES
Case Management Discharge Note                Destination      No service has been selected for the patient.      Durable Medical Equipment      No service has been selected for the patient.      Dialysis/Infusion      No service has been selected for the patient.      Home Medical Care - Selection Complete      Service Provider Request Status Selected Services Address Phone Number Fax Number    [facility] Selected Home Health Services [address] [id number] [phone]      Therapy      No service has been selected for the patient.      Community Resources      No service has been selected for the patient.             Final Discharge Disposition Code: 06 - home with home health care

## 2020-01-10 NOTE — DISCHARGE SUMMARY
[facility]   DISCHARGE SUMMARY      Name:  Pat Morel   Age:  80 y.o.  Sex:  female  :  1940  MRN:  [id number]   Visit Number:  [id number]    Admission Date:  2020  Date of Discharge:  1/10/2020  Primary Care Physician:  Anthony Sweet DO    Important issues to note:    1.  Continue prednisone and taper as prescribed (Prednisone 60 mg daily for 2 weeks followed by prednisone 40 mg daily for 2 weeks, followed by of prednisone 30 mg daily for 2 weeks and then prednisone 20 mg thereafter).  2.  Lasix has been decreased to 40 mg daily.  3.  Bisoprolol has been increased to 10 mg twice daily due to her elevated blood pressures.  4.  No other change has been made to her home medication regimen.  5.  Follow-up with primary care physician in 1 week.  6.  Follow-up with Dr. Finnegan in 2 weeks.  7.  Follow-up with Dr. Garcia in 6 weeks.  8.  She does not need any antibiotics at discharge.  9.  Patient had a walking oximetry prior to discharge and was arranged 2 L/min continuous home oxygen therapy.    Discharge Diagnoses:     1.  Acute hypersensitivity pneumonitis, present on admission.  2.  Acute on chronic hypoxic respiratory failure, present on admission.  3.  Chronic diastolic heart failure, present on admission, no evidence of exacerbation.  4.  Chronic kidney disease stage III, present admission.  6.  Paroxysmal atrial fibrillation, on anticoagulation.  7.  CAD status post stents and CABG.  8.  Interstitial lung disease.  9.  Chronic pulmonary hypertension.  10.  Acquired hypothyroidism.  11.  RSV infection, present on admission.    Problem List:       Pneumonia of both lungs due to infectious organism    Persistent atrial fibrillation    Coronary artery disease involving native coronary artery of native heart without angina pectoris    Essential hypertension    Pulmonary arterial hypertension (CMS/HCC)    Acquired hypothyroidism    Chronic kidney disease, stage 3  (CMS/[facility])    GERD without esophagitis    Acute respiratory failure with hypoxia (CMS/[facility])    Presenting Problem:    Pneumonia of both lungs due to infectious organism, unspecified part of lung [J18.9]     Consults:     Consulting Physician(s)     Provider Relationship Specialty    Sanford Tsang MD, EVELINA Consulting Physician Nephrology    Sri Garcia MD Consulting Physician Pulmonary Disease        Procedures Performed:    None.    History of presenting illness:    Patient is a 80-year-old female who presents emergency room with multiple complaints including shortness of breath and cough.  Difficult medical history including CAD with CABG, atrial fibrillation, chronic kidney disease stage III, history of CVA, diastolic CHF, iron deficiency anemia, hypersensitivity pneumonitis, interstitial lung disease, hypertension, osteoporosis.  Patient admits to a one-week history of increased shortness of breath, productive cough, wheezing, and subjective fevers.  She tells that she had recently been in the hospital on Christmas for heart failure exacerbation.  She states she had done well for a couple days, but then started to have the increased cough and fevers.  She generally feels unwell.  She states she had seen her primary care doctor on 1/2/2020 where she was hypoxic with ambulation in the clinic, but improved at rest.  She was started on Tessalon Perles, DuoNeb treatments, and given Decadron prescription.  She states since then, she has continued to decline.  She denies any chest pains or palpitations.  No falls or trauma.  She follows with Dr. Laura as her pulmonologist.     In the ER, she presented hypertensive, atrial fibrillation with RVR, and hypoxic on room air.  CBC with white blood cell count of 8000, hemoglobin 10.2, platelets 222.  CMP with creatinine 2.01, sodium 135, chloride 92, total bili 3.1.  proBNP of 28,000.  Troponin 0 0.012.  Procalcitonin 0.25.  Lactic acid 6.6.  ABG with pH 7.42, PCO2 of  30, PO2 of 243, on 100% nonrebreather.  Chest x-ray demonstrated an irregular nodule opacity in the right upper lung field, recommended CT.  CT scan demonstrated multiple bilateral irregular nodule opacities most likely due to infectious disease.  Blood cultures were obtained.  She was started on antibiotics and we were asked to admit thereafter.    Hospital Course:    Patient is 80-year-old female with shortness of breath and cough.  She has coronary artery disease with CABG, A. fib, chronic kidney disease stage III, history of CVA, diastolic CHF, hypersensitivity pneumonitis, interstitial lung disease, hypertension, osteoporosis.  She has a one-week history of shortness of breath, productive cough, wheezing and fevers.  She follows with Dr. Laura as her pulmonologist.  She was noted to have elevated lactic acid, however has a history of congestive heart failure with chronic kidney disease and edema.  Due to this she was not aggressively hydrated.  CT scan of the chest showed multiple bilateral irregular nodular opacities most likely due to infectious disease with possible atypical mycoplasma infection or fungal infection per radiology.     Patient was placed on broad-spectrum combination antibiotic therapy initially including Zosyn, vancomycin, azithromycin and micafungin.  However, it was felt that the patient's pulmonary findings were related to hypersensitivity pneumonitis and her antibiotics were subsequently de-escalated and she was continued on Rocephin and azithromycin.  Patient was seen by Dr. Garcia from pulmonology and he recommended continuation of high-dose steroid therapy and bronchodilators and budesonide.  Hypersensitivity panel was sent and it is currently pending.  Patient improved slowly over the next 3 to 4 days with regards to her shortness of breath and chest congestion and wheezing.  She was started on physical therapy and was able to walk in the hallway.  She already has home oxygen.   Repeat chest x-ray done yesterday shows improvement in her bilateral opacities.  She has completed a course with Rocephin and azithromycin and does not need any further antibiotic therapy.  Her blood cultures, Legionella and streptococcal serologies have been negative.  Respiratory panel showed RSV.    She has a history of coronary artery disease with CABG and stents.  She follows with Dr. Higgins.  Troponin have been negative during this admission.  She states that she has been on prednisone and CellCept in the past for interstitial lung disease but unfortunately could not tolerate these medications due to increased weight gain and swelling.     Patient was seen by Dr. Tsang for her chronic kidney disease.  Renal function is back to baseline.  Dr. Finnegan decreased her Lasix to once daily from twice daily.  Her spironolactone has been continued.  She has been recommended to follow-up with Dr. Finnegan in 2 weeks.  Renal ultrasound done during this admission was unremarkable.    Patient is currently being discharged home with oral prednisone.  Unfortunately, she has no other options at this time and she understands that prednisone may cause her to have weight gain and fluid retention.  She will be slowly tapered down to 20 mg daily over the next 6 weeks.  She is advised to follow-up with Dr. Garcia in 6 weeks.  She will be prescribed budesonide nebulizations at home.  She is advised to follow-up with her primary care physician in 1 week.  She will be arranged home health and home walker.  She had a walking oximetry prior to discharge and qualified for 2 L of nasal cannula oxygen continuously and this will be arranged.  Discussed with case management services.    Vital Signs:    Temp:  [97.8 °F (36.6 °C)-98.9 °F (37.2 °C)] 98.8 °F (37.1 °C)  Heart Rate:  [76-98] 95  Resp:  [16-20] 20  BP: (131-168)/() 139/84    Physical Exam:    General Appearance:  Alert and cooperative, not in any acute distress.   Head:  Atraumatic  and normocephalic, without obvious abnormality.   Eyes:          PERRLA, conjunctivae and sclerae normal, no Icterus. No pallor. Extraocular movements are within normal limits.   Ears:  Ears appear intact with no abnormalities noted.   Throat: No oral lesions, no thrush, oral mucosa moist.   Neck: Supple, trachea midline, no thyromegaly, no carotid bruit.   Back:   No kyphoscoliosis present. No tenderness to palpation,   range of motion normal.   Lungs:   Chest shape is normal. Breath sounds heard bilaterally equally.  No wheezing.  Bilateral coarse crackles heard but much improved from admission.  No Pleural rub or bronchial breathing.   Heart:  Normal S1 and S2, no murmur, no gallop, no rub. No JVD.  Midline CABG scar noted.   Abdomen:   Normal bowel sounds, no masses, no organomegaly. Soft, non-tender, non-distended, no guarding, no rebound tenderness.   Extremities: Moves all extremities well, 1+ edema, no cyanosis, no clubbing. Ecchymoses noted on the right arm and forearm.   Pulses: Pulses palpable and equal bilaterally.   Skin: No bleeding or rash.   Neurologic: Alert and oriented x 3. Moves all four limbs equally. No tremors. No facial asymmetry.     Pertinent Lab Results:     Results from last 7 days   Lab Units 01/10/20  0528 01/09/20  0626 01/08/20  0602  01/06/20  0539  01/04/20  1949   SODIUM mmol/L 144 141 141   < > 140   < > 135*   POTASSIUM mmol/L 3.7 3.6 3.7   < > 3.5   < > 4.9   CHLORIDE mmol/L 105 101 101   < > 100   < > 92*   CO2 mmol/L 24.1 23.3 22.6   < > 23.7   < > 18.4*   BUN mg/dL 59* 65* 63*   < > 54*   < > 41*   CREATININE mg/dL 1.64* 1.90* 1.96*   < > 1.98*   < > 2.01*   CALCIUM mg/dL 8.3* 8.5* 9.0   < > 9.2   < > 9.8   BILIRUBIN mg/dL  --   --   --   --  1.4*  --  3.1*   ALK PHOS U/L  --   --   --   --  71  --  96   ALT (SGPT) U/L  --   --   --   --  28  --  26   AST (SGOT) U/L  --   --   --   --  37*  --  55*   GLUCOSE mg/dL 165* 189* 177*   < > 168*   < > 179*    < > = values in this  interval not displayed.     Results from last 7 days   Lab Units 01/09/20  0626 01/07/20  0707 01/06/20  0539   WBC 10*3/mm3 7.28 8.51 7.29   HEMOGLOBIN g/dL 8.7* 9.0* 8.9*   HEMATOCRIT % 28.9* 29.8* 29.9*   PLATELETS 10*3/mm3 228 221 200         Results from last 7 days   Lab Units 01/06/20  0539 01/05/20  1635 01/04/20  1950   TROPONIN T ng/mL <0.010 <0.010 0.012     Results from last 7 days   Lab Units 01/07/20  0707   PROBNP pg/mL 13,304.0*             Results from last 7 days   Lab Units 01/04/20  2034   PH, ARTERIAL pH units 7.422   PO2 ART mm Hg 243.0*   PCO2, ARTERIAL mm Hg 30.9*   HCO3 ART mmol/L 20.1*     Results from last 7 days   Lab Units 01/09/20  0759   COLOR UA  Yellow   GLUCOSE UA  Negative   KETONES UA  Negative   LEUKOCYTES UA  Negative   PH, URINE  5.5   BILIRUBIN UA  Negative   UROBILINOGEN UA  0.2 E.U./dL     Pain Management Panel     Pain Management Panel Latest Ref Rng & Units 1/9/2020    CREATININE UR mg/dL 67.7        Results from last 7 days   Lab Units 01/06/20  0859 01/04/20 2005 01/04/20 1959   BLOODCX   --  No growth at 5 days No growth at 5 days   MRSA SCREEN CX  No Methicillin Resistant Staphylococcus aureus isolated  --   --      Pertinent Radiology Results:    Imaging Results (All)     Procedure Component Value Units Date/Time    XR Chest 2 View [[id number]] Collected:  01/09/20 0900     Updated:  01/09/20 0903    Narrative:       PROCEDURE: XR CHEST 2 VW-        HISTORY: PNA; J18.9-Pneumonia, unspecified organism; J96.01-Acute  respiratory failure with hypoxia; E87.2-Acidosis; I48.91-Unspecified  atrial fibrillation; Z74.09-Other reduced mobility     COMPARISON: 01/06/2020.     FINDINGS: The patient is status post median sternotomy and CABG  procedure. The heart is enlarged. The mediastinum is unremarkable. There  has been interval improvement in the patients patchy right lung airspace  disease, however there are a few patchy opacities bilaterally which  persist. There is no  pneumothorax. There are no acute osseous  abnormalities.       Impression:       Interval improvement in the patients patchy bilateral  airspace disease.           This report was finalized on 1/9/2020 9:01 AM by Joaquín Shannon M.D..     Renal Limited [[id number]] Collected:  01/06/20 2328     Updated:  01/06/20 2329    Narrative:       FINAL REPORT    TECHNIQUE:  Sonographic images of the kidneys were obtained.    CLINICAL HISTORY:  luna    FINDINGS:  The dcfs-ii-cxbh lengths of the right and left kidneys are 8.4  cm and 9.0 cm respectively.  There is normal renal parenchymal  echotexture and cortical thickness.  There is no mass or  hydronephrosis.      Impression:       Unremarkable.    Authenticated by Ran Nathan M.D. on 01/06/2020 11:28:20 PM    XR Chest 1 View [[id number]] Collected:  01/06/20 0858     Updated:  01/06/20 0902    Narrative:       PROCEDURE: XR CHEST 1 VW-     HISTORY: DYSPNEA CHRONIC, NO XRAY     COMPARISON: 01/04/2020.     FINDINGS: The patient is status post median sternotomy and CABG  procedure. The heart is normal in size. The mediastinum is unremarkable.  A reticulonodular lung pattern is unchanged. No significant effusions  are evident. There is no pneumothorax.  There are no acute osseous  abnormalities.       Impression:       No change in the patient's reticulonodular lung pattern.     Continued followup is recommended.     This report was finalized on 1/6/2020 9:00 AM by Joaquín Shannon M.D..    CT Chest Without Contrast [[id number]] Collected:  01/04/20 2313     Updated:  01/04/20 2314    Narrative:       FINAL REPORT    TECHNIQUE:  Routine axial images were obtained from the lung apices to below  the diaphragm without contrast.    CLINICAL HISTORY:  sob, hypoxia, abnormal CXR    FINDINGS:  There are multiple small bilateral irregular nodular opacities  in the lungs bilaterally, including in the right upper lobe.  No  clear pulmonary mass or nodule is seen.  There is no  pleural  disease.  There is borderline right paratracheal and mediastinal  adenopathy.  There is a small hiatal hernia of the stomach.  There is no acute osseous abnormality.      Impression:       Multiple bilateral irregular nodular opacities most likely due  to infectious disease including mycobacterial and fungal  infection.  Metastatic disease much less likely.    Authenticated by Ran Nathan M.D. on 01/04/2020 11:13:21 PM    XR Chest 1 View [[id number]] Collected:  01/04/20 2133     Updated:  01/04/20 2134    Narrative:       FINAL REPORT    TECHNIQUE:  An AP portable view of the chest was obtained.    CLINICAL HISTORY:  Simple Sepsis Triage Protocol    FINDINGS:  There is an apparent small irregular nodular opacity in the  right upper lobe superimposed on the right posterior seventh  rib.  CT is recommended for further evaluation.  The lungs are  otherwise clear.  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 There  is no pleural disease, adenopathy, or significant osseous  abnormality.  There is a small hiatal hernia.      Impression:       Irregular nodular opacity in the right upper lung field.  Recommend CT for further evaluation.  No acute disease.    Authenticated by Ran Nathan M.D. on 01/04/2020 09:33:57 PM        Condition on Discharge:      Stable.    Code status during the hospital stay:    Code Status and Medical Interventions:   Ordered at: 01/05/20 0117     Code Status:    No CPR     Medical Interventions (Level of Support Prior to Arrest):    Full     Discharge Disposition:    [place] [facility]    [place]charge Medications:       Discharge Medications      New Medications      Instructions Start Date   budesonide 0.5 MG/2ML nebulizer solution  Commonly known as:  PULMICORT   0.5 mg, Nebulization, 2 Times Daily - RT      predniSONE 20 MG tablet  Commonly known as:  DELTASONE   60 mg, Oral, 2 Times Daily With Meals         Changes to Medications      Instructions Start Date   bisoprolol 5 MG  tablet  Commonly known as:  ZEBETA  What changed:  how much to take   10 mg, Oral, 2 Times Daily      furosemide 40 MG tablet  Commonly known as:  LASIX  What changed:  when to take this   40 mg, Oral, Daily      potassium chloride 10 MEQ CR tablet  Commonly known as:  K-DUR  What changed:  when to take this   10 mEq, Oral, Daily         Continue These Medications      Instructions Start Date   apixaban 2.5 MG tablet tablet  Commonly known as:  ELIQUIS   2.5 mg, Oral, 2 Times Daily      atorvastatin 40 MG tablet  Commonly known as:  LIPITOR   40 mg, Oral, Nightly      benzonatate 200 MG capsule  Commonly known as:  TESSALON   200 mg, Oral, 3 Times Daily PRN      ferrous gluconate 324 MG tablet  Commonly known as:  FERGON   324 mg, Oral, Daily With Breakfast      ipratropium-albuterol 0.5-2.5 mg/3 ml nebulizer  Commonly known as:  DUO-NEB   3 mL, Nebulization, Every 4 Hours PRN      isosorbide mononitrate 120 MG 24 hr tablet  Commonly known as:  IMDUR   60 mg, Oral, Daily      levothyroxine 75 MCG tablet  Commonly known as:  SYNTHROID, LEVOTHROID   75 mcg, Oral, Daily      nitroglycerin 0.4 MG SL tablet  Commonly known as:  NITROSTAT   0.4 mg, Sublingual, Every 5 Minutes PRN      pantoprazole 40 MG EC tablet  Commonly known as:  PROTONIX   40 mg, Oral, Daily      spironolactone 25 MG tablet  Commonly known as:  ALDACTONE   12.5 mg, Oral, Daily      Vitamin D (Cholecalciferol) 50 MCG (2000 UT) capsule   1 capsule, Oral, Daily         Stop These Medications    dexamethasone 4 MG tablet  Commonly known as:  DECADRON          Discharge Diet:     Diet Instructions     Diet: Renal; Thin      Discharge Diet:  Renal    Fluid Consistency:  Thin        Activity at Discharge:     Activity Instructions     Activity as Tolerated          Follow-up Appointments:    Additional Instructions for the Follow-ups that You Need to Schedule     Ambulatory Referral to Home Health   As directed      Face to Face Visit Date:  1/10/2020     Follow-up provider for Plan of Care?:  I treated the patient in an acute care facility and will not continue treatment after discharge.    Follow-up provider:  RENE SALEEM [[id number]]    Reason/Clinical Findings:  HSP, ILD, AFib, HTN, CKD    Describe mobility limitations that make leaving home difficult:  Generalized weakness, Chronic hypoxia, fall risk    Nursing/Therapeutic Services Requested:  Skilled Nursing Physical Therapy Occupational Therapy    Skilled nursing orders:  Medication education O2 instruction Mini-nebs Cardiopulmonary assessments    PT orders:  Therapeutic exercise Transfer training Strengthening Gait Training    Weight Bearing Status:  As Tolerated    Occupational orders:  Activities of daily living Strengthening    Frequency:  1 Week 1         Discharge Follow-up with Specified Provider: Sri Garcia (overbook if necessary). She has to be seen within 5-7 weeks.; 6 Weeks   As directed      To:  Sri Garcia (overbook if necessary). She has to be seen within 5-7 weeks.    Follow Up:  6 Weeks    Follow Up Details:  If being discharged on a weekend, please call our office on the next working day to schedule this appointment.           Follow-up Information     Sanford Tsang MD, FASN Follow up in 2 week(s).    Specialties:  Nephrology, Hospitalist  Contact information:  [address] [place] [address]  [phone]             Rene Saleem,  Follow up in 1 week(s).    Specialty:  Family Medicine  Contact information:  [address] [place] [address]  [phone]             Sri Garcia MD Follow up in 6 week(s).    Specialties:  Pulmonary Disease, Sleep Medicine  Contact information:  [address]  [place] [address]  [phone]                 Future Appointments   Date Time Provider Department Center   1/17/2020  9:45 AM Effie Cornell APRN [facility] None   1/24/2020 10:00 AM Yomi Higgins MD [facility]   1/29/2020  1:45 PM  iTen Archer MD [facility] None   5/21/2020 10:30 AM Tien Archer MD [facility] None     Test Results Pending at Discharge:     Order Current Status    Aspergillus Antibodies In process    Immunofixation, Urine - Urine, Clean Catch In process             Denis Mercado MD  01/10/20  11:37 AM    Time spent: 35 min.    Dictated utilizing Dragon dictation.

## 2020-01-11 NOTE — OUTREACH NOTE
Prep Survey      Responses   Facility patient discharged from?  [facility]   Is patient eligible?  Yes   Discharge diagnosis  Acute hypersensitivity pneumonitis, A/C hypoxic resp. failure, chronic diastolic HF, CKD III, PAF, CAD, interstitial lung disease, chronic pulmonary HTN, acquired Hypothyroidism, RSV infection   Does the patient have one of the following disease processes/diagnoses(primary or secondary)?  COPD/Pneumonia   Does the patient have Home health ordered?  Yes   What is the Home health agency?   [facility]   Is there a DME ordered?  No   Comments regarding appointments  See AVS   Prep survey completed?  Yes          Deborah Rivas RN

## 2020-01-13 NOTE — OUTREACH NOTE
NICOLE call completed. Please see flow sheet for additional details.  Pt states LEs are swelling - keeping LEs elevated, watching salt. Confirms taking lasix 40 mg QD, bisoprolol 10 mg BID, pred taper @ 60 mg QD now.  SOA & cough improving - says breathing txs help - wearing O2 2L NC 24/7.  Denies chest pain, palps, dizziness, N/V, F/C. Eating drinking fair.  HH scheduled today. No immediate issues/questions. Understands sx requiring immediate med care. Confirms NICOLE 1/17, Dr Higgins cardio 1/24, Dr Tsang 1/28.

## 2020-01-14 NOTE — OUTREACH NOTE
COPD/PN Week 1 Survey      Responses   Facility patient discharged from?  [facility]   Does the patient have one of the following disease processes/diagnoses(primary or secondary)?  COPD/Pneumonia   Is there a successful TCM telephone encounter documented?  Yes          Tahir Thapa RN

## 2020-01-17 PROBLEM — K92.2 GI BLEED: Status: RESOLVED | Noted: 2019-10-13 | Resolved: 2020-01-01

## 2020-01-17 PROBLEM — I50.30 DIASTOLIC HEART FAILURE (HCC): Status: ACTIVE | Noted: 2020-01-01

## 2020-01-17 PROBLEM — N17.9 AKI (ACUTE KIDNEY INJURY) (HCC): Status: RESOLVED | Noted: 2019-10-13 | Resolved: 2020-01-01

## 2020-01-17 PROBLEM — Z87.19 HISTORY OF MELENA: Status: RESOLVED | Noted: 2019-11-19 | Resolved: 2020-01-01

## 2020-01-17 PROBLEM — Z98.890 HISTORY OF COLONOSCOPY: Status: RESOLVED | Noted: 2019-11-19 | Resolved: 2020-01-01

## 2020-01-17 PROBLEM — K92.1 GASTROINTESTINAL HEMORRHAGE WITH MELENA: Status: RESOLVED | Noted: 2019-10-13 | Resolved: 2020-01-01

## 2020-01-17 NOTE — H&P
[facility] Medicine Services  HISTORY AND PHYSICAL    Patient Name: Pat Morel  : 1940  MRN: [id number]  Primary Care Physician: Anthony Sweet DO  Date of admission: 2020      Subjective   Subjective     Chief Complaint:  Shortness of breath    HPI:  Pat Morel is a 80 y.o. female with history of CAD status post CABG, atrial fibrillation, CKD stage IV, diastolic heart failure, iron deficiency anemia, interstitial lung disease and hypersensitivity pneumonitis with recent admission to [facility] for acute on chronic hypoxic respiratory failure, RSV bronchiolitis and acute kidney injury.  During that hospitalization the patient was given empiric antibiotics, gently diuresed and started on a 6-week prednisone taper starting at 60 mg/day.  The patient presents from her transitional care follow-up at 1 week post discharge with increased lower extremity edema and worsening shortness of breath.  She has also had at least 2 falls at home over the past week.  Ms. Morel has been hospitalized several times over the past 4 months for volume overload, and she is concerned that her current high-dose prednisone taper has contributed to her fluid retention.    Review of Systems   Constitutional: Negative.    HENT: Negative.    Eyes: Negative.    Respiratory: Positive for cough, chest tightness and shortness of breath.    Cardiovascular: Positive for leg swelling.   Gastrointestinal: Negative.    Endocrine: Negative.    Genitourinary: Negative.    Musculoskeletal: Negative.    Skin: Negative.    Allergic/Immunologic: Negative.    Neurological:        Falls   Hematological: Negative.    Psychiatric/Behavioral: Negative.         All other systems reviewed and are negative.     Personal History     Past Medical History:   Diagnosis Date   • Acute on chronic diastolic congestive heart failure (CMS/[facility]) 2019   • Anemia    • Angina at rest (CMS/[facility])    • Arthritis     • Atrial fibrillation (CMS/HCC)    • Coronary artery disease    • Disease of thyroid gland    • Elevated cholesterol    • GERD without esophagitis 12/2/2019   • History of blood transfusion    • History of colonoscopy 11/19/2019   • History of melena 11/19/2019   • History of stomach ulcers    • Hypertension    • Impaired functional mobility, balance, gait, and endurance    • Osteoporosis    • Positive TB test    • Stroke (CMS/HCC)        Past Surgical History:   Procedure Laterality Date   • BRONCHOSCOPY Bilateral 4/12/2019    Procedure: BRONCHOSCOPY;  Surgeon: Jeff Laura MD;  Location:  [facility];  Service: Pulmonary   • BYPASS GRAFT     • COLONOSCOPY N/A 10/15/2019    Procedure: COLONOSCOPY;  Surgeon: Fredi Aaron MD;  Location:  [facility];  Service: Gastroenterology   • CORONARY ANGIOPLASTY WITH STENT PLACEMENT     • CORONARY ARTERY BYPASS GRAFT  10/18/2010   • ENDOSCOPY N/A 10/14/2019    Procedure: ESOPHAGOGASTRODUODENOSCOPY;  Surgeon: Fredi Aaron MD;  Location:  [facility];  Service: Gastroenterology   • HYSTERECTOMY     • STOMACH SURGERY  08/11/2019    Upper GI bleed    • STOMACH SURGERY  10/13/2019       Family History: family history includes Arthritis in her mother; Cancer in her mother; Liver disease in her sister; No Known Problems in her father. Otherwise pertinent FHx was reviewed and unremarkable.     Social History:  reports that she has never smoked. She has never used smokeless tobacco. She reports that she does not drink alcohol or use drugs.  Social History     Social History Narrative   • Not on file       Medications:  Available home medication information reviewed.  Medications Prior to Admission   Medication Sig Dispense Refill Last Dose   • apixaban (ELIQUIS) 2.5 MG tablet tablet Take 2.5 mg by mouth 2 (Two) Times a Day.   Taking   • atorvastatin (LIPITOR) 40 MG tablet Take 1 tablet by mouth Every Night. 90 tablet 3 Taking   • benzonatate  (TESSALON) 200 MG capsule Take 1 capsule by mouth 3 (Three) Times a Day As Needed for Cough. 30 capsule 0 Taking   • bisoprolol (ZEBeta) 10 MG tablet Take 1 tablet by mouth 2 (Two) Times a Day. (Patient taking differently: Take 5 mg by mouth 2 (Two) Times a Day.) 60 tablet 0 Taking   • budesonide (PULMICORT) 0.5 MG/2ML nebulizer solution Inhale contents of 1 vial through a nebulizer 2 (Two) Times a Day 120 mL 0 Taking   • ferrous gluconate (FERGON) 324 MG tablet Take 1 tablet by mouth Daily With Breakfast. 30 tablet 0 Taking   • furosemide (LASIX) 40 MG tablet Take 1 tablet by mouth Daily. 60 tablet 0 Taking   • ipratropium-albuterol (DUO-NEB) 0.5-2.5 mg/3 ml nebulizer Take 3 mL by nebulization Every 4 (Four) Hours As Needed for Wheezing or Shortness of Air (cough). 120 mL 0 Taking   • isosorbide mononitrate (IMDUR) 120 MG 24 hr tablet Take 0.5 tablets by mouth Daily.   Taking   • levothyroxine (SYNTHROID, LEVOTHROID) 75 MCG tablet Take 75 mcg by mouth Daily.   Taking   • nitroglycerin (NITROSTAT) 0.4 MG SL tablet Place 0.4 mg under the tongue Every 5 (Five) Minutes As Needed.   Taking   • pantoprazole (PROTONIX) 40 MG EC tablet Take 1 tablet by mouth Daily. 60 tablet 3 Taking   • potassium chloride (K-DUR) 10 MEQ CR tablet Take 1 tablet by mouth Daily.   Taking   • predniSONE (DELTASONE) 20 MG tablet Take 3 tablets by mouth daily for 2 weeks, then 2 tablets daily for 2 weeks, then 1.5 tablets daily for 2 weeks, then 1 tablet daily thereafter 120 tablet 0 Taking   • spironolactone (ALDACTONE) 25 MG tablet Take 0.5 tablets by mouth Daily. 30 tablet 0 Taking   • Vitamin D, Cholecalciferol, 50 MCG (2000 UT) capsule Take 1 capsule by mouth Daily.   Taking       Allergies   Allergen Reactions   • Tuberculin Tests Rash       Objective   Objective     Vital Signs:   Temp:  [97.6 °F (36.4 °C)-98 °F (36.7 °C)] 97.6 °F (36.4 °C)  Heart Rate:  [101-110] 101  Resp:  [24] 24  BP: (115-147)/(60-97) 147/97        Physical Exam      Constitutional -no acute distress, non toxic, in bed  HEENT-NCAT, mucous membranes moist  CV-irregularly irregular  Resp-diminished bilaterally, rare rales at bases, on wheezes  Abd-soft, non-tender, non-distended, normo active bowel sounds, overweight  Ext-1-2 + LE edema bilaterally, both lower legs in unna boots, toes cool but sensation intact  Neuro-alert and oriented, speech clear, moves all extremities   Psych-normal affect   Skin- ecchymosis on right lower back      Results Reviewed:  I have personally reviewed current lab and radiology data.              Invalid input(s):  ALKPHOS  Estimated Creatinine Clearance: 23.9 mL/min (A) (by C-G formula based on SCr of 1.64 mg/dL (H)).  Brief Urine Lab Results  (Last result in the past 365 days)      Color   Clarity   Blood   Leuk Est   Nitrite   Protein   CREAT   Urine HCG        01/09/20 0759             67.7       01/09/20 0759 Yellow Clear Negative Negative Negative Trace             Imaging Results (Last 24 Hours)     Procedure Component Value Units Date/Time    XR Chest 1 View [[id number]] Resulted:  01/17/20 1448     Updated:  01/17/20 1448        Results for orders placed during the hospital encounter of 10/13/19   Adult Transthoracic Echo Complete W/ Cont if Necessary Per Protocol    Narrative · The left atrium is enlarged.  · Global and segmental LV wall motion is normal. The estimated LV ejection   fraction is >70%.  · The aortic valve appears to be mildly sclerotic. AS and AI are absent.  · There is mild-moderate mitral regurgitation.  · There is moderate tricuspid regurgitation with a moderate elevation is   estimated PA pressure (45-55 mmHg)  · There are no other important findings on this study.          Assessment/Plan   Assessment & Plan     Active Hospital Problems    Diagnosis POA   • Volume overload [E87.70] Yes       Acute on chronic diastolic heart failure  -Suspect secondary to changes in home diuretic dose (lowered while recently hospitalized)  plus possible fluid retention secondary to high dose prednisone use  -Echo from October 2019 shows an EF of greater than 70%, mild MR and moderate TR  -Check proBNP, troponin, CMP, CBC  -EKG  -Chest x-ray  -Start IV diuresis once baseline labs available  - heart failure clinic navigator consulted.  - PT wound care for continued unna boots as needed    Hypersensitivity pneumonitis  -recently diagnosed on the basis of clinical and CT scan findings, as well as prior history of hypersensitivity pneumonitis.   -Continue prednisone taper as outlined by pulmonology in [place], however I do not see the results of the most recent hypersensitivity panel that was drawn at that hospital.   -Once chest x-ray and other information is available available, will need to review results with pulmonology here to see if a shorter prednisone taper is appropriate (vitaliy follows with Dr Jeff Laura in clinic).   - duonebs, pulmicort    CKDIV  - baseline 1.6-2.0  - consider nephrology consultation if diuresis impaired by worsening renal function.    Recent RSV bronchiolitis    Atrial fibrillation  - contin apixaban    Anemia of chronic disease    Group 3 pulmonary hypertension    CAD  - h/o CABG    Generalized weakness  - PT/OT  - fall precautions      DVT prophylaxis:  anticoagulated    CODE STATUS:    Code Status and Medical Interventions:   Ordered at: 01/17/20 1446     Limited Support to NOT Include:    Intubation     Level Of Support Discussed With:    Patient     Code Status:    No CPR     Medical Interventions (Level of Support Prior to Arrest):    Limited       Admission Status:  I believe this patient meets OBSERVATION status, however if further evaluation or treatment plans warrant, status may change.  Based upon current information, I predict patient's care encounter to be less than or equal to 2 midnights.      Electronically signed by Kishor Mao MD, 01/17/20, 2:53 PM.

## 2020-01-17 NOTE — TELEPHONE ENCOUNTER
Padmini from home health called and stated that the patient is being seen there.    Padmini doesn't need a call back just wanted to know if anything else needed to be done.    Please advise

## 2020-01-17 NOTE — PROGRESS NOTES
Transitional Care Follow Up Visit  Subjective     Pat Morel is a 80 y.o. female who presents for a transitional care management visit.    Within 48 business hours after discharge our office contacted her via telephone to coordinate her care and needs.      I reviewed and discussed the details of that call along with the discharge summary, hospital problems, inpatient lab results, inpatient diagnostic studies, and consultation reports with Pat.     Current outpatient and discharge medications have been reconciled for the patient.  Reviewed by: AYANNA Lewis      Date of TCM Phone Call 1/13/2020   [facility]   Date of Admission 1/4/2020   Date of Discharge 1/10/2020   [place] [facility] [place] for Readmission (LACE) Score: 13 (1/10/2020  6:00 AM)      History of Present Illness   Course During Hospital Stay:  Patient is 80-year-old female with shortness of breath and cough.  She has coronary artery disease with CABG, A. fib, chronic kidney disease stage III, history of CVA, diastolic CHF, hypersensitivity pneumonitis, interstitial lung disease, hypertension, osteoporosis.  She has a one-week history of shortness of breath, productive cough, wheezing and fevers.  She follows with Dr. Laura as her pulmonologist.  She was noted to have elevated lactic acid, however has a history of congestive heart failure with chronic kidney disease and edema.  Due to this she was not aggressively hydrated.  CT scan of the chest showed multiple bilateral irregular nodular opacities most likely due to infectious disease with possible atypical mycoplasma infection or fungal infection per radiology.     Patient was placed on broad-spectrum combination antibiotic therapy initially including Zosyn, vancomycin, azithromycin and micafungin.  However, it was felt that the patient's pulmonary findings were related to hypersensitivity pneumonitis and her antibiotics were  subsequently de-escalated and she was continued on Rocephin and azithromycin.  Patient was seen by Dr. Garcia from pulmonology and he recommended continuation of high-dose steroid therapy and bronchodilators and budesonide.  Hypersensitivity panel was sent and it is currently pending.  Patient improved slowly over the next 3 to 4 days with regards to her shortness of breath and chest congestion and wheezing.  She was started on physical therapy and was able to walk in the hallway.  She already has home oxygen.  Repeat chest x-ray done yesterday shows improvement in her bilateral opacities.  She has completed a course with Rocephin and azithromycin and does not need any further antibiotic therapy.  Her blood cultures, Legionella and streptococcal serologies have been negative.  Respiratory panel showed RSV.     She has a history of coronary artery disease with CABG and stents.  She follows with Dr. Higgins.  Troponin have been negative during this admission.  She states that she has been on prednisone and CellCept in the past for interstitial lung disease but unfortunately could not tolerate these medications due to increased weight gain and swelling.      Patient was seen by Dr. Tsang for her chronic kidney disease.  Renal function is back to baseline.  Dr. Finnegan decreased her Lasix to once daily from twice daily.  Her spironolactone has been continued.  She has been recommended to follow-up with Dr. Finnegan in 2 weeks.  Renal ultrasound done during this admission was unremarkable.     Patient is currently being discharged home with oral prednisone.  Unfortunately, she has no other options at this time and she understands that prednisone may cause her to have weight gain and fluid retention.  She will be slowly tapered down to 20 mg daily over the next 6 weeks.  She is advised to follow-up with Dr. Garcia in 6 weeks.  She will be prescribed budesonide nebulizations at home.  She is advised to follow-up with her primary  care physician in 1 week.  She will be arranged home health and home walker.  She had a walking oximetry prior to discharge and qualified for 2 L of nasal cannula oxygen continuously and this will be arranged.  Discussed with case management services.      Current HPI: She presents for TCM follow-up.  She was most recently admitted to [facility] from 1/4-1/10.  She has had 2 other admissions within a month for volume overload.  She notes chest pain with exertion. Sleeping on a foam pillow. When she was discharged she had edema of her feet. Now notes edema up into her thighs. She has gained 4lbs in the past 4 days. + cough with clear sputum. + soa at rest. + palpitations. Feels weak. A little nauseated at times. Eating a little bit. Is on high dose prednisone. She fell 2 days ago. She got tripped on the oxygen cord. She is bruised in abdommen. Did not hit her head. No BRBPR. Stools are dark and green. Sometimes sticky. Notes very little urine output. This is worse than her normal. BP at home has been good when home health checks. Notes her heart rate has been elevated . She has chronic afib.   She feels that the hospital is discharging her too soon which is leading to her readmissions.  Has never tried BiPAP therapy.     The following portions of the patient's history were reviewed and updated as appropriate: allergies, current medications, past family history, past medical history, past social history, past surgical history and problem list.    Review of Systems   Constitutional: Positive for fatigue. Negative for chills and fever.   HENT: Negative for congestion and postnasal drip.    Gastrointestinal: Positive for abdominal pain.   Neurological: Positive for dizziness. Negative for headaches.     Otherwise negative unless states above.     Objective   Physical Exam   Constitutional: She is oriented to person, place, and time. She has a sickly appearance.   HENT:   Head: Normocephalic and atraumatic.   Eyes: Pupils  are equal, round, and reactive to light.   Neck: Neck supple.   Cardiovascular:   irregularly irregular rate 96, grade II systolic murmur noted  Severe 4+ pitting edema noted up to thighs.      Pulmonary/Chest:   Faint rhales noted in bases, diminished, on supplemental O2  Mild accessory muscle usage,   Abdominal:   ecchymosis noted across lower abdomen, abdominal distention and edema noted.   + BS   Neurological: She is alert and oriented to person, place, and time.   Skin:   Bilateral compression wraps noted. 2nd and 3rd metatarsals on right foot are cyanotic and cool to touch. Fingers cyanotic      Psychiatric: She has a normal mood and affect. Her behavior is normal.   Nursing note and vitals reviewed.      Assessment/Plan   Pat was seen today for transitional care management.    Diagnoses and all orders for this visit:    Acute on chronic diastolic congestive heart failure (CMS/HCC)    Essential hypertension    Pulmonary arterial hypertension (CMS/HCC)    Interstitial lung disease (CMS/HCC)    Hypersensitivity pneumonitis (CMS/HCC)    Acute respiratory failure with hypoxia (CMS/HCC)    Chronic kidney disease, stage 3 (CMS/HCC)    Hypervolemia, unspecified hypervolemia type    Persistent atrial fibrillation    Coagulation disorder due to circulating anticoagulants (CMS/HCC)    GERD without esophagitis      Sad unfortunate situation.  She appears to be in acute congestive heart failure given severe volume overload. I am not sure if she is going to be able to tolerate steroids.  She needs aggressive diuretics which is complicated given her CKD.  Discussed at length with the patient.  I recommend admission to the hospital.  She would like to go to [facility].  She needs pulmonary consultation due to hypersensitivity pneumonitis and pulmonary hypertension.  She previously followed with Dr. Laura.  She also needs cardiology consultation.  Repeat echocardiogram.  I spoke with Dr. Tipton with  hospital medicine.  She graciously accepted patient.  Discussed plan of care with daughter over the phone.  She will go to outpatient registration at [facility] for admission.    Complex patient    Electronically signed by AYANNA Lewis, 01/17/20, 1:15 PM.

## 2020-01-17 NOTE — NURSING NOTE
WOC nurse for denuded groin.  Per RN Marjorie, patient groin is red and denuded.  Recommended good meticulous skin care and Interdry.  Please wick Interdry out 2 inches changed every 5 days and PRN soiling. If after 3 days, these interventions are not working, then reconsult WOC nurse .  Thank you

## 2020-01-18 NOTE — PROGRESS NOTES
[facility] Medicine Services  PROGRESS NOTE    Patient Name: Pat Morel  : 1940  MRN: [id number]    Date of Admission: 2020  Primary Care Physician: Anthony Sweet DO    Subjective   Subjective     CC:  Shortness of breath    HPI:  Patient is doing a little bit better this morning.  Reports improvement in her shortness of breath after giving Lasix.  Patient reports dyspnea on exertion at home.  Denies any chest pain.  Also has lower extremity edema but is not sure if he got better.    Review of Systems  General: denies fevers or chills  CV: denies chest pain  Resp: Shortness of breath improved, dyspnea with exertion  Abd: denies abd pain, nausea    All other systems reviewed and are negative.    Objective   Objective     Vital Signs:   Temp:  [97.5 °F (36.4 °C)-98 °F (36.7 °C)] 97.6 °F (36.4 °C)  Heart Rate:  [] 74  Resp:  [12-24] 16  BP: (115-147)/() 135/92        Physical Exam:  Constitutional: No acute distress, awake, alert, sitting upright in bed, pleasant  HENT: NCAT, mucous membranes moist  Respiratory: Crackles bilaterally, respiratory effort normal on 2 L  Cardiovascular: RRR, no murmurs, rubs, or gallops, palpable pedal pulses bilaterally  Gastrointestinal: Positive bowel sounds, soft, nontender, nondistended  Musculoskeletal: Bilateral Unna boots, trace edema in thighs  Psychiatric: Appropriate affect, cooperative  Neurologic: Oriented x 3, no focal neurological deficits, able to sit upright in bed without help speech clear  Skin: No rashes      Results Reviewed:  Results from last 7 days   Lab Units 20  0440 20  1509   WBC 10*3/mm3 8.87 11.22*   HEMOGLOBIN g/dL 7.9* 7.9*   HEMATOCRIT % 28.2* 26.8*   PLATELETS 10*3/mm3 253 303   INR   --  1.71*   PROCALCITONIN ng/mL  --  0.04*     Results from last 7 days   Lab Units 20  0440 20  1509   SODIUM mmol/L 135* 137   POTASSIUM mmol/L 4.9 4.5   CHLORIDE mmol/L 101 101   CO2  mmol/L 19.0* 23.0   BUN mg/dL 45* 46*   CREATININE mg/dL 1.58* 1.75*   GLUCOSE mg/dL 158* 224*   CALCIUM mg/dL 8.4* 8.8   ALT (SGPT) U/L  --  54*   AST (SGOT) U/L  --  42*   TROPONIN T ng/mL  --  0.024   PROBNP pg/mL  --  23,152.0*     Estimated Creatinine Clearance: 25.2 mL/min (A) (by C-G formula based on SCr of 1.58 mg/dL (H)).    Microbiology Results Abnormal     None          Imaging Results (Last 24 Hours)     Procedure Component Value Units Date/Time    XR Chest 1 View [[id number]] Collected:  01/17/20 1505     Updated:  01/17/20 1528    Narrative:       EXAMINATION: XR CHEST 1 VW- 01/17/2020     INDICATION: shortness of breath      COMPARISON: Chest radiograph 12/09/2019      FINDINGS: Single portable chest radiograph was submitted for review.      Post surgical changes to the heart and chest are identified consistent  with prior CABG procedure. Heart is top normal in size with pulmonary  vascular congestion again noted and similar to prior. Coarsened  interstitial lung changes are identified. No evidence of active airspace  disease is appreciated.           Impression:       1. Postsurgical changes to the heart and chest identified without  evidence for active airspace disease.     2. Moderate hiatal hernia suggested.     D:  01/17/2020  E:  01/17/2020                Results for orders placed during the hospital encounter of 10/13/19   Adult Transthoracic Echo Complete W/ Cont if Necessary Per Protocol    Narrative · The left atrium is enlarged.  · Global and segmental LV wall motion is normal. The estimated LV ejection   fraction is >70%.  · The aortic valve appears to be mildly sclerotic. AS and AI are absent.  · There is mild-moderate mitral regurgitation.  · There is moderate tricuspid regurgitation with a moderate elevation is   estimated PA pressure (45-55 mmHg)  · There are no other important findings on this study.          I have reviewed the medications:  Scheduled Meds:  apixaban 2.5 mg Oral Q12H     atorvastatin 40 mg Oral Nightly   bisoprolol 5 mg Oral BID   budesonide 0.5 mg Nebulization BID - RT   ferrous sulfate 325 mg Oral Daily With Breakfast   furosemide 40 mg Intravenous Q12H   ipratropium-albuterol 3 mL Nebulization 4x Daily - RT   isosorbide mononitrate 60 mg Oral Daily   levothyroxine 75 mcg Oral Q AM   pantoprazole 40 mg Oral Daily   predniSONE 60 mg Oral Daily With Breakfast   predniSONE 60 mg Oral Daily With Breakfast   sodium chloride 10 mL Intravenous Q12H     Continuous Infusions:   PRN Meds:.•  acetaminophen **OR** acetaminophen **OR** acetaminophen  •  ondansetron **OR** ondansetron  •  sodium chloride    Assessment/Plan   Assessment & Plan     Active Hospital Problems    Diagnosis  POA   • Diastolic heart failure (CMS/HCC) [I50.30]  Yes   • Volume overload [E87.70]  Yes      Resolved Hospital Problems   No resolved problems to display.        Brief Hospital Course to date:    Acute on chronic hypoxia  Acute on chronic diastolic heart failure  -Suspect secondary to changes in home diuretic dose (lowered while recently hospitalized) plus possible fluid retention secondary to high dose prednisone use.  -BNP significantly elevated 23,000.  Troponins negative  -Patient has improved with IV diuresis.  Now on baseline 2.5 L of oxygen.  -Reviewed chest x-ray, no active airspace disease.  -Echo from October 2019 shows an EF of greater than 70%, mild MR and moderate TR  - heart failure clinic navigator consulted.  -Continue Unna boots per PT     Hypersensitivity pneumonitis  -recently diagnosed on the basis of clinical and CT scan findings, as well as prior history of hypersensitivity pneumonitis.   -Continue prednisone taper as outlined by pulmonology in [place], however I do not see the results of the most recent hypersensitivity panel that was drawn at that hospital.   -May need to review results with pulmonology here to see if a shorter prednisone taper is appropriate (vitaliy follows with   Jeff Laura in clinic).   - duonebs, pulmicort     CKDIV  - baseline 1.6-2.0.  Creatinine improved with diuresis  -BMP in a.m.     Recent RSV bronchiolitis     Atrial fibrillation  - contin apixaban     Anemia of chronic disease     Group 3 pulmonary hypertension     CAD  - h/o CABG     Generalized weakness  - PT/OT  - fall precautions        DVT prophylaxis:  anticoagulated    Disposition: I expect the patient to be discharged 1-2 days    CODE STATUS:   Code Status and Medical Interventions:   Ordered at: 01/17/20 1446     Limited Support to NOT Include:    Intubation     Level Of Support Discussed With:    Patient     Code Status:    No CPR     Medical Interventions (Level of Support Prior to Arrest):    Limited         Electronically signed by Thea Smith MD, 01/18/20, 9:15 AM.

## 2020-01-18 NOTE — PLAN OF CARE
Problem: Patient Care Overview  Goal: Plan of Care Review  Outcome: Ongoing (interventions implemented as appropriate)  Flowsheets (Taken 1/18/2020 1040)  Outcome Summary: Patient presents with weakness, SOA, and decreased activity tolerance affecting functional mobility. She transferred supine>sit with CGA and STS with CGA. She ambulated 28ft with CGA and RW, requiring 3 standing rest breaks; all activites limited by SOA and fatigue. Skilled PT warranted at this time to address deficits. Recommend SNF at discharge.

## 2020-01-18 NOTE — PLAN OF CARE
Problem: Patient Care Overview  Goal: Plan of Care Review  Outcome: Ongoing (interventions implemented as appropriate)  Flowsheets (Taken 1/18/2020 0622)  Plan of Care Reviewed With: patient  Outcome Summary: Pt presents with BLE edema with intact skin. PT applied light multilayered compression wraps to help decrease edema to improve skin integrity and mobility.

## 2020-01-18 NOTE — THERAPY EVALUATION
Patient Name: Pat Morel  : 1940    MRN: [id number]                              Today's Date: 2020       Admit Date: 2020    Visit Dx: No diagnosis found.  Patient Active Problem List   Diagnosis   • Persistent atrial fibrillation   • Coronary artery disease involving native coronary artery of native heart without angina pectoris   • Essential hypertension   • Cerebrovascular accident (CVA) due to embolism (CMS/HCC)   • Dyspnea on exertion   • Pulmonary arterial hypertension (CMS/HCC)   • Interstitial lung disease (CMS/HCC)   • Hypersensitivity pneumonitis (CMS/HCC)   • Gastric ulcer   • Coagulation disorder due to circulating anticoagulants (CMS/HCC)   • Dyslipidemia   • Acquired hypothyroidism   • Chronic kidney disease, stage 3 (CMS/HCC)   • GERD without esophagitis   • Iron deficiency anemia   • Volume overload   • Acute on chronic diastolic congestive heart failure (CMS/HCC)   • Acute on chronic diastolic (congestive) heart failure (CMS/HCC)   • Pneumonia of both lungs due to infectious organism   • Acute respiratory failure with hypoxia (CMS/HCC)   • Diastolic heart failure (CMS/HCC)     Past Medical History:   Diagnosis Date   • Acute on chronic diastolic congestive heart failure (CMS/HCC) 2019   • Anemia    • Angina at rest (CMS/HCC)    • Arthritis    • Atrial fibrillation (CMS/HCC)    • Coronary artery disease    • Disease of thyroid gland    • Elevated cholesterol    • GERD without esophagitis 2019   • History of blood transfusion    • History of colonoscopy 2019   • History of melena 2019   • History of stomach ulcers    • Hypertension    • Impaired functional mobility, balance, gait, and endurance    • Osteoporosis    • Positive TB test    • Stroke (CMS/HCC)      Past Surgical History:   Procedure Laterality Date   • BRONCHOSCOPY Bilateral 2019    Procedure: BRONCHOSCOPY;  Surgeon: eJff Laura MD;  Location: [facility];  Service:  Pulmonary   • BYPASS GRAFT     • COLONOSCOPY N/A 10/15/2019    Procedure: COLONOSCOPY;  Surgeon: Fredi Aaron MD;  Location:  [facility];  Service: Gastroenterology   • CORONARY ANGIOPLASTY WITH STENT PLACEMENT     • CORONARY ARTERY BYPASS GRAFT  10/18/2010   • ENDOSCOPY N/A 10/14/2019    Procedure: ESOPHAGOGASTRODUODENOSCOPY;  Surgeon: Fredi Aaron MD;  Location:  [facility];  Service: Gastroenterology   • HYSTERECTOMY     • STOMACH SURGERY  08/11/2019    Upper GI bleed    • STOMACH SURGERY  10/13/2019     General Information     Row Name 01/18/20 1040          PT Evaluation Time/Intention    Document Type  evaluation  -NS     Mode of Treatment  individual therapy;physical therapy  -NS     Row Name 01/18/20 1040          General Information    Patient Profile Reviewed?  yes  -NS     Prior Level of Function  independent:;all household mobility;gait;transfer;bed mobility;ADL's Pt states she was using RW for mobility and was independent with ADLs and ambulation but required increased time for each and was SOA with all activity.   -NS     Existing Precautions/Restrictions  fall;oxygen therapy device and L/min  -NS     Row Name 01/18/20 1040          Relationship/Environment    Lives With  spouse  -NS     Row Name 01/18/20 1040          Resource/Environmental Concerns    Current Living Arrangements  home/apartment/condo  -NS     Row Name 01/18/20 1040          Home Main Entrance    Number of Stairs, Main Entrance  none  -NS     Row Name 01/18/20 1040          Stairs Within Home, Primary    Number of Stairs, Within Home, Primary  none  -NS     Row Name 01/18/20 1040          Cognitive Assessment/Intervention- PT/OT    Orientation Status (Cognition)  oriented x 4  -NS     Row Name 01/18/20 1040          Safety Issues, Functional Mobility    Safety Issues Affecting Function (Mobility)  insight into deficits/self awareness;safety precaution awareness;safety precautions follow-through/compliance;sequencing  abilities  -NS     Impairments Affecting Function (Mobility)  balance;coordination;endurance/activity tolerance;shortness of breath;strength;sensation/sensory awareness  -NS       User Key  (r) = Recorded By, (t) = Taken By, (c) = Cosigned By    Initials Name Provider Type    Dania Rodrigues, PT Physical Therapist        Mobility     Row Name 01/18/20 1040          Bed Mobility Assessment/Treatment    Bed Mobility Assessment/Treatment  supine-sit;sit-supine  -NS     Supine-Sit [place] (Bed Mobility)  contact guard;verbal cues  -NS     Sit-Supine [place] (Bed Mobility)  contact guard;verbal cues  -NS     Assistive Device (Bed Mobility)  head of bed elevated  -NS     Comment (Bed Mobility)  VCing for sequencing. Once sitting EOB, pt's O2 sat dropped to 85%; able to bring up to 90% with PLB education.   -NS     Row Name 01/18/20 1040          Transfer Assessment/Treatment    Comment (Transfers)  VCing for hand placement.  -NS     Row Name 01/18/20 1040          Sit-Stand Transfer    Sit-Stand [place] (Transfers)  contact guard  -NS     Assistive Device (Sit-Stand Transfers)  walker, front-wheeled  -NS     Row Name 01/18/20 1040          Gait/Stairs Assessment/Training    Gait/Stairs Assessment/Training  gait/ambulation assistive device  -NS     [place] Level (Gait)  contact guard  -NS     Assistive Device (Gait)  walker, front-wheeled  -NS     Distance in Feet (Gait)  28  -NS     Pattern (Gait)  step-to  -NS     Deviations/Abnormal Patterns (Gait)  base of support, narrow;chivo decreased;gait speed decreased;stride length decreased;festinating/shuffling  -NS     Bilateral Gait Deviations  forward flexed posture;heel strike decreased;weight shift ability decreased  -NS     Comment (Gait/Stairs)  Patient required 3 short standing rest breaks with ambulation; VCing for staying close to RW. She took short, shuffled steps and demonstrated SOA throughout ambulation. Distance limited by fatigue and SOA.  Once she returned to bed, she noted increase in chest pain. Nursing immediately notified. VSS.  -NS       User Key  (r) = Recorded By, (t) = Taken By, (c) = Cosigned By    Initials Name Provider Type    Dania Rodrigues PT Physical Therapist        Obj/Interventions     Row Name 01/18/20 1040          General ROM    GENERAL ROM COMMENTS   BLEs: WFL  -NS     [facility] Name 01/18/20 1040          MMT (Manual Muscle Testing)    General MMT Comments  BLEs: grossly 4/5  -NS     [facility] Name 01/18/20 1040          Static Sitting Balance    Level of [place] (Unsupported Sitting, Static Balance)  contact guard assist  -NS     Sitting Position (Unsupported Sitting, Static Balance)  sitting on edge of bed  -NS     Time Able to Maintain Position (Unsupported Sitting, Static Balance)  30 to 45 seconds  -NS     [facility] Name 01/18/20 1040          Dynamic Sitting Balance    Level of [place], Reaches Outside Midline (Sitting, Dynamic Balance)  contact guard assist  -NS     Sitting Position, Reaches Outside Midline (Sitting, Dynamic Balance)  sitting on edge of bed  -NS     Row Name 01/18/20 1040          Static Standing Balance    Level of [place] (Supported Standing, Static Balance)  contact guard assist  -NS     Time Able to Maintain Position (Supported Standing, Static Balance)  less than 15 seconds  -NS     Assistive Device Utilized (Supported Standing, Static Balance)  walker, rolling  -[facility] Name 01/18/20 1040          Dynamic Standing Balance    Level of [place], Reaches Outside Midline (Standing, Dynamic Balance)  contact guard assist  -NS     Time Able to Maintain Position, Reaches Outside Midline (Standing, Dynamic Balance)  3 to 4 minutes  -NS     Assistive Device Utilized (Supported Standing, Dynamic Balance)  walker, rolling  -NS     Row Name 01/18/20 1040          Sensory Assessment/Intervention    Sensory General Assessment  -- Decreased light touch sensation on B LEs.  -NS       User Key  (r) = Recorded  By, (t) = Taken By, (c) = Cosigned By    Initials Name Provider Type    Dania Rodrigues PT Physical Therapist        Goals/Plan     Row Name 01/18/20 1040          Bed Mobility Goal 1 (PT)    Activity/Assistive Device (Bed Mobility Goal 1, PT)  bed mobility activities, all  -NS     [place] Level/Cues Needed (Bed Mobility Goal 1, PT)  independent  -NS     Time Frame (Bed Mobility Goal 1, PT)  long term goal (LTG);2 weeks  -NS     Row Name 01/18/20 1040          Transfer Goal 1 (PT)    Activity/Assistive Device (Transfer Goal 1, PT)  sit-to-stand/stand-to-sit;bed-to-chair/chair-to-bed  -NS     [place] Level/Cues Needed (Transfer Goal 1, PT)  conditional independence  -NS     Time Frame (Transfer Goal 1, PT)  long term goal (LTG);2 weeks  -NS     Row Name 01/18/20 1040          Gait Training Goal 1 (PT)    Activity/Assistive Device (Gait Training Goal 1, PT)  gait (walking locomotion);assistive device use;walker, rolling  -NS     [place] Level (Gait Training Goal 1, PT)  conditional independence  -NS     Distance (Gait Goal 1, PT)  150ft  -NS     Time Frame (Gait Training Goal 1, PT)  long term goal (LTG);2 weeks  -NS       User Key  (r) = Recorded By, (t) = Taken By, (c) = Cosigned By    Initials Name Provider Type    Dania Rodrigues PT Physical Therapist        Clinical Impression     Row Name 01/18/20 1040          Pain Assessment    Additional Documentation  Pain Scale: FACES Pre/Post-Treatment (Group)  -NS     Row Name 01/18/20 1040          Pain Scale: Numbers Pre/Post-Treatment    Pain Location - Orientation  anterior  -NS     Pain Location  chest  -NS     Pre/Post Treatment Pain Comment  NSG and MD notified  -NS     Pain Intervention(s)  Repositioned;Ambulation/increased activity;MD notified (Comment)  -NS     Row Name 01/18/20 1040          Pain Scale: FACES Pre/Post-Treatment    Pain: FACES Scale, Pretreatment  0-->no hurt  -NS     Pain: FACES Scale, Post-Treatment  6-->hurts even more  -NS      Row Name 01/18/20 1040          Plan of Care Review    Plan of Care Reviewed With  patient  -NS     Progress  no change PT eval  -NS     Row Name 01/18/20 1040          Physical Therapy Clinical Impression    Patient/Family Goals Statement (PT Clinical Impression)  To walk more  -NS     Criteria for Skilled Interventions Met (PT Clinical Impression)  yes;treatment indicated  -NS     Rehab Potential (PT Clinical Summary)  fair, will monitor progress closely  -NS     Predicted Duration of Therapy (PT)  2 weeks  -NS     Row Name 01/18/20 1040          Vital Signs    Pre Systolic BP Rehab  135  -NS     Pre Treatment Diastolic BP  92  -NS     Pretreatment Heart Rate (beats/min)  90  -NS     Posttreatment Heart Rate (beats/min)  101  -NS     Pre SpO2 (%)  100  -NS     O2 Delivery Pre Treatment  nasal cannula  -NS     Intra SpO2 (%)  85  -NS     O2 Delivery Intra Treatment  nasal cannula  -NS     Post SpO2 (%)  96  -NS     O2 Delivery Post Treatment  nasal cannula  -NS     Pre Patient Position  Supine  -NS     Intra Patient Position  Standing  -NS     Post Patient Position  Supine  -NS     Row Name 01/18/20 1040          Positioning and Restraints    Pre-Treatment Position  in bed  -NS     Post Treatment Position  bed  -NS     In Bed  notified nsg;supine;call light within reach;encouraged to call for assist;exit alarm on;with nsg  -NS       User Key  (r) = Recorded By, (t) = Taken By, (c) = Cosigned By    Initials Name Provider Type    Dania Rodrigues, PT Physical Therapist        Outcome Measures     Row Name 01/18/20 1040          How much help from another person do you currently need...    Turning from your back to your side while in flat bed without using bedrails?  3  -NS     Moving from lying on back to sitting on the side of a flat bed without bedrails?  3  -NS     Moving to and from a bed to a chair (including a wheelchair)?  3  -NS     Standing up from a chair using your arms (e.g., wheelchair, bedside  chair)?  3  -NS     Climbing 3-5 steps with a railing?  2  -NS     To walk in hospital room?  3  -NS     AM-PAC 6 Clicks Score (PT)  17  -NS     Row Name 01/18/20 1040          Functional Assessment    Outcome Measure Options  AM-PAC 6 Clicks Basic Mobility (PT)  -NS       User Key  (r) = Recorded By, (t) = Taken By, (c) = Cosigned By    Initials Name Provider Type    Dania Rodrigues, PT Physical Therapist          PT Recommendation and Plan  Planned Therapy Interventions (PT Eval): balance training, bed mobility training, gait training, home exercise program, neuromuscular re-education, patient/family education, strengthening, stretching, transfer training  Outcome Summary/Treatment Plan (PT)  Anticipated Discharge Disposition (PT): skilled nursing facility  Plan of Care Reviewed With: patient  Progress: no change(PT eval)  Outcome Summary: Patient presents with weakness, SOA, and decreased activity tolerance affecting functional mobility. She transferred supine>sit with CGA and STS with CGA. She ambulated 28ft with CGA and RW, requiring 3 standing rest breaks; all activites limited by SOA and fatigue. Skilled PT warranted at this time to address deficits. Recommend SNF at discharge.     Time Calculation:   PT Charges     Row Name 01/18/20 1040 01/18/20 0845          Time Calculation    Start Time  1040  -NS  0845  -     PT Received On  01/18/20  -NS  --     PT Goal Re-Cert Due Date  01/28/20  -NS  01/28/20  -       User Key  (r) = Recorded By, (t) = Taken By, (c) = Cosigned By    Initials Name Provider Type    Gurwinder Ramirez, PT Physical Therapist    Dania Rodrigues, DAISY Physical Therapist        Therapy Charges for Today     Code Description Service Date Service Provider Modifiers Qty    [id number]  PT EVAL MOD COMPLEXITY 4 1/18/2020 Dania Guzman, PT GP 1          PT G-Codes  Outcome Measure Options: AM-PAC 6 Clicks Basic Mobility (PT)  AM-PAC 6 Clicks Score (PT): Jignesh Guzman  PT  1/18/2020

## 2020-01-18 NOTE — PLAN OF CARE
VSS. No complaints of pain. Pt rested throughout shift. Pt is still SOA, but states this is her baseline. No further concerns will continue to monitor

## 2020-01-18 NOTE — PLAN OF CARE
Problem: Patient Care Overview  Goal: Plan of Care Review  Outcome: Ongoing (interventions implemented as appropriate)  Flowsheets (Taken 1/18/2020 5209)  Progress: no change  Plan of Care Reviewed With: patient     Problem: Patient Care Overview  Goal: Individualization and Mutuality  Outcome: Ongoing (interventions implemented as appropriate)     Problem: Patient Care Overview  Goal: Discharge Needs Assessment  Outcome: Ongoing (interventions implemented as appropriate)     Problem: Patient Care Overview  Goal: Interprofessional Rounds/Family Conf  Outcome: Ongoing (interventions implemented as appropriate)     Problem: Skin Injury Risk (Adult)  Goal: Identify Related Risk Factors and Signs and Symptoms  Outcome: Ongoing (interventions implemented as appropriate)     Problem: Skin Injury Risk (Adult)  Goal: Skin Health and Integrity  Outcome: Ongoing (interventions implemented as appropriate)     Problem: Fall Risk (Adult)  Goal: Identify Related Risk Factors and Signs and Symptoms  Outcome: Ongoing (interventions implemented as appropriate)     Problem: Fall Risk (Adult)  Goal: Absence of Fall  Outcome: Ongoing (interventions implemented as appropriate)    VSS. Pt c/o chest pain after ambulating in halls. MD notified, EKG and troponin obtained, both WNL and pain resolved on its own. Good po intake and urine output. Will continue to monitor.

## 2020-01-18 NOTE — THERAPY EVALUATION
Acute Care - Wound/Debridement Initial Evaluation  [facility]     Patient Name: Pat Morel  : 1940  MRN: [id number]  Today's Date: 2020                Admit Date: 2020    Visit Dx:  No diagnosis found.    Patient Active Problem List   Diagnosis   • Persistent atrial fibrillation   • Coronary artery disease involving native coronary artery of native heart without angina pectoris   • Essential hypertension   • Cerebrovascular accident (CVA) due to embolism (CMS/HCC)   • Dyspnea on exertion   • Pulmonary arterial hypertension (CMS/HCC)   • Interstitial lung disease (CMS/HCC)   • Hypersensitivity pneumonitis (CMS/HCC)   • Gastric ulcer   • Coagulation disorder due to circulating anticoagulants (CMS/HCC)   • Dyslipidemia   • Acquired hypothyroidism   • Chronic kidney disease, stage 3 (CMS/HCC)   • GERD without esophagitis   • Iron deficiency anemia   • Volume overload   • Acute on chronic diastolic congestive heart failure (CMS/HCC)   • Acute on chronic diastolic (congestive) heart failure (CMS/HCC)   • Pneumonia of both lungs due to infectious organism   • Acute respiratory failure with hypoxia (CMS/HCC)   • Diastolic heart failure (CMS/HCC)        Past Medical History:   Diagnosis Date   • Acute on chronic diastolic congestive heart failure (CMS/HCC) 2019   • Anemia    • Angina at rest (CMS/HCC)    • Arthritis    • Atrial fibrillation (CMS/HCC)    • Coronary artery disease    • Disease of thyroid gland    • Elevated cholesterol    • GERD without esophagitis 2019   • History of blood transfusion    • History of colonoscopy 2019   • History of melena 2019   • History of stomach ulcers    • Hypertension    • Impaired functional mobility, balance, gait, and endurance    • Osteoporosis    • Positive TB test    • Stroke (CMS/HCC)         Past Surgical History:   Procedure Laterality Date   • BRONCHOSCOPY Bilateral 2019    Procedure: BRONCHOSCOPY;  Surgeon: Valentín  Jeff Means MD;  Location:  [facility];  Service: Pulmonary   • BYPASS GRAFT     • COLONOSCOPY N/A 10/15/2019    Procedure: COLONOSCOPY;  Surgeon: Fredi Aaron MD;  Location:  [facility];  Service: Gastroenterology   • CORONARY ANGIOPLASTY WITH STENT PLACEMENT     • CORONARY ARTERY BYPASS GRAFT  10/18/2010   • ENDOSCOPY N/A 10/14/2019    Procedure: ESOPHAGOGASTRODUODENOSCOPY;  Surgeon: Fredi Aaron MD;  Location:  [facility];  Service: Gastroenterology   • HYSTERECTOMY     • STOMACH SURGERY  08/11/2019    Upper GI bleed    • STOMACH SURGERY  10/13/2019              Lymphedema     Row Name 01/18/20 0845             Lymphedema Edema Assessment    Ptting Edema Category  By severity  -      Pitting Edema  Moderate  -         Skin Changes/Observations    Location/Assessment  Lower Extremity  -      Lower Extremity Conditions  bilateral:;intact;clean;dry  -MF      Lower Extremity Color/Pigment  bilateral:;normal  -MF         Lymphedema Pulses/Capillary Refill    Lymphedema Pulses/Capillary Refill  lower extremity pulses;capillary refill  -      Dorsalis Pedis Pulse  right:;left:;+1 diminished  -MF      Posterior Tibialis Pulse  right:;left:;+1 diminished  -MF      Capillary Refill  lower extremity capillary refill  -      Lower Extremity Capillary Refill  right:;left:;less than 3 seconds  -         Lymphedema Measurements    Measurement Type(s)  Quick Girth  -      Quick Girth Areas  Lower extremities  -         LLE Quick Girth (cm)    Mid foot  21 cm  -MF      Smallest ankle  23 cm  -MF      Largest calf  38.5 cm  -MF         RLE Quick Girth (cm)    Mid foot  21 cm  -MF      Smallest ankle  22.8 cm  -MF      Largest calf  38.3 cm  -      RLE Quick Girth Total  82.1  -MF         Compression/Skin Care    Compression/Skin Care  skin care;wrapping location;bandaging  -      Skin Care  washed/dried  -MF      Wrapping Location  lower extremity  -MF      Wrapping Location LE   "bilateral:;foot to knee  -      Wrapping Comments  cast padding and 4\" coban in light, gradient, multilayers  -        User Key  (r) = Recorded By, (t) = Taken By, (c) = Cosigned By    Initials Name Provider Type    MF Gurwinder Desai, PT Physical Therapist          WOUND DEBRIDEMENT                        PT ASSESSMENT (last 12 hours)      Physical Therapy Evaluation     Row Name 01/18/20 0845          PT Evaluation Time/Intention    Subjective Information  no complaints  -     Document Type  evaluation;therapy note (daily note);wound care  -     Mode of Treatment  individual therapy;physical therapy  -     Row Name 01/18/20 0845          General Information    Patient Profile Reviewed?  yes  -     Patient Observations  alert;cooperative;agree to therapy  -     Pertinent History of Current Functional Problem  Pt with chronic BLE edema with limited mobility  -     Risks Reviewed  patient:;increased discomfort  -     Benefits Reviewed  patient:;improve skin integrity  -     Barriers to Rehab  previous functional deficit  -     Row Name 01/18/20 0845          Cognitive Assessment/Intervention- PT/OT    Orientation Status (Cognition)  oriented x 3  -     Follows Commands (Cognition)  WNL  -     Row Name 01/18/20 0845          Pain Assessment    Additional Documentation  Pain Scale: FACES Pre/Post-Treatment (Group)  -     Row Name 01/18/20 0845          Pain Scale: Numbers Pre/Post-Treatment    Pain Location - Side  Bilateral  -     Pain Location - Orientation  lower  -     Pain Location  extremity  -     Pain Intervention(s)  Repositioned  -     Row Name 01/18/20 0845          Pain Scale: FACES Pre/Post-Treatment    Pain: FACES Scale, Pretreatment  0-->no hurt  -     Pain: FACES Scale, Post-Treatment  0-->no hurt  -     Row Name 01/18/20 0845          Coping    Observed Emotional State  accepting  -     Verbalized Emotional State  acceptance  -     Row Name 01/18/20 0845 "          Plan of Care Review    Plan of Care Reviewed With  patient  -MF     Outcome Summary  Pt presents with BLE edema with intact skin. PT applied light multilayered compression wraps to help decrease edema to improve skin integrity and mobility.   -     Row Name 01/18/20 0845          Physical Therapy Clinical Impression    PT Diagnosis (PT Clinical Impression)  BLE edema  -     Patient/Family Goals Statement (PT Clinical Impression)  increase mobility / independence  -     Criteria for Skilled Interventions Met (PT Clinical Impression)  yes;treatment indicated  -     Pathology/Pathophysiology Noted (Describe Specifically for Each System)  integumentary  -     Rehab Potential (PT Clinical Summary)  fair, will monitor progress closely  -     Care Plan Review (PT)  evaluation/treatment results reviewed;care plan/treatment goals reviewed;risks/benefits reviewed;patient/other agree to care plan;current/potential barriers reviewed  -     Row Name 01/18/20 0845          Physical Therapy Goals    Wound Care Goal Selection (PT)  wound care, PT goal 1  -     Additional Documentation  Wound Care Goal Selection (PT) (Row)  -     Row Name 01/18/20 0845          Wound Care Goal 1 (PT)    Wound Care Goal 1 (PT)  Decrease BLE edema by 20% to improve skin integrity and mobility  -     Time Frame (Wound Care Goal 1, PT)  10 days  -     Row Name 01/18/20 0845          Positioning and Restraints    Pre-Treatment Position  in bed  -     Post Treatment Position  bed  -     In Bed  supine;call light within reach  -       User Key  (r) = Recorded By, (t) = Taken By, (c) = Cosigned By    Initials Name Provider Type    Gurwinder Ramirez, PT Physical Therapist            Recommendation and Plan  Planned Therapy Interventions (PT Eval): wound care  Plan of Care Reviewed With: patient           Outcome Summary: Pt presents with BLE edema with intact skin. PT applied light multilayered compression wraps to  help decrease edema to improve skin integrity and mobility.   Plan of Care Reviewed With: patient            Time Calculation  PT Charges     Row Name 01/18/20 0845             Time Calculation    Start Time  0845  -      PT Goal Re-Cert Due Date  01/28/20  -        User Key  (r) = Recorded By, (t) = Taken By, (c) = Cosigned By    Initials Name Provider Type    Gurwinder Ramirez, PT Physical Therapist            Therapy Charges for Today     Code Description Service Date Service Provider Modifiers Qty    51553492803 HC PT MULTI LAYER COMP SYS BELOW KNEE 1/18/2020 Gurwinder Desai, PT GP 1    63803118166 HC PT EVAL LOW COMPLEXITY 4 1/18/2020 Gurwinder Desai, PT GP 1            PT G-Codes  AM-PAC 6 Clicks Score (PT): 17       Gurwinder Desai, PT  1/18/2020

## 2020-01-19 NOTE — PLAN OF CARE
Pt has rested well thus far this shift. No c/o's or distress noted. Will cont to monitor.    Problem: Patient Care Overview  Goal: Plan of Care Review  Outcome: Ongoing (interventions implemented as appropriate)  Goal: Individualization and Mutuality  Outcome: Ongoing (interventions implemented as appropriate)  Goal: Discharge Needs Assessment  Outcome: Ongoing (interventions implemented as appropriate)  Goal: Interprofessional Rounds/Family Conf  Outcome: Ongoing (interventions implemented as appropriate)     Problem: Skin Injury Risk (Adult)  Goal: Identify Related Risk Factors and Signs and Symptoms  Outcome: Ongoing (interventions implemented as appropriate)  Goal: Skin Health and Integrity  Outcome: Ongoing (interventions implemented as appropriate)     Problem: Fall Risk (Adult)  Goal: Identify Related Risk Factors and Signs and Symptoms  Outcome: Ongoing (interventions implemented as appropriate)  Goal: Absence of Fall  Outcome: Ongoing (interventions implemented as appropriate)

## 2020-01-19 NOTE — PROGRESS NOTES
Psychiatric Medicine Services  PROGRESS NOTE    Patient Name: Pat Morel  : 1940  MRN: [id number]    Date of Admission: 2020  Primary Care Physician: Anthony Sweet DO    Subjective   Subjective     CC:  F/U CHF exacerbation    HPI:  Patient seen and examined. Nursing notes reviewed. No acute events overnight. Pt doing well, states her swelling is much improved. No shortness of breath.    Review of Systems  General: denies fevers or chills  CV: denies chest pain  Resp: +SOA (improved), no cough  Abd: denies abd pain, nausea      Objective   Objective     Vital Signs:   Temp:  [97.7 °F (36.5 °C)-97.9 °F (36.6 °C)] 97.7 °F (36.5 °C)  Heart Rate:  [] 108  Resp:  [16-18] 18  BP: (125-143)/(80-96) 141/84        Physical Exam:  Constitutional: No acute distress, awake, alert, sitting upright in bed, very pleasant  HENT: NCAT, mucous membranes moist  Respiratory: CTA b/l, respiratory effort normal on 2 L  Cardiovascular: IRR, no murmurs, rubs, or gallops, palpable pedal pulses bilaterally  Gastrointestinal: Positive bowel sounds, soft, nontender, nondistended  Musculoskeletal: Bilateral Unna boots, trace edema in thighs  Psychiatric: Appropriate affect, cooperative  Neurologic: Oriented x 3, no focal neurological deficits, able to sit upright in bed without help speech clear  Skin: No rashes      Results Reviewed:  Results from last 7 days   Lab Units 20  0440 20  1509   WBC 10*3/mm3 8.87 11.22*   HEMOGLOBIN g/dL 7.9* 7.9*   HEMATOCRIT % 28.2* 26.8*   PLATELETS 10*3/mm3 253 303   INR   --  1.71*   PROCALCITONIN ng/mL  --  0.04*     Results from last 7 days   Lab Units 20  0418 20  1136 20  0440 20  1509   SODIUM mmol/L 137  --  135* 137   POTASSIUM mmol/L 4.6  --  4.9 4.5   CHLORIDE mmol/L 99  --  101 101   CO2 mmol/L 25.0  --  19.0* 23.0   BUN mg/dL 46*  --  45* 46*   CREATININE mg/dL 1.87*  --  1.58* 1.75*   GLUCOSE mg/dL 191*  --   158* 224*   CALCIUM mg/dL 8.4*  --  8.4* 8.8   ALT (SGPT) U/L  --   --   --  54*   AST (SGOT) U/L  --   --   --  42*   TROPONIN T ng/mL  --  0.025  --  0.024   PROBNP pg/mL  --   --   --  23,152.0*     Estimated Creatinine Clearance: 21.3 mL/min (A) (by C-G formula based on SCr of 1.87 mg/dL (H)).    Microbiology Results Abnormal     None          Imaging Results (Last 24 Hours)     Procedure Component Value Units Date/Time    XR Chest 1 View [[id number]] Collected:  01/17/20 1505     Updated:  01/18/20 1653    Narrative:       EXAMINATION: XR CHEST 1 VW- 01/17/2020     INDICATION: shortness of breath      COMPARISON: Chest radiograph 12/09/2019      FINDINGS: Single portable chest radiograph was submitted for review.      Post surgical changes to the heart and chest are identified consistent  with prior CABG procedure. Heart is top normal in size with pulmonary  vascular congestion again noted and similar to prior. Coarsened  interstitial lung changes are identified. No evidence of active airspace  disease is appreciated.           Impression:       1. Postsurgical changes to the heart and chest identified without  evidence for active airspace disease.     2. Moderate hiatal hernia suggested.     D:  01/17/2020  E:  01/17/2020     This report was finalized on 1/18/2020 4:50 PM by Dr. Franki Rutherford MD.             Results for orders placed during the hospital encounter of 10/13/19   Adult Transthoracic Echo Complete W/ Cont if Necessary Per Protocol    Narrative · The left atrium is enlarged.  · Global and segmental LV wall motion is normal. The estimated LV ejection   fraction is >70%.  · The aortic valve appears to be mildly sclerotic. AS and AI are absent.  · There is mild-moderate mitral regurgitation.  · There is moderate tricuspid regurgitation with a moderate elevation is   estimated PA pressure (45-55 mmHg)  · There are no other important findings on this study.          I have reviewed the  medications:  Scheduled Meds:    apixaban 2.5 mg Oral Q12H   atorvastatin 40 mg Oral Nightly   bisoprolol 5 mg Oral BID   budesonide 0.5 mg Nebulization BID - RT   ferrous sulfate 325 mg Oral Daily With Breakfast   furosemide 40 mg Intravenous Q12H   ipratropium-albuterol 3 mL Nebulization 4x Daily - RT   isosorbide mononitrate 60 mg Oral Daily   levothyroxine 75 mcg Oral Q AM   pantoprazole 40 mg Oral Daily   pharmacy consult - MTM  Does not apply Daily   predniSONE 60 mg Oral Daily With Breakfast   sodium chloride 10 mL Intravenous Q12H     Continuous Infusions:   PRN Meds:.•  acetaminophen **OR** acetaminophen **OR** acetaminophen  •  ondansetron **OR** ondansetron  •  sodium chloride    Assessment/Plan   Assessment & Plan     Active Hospital Problems    Diagnosis  POA   • Diastolic heart failure (CMS/HCC) [I50.30]  Yes   • Volume overload [E87.70]  Yes      Resolved Hospital Problems   No resolved problems to display.        Brief Hospital Course to date: Pat Morel is a 80 y.o. female with history of CAD status post CABG, atrial fibrillation, CKD stage IV, diastolic heart failure, iron deficiency anemia, interstitial lung disease and hypersensitivity pneumonitis with recent admission to [facility] for acute on chronic hypoxic respiratory failure, RSV bronchiolitis and acute kidney injury.  During that hospitalization the patient was given empiric antibiotics, gently diuresed and started on a 6-week prednisone taper starting at 60 mg/day.  The patient presents from her transitional care follow-up at 1 week post discharge with increased lower extremity edema and worsening shortness of breath.  She has also had at least 2 falls at home over the past week.  Ms. Morel has been hospitalized several times over the past 4 months for volume overload, and she is concerned that her current high-dose prednisone taper has contributed to her fluid retention.    Acute on chronic hypoxia  Acute on chronic  diastolic heart failure  -Suspect secondary to changes in home diuretic dose (lowered while recently hospitalized) plus possible fluid retention secondary to high dose prednisone use.  -BNP significantly elevated 23,000.  Troponins negative  -Patient has improved with IV diuresis.  Now on baseline 2.5 L of oxygen.  -Reviewed chest x-ray, no active airspace disease.  -Echo from October 2019 shows an EF of greater than 70%, mild MR and moderate TR  - heart failure clinic navigator consulted.  -Continue Unna boots per PT  -Decrease Lasix to 40mg IV daily from BID  -Continue strict I's/O's, daily weights     Hypersensitivity pneumonitis  -recently diagnosed on the basis of clinical and CT scan findings, as well as prior history of hypersensitivity pneumonitis.   -Continue prednisone taper as outlined by pulmonology in [place], however I do not see the results of the most recent hypersensitivity panel that was drawn at that hospital.   -May need to review results with pulmonology here to see if a shorter prednisone taper is appropriate (patient follows with Dr Jeff Laura in clinic).   - duonebs, pulmicort     CKD IV  - baseline 1.6-2.0.       Recent RSV bronchiolitis     Atrial fibrillation  -Rate controlled with Bisoprolol   -Continue apixaban     Anemia of chronic disease  -Monitor H&H, currently stable     Group 3 pulmonary hypertension     CAD  - h/o CABG     Generalized weakness  - PT/OT have evaluated, recommends SNF. Pt states she prefers HHPT  - fall precautions        DVT prophylaxis:  anticoagulated    Disposition: I expect the patient to be discharged 1-2 days    CODE STATUS:   Code Status and Medical Interventions:   Ordered at: 01/17/20 1446     Limited Support to NOT Include:    Intubation     Level Of Support Discussed With:    Patient     Code Status:    No CPR     Medical Interventions (Level of Support Prior to Arrest):    Limited         Electronically signed by Jessica Silva DO, 01/19/20,  2:14 PM.

## 2020-01-20 NOTE — PLAN OF CARE
Pt rested well this evening, no c/o's, no distress.     Problem: Fall Risk (Adult)  Goal: Identify Related Risk Factors and Signs and Symptoms  Outcome: Outcome(s) achieved  Goal: Absence of Fall  Outcome: Outcome(s) achieved

## 2020-01-20 NOTE — PROGRESS NOTES
[facility] Medicine Services  PROGRESS NOTE    Patient Name: Pat Morel  : 1940  MRN: [id number]    Date of Admission: 2020  Primary Care Physician: Anthony Sweet DO    Subjective   Subjective     CC:  F/U CHF exacerbation    HPI:  Patient seen and examined. Nursing notes reviewed. No acute events overnight. Pt sitting up in bedside chair, states her swelling has improved. Breathing is at baseline.    Review of Systems  General: denies fevers or chills  CV: denies chest pain  Resp: No SOA, no cough  Abd: denies abd pain, nausea      Objective   Objective     Vital Signs:   Temp:  [97.7 °F (36.5 °C)-97.9 °F (36.6 °C)] 97.9 °F (36.6 °C)  Heart Rate:  [] 95  Resp:  [17-24] 24  BP: (131-144)/(72-95) 144/95        Physical Exam:  Constitutional: No acute distress, awake, alert, sitting up in bedside chair, very pleasant  HENT: NCAT, mucous membranes moist  Respiratory: CTA b/l, respiratory effort normal on 2 L  Cardiovascular: IRR, no murmurs, rubs, or gallops, palpable pedal pulses bilaterally  Gastrointestinal: Positive bowel sounds, soft, nontender, nondistended  Musculoskeletal: Bilateral Unna boots, trace edema in thighs  Psychiatric: Appropriate affect, cooperative  Neurologic: Oriented x 3, no focal neurological deficits, speech clear  Skin: No rashes      Results Reviewed:  Results from last 7 days   Lab Units 20  0421 20  0440 20  1509   WBC 10*3/mm3 9.58 8.87 11.22*   HEMOGLOBIN g/dL 7.6* 7.9* 7.9*   HEMATOCRIT % 25.4* 28.2* 26.8*   PLATELETS 10*3/mm3 231 253 303   INR   --   --  1.71*   PROCALCITONIN ng/mL  --   --  0.04*     Results from last 7 days   Lab Units 20  0421 20  0418 20  1136 20  0440 20  1509   SODIUM mmol/L 136 137  --  135* 137   POTASSIUM mmol/L 4.7 4.6  --  4.9 4.5   CHLORIDE mmol/L 99 99  --  101 101   CO2 mmol/L 25.0 25.0  --  19.0* 23.0   BUN mg/dL 46* 46*  --  45* 46*   CREATININE mg/dL  1.86* 1.87*  --  1.58* 1.75*   GLUCOSE mg/dL 184* 191*  --  158* 224*   CALCIUM mg/dL 8.7 8.4*  --  8.4* 8.8   ALT (SGPT) U/L  --   --   --   --  54*   AST (SGOT) U/L  --   --   --   --  42*   TROPONIN T ng/mL  --   --  0.025  --  0.024   PROBNP pg/mL  --   --   --   --  23,152.0*     Estimated Creatinine Clearance: 21.6 mL/min (A) (by C-G formula based on SCr of 1.86 mg/dL (H)).    Microbiology Results Abnormal     None          Imaging Results (Last 24 Hours)     ** No results found for the last 24 hours. **          Results for orders placed during the hospital encounter of 10/13/19   Adult Transthoracic Echo Complete W/ Cont if Necessary Per Protocol    Narrative · The left atrium is enlarged.  · Global and segmental LV wall motion is normal. The estimated LV ejection   fraction is >70%.  · The aortic valve appears to be mildly sclerotic. AS and AI are absent.  · There is mild-moderate mitral regurgitation.  · There is moderate tricuspid regurgitation with a moderate elevation is   estimated PA pressure (45-55 mmHg)  · There are no other important findings on this study.          I have reviewed the medications:  Scheduled Meds:    apixaban 2.5 mg Oral Q12H   atorvastatin 40 mg Oral Nightly   bisoprolol 5 mg Oral BID   budesonide 0.5 mg Nebulization BID - RT   ferrous sulfate 325 mg Oral Daily With Breakfast   furosemide 40 mg Intravenous Daily   isosorbide mononitrate 60 mg Oral Daily   levothyroxine 75 mcg Oral Q AM   pantoprazole 40 mg Oral Daily   pharmacy consult - MTM  Does not apply Daily   predniSONE 60 mg Oral Daily With Breakfast   sodium chloride 10 mL Intravenous Q12H     Continuous Infusions:   PRN Meds:.•  acetaminophen **OR** acetaminophen **OR** acetaminophen  •  ipratropium-albuterol  •  ondansetron **OR** ondansetron  •  sodium chloride    Assessment/Plan   Assessment & Plan     Active Hospital Problems    Diagnosis  POA   • Diastolic heart failure (CMS/HCC) [I50.30]  Yes   • Volume overload  [E87.70]  Yes      Resolved Hospital Problems   No resolved problems to display.        Brief Hospital Course to date: Pat Morel is a 80 y.o. female with history of CAD status post CABG, atrial fibrillation, CKD stage IV, diastolic heart failure, iron deficiency anemia, interstitial lung disease and hypersensitivity pneumonitis with recent admission to [facility] for acute on chronic hypoxic respiratory failure, RSV bronchiolitis and acute kidney injury.  During that hospitalization the patient was given empiric antibiotics, gently diuresed and started on a 6-week prednisone taper starting at 60 mg/day.  The patient presents from her transitional care follow-up at 1 week post discharge with increased lower extremity edema and worsening shortness of breath.  She has also had at least 2 falls at home over the past week.  Ms. Morel has been hospitalized several times over the past 4 months for volume overload, and she is concerned that her current high-dose prednisone taper has contributed to her fluid retention.    Acute on chronic hypoxia  Acute on chronic diastolic heart failure  -Suspect secondary to changes in home diuretic dose (lowered while recently hospitalized) plus possible fluid retention secondary to high dose prednisone use.  -BNP significantly elevated 23,000.  Troponins negative  -Patient has improved with IV diuresis.  Now on baseline 2.5 L of oxygen.  -Reviewed chest x-ray, no active airspace disease.  -Echo from October 2019 shows an EF of greater than 70%, mild MR and moderate TR  -Heart failure clinic navigator consulted.  -Continue Unna boots per PT  -Decrease Lasix to 40mg PO BID  -Continue strict I's/O's, daily weights     Hypersensitivity pneumonitis  -recently diagnosed on the basis of clinical and CT scan findings, as well as prior history of hypersensitivity pneumonitis.   -Continue prednisone taper as outlined by pulmonology in [place], however I do not see the  results of the most recent hypersensitivity panel that was drawn at that hospital.   -Will have patient follow-up with her pulmonologist Dr. Laura on discharge   - duonebs, pulmicort     CKD IV  - baseline 1.6-2.0.       Recent RSV bronchiolitis     Atrial fibrillation  -Rate controlled with Bisoprolol   -Continue apixaban     Anemia of chronic disease  -Monitor H&H, currently stable  -Check Iron panel     Group 3 pulmonary hypertension     CAD  - h/o CABG     Generalized weakness  - PT/OT have evaluated, recommends SNF. Pt states she prefers HHPT  - fall precautions        DVT prophylaxis:  anticoagulated    Disposition: I expect the patient to be discharged home, likely tomorrow.    CODE STATUS:   Code Status and Medical Interventions:   Ordered at: 01/17/20 1446     Limited Support to NOT Include:    Intubation     Level Of Support Discussed With:    Patient     Code Status:    No CPR     Medical Interventions (Level of Support Prior to Arrest):    Limited         Electronically signed by Jessica Silva DO, 01/20/20, 2:37 PM.

## 2020-01-20 NOTE — THERAPY EVALUATION
Acute Care - Occupational Therapy Initial Evaluation  [facility]     Patient Name: Pat Morel  : 1940  MRN: [id number]  Today's Date: 2020     Date of Referral to OT: 20  Referring Physician: MD Mayco    Admit Date: 2020     No diagnosis found.  Patient Active Problem List   Diagnosis   • Persistent atrial fibrillation   • Coronary artery disease involving native coronary artery of native heart without angina pectoris   • Essential hypertension   • Cerebrovascular accident (CVA) due to embolism (CMS/HCC)   • Dyspnea on exertion   • Pulmonary arterial hypertension (CMS/HCC)   • Interstitial lung disease (CMS/HCC)   • Hypersensitivity pneumonitis (CMS/HCC)   • Gastric ulcer   • Coagulation disorder due to circulating anticoagulants (CMS/HCC)   • Dyslipidemia   • Acquired hypothyroidism   • Chronic kidney disease, stage 3 (CMS/HCC)   • GERD without esophagitis   • Iron deficiency anemia   • Volume overload   • Acute on chronic diastolic congestive heart failure (CMS/HCC)   • Acute on chronic diastolic (congestive) heart failure (CMS/HCC)   • Pneumonia of both lungs due to infectious organism   • Acute respiratory failure with hypoxia (CMS/HCC)   • Diastolic heart failure (CMS/HCC)     Past Medical History:   Diagnosis Date   • Acute on chronic diastolic congestive heart failure (CMS/HCC) 2019   • Anemia    • Angina at rest (CMS/HCC)    • Arthritis    • Atrial fibrillation (CMS/HCC)    • Coronary artery disease    • Disease of thyroid gland    • Elevated cholesterol    • GERD without esophagitis 2019   • History of blood transfusion    • History of colonoscopy 2019   • History of melena 2019   • History of stomach ulcers    • Hypertension    • Impaired functional mobility, balance, gait, and endurance    • Osteoporosis    • Positive TB test    • Stroke (CMS/HCC)      Past Surgical History:   Procedure Laterality Date   • BRONCHOSCOPY Bilateral 2019     Procedure: BRONCHOSCOPY;  Surgeon: Jeff Laura MD;  Location:  [facility];  Service: Pulmonary   • BYPASS GRAFT     • COLONOSCOPY N/A 10/15/2019    Procedure: COLONOSCOPY;  Surgeon: Fredi Aaron MD;  Location:  [facility];  Service: Gastroenterology   • CORONARY ANGIOPLASTY WITH STENT PLACEMENT     • CORONARY ARTERY BYPASS GRAFT  10/18/2010   • ENDOSCOPY N/A 10/14/2019    Procedure: ESOPHAGOGASTRODUODENOSCOPY;  Surgeon: Fredi Aaron MD;  Location:  [facility];  Service: Gastroenterology   • HYSTERECTOMY     • STOMACH SURGERY  08/11/2019    Upper GI bleed    • STOMACH SURGERY  10/13/2019          OT ASSESSMENT FLOWSHEET (last 12 hours)      Occupational Therapy Evaluation     Row Name 01/20/20 0745                   OT Evaluation Time/Intention    Document Type  evaluation  -AC        Mode of Treatment  occupational therapy  -AC        Patient Effort  good  -AC           General Information    Patient Profile Reviewed?  yes  -AC        Referring Physician  MD Mayco  -AC        Prior Level of Function  independent:;all household mobility;ADL's  -AC        Equipment Currently Used at Home  walker, rolling  -AC        Pertinent History of Current Functional Problem  Pt with BLE edema, SOA  -AC        Existing Precautions/Restrictions  fall;oxygen therapy device and L/min  -AC        Barriers to Rehab  hearing deficit  -AC           Relationship/Environment    Primary Source of Support/Comfort  spouse  -AC        Lives With  spouse  -AC           Resource/Environmental Concerns    Current Living Arrangements  home/apartment/condo  -AC           Home Main Entrance    Number of Stairs, Main Entrance  one  -AC           Stairs Within Home, Primary    Number of Stairs, Within Home, Primary  none  -AC           Cognitive Assessment/Interventions    Additional Documentation  Cognitive Assessment/Intervention (Group)  -AC           Cognitive Assessment/Intervention- PT/OT    Orientation  Status (Cognition)  oriented x 4  -AC        Follows Commands (Cognition)  WNL  -AC           Safety Issues, Functional Mobility    Safety Issues Affecting Function (Mobility)  safety precaution awareness;safety precautions follow-through/compliance  -AC        Impairments Affecting Function (Mobility)  balance;endurance/activity tolerance;shortness of breath;strength  -AC           Bed Mobility Assessment/Treatment    Bed Mobility Assessment/Treatment  supine-sit  -AC        Supine-Sit [place] (Bed Mobility)  contact guard  -AC        Assistive Device (Bed Mobility)  bed rails;leg   -           Functional Mobility    Functional Mobility- Ind. Level  contact guard assist  -AC        Functional Mobility- Device  rolling walker  -        Functional Mobility-Distance (Feet)  20  -AC           Transfer Assessment/Treatment    Transfer Assessment/Treatment  sit-stand transfer;stand-sit transfer  -        Comment (Transfers)  Vcs for hand placement  -           Sit-Stand Transfer    Sit-Stand [place] (Transfers)  contact guard  -AC        Assistive Device (Sit-Stand Transfers)  walker, front-wheeled  -AC           Stand-Sit Transfer    Stand-Sit [place]eman (Transfers)  contact guard  -AC        Assistive Device (Stand-Sit Transfers)  walker, front-wheeled  -AC           ADL Assessment/Intervention    BADL Assessment/Intervention  lower body dressing  -AC           Lower Body Dressing Assessment/Training    Lower Body Dressing Hodgeman Level  don;socks;dependent (less than 25% patient effort)  -        Lower Body Dressing Position  unsupported sitting  -AC           BADL Safety/Performance    Impairments, BADL Safety/Performance  balance;endurance/activity tolerance;strength;shortness of breath  -AC           General ROM    GENERAL ROM COMMENTS  BUE AROM WFL  -AC           MMT (Manual Muscle Testing)    General MMT Comments  BUE grossly 4-/5  -AC           Sensory Assessment/Intervention     Sensory General Assessment  no sensation deficits identified BUE  -AC        Additional Documentation  Hearing Assessment (Group)  -AC           Positioning and Restraints    Pre-Treatment Position  in bed  -AC        Post Treatment Position  chair  -AC        In Chair  reclined;call light within reach;encouraged to call for assist;exit alarm on  -AC           Pain Assessment    Additional Documentation  Pain Scale: Numbers Pre/Post-Treatment (Group)  -AC           Pain Scale: Numbers Pre/Post-Treatment    Pain Scale: Numbers, Pretreatment  0/10 - no pain  -AC        Pain Scale: Numbers, Post-Treatment  0/10 - no pain  -AC           Plan of Care Review    Plan of Care Reviewed With  patient  -AC        Outcome Summary  OT eval complete.  Pt presents with weakness and  decreased activity tolerance limiting independence with self care.  Pt required CGA supine to sit, CGA STS, CGA to ambulate 20 ft with RW given 3 standing restbreaks.  Pt's expected UB self care performance setup/supervision,  LB self care max A.  OT will follow to advance pt toward increased independence with ADLs.  Recommend SNF for rehab upon d/c, but pt prefers home with assist and  HHOT.   -AC           Clinical Impression (OT)    Date of Referral to OT  01/17/20  -AC        OT Diagnosis  ADL decline  -AC        Patient/Family Goals Statement (OT Eval)  increase ADL independence  -AC        Criteria for Skilled Therapeutic Interventions Met (OT Eval)  yes;treatment indicated  -AC        Rehab Potential (OT Eval)  good, to achieve stated therapy goals  -AC        Therapy Frequency (OT Eval)  daily  -AC        Anticipated Discharge Disposition (OT)  home with assist;home with home health recommend SNF, pt prefers home with assist and HHOT  -AC           Vital Signs    Pre SpO2 (%)  98  -AC        O2 Delivery Pre Treatment  nasal cannula  -AC        Intra SpO2 (%)  88  -AC        O2 Delivery Intra Treatment  nasal cannula  -AC        Post SpO2 (%)   98  -AC        O2 Delivery Post Treatment  nasal cannula  -AC        Pre Patient Position  Supine  -AC        Intra Patient Position  Standing  -AC        Post Patient Position  Sitting  -AC           Planned OT Interventions    Planned Therapy Interventions (OT Eval)  activity tolerance training;adaptive equipment training;BADL retraining;functional balance retraining;occupation/activity based interventions;patient/caregiver education/training;strengthening exercise;transfer/mobility retraining  -AC           OT Goals    Transfer Goal Selection (OT)  transfer, OT goal 1  -AC        Dressing Goal Selection (OT)  dressing, OT goal 1  -AC        Toileting Goal Selection (OT)  toileting, OT goal 1  -AC        Activity Tolerance Goal Selection (OT)  activity tolerance, OT goal 1  -AC        Additional Documentation  Activity Tolerance Goal Selection (OT) (Row)  -AC           Transfer Goal 1 (OT)    Activity/Assistive Device (Transfer Goal 1, OT)  bed-to-chair/chair-to-bed;toilet;walker, rolling  -AC        [place] Level/Cues Needed (Transfer Goal 1, OT)  supervision required  -AC        Time Frame (Transfer Goal 1, OT)  by discharge  -AC        Progress/Outcome (Transfer Goal 1, OT)  goal ongoing  -AC           Dressing Goal 1 (OT)    Activity/Assistive Device (Dressing Goal 1, OT)  lower body dressing AE prn  -AC        [place]/Cues Needed (Dressing Goal 1, OT)  minimum assist (75% or more patient effort)  -AC        Time Frame (Dressing Goal 1, OT)  by discharge  -AC        Progress/Outcome (Dressing Goal 1, OT)  goal ongoing  -AC           Toileting Goal 1 (OT)    Activity/Device (Toileting Goal 1, OT)  adjust/manage clothing;perform perineal hygiene  -AC        [place] Level/Cues Needed (Toileting Goal 1, OT)  supervision required  -AC        Time Frame (Toileting Goal 1, OT)  by discharge  -AC        Progress/Outcome (Toileting Goal 1, OT)  goal ongoing  -AC            Activity Tolerance Goal 1 (OT)     Activity Tolerance Goal 1 (OT)  pt will tolerate 15 min self care/ ther ex/ mobility  -AC        Activity Level (Endurance Goal 1, OT)  15 min activity;O2 sat >/ equal to 88%  -AC        Time Frame (Activity Tolerance Goal 1, OT)  by discharge  -AC        Progress/Outcome (Activity Tolerance Goal 1, OT)  goal ongoing  -AC           Living Environment    Home Accessibility  tub/shower is not walk in;stairs to enter home  -AC          User Key  (r) = Recorded By, (t) = Taken By, (c) = Cosigned By    Initials Name Effective Dates    AC Madelin Harrison, OT 06/23/15 -                OT Recommendation and Plan  Outcome Summary/Treatment Plan (OT)  Anticipated Discharge Disposition (OT): home with assist, home with home health(recommend SNF, pt prefers home with assist and HHOT)  Planned Therapy Interventions (OT Eval): activity tolerance training, adaptive equipment training, BADL retraining, functional balance retraining, occupation/activity based interventions, patient/caregiver education/training, strengthening exercise, transfer/mobility retraining  Therapy Frequency (OT Eval): daily  Plan of Care Review  Plan of Care Reviewed With: patient  Plan of Care Reviewed With: patient  Outcome Summary: OT eval complete.  Pt presents with weakness and  decreased activity tolerance limiting independence with self care.  Pt required CGA supine to sit, CGA STS, CGA to ambulate 20 ft with RW given 3 standing restbreaks.  Pt's expected UB self care performance setup/supervision,  LB self care max A.  OT will follow to advance pt toward increased independence with ADLs.  Recommend SNF for rehab upon d/c, but pt prefers home with assist and  HHOT.     Outcome Measures     Row Name 01/20/20 0745             How much help from another is currently needed...    Putting on and taking off regular lower body clothing?  2  -AC      Bathing (including washing, rinsing, and drying)  2  -AC      Toileting (which includes using toilet bed pan  or urinal)  2  -AC      Putting on and taking off regular upper body clothing  3  -AC      Taking care of personal grooming (such as brushing teeth)  3  -AC      Eating meals  4  -AC      AM-PAC 6 Clicks Score (OT)  16  -AC         Functional Assessment    Outcome Measure Options  AM-PAC 6 Clicks Daily Activity (OT)  -AC        User Key  (r) = Recorded By, (t) = Taken By, (c) = Cosigned By    Initials Name Provider Type    Madelin Madrid OT Occupational Therapist          Time Calculation:   Time Calculation- OT     Row Name 01/20/20 0745             Time Calculation- OT    OT Start Time  0745  -      OT Received On  01/20/20  -      OT Goal Re-Cert Due Date  01/30/20  -        User Key  (r) = Recorded By, (t) = Taken By, (c) = Cosigned By    Initials Name Provider Type    Madelin Madrid, OT Occupational Therapist        Therapy Charges for Today     Code Description Service Date Service Provider Modifiers Qty    [id number] HC OT EVAL MOD COMPLEXITY 4 1/20/2020 Madelin Harrison OT GO 1               Madlein Harrison OT  1/20/2020

## 2020-01-20 NOTE — PROGRESS NOTES
Case Management Readmission Assessment Note       Case Management Readmission Assessment (all recorded)      Readmission Interview     Row Name 01/20/20 1208             Readmission Indications    Is this hospitalization related to the prior hospital diagnosis?  Yes      What was the reason you were admitted?  keep filling up with fluid      Row Name 01/20/20 1208             Recommendation for rehospitalization    Did you speak with your physician prior to coming to the hospital  Yes      Who recommended you return to the hospital?  PCP      [facility] Name 01/20/20 1208             Follow up appointment    Do you have a PCP?  Yes      Did you have an appointment with PCP/specialist after hospitalization within 7 days?  Yes      Did you keep your appointment?  Yes      [facility] Name 01/20/20 1208             Medications    Did you have newly prescribed medications at discharge?  Yes      Did you understand the reasons for your medications at discharge and how to take them?  Yes      Are you taking all of you prescribed medications?  Yes

## 2020-01-20 NOTE — PLAN OF CARE
Problem: Patient Care Overview  Goal: Plan of Care Review  Flowsheets (Taken 1/20/2020 8139)  Plan of Care Reviewed With: patient  Outcome Summary: OT eval complete.  Pt presents with weakness and  decreased activity tolerance limiting independence with self care.  Pt required CGA supine to sit, CGA STS, CGA to ambulate 20 ft with RW given 3 standing restbreaks.  Pt's expected UB self care performance setup/supervision,  LB self care max A.  OT will follow to advance pt toward increased independence with ADLs.  Recommend SNF for rehab upon d/c, but pt prefers home with assist and  HHOT.

## 2020-01-20 NOTE — PROGRESS NOTES
Discharge Planning Assessment  [facility]     Patient Name: Pat Morel  MRN: [id number]  Today's Date: 1/20/2020    Admit Date: 1/17/2020    Discharge Needs Assessment     Row Name 01/20/20 1209       Living Environment    Lives With  spouse    Living Arrangement Comments  Spouse can asssist as needed.       Discharge Needs Assessment    Equipment Currently Used at Home  bath bench;oxygen;nebulizer;walker, rolling    Equipment Needed After Discharge  none    Discharge Coordination/Progress  The pt wears continous 02 2L from [place]. States she has a neb machine. Current with [facility] for DX CHF, Chronic kidney Dz and Afib. Has SN, PT/OT (OT had not seeen yet). Has med coverage [facility] her [facility] [facility]. States she recently relocated to [place] from [place]. Had done inpt rehab at the [facility] in the past.        Discharge Plan     Row Name 01/20/20 1215       Plan    Plan  Home with HH    Patient/Family in Agreement with Plan  yes    Plan Comments  I spoke with the pt. She doesnt feel she needs inpt rehab at [facility] and wants to go home with [facility]. CM will follow. Will need resume HH orders at [facility].    Final Discharge Disposition Code  06 - home with home health care        Destination      Coordination has not been started for this encounter.      Durable Medical Equipment      Coordination has not been started for this encounter.      Dialysis/Infusion      Coordination has not been started for this encounter.      Home Medical Care      Coordination has not been started for this encounter.      Therapy      Coordination has not been started for this encounter.      Community Resources      Coordination has not been started for this encounter.        Expected Discharge Date and Time     Expected Discharge Date Expected Discharge Time    Jan 23, 2020         Demographic Summary    No documentation.       Functional Status     Row Name 01/20/20 120       Functional Status    Usual Activity Tolerance  moderate        Functional Status, IADL    Medications  independent    Meal Preparation  independent    Housekeeping  independent    Laundry  independent    Shopping  independent        Psychosocial    No documentation.       Abuse/Neglect    No documentation.       Legal    No documentation.       Substance Abuse    No documentation.       Patient Forms    No documentation.           Lucie Villatoro RN

## 2020-01-21 NOTE — THERAPY TREATMENT NOTE
Patient Name: Pat Morel  : 1940    MRN: [id number]                              Today's Date: 2020       Admit Date: 2020    Visit Dx: No diagnosis found.  Patient Active Problem List   Diagnosis   • Persistent atrial fibrillation   • Coronary artery disease involving native coronary artery of native heart without angina pectoris   • Essential hypertension   • Cerebrovascular accident (CVA) due to embolism (CMS/HCC)   • Dyspnea on exertion   • Pulmonary arterial hypertension (CMS/HCC)   • Interstitial lung disease (CMS/HCC)   • Hypersensitivity pneumonitis (CMS/HCC)   • Gastric ulcer   • Coagulation disorder due to circulating anticoagulants (CMS/HCC)   • Dyslipidemia   • Acquired hypothyroidism   • Chronic kidney disease, stage 3 (CMS/HCC)   • GERD without esophagitis   • Iron deficiency anemia   • Volume overload   • Acute on chronic diastolic congestive heart failure (CMS/HCC)   • Acute on chronic diastolic (congestive) heart failure (CMS/HCC)   • Pneumonia of both lungs due to infectious organism   • Acute respiratory failure with hypoxia (CMS/HCC)   • Diastolic heart failure (CMS/HCC)     Past Medical History:   Diagnosis Date   • Acute on chronic diastolic congestive heart failure (CMS/HCC) 2019   • Anemia    • Angina at rest (CMS/HCC)    • Arthritis    • Atrial fibrillation (CMS/HCC)    • Coronary artery disease    • Disease of thyroid gland    • Elevated cholesterol    • GERD without esophagitis 2019   • History of blood transfusion    • History of colonoscopy 2019   • History of melena 2019   • History of stomach ulcers    • Hypertension    • Impaired functional mobility, balance, gait, and endurance    • Osteoporosis    • Positive TB test    • Stroke (CMS/HCC)      Past Surgical History:   Procedure Laterality Date   • BRONCHOSCOPY Bilateral 2019    Procedure: BRONCHOSCOPY;  Surgeon: Jeff Laura MD;  Location: [facility] ENDOSCOPY;  Service:  Pulmonary   • BYPASS GRAFT     • COLONOSCOPY N/A 10/15/2019    Procedure: COLONOSCOPY;  Surgeon: Fredi Aaron MD;  Location: [facility] ENDOSCOPY;  Service: Gastroenterology   • CORONARY ANGIOPLASTY WITH STENT PLACEMENT     • CORONARY ARTERY BYPASS GRAFT  10/18/2010   • ENDOSCOPY N/A 10/14/2019    Procedure: ESOPHAGOGASTRODUODENOSCOPY;  Surgeon: Fredi Aaron MD;  Location:  [facility]COPY;  Service: Gastroenterology   • HYSTERECTOMY     • STOMACH SURGERY  08/11/2019    Upper GI bleed    • STOMACH SURGERY  10/13/2019     General Information     Row Name 01/21/20 1217          PT Evaluation Time/Intention    Document Type  therapy note (daily note)  -KM     Mode of Treatment  physical therapy  -KM     Row Name 01/21/20 1217          General Information    Patient Profile Reviewed?  yes  -KM     Existing Precautions/Restrictions  fall;oxygen therapy device and L/min purewick catheter  -KM     Barriers to Rehab  hearing deficit  -KM     Row Name 01/21/20 1217          Cognitive Assessment/Intervention- PT/OT    Orientation Status (Cognition)  oriented x 4  -KM     Row Name 01/21/20 1217          Safety Issues, Functional Mobility    Safety Issues Affecting Function (Mobility)  safety precaution awareness;safety precautions follow-through/compliance  -KM     Impairments Affecting Function (Mobility)  balance;endurance/activity tolerance;shortness of breath;strength  -KM       User Key  (r) = Recorded By, (t) = Taken By, (c) = Cosigned By    Initials Name Provider Type    KM Pat Hernandez, PT Physical Therapist        Mobility     Row Name 01/21/20 1218          Bed Mobility Assessment/Treatment    Bed Mobility Assessment/Treatment  supine-sit;scooting/bridging  -KM     Scooting/Bridging [place] (Bed Mobility)  verbal cues;minimum assist (75% patient effort)  -KM     Supine-Sit [place] (Bed Mobility)  contact guard  -KM     Assistive Device (Bed Mobility)  bed rails;head of bed elevated;draw sheet   -KM     Row Name 01/21/20 1218          Sit-Stand Transfer    Sit-Stand [place] (Transfers)  contact guard  -KM     Assistive Device (Sit-Stand Transfers)  walker, front-wheeled  -KM     Row Name 01/21/20 1218          Gait/Stairs Assessment/Training    Gait/Stairs Assessment/Training  gait/ambulation assistive device  -KM     [place] Level (Gait)  contact guard  -KM     Assistive Device (Gait)  walker, front-wheeled  -KM     Distance in Feet (Gait)  35  -KM     Deviations/Abnormal Patterns (Gait)  base of support, narrow;chivo decreased;gait speed decreased;stride length decreased  -KM     Bilateral Gait Deviations  heel strike decreased;weight shift ability decreased  -KM     Comment (Gait/Stairs)  2 brief standing rest breaks, pt on supplemental O2, sats stable  -KM       User Key  (r) = Recorded By, (t) = Taken By, (c) = Cosigned By    Initials Name Provider Type     Pat Hernandez, PT Physical Therapist        Obj/Interventions     Row Name 01/21/20 1220          Therapeutic Exercise    Lower Extremity (Therapeutic Exercise)  gastroc stretch, bilateral;heel slides, bilateral;marching while seated;LAQ (long arc quad), bilateral  -KM     Lower Extremity Range of Motion (Therapeutic Exercise)  ankle dorsiflexion/plantar flexion, bilateral;hip abduction/adduction, bilateral  -KM     Exercise Type (Therapeutic Exercise)  AROM (active range of motion)  -KM     Position (Therapeutic Exercise)  seated  -KM     Sets/Reps (Therapeutic Exercise)  10x  -KM     Row Name 01/21/20 1220          Dynamic Sitting Balance    Level of [place], Reaches Outside Midline (Sitting, Dynamic Balance)  contact guard assist  -KM     Row Name 01/21/20 1220          Static Standing Balance    Level of [place] (Supported Standing, Static Balance)  supervision  -KM     Assistive Device Utilized (Supported Standing, Static Balance)  walker, rolling  -KM     Row Name 01/21/20 1220          Dynamic Standing Balance     Level of [place], Reaches Outside Midline (Standing, Dynamic Balance)  contact guard assist  -KM       User Key  (r) = Recorded By, (t) = Taken By, (c) = Cosigned By    Initials Name Provider Type    Pat Panchal, PT Physical Therapist        Goals/Plan    No documentation.       Clinical Impression     Row Name 01/21/20 1221          Pain Scale: Numbers Pre/Post-Treatment    Pain Scale: Numbers, Pretreatment  0/10 - no pain  -KM     Pain Scale: Numbers, Post-Treatment  0/10 - no pain  -KM     Row Name 01/21/20 1221          Plan of Care Review    Plan of Care Reviewed With  patient  -KM     Progress  improving  -KM     Outcome Summary  Patient transitions to eob with supervision and min assist to scoot , stood with c.g.assist and ambul 35 ft with r wx on O2, O2 sats stable, fatigues easily  -KM     Row Name 01/21/20 1221          Vital Signs    Pre SpO2 (%)  98  -KM     O2 Delivery Pre Treatment  supplemental O2  -KM     Intra SpO2 (%)  89  -KM     O2 Delivery Intra Treatment  supplemental O2  -KM     Post SpO2 (%)  98  -KM     O2 Delivery Post Treatment  supplemental O2  -KM     Row Name 01/21/20 1221          Positioning and Restraints    Pre-Treatment Position  in bed  -KM     Post Treatment Position  chair  -KM     In Chair  reclined;call light within reach;encouraged to call for assist;exit alarm on  -KM       User Key  (r) = Recorded By, (t) = Taken By, (c) = Cosigned By    Initials Name Provider Type    Pat Panchal, PT Physical Therapist        Outcome Measures     Row Name 01/21/20 1223          How much help from another person do you currently need...    Turning from your back to your side while in flat bed without using bedrails?  4  -KM     Moving from lying on back to sitting on the side of a flat bed without bedrails?  3  -KM     Moving to and from a bed to a chair (including a wheelchair)?  3  -KM     Standing up from a chair using your arms (e.g., wheelchair,  bedside chair)?  3  -KM     Climbing 3-5 steps with a railing?  2  -KM     To walk in hospital room?  3  -KM     AM-PAC 6 Clicks Score (PT)  18  -KM     Row Name 01/21/20 1223          Functional Assessment    Outcome Measure Options  AM-PAC 6 Clicks Basic Mobility (PT)  -KM       User Key  (r) = Recorded By, (t) = Taken By, (c) = Cosigned By    Initials Name Provider Type    Pat Panchal, PT Physical Therapist          PT Recommendation and Plan     Plan of Care Reviewed With: patient  Progress: improving  Outcome Summary: Patient transitions to eob with supervision and min assist to scoot , stood with c.g.assist and ambul 35 ft with r wx on O2, O2 sats stable, fatigues easily     Time Calculation:   PT Charges     Row Name 01/21/20 1225             Time Calculation    Start Time  1144  -KM      PT Received On  01/21/20  -KM      PT Goal Re-Cert Due Date  01/28/20  -KM         Time Calculation- PT    Total Timed Code Minutes- PT  23 minute(s)  -KM         Timed Charges    81224 - PT Therapeutic Exercise Minutes  8  -KM      77732 - Gait Training Minutes   15  -KM        User Key  (r) = Recorded By, (t) = Taken By, (c) = Cosigned By    Initials Name Provider Type    Pat Panchal, PT Physical Therapist        Therapy Charges for Today     Code Description Service Date Service Provider Modifiers Qty    55709637192 HC PT THER PROC EA 15 MIN 1/21/2020 Pat Hernandez, PT GP 1    78102475677 HC GAIT TRAINING EA 15 MIN 1/21/2020 Pat Hernandez, PT GP 1          PT G-Codes  Outcome Measure Options: AM-PAC 6 Clicks Basic Mobility (PT)  AM-PAC 6 Clicks Score (PT): 18  AM-PAC 6 Clicks Score (OT): 16    Pat Hernandez, DAISY  1/21/2020

## 2020-01-21 NOTE — NURSING NOTE
Patient Name:  Pat Morel  :  1940  DOS:  20    Active Hospital Problems    Diagnosis   • Diastolic heart failure (CMS/HCC)   • Volume overload       Consult has been received.  Medical records have been reviewed.  Patient is a good candidate for the [facility].  Education provided.  Education time 10 minutes.  Patient to be scheduled follow up 3-5 days post discharge.    · Echo Results:10/19: The left atrium is enlarged.  · Global and segmental LV wall motion is normal. The estimated LV ejection fraction is >70%.  · The aortic valve appears to be mildly sclerotic. AS and AI are absent.  · There is mild-moderate mitral regurgitation.  · There is moderate tricuspid regurgitation with a moderate elevation is estimated PA pressure (45-55 mmHg)  There are no other important findings on this study.    NYHA Class: IV    Heart Failure Quality Measures    ACE I, ARB, ARNI (if EF <40%)     N/A      Evidence-based Beta Blocker (EF<40%)    (Bisoprolol, Carvedilol, Metoprolol Succinate)  Yes      Aldosterone Antagonist (EF <40%)  N/A      Heart Failure Education    Low Na diet, Signs / symptoms, Daily weight monitoring, Role of [facility] and when to call and Other:  Hf zones    If EF < 35%  n/a    Atrial fibrillation / Atrial flutter:  Quality Measure    CHADS-VASc Score:  CHADS-VASc Risk Assessment            7       Total Score        1 CHF    1 Hypertension    2 Age >/= 75    2 PRIOR STROKE/TIA/THROMBO    1 Sex: Female        Criteria that do not apply:    DM    Vascular Disease    Age 65-74            Anticoagulation for CHADS-VASc >/=2    Yes      Statin Therapy (for patients with a history of CAD, CVA/TIA, PVD, DM)  Yes

## 2020-01-21 NOTE — PROGRESS NOTES
I was called regarding the patient's previous history of hypersensitivity pneumonitis.  She is followed by my partner Dr. Laura.  Since discharge from [facility] she has been on a prednisone taper.  She is currently on 60 mg p.o. prednisone and has been on that for upwards of 11 days.  The initial plan by Dr. Garcia was for about 2 weeks of 60 with a subsequent taper over the next month and a half.  Given her swelling, I feel would be reasonable to go ahead and decrease this to 40 mg of prednisone daily for now.  I would like her to follow-up with our nurse practitioner or Dr. Laura in the next week or 2 at our office and further tapering versus changing back to a steroid sparing agent can be entertained.  Please let me know if I can be of any further help.    Sy Keen MD

## 2020-01-21 NOTE — PLAN OF CARE
Problem: Patient Care Overview  Goal: Plan of Care Review  Flowsheets  Taken 1/21/2020 1224  Progress: improving  Plan of Care Reviewed With: patient  Taken 1/21/2020 1221  Outcome Summary: Patient transitions to eob with supervision and min assist to scoot , stood with c.g.assist and ambul 35 ft with r wx on O2, O2 sats stable, fatigues easily

## 2020-01-21 NOTE — PROGRESS NOTES
[facility] Medicine Services  PROGRESS NOTE    Patient Name: Pat Morel  : 1940  MRN: [id number]    Date of Admission: 2020  Primary Care Physician: Anthony Sweet DO    Subjective   Subjective     CC:  F/U CHF exacerbation    HPI:  Patient seen and examined. Nursing notes reviewed. No acute events overnight. Pt sitting up in bed. Discussed with patient that she is doing well and we talked about going home. After I left the room, per nursing, patient became tearful and wanted to stay one more day.     Review of Systems  General: denies fevers or chills  CV: denies chest pain  Resp: No SOA, no cough  Abd: denies abd pain, nausea      Objective   Objective     Vital Signs:   Temp:  [97.4 °F (36.3 °C)-97.6 °F (36.4 °C)] 97.4 °F (36.3 °C)  Heart Rate:  [74-95] 87  Resp:  [18-22] 18  BP: (129-154)/(86-97) 154/97        Physical Exam:  Constitutional: No acute distress, awake, alert, sitting up in bedside chair, very pleasant  HENT: NCAT, mucous membranes moist  Respiratory: CTA b/l, respiratory effort normal on 2 L  Cardiovascular: IRR, no murmurs, rubs, or gallops, palpable pedal pulses bilaterally  Gastrointestinal: Positive bowel sounds, soft, nontender, nondistended  Musculoskeletal: Bilateral Unna boots, trace edema in thighs  Psychiatric: Appropriate affect, cooperative  Neurologic: Oriented x 3, no focal neurological deficits, speech clear  Skin: No rashes      Results Reviewed:  Results from last 7 days   Lab Units 20  0501 20  04220  0440 20  1509   WBC 10*3/mm3 9.11 9.58 8.87 11.22*   HEMOGLOBIN g/dL 8.1* 7.6* 7.9* 7.9*   HEMATOCRIT % 27.6* 25.4* 28.2* 26.8*   PLATELETS 10*3/mm3 190 231 253 303   INR   --   --   --  1.71*   PROCALCITONIN ng/mL  --   --   --  0.04*     Results from last 7 days   Lab Units 20  0501 20  0421 20  0418 20  1136  20  1509   SODIUM mmol/L 137 136 137  --    < > 137   POTASSIUM  mmol/L 4.7 4.7 4.6  --    < > 4.5   CHLORIDE mmol/L 99 99 99  --    < > 101   CO2 mmol/L 24.0 25.0 25.0  --    < > 23.0   BUN mg/dL 43* 46* 46*  --    < > 46*   CREATININE mg/dL 1.50* 1.86* 1.87*  --    < > 1.75*   GLUCOSE mg/dL 138* 184* 191*  --    < > 224*   CALCIUM mg/dL 8.3* 8.7 8.4*  --    < > 8.8   ALT (SGPT) U/L  --   --   --   --   --  54*   AST (SGOT) U/L  --   --   --   --   --  42*   TROPONIN T ng/mL  --   --   --  0.025  --  0.024   PROBNP pg/mL  --   --   --   --   --  23,152.0*    < > = values in this interval not displayed.     Estimated Creatinine Clearance: 26.5 mL/min (A) (by C-G formula based on SCr of 1.5 mg/dL (H)).    Microbiology Results Abnormal     None          Imaging Results (Last 24 Hours)     ** No results found for the last 24 hours. **          Results for orders placed during the hospital encounter of 10/13/19   Adult Transthoracic Echo Complete W/ Cont if Necessary Per Protocol    Narrative · The left atrium is enlarged.  · Global and segmental LV wall motion is normal. The estimated LV ejection   fraction is >70%.  · The aortic valve appears to be mildly sclerotic. AS and AI are absent.  · There is mild-moderate mitral regurgitation.  · There is moderate tricuspid regurgitation with a moderate elevation is   estimated PA pressure (45-55 mmHg)  · There are no other important findings on this study.          I have reviewed the medications:  Scheduled Meds:    apixaban 2.5 mg Oral Q12H   atorvastatin 40 mg Oral Nightly   bisoprolol 5 mg Oral BID   budesonide 0.5 mg Nebulization BID - RT   ferrous sulfate 325 mg Oral Daily With Breakfast   furosemide 40 mg Oral BID   isosorbide mononitrate 60 mg Oral Daily   levothyroxine 75 mcg Oral Q AM   pantoprazole 40 mg Oral Daily   pharmacy consult - MTM  Does not apply Daily   potassium chloride 20 mEq Oral Daily   predniSONE 60 mg Oral Daily With Breakfast   sodium chloride 10 mL Intravenous Q12H     Continuous Infusions:   PRN Meds:.•   acetaminophen **OR** acetaminophen **OR** acetaminophen  •  ipratropium-albuterol  •  ondansetron **OR** ondansetron  •  sodium chloride    Assessment/Plan   Assessment & Plan     Active Hospital Problems    Diagnosis  POA   • Diastolic heart failure (CMS/[facility]) [I50.30]  Yes   • Volume overload [E87.70]  Yes      Resolved Hospital Problems   No resolved problems to display.        Brief Hospital Course to date: Pat Morel is a 80 y.o. female with history of CAD status post CABG, atrial fibrillation, CKD stage IV, diastolic heart failure, iron deficiency anemia, interstitial lung disease and hypersensitivity pneumonitis with recent admission to [facility] for acute on chronic hypoxic respiratory failure, RSV bronchiolitis and acute kidney injury.  During that hospitalization the patient was given empiric antibiotics, gently diuresed and started on a 6-week prednisone taper starting at 60 mg/day.  The patient presents from her transitional care follow-up at 1 week post discharge with increased lower extremity edema and worsening shortness of breath.  She has also had at least 2 falls at home over the past week.  Ms. Morel has been hospitalized several times over the past 4 months for volume overload, and she is concerned that her current high-dose prednisone taper has contributed to her fluid retention.    Acute on chronic hypoxia  Acute on chronic diastolic heart failure  -Suspect secondary to changes in home diuretic dose (lowered while recently hospitalized) plus possible fluid retention secondary to high dose prednisone use.  -BNP significantly elevated 23,000.  Troponins negative  -Patient has improved with IV diuresis.  Now on baseline 2.5 L of oxygen.  -Reviewed chest x-ray, no active airspace disease.  -Echo from October 2019 shows an EF of greater than 70%, mild MR and moderate TR  -Heart failure clinic navigator consulted and has seen patient, plan to follow-up in 3-5 days after  DC.  -Continue Unna boots per PT  -Change Lasix to oral Bumex 2mg BID with K+ replacement  -Continue strict I's/O's, daily weights. Continues to be in negative fluid balance but not as much output yesterday   -Hold off on adding Spironolactone due to CKD    Hypersensitivity pneumonitis  -recently diagnosed on the basis of clinical and CT scan findings, as well as prior history of hypersensitivity pneumonitis.   -Continue prednisone taper as outlined by pulmonology in [place], however I do not see the results of the most recent hypersensitivity panel that was drawn at that hospital.   -Will ask Pulmonology to see her regarding Prednisone taper prior to DC  - duonebs, pulmicort     CKD IV  - baseline 1.6-2.0.  (today 1.5)     Recent RSV bronchiolitis     Atrial fibrillation  -Rate controlled with Bisoprolol   -Continue apixaban     Anemia of chronic disease  -Monitor H&H, currently stable  -Iron low, increase Ferrous Sulfate to BID with meals      Group 3 pulmonary hypertension     CAD  - h/o CABG     Generalized weakness  - PT/OT have evaluated, recommends SNF. Pt states she prefers HHPT  - fall precautions        DVT prophylaxis:  anticoagulated    Disposition: I expect the patient to be discharged home, likely tomorrow.    CODE STATUS:   Code Status and Medical Interventions:   Ordered at: 01/17/20 1446     Limited Support to NOT Include:    Intubation     Level Of Support Discussed With:    Patient     Code Status:    No CPR     Medical Interventions (Level of Support Prior to Arrest):    Limited         Electronically signed by Jessica Silva DO, 01/21/20, 12:16 PM.

## 2020-01-21 NOTE — THERAPY WOUND CARE TREATMENT
"Acute Care - Wound/Debridement Treatment Note  [facility]     Patient Name: Pat Morel  : 1940  MRN: [id number]  Today's Date: 2020          Referring Physician: MD Mayco       Admit Date: 2020    Visit Dx:  No diagnosis found.    Patient Active Problem List   Diagnosis   • Persistent atrial fibrillation   • Coronary artery disease involving native coronary artery of native heart without angina pectoris   • Essential hypertension   • Cerebrovascular accident (CVA) due to embolism (CMS/HCC)   • Dyspnea on exertion   • Pulmonary arterial hypertension (CMS/HCC)   • Interstitial lung disease (CMS/HCC)   • Hypersensitivity pneumonitis (CMS/HCC)   • Gastric ulcer   • Coagulation disorder due to circulating anticoagulants (CMS/HCC)   • Dyslipidemia   • Acquired hypothyroidism   • Chronic kidney disease, stage 3 (CMS/HCC)   • GERD without esophagitis   • Iron deficiency anemia   • Volume overload   • Acute on chronic diastolic congestive heart failure (CMS/HCC)   • Acute on chronic diastolic (congestive) heart failure (CMS/HCC)   • Pneumonia of both lungs due to infectious organism   • Acute respiratory failure with hypoxia (CMS/HCC)   • Diastolic heart failure (CMS/HCC)              Lymphedema     Row Name 20 1000             Lymphedema Edema Assessment    Ptting Edema Category  By severity  -MF      Pitting Edema  Mild  -MF         Compression/Skin Care    Compression/Skin Care  skin care;wrapping location;bandaging  -MF      Skin Care  washed/dried  -MF      Wrapping Location  lower extremity  -MF      Wrapping Location LE  bilateral:;foot to knee  -MF      Wrapping Comments  cast padding and 4\" coban in light multilayers  -MF        User Key  (r) = Recorded By, (t) = Taken By, (c) = Cosigned By    Initials Name Provider Type    Gurwinder Ramirez, PT Physical Therapist          WOUND DEBRIDEMENT                  Therapy Treatment    Rehabilitation Treatment Summary     Row Name " 01/21/20 1000             Treatment Time/Intention    Discipline  physical therapist  -      Document Type  therapy note (daily note);wound care  -      Subjective Information  no complaints  -MF      Mode of Treatment  physical therapy  -      Recorded by [] Gurwinder Desai, PT 01/21/20 1438      Row Name 01/21/20 1000             Positioning and Restraints    Pre-Treatment Position  in bed  -MF      Post Treatment Position  bed  -MF      In Bed  supine;call light within reach  -MF      Recorded by [] Gurwinder Desai, PT 01/21/20 1438      Row Name 01/21/20 1000             Pain Assessment    Additional Documentation  Pain Scale: FACES Pre/Post-Treatment (Group)  -      Recorded by [] Gurwinder Desai, PT 01/21/20 1438      Row Name 01/21/20 1000             Pain Scale: FACES Pre/Post-Treatment    Pain: FACES Scale, Pretreatment  0-->no hurt  -MF      Pain: FACES Scale, Post-Treatment  0-->no hurt  -      Recorded by [] Gurwinder Desai, PT 01/21/20 1438      Row Name 01/21/20 1000             Coping    Observed Emotional State  calm;cooperative;pleasant  -      Verbalized Emotional State  acceptance  -MF      Recorded by [] Gurwinder Desai, PT 01/21/20 1438      Row Name 01/21/20 1000             Plan of Care Review    Plan of Care Reviewed With  patient  -MF      Recorded by [] Gurwinder Desai, PT 01/21/20 1438        User Key  (r) = Recorded By, (t) = Taken By, (c) = Cosigned By    Initials Name Effective Dates Discipline     Gurwinder Desai, PT 06/19/15 -  PT                PT Recommendation and Plan  Anticipated Discharge Disposition (PT): skilled nursing facility  Planned Therapy Interventions (PT Eval): balance training, bed mobility training, gait training, home exercise program, neuromuscular re-education, patient/family education, strengthening, stretching, transfer training  Therapy Frequency (PT Clinical Impression): daily               Outcome Summary: BLE  edema responding well to light compression with decreased edema and open ulcerations or weeping to LEs.  Plan of Care Reviewed With: patient    Outcome Measures     Row Name 01/20/20 0745             How much help from another is currently needed...    Putting on and taking off regular lower body clothing?  2  -AC      Bathing (including washing, rinsing, and drying)  2  -AC      Toileting (which includes using toilet bed pan or urinal)  2  -AC      Putting on and taking off regular upper body clothing  3  -AC      Taking care of personal grooming (such as brushing teeth)  3  -AC      Eating meals  4  -AC      AM-PAC 6 Clicks Score (OT)  16  -AC         Functional Assessment    Outcome Measure Options  AM-PAC 6 Clicks Daily Activity (OT)  -AC        User Key  (r) = Recorded By, (t) = Taken By, (c) = Cosigned By    Initials Name Provider Type    AC Madelin Harrison, OT Occupational Therapist              Time Calculation  PT Charges     Row Name 01/21/20 1225 01/21/20 1000          Time Calculation    Start Time  1144  -KM  1000  -     PT Received On  01/21/20  -  --     PT Goal Re-Cert Due Date  01/28/20  -KM  01/28/20  -        Time Calculation- PT    Total Timed Code Minutes- PT  23 minute(s)  -KM  --        Timed Charges    14126 - PT Therapeutic Exercise Minutes  8  -KM  --     76113 - Gait Training Minutes   15  -KM  --       User Key  (r) = Recorded By, (t) = Taken By, (c) = Cosigned By    Initials Name Provider Type     Gurwinder Desai, PT Physical Therapist     Pat Hernandez, PT Physical Therapist           Therapy Charges for Today     Code Description Service Date Service Provider Modifiers Qty    68751070682 HC PT MULTI LAYER COMP SYS BELOW KNEE 1/21/2020 Gurwinder Desai, PT GP 1            PT G-Codes  Outcome Measure Options: AM-PAC 6 Clicks Basic Mobility (PT)  AM-PAC 6 Clicks Score (PT): 18  AM-PAC 6 Clicks Score (OT): 16        Gurwinder Desai, PT  1/21/2020

## 2020-01-21 NOTE — PLAN OF CARE
Problem: Patient Care Overview  Goal: Plan of Care Review  Outcome: Ongoing (interventions implemented as appropriate)  Flowsheets (Taken 1/21/2020 1000)  Outcome Summary: BLE edema responding well to light compression with decreased edema and open ulcerations or weeping to LEs.

## 2020-01-21 NOTE — PROGRESS NOTES
Continued Stay Note  [facility]     Patient Name: Pat Morel  MRN: [id number]  Today's Date: 1/21/2020    Admit Date: 1/17/2020    Discharge Plan     Row Name 01/21/20 1514       Plan    Plan  discharge plan.    Patient/Family in Agreement with Plan  yes    Plan Comments  Plans remains home with family and [facility].  CM will notify [facility] at discharge. CM will cont to follow    Final Discharge Disposition Code  06 - home with home health care        Discharge Codes    No documentation.       Expected Discharge Date and Time     Expected Discharge Date Expected Discharge Time    Jan 23, 2020             Kami Fletcher RN

## 2020-01-22 PROBLEM — I50.33 ACUTE ON CHRONIC DIASTOLIC (CONGESTIVE) HEART FAILURE (HCC): Status: RESOLVED | Noted: 2019-01-01 | Resolved: 2020-01-01

## 2020-01-22 PROBLEM — E87.70 VOLUME OVERLOAD: Status: RESOLVED | Noted: 2019-12-09 | Resolved: 2020-01-01

## 2020-01-22 NOTE — DISCHARGE SUMMARY
[facility] Medicine Services  DISCHARGE SUMMARY    Patient Name: Pat Morel  : 1940  MRN: [id number]    Date of Admission: 2020 12:46 PM  Date of Discharge:  2020  Primary Care Physician: Anthony Sweet DO    Consults     Date and Time Order Name Status Description    2020 0030 Inpatient Pulmonology Consult Completed     2020 1604 Inpatient Nephrology Consult Completed     2019 0821 Inpatient Cardiology Consult Completed     2019 1829 Inpatient Nephrology Consult Completed           Hospital Course     Presenting Problem:   Volume overload [E87.70]  Diastolic heart failure (CMS/HCC) [I50.30]    Active Hospital Problems    Diagnosis  POA   • Diastolic heart failure (CMS/HCC) [I50.30]  Yes   • Dyslipidemia [E78.5]  Yes   • Chronic kidney disease, stage 3 (CMS/HCC) [N18.3]  Yes   • Iron deficiency anemia [D50.9]  Yes   • GERD without esophagitis [K21.9]  Yes   • Acquired hypothyroidism [E03.9]  Yes   • Hypersensitivity pneumonitis (CMS/HCC) [J67.9]  Yes   • Pulmonary arterial hypertension (CMS/HCC) [I27.21]  Yes   • Persistent atrial fibrillation [I48.19]  Yes   • Coronary artery disease involving native coronary artery of native heart without angina pectoris [I25.10]  Yes   • Essential hypertension [I10]  Yes      Resolved Hospital Problems    Diagnosis Date Resolved POA   • Acute on chronic diastolic (congestive) heart failure (CMS/HCC) [I50.33] 2020 Yes   • Volume overload [E87.70] 2020 Yes          Hospital Course:  Pat Morel is a 80 y.o. female with history of CAD status post CABG, atrial fibrillation, CKD stage IV, diastolic heart failure, iron deficiency anemia, interstitial lung disease and hypersensitivity pneumonitis with recent admission to [facility] for acute on chronic hypoxic respiratory failure, RSV bronchiolitis and acute kidney injury.  During that hospitalization, the patient was given empiric  antibiotics, gently diuresed and started on a 6-week prednisone taper starting at 60 mg/day.  The patient presents from her transitional care follow-up at 1 week post discharge with increased lower extremity edema and worsening shortness of breath.  She has also had at least 2 falls at home over the past week.  Ms. Morel has been hospitalized several times over the past 4 months for volume overload, and she is concerned that her current high-dose prednisone taper has contributed to her fluid retention.    Acute on chronic hypoxia  Acute on chronic diastolic heart failure  -Suspect secondary to changes in home diuretic dose (lowered while recently hospitalized) plus possible fluid retention secondary to high dose prednisone use.  -BNP significantly elevated 23,000.  Troponins negative  -Patient improved with IV diuresis.  Now on baseline 2.5 L of oxygen.  -Reviewed chest x-ray, no active airspace disease.  -Echo from October 2019 shows an EF of greater than 70%, mild MR and moderate TR  -Heart failure clinic navigator has seen in consult, plan to follow-up in 3-5 days after DC.  -Continue Unna boots per PT  -Continue oral Bumex 2mg BID with K+ replacement  -Diuresed well yesterday (1450 mL)   -Hold off on adding Spironolactone due to CKD     Hypersensitivity pneumonitis  -recently diagnosed on the basis of clinical and CT scan findings, as well as prior history of hypersensitivity pneumonitis.   -Discussed with Dr. Keen 1/21, decrease Prednisone to 40mg daily and patient needs to follow-up in Pulmonology clinic in 1 week.   - Continue duonebs, pulmicort     CKD IV  - Cr at baseline 1.6-2.0.       Recent RSV bronchiolitis     Atrial fibrillation  -Rate controlled with Bisoprolol   -Continue apixaban     Anemia of chronic disease  -H&H stable  -Iron low, increased Ferrous Sulfate to BID with meals      Group 3 pulmonary hypertension     CAD  - h/o CABG     Generalized weakness  - PT/OT have evaluated, recommends  SNF. Pt states she prefers HHPT.     Discharge Follow Up Recommendations for outpatient labs/diagnostics:  -Follow-up with PCP Dr. Sweet in 1 week  -Follow-up with Cardiologist, Dr. Higgins, as scheduled 1/24/2020  -Follow-up with Dr. Laura/Pulmonology clinic in 1 week   -Follow-up with Heart and Valve clinic in 3-5 days    Day of Discharge     HPI:   Pt seen and examined. Nursing notes reviewed. No acute events overnight. Pt in good spirits today, states she is ready to go home. States her breathing is at baseline and she can now see her feet.    Review of Systems  Gen- No fevers, chills  CV- No chest pain, palpitations  Resp- No cough, dyspnea  GI- No N/V/D, abd pain    Vital Signs:   Temp:  [97.5 °F (36.4 °C)-97.6 °F (36.4 °C)] 97.6 °F (36.4 °C)  Heart Rate:  [] 107  Resp:  [16-18] 16  BP: (122-152)/(77-95) 152/95     Physical Exam:  Constitutional: No acute distress, awake, alert  HENT: NCAT, mucous membranes moist  Respiratory: Clear to auscultation bilaterally, respiratory effort normal   Cardiovascular: IRR, +murmurs, No rubs or gallops, palpable pedal pulses bilaterally  Gastrointestinal: Positive bowel sounds, soft, nontender, nondistended  Musculoskeletal: Trace bilateral ankle edema  Psychiatric: Appropriate affect, cooperative  Neurologic: Oriented x 3, strength symmetric in all extremities, Cranial Nerves grossly intact to confrontation, speech clear  Skin: No rashes    Pertinent  and/or Most Recent Results     Results from last 7 days   Lab Units 01/21/20  0501 01/20/20  0421 01/19/20  0418 01/18/20  0440 01/17/20  1509   WBC 10*3/mm3 9.11 9.58  --  8.87 11.22*   HEMOGLOBIN g/dL 8.1* 7.6*  --  7.9* 7.9*   HEMATOCRIT % 27.6* 25.4*  --  28.2* 26.8*   PLATELETS 10*3/mm3 190 231  --  253 303   SODIUM mmol/L 137 136 137 135* 137   POTASSIUM mmol/L 4.7 4.7 4.6 4.9 4.5   CHLORIDE mmol/L 99 99 99 101 101   CO2 mmol/L 24.0 25.0 25.0 19.0* 23.0   BUN mg/dL 43* 46* 46* 45* 46*   CREATININE mg/dL 1.50*  1.86* 1.87* 1.58* 1.75*   GLUCOSE mg/dL 138* 184* 191* 158* 224*   CALCIUM mg/dL 8.3* 8.7 8.4* 8.4* 8.8     Results from last 7 days   Lab Units 01/17/20  1509   BILIRUBIN mg/dL 1.3*   ALK PHOS U/L 180*   ALT (SGPT) U/L 54*   AST (SGOT) U/L 42*   PROTIME Seconds 19.3*   INR  1.71*           Invalid input(s): TG, LDLCALC, LDLREALC  Results from last 7 days   Lab Units 01/18/20  1136 01/17/20  1509   PROBNP pg/mL  --  23,152.0*   TROPONIN T ng/mL 0.025 0.024   PROCALCITONIN ng/mL  --  0.04*       Brief Urine Lab Results  (Last result in the past 365 days)      Color   Clarity   Blood   Leuk Est   Nitrite   Protein   CREAT   Urine HCG        01/09/20 0759             67.7       01/09/20 0759 Yellow Clear Negative Negative Negative Trace               Microbiology Results Abnormal     None          Imaging Results (All)     Procedure Component Value Units Date/Time    XR Chest 1 View [[id number]] Collected:  01/17/20 1505     Updated:  01/18/20 1653    Narrative:       EXAMINATION: XR CHEST 1 VW- 01/17/2020     INDICATION: shortness of breath      COMPARISON: Chest radiograph 12/09/2019      FINDINGS: Single portable chest radiograph was submitted for review.      Post surgical changes to the heart and chest are identified consistent  with prior CABG procedure. Heart is top normal in size with pulmonary  vascular congestion again noted and similar to prior. Coarsened  interstitial lung changes are identified. No evidence of active airspace  disease is appreciated.           Impression:       1. Postsurgical changes to the heart and chest identified without  evidence for active airspace disease.     2. Moderate hiatal hernia suggested.     D:  01/17/2020  E:  01/17/2020     This report was finalized on 1/18/2020 4:50 PM by Dr. Franki Rutherford MD.                       Results for orders placed during the hospital encounter of 10/13/19   Adult Transthoracic Echo Complete W/ Cont if Necessary Per Protocol    Narrative ·  The left atrium is enlarged.  · Global and segmental LV wall motion is normal. The estimated LV ejection   fraction is >70%.  · The aortic valve appears to be mildly sclerotic. AS and AI are absent.  · There is mild-moderate mitral regurgitation.  · There is moderate tricuspid regurgitation with a moderate elevation is   estimated PA pressure (45-55 mmHg)  · There are no other important findings on this study.          Plan for Follow-up of Pending Labs/Results: PCP    Discharge Details        Discharge Medications      New Medications      Instructions Start Date   bumetanide 2 MG tablet  Commonly known as:  BUMEX   2 mg, Oral, 2 Times Daily      ferrous sulfate 325 (65 FE) MG tablet  Replaces:  ferrous gluconate 324 MG tablet   325 mg, Oral, 2 Times Daily With Meals      potassium chloride 10 MEQ CR capsule  Commonly known as:  MICRO-K  Replaces:  potassium chloride 10 MEQ CR tablet   20 mEq, Oral, Daily   Start Date:  January 23, 2020     predniSONE 20 MG tablet  Commonly known as:  DELTASONE   40 mg, Oral, Daily With Breakfast   Start Date:  January 23, 2020        Changes to Medications      Instructions Start Date   bisoprolol 5 MG tablet  Commonly known as:  ZEBeta  What changed:    · medication strength  · how much to take   5 mg, Oral, 2 Times Daily         Continue These Medications      Instructions Start Date   apixaban 2.5 MG tablet tablet  Commonly known as:  ELIQUIS   2.5 mg, Oral, 2 Times Daily      atorvastatin 40 MG tablet  Commonly known as:  LIPITOR   40 mg, Oral, Nightly      benzonatate 200 MG capsule  Commonly known as:  TESSALON   200 mg, Oral, 3 Times Daily PRN      budesonide 0.5 MG/2ML nebulizer solution  Commonly known as:  PULMICORT   0.5 mg, Nebulization, 2 Times Daily - RT      ipratropium-albuterol 0.5-2.5 mg/3 ml nebulizer  Commonly known as:  DUO-NEB   3 mL, Nebulization, Every 4 Hours PRN      isosorbide mononitrate 120 MG 24 hr tablet  Commonly known as:  IMDUR   60 mg, Oral,  Daily      levothyroxine 75 MCG tablet  Commonly known as:  SYNTHROID, LEVOTHROID   75 mcg, Oral, Daily      nitroglycerin 0.4 MG SL tablet  Commonly known as:  NITROSTAT   0.4 mg, Sublingual, Every 5 Minutes PRN      pantoprazole 40 MG EC tablet  Commonly known as:  PROTONIX   40 mg, Oral, Daily      Vitamin D (Cholecalciferol) 50 MCG (2000 UT) capsule   1 capsule, Oral, Daily         Stop These Medications    ferrous gluconate 324 MG tablet  Commonly known as:  FERGON  Replaced by:  ferrous sulfate 325 (65 FE) MG tablet     furosemide 40 MG tablet  Commonly known as:  LASIX     potassium chloride 10 MEQ CR tablet  Commonly known as:  K-DUR  Replaced by:  potassium chloride 10 MEQ CR capsule     spironolactone 25 MG tablet  Commonly known as:  ALDACTONE            Allergies   Allergen Reactions   • Tuberculin Tests Rash         Discharge Disposition:  Home-Health Care [facility]    Diet:  Hospital:  Diet Order   Procedures   • Diet Regular; Cardiac, Renal, Low Sodium; 2,000 mg Na       Activity: As Tolerated      Restrictions or Other Recommendations:  -Pt to check weight daily, call PCP for increase >5 pounds        CODE STATUS:    Code Status and Medical Interventions:   Ordered at: 01/17/20 1446     Limited Support to NOT Include:    Intubation     Level Of Support Discussed With:    Patient     Code Status:    No CPR     Medical Interventions (Level of Support Prior to Arrest):    Limited       Future Appointments   Date Time Provider Department Center   1/24/2020 10:00 AM Yomi Higgins MD [facility]   3/16/2020  2:30 PM [facility] None   3/16/2020  3:30 PM Sri Garcia MD [facility] None       Additional Instructions for the Follow-ups that You Need to Schedule     Discharge Follow-up with PCP   As directed       Currently Documented PCP:    Anthony Sweet DO    PCP Phone Number:    [phone]     Follow Up Details:  1 week         Discharge Follow-up with  Specified Provider: Follow-up in Pulmonology Clinic with Dr. Jeff Laura or AYANNA in Pulmonology clinic; 1 Week   As directed      To:  Follow-up in Pulmonology Clinic with Dr. Jeff Laura or AYANNA in Pulmonology clinic    Follow Up:  1 Week         Discharge Follow-up with Specified Provider: Follow-up with Heart and Valve Clinic in 3-5 days   As directed      To:  Follow-up with Heart and Valve Clinic in 3-5 days         Discharge Follow-up with Specified Provider: Keep follow-up with Cardiologist Dr. Higgins on 1/24/2020   As directed      To:  Keep follow-up with Cardiologist Dr. Higgins on 1/24/2020               Time Spent on Discharge:  50 minutes, 30 minute spent face to face with patient/counseling, remaining time spent coordinating care.     Electronically signed by Jessica Silva DO, 01/22/20, 12:11 PM.

## 2020-01-22 NOTE — PLAN OF CARE
Problem: Patient Care Overview  Goal: Plan of Care Review  Flowsheets (Taken 1/22/2020 1115)  Plan of Care Reviewed With: patient  Outcome Summary: Pt required supervision with bed mobility,  supervision STS.  supervision to ambulate 60 ft with RW.  Pt required min a to don/doff back gown, dependent to don/doff socks  Pt issued UE HEP and  and completed 15 rep,   Pt with good progress toward OT goals, but limited by decreased activity tolerance.

## 2020-01-22 NOTE — PROGRESS NOTES
Case Management Discharge Note      Final Note: Plan is home with family and HH with [facility].  CM notified Valentin at [facility] of pt's discharge today and HH will be in contact with pt to arrange HH visits.          Destination      No service has been selected for the patient.      Durable Medical Equipment      No service has been selected for the patient.      Dialysis/Infusion      No service has been selected for the patient.      Home Medical Care - Selection Complete      Service Provider Request Status Selected Services Address Phone Number Fax Number    [facility] Selected Home Health Services [address] [facility] [id number] [phone]      Therapy      No service has been selected for the patient.      Community Resources      No service has been selected for the patient.             Final Discharge Disposition Code: 06 - home with home health care

## 2020-01-22 NOTE — THERAPY TREATMENT NOTE
Acute Care - Occupational Therapy Treatment Note  [facility]     Patient Name: Pat Morel  : 1940  MRN: [id number]  Today's Date: 2020     Date of Referral to OT: 20  Referring Physician: MD Mayco    Admit Date: 2020     No diagnosis found.  Patient Active Problem List   Diagnosis   • Persistent atrial fibrillation   • Coronary artery disease involving native coronary artery of native heart without angina pectoris   • Essential hypertension   • Cerebrovascular accident (CVA) due to embolism (CMS/HCC)   • Dyspnea on exertion   • Pulmonary arterial hypertension (CMS/HCC)   • Interstitial lung disease (CMS/HCC)   • Hypersensitivity pneumonitis (CMS/HCC)   • Gastric ulcer   • Coagulation disorder due to circulating anticoagulants (CMS/HCC)   • Dyslipidemia   • Acquired hypothyroidism   • Chronic kidney disease, stage 3 (CMS/HCC)   • GERD without esophagitis   • Iron deficiency anemia   • Acute on chronic diastolic congestive heart failure (CMS/HCC)   • Pneumonia of both lungs due to infectious organism   • Acute respiratory failure with hypoxia (CMS/HCC)   • Diastolic heart failure (CMS/HCC)     Past Medical History:   Diagnosis Date   • Acute on chronic diastolic congestive heart failure (CMS/HCC) 2019   • Anemia    • Angina at rest (CMS/HCC)    • Arthritis    • Atrial fibrillation (CMS/HCC)    • Coronary artery disease    • Disease of thyroid gland    • Elevated cholesterol    • GERD without esophagitis 2019   • History of blood transfusion    • History of colonoscopy 2019   • History of melena 2019   • History of stomach ulcers    • Hypertension    • Impaired functional mobility, balance, gait, and endurance    • Osteoporosis    • Positive TB test    • Stroke (CMS/HCC)      Past Surgical History:   Procedure Laterality Date   • BRONCHOSCOPY Bilateral 2019    Procedure: BRONCHOSCOPY;  Surgeon: Jeff Laura MD;  Location: [facility] ENDOSCOPY;   Service: Pulmonary   • BYPASS GRAFT     • COLONOSCOPY N/A 10/15/2019    Procedure: COLONOSCOPY;  Surgeon: Fredi Aaron MD;  Location: [facility] ENDOSCOPY;  Service: Gastroenterology   • CORONARY ANGIOPLASTY WITH STENT PLACEMENT     • CORONARY ARTERY BYPASS GRAFT  10/18/2010   • ENDOSCOPY N/A 10/14/2019    Procedure: ESOPHAGOGASTRODUODENOSCOPY;  Surgeon: Fredi Aaron MD;  Location: [facility] ENDOSCOPY;  Service: Gastroenterology   • HYSTERECTOMY     • STOMACH SURGERY  08/11/2019    Upper GI bleed    • STOMACH SURGERY  10/13/2019       Therapy Treatment    Rehabilitation Treatment Summary     Row Name 01/22/20 1115             Treatment Time/Intention    Discipline  occupational therapist  -AC      Document Type  therapy note (daily note)  -AC      Patient Effort  good  -AC      Existing Precautions/Restrictions  fall;oxygen therapy device and L/min  -AC      Recorded by [AC] Madelin Harrison, OT 01/22/20 1200      Row Name 01/22/20 1115             Vital Signs    Intra SpO2 (%)  93  -AC      O2 Delivery Intra Treatment  supplemental O2  -AC      Post SpO2 (%)  98  -AC      O2 Delivery Post Treatment  supplemental O2  -AC      Pre Patient Position  Supine  -AC      Intra Patient Position  Standing  -AC      Post Patient Position  Sitting  -AC      Rest Breaks   3  -AC      Recorded by [AC] Madelin Harrison, OT 01/22/20 1200      Row Name 01/22/20 1115             Cognitive Assessment/Intervention    Additional Documentation  Cognitive Assessment/Intervention (Group)  -AC      Recorded by [AC] Madelin Harrison, OT 01/22/20 1200      Row Name 01/22/20 1115             Cognitive Assessment/Intervention- PT/OT    Orientation Status (Cognition)  oriented x 4  -AC      Follows Commands (Cognition)  WNL  -AC      Recorded by [AC] Madelin Harrison, OT 01/22/20 1200      Row Name 01/22/20 1115             Safety Issues, Functional Mobility    Safety Issues Affecting Function (Mobility)  safety precaution awareness;safety precautions  follow-through/compliance  -AC      Impairments Affecting Function (Mobility)  balance;endurance/activity tolerance;postural/trunk control;strength  -AC      Recorded by [AC] Madelin Harrison, OT 01/22/20 1200      Row Name 01/22/20 1115             Bed Mobility Assessment/Treatment    Bed Mobility Assessment/Treatment  supine-sit;sit-supine  -AC      Supine-Sit [place] (Bed Mobility)  conditional independence  -AC      Assistive Device (Bed Mobility)  bed rails;head of bed elevated  -AC      Recorded by [AC] Madelin Harrison, OT 01/22/20 1200      Row Name 01/22/20 1115             Functional Mobility    Functional Mobility- Ind. Level  supervision required  -AC      Functional Mobility- Device  rolling walker  -AC      Functional Mobility-Distance (Feet)  60  -AC      Recorded by [AC] Madelin Harrison, OT 01/22/20 1200      Row Name 01/22/20 1115             Transfer Assessment/Treatment    Transfer Assessment/Treatment  sit-stand transfer;stand-sit transfer  -AC      Recorded by [AC] Madelin Harrison, OT 01/22/20 1200      Row Name 01/22/20 1115             Sit-Stand Transfer    Sit-Stand [place] (Transfers)  verbal cues;supervision  -AC      Assistive Device (Sit-Stand Transfers)  walker, front-wheeled  -AC      Recorded by [AC] Madelin Harrison, OT 01/22/20 1200      Row Name 01/22/20 1115             Stand-Sit Transfer    Stand-Sit [place] (Transfers)  supervision  -AC      Assistive Device (Stand-Sit Transfers)  walker, front-wheeled  -AC      Recorded by [AC] Madelin Harrison, OT 01/22/20 1200      Row Name 01/22/20 1115             ADL Assessment/Intervention    BADL Assessment/Intervention  lower body dressing;grooming;upper body dressing  -AC      Recorded by [AC] Madelin Harrison, OT 01/22/20 1200      Row Name 01/22/20 1115             Upper Body Dressing Assessment/Training    Upper Body Dressing [place] Level  doff;don;minimum assist (75% patient effort)  -AC      Recorded by [AC] Madelin Harrison  KIKI, OT 01/22/20 1200      Row Name 01/22/20 1115             Lower Body Dressing Assessment/Training    Lower Body Dressing [place] Level  doff;don;socks;dependent (less than 25% patient effort)  -AC      Lower Body Dressing Position  unsupported sitting  -AC      Comment (Lower Body Dressing)  spouse assists at home  -AC      Recorded by [AC] Madelin Harrison, OT 01/22/20 1200      Row Name 01/22/20 1115             Grooming Assessment/Training    Comment (Grooming)  pt declined to attempt  -AC      Recorded by [AC] Madelin Harrison, OT 01/22/20 1200      Row Name 01/22/20 1115             BADL Safety/Performance    Impairments, BADL Safety/Performance  balance;endurance/activity tolerance;strength;trunk/postural control  -AC      Recorded by [AC] Madelin Harrison, OT 01/22/20 1200      Row Name 01/22/20 1115             Motor Skills Assessment/Interventions    Additional Documentation  Therapeutic Exercise (Group);Therapeutic Exercise Interventions (Group)  -AC      Recorded by [AC] Madelin Harrison, OT 01/22/20 1200      Row Name 01/22/20 1115             Therapeutic Exercise    87713 - OT Therapeutic Exercise Minutes  8  -AC      64094 - OT Therapeutic Activity Minutes  15  -AC      Recorded by [AC] Madelin Harrison, OT 01/22/20 1200      Row Name 01/22/20 1115             Therapeutic Exercise    Upper Extremity Range of Motion (Therapeutic Exercise)  shoulder flexion/extension, bilateral;shoulder horizontal abduction/adduction, bilateral;elbow flexion/extension, bilateral  -AC      Exercise Type (Therapeutic Exercise)  AROM (active range of motion)  -AC      Position (Therapeutic Exercise)  seated  -AC      Sets/Reps (Therapeutic Exercise)  1/15  -AC      Comment (Therapeutic Exercise)  issued written UE HEP  -AC      Recorded by [AC] Madelin Harrison, OT 01/22/20 1200      Row Name 01/22/20 1115             Positioning and Restraints    Pre-Treatment Position  in bed  -AC      Post Treatment Position  chair  -AC       In Chair  reclined;call light within reach;encouraged to call for assist;exit alarm on  -AC      Recorded by [AC] HarrisonMadelin, OT 01/22/20 1200      Row Name 01/22/20 1115             Pain Assessment    Additional Documentation  Pain Scale: Numbers Pre/Post-Treatment (Group)  -AC      Recorded by [AC] Harrison Madelin KIKI, OT 01/22/20 1200      Row Name 01/22/20 1115             Pain Scale: Numbers Pre/Post-Treatment    Pain Scale: Numbers, Pretreatment  0/10 - no pain  -AC      Pain Scale: Numbers, Post-Treatment  0/10 - no pain  -AC      Recorded by [AC] Madelin Harrison, OT 01/22/20 1200      Row Name 01/22/20 1115             Plan of Care Review    Plan of Care Reviewed With  patient  -AC      Progress  improving  -AC      Outcome Summary  Pt required supervision with bed mobiltiy,  supervision STS.  supervision to ambulate 60 ft with RW.  Pt required min a to don/doff back gown, dependent to don/doff socks.  Pt with good progress toward OT goals, but limited by decreased activity tolerance.   -AC      Recorded by [AC] Madelin Harrison, OT 01/22/20 1200        User Key  (r) = Recorded By, (t) = Taken By, (c) = Cosigned By    Initials Name Effective Dates Discipline     Madelin Harrison, OT 06/23/15 -  OT                 OT Recommendation and Plan  Outcome Summary/Treatment Plan (OT)  Anticipated Discharge Disposition (OT): home with assist, home with home health(recommend SNF, pt prefers home with assist and HHOT)  Planned Therapy Interventions (OT Eval): activity tolerance training, adaptive equipment training, BADL retraining, functional balance retraining, occupation/activity based interventions, patient/caregiver education/training, strengthening exercise, transfer/mobility retraining  Therapy Frequency (OT Eval): daily  Plan of Care Review  Plan of Care Reviewed With: patient  Plan of Care Reviewed With: patient  Outcome Summary: Pt required supervision with bed mobiltiy,  supervision STS.  supervision to ambulate  60 ft with RW.  Pt required min a to don/doff back gown, dependent to don/doff socks.  Pt with good progress toward OT goals, but limited by decreased activity tolerance.   Outcome Measures     Row Name 01/22/20 1115 01/20/20 0745          How much help from another is currently needed...    Putting on and taking off regular lower body clothing?  2  -AC  2  -AC     Bathing (including washing, rinsing, and drying)  2  -AC  2  -AC     Toileting (which includes using toilet bed pan or urinal)  3  -AC  2  -AC     Putting on and taking off regular upper body clothing  3  -AC  3  -AC     Taking care of personal grooming (such as brushing teeth)  3  -AC  3  -AC     Eating meals  4  -AC  4  -AC     AM-PAC 6 Clicks Score (OT)  17  -AC  16  -AC        Functional Assessment    Outcome Measure Options  AM-PAC 6 Clicks Daily Activity (OT)  -AC  AM-PAC 6 Clicks Daily Activity (OT)  -AC       User Key  (r) = Recorded By, (t) = Taken By, (c) = Cosigned By    Initials Name Provider Type    Madelin Madrid, OT Occupational Therapist           Time Calculation:   Time Calculation- OT     Row Name 01/22/20 1115             Time Calculation- OT    OT Start Time  1115  -AC      OT Received On  01/22/20  -      OT Goal Re-Cert Due Date  01/30/20  -         Timed Charges    55838 - OT Therapeutic Exercise Minutes  8  -AC      67576 - OT Therapeutic Activity Minutes  15  -AC        User Key  (r) = Recorded By, (t) = Taken By, (c) = Cosigned By    Initials Name Provider Type    Madelin Madrid, OT Occupational Therapist        Therapy Charges for Today     Code Description Service Date Service Provider Modifiers Qty    34112719926  OT THER PROC EA 15 MIN 1/22/2020 Madelin Harrison OT GO 1    34881534872 HC OT THERAPEUTIC ACT EA 15 MIN 1/22/2020 Madelin Harrison OT GO 1               Madelin Harrison OT  1/22/2020

## 2020-01-23 NOTE — OUTREACH NOTE
TCM call completed.  Patient says she is doing really well and the HH nurse was at her house during call.   Patient states that the HH nurse was able to help her go through her new medications and get them organized.  Reviewed signs and symptoms of when to call MD or go to ER.  Reviewed follow up appts, will see Dr. Sweet on 1/30 at 10:45 am.  No questions or concerns at this time.

## 2020-01-23 NOTE — OUTREACH NOTE
Prep Survey      Responses   Facility patient discharged from?  [facility]   Is patient eligible?  Yes   Discharge diagnosis  Acute CHF   Does the patient have one of the following disease processes/diagnoses(primary or secondary)?  CHF   Does the patient have Home health ordered?  Yes   What is the Home health agency?   [facility]   Is there a DME ordered?  No   Medication alerts for this patient  lasix, aldactone, imdur   Prep survey completed?  Yes          Bernadette Sy RN

## 2020-01-23 NOTE — OUTREACH NOTE
COPD/PN Week 2 Survey      Responses   Facility patient discharged from?  [place]   Does the patient have one of the following disease processes/diagnoses(primary or secondary)?  COPD/Pneumonia   Week 2 attempt successful?  No   Revoke  Readmitted          Ivy Guzman RN

## 2020-01-24 NOTE — PROGRESS NOTES
[facility] Cardiology Office Follow Up Note    Pat Morel  [id number]  01/24/2020    Primary Care Provider: Anthony Sweet DO     Chief Complaint: Hospital follow-up    History of Present Illness:   Mrs. Pat Morel is a 79 y.o. female who presents to the Cardiology Clinic for follow-up after recent hospitalization.  The patient has a past medical history which includes hypertension, prior CVA, history of GI bleeding in the setting of peptic ulcer disease, and stage III chronic kidney disease.  She also has a history of pulmonary artery hypertension and interstitial lung disease possibly secondary to hypersensitivity pneumonitis.  Her cardiac history is significant for chronic diastolic heart failure and persistent atrial fibrillation, with a history of prior cardioversion.  She is previously followed with electrophysiology in [facility], who recommended consideration of initiating Tikosyn given the patient's underlying pulmonary issues.  The patient, however, ultimately decided not to pursue Tikosyn initiation.  Her cardiac history is also significant for coronary artery disease, status post three-vessel CABG.  She reports coronary angiography 1 to 2 years ago, at which time her bypass grafts were found to be patent.    Upon recent evaluation in the cardiology clinic, the patient was found to be significantly volume overloaded.  A proBNP was also found to be severely elevated.  Given poor urinary response to oral Bumex, she was subsequently admitted for inpatient IV diuresis.  Following IV diuresis, she was transitioned back to oral diuresis and subsequently discharged home.  Return home, the patient notes continued improvement in her lower extremity edema as well as her shortness of breath.  She continues to have adequate urine response to oral diuresis.  She does, however, complain of ongoing exertional chest discomfort.  The episodes of chest discomfort appeared  "to be worse during episodes of volume overload.  She notes that with minimal ambulation around her house she experiences a \"chest tightness.\"  This is a similar discomfort that led to her last coronary angiography, at which time her bypass grafts were reportedly widely patent.  She denies any episodes of chest pain or chest discomfort while at rest.  She denies significant orthopnea.  She notes continued improvement in her daily weights with oral Bumex.  No episodes of palpitations, presyncopal, or syncopal events.  No other complaints at this time.      Past Cardiac Testin. Last Coronary Angio: [place] years ago, report unavailable  2. Prior Stress Testing: None  3. Last Echo: 10/16/2019  · The left atrium is enlarged.  · Global and segmental LV wall motion is normal. The estimated LV ejection fraction is >70%.  · The aortic valve appears to be mildly sclerotic. AS and AI are absent.  · There is mild-moderate mitral regurgitation.  · There is moderate tricuspid regurgitation with a moderate elevation is estimated PA pressure (45-55 mmHg)  · There are no other important findings on this study.  4. Prior Holter Monitor: 7-day Holter monitor 2018              1.  100% atrial fibrillation    Review of Systems:   Review of Systems   Constitutional: Negative for activity change, appetite change, chills, diaphoresis, fatigue, fever, unexpected weight gain and unexpected weight loss.   Respiratory: Positive for chest tightness and shortness of breath. Negative for cough and wheezing.    Cardiovascular: Positive for chest pain and leg swelling. Negative for palpitations.   Gastrointestinal: Negative for abdominal pain, anal bleeding, blood in stool and GERD.   Endocrine: Negative for cold intolerance and heat intolerance.   Genitourinary: Negative for hematuria.   Neurological: Negative for dizziness, syncope, weakness and light-headedness.   Hematological: Does not bruise/bleed easily.   Psychiatric/Behavioral: " Negative for depressed mood and stress. The patient is not nervous/anxious.        I have reviewed and/or updated the patient's past medical, past surgical, family, social history, problem list and allergies as appropriate.     Medications:     Current Outpatient Medications:   •  apixaban (ELIQUIS) 2.5 MG tablet tablet, Take 2.5 mg by mouth 2 (Two) Times a Day., Disp: , Rfl:   •  atorvastatin (LIPITOR) 40 MG tablet, Take 1 tablet by mouth Every Night., Disp: 90 tablet, Rfl: 3  •  bisoprolol (ZEBeta) 5 MG tablet, Take 1 tablet by mouth 2 (Two) Times a Day., Disp: 60 tablet, Rfl: 0  •  budesonide (PULMICORT) 0.5 MG/2ML nebulizer solution, Inhale contents of 1 vial through a nebulizer 2 (Two) Times a Day, Disp: 120 mL, Rfl: 0  •  bumetanide (BUMEX) 2 MG tablet, Take 1 tablet by mouth 2 (Two) Times a Day., Disp: 60 tablet, Rfl: 0  •  ferrous sulfate 325 (65 FE) MG tablet, Take 1 tablet by mouth 2 (Two) Times a Day With Meals., Disp: 60 tablet, Rfl: 0  •  ipratropium-albuterol (DUO-NEB) 0.5-2.5 mg/3 ml nebulizer, Take 3 mL by nebulization Every 4 (Four) Hours As Needed for Wheezing or Shortness of Air (cough)., Disp: 120 mL, Rfl: 0  •  isosorbide mononitrate (IMDUR) 120 MG 24 hr tablet, Take 0.5 tablets by mouth Daily., Disp: , Rfl:   •  levothyroxine (SYNTHROID, LEVOTHROID) 75 MCG tablet, Take 75 mcg by mouth Daily., Disp: , Rfl:   •  nitroglycerin (NITROSTAT) 0.4 MG SL tablet, Place 0.4 mg under the tongue Every 5 (Five) Minutes As Needed., Disp: , Rfl:   •  pantoprazole (PROTONIX) 40 MG EC tablet, Take 1 tablet by mouth Daily., Disp: 60 tablet, Rfl: 3  •  potassium chloride (MICRO-K) 10 MEQ CR capsule, Take 2 capsules by mouth Daily., Disp: 30 capsule, Rfl: 0  •  predniSONE (DELTASONE) 20 MG tablet, Take 2 tablets by mouth Daily With Breakfast., Disp: 28 tablet, Rfl: 0  •  Vitamin D, Cholecalciferol, 50 MCG (2000 UT) capsule, Take 1 capsule by mouth Daily., Disp: , Rfl:   •  benzonatate (TESSALON) 200 MG capsule,  "Take 200 mg by mouth 3 (Three) Times a Day As Needed for Cough., Disp: , Rfl:     Physical Exam:  Vital Signs:   Vitals:    01/24/20 1003 01/24/20 1012   BP: 112/60    BP Location: Left arm    Patient Position: Sitting    Cuff Size: Adult    Pulse: 75    SpO2: (!) 78%  Comment: on room air 98%  Comment: on in office oxygen. set at 3 L   Weight: 66.7 kg (147 lb)    Height: 147.3 cm (57.99\")        Physical Exam   Constitutional: She is oriented to person, place, and time. She appears well-developed.   Sitting in wheelchair in no acute distress.   HENT:   Head: Normocephalic and atraumatic.   Moist Mucous Membranes.    Eyes: Pupils are equal, round, and reactive to light. EOM are normal. No scleral icterus.   Neck: No tracheal deviation present.   Cardiovascular: Normal rate, normal heart sounds and intact distal pulses. Exam reveals no gallop and no friction rub.   No murmur heard.  Irregularly irregular rhythm.  1+ bilateral lower extremity pitting edema.     Pulmonary/Chest: Effort normal. No stridor. No respiratory distress. She has no wheezes. She exhibits no tenderness.   On supplemental O2.  Diminished breath sounds at bilateral bases.  Few scattered crackles.   Abdominal: Soft. Bowel sounds are normal. She exhibits no distension. There is no tenderness. There is no rebound and no guarding.   Musculoskeletal: Normal range of motion. She exhibits edema.   Lymphadenopathy:     She has no cervical adenopathy.   Neurological: She is alert and oriented to person, place, and time.   Skin: Skin is warm and dry. She is not diaphoretic. No erythema.   Psychiatric: She has a normal mood and affect. Her behavior is normal.       Results Review:   I reviewed the patient's new clinical results.        Assessment / Plan:     1.  Acute on chronic diastolic heart failure   --Echocardiogram was grade 2 diastolic dysfunction with left atrial enlargement and elevated left atrial pressures  --Recent hospitalization for volume " overload, improved with IV diuresis  --Volume status continues to improve with oral diuresis as an outpatient  --Will continue twice daily Bumex with potassium supplementation  --BP is currently controlled   --Advised to continue monitoring daily weights     2.  Multivessel coronary artery disease  --History of three-vessel CABG 2012 including LIMA-LAD, as SVG- PL and PDA  --Last coronary angiography in 2017 with patent grafts at that time  --Currently complains of exertional chest discomfort, possibly chronic angina however in the setting of volume overload  --Symptoms appear to be improving with diuresis, and will defer further ischemic evaluation for now  --Continue isosorbide and bisoprolol as antianginals  --If symptoms persist despite continued improvement in volume status, would consider optimization of antianginals and possible repeat ischemic evaluation at that time     3.  Persistent atrial fibrillation  --History of prior cardioversion however no plans for further attempts at restoration of sinus rhythm as the patient has decided against initiation of Tikosyn  --Continue Eliquis for CVA prophylaxis     4.  Pulmonary arterial hypertension   --In the setting of interstitial lung disease  --Mild to moderate RV and RA enlargement, mild to moderate TR with RVSP 50 mmHg on last echocardiogram, RV systolic function appeared to be preserved  --Likely contributing to lower extremity edema and volume overload as well as chronic dyspnea     5.  Chronic kidney disease, stage 3  --Renal function stable on last labs     6.  Dyslipidemia  --Continue statin therapy      Follow Up:   Return in about 4 weeks (around 2/21/2020).      Thank you for allowing me to participate in the care of your patient. Please to not hesitate to contact me with additional questions or concerns.     LA NENA Higgins MD  Interventional Cardiology   01/24/2020  10:16 AM

## 2020-01-24 NOTE — OUTREACH NOTE
CHF Week 1 Survey      Responses   Facility patient discharged from?  [place]   Does the patient have one of the following disease processes/diagnoses(primary or secondary)?  CHF   Is there a successful TCM telephone encounter documented?  Yes          Gloria Hinton RN

## 2020-01-28 NOTE — TELEPHONE ENCOUNTER
Patient called requesting med refills on Protonix and Levothyroxine.     [facility]

## 2020-01-30 NOTE — OUTREACH NOTE
CHF Week 2 Survey      Responses   Facility patient discharged from?  [facility]   Does the patient have one of the following disease processes/diagnoses(primary or secondary)?  CHF   Week 2 attempt successful?  Yes   Call start time  1415   Call end time  1417   Discharge diagnosis  Acute CHF   Meds reviewed with patient/caregiver?  Yes   Is the patient having any side effects they believe may be caused by any medication additions or changes?  No   Does the patient have all medications ordered at discharge?  Yes   Is the patient taking all medications as directed (includes completed medication regime)?  Yes   Does the patient have a primary care provider?   Yes   Does the patient have an appointment with their PCP within 7 days of discharge?  Yes   Has the patient kept scheduled appointments due by today?  Yes   What is the Home health agency?   [facility]   Has home health visited the patient within 72 hours of discharge?  Yes   Psychosocial issues?  No   Did the patient receive a copy of their discharge instructions?  Yes   Nursing interventions  Reviewed instructions with patient   What is the patient's perception of their health status since discharge?  Improving   Nursing interventions  Nurse provided patient education   Is the patient weighing daily?  Yes   Does the patient have scales?  Yes   Daily weight interventions  Education provided on importance of daily weight   Is the patient able to teach back Heart Failure diet management?  Yes   Is the patient able to teach back Heart Failure Zones?  Yes   Is the patient able to teach back signs and symptoms of worsening condition? (i.e. weight gain, shortness of air, etc.)  Yes   Is the patient/caregiver able to teach back the hierarchy of who to call/visit for symptoms/problems? PCP, Specialist, Home health nurse, Urgent Care, ED, 911  Yes   Additional teach back comments  Weighs daily, she is losing fluid slowly, no SOA with rest,    CHF Week 2 call completed?  Yes           Clau Carr, RN

## 2020-01-31 NOTE — PROGRESS NOTES
[facility], [place] Pulmonary Follow up    CHIEF COMPLAINT    Dyspnea with exertion/hospital follow-up    HISTORY OF PRESENT ILLNESS    Pat Morel is a 80 y.o.female here today for follow-up of her dyspnea with exertion and hospital follow-up.  She was last seen in the office by Dr. Jeff Laura on 9 December.  At that appointment she was admitted to the hospital for diuresis.  He wanted her to follow-up in 6 weeks.  She was admitted to [facility].  She was diuresed and her symptoms improved.  She was discharged home on Bumex twice a day.  Her symptoms improved then she was admitted to [facility] for similar problems.  She was again diuresed with IV diuretics and her symptoms improved dramatically.  She was discharged and then later readmitted on January 4 for pneumonia.  It was felt that she had acute hypersensitivity pneumonitis and RSV.  She was treated with IV antibiotics and discharged home on high-dose steroid therapy.  She was also discharged on oxygen at this time.  She states that her symptoms improved over this hospitalization.  Right before discharge she was weaned from 60 mg to 40 mg and is hopeful to be weaned further today.    She states that she is getting slowly back to baseline.  She does have shortness of breath with exertion but recovers somewhat quickly.  She has been wearing 2 L nasal cannula continuously at home in the evenings and majority of the time during the day.    She continues to use Pulmicort nebulizers twice a day and DuoNeb's twice a day as well.    She denies fever, chills, sputum production, hemoptysis, night sweats, weight loss, chest pain or palpitations.  She has chronic lower extremity edema but states this is improved since her last appointment.  She remains on diuretics daily.  She continues close follow-up with cardiology as well.  She denies any sinus or allergy symptoms.  She denies reflux symptoms.    She is up-to-date on her  current vaccinations.    Patient Active Problem List   Diagnosis   • Persistent atrial fibrillation   • Coronary artery disease involving native coronary artery of native heart without angina pectoris   • Essential hypertension   • Cerebrovascular accident (CVA) due to embolism (CMS/HCC)   • Dyspnea on exertion   • Pulmonary arterial hypertension (CMS/HCC)   • Interstitial lung disease (CMS/HCC)   • Hypersensitivity pneumonitis (CMS/HCC)   • Gastric ulcer   • Coagulation disorder due to circulating anticoagulants (CMS/HCC)   • Dyslipidemia   • Acquired hypothyroidism   • Chronic kidney disease, stage 3 (CMS/HCC)   • GERD without esophagitis   • Iron deficiency anemia   • Acute on chronic diastolic congestive heart failure (CMS/HCC)   • Pneumonia of both lungs due to infectious organism   • Acute respiratory failure with hypoxia (CMS/HCC)   • Diastolic heart failure (CMS/HCC)       Allergies   Allergen Reactions   • Tuberculin Tests Rash       Current Outpatient Medications:   •  apixaban (ELIQUIS) 2.5 MG tablet tablet, Take 2.5 mg by mouth 2 (Two) Times a Day., Disp: , Rfl:   •  atorvastatin (LIPITOR) 40 MG tablet, Take 1 tablet by mouth Every Night., Disp: 90 tablet, Rfl: 3  •  bisoprolol (ZEBeta) 5 MG tablet, Take 1 tablet by mouth 2 (Two) Times a Day., Disp: 60 tablet, Rfl: 0  •  budesonide (PULMICORT) 0.5 MG/2ML nebulizer solution, Inhale contents of 1 vial through a nebulizer 2 (Two) Times a Day, Disp: 120 mL, Rfl: 0  •  bumetanide (BUMEX) 2 MG tablet, Take 1 tablet by mouth 2 (Two) Times a Day., Disp: 60 tablet, Rfl: 0  •  ferrous sulfate 325 (65 FE) MG tablet, Take twice daily on Monday, Wednesday, and Friday, Disp: 24 tablet, Rfl: 5  •  ipratropium-albuterol (DUO-NEB) 0.5-2.5 mg/3 ml nebulizer, Take 3 mL by nebulization Every 4 (Four) Hours As Needed for Wheezing or Shortness of Air (cough)., Disp: 120 mL, Rfl: 0  •  isosorbide mononitrate (IMDUR) 120 MG 24 hr tablet, Take 0.5 tablets by mouth Daily., Disp:  , Rfl:   •  levothyroxine (SYNTHROID, LEVOTHROID) 75 MCG tablet, Take 1 tablet by mouth Daily., Disp: 90 tablet, Rfl: 2  •  nitroglycerin (NITROSTAT) 0.4 MG SL tablet, Place 0.4 mg under the tongue Every 5 (Five) Minutes As Needed., Disp: , Rfl:   •  pantoprazole (PROTONIX) 40 MG EC tablet, Take 1 tablet by mouth Daily., Disp: 90 tablet, Rfl: 3  •  potassium chloride (MICRO-K) 10 MEQ CR capsule, Take 2 capsules by mouth Daily., Disp: 30 capsule, Rfl: 0  •  predniSONE (DELTASONE) 20 MG tablet, Take 2 tablets by mouth Daily With Breakfast., Disp: 28 tablet, Rfl: 0  •  Vitamin D, Cholecalciferol, 50 MCG (2000 UT) capsule, Take 1 capsule by mouth Daily., Disp: , Rfl:   •  benzonatate (TESSALON) 200 MG capsule, Take 200 mg by mouth 3 (Three) Times a Day As Needed for Cough., Disp: , Rfl:   •  predniSONE (DELTASONE) 10 MG tablet, Take 1 tablet by mouth Daily., Disp: 30 tablet, Rfl: 0  •  sulfamethoxazole-trimethoprim (BACTRIM DS,SEPTRA DS) 800-160 MG per tablet, Take 1 tablet every Monday, Wednesday and Friday until on 20mg Prednisone., Disp: 9 tablet, Rfl: 0  MEDICATION LIST AND ALLERGIES REVIEWED.    Social History     Tobacco Use   • Smoking status: Never Smoker   • Smokeless tobacco: Never Used   Substance Use Topics   • Alcohol use: No   • Drug use: No       FAMILY AND SOCIAL HISTORY REVIEWED.    Review of Systems   Constitutional: Positive for activity change and fatigue. Negative for appetite change, fever and unexpected weight change.   HENT: Positive for facial swelling. Negative for congestion, postnasal drip, rhinorrhea, sinus pressure, sore throat and voice change.    Eyes: Negative for visual disturbance.   Respiratory: Positive for shortness of breath. Negative for cough, chest tightness and wheezing.    Cardiovascular: Positive for palpitations and leg swelling. Negative for chest pain.   Gastrointestinal: Negative for abdominal distention, abdominal pain, nausea and vomiting.   Endocrine: Negative for cold  "intolerance and heat intolerance.   Genitourinary: Negative for difficulty urinating and urgency.   Musculoskeletal: Positive for back pain and gait problem. Negative for arthralgias and neck pain.   Skin: Negative for color change and pallor.   Allergic/Immunologic: Negative for environmental allergies and food allergies.   Neurological: Positive for tremors. Negative for dizziness, syncope, weakness and light-headedness.   Hematological: Negative for adenopathy. Bruises/bleeds easily.   Psychiatric/Behavioral: Negative for agitation and behavioral problems.   .    /70 (BP Location: Left arm, Patient Position: Sitting, Cuff Size: Adult)   Pulse 82   Temp 98 °F (36.7 °C)   Resp 16   Ht 147.3 cm (57.99\")   Wt 60.5 kg (133 lb 6 oz)   LMP  (LMP Unknown)   SpO2 91% Comment: 2Liters Continious  BMI 27.89 kg/m²     Immunization History   Administered Date(s) Administered   • Flu Vaccine Split Quad 10/24/2018, 09/25/2019   • Influenza TIV (IM) 10/18/2017   • Influenza, Unspecified 10/01/2018   • Pneumococcal Polysaccharide (PPSV23) 09/25/2019   • Pneumococcal, Unspecified 11/20/2017   • Td, Unspecified 11/19/2013   • Tetanus 11/19/2013       Physical Exam   Constitutional: She is oriented to person, place, and time. She appears well-developed and well-nourished.   HENT:   Head: Normocephalic and atraumatic.   Eyes: Pupils are equal, round, and reactive to light.   Neck: Normal range of motion. Neck supple. No thyromegaly present.   Cardiovascular: Normal rate, regular rhythm, normal heart sounds and intact distal pulses. Exam reveals no gallop and no friction rub.   No murmur heard.  Pulmonary/Chest: Effort normal and breath sounds normal. No respiratory distress. She has no wheezes. She has no rales. She exhibits no tenderness.   Abdominal: Soft. Bowel sounds are normal. There is no tenderness.   Musculoskeletal: Normal range of motion.   Lymphadenopathy:     She has no cervical adenopathy.   Neurological: " She is alert and oriented to person, place, and time.   Skin: Skin is warm and dry. Capillary refill takes less than 2 seconds. She is not diaphoretic.   Psychiatric: She has a normal mood and affect. Her behavior is normal.   Nursing note and vitals reviewed.        RESULTS      PROBLEM LIST    Problem List Items Addressed This Visit        Cardiovascular and Mediastinum    Acute on chronic diastolic congestive heart failure (CMS/HCC)       Respiratory    Interstitial lung disease (CMS/HCC)    Hypersensitivity pneumonitis (CMS/HCC) - Primary    Relevant Medications    predniSONE (DELTASONE) 10 MG tablet    sulfamethoxazole-trimethoprim (BACTRIM DS,SEPTRA DS) 800-160 MG per tablet            DISCUSSION    Ms. Morel was here for a hospital follow-up and follow-up of her shortness of breath on exertion.  She states that her breathing has improved.  I am going to start a slow taper of her prednisone currently.  She is on 40 mg currently.  She is going to start 30 mg tomorrow and decrease by 10 mg every 7 to 10 days until she gets down to 10 mg daily.  I am also going to put her on Bactrim every Monday, Wednesday and Friday to take until she is down to 20 mg of prednisone daily.  I sent this in to her local pharmacy today.  I also prescribed her more prednisone so that way she could have this on hand during her taper.  I also gave her written instructions of how to taper the prednisone.  She has an appointment with Dr. Garcia in March and would like to continue her care with him.  I will taper her down to 10 mg until she is seen by him and then he can address what he would like to do further with her at that time.    She will continue to use her Pulmicort and DuoNeb's twice a day or more frequently if needed for shortness of breath.    She will continue close follow-up with cardiology for her acute on chronic diastolic congestive heart failure.    She will follow-up as needed in our office.  I did advise her to call  with any additional concerns or questions before she is seen by Dr. Garcia in March.  I spent 25 minutes with the patient and family. I spent > 50% percent of this time counseling and discussing diagnosis, prognosis, diagnostic testing, evaluation, current status, treatment options and management.    Tricia Almanza, AYANNA  01/31/20202:08 PM  Electronically signed     Please note that portions of this note were completed with a voice recognition program. Efforts were made to edit the dictations, but occasionally words are mistranscribed.      CC: Anthony Sweet, DO

## 2020-02-03 NOTE — PROGRESS NOTES
I called patient and left voicemail on Saturday. Creatinine is elevated. I want her to decrease bumex to once daily for a couple of days. Keep a close eye on her weight. We can repeat her labs at the end of the week.

## 2020-02-03 NOTE — TELEPHONE ENCOUNTER
I received a message from Mrs. Morel's PCP about her creatinine bumped up over the weekend.  I had started her on Bactrim for PCP prophylaxis.  She is going to wean down to 20 mg over a week and I discontinued Bactrim due to her creatinine increase over the weekend.  I called and notified her of this to not take Bactrim this next week and she was agreeable with this plan.  She will call with any additional concerns or questions.

## 2020-02-06 PROBLEM — N17.9 ACUTE RENAL FAILURE (HCC): Status: ACTIVE | Noted: 2020-01-01

## 2020-02-06 NOTE — ED NOTES
At this time, House Supervisor was contacted for transport of patient per Seema.      Lissy Harrison  02/06/20 1404

## 2020-02-06 NOTE — ED NOTES
At this time, house supervisor was contacted for bed assignment.      Lisys Harrison  02/06/20 [id number]

## 2020-02-06 NOTE — ED PROVIDER NOTES
Subjective   80-year-old female presents to the ED with a chief complaint of generalized weakness.  The patient states that she has had multiple falls over the last few days to weeks.  She had 2 falls yesterday where she was trying to get to the bathroom and went down gently to the floor.  She did not strike her head.  There was no loss of conscious.  She denies acute traumatic injury.  She states that her weakness has been progressively worsening over the last week.  She complains of swelling in her lower extremities.  Complains of mild shortness of breath.  Also notes that she has had some decreased urinary output.  Denies fever chills.  No cough or wheeze.  No chest pain.  No nausea vomiting diarrhea or abdominal pain.  No other complaints at this time.          Review of Systems   Constitutional: Positive for fatigue. Negative for fever.   Respiratory: Positive for shortness of breath. Negative for cough, chest tightness and wheezing.    Cardiovascular: Positive for leg swelling. Negative for chest pain and palpitations.   Neurological: Positive for weakness.        Generalized weakness   All other systems reviewed and are negative.      Past Medical History:   Diagnosis Date   • Acute on chronic diastolic congestive heart failure (CMS/HCC) 12/20/2019   • Anemia    • Angina at rest (CMS/HCC)    • Arthritis    • Atrial fibrillation (CMS/HCC)    • Coronary artery disease    • Disease of thyroid gland    • Elevated cholesterol    • GERD without esophagitis 12/2/2019   • History of blood transfusion    • History of colonoscopy 11/19/2019   • History of melena 11/19/2019   • History of stomach ulcers    • Hypertension    • Impaired functional mobility, balance, gait, and endurance    • Osteoporosis    • Positive TB test    • Stroke (CMS/HCC)     No deficits       Allergies   Allergen Reactions   • Tuberculin Tests Rash       Past Surgical History:   Procedure Laterality Date   • BRONCHOSCOPY Bilateral 4/12/2019     Procedure: BRONCHOSCOPY;  Surgeon: Jeff Laura MD;  Location:  [facility];  Service: Pulmonary   • BYPASS GRAFT     • COLONOSCOPY N/A 10/15/2019    Procedure: COLONOSCOPY;  Surgeon: Fredi Aaron MD;  Location:  [facility];  Service: Gastroenterology   • CORONARY ANGIOPLASTY WITH STENT PLACEMENT     • CORONARY ARTERY BYPASS GRAFT  10/18/2010   • ENDOSCOPY N/A 10/14/2019    Procedure: ESOPHAGOGASTRODUODENOSCOPY;  Surgeon: Fredi Aaron MD;  Location:  [facility];  Service: Gastroenterology   • HYSTERECTOMY     • STOMACH SURGERY  08/11/2019    Upper GI bleed    • STOMACH SURGERY  10/13/2019       Family History   Problem Relation Age of Onset   • Cancer Mother    • Arthritis Mother    • No Known Problems Father    • Liver disease Sister        Social History     Socioeconomic History   • Marital status:      Spouse name: Not on file   • Number of children: Not on file   • Years of education: Not on file   • Highest education level: Not on file   Tobacco Use   • Smoking status: Never Smoker   • Smokeless tobacco: Never Used   Substance and Sexual Activity   • Alcohol use: No   • Drug use: No   • Sexual activity: Not Currently           Objective   Physical Exam   Constitutional: She is oriented to person, place, and time. No distress.   Chronically ill-appearing.   HENT:   Head: Normocephalic and atraumatic.   Nose: Nose normal.   Eyes: Conjunctivae and EOM are normal.   Cardiovascular: Intact distal pulses.   Irregular rhythm.  Tachycardic.  Atrial fibrillation on the monitor   Pulmonary/Chest: No respiratory distress.   Coarse breath sounds bilaterally.   Abdominal: Soft. She exhibits no distension. There is no tenderness. There is no guarding.   Musculoskeletal: She exhibits edema. She exhibits no deformity.   Pitting edema in the bilateral lower extremities.  Patient also has obvious anasarca edema to her bilateral upper extremities.   Neurological: She is alert and  oriented to person, place, and time. No cranial nerve deficit.   Skin: She is not diaphoretic.   Bruising to the inferior abdominal wall  Multiple bruises and abrasions to the bilateral upper extremities   Nursing note and vitals reviewed.      Critical Care  Performed by: Jovanny Cancino DO  Authorized by: Jovanny Cancino DO     Critical care provider statement:     Critical care time (minutes):  65    Critical care time was exclusive of:  Separately billable procedures and treating other patients    Critical care was necessary to treat or prevent imminent or life-threatening deterioration of the following conditions:  Cardiac failure, respiratory failure, sepsis, shock, circulatory failure, dehydration, renal failure and metabolic crisis    Critical care was time spent personally by me on the following activities:  Ordering and performing treatments and interventions, development of treatment plan with patient or surrogate, discussions with consultants, evaluation of patient's response to treatment, examination of patient, ordering and review of laboratory studies, ordering and review of radiographic studies, pulse oximetry, re-evaluation of patient's condition and review of old charts               ED Course  ED Course as of Feb 06 2004   Thu Feb 06, 2020   1327 EKG interpreted by me.  Atrial rhythm.  Tachycardic.  Rate of 100.  Nonspecific Mobitz type II.  Nonspecific ST segment changes.  Abnormal EKG.    [CG]   1331 Spoke with Dr. Tsang regarding patient's laboratory results.  He requested Merchant catheter and recommends 4 mg Bumex IV at this time.  Agrees with admission for further evaluation medical management.    [CG]      ED Course User Index  [CG] Jovanny Cancino DO                                               [facility]     80-year-old female presented to the ED with shortness of breath hypoxia and fluid overload.  She was having some acute respiratory insufficiency as she wears 2 L nasal cannula at home and  is requiring 4 L nasal cannula here to maintain normal oxygen saturations.  Laboratory results are significant for acute renal failure with baseline creatinine being around 2 and today her creatinine is almost 5.  Discussed this with her nephrologist's recommendations are listed above under ED course.  Discussed the case with Dr. Mercado, he accepts the patient for admission for further evaluation and medical management.  Chest x-ray shows mild vascular congestion.  BNP significantly elevated at greater than 70,000.  Troponin is also elevated which I feel is more likely related to the kidney disease.  She was also noted to have hyperkalemia which was treated medically.    Final diagnoses:   Acute renal failure, unspecified acute renal failure type (CMS/HCC)   Hyperkalemia   Atrial fibrillation, unspecified type (CMS/HCC)   Acute respiratory insufficiency   Elevated troponin            Jovanny Cancino,   02/06/20 1958       Jovanny Cancino,   02/06/20 2004

## 2020-02-06 NOTE — CONSULTS
[facility]      Nephrology Consultation    Referring Provider:   No ref. provider found    Reason for Consultation:  Acute Kidney Injury and CKD4 with hyperkalemia    Subjective:  Chief complaint   Chief Complaint   Patient presents with   • Weakness - Generalized     History of present illness:    Patient is 81 yo female with multiple medical problems as listed below in the past medical history who presented to the ER with dyspnea and weakness. She was noted to have significant hyperkalemia and RENEE with creatinine 4.9mg/dl and was admitted for further evaluation and management. For full details on admission please see the H+P which was reviewed by me. I was contacted by the ER physician due to RENEE and hyperkalemia to see the patient. She was recently seen by me in this facility 1/2020 with RENEE/CKD4 and fluid overload. She was discharged with oral diuretics and a close follow up in 2 weeks - patient failed to follow up. I was consulted this admission to manage renal function and fluid status. I have reviewed labs/imaging/records from this hospitalization, including ER staff and admitting/attending physicians H/P's and progress notes to establish a comprehensive understanding of this patient's clinical hospital course, as well as to establish plan of care appropriately.   Past Medical History:   Diagnosis Date   • Acute on chronic diastolic congestive heart failure (CMS/HCC) 12/20/2019   • Anemia    • Angina at rest (CMS/HCC)    • Arthritis    • Atrial fibrillation (CMS/HCC)    • Coronary artery disease    • Disease of thyroid gland    • Elevated cholesterol    • GERD without esophagitis 12/2/2019   • History of blood transfusion    • History of colonoscopy 11/19/2019   • History of melena 11/19/2019   • History of stomach ulcers    • Hypertension    • Impaired functional mobility, balance, gait, and endurance    • Osteoporosis    • Positive TB test    • Stroke (CMS/HCC)        Past Surgical  History:   Procedure Laterality Date   • BRONCHOSCOPY Bilateral 4/12/2019    Procedure: BRONCHOSCOPY;  Surgeon: Jeff Laura MD;  Location:  [facility];  Service: Pulmonary   • BYPASS GRAFT     • COLONOSCOPY N/A 10/15/2019    Procedure: COLONOSCOPY;  Surgeon: Fredi Aaron MD;  Location:  [facility];  Service: Gastroenterology   • CORONARY ANGIOPLASTY WITH STENT PLACEMENT     • CORONARY ARTERY BYPASS GRAFT  10/18/2010   • ENDOSCOPY N/A 10/14/2019    Procedure: ESOPHAGOGASTRODUODENOSCOPY;  Surgeon: Fredi Aaron MD;  Location:  [facility];  Service: Gastroenterology   • HYSTERECTOMY     • STOMACH SURGERY  08/11/2019    Upper GI bleed    • STOMACH SURGERY  10/13/2019     Family History   Problem Relation Age of Onset   • Cancer Mother    • Arthritis Mother    • No Known Problems Father    • Liver disease Sister      negative h/o ESRD     Social History     Tobacco Use   • Smoking status: Never Smoker   • Smokeless tobacco: Never Used   Substance Use Topics   • Alcohol use: No   • Drug use: No     Home medications:   Prior to Admission Medications     Prescriptions Last Dose Informant Patient Reported? Taking?    apixaban (ELIQUIS) 2.5 MG tablet tablet   Yes No    Take 2.5 mg by mouth 2 (Two) Times a Day.    atorvastatin (LIPITOR) 40 MG tablet   No No    Take 1 tablet by mouth Every Night.    bisoprolol (ZEBeta) 5 MG tablet   No No    Take 1 tablet by mouth 2 (Two) Times a Day.    budesonide (PULMICORT) 0.5 MG/2ML nebulizer solution   No No    Inhale contents of 1 vial through a nebulizer 2 (Two) Times a Day    bumetanide (BUMEX) 2 MG tablet   No No    Take 1 tablet by mouth 2 (Two) Times a Day.    ferrous sulfate 325 (65 FE) MG tablet   No No    Take twice daily on Monday, Wednesday, and Friday    ipratropium-albuterol (DUO-NEB) 0.5-2.5 mg/3 ml nebulizer   No No    Take 3 mL by nebulization Every 4 (Four) Hours As Needed for Wheezing or Shortness of Air (cough).    isosorbide  mononitrate (IMDUR) 120 MG 24 hr tablet   No No    Take 0.5 tablets by mouth Daily.    levothyroxine (SYNTHROID, LEVOTHROID) 75 MCG tablet   No No    Take 1 tablet by mouth Daily.    nitroglycerin (NITROSTAT) 0.4 MG SL tablet   Yes No    Place 0.4 mg under the tongue Every 5 (Five) Minutes As Needed.    pantoprazole (PROTONIX) 40 MG EC tablet   No No    Take 1 tablet by mouth Daily.    potassium chloride (MICRO-K) 10 MEQ CR capsule   No No    Take 2 capsules by mouth Daily.    predniSONE (DELTASONE) 10 MG tablet   No No    Take 1 tablet by mouth Daily.    predniSONE (DELTASONE) 20 MG tablet   No No    Take 2 tablets by mouth Daily With Breakfast.    Vitamin D, Cholecalciferol, 50 MCG (2000 UT) capsule   Yes No    Take 1 capsule by mouth Daily.        Emergency department medications:   Medications   sodium chloride 0.9 % flush 10 mL (has no administration in time range)   sodium chloride 0.9 % bolus 500 mL (0 mL Intravenous Stopped 2/6/20 1416)   dextrose (D50W) 25 g/ 50mL Intravenous Solution 50 mL (50 mL Intravenous Given 2/6/20 1253)   insulin regular (humuLIN R,novoLIN R) injection 10 Units (10 Units Intravenous Given 2/6/20 1253)   albuterol (PROVENTIL) nebulizer solution 0.083% 2.5 mg/3mL (10 mg Nebulization Given 2/6/20 1350)   bumetanide (BUMEX) injection 4 mg (4 mg Intravenous Given 2/6/20 1414)       Allergies:  Tuberculin tests    Review of Systems  1. Constitutional: Negative for fever and chills.  Denies any diaphoresis.  Positive for fatigue as well as malaise and unexpected weight change.  She thinks she has gained about 7 to 10 pounds in the last 3 days.  2. HENT: Negative for congestion and hearing loss.   3. Eyes: Negative for redness and visual disturbance.   4. Respiratory: Positive for shortness of breath occasional cough. Negative for chest pain  5. Cardiovascular: Negative for chest pain and chest tightness or palpitations.   6. Gastrointestinal: Negative for abdominal pain or distention, and  "blood in stool. Denies any Nausea, vomiting, diarrhea or constipation.  7. Endocrine: Negative for heat or cold intolerance.   8. Genitourinary: Negative for difficulty urinating, dysuria and frequency.   9. Musculoskeletal: Positive for arthralgias, back pain.  Denies any myalgias.  She did say that she has been feeling weak and has fallen at least 2 times in the last 2 days.  10. Skin: Negative for color change, rash and wound.   11. Neurological: Negative for syncope, weakness and headaches.   12. Hematological: Negative for adenopathy. Does bruise/bleed easily.   13. Psychiatric/Behavioral: Negative for confusion. The patient is not nervous/anxious.     Objective:  Vital Signs  /78   Pulse 99   Temp 97.6 °F (36.4 °C) (Oral)   Resp 19   Ht 147.3 cm (58\")   Wt 60.8 kg (134 lb)   LMP  (LMP Unknown)   SpO2 93%   BMI 28.01 kg/m²          I/O this shift:  In: 500 [IV Piggyback:500]  Out: 300 [Urine:300]    Intake/Output Summary (Last 24 hours) at 2/6/2020 1445  Last data filed at 2/6/2020 1419  Gross per 24 hour   Intake 500 ml   Output 300 ml   Net 200 ml       Physical Exam:  General Appearance:   Alert, cooperative, in no acute distress.     Head:   Normocephalic, without obvious abnormality, atraumatic.     Eyes:      Normal, conjunctivae and sclerae, no icterus, no pallor, corneas clear, PERRLA        Throat:   Oral mucosa dry      Neck:  No adenopathy, supple, trachea midline, no thyromegaly, no carotid bruit, no JVD      Back:   No CVA tenderness on Percussion.     Lungs:    Clear to auscultation and fair air movement noted.      Heart::   Irregular rhythm and rate.       Abdomen:   Obese. Normal bowel sounds, no masses, no organomegaly, soft non-tender, non-distended, no guarding, no rebound tenderness      Genital urinary:   No urinary bladder palpable      Extremities:  Moves all extremities, 2+ on the left side and 1+ on the right side edema, no cyanosis, no redness.     Pulses:  Pulses " palpable and equal bilaterally but weak.     Skin:  No bleeding, bruising or rash        Neurologic:  Cranial nerves grossly intact, move all extremities           Lab Results (last 7 days)     Procedure Component Value Units Date/Time    Troponin [[id number]]  (Abnormal) Collected:  02/06/20 1148    Specimen:  Blood Updated:  02/06/20 1315     Troponin T 0.106 ng/mL     Narrative:       Troponin T Reference Range:  <= 0.03 ng/mL-   Negative for AMI  >0.03 ng/mL-     Abnormal for myocardial necrosis.  Clinicians would have to utilize clinical acumen, EKG, Troponin and serial changes to determine if it is an Acute Myocardial Infarction or myocardial injury due to an underlying chronic condition.       Results may be falsely decreased if patient taking Biotin.      BNP [[id number]]  (Abnormal) Collected:  02/06/20 1148    Specimen:  Blood Updated:  02/06/20 1315     proBNP >70,000.0 pg/mL     Narrative:       Among patients with dyspnea, NT-proBNP is highly sensitive for the detection of acute congestive heart failure. In addition NT-proBNP of <300 pg/ml effectively rules out acute congestive heart failure with 99% negative predictive value.    Results may be falsely decreased if patient taking Biotin.      [place] Draw [[id number]] Collected:  02/06/20 1148    Specimen:  Blood Updated:  02/06/20 1301    Narrative:       The following orders were created for panel order [place] Draw.  Procedure                               Abnormality         Status                     ---------                               -----------         ------                     Light Blue Top[[id number]]                                   Final result               Green Top (Gel)[[id number]]                                  Final result               Lavender Top[[id number]]                                     Final result               Gold Top - SST[[id number]]                                   Final result               Green Top (No  Gel)[[id number]]                               In process                   Please view results for these tests on the individual orders.    Light Blue Top [[id number]] Collected:  02/06/20 1148    Specimen:  Blood Updated:  02/06/20 1301     Extra Tube hold for add-on     Comment: Auto resulted       Green Top (Gel) [[id number]] Collected:  02/06/20 1148    Specimen:  Blood Updated:  02/06/20 1301     Extra Tube Hold for add-ons.     Comment: Auto resulted.       Lavender Top [[id number]] Collected:  02/06/20 1148    Specimen:  Blood Updated:  02/06/20 1301     Extra Tube hold for add-on     Comment: Auto resulted       Gold Top - SST [[id number]] Collected:  02/06/20 1148    Specimen:  Blood Updated:  02/06/20 1301     Extra Tube Hold for add-ons.     Comment: Auto resulted.       CBC & Differential [[id number]] Collected:  02/06/20 1148    Specimen:  Blood Updated:  02/06/20 1229    Narrative:       The following orders were created for panel order CBC & Differential.  Procedure                               Abnormality         Status                     ---------                               -----------         ------                     CBC Auto Differential[[id number]]        Abnormal            Final result                 Please view results for these tests on the individual orders.    CBC Auto Differential [[id number]]  (Abnormal) Collected:  02/06/20 1148    Specimen:  Blood Updated:  02/06/20 1229     WBC 8.18 10*3/mm3      RBC 3.17 10*6/mm3      Hemoglobin 9.4 g/dL      Hematocrit 30.3 %      MCV 95.6 fL      MCH 29.7 pg      MCHC 31.0 g/dL      RDW 19.1 %      RDW-SD 65.2 fl      MPV 12.0 fL      Platelets 194 10*3/mm3     Scan Slide [[id number]] Collected:  02/06/20 1148    Specimen:  Blood Updated:  02/06/20 1229     Anisocytosis Mod/2+     Hypochromia Slight/1+     Platelet Estimate Adequate     Scan Slide --     Comment: See Manual Differential Results       Manual Differential [[id number]]  (Abnormal)  Collected:  02/06/20 1148    Specimen:  Blood Updated:  02/06/20 1229     Neutrophil % 97.0 %      Lymphocyte % 2.0 %      Monocyte % 1.0 %      Neutrophils Absolute 7.93 10*3/mm3      Lymphocytes Absolute 0.16 10*3/mm3      Monocytes Absolute 0.08 10*3/mm3      nRBC 2.0 /100 WBC      RBC Morphology Normal     WBC Morphology Normal     Platelet Morphology Normal    Comprehensive Metabolic Panel [[id number]]  (Abnormal) Collected:  02/06/20 1148    Specimen:  Blood Updated:  02/06/20 1220     Glucose 165 mg/dL       mg/dL      Creatinine 4.99 mg/dL      Sodium 136 mmol/L      Potassium 6.1 mmol/L      Chloride 93 mmol/L      CO2 19.9 mmol/L      Calcium 9.3 mg/dL      Total Protein 7.0 g/dL      Albumin 4.20 g/dL      ALT (SGPT) 163 U/L      AST (SGOT) 157 U/L      Comment: Specimen hemolyzed.  Results may be affected.        Alkaline Phosphatase 216 U/L      Total Bilirubin 2.9 mg/dL      eGFR Non African Amer 8 mL/min/1.73      Comment: <15 Indicative of kidney failure.        eGFR   Amer --     Comment: <15 Indicative of kidney failure.        Globulin 2.8 gm/dL      A/G Ratio 1.5 g/dL      BUN/Creatinine Ratio 20.6     Anion Gap 23.1 mmol/L     Narrative:       GFR Normal >60  Chronic Kidney Disease <60  Kidney Failure <15      Green Top (No Gel) [[id number]] Collected:  02/06/20 1148    Specimen:  Blood Updated:  02/06/20 1151        Imaging Results (Last 72 Hours)     Procedure Component Value Units Date/Time    XR Chest 1 View [[id number]] Collected:  02/06/20 1229     Updated:  02/06/20 1232    Narrative:       PROCEDURE: XR CHEST 1 VW-     HISTORY: dyspnea     COMPARISON: 01/09/2020 and 01/06/2020.     FINDINGS: The heart is mildly enlarged. The mediastinum is unremarkable.  There are chronic interstitial lung changes. No focal opacities were  effusions are evident. There is no pneumothorax.  There are no acute  osseous abnormalities.       Impression:       Chronic lung changes with no acute  cardiopulmonary process.     Continued followup is recommended.     This report was finalized on 2/6/2020 12:29 PM by Joaquín Shannon M.D..        No results found for: UTPCR          Assessment/Plan:    1. Acute renal failure (CMS/HCC): It does appear that she has worsening of her baseline renal function, at this point it is not very clear if this is all acute or may have some worsening of her chronic renal failure as well.  She likely has a significant component of cardiorenal syndrome.  We will check a UA, Merchant catheter was placed in the ER with no significant increase in output.  2. Chronic kidney disease stage IV: He has known to have stage IV chronic kidney disease, she has been admitted almost every 2 weeks since middle of December.  Every time has she has some worsening of her renal function.  He was not on any ACE inhibitors or denies using any nonsteroidals.  She did not have any significant hematuria or proteinuria.  May end up requiring dialysis this admission.  3. Hyperkalemia: Potassium of 6.1 he was given fairly good dose of diuretic likely will have helped some.  I will go ahead and give a dose of Kayexalate and continue to follow closely.  4. Metabolic acidosis: Her bicarb is only slightly low.  19.9 will need to be watched closely.  5. Abnormal liver function: It is quite likely that she has an arrhythmia that might have led to the current picture of congestive heart failure as well as congestive liver.  6. Anemia: He had fairly normal iron stores on her last visit.  Will check iron stores again she may be needing clear if she is anemic.  Work-up as ordered.  7. Coronary artery disease, status post CABG, status post stent placement: May need cardiac evaluation again during this hospitalization.  Last echo that was done in October 2019 showed mild to moderate mitral regurgitation, moderate tricuspid regurgitation with elevated pulmonary pressure of 45 to 55 mmHg.  8. Acute on chronic  respiratory failure:  9. Hypersensitivity pneumonitis  10. Interstitial lung disease       Plan:  · She was given a dose of Bumex 4 mg 1 time in the ER, will see how much response she had before giving another dose.  · We will give her a dose of Kayexalate because of her hyperkalemia and reassess on a daily basis.  She does not have any acute indications today for dialysis, I have discussed with her and her renal function continues to worsen she may end up requiring dialysis.  · She may benefit from pulmonary consultation as well cardiac evaluation.  · Surveillance labs.  · Details were discussed with the patient no family in the room.    · Details were also discussed with the hospitalist service as well as ER physician.  · Further recommendations will depend on clinical course of the patient during the current hospitalization.    · I also discussed the details with the nursing staff.  · Rest as ordered.    In closing, I sincerely appreciate opportunity to participate in care of this patient. If I can be of any further assistance with the management of this patient, please don’t hesitate to contact me.    Sanford Tsang MD, FASN  02/06/20  2:45 PM    Dictated using Dragon.

## 2020-02-06 NOTE — H&P
[facility]   HISTORY AND PHYSICAL      Name:  Pat Morel   Age:  80 y.o.  Sex:  female  :  1940  MRN:  [id number]   Visit Number:  [id number]  Admission Date:  2020  Date Of Service:  20  Primary Care Physician:  Anthony Sweet DO    Chief Complaint:     Generalized weakness.    History Of Presenting Illness:      Patient is a 80-year-old female with history including CAD with CABG, atrial fibrillation, chronic kidney disease stage III, history of CVA, diastolic CHF, iron deficiency anemia, hypersensitivity pneumonitis, hypertension, osteoporosis was brought to the emergency room by EMS with generalized weakness.  Patient has had progressive decline in her health with generalized weakness and multiple falls recently.  She apparently was getting down from her bed to go to the bathroom but gently slumped to the floor.  There was no history of any head trauma.  She does complain of mild shortness of breath and decreased urine output.  However, her shortness of breath is much better compared to in the past after initiation of high-dose prednisone.  No history of fever, nausea or vomiting.  No history of abdominal pain.    In the emergency room, she was afebrile but slightly tachycardic at 107 with initial pulse oxygen saturation of 86% on room air.  Blood pressure was 143/89.  Blood work done in the emergency room however revealed significant worsening of her renal failure with a creatinine of 4.99 (last creatinine at 2.47 on 2020),  and a potassium of 6.1.  CBC was unremarkable except for a hemoglobin of 9.4.  Troponin was 0.106 and BNP was more than 70,000.  Chest x-ray revealed chronic changes without any acute process.  Patient was given 500 mL of normal saline.  Dr. Finnegan was called from the emergency room and he recommended IV Bumex 4 mg, insulin and dextrose for hyperkalemia.  Patient was subsequently admitted to the medical floor with  telemetry.    She was recently admitted to [facility] from 1/17/2020 to 1/20/2020 with similar presentation.  At that time she was treated for diastolic heart failure as well as acute on chronic renal failure.    Review Of Systems:     General ROS: Patient denies any fevers, chills or loss of consciousness. Complains of generalized weakness.   Psychological ROS: No history of any hallucinations and delusions.  Ophthalmic ROS: No history of any diplopia or transient loss of vision.  ENT ROS: No history of sore throat, nasal congestion or ear pain.   Allergy and Immunology ROS: No history of rash or itching.  Hematological and Lymphatic ROS: No history of neck swelling or easy bleeding.  Endocrine ROS: No history of any recent unintentional weight gain or loss.  Respiratory ROS: History of shortness of breath and cough.  Cardiovascular ROS: No history of chest pain or palpitations.  Gastrointestinal ROS: No history of nausea and vomiting. Denies any abdominal pain.   Genito-Urinary ROS: History of decreased urine output.  Musculoskeletal ROS: No muscle pain. No calf pain.  Neurological ROS: No history of any focal weakness. No loss of consciousness.   Dermatological ROS: No history of any redness or pruritis.     Past Medical History:    Past Medical History:   Diagnosis Date   • Acute on chronic diastolic congestive heart failure (CMS/HCC) 12/20/2019   • Anemia    • Angina at rest (CMS/HCC)    • Arthritis    • Atrial fibrillation (CMS/HCC)    • Coronary artery disease    • Disease of thyroid gland    • Elevated cholesterol    • GERD without esophagitis 12/2/2019   • History of blood transfusion    • History of colonoscopy 11/19/2019   • History of melena 11/19/2019   • History of stomach ulcers    • Hypertension    • Impaired functional mobility, balance, gait, and endurance    • Osteoporosis    • Positive TB test    • Stroke (CMS/HCC)     No deficits     Past Surgical history:    Past Surgical History:    Procedure Laterality Date   • BRONCHOSCOPY Bilateral 4/12/2019    Procedure: BRONCHOSCOPY;  Surgeon: Jeff Laura MD;  Location:  [facility];  Service: Pulmonary   • BYPASS GRAFT     • COLONOSCOPY N/A 10/15/2019    Procedure: COLONOSCOPY;  Surgeon: Fredi Aaron MD;  Location:  [facility];  Service: Gastroenterology   • CORONARY ANGIOPLASTY WITH STENT PLACEMENT     • CORONARY ARTERY BYPASS GRAFT  10/18/2010   • ENDOSCOPY N/A 10/14/2019    Procedure: ESOPHAGOGASTRODUODENOSCOPY;  Surgeon: Fredi Aaron MD;  Location:  [facility];  Service: Gastroenterology   • HYSTERECTOMY     • STOMACH SURGERY  08/11/2019    Upper GI bleed    • STOMACH SURGERY  10/13/2019     Social History:    Social History     Socioeconomic History   • Marital status:      Spouse name: Not on file   • Number of children: Not on file   • Years of education: Not on file   • Highest education level: Not on file   Tobacco Use   • Smoking status: Never Smoker   • Smokeless tobacco: Never Used   Substance and Sexual Activity   • Alcohol use: No   • Drug use: No   • Sexual activity: Not Currently     Family History:    Family History   Problem Relation Age of Onset   • Cancer Mother    • Arthritis Mother    • No Known Problems Father    • Liver disease Sister      Allergies:      Tuberculin tests    Home Medications:    Prior to Admission Medications     Prescriptions Last Dose Informant Patient Reported? Taking?    apixaban (ELIQUIS) 2.5 MG tablet tablet 2/6/2020  Yes Yes    Take 2.5 mg by mouth 2 (Two) Times a Day.    atorvastatin (LIPITOR) 40 MG tablet 2/5/2020  No Yes    Take 1 tablet by mouth Every Night.    bisoprolol (ZEBeta) 5 MG tablet 2/6/2020  No Yes    Take 1 tablet by mouth 2 (Two) Times a Day.    budesonide (PULMICORT) 0.5 MG/2ML nebulizer solution 2/5/2020  No Yes    Inhale contents of 1 vial through a nebulizer 2 (Two) Times a Day    bumetanide (BUMEX) 2 MG tablet 2/6/2020  No Yes    Take 1  tablet by mouth 2 (Two) Times a Day.    ferrous sulfate 325 (65 FE) MG tablet 2/5/2020  No Yes    Take twice daily on Monday, Wednesday, and Friday    ipratropium-albuterol (DUO-NEB) 0.5-2.5 mg/3 ml nebulizer 2/5/2020  No Yes    Take 3 mL by nebulization Every 4 (Four) Hours As Needed for Wheezing or Shortness of Air (cough).    isosorbide mononitrate (IMDUR) 120 MG 24 hr tablet 2/6/2020  No Yes    Take 0.5 tablets by mouth Daily.    levothyroxine (SYNTHROID, LEVOTHROID) 75 MCG tablet 2/6/2020  No Yes    Take 1 tablet by mouth Daily.    pantoprazole (PROTONIX) 40 MG EC tablet 2/6/2020  No Yes    Take 1 tablet by mouth Daily.    potassium chloride (MICRO-K) 10 MEQ CR capsule 2/6/2020  No Yes    Take 2 capsules by mouth Daily.    predniSONE (DELTASONE) 20 MG tablet 2/6/2020  No Yes    Take 2 tablets by mouth Daily With Breakfast.    Vitamin D, Cholecalciferol, 50 MCG (2000 UT) capsule 2/6/2020  Yes Yes    Take 1 capsule by mouth Daily.    nitroglycerin (NITROSTAT) 0.4 MG SL tablet Unknown  Yes No    Place 0.4 mg under the tongue Every 5 (Five) Minutes As Needed.    predniSONE (DELTASONE) 10 MG tablet   No No    Take 1 tablet by mouth Daily.          ED Medications:    Medications   sodium chloride 0.9 % flush 10 mL (has no administration in time range)   sodium chloride 0.9 % bolus 500 mL (0 mL Intravenous Stopped 2/6/20 1416)   dextrose (D50W) 25 g/ 50mL Intravenous Solution 50 mL (50 mL Intravenous Given 2/6/20 1253)   insulin regular (humuLIN R,novoLIN R) injection 10 Units (10 Units Intravenous Given 2/6/20 1253)   albuterol (PROVENTIL) nebulizer solution 0.083% 2.5 mg/3mL (10 mg Nebulization Given 2/6/20 1350)   bumetanide (BUMEX) injection 4 mg (4 mg Intravenous Given 2/6/20 1414)     Vital Signs:    Temp:  [97.6 °F (36.4 °C)] 97.6 °F (36.4 °C)  Heart Rate:  [] 99  Resp:  [18-20] 19  BP: (126-143)/(78-89) 126/78        02/06/20  1139 02/06/20  1350   Weight: 60.8 kg (134 lb) 63.5 kg (140 lb 1.6 oz)      Body mass index is 29.28 kg/m².    Physical Exam:    General Appearance:  Alert and cooperative, not in any acute distress.   Head:  Atraumatic and normocephalic, without obvious abnormality.   Eyes:          PERRLA, conjunctivae and sclerae normal, no Icterus. No pallor. Extraocular movements are within normal limits.   Ears:  Ears appear intact with no abnormalities noted.   Throat: No oral lesions, no thrush, oral mucosa moist.   Neck: Supple, trachea midline, no thyromegaly, no carotid bruit.   Back:   No kyphoscoliosis present. No tenderness to palpation,   range of motion normal.   Lungs:   Chest shape is normal. Breath sounds heard bilaterally equally.  No wheezing.  Bilateral coarse crackles heard.  No Pleural rub or bronchial breathing.   Heart:  Normal S1 and S2, no murmur, no gallop, no rub. No JVD.  Midline CABG scar noted.   Abdomen:   Normal bowel sounds, no masses, no organomegaly. Soft, non-tender, non-distended, no guarding, no rebound tenderness.  Large area of ecchymosis noted in the mid and lower abdomen bilaterally.   Extremities: Moves all extremities well, 1+ edema, no cyanosis, no clubbing.   Pulses: Pulses palpable and equal bilaterally.   Skin: No bleeding or rash.   Neurologic: Alert and oriented x 3. Moves all four limbs equally. No tremors. No facial asymmetry.     Laboratory data:    I have reviewed the labs done in the emergency room.    Results from last 7 days   Lab Units 02/06/20  1148   SODIUM mmol/L 136   POTASSIUM mmol/L 6.1*   CHLORIDE mmol/L 93*   CO2 mmol/L 19.9*   BUN mg/dL 103*   CREATININE mg/dL 4.99*   CALCIUM mg/dL 9.3   BILIRUBIN mg/dL 2.9*   ALK PHOS U/L 216*   ALT (SGPT) U/L 163*   AST (SGOT) U/L 157*   GLUCOSE mg/dL 165*     Results from last 7 days   Lab Units 02/06/20  1148   WBC 10*3/mm3 8.18   HEMOGLOBIN g/dL 9.4*   HEMATOCRIT % 30.3*   PLATELETS 10*3/mm3 194         Results from last 7 days   Lab Units 02/06/20  1148   TROPONIN T ng/mL 0.106*     Results from  last 7 days   Lab Units 02/06/20  1148   PROBNP pg/mL >70,000.0*     EKG:      EKG done in the emergency room was reviewed by me.  It shows atrial fibrillation with a rate of 100 bpm.  Indeterminate axis.  Nonspecific ST-T changes were noted.  Low voltage QRS complexes noted throughout.    Radiology:    Imaging Results (Last 72 Hours)     Procedure Component Value Units Date/Time    XR Chest 1 View [[id number]] Collected:  02/06/20 1229     Updated:  02/06/20 1232    Narrative:       PROCEDURE: XR CHEST 1 VW-     HISTORY: dyspnea     COMPARISON: 01/09/2020 and 01/06/2020.     FINDINGS: The heart is mildly enlarged. The mediastinum is unremarkable.  There are chronic interstitial lung changes. No focal opacities were  effusions are evident. There is no pneumothorax.  There are no acute  osseous abnormalities.       Impression:       Chronic lung changes with no acute cardiopulmonary process.     Continued followup is recommended.     This report was finalized on 2/6/2020 12:29 PM by Joaquín Shannon M.D..        Assessment:    1.  Acute renal failure on chronic kidney disease stage III, present on admission.  2.  Recurrent falls at home secondary to uremia, present on admission.  3.  Acute hyperkalemia secondary to #1, present on admission.  4.  Hypersensitivity pneumonitis on prednisone therapy.  5.  Chronic hypoxic respiratory failure on home oxygen.  6.  Chronic diastolic heart failure, no evidence of exacerbation.  7.  Paroxysmal atrial fibrillation on apixaban.  8.  Coronary artery disease status post stents and CABG.  9.  Chronic pulmonary hypertension.  10.  Acquired hypothyroidism.    Plan:    Ms. Morel is currently being admitted to the medical floor with telemetry as an inpatient.  I anticipate more than 3 days of inpatient hospital stay.  Unfortunately her renal failure has significantly worsened.  She has elevated level of BUN and may have underlying uremic encephalopathy causing her recurrent falls.   She is also on high-dose prednisone and may have underlying proximal myopathy as well.  She will be placed on gentle IV hydration.  We will consult Dr. Tsang from nephrology for further recommendations regarding her worsening renal failure.  He has recommended placement of a Merchant catheter for accurate urine output measurement.    She will be started on physical and occupational therapy for her generalized weakness and recurrent falls.  Her home medications will be continued.  She will be continued on Eliquis for her atrial fibrillation.  Unfortunately she has had recent multiple falls in the question will be whether to continue her anticoagulation.  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  We will await evaluation by physical therapy.    She does have elevated troponin levels but denies any chest pain.  I suspect this is secondary to her renal failure and decreased clearance of troponins.  I have discussed advanced directives with the patient and she wants to be DNR according to her living will.  Further recommendations depend upon her clinical course.    Denis Mercado MD  02/06/20  3:41 PM    Dictated utilizing Dragon dictation.

## 2020-02-07 PROBLEM — R29.6 RECURRENT FALLS: Status: ACTIVE | Noted: 2020-01-01

## 2020-02-07 PROBLEM — E87.5 ACUTE HYPERKALEMIA: Status: ACTIVE | Noted: 2020-01-01

## 2020-02-07 NOTE — PLAN OF CARE
Problem: Patient Care Overview  Goal: Plan of Care Review  Outcome: Ongoing (interventions implemented as appropriate)  Flowsheets (Taken 2/7/2020 1130)  Outcome Summary: PT eval completed.   Patient presents with deficits in strength, balance, endurance and independence. She is expected to benefit from continued skilled PT intervention to improve her mobility status prior to D/C.

## 2020-02-07 NOTE — NURSING NOTE
"Consulted by patient's nurse for skin tears that had been covered by tegaderm-type dressing by EMS Left upper arm. Patient sitting up in chair visiting with . Informed me that it was OK to work with skin tear wounds. States she sustained these during fall at home. Noted 2 skin tear injuries under dressing with upper wound much larger than more distal wound. Tegaderm dressing very gently removed with use of alcohol wipes at borders and gentle stretching of dressing parallel to skin. Skin flaps stayed intact with no additional trauma as tegaderm-type dressing gently removed. Gently cleansed with sterile normal saline and skin flap re-approximated with help of sterile swab. Wound beds mostly re-covered with skin flaps except for narrow strip at edge of wound. Some sero-sanguinous drainage noted under Tegaderm but does not appear to be actively bleeding at this time. Patient states she takes \"blood-thinners and my skin tears so easily\". No s/s infection noted. Covered with Vaseline gauze and wrapped with dry Kerlix gauze to hold in place. Patient tolerated procedure well and expressed relief that done without discomfort. Noted another small scabbed area more lateral to above skin tears that is covered by intact scab. Patient states this was due to skin tear \"about 1 and 1/2 weeks ago. No s/s infection noted and no wound care done with this older wound. Can use skin care nursing protocol for wound care.   "

## 2020-02-07 NOTE — OUTREACH NOTE
CHF Week 3 Survey      Responses   Facility patient discharged from?  [facility]   Does the patient have one of the following disease processes/diagnoses(primary or secondary)?  CHF   Week 3 attempt successful?  No   Revoke  Readmitted          Marycarmen Gonzales RN

## 2020-02-07 NOTE — PLAN OF CARE
Problem: Patient Care Overview  Goal: Plan of Care Review  Outcome: Ongoing (interventions implemented as appropriate)  Flowsheets (Taken 2/7/2020 0401)  Progress: no change (Pended)  Plan of Care Reviewed With: patient (Pended)    Patient's skin is continuing to be monitored. Bruising on abdomen and redness to perianal area. Heart rhythm is continued to be monitored.

## 2020-02-07 NOTE — THERAPY EVALUATION
Acute Care - Occupational Therapy Initial Evaluation   Jose     Patient Name: Pat Morel  : 1940  MRN: [id number]  Today's Date: 2020  Onset of Illness/Injury or Date of Surgery: 20  Date of Referral to OT: 20  Referring Physician: Dr. Mercado    Admit Date: 2020       ICD-10-CM ICD-9-CM   1. Acute renal failure, unspecified acute renal failure type (CMS/HCC) N17.9 584.9   2. Hyperkalemia E87.5 276.7   3. Atrial fibrillation, unspecified type (CMS/HCC) I48.91 427.31   4. Acute respiratory insufficiency R06.89 518.82   5. Elevated troponin R79.89 790.6   6. Impaired mobility and ADLs Z74.09 799.89     Patient Active Problem List   Diagnosis   • Persistent atrial fibrillation   • Coronary artery disease involving native coronary artery of native heart without angina pectoris   • Essential hypertension   • Cerebrovascular accident (CVA) due to embolism (CMS/[facility])   • Dyspnea on exertion   • Pulmonary arterial hypertension (CMS/HCC)   • Interstitial lung disease (CMS/HCC)   • Hypersensitivity pneumonitis (CMS/HCC)   • Gastric ulcer   • Coagulation disorder due to circulating anticoagulants (CMS/HCC)   • Dyslipidemia   • Acquired hypothyroidism   • Chronic kidney disease, stage 3 (CMS/HCC)   • GERD without esophagitis   • Iron deficiency anemia   • Acute on chronic diastolic congestive heart failure (CMS/HCC)   • Pneumonia of both lungs due to infectious organism   • Acute respiratory failure with hypoxia (CMS/HCC)   • Diastolic heart failure (CMS/HCC)   • Acute renal failure (CMS/HCC)   • Recurrent falls   • Acute hyperkalemia     Past Medical History:   Diagnosis Date   • Acute on chronic diastolic congestive heart failure (CMS/HCC) 2019   • Anemia    • Angina at rest (CMS/HCC)    • Arthritis    • Atrial fibrillation (CMS/HCC)    • Coronary artery disease    • Disease of thyroid gland    • Elevated cholesterol    • GERD without esophagitis 2019   • History of blood  transfusion    • History of colonoscopy 11/19/2019   • History of melena 11/19/2019   • History of stomach ulcers    • Hypertension    • Impaired functional mobility, balance, gait, and endurance    • Impaired functional mobility, balance, gait, and endurance    • Osteoporosis    • Positive TB test    • Stroke (CMS/HCC)     No deficits     Past Surgical History:   Procedure Laterality Date   • BRONCHOSCOPY Bilateral 4/12/2019    Procedure: BRONCHOSCOPY;  Surgeon: Jeff Laura MD;  Location:  [facility];  Service: Pulmonary   • BYPASS GRAFT     • COLONOSCOPY N/A 10/15/2019    Procedure: COLONOSCOPY;  Surgeon: Fredi Aaron MD;  Location:  [facility];  Service: Gastroenterology   • CORONARY ANGIOPLASTY WITH STENT PLACEMENT     • CORONARY ARTERY BYPASS GRAFT  10/18/2010   • ENDOSCOPY N/A 10/14/2019    Procedure: ESOPHAGOGASTRODUODENOSCOPY;  Surgeon: Fredi Aaron MD;  Location:  [facility];  Service: Gastroenterology   • HYSTERECTOMY     • STOMACH SURGERY  08/11/2019    Upper GI bleed    • STOMACH SURGERY  10/13/2019          OT ASSESSMENT FLOWSHEET (last 12 hours)      Occupational Therapy Evaluation     Row Name 02/07/20 1129                   OT Evaluation Time/Intention    Subjective Information  complains of;weakness;fatigue  -SD        Document Type  evaluation  -SD        Mode of Treatment  occupational therapy  -SD        Patient Effort  adequate  -SD        Symptoms Noted During/After Treatment  fatigue;shortness of breath  -SD           General Information    Patient Profile Reviewed?  yes  -SD        Onset of Illness/Injury or Date of Surgery  02/06/20  -SD        Referring Physician  Dr. Mercado  -SD        Patient Observations  alert;cooperative;agree to therapy  -SD        General Observations of Patient  supine in bed, urinary catheter, Ox  -SD        Prior Level of Function  independent:;all household mobility  -SD        Equipment Currently Used at Home  cane,  straight;oxygen;rollator transport WC  -SD        Pertinent History of Current Functional Problem  Acute renal failure; CAD, Afib, CKD, CHF, CVA, iron defic anemia, osteoporosis, HTN, OA  -SD        Existing Precautions/Restrictions  fall;oxygen therapy device and L/min  -SD        Risks Reviewed  patient:;increased discomfort  -SD        Benefits Reviewed  patient:;improve function;increase independence;increase strength;increase balance  -SD        Barriers to Rehab  previous functional deficit  -SD           Relationship/Environment    Primary Source of Support/Comfort  spouse  -SD        Lives With  spouse  -SD           Resource/Environmental Concerns    Current Living Arrangements  home/apartment/condo  -SD           Home Main Entrance    Number of Stairs, Main Entrance  one  -SD           Cognitive Assessment/Intervention- PT/OT    Orientation Status (Cognition)  oriented x 4  -SD        Follows Commands (Cognition)  verbal cues/prompting required  -SD        Safety Deficit (Cognitive)  safety precautions follow-through/compliance  -SD           Safety Issues, Functional Mobility    Safety Issues Affecting Function (Mobility)  safety precautions follow-through/compliance  -SD        Impairments Affecting Function (Mobility)  balance;endurance/activity tolerance;shortness of breath;strength  -SD           Bed Mobility Assessment/Treatment    Bed Mobility Assessment/Treatment  supine-sit  -SD        Supine-Sit [place] (Bed Mobility)  minimum assist (75% patient effort);2 person assist  -SD        Bed Mobility, Safety Issues  decreased use of arms for pushing/pulling;decreased use of legs for bridging/pushing;impaired trunk control for bed mobility  -SD        Assistive Device (Bed Mobility)  bed rails;draw sheet;head of bed elevated  -SD           Functional Mobility    Functional Mobility- Ind. Level  minimum assist (75% patient effort);2 person assist required  -SD        Functional Mobility- Device   rolling walker  -SD        Functional Mobility-Distance (Feet)  3  -SD        Functional Mobility- Safety Issues  balance decreased during turns;sequencing ability decreased;step length decreased;weight-shifting ability decreased;supplemental O2  -SD           Transfer Assessment/Treatment    Transfer Assessment/Treatment  sit-stand transfer;stand-sit transfer  -SD           Bed-Chair Transfer    Bed-Chair [place] (Transfers)  minimum assist (75% patient effort);2 person assist  -SD        Assistive Device (Bed-Chair Transfers)  walker, front-wheeled  -SD           Sit-Stand Transfer    Sit-Stand [place] (Transfers)  minimum assist (75% patient effort);2 person assist  -SD        Assistive Device (Sit-Stand Transfers)  walker, front-wheeled  -SD           Stand-Sit Transfer    Stand-Sit [place] (Transfers)  minimum assist (75% patient effort);2 person assist  -SD        Assistive Device (Stand-Sit Transfers)  walker, front-wheeled  -SD           ADL Assessment/Intervention    BADL Assessment/Intervention  bathing;upper body dressing;lower body dressing;grooming;feeding;toileting  -SD           Bathing Assessment/Intervention    Bathing [place] Level  moderate assist (50% patient effort)  -SD           Upper Body Dressing Assessment/Training    Upper Body Dressing [place] Level  minimum assist (75% patient effort)  -SD           Lower Body Dressing Assessment/Training    Lower Body Dressing [place] Level  maximum assist (25% patient effort)  -SD           Grooming Assessment/Training    [place] Level (Grooming)  minimum assist (75% patient effort)  -SD           Self-Feeding Assessment/Training    [place] Level (Feeding)  supervision  -SD           Toileting Assessment/Training    [place] Level (Toileting)  maximum assist (25% patient effort)  -SD           BADL Safety/Performance    Impairments, BADL Safety/Performance  balance;endurance/activity tolerance;shortness of  breath;strength;trunk/postural control  -SD           General ROM    GENERAL ROM COMMENTS  WFL  -SD           MMT (Manual Muscle Testing)    General MMT Comments  3+/5  -SD           Positioning and Restraints    Pre-Treatment Position  in bed  -SD        Post Treatment Position  chair  -SD        In Chair  reclined;call light within reach;encouraged to call for assist;with nsg  -SD           Pain Scale: Numbers Pre/Post-Treatment    Pain Scale: Numbers, Pretreatment  0/10 - no pain  -SD        Pain Scale: Numbers, Post-Treatment  3/10  -SD        Pain Location  chest  -SD        Pain Intervention(s)  Repositioned;Ambulation/increased activity  -SD           Wound 02/07/20 0950 Left anterior;upper arm Skin Tear    Wound - Properties Group Date first assessed: 02/07/20  -SB Time first assessed: 0950  -SB Side: Left  -SB Orientation: anterior;upper  -SB Location: arm  -SB Primary Wound Type: Skin tear  -SB       Coping    Observed Emotional State  calm;cooperative  -SD        Verbalized Emotional State  acceptance  -SD           Plan of Care Review    Plan of Care Reviewed With  patient  -SD        Progress  no change  -SD           Clinical Impression (OT)    Date of Referral to OT  02/06/20  -SD        OT Diagnosis  ADL decline  -SD        Patient/Family Goals Statement (OT Eval)  Increase strength and endurance  -SD        Criteria for Skilled Therapeutic Interventions Met (OT Eval)  yes  -SD        Rehab Potential (OT Eval)  good, to achieve stated therapy goals  -SD        Therapy Frequency (OT Eval)  3 times/wk 5 times if indicated  -SD        Care Plan Review (OT)  evaluation/treatment results reviewed  -SD        Anticipated Equipment Needs at Discharge (OT)  bedside commode  -SD        Anticipated Discharge Disposition (OT)  inpatient rehabilitation facility  -SD           Vital Signs    Pretreatment Heart Rate (beats/min)  95  -SD        Intratreatment Heart Rate (beats/min)  142  -SD        Posttreatment  Heart Rate (beats/min)  100  -SD        Pre SpO2 (%)  92  -SD        O2 Delivery Pre Treatment  supplemental O2  -SD        Intra SpO2 (%)  86  -SD        O2 Delivery Intra Treatment  supplemental O2  -SD        Post SpO2 (%)  92  -SD        O2 Delivery Post Treatment  supplemental O2  -SD           Planned OT Interventions    Planned Therapy Interventions (OT Eval)  activity tolerance training;adaptive equipment training;BADL retraining;patient/caregiver education/training;strengthening exercise;transfer/mobility retraining  -SD           OT Goals    Transfer Goal Selection (OT)  transfer, OT goal 1  -SD        Dressing Goal Selection (OT)  dressing, OT goal 1  -SD        Toileting Goal Selection (OT)  toileting, OT goal 1  -SD        Activity Tolerance Goal Selection (OT)  activity tolerance, OT goal 1  -SD        Functional Mobility Goal Selection (OT)  functional mobility, OT goal 1  -SD        Additional Documentation  Activity Tolerance Goal Selection (OT) (Row);Functional Mobility Selection (OT) (Row)  -SD           Transfer Goal 1 (OT)    Activity/Assistive Device (Transfer Goal 1, OT)  sit-to-stand/stand-to-sit;walker, rolling  -SD        [place] Level/Cues Needed (Transfer Goal 1, OT)  minimum assist (75% or more patient effort)  -SD        Time Frame (Transfer Goal 1, OT)  2 weeks  -SD        Progress/Outcome (Transfer Goal 1, OT)  goal ongoing  -SD           Dressing Goal 1 (OT)    Activity/Assistive Device (Dressing Goal 1, OT)  lower body dressing;reacher;sock-aid  -SD        [place]/Cues Needed (Dressing Goal 1, OT)  moderate assist (50-74% patient effort);minimum assist (75% or more patient effort)  -SD        Time Frame (Dressing Goal 1, OT)  2 weeks  -SD        Progress/Outcome (Dressing Goal 1, OT)  goal ongoing  -SD           Toileting Goal 1 (OT)    Activity/Device (Toileting Goal 1, OT)  toileting skills, all  -SD        [place] Level/Cues Needed (Toileting Goal 1, OT)  moderate  assist (50-74% patient effort);minimum assist (75% or more patient effort)  -SD        Time Frame (Toileting Goal 1, OT)  2 weeks  -SD        Progress/Outcome (Toileting Goal 1, OT)  goal ongoing  -SD            Activity Tolerance Goal 1 (OT)    Activity Tolerance Goal 1 (OT)  Patient to perform UB ther ex/ther act/ADL tasks   -SD        Activity Level (Endurance Goal 1, OT)  10 min activity;O2 sat >/ equal to 88%  -SD        Time Frame (Activity Tolerance Goal 1, OT)  long term goal (LTG)  -SD        Progress/Outcome (Activity Tolerance Goal 1, OT)  goal ongoing  -SD           Functional Mobility Goal 1 (OT)    Activity/Assistive Device (Functional Mobility Goal 1, OT)  walker, rolling  -SD        [place] Level/Cues Needed (Functional Mobility Goal 1, OT)  contact guard assist;minimum assist (75% or more patient effort)  -SD        Distance Goal 1 (Functional Mobility, OT)  50  -SD        Time Frame (Functional Mobility Goal 1, OT)  long term goal (LTG)  -SD        Progress/Outcome (Functional Mobility Goal 1, OT)  goal ongoing  -SD           Living Environment    Home Accessibility  stairs to enter home  -SD          User Key  (r) = Recorded By, (t) = Taken By, (c) = Cosigned By    Initials Name Effective Dates    SD Italia Schumacher OT 03/07/18 -     Marcelina Santiago RN 06/16/16 -                OT Recommendation and Plan  Outcome Summary/Treatment Plan (OT)  Anticipated Equipment Needs at Discharge (OT): bedside commode  Anticipated Discharge Disposition (OT): inpatient rehabilitation facility  Planned Therapy Interventions (OT Eval): activity tolerance training, adaptive equipment training, BADL retraining, patient/caregiver education/training, strengthening exercise, transfer/mobility retraining  Therapy Frequency (OT Eval): 3 times/wk(5 times if indicated)  Plan of Care Review  Plan of Care Reviewed With: patient  Plan of Care Reviewed With: patient  Outcome Summary: OT eval completed. Patient presents  deficits in strength, endurance, balance, mobility and ADL performance. Patient is expected to benefit from continued OT services prior to DC.    Outcome Measures     Row Name 02/07/20 1129             How much help from another is currently needed...    Putting on and taking off regular lower body clothing?  2  -SD      Bathing (including washing, rinsing, and drying)  2  -SD      Toileting (which includes using toilet bed pan or urinal)  2  -SD      Putting on and taking off regular upper body clothing  3  -SD      Taking care of personal grooming (such as brushing teeth)  3  -SD      Eating meals  3  -SD      AM-PAC 6 Clicks Score (OT)  15  -SD         Functional Assessment    Outcome Measure Options  AM-PAC 6 Clicks Daily Activity (OT)  -SD        User Key  (r) = Recorded By, (t) = Taken By, (c) = Cosigned By    Initials Name Provider Type    Italia Norris OT Occupational Therapist          Time Calculation:   Time Calculation- OT     Row Name 02/07/20 1543             Time Calculation- OT    OT Start Time  1129  -SD      OT Received On  02/07/20  -SD      OT Goal Re-Cert Due Date  02/17/20  -SD        User Key  (r) = Recorded By, (t) = Taken By, (c) = Cosigned By    Initials Name Provider Type    Italia Norris OT Occupational Therapist        Therapy Charges for Today     Code Description Service Date Service Provider Modifiers Qty    [id number]  OT EVAL MOD COMPLEXITY 3 2/7/2020 Italia Schumacher OT GO 1               Italia Schumacher OT  2/7/2020

## 2020-02-07 NOTE — PROGRESS NOTES
"Nephrology Progress Note.    LOS: 1 day    Patient Care Team:  Anthony Sweet DO as PCP - General (Family Medicine)  Tien Archer MD as Consulting Physician (Cardiac Electrophysiology)  Xu Block MD (Cardiology)  Yomi Higgins MD as Consulting Physician (Cardiology)    Chief Complaint:    Chief Complaint   Patient presents with   • Weakness - Generalized       Subjective:   Follow up for RENEE and Chronic Kidney disease stage 4 / Anemia.     Interval History:   Patient Complaints: none  Patient seen and examined this morning.  Events from last night noted.  Patient denies having any fevers chills.  No nausea or vomiting no abdominal pain.  Denies any chest pain, shortness of breath or cough and sputum production.  There is no significant edema.   Patient also denies having new onset weakness of numbness of either extremity.  History taken from: patient      Vital Signs  /91 (BP Location: Right arm, Patient Position: Lying)   Pulse 112   Temp 97.1 °F (36.2 °C) (Oral)   Resp 20   Ht 147.3 cm (58\")   Wt 63.2 kg (139 lb 4.8 oz)   LMP  (LMP Unknown)   SpO2 97%   BMI 29.11 kg/m²     I/O this shift:  In: 240 [P.O.:240]  Out: -     Intake/Output Summary (Last 24 hours) at 2/7/2020 0837  Last data filed at 2/7/2020 0830  Gross per 24 hour   Intake 740 ml   Output 1000 ml   Net -260 ml       Physical Exam:  General Appearance: alert, oriented x 3, no acute distress,   HEENT: Oral mucosa dry, extra occular movements intact. Sclera clear.  Skin: Warm and dry  Neck: supple, no JVD, trachea midline  Lungs:Chest shape is normal. Breath sounds heard bilaterally equally. No crackles, No wheezing.   Heart: Irregular rate and rhythm. normal S1 and S2, no S3, no rub, peripheral pulses weak but palpable.  Abdomen: Obese, soft, non-tender,  present bowel sounds to auscultation  : no palpable bladder.  Extremities: Trace edema, no cyanosis or clubbing.   Neuro: normal speech and mental status, grossly " non focal.     Results Review:   Results from last 7 days   Lab Units 02/07/20  0546 02/06/20  1148   SODIUM mmol/L 140 136   POTASSIUM mmol/L 3.9 6.1*   CHLORIDE mmol/L 100 93*   CO2 mmol/L 20.1* 19.9*   BUN mg/dL 105* 103*   CREATININE mg/dL 4.61* 4.99*   CALCIUM mg/dL 8.0* 9.3   ALBUMIN g/dL 3.60 4.20   BILIRUBIN mg/dL  --  2.9*   ALK PHOS U/L  --  216*   ALT (SGPT) U/L  --  163*   AST (SGOT) U/L  --  157*   GLUCOSE mg/dL 146* 165*     Estimated Creatinine Clearance: 8.4 mL/min (A) (by C-G formula based on SCr of 4.61 mg/dL (H)).  Results from last 7 days   Lab Units 02/07/20  0546   MAGNESIUM mg/dL 2.9*   PHOSPHORUS mg/dL 5.8*         Results from last 7 days   Lab Units 02/07/20  0546 02/06/20  1148   WBC 10*3/mm3 6.03 8.18   HEMOGLOBIN g/dL 8.7* 9.4*   PLATELETS 10*3/mm3 171 194     Results from last 7 days   Lab Units 02/07/20  0546   INR  1.71*     Brief Urine Lab Results  (Last result in the past 365 days)      Color   Clarity   Blood   Leuk Est   Nitrite   Protein   CREAT   Urine HCG        02/06/20 2315 Yellow Cloudy Moderate (2+) Negative Negative 100 mg/dL (2+)             No results found for: UTPCR  Imaging Results (Last 24 Hours)     Procedure Component Value Units Date/Time    XR Chest 1 View [[id number]] Collected:  02/06/20 1229     Updated:  02/06/20 1232    Narrative:       PROCEDURE: XR CHEST 1 VW-     HISTORY: dyspnea     COMPARISON: 01/09/2020 and 01/06/2020.     FINDINGS: The heart is mildly enlarged. The mediastinum is unremarkable.  There are chronic interstitial lung changes. No focal opacities were  effusions are evident. There is no pneumothorax.  There are no acute  osseous abnormalities.       Impression:       Chronic lung changes with no acute cardiopulmonary process.     Continued followup is recommended.     This report was finalized on 2/6/2020 12:29 PM by Shardan Radmanesh,  M.D..          apixaban 2.5 mg Oral Q12H   atorvastatin 40 mg Oral Nightly   bisoprolol 5 mg Oral Q24H    budesonide 0.5 mg Nebulization BID - RT   isosorbide mononitrate 60 mg Oral Daily   levothyroxine 75 mcg Oral Daily   pantoprazole 40 mg Oral Daily   predniSONE 10 mg Oral Daily   sodium chloride 10 mL Intravenous Q12H            Medication Review:   Current Facility-Administered Medications   Medication Dose Route Frequency Provider Last Rate Last Dose   • acetaminophen (TYLENOL) tablet 650 mg  650 mg Oral Q4H PRN Denis Mercado MD        Or   • acetaminophen (TYLENOL) 160 MG/5ML solution 650 mg  650 mg Oral Q4H PRN Denis Mercado MD        Or   • acetaminophen (TYLENOL) suppository 650 mg  650 mg Rectal Q4H PRN Denis Mercado MD       • apixaban (ELIQUIS) tablet 2.5 mg  2.5 mg Oral Q12H Denis Mercado MD   2.5 mg at 02/06/20 2104   • atorvastatin (LIPITOR) tablet 40 mg  40 mg Oral Nightly Denis Mercado MD   40 mg at 02/06/20 2104   • bisoprolol (ZEBeta) tablet 5 mg  5 mg Oral Q24H Denis Mercado MD       • budesonide (PULMICORT) nebulizer solution 0.5 mg  0.5 mg Nebulization BID - RT Denis Mercado MD   0.5 mg at 02/07/20 0711   • ipratropium-albuterol (DUO-NEB) nebulizer solution 3 mL  3 mL Nebulization Q4H PRN Denis Mercado MD   3 mL at 02/06/20 2007   • isosorbide mononitrate (IMDUR) 24 hr tablet 60 mg  60 mg Oral Daily Denis Mercado MD       • levothyroxine (SYNTHROID, LEVOTHROID) tablet 75 mcg  75 mcg Oral Daily Denis Mercado MD       • ondansetron (ZOFRAN) injection 4 mg  4 mg Intravenous Q6H PRN Denis Mercado MD       • pantoprazole (PROTONIX) EC tablet 40 mg  40 mg Oral Daily Denis Mercado MD       • predniSONE (DELTASONE) tablet 10 mg  10 mg Oral Daily Denis Mercado MD       • sodium chloride 0.9 % flush 10 mL  10 mL Intravenous PRN Denis Mercado MD       • sodium chloride 0.9 % flush 10 mL  10 mL Intravenous Q12H Denis Mercado MD   10 mL at 02/06/20 2104   • sodium chloride 0.9 % flush 10 mL  10 mL Intravenous PRN Denis Mercado MD           Assessment/Plan:    1. Acute renal failure (CMS/HCC): It does appear  that she has worsening of her baseline renal function, at this point it is not very clear if this is all acute or may have some worsening of her chronic renal failure as well.  She likely has a significant component of cardiorenal syndrome.  We will check a UA, Merchant catheter was placed in the ER with no significant increase in output.  2. Chronic kidney disease stage IV: He has known to have stage IV chronic kidney disease, she has been admitted almost every 2 weeks since middle of December.  Every time has she has some worsening of her renal function.  He was not on any ACE inhibitors or denies using any nonsteroidals.  She did not have any significant hematuria or proteinuria.  May end up requiring dialysis this admission.  3. Hyperkalemia: Potassium of 6.1 he was given fairly good dose of diuretic likely will have helped some.  I will go ahead and give a dose of Kayexalate and continue to follow closely.  4. Metabolic acidosis: Her bicarb is only slightly low.  19.9 will need to be watched closely.  5. Abnormal liver function: It is quite likely that she has an arrhythmia that might have led to the current picture of congestive heart failure as well as congestive liver.  6. Anemia: He had fairly normal iron stores on her last visit.  Will check iron stores again she may be needing clear if she is anemic.  Work-up as ordered.  7. Coronary artery disease, status post CABG, status post stent placement: May need cardiac evaluation again during this hospitalization.  Last echo that was done in October 2019 showed mild to moderate mitral regurgitation, moderate tricuspid regurgitation with elevated pulmonary pressure of 45 to 55 mmHg.  8. Acute on chronic respiratory failure:  9. Hypersensitivity pneumonitis  10. Interstitial lung disease      Plan:  · Hold off giving any more diuretics, clinically appears to be stable, she has been eating and drinking fairly well.  She has more than 300 cc of urine in the Merchant  catheter bag this morning.  · Patient is well aware of may end up requiring dialysis short-term or long-term, she is fine with it.  She wants to set it up at [place] dialysis [facility].  · Details were discussed with the patient as well as family in the room.    · Details were also discussed with the hospitalist service.   · Surveillance labs.  · Further recommendations will depend on clinical course of the patient during the current hospitalization.    · I also discussed the details with the nursing staff.  · Rest as ordered.    Sanford Tsang MD, JULIANN  02/07/20  8:37 AM    Dictated utilizing Dragon dictation.

## 2020-02-07 NOTE — CONSULTS
Adult Nutrition  Assessment/PES    Patient Name:  Pat Morel  YOB: 1940  MRN: [id number]  Admit Date:  2/6/2020    Assessment Date:  2/7/2020    Comments:    Recommend:  1. Consider liberalization to Regular, Cardiac, Renal diet order as medically appropriate and tolerated.  2. Encourage PO intake. PO intake average ~75% x 1 meal.  3. RD ordered Novasource Renal daily.  4. Consider a renal multivitamin with minerals daily.     RD available PRN.      Reason for Assessment     Row Name 02/07/20 0955          Reason for Assessment    Reason For Assessment  nurse/nurse practitioner consult     Diagnosis  cardiac disease;renal disease;pulmonary disease CAD, Afib, CKD Stage III, ARF, CHF, HTN, Hyperkalemia, Chronic hypoxic respiratory failure, heart failure     Identified At Risk by Screening Criteria  other (see comments) Chronic poor intake           Anthropometrics     Row Name 02/07/20 0500          Anthropometrics    Weight  63.2 kg (139 lb 4.8 oz)         Labs/Tests/Procedures/Meds     Row Name 02/07/20 0959          Labs/Procedures/Meds    Lab Results Reviewed  reviewed, pertinent     Lab Results Comments  High: Gluc, BUN, Cr, Phos, Mg++, ALT, Total Bili.        Medications    Pertinent Medications Reviewed  reviewed         Physical Findings     Row Name 02/07/20 1006          Physical Findings    Overall Physical Appearance  overweight         Estimated/Assessed Needs     Row Name 02/07/20 1007          Calculation Measurements    Weight Used For Calculations  51.1 kg (112 lb 11.4 oz) Adjusted BW         Nutrition Prescription Ordered     Row Name 02/07/20 1006          Nutrition Prescription PO    Current PO Diet  Regular     Common Modifiers  Cardiac;Consistent Carbohydrate;Renal         Evaluation of Received Nutrient/Fluid Intake     Row Name 02/07/20 1007          Calculation Measurements    Weight Used For Calculations  51.1 kg (112 lb 11.4 oz) Adjusted BW        PO Evaluation     Number of Days PO Intake Evaluated  1 day     Number of Meals  1     % PO Intake  75               Problem/Interventions:  Problem 1     Row Name 02/07/20 1009          Nutrition Diagnoses Problem 1    Problem 1  Impaired Nutrient Utilization     Etiology (related to)  Medical Diagnosis     Renal  CKD;ARF     Signs/Symptoms (evidenced by)  Biochemical     Specific Labs Noted  Glucose;BUN;Creatinine;Phosphorus         Problem 2     Row Name 02/07/20 1013          Nutrition Diagnoses Problem 2    Problem 2  Overweight/Obesity     Etiology (related to)  Factors Affecting Nutrition     Food Habit/Preferences  Large Meals     Signs/Symptoms (evidenced by)  BMI     BMI  25 - 29.9             Intervention Goal     Row Name 02/07/20 1013          Intervention Goal    General  Meet nutritional needs for age/condition     PO  Meet estimated needs;Maintain intake     Weight  No significant weight loss         Nutrition Intervention     Row Name 02/07/20 1013          Nutrition Intervention    RD/Tech Action  Follow Tx progress;Encourage intake;Recommend/ordered     Recommended/Ordered  Supplement         Nutrition Prescription     Row Name 02/07/20 1013          Nutrition Prescription PO    PO Prescription  Begin/change diet;Begin/change supplement     Begin/Change Diet to  Regular     Supplement  Nova Renal     Supplement Frequency  Daily     Common Modifiers  Cardiac;Renal     New PO Prescription Ordered?  No, recommended        Other Orders    Obtain Weight  Daily     Obtain Weight Ordered?  No, recommended     Supplement  Vitamin mineral supplement Renal     Supplement Ordered?  No, recommended         Education/Evaluation     Row Name 02/07/20 1014          Education    Education  Will Instruct as appropriate        Monitor/Evaluation    Monitor  I&O;Per protocol;PO intake;Supplement intake;Pertinent labs;Weight;Skin status           Electronically signed by:  Samantha Oneill RD  02/07/20 10:14 AM

## 2020-02-07 NOTE — PLAN OF CARE
Problem: Patient Care Overview  Goal: Plan of Care Review  Outcome: Ongoing (interventions implemented as appropriate)  Flowsheets (Taken 2/7/2020 1617)  Progress: no change  Plan of Care Reviewed With: patient  Outcome Summary: VSS.  No c/o pain.  She has been up in the chair today.  Resting comfortably.  Dr. Horvath consulted today.  Continue w/current treatment plan.

## 2020-02-07 NOTE — PROGRESS NOTES
Discharge Planning Assessment   Jose     Patient Name: Pat Bass  MRN: [id number]  Today's Date: 2/7/2020    Admit Date: 2/6/2020    Discharge Needs Assessment     Row Name 02/07/20 1143       Living Environment    Lives With  spouse    Name(s) of Who Lives With Patient  Dell Bass     Current Living Arrangements  home/apartment/condo    Primary Care Provided by  self    Provides Primary Care For  no one    Family Caregiver if Needed  child(jennie), adult;spouse    Family Caregiver Names  Dell     Quality of Family Relationships  involved    Able to Return to Prior Arrangements  yes       Resource/Environmental Concerns    Transportation Concerns  car, none       Transition Planning    Patient/Family Anticipates Transition to  home with help/services    Patient/Family Anticipated Services at Transition  home health care    Transportation Anticipated  family or friend will provide       Discharge Needs Assessment    Readmission Within the Last 30 Days  previous discharge plan unsuccessful    Concerns to be Addressed  discharge planning    Equipment Currently Used at Home  oxygen;nebulizer    Anticipated Changes Related to Illness  inability to care for self    Equipment Needed After Discharge  commode        Discharge Plan     Row Name 02/07/20 1147       Plan    Plan  Home with Home Health     Provided post acute provider list?  N/A    Patient/Family in Agreement with Plan  yes    Plan Comments  Case Managment spoke to pt she reprots she lives with her  and has children to assisit as needed She has been reciving Home Health with [facility] Skilled nursoing PT/OT   She has Oxygen 2.5 LNC  continously  GOULDS   She has a Nebulizer Walker with wheels she would like a BSC  Gave her the option of short term rehab she will think about it  She has a POA Living will         Destination      Coordination has not been started for this encounter.      Durable Medical Equipment       Coordination has not been started for this encounter.      Dialysis/Infusion      Coordination has not been started for this encounter.      Home Medical Care      Coordination has not been started for this encounter.      Therapy      Coordination has not been started for this encounter.      Community Resources      Coordination has not been started for this encounter.          Demographic Summary     Row Name 02/07/20 1141       General Information    Admission Type  inpatient    Arrived From  emergency department        Functional Status    No documentation.       Psychosocial    No documentation.       Abuse/Neglect    No documentation.       Legal    No documentation.       Substance Abuse    No documentation.       Patient Forms    No documentation.           Kelsey Peña RN

## 2020-02-07 NOTE — PLAN OF CARE
Problem: Patient Care Overview  Goal: Plan of Care Review  Outcome: Ongoing (interventions implemented as appropriate)  Flowsheets (Taken 2/7/2020 1126)  Outcome Summary: OT eval completed. Patient presents deficits in strength, endurance, balance, mobility and ADL performance. Patient is expected to benefit from continued OT services prior to DC.

## 2020-02-07 NOTE — THERAPY EVALUATION
Patient Name: Pat Morel  : 1940    MRN: [id number]                              Today's Date: 2020       Admit Date: 2020    Visit Dx:     ICD-10-CM ICD-9-CM   1. Acute renal failure, unspecified acute renal failure type (CMS/Formerly [facility]) N17.9 584.9   2. Hyperkalemia E87.5 276.7   3. Atrial fibrillation, unspecified type (CMS/[facility]ly [facility]) I48.91 427.31   4. Acute respiratory insufficiency R06.89 518.82   5. Elevated troponin R79.89 790.6     Patient Active Problem List   Diagnosis   • Persistent atrial fibrillation   • Coronary artery disease involving native coronary artery of native heart without angina pectoris   • Essential hypertension   • Cerebrovascular accident (CVA) due to embolism (CMS/[facility])   • Dyspnea on exertion   • Pulmonary arterial hypertension (CMS/[facility])   • Interstitial lung disease (CMS/[facility])   • Hypersensitivity pneumonitis (CMS/[facility])   • Gastric ulcer   • Coagulation disorder due to circulating anticoagulants (CMS/[facility])   • Dyslipidemia   • Acquired hypothyroidism   • Chronic kidney disease, stage 3 (CMS/[facility])   • GERD without esophagitis   • Iron deficiency anemia   • Acute on chronic diastolic congestive heart failure (CMS/[facility])   • Pneumonia of both lungs due to infectious organism   • Acute respiratory failure with hypoxia (CMS/[facility])   • Diastolic heart failure (CMS/[facility])   • Acute renal failure (CMS/[facility])   • Recurrent falls   • Acute hyperkalemia     Past Medical History:   Diagnosis Date   • Acute on chronic diastolic congestive heart failure (CMS/[facility]) 2019   • Anemia    • Angina at rest (CMS/[facility])    • Arthritis    • Atrial fibrillation (CMS/[facility])    • Coronary artery disease    • Disease of thyroid gland    • Elevated cholesterol    • GERD without esophagitis 2019   • History of blood transfusion    • History of colonoscopy 2019   • History of melena 2019   • History of stomach ulcers    • Hypertension    • Impaired functional mobility, balance, gait, and endurance     • Impaired functional mobility, balance, gait, and endurance    • Osteoporosis    • Positive TB test    • Stroke (CMS/HCC)     No deficits     Past Surgical History:   Procedure Laterality Date   • BRONCHOSCOPY Bilateral 4/12/2019    Procedure: BRONCHOSCOPY;  Surgeon: Jeff Laura MD;  Location:  [facility];  Service: Pulmonary   • BYPASS GRAFT     • COLONOSCOPY N/A 10/15/2019    Procedure: COLONOSCOPY;  Surgeon: Fredi Aaron MD;  Location:  [facility];  Service: Gastroenterology   • CORONARY ANGIOPLASTY WITH STENT PLACEMENT     • CORONARY ARTERY BYPASS GRAFT  10/18/2010   • ENDOSCOPY N/A 10/14/2019    Procedure: ESOPHAGOGASTRODUODENOSCOPY;  Surgeon: Fredi Aaron MD;  Location:  [facility];  Service: Gastroenterology   • HYSTERECTOMY     • STOMACH SURGERY  08/11/2019    Upper GI bleed    • STOMACH SURGERY  10/13/2019     General Information     Row Name 02/07/20 1130          PT Evaluation Time/Intention    Document Type  evaluation  -LM     Mode of Treatment  physical therapy  -LM     Row Name 02/07/20 1130          General Information    Patient Profile Reviewed?  yes  -LM     Prior Level of Function  independent:;all household mobility  -LM     Existing Precautions/Restrictions  fall;oxygen therapy device and L/min  -LM     Barriers to Rehab  previous functional deficit  -LM     Row Name 02/07/20 1130          Relationship/Environment    Lives With  spouse  -LM     Row Name 02/07/20 1130          Resource/Environmental Concerns    Current Living Arrangements  home/apartment/condo  -LM     Row Name 02/07/20 1130          Home Main Entrance    Number of Stairs, Main Entrance  one  -LM     Row Name 02/07/20 1130          Stairs Within Home, Primary    Number of Stairs, Within Home, Primary  none  -LM     Row Name 02/07/20 1130          Cognitive Assessment/Intervention- PT/OT    Orientation Status (Cognition)  oriented x 4  -LM     Row Name 02/07/20 1130          Safety  Issues, Functional Mobility    Safety Issues Affecting Function (Mobility)  safety precaution awareness;safety precautions follow-through/compliance  -LM     Impairments Affecting Function (Mobility)  balance;endurance/activity tolerance;shortness of breath;strength;pain  -LM       User Key  (r) = Recorded By, (t) = Taken By, (c) = Cosigned By    Initials Name Provider Type    LM Lupe Baugh, PT Physical Therapist        Mobility     Row Name 02/07/20 1130          Bed Mobility Assessment/Treatment    Bed Mobility Assessment/Treatment  supine-sit  -LM     Supine-Sit Wetzel (Bed Mobility)  minimum assist (75% patient effort);2 person assist  -LM     Assistive Device (Bed Mobility)  bed rails;draw sheet;head of bed elevated  -LM     Row Name 02/07/20 1130          Bed-Chair Transfer    Bed-Chair Wetzel (Transfers)  minimum assist (75% patient effort);2 person assist  -LM     Assistive Device (Bed-Chair Transfers)  walker, front-wheeled  -LM     Row Name 02/07/20 1130          Sit-Stand Transfer    Sit-Stand Wetzel (Transfers)  minimum assist (75% patient effort);2 person assist  -LM     Assistive Device (Sit-Stand Transfers)  walker, front-wheeled  -LM     Row Name 02/07/20 1130          Gait/Stairs Assessment/Training    Gait/Stairs Assessment/Training  gait/ambulation assistive device  -LM     Wetzel Level (Gait)  minimum assist (75% patient effort);2 person assist  -LM     Assistive Device (Gait)  walker, front-wheeled  -LM     Distance in Feet (Gait)  3'  -LM     Pattern (Gait)  step-to  -LM     Deviations/Abnormal Patterns (Gait)  base of support, narrow;chivo decreased;festinating/shuffling;gait speed decreased;stride length decreased  -LM     Bilateral Gait Deviations  forward flexed posture;heel strike decreased;knee buckling, bilateral;weight shift ability decreased  -LM       User Key  (r) = Recorded By, (t) = Taken By, (c) = Cosigned By    Initials Name Provider Type    LM  Lupe Baugh, PT Physical Therapist        Obj/Interventions     Row Name 02/07/20 1130          General ROM    GENERAL ROM COMMENTS  WFL  -LM     Row Name 02/07/20 1130          MMT (Manual Muscle Testing)    General MMT Comments  3+/5  -LM     Row Name 02/07/20 1130          Static Sitting Balance    Level of Hart (Unsupported Sitting, Static Balance)  contact guard assist  -LM     Sitting Position (Unsupported Sitting, Static Balance)  sitting on edge of bed  -LM     Row Name 02/07/20 1130          Dynamic Sitting Balance    Level of [place], Reaches Outside Midline (Sitting, Dynamic Balance)  contact guard assist  -LM     Sitting Position, Reaches Outside Midline (Sitting, Dynamic Balance)  sitting on edge of bed  -LM     Row Name 02/07/20 1130          Static Standing Balance    Level of [place] (Supported Standing, Static Balance)  minimal assist, 75% patient effort;2 person assist  -LM     Assistive Device Utilized (Supported Standing, Static Balance)  walker, rolling  -LM     Row Name 02/07/20 1130          Dynamic Standing Balance    Level of [place], Reaches Outside Midline (Standing, Dynamic Balance)  minimal assist, 75% patient effort;2 person assist  -LM     Assistive Device Utilized (Supported Standing, Dynamic Balance)  walker, rolling  -LM       User Key  (r) = Recorded By, (t) = Taken By, (c) = Cosigned By    Initials Name Provider Type    LM Lupe Baugh, PT Physical Therapist        Goals/Plan     Row Name 02/07/20 1130          Bed Mobility Goal 1 (PT)    Activity/Assistive Device (Bed Mobility Goal 1, PT)  bed mobility activities, all;bed rails  -LM     Hart Level/Cues Needed (Bed Mobility Goal 1, PT)  contact guard assist  -LM     Time Frame (Bed Mobility Goal 1, PT)  short term goal (STG);10 days  -LM     Progress/Outcomes (Bed Mobility Goal 1, PT)  goal ongoing  -LM     Row Name 02/07/20 1130          Transfer Goal 1 (PT)    Activity/Assistive Device (Transfer  Goal 1, PT)  sit-to-stand/stand-to-sit;bed-to-chair/chair-to-bed;toilet;walker, rolling  -LM     [place] Level/Cues Needed (Transfer Goal 1, PT)  minimum assist (75% or more patient effort)  -LM     Time Frame (Transfer Goal 1, PT)  short term goal (STG);10 days  -LM     Progress/Outcome (Transfer Goal 1, PT)  goal ongoing  -LM     Row Name 02/07/20 1130          Gait Training Goal 1 (PT)    Activity/Assistive Device (Gait Training Goal 1, PT)  gait (walking locomotion);assistive device use;walker, rolling  -LM     [place] Level (Gait Training Goal 1, PT)  minimum assist (75% or more patient effort)  -LM     Distance (Gait Goal 1, PT)  30'  -LM     Time Frame (Gait Training Goal 1, PT)  short term goal (STG);10 days  -LM     Progress/Outcome (Gait Training Goal 1, PT)  goal ongoing  -LM     Row Name 02/07/20 1130          Patient Education Goal (PT)    Activity (Patient Education Goal, PT)  Pt will perform B LE ther ex x 15 reps.  -LM     [place]/Cues/Accuracy (Memory Goal 2, PT)  demonstrates adequately;verbalizes understanding  -LM     Time Frame (Patient Education Goal, PT)  short term goal (STG);10 days  -LM     Progress/Outcome (Patient Education Goal, PT)  goal ongoing  -LM       User Key  (r) = Recorded By, (t) = Taken By, (c) = Cosigned By    Initials Name Provider Type    LM Lupe Baugh, PT Physical Therapist        Clinical Impression     Row Name 02/07/20 1130          Pain Assessment    Additional Documentation  Pain Scale: Numbers Pre/Post-Treatment (Group)  -LM     Row Name 02/07/20 1130          Pain Scale: Numbers Pre/Post-Treatment    Pain Scale: Numbers, Pretreatment  0/10 - no pain  -LM     Pain Scale: Numbers, Post-Treatment  3/10  -LM     Pain Location  chest  -LM     Pain Intervention(s)  Repositioned;Ambulation/increased activity  -LM     Row Name 02/07/20 1130          Plan of Care Review    Plan of Care Reviewed With  patient  -LM     Progress  no change  -LM     Row Name  02/07/20 1130          Physical Therapy Clinical Impression    Patient/Family Goals Statement (PT Clinical Impression)  Get stronger and return home.  -LM     Criteria for Skilled Interventions Met (PT Clinical Impression)  yes;treatment indicated  -LM     Rehab Potential (PT Clinical Summary)  good, to achieve stated therapy goals  -LM     Row Name 02/07/20 1130          Vital Signs    Pretreatment Heart Rate (beats/min)  95  -LM     Intratreatment Heart Rate (beats/min)  142  -LM     Posttreatment Heart Rate (beats/min)  100  -LM     Pre SpO2 (%)  92  -LM     O2 Delivery Pre Treatment  supplemental O2 5 LPM  -LM     Intra SpO2 (%)  86  -LM     O2 Delivery Intra Treatment  supplemental O2 5 LPM  -LM     Post SpO2 (%)  92  -LM     O2 Delivery Post Treatment  supplemental O2 5 LPM  -LM     Row Name 02/07/20 1130          Positioning and Restraints    Pre-Treatment Position  in bed  -LM     Post Treatment Position  chair  -LM     In Chair  call light within reach;encouraged to call for assist;with nsg  -LM       User Key  (r) = Recorded By, (t) = Taken By, (c) = Cosigned By    Initials Name Provider Type    LM Lupe Baugh, PT Physical Therapist        Outcome Measures     Row Name 02/07/20 1130          How much help from another person do you currently need...    Turning from your back to your side while in flat bed without using bedrails?  3  -LM     Moving from lying on back to sitting on the side of a flat bed without bedrails?  2  -LM     Moving to and from a bed to a chair (including a wheelchair)?  2  -LM     Standing up from a chair using your arms (e.g., wheelchair, bedside chair)?  2  -LM     Climbing 3-5 steps with a railing?  1  -LM     To walk in hospital room?  2  -LM     AM-PAC 6 Clicks Score (PT)  12  -LM     Row Name 02/07/20 1130          Functional Assessment    Outcome Measure Options  AM-PAC 6 Clicks Basic Mobility (PT)  -LM       User Key  (r) = Recorded By, (t) = Taken By, (c) = Cosigned By     Initials Name Provider Type    Lupe Franco, PT Physical Therapist          PT Recommendation and Plan  Planned Therapy Interventions (PT Eval): balance training, strengthening, bed mobility training, gait training, home exercise program, patient/family education, transfer training  Outcome Summary/Treatment Plan (PT)  Anticipated Discharge Disposition (PT): unknown, anticipate therapy at next level of care  Plan of Care Reviewed With: patient  Progress: no change  Outcome Summary: PT eval completed.   Patient presents with deficits in strength, balance, endurance and independence. She is expected to benefit from continued skilled PT intervention to improve her mobility status prior to D/C.     Time Calculation:   PT Charges     Row Name 02/07/20 1204             Time Calculation    Start Time  1130  -LM      PT Received On  02/07/20  -ANAYELI      PT Goal Re-Cert Due Date  02/17/20  -ANAYELI        User Key  (r) = Recorded By, (t) = Taken By, (c) = Cosigned By    Initials Name Provider Type    Lupe Franco, PT Physical Therapist        Therapy Charges for Today     Code Description Service Date Service Provider Modifiers Qty    [id number]  PT EVAL MOD COMPLEXITY 3 2/7/2020 Lupe Baugh, PT GP 1          PT G-Codes  Outcome Measure Options: AM-PAC 6 Clicks Basic Mobility (PT)  AM-PAC 6 Clicks Score (PT): 12    Lupe Baugh PT  2/7/2020

## 2020-02-07 NOTE — PROGRESS NOTES
[facility]    [facility]    Name:  Pat Morel   Age:  80 y.o.  Sex:  female  :  1940  MRN:  [id number]   Visit Number:  [id number]  Admission Date:  2020  Date Of Service:  20  Primary Care Physician:  Anthony Sweet DO     LOS: 1 day :  Patient Care Team:  Anthony Sweet DO as PCP - General (Family Medicine)  Tien Archer MD as Consulting Physician (Cardiac Electrophysiology)  Xu Block MD (Cardiology)  Yomi Higgins MD as Consulting Physician (Cardiology):    Chief Complaint:      Generalized weakness and follow-up of renal failure.    Subjective / Interval History:     Ms. Morel is currently lying down on the bed and is comfortable at rest.  She did work with physical therapy and apparently developed increased shortness of breath and some chest pain.  But currently feeling better.  Denies any significant overnight events.  Her renal function has slightly improved.  She does have chronic shortness of breath because of hypersensitivity pneumonitis.  She was admitted from the emergency room yesterday with worsening renal failure.  She has been seen by Dr. Finnegan for acute on chronic renal failure.  He is planning to give her gentle IV hydration today.    Review of Systems:     General ROS: Patient denies any fevers, chills or loss of consciousness.  Generalized weakness.  Respiratory ROS: Chronic shortness of breath.  Cardiovascular ROS: Denies chest pain or palpitations. No history of exertional chest pain.  Gastrointestinal ROS: Denies nausea and vomiting. Denies any abdominal pain. No diarrhea.  Neurological ROS: Denies any focal weakness. No loss of consciousness. Denies any numbness.  Dermatological ROS: Denies any redness or pruritis.    Vital Signs:    Temp:  [97.1 °F (36.2 °C)-98.1 °F (36.7 °C)] 97.4 °F (36.3 °C)  Heart Rate:  [] 103  Resp:  [16-20] 20  BP: (104-142)/(71-91) 113/71    Intake and output:    I/O last 3  completed shifts:  In: 500 [IV Piggyback:500]  Out: 1000 [Urine:1000]  I/O this shift:  In: 480 [P.O.:480]  Out: -     Physical Examination:    General Appearance:  Alert and cooperative, not in any acute distress.   Head:  Atraumatic and normocephalic, without obvious abnormality.  Facial puffiness noted due to steroid use.   Eyes:          PERRLA, conjunctivae and sclerae normal, no Icterus. No pallor. Extraocular movements are within normal limits.   Neck: Supple, trachea midline, no thyromegaly, no carotid bruit.   Lungs:   Chest shape is normal. Breath sounds heard bilaterally equally.  No wheezing.  Bilateral coarse crackles heard.  No Pleural rub or bronchial breathing.   Heart:  Normal S1 and S2, no murmur, no gallop, no rub. No JVD   Abdomen:   Normal bowel sounds, no masses, no organomegaly. Soft, non-tender, non-distended, no guarding, no rebound tenderness.  Large area of ecchymosis noted in the mid and lower abdomen bilaterally.   Extremities: Moves all extremities well, 1+ edema, no cyanosis, no clubbing.   Skin: No bleeding or rash.  Ecchymotic patches noted in both upper and lower extremities.   Neurologic: Awake, alert and oriented times 3. Moves all 4 extremities equally but does have generalized weakness.     Laboratory results:    Results from last 7 days   Lab Units 02/07/20  0546 02/06/20  1148   SODIUM mmol/L 140 136   POTASSIUM mmol/L 3.9 6.1*   CHLORIDE mmol/L 100 93*   CO2 mmol/L 20.1* 19.9*   BUN mg/dL 105* 103*   CREATININE mg/dL 4.61* 4.99*   CALCIUM mg/dL 8.0* 9.3   BILIRUBIN mg/dL  --  2.9*   ALK PHOS U/L  --  216*   ALT (SGPT) U/L  --  163*   AST (SGOT) U/L  --  157*   GLUCOSE mg/dL 146* 165*     Results from last 7 days   Lab Units 02/07/20  0546 02/06/20  1148   WBC 10*3/mm3 6.03 8.18   HEMOGLOBIN g/dL 8.7* 9.4*   HEMATOCRIT % 27.9* 30.3*   PLATELETS 10*3/mm3 171 194     Results from last 7 days   Lab Units 02/07/20  0546   INR  1.71*     Results from last 7 days   Lab Units  02/07/20  0546 02/06/20  1148   TROPONIN T ng/mL 0.103* 0.106*           I have reviewed the patient's laboratory results.    Radiology results:    Imaging Results (Last 24 Hours)     ** No results found for the last 24 hours. **        I have reviewed the patient's radiology reports.    Medication Review:     I have reviewed the patients active and prn medications.       Acute renal failure (CMS/HCC)    Persistent atrial fibrillation    Coronary artery disease involving native coronary artery of native heart without angina pectoris    Essential hypertension    Pulmonary arterial hypertension (CMS/HCC)    Interstitial lung disease (CMS/HCC)    Hypersensitivity pneumonitis (CMS/HCC)    Acquired hypothyroidism    Chronic kidney disease, stage 3 (CMS/HCC)    GERD without esophagitis    Diastolic heart failure (CMS/HCC)    Recurrent falls    Acute hyperkalemia    Assessment:    1.  Acute renal failure on chronic kidney disease stage III, present on admission.  2.  Recurrent falls at home secondary to uremia, present on admission.  3.  Acute hyperkalemia secondary to #1, present on admission.  4.  Hypersensitivity pneumonitis on prednisone therapy.  5.  Chronic hypoxic respiratory failure on home oxygen.  6.  Chronic diastolic heart failure, no evidence of exacerbation.  7.  Paroxysmal atrial fibrillation on apixaban.  8.  Coronary artery disease status post stents and CABG.  9.  Chronic pulmonary hypertension.  10.  Acquired hypothyroidism.  11.  Elevated transaminases of uncertain etiology.    Plan:    Ms. Morel  is currently doing better and is hemodynamically stable.  Renal function is slightly better.  She is being followed by Dr. Tsang and I have discussed the patient's condition with him.  He is planning to give her gentle IV hydration today.  For now, we will continue to hold off on dialysis.    Patient does have elevated troponin levels.  This is likely related to the worsening renal failure.  We will get a 2D  echocardiogram to rule out regional wall motion abnormalities.  She will be continued on Eliquis.  We will add aspirin.  We will get a 2D echocardiogram to assess for any regional wall motion abnormalities.  She states that she follows up with Dr. Higgins from cardiology.    Patient will be continued on her home medications including prednisone 30 mg daily for her hypersensitivity pneumonitis.  She will be continued on Eliquis.  We will consult physical and Occupational Therapy today for history of recurrent falls and generalized weakness.  Patient's CODE STATUS is DNR.  Further recommendations depend upon her clinical course.    Denis Mercado MD  02/07/20  1:32 PM    Dictated utilizing Dragon dictation.

## 2020-02-08 NOTE — PROGRESS NOTES
"   LOS: 2 days    Patient Care Team:  Anthony Sweet DO as PCP - General (Family Medicine)  Tien Archer MD as Consulting Physician (Cardiac Electrophysiology)  Xu Block MD (Cardiology)  Yomi Higgins MD as Consulting Physician (Cardiology)    Chief Complaint:    Chief Complaint   Patient presents with   • Weakness - Generalized     Follow UP RENEE CKD4  Subjective     Interval History:   I/O 720/900.  Desatured getting from bed to chair and took minutes to recover per son.  Denies dyspnea at rest.    Objective     Vital Signs  Temp:  [97.2 °F (36.2 °C)-98 °F (36.7 °C)] 98 °F (36.7 °C)  Heart Rate:  [] 99  Resp:  [16-20] 16  BP: (110-127)/(71-94) 122/94    Flowsheet Rows      First Filed Value   Admission Height  147.3 cm (58\") Documented at 02/06/2020 1139   Admission Weight  60.8 kg (134 lb) Documented at 02/06/2020 1139          I/O this shift:  In: -   Out: 400 [Urine:400]  I/O last 3 completed shifts:  In: 720 [P.O.:720]  Out: 1600 [Urine:1600]    Intake/Output Summary (Last 24 hours) at 2/8/2020 0815  Last data filed at 2/8/2020 0742  Gross per 24 hour   Intake 720 ml   Output 1300 ml   Net -580 ml       Physical Exam:  GEN frail WF in chair no distress  Neck supple no JVD  Lungs dec BS bilat  CV RRR  abd soft NT/ND  vasc 1+ BLE pedal/ankle edema     Results Review:    Results from last 7 days   Lab Units 02/08/20  0624 02/07/20  0546 02/06/20  1148   SODIUM mmol/L 140 140 136   POTASSIUM mmol/L 3.7 3.9 6.1*   CHLORIDE mmol/L 99 100 93*   CO2 mmol/L 21.8* 20.1* 19.9*   BUN mg/dL 102* 105* 103*   CREATININE mg/dL 4.53* 4.61* 4.99*   CALCIUM mg/dL 8.3* 8.0* 9.3   BILIRUBIN mg/dL 1.8*  --  2.9*   ALK PHOS U/L 195*  --  216*   ALT (SGPT) U/L 149*  --  163*   AST (SGOT) U/L 130*  --  157*   GLUCOSE mg/dL 117* 146* 165*       Estimated Creatinine Clearance: 8.6 mL/min (A) (by C-G formula based on SCr of 4.53 mg/dL (H)).    Results from last 7 days   Lab Units 02/07/20  0546   MAGNESIUM " mg/dL 2.9*   PHOSPHORUS mg/dL 5.8*             Results from last 7 days   Lab Units 02/08/20  0624 02/07/20  0546 02/06/20  1148   WBC 10*3/mm3 6.99 6.03 8.18   HEMOGLOBIN g/dL 8.7* 8.7* 9.4*   PLATELETS 10*3/mm3 153 171 194       Results from last 7 days   Lab Units 02/07/20  0546   INR  1.71*         Imaging Results (Last 24 Hours)     ** No results found for the last 24 hours. **          apixaban 2.5 mg Oral Q12H   aspirin 81 mg Oral Daily   atorvastatin 40 mg Oral Nightly   bisoprolol 5 mg Oral Q24H   budesonide 0.5 mg Nebulization BID - RT   isosorbide mononitrate 60 mg Oral Daily   levothyroxine 75 mcg Oral Daily   pantoprazole 40 mg Oral Daily   predniSONE 30 mg Oral Daily   sodium chloride 10 mL Intravenous Q12H          Medication Review:   Current Facility-Administered Medications   Medication Dose Route Frequency Provider Last Rate Last Dose   • acetaminophen (TYLENOL) tablet 650 mg  650 mg Oral Q4H PRN Denis Mercado MD   650 mg at 02/07/20 2302    Or   • acetaminophen (TYLENOL) 160 MG/5ML solution 650 mg  650 mg Oral Q4H PRN Denis Mercado MD        Or   • acetaminophen (TYLENOL) suppository 650 mg  650 mg Rectal Q4H PRN Denis Mercado MD       • apixaban (ELIQUIS) tablet 2.5 mg  2.5 mg Oral Q12H Denis Mercado MD   2.5 mg at 02/07/20 2105   • aspirin EC tablet 81 mg  81 mg Oral Daily Denis Mercado MD   81 mg at 02/07/20 1557   • atorvastatin (LIPITOR) tablet 40 mg  40 mg Oral Nightly Denis Mercado MD   40 mg at 02/07/20 2105   • bisoprolol (ZEBeta) tablet 5 mg  5 mg Oral Q24H Denis Mercado MD   5 mg at 02/07/20 0838   • budesonide (PULMICORT) nebulizer solution 0.5 mg  0.5 mg Nebulization BID - RT Denis Mercado MD   0.5 mg at 02/08/20 0630   • ipratropium-albuterol (DUO-NEB) nebulizer solution 3 mL  3 mL Nebulization Q4H PRN Denis Mercado MD   3 mL at 02/07/20 2000   • isosorbide mononitrate (IMDUR) 24 hr tablet 60 mg  60 mg Oral Daily Denis Mercado MD   60 mg at 02/07/20 0838   • levothyroxine  (SYNTHROID, LEVOTHROID) tablet 75 mcg  75 mcg Oral Daily Denis Mercado MD   75 mcg at 02/07/20 0838   • ondansetron (ZOFRAN) injection 4 mg  4 mg Intravenous Q6H PRN Denis Mercado MD       • pantoprazole (PROTONIX) EC tablet 40 mg  40 mg Oral Daily Denis Mercado MD   40 mg at 02/07/20 0838   • predniSONE (DELTASONE) tablet 30 mg  30 mg Oral Daily Denis Mercado MD       • sodium chloride 0.9 % flush 10 mL  10 mL Intravenous PRN Denis Mercado MD       • sodium chloride 0.9 % flush 10 mL  10 mL Intravenous Q12H Denis Mercado MD   10 mL at 02/07/20 2106   • sodium chloride 0.9 % flush 10 mL  10 mL Intravenous PRN Denis Mercado MD           Assessment/Plan   1. Acute renal failure (CMS/HCC): It does appear that she has worsening of her baseline renal function, at this point it is not very clear if this is all acute or may have some worsening of her chronic renal failure as well.  She likely has a significant component of cardiorenal syndrome.  We will check a UA, Merchant catheter was placed in the ER with no significant increase in output.  2. Chronic kidney disease stage IV: He has known to have stage IV chronic kidney disease, she has been admitted almost every 2 weeks since middle of December.  Every time has she has some worsening of her renal function.  He was not on any ACE inhibitors or denies using any nonsteroidals.  She did not have any significant hematuria or proteinuria.  May end up requiring dialysis this admission.  3. Hyperkalemia: Potassium of 6.1 he was given fairly good dose of diuretic likely will have helped some.  I will go ahead and give a dose of Kayexalate and continue to follow closely.  4. Metabolic acidosis: Her bicarb is only slightly low.  19.9 will need to be watched closely.  5. Abnormal liver function: It is quite likely that she has an arrhythmia that might have led to the current picture of congestive heart failure as well as congestive liver.  6. Anemia: He had fairly normal iron stores  on her last visit.  Will check iron stores again she may be needing clear if she is anemic.  Work-up as ordered.  7. Coronary artery disease, status post CABG, status post stent placement: May need cardiac evaluation again during this hospitalization.  Last echo that was done in October 2019 showed mild to moderate mitral regurgitation, moderate tricuspid regurgitation with elevated pulmonary pressure of 45 to 55 mmHg.  8. Acute on chronic respiratory failure:  9. Hypersensitivity pneumonitis   10. Interstitial lung disease    plan  - no acute indication for HD -- Cr down slightly 4.9 - 4.6 - 4.5, while BUN same ~ 100, in part due to steroid use, on for chronic lung dz.  Plan was to wean prednisone this coming week to 20mg/day, so will go ahead and adjust dose.  Resume PO bumex.  Echo is pending.  Note patient reluctance re: cardiac w/u.        Acute renal failure (CMS/HCC)    Persistent atrial fibrillation    Coronary artery disease involving native coronary artery of native heart without angina pectoris    Essential hypertension    Pulmonary arterial hypertension (CMS/HCC)    Interstitial lung disease (CMS/HCC)    Hypersensitivity pneumonitis (CMS/HCC)    Acquired hypothyroidism    Chronic kidney disease, stage 3 (CMS/HCC)    GERD without esophagitis    Diastolic heart failure (CMS/HCC)    Recurrent falls    Acute hyperkalemia              Gurwinder Tay MD  02/08/20  8:15 AM

## 2020-02-08 NOTE — CONSULTS
"[facility] Consult Note    Referring Provider: Jess  Reason for Consultation: Abnormal troponin and chest pain    Patient Care Team:  Anthony Sweet DO as PCP - General (Family Medicine)  Tien Archer MD as Consulting Physician (Cardiac Electrophysiology)  Xu Block MD (Cardiology)  Yomi Higgins MD as Consulting Physician (Cardiology)    Chief complaint : Chest pain    Subjective:    History of present illness: This is a 80-year-old female patient with known coronary artery disease who presented to the emergency room with fatigue, generalized weakness increasing peripheral edema shortness of breath with activity and chest discomfort with basic activities of daily living.  The patient indicates that for the last 6 months or so she has been experiencing a retrosternal burning and pressure sensation with minimal activities.  The patient indicates that in the fall 2019 she underwent coronary angiography by Dr. Mcdonnell in [facility].  At that time she was informed that \"everything looked okay\".  She did not require catheter-based intervention.  The patient indicates that her symptoms have remained largely unchanged in regards to frequency duration or intensity since that time.  The patient indicates that she had an episode of chest discomfort while transitioning from her hospital bed to a bedside chair yesterday which lasted approximately 5-10 minutes.  The patient indicates that her chest discomfort is associated with shortness of breath but no nausea vomiting or diaphoresis.  It does not radiate.  It has a 6-8/10 intensity and responds to sublingual nitroglycerin.  The patient has had multivessel coronary artery bypass surgery 9 years ago.  The details are unknown.  On presentation to the emergency room the patient was noted to be significantly anemic and has advanced chronic renal failure with a serum creatinine of 4.9 mg/dL.  She is nonoliguric and has responded favorably to diuretic " "therapy but is not on hemodialysis.  The patient was also noted to have significant anemia which has trended downward during her hospital stay.  She has no history of clinical GI blood loss.  She has a history of permanent atrial fibrillation and is on Eliquis adjusted for her age and renal failure.  She is also on enteric-coated aspirin 81 mg/day.  She has no signs or symptoms to suggest recent stroke or TIA.  She has a history of prior stroke presumably related to her atrial fibrillation with no significant residual deficit.  She was experiencing some orthopnea and peripheral edema.  The patient is known to have a normal ejection fraction but has significant right heart failure due to chronic cor pulmonale secondary to moderate-severe secondary pulmonary hypertension due to pulmonary interstitial lung disease.  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  It is unconfirmed but suspected that the patient may have taken amiodarone in the past.  Amiodarone would be considered strictly contraindicated.  The patient has a history of previous upper GI bleeding presenting with melena in November 2019 which was due to gastric ulceration.  The patient has been previously treated at [facility] under the care of the cardiologist service there, pulmonary specialist and GI specialist.  She recently saw my partner Dr. Higgins at the [place] cardiology clinic.  Since that initial visit she has either canceled or \"no-show\" for her last 5 appointments.  Dr. Higgins had discussed with her the option of doing additional ischemic testing.  However, the patient indicates that she does not wish to pursue any invasive or noninvasive testing.  Her twelve-lead electrocardiogram shows permanent atrial fibrillation with relative rate control.  She is noted to have approximately 1/2 mm of downsloping ST segment depression in the mid precordial leads which is unchanged from her cardiac tracing in January of this year when " "her troponins were normal.  Her troponins have been mildly elevated in a \"plateau\" pattern.  She does not appear to have any significant dementia and is able to accurately detail her previous cardiovascular care.    Review of Systems   Review of Systems   Constitution: Positive for malaise/fatigue. Negative for chills, diaphoresis, fever, weight gain and weight loss.   HENT: Negative for ear discharge, hearing loss, hoarse voice and nosebleeds.    Eyes: Negative for discharge, double vision, pain and photophobia.   Cardiovascular: Positive for chest pain, dyspnea on exertion and leg swelling. Negative for claudication, cyanosis, irregular heartbeat, near-syncope, orthopnea, palpitations, paroxysmal nocturnal dyspnea and syncope.   Respiratory: Positive for shortness of breath. Negative for cough, hemoptysis, sputum production and wheezing.    Endocrine: Negative for cold intolerance, heat intolerance, polydipsia, polyphagia and polyuria.   Hematologic/Lymphatic: Negative for adenopathy and bleeding problem. Does not bruise/bleed easily.   Skin: Negative for color change, flushing, itching and rash.   Musculoskeletal: Negative for muscle cramps, muscle weakness, myalgias and stiffness.   Gastrointestinal: Negative for abdominal pain, diarrhea, hematemesis, hematochezia, nausea and vomiting.   Genitourinary: Negative for dysuria, frequency and nocturia.   Neurological: Positive for dizziness and light-headedness. Negative for focal weakness, loss of balance, numbness, paresthesias and seizures.   Psychiatric/Behavioral: Negative for altered mental status, hallucinations and suicidal ideas.   Allergic/Immunologic: Negative for HIV exposure, hives and persistent infections.       History  Past Medical History:   Diagnosis Date   • Acute on chronic diastolic congestive heart failure (CMS/HCC) 12/20/2019   • Anemia    • Angina at rest (CMS/HCC)    • Arthritis    • Atrial fibrillation (CMS/HCC)    • Coronary artery disease "    • Disease of thyroid gland    • Elevated cholesterol    • GERD without esophagitis 12/2/2019   • History of blood transfusion    • History of colonoscopy 11/19/2019   • History of melena 11/19/2019   • History of stomach ulcers    • Hypertension    • Impaired functional mobility, balance, gait, and endurance    • Impaired functional mobility, balance, gait, and endurance    • Osteoporosis    • Positive TB test    • Stroke (CMS/HCC)     No deficits   ,   Past Surgical History:   Procedure Laterality Date   • BRONCHOSCOPY Bilateral 4/12/2019    Procedure: BRONCHOSCOPY;  Surgeon: Jeff Laura MD;  Location:  [facility];  Service: Pulmonary   • BYPASS GRAFT     • COLONOSCOPY N/A 10/15/2019    Procedure: COLONOSCOPY;  Surgeon: Fredi Aaron MD;  Location:  [facility];  Service: Gastroenterology   • CORONARY ANGIOPLASTY WITH STENT PLACEMENT     • CORONARY ARTERY BYPASS GRAFT  10/18/2010   • ENDOSCOPY N/A 10/14/2019    Procedure: ESOPHAGOGASTRODUODENOSCOPY;  Surgeon: Fredi Aaron MD;  Location:  [facility];  Service: Gastroenterology   • HYSTERECTOMY     • STOMACH SURGERY  08/11/2019    Upper GI bleed    • STOMACH SURGERY  10/13/2019   ,   Family History   Problem Relation Age of Onset   • Cancer Mother    • Arthritis Mother    • No Known Problems Father    • Liver disease Sister    ,   Social History     Tobacco Use   • Smoking status: Never Smoker   • Smokeless tobacco: Never Used   Substance Use Topics   • Alcohol use: No   • Drug use: No   ,   Medications Prior to Admission   Medication Sig Dispense Refill Last Dose   • apixaban (ELIQUIS) 2.5 MG tablet tablet Take 2.5 mg by mouth 2 (Two) Times a Day.   2/6/2020 at Unknown time   • atorvastatin (LIPITOR) 40 MG tablet Take 1 tablet by mouth Every Night. 90 tablet 3 2/5/2020 at Unknown time   • bisoprolol (ZEBeta) 5 MG tablet Take 1 tablet by mouth 2 (Two) Times a Day. 60 tablet 0 2/6/2020 at Unknown time   • budesonide (PULMICORT)  "0.5 MG/2ML nebulizer solution Inhale contents of 1 vial through a nebulizer 2 (Two) Times a Day 120 mL 0 2/5/2020 at Unknown time   • bumetanide (BUMEX) 2 MG tablet Take 1 tablet by mouth 2 (Two) Times a Day. 60 tablet 0 2/6/2020 at Unknown time   • ferrous sulfate 325 (65 FE) MG tablet Take twice daily on Monday, Wednesday, and Friday 24 tablet 5 2/5/2020 at Unknown time   • ipratropium-albuterol (DUO-NEB) 0.5-2.5 mg/3 ml nebulizer Take 3 mL by nebulization Every 4 (Four) Hours As Needed for Wheezing or Shortness of Air (cough). 120 mL 0 2/5/2020 at Unknown time   • isosorbide mononitrate (IMDUR) 120 MG 24 hr tablet Take 0.5 tablets by mouth Daily.   2/6/2020 at Unknown time   • levothyroxine (SYNTHROID, LEVOTHROID) 75 MCG tablet Take 1 tablet by mouth Daily. 90 tablet 2 2/6/2020 at Unknown time   • pantoprazole (PROTONIX) 40 MG EC tablet Take 1 tablet by mouth Daily. 90 tablet 3 2/6/2020 at Unknown time   • potassium chloride (MICRO-K) 10 MEQ CR capsule Take 2 capsules by mouth Daily. 30 capsule 0 2/6/2020 at Unknown time   • predniSONE (DELTASONE) 20 MG tablet Take 2 tablets by mouth Daily With Breakfast. 28 tablet 0 2/6/2020 at Unknown time   • Vitamin D, Cholecalciferol, 50 MCG (2000 UT) capsule Take 1 capsule by mouth Daily.   2/6/2020 at Unknown time   • nitroglycerin (NITROSTAT) 0.4 MG SL tablet Place 0.4 mg under the tongue Every 5 (Five) Minutes As Needed.   Unknown at Unknown time   • predniSONE (DELTASONE) 10 MG tablet Take 1 tablet by mouth Daily. 30 tablet 0     and Allergies:  Tuberculin tests    Objective:    Vital Sign Min/Max for last 24 hours  Temp  Min: 97.2 °F (36.2 °C)  Max: 98 °F (36.7 °C)   BP  Min: 110/77  Max: 127/88   Pulse  Min: 85  Max: 103   Resp  Min: 16  Max: 20   SpO2  Min: 88 %  Max: 100 %   Flow (L/min)  Min: 2.5  Max: 4   Weight  Min: 65.1 kg (143 lb 9.6 oz)  Max: 65.1 kg (143 lb 9.6 oz)     Flowsheet Rows      First Filed Value   Admission Height  147.3 cm (58\") Documented at " 02/06/2020 1139   Admission Weight  60.8 kg (134 lb) Documented at 02/06/2020 1139             Physical Exam:   Physical Exam   Constitutional: She is oriented to person, place, and time. She appears well-developed and well-nourished. No distress.   HENT:   Head: Normocephalic and atraumatic.   Mouth/Throat: Oropharynx is clear and moist.   Eyes: Pupils are equal, round, and reactive to light. Conjunctivae and EOM are normal. No scleral icterus.   Neck: Normal range of motion. Neck supple. No JVD present. No tracheal deviation present. No thyromegaly present.   Cardiovascular: Normal rate, S1 normal, S2 normal, normal heart sounds and intact distal pulses. An irregularly irregular rhythm present. PMI is not displaced. Exam reveals no gallop and no friction rub.   No murmur heard.  Pulses:       Carotid pulses are 2+ on the right side, and 2+ on the left side.       Radial pulses are 1+ on the right side, and 1+ on the left side.        Dorsalis pedis pulses are 1+ on the right side, and 1+ on the left side.        Posterior tibial pulses are 1+ on the right side, and 1+ on the left side.   Pulmonary/Chest: Effort normal and breath sounds normal. No respiratory distress. She has no wheezes. She has no rales.   Abdominal: Soft. Bowel sounds are normal. She exhibits no distension and no mass. There is no tenderness. There is no rebound and no guarding.   Musculoskeletal: Normal range of motion. She exhibits no edema or deformity.   Neurological: She is alert and oriented to person, place, and time. She displays normal reflexes. No cranial nerve deficit. Coordination normal.   Skin: Skin is warm and dry. No rash noted. She is not diaphoretic. No erythema.   Psychiatric: She has a normal mood and affect. Her behavior is normal. Thought content normal.       Results Review:   I reviewed the patient's new clinical results.  Results from last 7 days   Lab Units 02/08/20  0624 02/07/20  0546 02/06/20  1148   WBC 10*3/mm3  6.99 6.03 8.18   HEMOGLOBIN g/dL 8.7* 8.7* 9.4*   HEMATOCRIT % 27.9* 27.9* 30.3*   PLATELETS 10*3/mm3 153 171 194     Results from last 7 days   Lab Units 02/08/20  0624 02/07/20  0546 02/06/20  1148   SODIUM mmol/L 140 140 136   POTASSIUM mmol/L 3.7 3.9 6.1*   CHLORIDE mmol/L 99 100 93*   CO2 mmol/L 21.8* 20.1* 19.9*   BUN mg/dL 102* 105* 103*   CREATININE mg/dL 4.53* 4.61* 4.99*   GLUCOSE mg/dL 117* 146* 165*   CALCIUM mg/dL 8.3* 8.0* 9.3     Lab Results   Lab Value Date/Time    TROPONINT 0.100 (C) 02/08/2020 0624    TROPONINT 0.103 (C) 02/07/2020 0546    TROPONINT 0.106 (C) 02/06/2020 1148    TROPONINT 0.025 01/18/2020 1136    TROPONINT 0.024 01/17/2020 1509    TROPONINT <0.010 01/06/2020 0539    TROPONINT <0.010 01/05/2020 1635    TROPONINT 0.012 01/04/2020 1950             Assessment/Plan:      Acute renal failure (CMS/HCC).  Serum creatinine is just under 5 mg/dL.  She remains nonoliguric.    Persistent atrial fibrillation.  Anticoagulation and rate control strategy.    Coronary artery disease involving native coronary artery of native heart with angina pectoris.  Prior coronary artery bypass surgery 9 years ago.    Essential hypertension    Pulmonary arterial hypertension (CMS/HCC).  Chronic cor pulmonale with right heart failure features.  WHO group 3 from chronic hypersensitivity pneumonitis.    Interstitial lung disease (CMS/HCC)    Hypersensitivity pneumonitis (CMS/HCC)    Acquired hypothyroidism    Chronic kidney disease, stage 3 (CMS/HCC)    GERD without esophagitis    Diastolic heart failure (CMS/HCC)    Recurrent falls    Acute hyperkalemia    Severe anemia.  Possible relation to stage V chronic renal failure.  Occult GI blood loss being evaluated.    Unstable angina.  I suspect her chest discomfort is being provoked by her anemia and poorly controlled rate-pressure product.  Her twelve-lead electrocardiogram shows permanent atrial fibrillation with a less than ideal rate control.  There is 1/2 mm of  "downsloping ST segment depression in the mid precordial leads which is unchanged compared to a twelve-lead electrocardiogram 1 month ago when cardiac troponins were normal.  There is no injury current.  Cardiac troponins have been mildly elevated in a \"plateau\" pattern.  I suspect her chest discomfort is in fact ischemic in nature.  However, the suspicion for an acute coronary syndrome/coronary thrombosis/plaque rupture/non-ST elevation myocardial infarction/etc. is low I suspect this is a non-thrombotic biomarker event due to advanced renal failure and severe anemia with underlying fixed obstructive small vessel coronary disease.  The patient indicates that the frequency duration and intensity of her chest discomfort has been unchanged for the last 4-6 months.  The patient indicates that in the fall of last year she underwent coronary angiography from the right femoral approach in [facility] and was told at that time her grafts looked okay and there were no unprotected vascular territories that required PCI.    I have discussed with the patient various treatment options.  She is reluctant to have any cardiovascular testing either invasive or noninvasive.  The patient has either canceled or been a \"no-show\" for her last 5 cardiology appointments.  I have discussed with the patient the option of invasive coronary angiography with interventional standby with emphasis on the risk of contrast-induced worsening renal failure which would almost certainly require at least temporary hemodialysis.  The patient indicates that she would prefer medical therapy.  I have also discussed with the patient that if she were to undergo an invasive coronary procedure, she would have to be willing to temporarily reverse her DNR status for 72-96 hours post procedure as per hospital protocol.  She indicates that this point she would be reluctant to agree with that change, but may feel differently if it were a life-threatening " scenario.  In line with her wishes, I have recommended trying triple therapy with aspirin 81 mg/day Eliquis 2.5 mg/day and Plavix 75 mg/day.  If she demonstrates evidence of occult GI blood loss or recurrent overt clinical GI bleeding, we will have to rethink her anticoagulation/antiplatelet therapy.  I have recommended increasing her Bystolic to 20 mg orally once per day.  I would try to achieve a resting heart rate of around 60 bpm.  I have read commended continuing her Imdur with unchanged dose.  I have recommended adding ranolazine 500 mg orally twice per day.  Echocardiogram is pending.    I discussed the patients findings and my recommendations with patient and consulting provider    Nahid Horvath MD  02/08/20  10:17 AM

## 2020-02-08 NOTE — SIGNIFICANT NOTE
02/08/20 1348   Rehab Treatment   Discipline physical therapist   Reason Treatment Not Performed patient/family declined treatment  (Pt on phone with granddaughter who currently is living in [place]. Pt stated she wanted to transfer to chair however Family declined PT to assist at this time as they would like her to continue converation as it is difficult to get in touch with her.)   Recommendation   PT - Next Appointment 02/09/20

## 2020-02-08 NOTE — PLAN OF CARE
Problem: Patient Care Overview  Goal: Plan of Care Review  Outcome: Ongoing (interventions implemented as appropriate)  Flowsheets (Taken 2/8/2020 7706)  Progress: no change  Plan of Care Reviewed With: patient  Outcome Summary: VSS.  Patient complains of bilateral ankle pain, PRN tylenol given.  Continue to monitor labs this morning.

## 2020-02-08 NOTE — PROGRESS NOTES
[facility]    Name:  Pat Morel   Age:  80 y.o.  Sex:  female  :  1940  MRN:  [id number]   Visit Number:  [id number]  Admission Date:  2020  Date Of Service:  20  Primary Care Physician:  Anthony Sweet DO     LOS: 2 days :  Patient Care Team:  Anthony Sweet DO as PCP - General (Family Medicine)  Tien Archer MD as Consulting Physician (Cardiac Electrophysiology)  Xu Block MD (Cardiology)  Yomi Higgins MD as Consulting Physician (Cardiology):    Chief Complaint:      Generalized weakness and follow-up of renal failure.    Subjective / Interval History:     Ms. Morel is currently lying down on the bed and is comfortable at rest.  She does get shortness of breath on minimal exertion.  With physical therapy yesterday and was able to sit on the chair.  She was seen by Dr. Horvath this morning for her elevated troponin levels and history of on and off chest pain.  He recommended cardiac catheterization to her but she wants to do medical therapy at this time.  Denies any chest pain at this time.    Patient is a 80-year-old female with history including CAD with CABG, atrial fibrillation, chronic kidney disease stage III, history of CVA, diastolic CHF, iron deficiency anemia, hypersensitivity pneumonitis, hypertension, osteoporosis was brought to the emergency room by EMS with generalized weakness on 2020.  She was admitted with acute worsening of her chronic renal failure, elevated troponin levels  as well as hyperkalemia.  She does have chronic respiratory failure which has been stable on prednisone.  She was seen by Dr. Tsang from nephrology who recommended gentle IV hydration.  Her renal function has slowly improved and her hyperkalemia has resolved.  She was also seen by Dr. Horvath for her elevated troponin levels.  He felt that the patient's troponin levels were related to her coronary artery disease in the  setting of renal failure and anemia.  He has recommended triple antiplatelet/anticoagulant agents with aspirin, Plavix and Eliquis.  He has also added Ranexa and increased her Bystolic dose.  An echocardiogram was done and the results are currently pending.    Review of Systems:     General ROS: Patient denies any fevers, chills or loss of consciousness.  Generalized weakness.  Respiratory ROS: Chronic shortness of breath.  Cardiovascular ROS: Denies chest pain or palpitations. No history of exertional chest pain.  Gastrointestinal ROS: Denies nausea and vomiting. Denies any abdominal pain. No diarrhea.  Neurological ROS: Denies any focal weakness. No loss of consciousness. Denies any numbness.  Dermatological ROS: Denies any redness or pruritis.    Vital Signs:    Temp:  [97.2 °F (36.2 °C)-98.1 °F (36.7 °C)] 98.1 °F (36.7 °C)  Heart Rate:  [] 90  Resp:  [16-20] 18  BP: (110-127)/(71-94) 116/75    Intake and output:    I/O last 3 completed shifts:  In: 720 [P.O.:720]  Out: 1600 [Urine:1600]  I/O this shift:  In: 240 [P.O.:240]  Out: 400 [Urine:400]    Physical Examination:    General Appearance:  Alert and cooperative, not in any acute distress.   Head:  Atraumatic and normocephalic, without obvious abnormality.  Facial puffiness noted due to steroid use.   Eyes:          PERRLA, conjunctivae and sclerae normal, no Icterus. No pallor. Extraocular movements are within normal limits.   Neck: Supple, trachea midline, no thyromegaly, no carotid bruit.   Lungs:   Chest shape is normal. Breath sounds heard bilaterally equally.  No wheezing.  Bilateral coarse crackles heard.  No Pleural rub or bronchial breathing.   Heart:  Normal S1 and S2, no murmur, no gallop, no rub. No JVD   Abdomen:   Normal bowel sounds, no masses, no organomegaly. Soft, non-tender, non-distended, no guarding, no rebound tenderness.  Large area of ecchymosis noted in the mid and lower abdomen bilaterally.   Extremities: Moves all extremities  well, 1+ edema, no cyanosis, no clubbing.   Skin: No bleeding or rash.  Ecchymotic patches noted in both upper and lower extremities.   Neurologic: Awake, alert and oriented times 3. Moves all 4 extremities equally but does have generalized weakness.     Laboratory results:    Results from last 7 days   Lab Units 02/08/20  0624 02/07/20  0546 02/06/20  1148   SODIUM mmol/L 140 140 136   POTASSIUM mmol/L 3.7 3.9 6.1*   CHLORIDE mmol/L 99 100 93*   CO2 mmol/L 21.8* 20.1* 19.9*   BUN mg/dL 102* 105* 103*   CREATININE mg/dL 4.53* 4.61* 4.99*   CALCIUM mg/dL 8.3* 8.0* 9.3   BILIRUBIN mg/dL 1.8*  --  2.9*   ALK PHOS U/L 195*  --  216*   ALT (SGPT) U/L 149*  --  163*   AST (SGOT) U/L 130*  --  157*   GLUCOSE mg/dL 117* 146* 165*     Results from last 7 days   Lab Units 02/08/20  0624 02/07/20  0546 02/06/20  1148   WBC 10*3/mm3 6.99 6.03 8.18   HEMOGLOBIN g/dL 8.7* 8.7* 9.4*   HEMATOCRIT % 27.9* 27.9* 30.3*   PLATELETS 10*3/mm3 153 171 194     Results from last 7 days   Lab Units 02/07/20  0546   INR  1.71*     Results from last 7 days   Lab Units 02/08/20  0624 02/07/20  0546 02/06/20  1148   TROPONIN T ng/mL 0.100* 0.103* 0.106*           I have reviewed the patient's laboratory results.    Radiology results:    Imaging Results (Last 24 Hours)     ** No results found for the last 24 hours. **        I have reviewed the patient's radiology reports.    Medication Review:     I have reviewed the patients active and prn medications.       Acute renal failure (CMS/HCC)    Persistent atrial fibrillation    Coronary artery disease involving native coronary artery of native heart without angina pectoris    Essential hypertension    Pulmonary arterial hypertension (CMS/HCC)    Interstitial lung disease (CMS/HCC)    Hypersensitivity pneumonitis (CMS/HCC)    Acquired hypothyroidism    Chronic kidney disease, stage 3 (CMS/HCC)    GERD without esophagitis    Diastolic heart failure (CMS/HCC)    Recurrent falls    Acute  hyperkalemia    Assessment:    1.  Acute renal failure on chronic kidney disease stage III, present on admission.  2.  Recurrent falls at home secondary to uremia, present on admission.  3.  Acute hyperkalemia secondary to #1, present on admission.  4.  Demand ischemia with elevated troponin levels, present on admission.  5.  Hypersensitivity pneumonitis on prednisone therapy.  6.  Chronic hypoxic respiratory failure on home oxygen.  7.  Chronic diastolic heart failure, no evidence of exacerbation.  8.  Paroxysmal atrial fibrillation on apixaban.  9.  Coronary artery disease status post stents and CABG.  10.  Chronic pulmonary hypertension.  11.  Acquired hypothyroidism.  12.  Elevated transaminases of uncertain etiology, improving.    Plan:    Ms. Morel  is currently doing better and is hemodynamically stable.  Renal function is improving slowly.  She is being followed by nephrology services.  At this time there is no indication for dialysis.    Patient has elevated troponins which are plateaued.  She was seen by Dr. Horvath today and he has recommended addition of Plavix to her aspirin and Eliquis.  He has increased her Bystolic dose and added Ranexa for her chest pain.  Patient is reluctant for invasive cardiac work-up at this time.  A 2D echocardiogram was done yesterday and the report is currently pending.  She follows up with Dr. Higgins as an outpatient.    Patient will be continued on her home medications including prednisone.  We will decrease it to 20 mg daily from today.  She will be continued on physical and Occupational Therapy.  Patient's CODE STATUS is DNR.  Further recommendations depend upon her clinical course.    Denis Mercado MD  02/08/20  12:48 PM    Dictated utilizing Dragon dictation.

## 2020-02-09 NOTE — PROGRESS NOTES
[facility]    Name:  Pat Morel   Age:  80 y.o.  Sex:  female  :  1940  MRN:  [id number]   Visit Number:  [id number]  Admission Date:  2020  Date Of Service:  20  Primary Care Physician:  Anthony Sweet DO     LOS: 3 days :  Patient Care Team:  Anthony Sweet DO as PCP - General (Family Medicine)  Tien Archer MD as Consulting Physician (Cardiac Electrophysiology)  Xu Block MD (Cardiology)  Yomi Higgins MD as Consulting Physician (Cardiology):    Chief Complaint:      Generalized weakness and follow-up of renal failure.    Subjective / Interval History:     Ms. Morel is currently lying down on the bed and is comfortable at rest.  Denies any chest pain this morning.  Does complain of generalized weakness.  She was seen by Dr. Horvath yesterday for elevated troponin levels and on and off chest pains.  Patient refused invasive cardiac work-up.  Dr. Horvath recommended medical therapy and added Plavix to her current regimen of aspirin and Eliquis.  Unfortunately, her hemoglobin is slightly decreased to 8.1 from 8.7 yesterday.  Creatinine however has improved slightly.  No significant overnight events.    Patient is a 80-year-old female with history including CAD with CABG, atrial fibrillation, chronic kidney disease stage III, history of CVA, diastolic CHF, iron deficiency anemia, hypersensitivity pneumonitis, hypertension, osteoporosis was brought to the emergency room by EMS with generalized weakness on 2020.  She was admitted with acute worsening of her chronic renal failure, elevated troponin levels  as well as hyperkalemia.  She does have chronic respiratory failure which has been stable on prednisone.  She is currently on prednisone 20 mg daily started this week.  She will taper it down to 10 mg every week and be continued on 10 mg daily until she sees her pulmonologist Dr. Laura.      She was seen by   Trinh from nephrology who recommended gentle IV hydration.  Her renal function has slowly improved and her hyperkalemia has resolved.  She was also seen by Dr. Horvath for her elevated troponin levels.  He felt that the patient's troponin levels were related to her coronary artery disease in the setting of renal failure and anemia.  He did offer cardiac catheterization to the patient but she is currently not willing for any invasive cardiac work-up.  He has recommended triple antiplatelet/anticoagulant agents with aspirin, Plavix and Eliquis.  He has also added Ranexa and increased her Bystolic dose.  An echocardiogram was done and the results are currently pending.  Unfortunately, patient has history of recurrent falls and chronic anemia and triple anticoagulation/antiplatelet therapy may need to be closely watched.    Review of Systems:     General ROS: Patient denies any fevers, chills or loss of consciousness.  Generalized weakness.  Respiratory ROS: Chronic shortness of breath.  Cardiovascular ROS: Denies chest pain or palpitations. No history of exertional chest pain.  Gastrointestinal ROS: Denies nausea and vomiting. Denies any abdominal pain. No diarrhea.  Neurological ROS: Denies any focal weakness. No loss of consciousness. Denies any numbness.  Dermatological ROS: Denies any redness or pruritis.    Vital Signs:    Temp:  [97.6 °F (36.4 °C)-98.3 °F (36.8 °C)] 97.6 °F (36.4 °C)  Heart Rate:  [] 82  Resp:  [16-20] 16  BP: (104-132)/(67-87) 104/80    Intake and output:    I/O last 3 completed shifts:  In: 600 [P.O.:600]  Out: 800 [Urine:800]  I/O this shift:  In: 120 [P.O.:120]  Out: -     Physical Examination:    General Appearance:  Alert and cooperative, not in any acute distress.   Head:  Atraumatic and normocephalic, without obvious abnormality.  Facial puffiness noted due to steroid use.   Eyes:          PERRLA, conjunctivae and sclerae normal, no Icterus. No pallor. Extraocular movements are  within normal limits.   Neck: Supple, trachea midline, no thyromegaly, no carotid bruit.   Lungs:   Chest shape is normal. Breath sounds heard bilaterally equally.  No wheezing.  Bilateral coarse crackles heard.  No Pleural rub or bronchial breathing.   Heart:  Normal S1 and S2, no murmur, no gallop, no rub. No JVD   Abdomen:   Normal bowel sounds, no masses, no organomegaly. Soft, non-tender, non-distended, no guarding, no rebound tenderness.  Large area of ecchymosis noted in the mid and lower abdomen bilaterally.   Extremities: Moves all extremities well, 1+ edema, no cyanosis, no clubbing.   Skin: No bleeding or rash.  Ecchymotic patches noted in both upper and lower extremities.   Neurologic: Awake, alert and oriented times 3. Moves all 4 extremities equally but does have generalized weakness.     Laboratory results:    Results from last 7 days   Lab Units 02/09/20  0548 02/08/20  0624 02/07/20  0546 02/06/20  1148   SODIUM mmol/L 138 140 140 136   POTASSIUM mmol/L 3.7 3.7 3.9 6.1*   CHLORIDE mmol/L 100 99 100 93*   CO2 mmol/L 20.4* 21.8* 20.1* 19.9*   BUN mg/dL 109* 102* 105* 103*   CREATININE mg/dL 4.05* 4.53* 4.61* 4.99*   CALCIUM mg/dL 7.9* 8.3* 8.0* 9.3   BILIRUBIN mg/dL  --  1.8*  --  2.9*   ALK PHOS U/L  --  195*  --  216*   ALT (SGPT) U/L  --  149*  --  163*   AST (SGOT) U/L  --  130*  --  157*   GLUCOSE mg/dL 140* 117* 146* 165*     Results from last 7 days   Lab Units 02/09/20  0548 02/08/20  0624 02/07/20  0546   WBC 10*3/mm3 7.60 6.99 6.03   HEMOGLOBIN g/dL 8.1* 8.7* 8.7*   HEMATOCRIT % 26.2* 27.9* 27.9*   PLATELETS 10*3/mm3 145 153 171     Results from last 7 days   Lab Units 02/07/20  0546   INR  1.71*     Results from last 7 days   Lab Units 02/08/20  0624 02/07/20  0546 02/06/20  1148   TROPONIN T ng/mL 0.100* 0.103* 0.106*           I have reviewed the patient's laboratory results.    Radiology results:    Imaging Results (Last 24 Hours)     ** No results found for the last 24 hours. **         Medication Review:     I have reviewed the patients active and prn medications.       Acute renal failure (CMS/HCC)    Persistent atrial fibrillation    Coronary artery disease involving native coronary artery of native heart without angina pectoris    Essential hypertension    Pulmonary arterial hypertension (CMS/HCC)    Interstitial lung disease (CMS/HCC)    Hypersensitivity pneumonitis (CMS/HCC)    Acquired hypothyroidism    Chronic kidney disease, stage 3 (CMS/HCC)    GERD without esophagitis    Diastolic heart failure (CMS/HCC)    Recurrent falls    Acute hyperkalemia    Assessment:    1.  Acute renal failure on chronic kidney disease stage III, present on admission, improving.  2.  Recurrent falls at home secondary to uremia, present on admission.  3.  Acute hyperkalemia secondary to #1, present on admission, resolved.  4.  Demand ischemia with elevated troponin levels, present on admission.  5.  Hypersensitivity pneumonitis on prednisone therapy.  6.  Chronic hypoxic respiratory failure on home oxygen.  7.  Chronic diastolic heart failure, no evidence of exacerbation.  8.  Paroxysmal atrial fibrillation on apixaban.  9.  Coronary artery disease status post stents and CABG.  10.  Chronic pulmonary hypertension.  11.  Acquired hypothyroidism.  12.  Elevated transaminases of uncertain etiology, improving.    Plan:    Ms. Morel  is currently doing better and is hemodynamically stable.  Renal function is improving slowly.  She is being followed by nephrology services.  At this time there is no indication for dialysis.  She will be continued on physical therapy.  She has had recurrent admissions in the recent past.  She lives with her  and way benefit from going to short-term rehabilitation this time.  She is open to this idea.    Patient has elevated troponins which are plateaued.  She was seen by Dr. Horvath today and he has recommended addition of Plavix to her aspirin and Eliquis.  He has increased  her Bystolic dose and added Ranexa for her chest pain.  Patient is reluctant for invasive cardiac work-up at this time.  A 2D echocardiogram was done yesterday and the report is currently pending.  She follows up with Dr. Higgins as an outpatient.    Patient will be continued on her home medications including prednisone.  Her prednisone has been decreased to 20 mg daily from this week.  It will be decreased to 10 mg next week to continue until she sees her primary pulmonologist Dr. Laura.  She will be continued on physical and Occupational Therapy.  Patient's CODE STATUS is DNR.  Further recommendations depend upon her clinical course.  Hopefully, she may be able to go to short-term rehabilitation in the next 2 to 3 days.    Denis Mercado MD  02/09/20  11:30 AM    Dictated utilizing Dragon dictation.

## 2020-02-09 NOTE — PLAN OF CARE
Problem: Patient Care Overview  Goal: Plan of Care Review  Outcome: Ongoing (interventions implemented as appropriate)  Flowsheets (Taken 2/9/2020 0614)  Progress: improving  Plan of Care Reviewed With: patient  Outcome Summary: VSS, bilateral lower extremity edema is improving.  Patient's occult blood was positive.  Monitor labs this morning.

## 2020-02-09 NOTE — THERAPY TREATMENT NOTE
Patient Name: Pat Morel  : 1940    MRN: [id number]                              Today's Date: 2020       Admit Date: 2020    Visit Dx:     ICD-10-CM ICD-9-CM   1. Acute renal failure, unspecified acute renal failure type (CMS/[facility]) N17.9 584.9   2. Hyperkalemia E87.5 276.7   3. Atrial fibrillation, unspecified type (CMS/[facility]) I48.91 427.31   4. Acute respiratory insufficiency R06.89 518.82   5. Elevated troponin R79.89 790.6   6. Impaired mobility and ADLs Z74.09 799.89     Patient Active Problem List   Diagnosis   • Persistent atrial fibrillation   • Coronary artery disease involving native coronary artery of native heart without angina pectoris   • Essential hypertension   • Cerebrovascular accident (CVA) due to embolism (CMS/[facility])   • Dyspnea on exertion   • Pulmonary arterial hypertension (CMS/[facility])   • Interstitial lung disease (CMS/[facility])   • Hypersensitivity pneumonitis (CMS/[facility])   • Gastric ulcer   • Coagulation disorder due to circulating anticoagulants (CMS/[facility])   • Dyslipidemia   • Acquired hypothyroidism   • Chronic kidney disease, stage 3 (CMS/[facility])   • GERD without esophagitis   • Iron deficiency anemia   • Acute on chronic diastolic congestive heart failure (CMS/[facility])   • Pneumonia of both lungs due to infectious organism   • Acute respiratory failure with hypoxia (CMS/[facility])   • Diastolic heart failure (CMS/[facility])   • Acute renal failure (CMS/[facility])   • Recurrent falls   • Acute hyperkalemia     Past Medical History:   Diagnosis Date   • Acute on chronic diastolic congestive heart failure (CMS/[facility]) 2019   • Anemia    • Angina at rest (CMS/[facility])    • Arthritis    • Atrial fibrillation (CMS/[facility])    • Coronary artery disease    • Disease of thyroid gland    • Elevated cholesterol    • GERD without esophagitis 2019   • History of blood transfusion    • History of colonoscopy 2019   • History of melena 2019   • History of stomach ulcers    • Hypertension    • Impaired  functional mobility, balance, gait, and endurance    • Impaired functional mobility, balance, gait, and endurance    • Osteoporosis    • Positive TB test    • Stroke (CMS/HCC)     No deficits     Past Surgical History:   Procedure Laterality Date   • BRONCHOSCOPY Bilateral 4/12/2019    Procedure: BRONCHOSCOPY;  Surgeon: Jeff Laura MD;  Location:  [facility];  Service: Pulmonary   • BYPASS GRAFT     • COLONOSCOPY N/A 10/15/2019    Procedure: COLONOSCOPY;  Surgeon: Fredi Aaron MD;  Location:  [facility];  Service: Gastroenterology   • CORONARY ANGIOPLASTY WITH STENT PLACEMENT     • CORONARY ARTERY BYPASS GRAFT  10/18/2010   • ENDOSCOPY N/A 10/14/2019    Procedure: ESOPHAGOGASTRODUODENOSCOPY;  Surgeon: Fredi Aaron MD;  Location:  [facility];  Service: Gastroenterology   • HYSTERECTOMY     • STOMACH SURGERY  08/11/2019    Upper GI bleed    • STOMACH SURGERY  10/13/2019     General Information     Row Name 02/09/20 0942          PT Evaluation Time/Intention    Document Type  therapy note (daily note)  -     Mode of Treatment  physical therapy;individual therapy  -     Row Name 02/09/20 0942          General Information    Existing Precautions/Restrictions  fall;oxygen therapy device and L/min  -CC     Row Name 02/09/20 0942          Safety Issues, Functional Mobility    Safety Issues Affecting Function (Mobility)  safety precautions follow-through/compliance;safety precaution awareness  -     Impairments Affecting Function (Mobility)  balance;endurance/activity tolerance;shortness of breath;strength;pain  -       User Key  (r) = Recorded By, (t) = Taken By, (c) = Cosigned By    Initials Name Provider Type    CC Jasmin Bragg, PTA Physical Therapy Assistant        Mobility     Row Name 02/09/20 0942          Bed Mobility Assessment/Treatment    Bed Mobility Assessment/Treatment  supine-sit;rolling left;rolling right  -CC     Rolling Left [place] (Bed Mobility)   contact guard;minimum assist (75% patient effort)  -     Rolling Right [place] (Bed Mobility)  contact guard;minimum assist (75% patient effort)  -     Supine-Sit [place] (Bed Mobility)  minimum assist (75% patient effort)  -     Assistive Device (Bed Mobility)  bed rails;head of bed elevated  -     Row Name 02/09/20 0942          Sit-Stand Transfer    Sit-Stand [place] (Transfers)  minimum assist (75% patient effort);1 person assist  -     Assistive Device (Sit-Stand Transfers)  walker, front-wheeled  -     Row Name 02/09/20 0942          Gait/Stairs Assessment/Training    47531 - Gait Training Minutes   15  -CC     Gait/Stairs Assessment/Training  gait/ambulation assistive device  -     [place] Level (Gait)  minimum assist (75% patient effort);contact guard  -     Assistive Device (Gait)  walker, front-wheeled  -     Distance in Feet (Gait)  10  -CC     Pattern (Gait)  step-through;step-to  -CC     Deviations/Abnormal Patterns (Gait)  base of support, narrow;chivo decreased;gait speed decreased;stride length decreased  -     Bilateral Gait Deviations  forward flexed posture;heel strike decreased;weight shift ability decreased  -       User Key  (r) = Recorded By, (t) = Taken By, (c) = Cosigned By    Initials Name Provider Type     Jasmin Bragg, PTA Physical Therapy Assistant        Obj/Interventions     Row Name 02/09/20 0942          Therapeutic Exercise    Lower Extremity (Therapeutic Exercise)  gluteal sets;heel slides, bilateral;quad sets, bilateral;SAQ (short arc quad), bilateral;SLR (straight leg raise), bilateral  -     Lower Extremity Range of Motion (Therapeutic Exercise)  hip abduction/adduction, bilateral;ankle dorsiflexion/plantar flexion, bilateral  -     Exercise Type (Therapeutic Exercise)  AROM (active range of motion)  -     Position (Therapeutic Exercise)  supine  -CC     Sets/Reps (Therapeutic Exercise)  1x10  -CC     Expected Outcome  (Therapeutic Exercise)  facilitate normal movement patterns;improve performance, gait skills;improve performance, transfer skills  -CC       User Key  (r) = Recorded By, (t) = Taken By, (c) = Cosigned By    Initials Name Provider Type    Jasmin Moreno PTA Physical Therapy Assistant        Goals/Plan    No documentation.       Clinical Impression     Row Name 02/09/20 0942          Pain Assessment    Additional Documentation  Pain Scale: Numbers Pre/Post-Treatment (Group)  -[facility] Name 02/09/20 0942          Pain Scale: Numbers Pre/Post-Treatment    Pain Scale: Numbers, Pretreatment  0/10 - no pain  -     Row Name 02/09/20 0942          Plan of Care Review    Plan of Care Reviewed With  patient  -CC     Progress  improving  -[facility] Name 02/09/20 0942          Positioning and Restraints    Pre-Treatment Position  in bed  -CC     Post Treatment Position  chair  -CC     In Chair  reclined;call light within reach;encouraged to call for assist  -CC       User Key  (r) = Recorded By, (t) = Taken By, (c) = Cosigned By    Initials Name Provider Type    Jasmin Moreno PTA Physical Therapy Assistant        Outcome Measures     Row Name 02/09/20 0942          How much help from another person do you currently need...    Turning from your back to your side while in flat bed without using bedrails?  3  -CC     Moving from lying on back to sitting on the side of a flat bed without bedrails?  2  -CC     Moving to and from a bed to a chair (including a wheelchair)?  3  -CC     Standing up from a chair using your arms (e.g., wheelchair, bedside chair)?  3  -CC     Climbing 3-5 steps with a railing?  1  -CC     To walk in hospital room?  3  -CC     AM-PAC 6 Clicks Score (PT)  15  -CC     Row Name 02/09/20 0942          Functional Assessment    Outcome Measure Options  AM-PAC 6 Clicks Basic Mobility (PT)  -CC       User Key  (r) = Recorded By, (t) = Taken By, (c) = Cosigned By    Initials Name Provider Type     CC Jasmin Bragg PTA Physical Therapy Assistant          PT Recommendation and Plan     Plan of Care Reviewed With: patient  Progress: improving  Outcome Summary: Pt with increased distance amb and slight decreased assist required. Pt requires increased time to perform tasks. Con't with PT POC and progress as tolerated     Time Calculation:   PT Charges     Row Name 02/09/20 0942             Time Calculation    Start Time  0942  -CC      PT Received On  02/09/20  -      PT Goal Re-Cert Due Date  02/17/20  -         Time Calculation- PT    Total Timed Code Minutes- PT  45 minute(s)  -         Timed Charges    81642 - PT Therapeutic Exercise Minutes  15  -CC      73449 - Gait Training Minutes   15  -      18248 - PT Therapeutic Activity Minutes  15  -CC        User Key  (r) = Recorded By, (t) = Taken By, (c) = Cosigned By    Initials Name Provider Type    Jasmin Moreno PTA Physical Therapy Assistant        Therapy Charges for Today     Code Description Service Date Service Provider Modifiers Qty    24984417081 HC PT THER PROC EA 15 MIN 2/9/2020 Jasmin Bragg, HAKEEM GP 1    32196723193 HC GAIT TRAINING EA 15 MIN 2/9/2020 Jasmin Bragg, HAKEME GP 1    71545801405 HC PT THERAPEUTIC ACT EA 15 MIN 2/9/2020 Jasmin Bragg, HAKEEM GP 1          PT G-Codes  Outcome Measure Options: AM-PAC 6 Clicks Basic Mobility (PT)  AM-PAC 6 Clicks Score (PT): 15  AM-PAC 6 Clicks Score (OT): 15    Jasmin Bragg PTA  2/9/2020

## 2020-02-09 NOTE — PROGRESS NOTES
"G-Cardiology Progress note     LOS: 3 days   Patient Care Team:  Anthony Sweet DO as PCP - General (Family Medicine)  Tien Archer MD as Consulting Physician (Cardiac Electrophysiology)  Xu Block MD (Cardiology)  Yomi Higgins MD as Consulting Physician (Cardiology)    Chief Complaint: Chest pain    Subjective:    Interval History: Stools have returned guaiac positive.  No chest discomfort in the last 24 hours including short distance ambulation in the [place].    Patient Complaints: none  Patient Denies:   Chest pain, shortness of breath, orthopnea, peripheral edema, palpitations, cough, sputum production, hemoptysis, abdominal pain, nausea, vomiting, diarrhea, fevers chills or night sweats  History taken from: patient    Review of Systems:   All systems were reviewed and negative       Objective:    Vital Sign Min/Max for last 24 hours  Temp  Min: 97.6 °F (36.4 °C)  Max: 98.3 °F (36.8 °C)   BP  Min: 104/80  Max: 132/84   Pulse  Min: 75  Max: 108   Resp  Min: 16  Max: 20   SpO2  Min: 91 %  Max: 99 %   Flow (L/min)  Min: 2.5  Max: 3   Weight  Min: 64.7 kg (142 lb 11.2 oz)  Max: 64.7 kg (142 lb 11.2 oz)     Flowsheet Rows      First Filed Value   Admission Height  147.3 cm (58\") Documented at 02/06/2020 1139   Admission Weight  60.8 kg (134 lb) Documented at 02/06/2020 1139          Physical Exam:     General Appearance:    Alert, cooperative, in no acute distress   Head:    Normocephalic, without obvious abnormality, atraumatic   Eyes:            Lids and lashes normal, conjunctivae and sclerae normal, no   icterus, no pallor, corneas clear, PERRLA   Ears:    Ears appear intact with no abnormalities noted   Throat:   No oral lesions, no thrush, oral mucosa moist   Neck:   No adenopathy, supple, trachea midline, no thyromegaly, no     carotid bruit, no JVD   Back:     No kyphosis present, no scoliosis present, no skin lesions,       erythema or scars, no tenderness to percussion or               "     palpation,   range of motion normal   Lungs:     Clear to auscultation,respirations regular, even and                   unlabored    Heart:    Regular rhythm and normal rate, normal S1 and S2, no            murmur, no gallop, no rub, no click   Breast Exam:    Deferred   Abdomen:     Normal bowel sounds, no masses, no organomegaly, soft        non-tender, non-distended, no guarding, no rebound                 tenderness   Genitalia:    Deferred   Extremities:   Moves all extremities well, no edema, no cyanosis, no              redness   Pulses:   Pulses palpable and equal bilaterally   Skin:   No bleeding, bruising or rash   Lymph nodes:   No palpable adenopathy   Neurologic:   Cranial nerves 2 - 12 grossly intact, sensation intact, DTR        present and equal bilaterally        Results Review:     I reviewed the patient's new clinical results.      Results from last 7 days   Lab Units 02/09/20  0548   SODIUM mmol/L 138   POTASSIUM mmol/L 3.7   CHLORIDE mmol/L 100   CO2 mmol/L 20.4*   BUN mg/dL 109*   CREATININE mg/dL 4.05*   GLUCOSE mg/dL 140*   CALCIUM mg/dL 7.9*     Results from last 7 days   Lab Units 02/09/20  0548 02/08/20  0624 02/07/20  0546   WBC 10*3/mm3 7.60 6.99 6.03   HEMOGLOBIN g/dL 8.1* 8.7* 8.7*   HEMATOCRIT % 26.2* 27.9* 27.9*   PLATELETS 10*3/mm3 145 153 171         Physical Exam    Medication Review: yes    Assessment/Plan:      Acute renal failure (CMS/HCC)    Persistent atrial fibrillation    Coronary artery disease involving native coronary artery of native heart without angina pectoris    Essential hypertension    Pulmonary arterial hypertension (CMS/HCC)    Interstitial lung disease (CMS/HCC)    Hypersensitivity pneumonitis (CMS/HCC)    Acquired hypothyroidism    Chronic kidney disease, stage 3 (CMS/HCC)    GERD without esophagitis    Diastolic heart failure (CMS/HCC)    Recurrent falls    Acute hyperkalemia      Stools are guaiac positive.  Hematocrit continues to trend downward.  The  patient has a history of prior upper GI bleeding with last blood transfusion requirement in August 2019.  I suspect the anemia is the driving influence behind accelerated angina.  I have recommended discontinuation of Eliquis, aspirin and Plavix for the time being.  I would recommend transfusion to maintain her hematocrit of at least 30% but ideally greater than 35% given evidence of active ischemic heart disease.  I suspect the patient will require reinvestigation of her GI pathology.        Nahid Horvath MD  02/09/20  11:37 AM

## 2020-02-09 NOTE — PROGRESS NOTES
"   LOS: 3 days    Patient Care Team:  Anthony Sweet DO as PCP - General (Family Medicine)  Tien Archer MD as Consulting Physician (Cardiac Electrophysiology)  Xu Block MD (Cardiology)  Yomi Higgins MD as Consulting Physician (Cardiology)    Chief Complaint:    Chief Complaint   Patient presents with   • Weakness - Generalized     Follow UP RENEE CKD4  Subjective     Interval History:   Feels well, less SOA today with ambulation.  Wt down 1 lb    Objective     Vital Signs  Temp:  [97.5 °F (36.4 °C)-98.3 °F (36.8 °C)] 97.5 °F (36.4 °C)  Heart Rate:  [] 82  Resp:  [16-20] 18  BP: (104-132)/(67-87) 104/80    Flowsheet Rows      First Filed Value   Admission Height  147.3 cm (58\") Documented at 02/06/2020 1139   Admission Weight  60.8 kg (134 lb) Documented at 02/06/2020 1139          I/O this shift:  In: 120 [P.O.:120]  Out: -   I/O last 3 completed shifts:  In: 600 [P.O.:600]  Out: 800 [Urine:800]    Intake/Output Summary (Last 24 hours) at 2/9/2020 1351  Last data filed at 2/9/2020 0950  Gross per 24 hour   Intake 360 ml   Output 400 ml   Net -40 ml       Physical Exam:  GEN frail WF In no distress  Neck supple no JVD  Lungs CTA bilat no rales  CV RRR no m/g  abd soft NT/ND  vasc 1+ BLE pedal/ankle edema, 2+ radial pulses         Results Review:    Results from last 7 days   Lab Units 02/09/20  0548 02/08/20  0624 02/07/20  0546 02/06/20  1148   SODIUM mmol/L 138 140 140 136   POTASSIUM mmol/L 3.7 3.7 3.9 6.1*   CHLORIDE mmol/L 100 99 100 93*   CO2 mmol/L 20.4* 21.8* 20.1* 19.9*   BUN mg/dL 109* 102* 105* 103*   CREATININE mg/dL 4.05* 4.53* 4.61* 4.99*   CALCIUM mg/dL 7.9* 8.3* 8.0* 9.3   BILIRUBIN mg/dL  --  1.8*  --  2.9*   ALK PHOS U/L  --  195*  --  216*   ALT (SGPT) U/L  --  149*  --  163*   AST (SGOT) U/L  --  130*  --  157*   GLUCOSE mg/dL 140* 117* 146* 165*       Estimated Creatinine Clearance: 9.6 mL/min (A) (by C-G formula based on SCr of 4.05 mg/dL (H)).    Results from last 7 " days   Lab Units 02/09/20  0548 02/07/20  0546   MAGNESIUM mg/dL  --  2.9*   PHOSPHORUS mg/dL 5.3* 5.8*             Results from last 7 days   Lab Units 02/09/20  0548 02/08/20  0624 02/07/20  0546 02/06/20  1148   WBC 10*3/mm3 7.60 6.99 6.03 8.18   HEMOGLOBIN g/dL 8.1* 8.7* 8.7* 9.4*   PLATELETS 10*3/mm3 145 153 171 194       Results from last 7 days   Lab Units 02/07/20  0546   INR  1.71*         Imaging Results (Last 24 Hours)     ** No results found for the last 24 hours. **          atorvastatin 40 mg Oral Nightly   bisoprolol 20 mg Oral Q24H   budesonide 0.5 mg Nebulization BID - RT   bumetanide 2 mg Oral BID   isosorbide mononitrate 60 mg Oral Daily   levothyroxine 75 mcg Oral Daily   pantoprazole 40 mg Oral Daily   predniSONE 20 mg Oral Daily   ranolazine 500 mg Oral Q12H   sodium chloride 10 mL Intravenous Q12H          Medication Review:   Current Facility-Administered Medications   Medication Dose Route Frequency Provider Last Rate Last Dose   • acetaminophen (TYLENOL) tablet 650 mg  650 mg Oral Q4H PRN Denis Mercado MD   650 mg at 02/07/20 2302    Or   • acetaminophen (TYLENOL) 160 MG/5ML solution 650 mg  650 mg Oral Q4H PRN Denis Mercado MD        Or   • acetaminophen (TYLENOL) suppository 650 mg  650 mg Rectal Q4H PRN Denis Mercado MD       • atorvastatin (LIPITOR) tablet 40 mg  40 mg Oral Nightly Denis Mercado MD   40 mg at 02/08/20 2234   • bisoprolol (ZEBeta) tablet 20 mg  20 mg Oral Q24H Nahid Horvath MD   20 mg at 02/09/20 0916   • budesonide (PULMICORT) nebulizer solution 0.5 mg  0.5 mg Nebulization BID - RT Denis Mercado MD   0.5 mg at 02/09/20 0810   • bumetanide (BUMEX) tablet 2 mg  2 mg Oral BID Gurwinder Tay MD   2 mg at 02/09/20 0917   • ipratropium-albuterol (DUO-NEB) nebulizer solution 3 mL  3 mL Nebulization Q4H PRN Denis Mercado MD   3 mL at 02/07/20 2000   • isosorbide mononitrate (IMDUR) 24 hr tablet 60 mg  60 mg Oral Daily Denis Mercado MD   60 mg at 02/09/20 0908    • levothyroxine (SYNTHROID, LEVOTHROID) tablet 75 mcg  75 mcg Oral Daily Denis Mercado MD   75 mcg at 02/09/20 0917   • ondansetron (ZOFRAN) injection 4 mg  4 mg Intravenous Q6H PRN Denis Mercado MD       • pantoprazole (PROTONIX) EC tablet 40 mg  40 mg Oral Daily Denis Mercado MD   40 mg at 02/09/20 0917   • predniSONE (DELTASONE) tablet 20 mg  20 mg Oral Daily Gurwinder Tay MD   20 mg at 02/09/20 0917   • ranolazine (RANEXA) 12 hr tablet 500 mg  500 mg Oral Q12H Nahid Horvath MD   500 mg at 02/09/20 0917   • sodium chloride 0.9 % flush 10 mL  10 mL Intravenous PRN Denis Mercado MD       • sodium chloride 0.9 % flush 10 mL  10 mL Intravenous Q12H Denis Mercado MD   10 mL at 02/09/20 0917   • sodium chloride 0.9 % flush 10 mL  10 mL Intravenous PRN Denis Mercado MD           Assessment/Plan   1. Acute renal failure (CMS/HCC): It does appear that she has worsening of her baseline renal function, at this point it is not very clear if this is all acute or may have some worsening of her chronic renal failure as well.  She likely has a significant component of cardiorenal syndrome.  We will check a UA, Merchant catheter was placed in the ER with no significant increase in output.  2. Chronic kidney disease stage IV: He has known to have stage IV chronic kidney disease, she has been admitted almost every 2 weeks since middle of December.  Every time has she has some worsening of her renal function.  He was not on any ACE inhibitors or denies using any nonsteroidals.  She did not have any significant hematuria or proteinuria.  May end up requiring dialysis this admission.  3. Hyperkalemia: Potassium of 6.1 he was given fairly good dose of diuretic likely will have helped some.  I will go ahead and give a dose of Kayexalate and continue to follow closely.  4. Metabolic acidosis: Her bicarb is only slightly low.  19.9 will need to be watched closely.  5. Abnormal liver function: It is quite likely that she  has an arrhythmia that might have led to the current picture of congestive heart failure as well as congestive liver.  6. Anemia: He had fairly normal iron stores on her last visit.  Will check iron stores again she may be needing clear if she is anemic.  Work-up as ordered.  7. Coronary artery disease, status post CABG, status post stent placement: May need cardiac evaluation again during this hospitalization.  Last echo that was done in October 2019 showed mild to moderate mitral regurgitation, moderate tricuspid regurgitation with elevated pulmonary pressure of 45 to 55 mmHg.  8. Acute on chronic respiratory failure:  9. Hypersensitivity pneumonitis   10. Interstitial lung disease    Plan  - Cr slowly improving, 4.5 today, while BUN up slightly to 109, high in large part from steroids -- prednisone weaned to 20mg/day today -- and have resume PO diuretic, cont bumex 2mg BID for now, less SOA today.  K normal.  HCO3 20, will not add nahco3 at this level.  No acute indication for HD and hopefully renal fcn will stabilize further before anticipated discharge in new few days.      Acute renal failure (CMS/HCC)    Persistent atrial fibrillation    Coronary artery disease involving native coronary artery of native heart without angina pectoris    Essential hypertension    Pulmonary arterial hypertension (CMS/HCC)    Interstitial lung disease (CMS/HCC)    Hypersensitivity pneumonitis (CMS/HCC)    Acquired hypothyroidism    Chronic kidney disease, stage 3 (CMS/HCC)    GERD without esophagitis    Diastolic heart failure (CMS/HCC)    Recurrent falls    Acute hyperkalemia              Gurwinder Tay MD  02/09/20  1:51 PM

## 2020-02-09 NOTE — PLAN OF CARE
Problem: Patient Care Overview  Goal: Plan of Care Review  Outcome: Ongoing (interventions implemented as appropriate)  Flowsheets (Taken 2/9/2020 4518)  Outcome Summary: Pt with increased distance amb and slight decreased assist required. Pt requires increased time to perform tasks. Con't with PT POC and progress as tolerated

## 2020-02-10 NOTE — THERAPY EVALUATION
Acute Care - Speech Language Pathology   Swallow Initial Evaluation  [facility]     Patient Name: Pat Morel  : 1940  MRN: [id number]  Today's Date: 2/10/2020  Onset of Illness/Injury or Date of Surgery: 20     Referring Physician: Dr. Mercado      Admit Date: 2020    Visit Dx:     ICD-10-CM ICD-9-CM   1. Acute renal failure, unspecified acute renal failure type (CMS/HCC) N17.9 584.9   2. Hyperkalemia E87.5 276.7   3. Atrial fibrillation, unspecified type (CMS/HCC) I48.91 427.31   4. Acute respiratory insufficiency R06.89 518.82   5. Elevated troponin R79.89 790.6   6. Impaired mobility and ADLs Z74.09 799.89     Patient Active Problem List   Diagnosis   • Persistent atrial fibrillation   • Coronary artery disease involving native coronary artery of native heart without angina pectoris   • Essential hypertension   • Cerebrovascular accident (CVA) due to embolism (CMS/[facility])   • Dyspnea on exertion   • Pulmonary arterial hypertension (CMS/HCC)   • Interstitial lung disease (CMS/HCC)   • Hypersensitivity pneumonitis (CMS/HCC)   • Gastric ulcer   • Coagulation disorder due to circulating anticoagulants (CMS/HCC)   • Dyslipidemia   • Acquired hypothyroidism   • Chronic kidney disease, stage 3 (CMS/HCC)   • GERD without esophagitis   • Iron deficiency anemia   • Acute on chronic diastolic congestive heart failure (CMS/HCC)   • Pneumonia of both lungs due to infectious organism   • Acute respiratory failure with hypoxia (CMS/HCC)   • Diastolic heart failure (CMS/HCC)   • Acute renal failure (CMS/HCC)   • Recurrent falls   • Acute hyperkalemia     Past Medical History:   Diagnosis Date   • Acute on chronic diastolic congestive heart failure (CMS/HCC) 2019   • Anemia    • Angina at rest (CMS/HCC)    • Arthritis    • Atrial fibrillation (CMS/HCC)    • Coronary artery disease    • Disease of thyroid gland    • Elevated cholesterol    • GERD without esophagitis 2019   • History of blood  transfusion    • History of colonoscopy 11/19/2019   • History of melena 11/19/2019   • History of stomach ulcers    • Hypertension    • Impaired functional mobility, balance, gait, and endurance    • Impaired functional mobility, balance, gait, and endurance    • Osteoporosis    • Positive TB test    • Stroke (CMS/HCC)     No deficits     Past Surgical History:   Procedure Laterality Date   • BRONCHOSCOPY Bilateral 4/12/2019    Procedure: BRONCHOSCOPY;  Surgeon: Jeff Laura MD;  Location:  [facility];  Service: Pulmonary   • BYPASS GRAFT     • COLONOSCOPY N/A 10/15/2019    Procedure: COLONOSCOPY;  Surgeon: Fredi Aaron MD;  Location:  [facility];  Service: Gastroenterology   • CORONARY ANGIOPLASTY WITH STENT PLACEMENT     • CORONARY ARTERY BYPASS GRAFT  10/18/2010   • ENDOSCOPY N/A 10/14/2019    Procedure: ESOPHAGOGASTRODUODENOSCOPY;  Surgeon: Fredi Aaron MD;  Location:  [facility];  Service: Gastroenterology   • HYSTERECTOMY     • STOMACH SURGERY  08/11/2019    Upper GI bleed    • STOMACH SURGERY  10/13/2019        SWALLOW EVALUATION (last 72 hours)      [facility] Adult Swallow Evaluation     Row Name 02/10/20 1404                   Rehab Evaluation    Document Type  evaluation  -TM        Subjective Information  complains of dry mouth; difficulty swallowing meds  -TM        Patient Observations  alert;cooperative  -TM        Patient/Family Observations   present  -TM        Patient Effort  good  -TM        Symptoms Noted During/After Treatment  shortness of breath prior to po trials  -TM           General Information    Patient Profile Reviewed  yes  -TM        Pertinent History Of Current Problem  GERD, cardiac, hx CVA  -TM        Current Method of Nutrition  regular textures;thin liquids  -TM        Precautions/Limitations, Hearing  hearing impairment, bilaterally;hearing aid, left;hearing aid, right  -TM        Prior Level of Function-Communication  WFL  -TM         Prior Level of Function-Swallowing  no diet consistency restrictions  -TM        Plans/Goals Discussed with  patient and family  -TM        Barriers to Rehab  medically complex SOA  -TM        Patient's Goals for Discharge  patient did not state  -TM        Family Goals for Discharge  family did not state  -TM           Pain Assessment    Additional Documentation  Pain Scale: Numbers Pre/Post-Treatment (Group)  -TM           Pain Scale: Numbers Pre/Post-Treatment    Pain Scale: Numbers, Pretreatment  0/10 - no pain  -TM        Pain Scale: Numbers, Post-Treatment  0/10 - no pain  -TM           Oral Motor and Function    Oral Lesions or Structural Abnormalities and/or variants  none  -TM        Dentition Assessment  natural, present and adequate  -TM        Secretion Management  WNL/WFL  -TM        Mucosal Quality  dry  -TM        Volitional Cough  WFL  -TM           Oral Musculature and Cranial Nerve Assessment    Oral Motor General Assessment  WFL  -TM           General Eating/Swallowing Observations    Respiratory Support Currently in Use  nasal cannula  -TM        Eating/Swallowing Skills  fed by SLP;self-fed  -TM        Positioning During Eating  upright in chair  -TM        Utensils Used  spoon;straw  -TM        Consistencies Trialed  regular textures;pureed;thin liquids  -TM        Pre SpO2 (%)  93  -TM        Post SpO2 (%)  95  -TM           Respiratory    Respiratory Status  WFL;during swallowing/eating  -TM        Respiratory Pattern  decrease control/incoordination of breathing swallow risk of swallow-breathe dyscoordination  -TM           Clinical Swallow Eval    Oral Prep Phase  WFL  -TM        Oral Transit  impaired  -TM        Oral Residue  WFL  -TM        Pharyngeal Phase  no overt signs/symptoms of pharyngeal impairment  -TM        Esophageal Phase  suspected esophageal impairment previous dx of GERD  -TM        Clinical Swallow Evaluation Summary  Bedside eval of swallow completed.  Oral phase mildly  impaired due to dry mouth and delayed oral transit time with regular and pureed trials intermittently.  Pt. has some difficulty coordinating oral swallow at times, and as she has had SOA, her endurance with meals may be affected.  Her RN reported some coughing with meds and pt. agreed.  Pt. has prior dx of GERD.  No overt s/s aspiration or other pharyngeal phase dysphagia during the eval, but pt. is at increased risk of aspiration from swallow-breathe dyscoordination.  Recommend:  1. mech soft diet with thin liq as julienne,  2. meds whole with pudding/applesauce,  3. no mixed consistencies,  4. small bites and sips,  5. aspiration precautions,  6. reflux precautions.    -TM           Oral Transit Concerns    Oral Transit Concerns  delayed initiation of bolus transit;increased oral transit time  -TM        Delayed Intiation of Bolus Transit  pudding;regular consistencies  -TM        Increased Oral Transit Time  pudding;regular consistencies  -TM           Esophageal Phase Concerns    Esophageal Phase Concerns  other (see comments) prior dx of GERD  -TM           Clinical Impression    SLP Swallowing Diagnosis  mild;oral dysfunction;esophageal dysfunction  -TM        Functional Impact  risk of aspiration/pneumonia;risk of dehydration;risk of malnutrition  -TM           Recommendations    Therapy Frequency (Swallow)  evaluation only  -TM        SLP Diet Recommendation  mechanical soft with no mixed consistencies;thin liquids  -TM        Recommended Precautions and Strategies  upright posture during/after eating;small bites of food and sips of liquid  -TM        SLP Rec. for Method of Medication Administration  meds whole;with pudding or applesauce  -TM        Monitor for Signs of Aspiration  yes;notify SLP if any concerns;cough;gurgly voice;throat clearing;right lower lobe infiltrates  -TM        Anticipated Dischage Disposition  unknown  -TM          User Key  (r) = Recorded By, (t) = Taken By, (c) = Cosigned By     Initials Name Effective Dates     Randa Mace 03/07/18 -           EDUCATION  The patient has been educated in the following areas:   Dysphagia (Swallowing Impairment).    SLP Recommendation and Plan  SLP Swallowing Diagnosis: mild, oral dysfunction, esophageal dysfunction  SLP Diet Recommendation: mechanical soft with no mixed consistencies, thin liquids  Recommended Precautions and Strategies: upright posture during/after eating, small bites of food and sips of liquid  SLP Rec. for Method of Medication Administration: meds whole, with pudding or applesauce     Monitor for Signs of Aspiration: yes, notify SLP if any concerns, cough, gurgly voice, throat clearing, right lower lobe infiltrates        Anticipated Dischage Disposition: unknown     Therapy Frequency (Swallow): evaluation only          Plan of Care Reviewed With: patient, spouse  Outcome Summary: Bedside eval of swallow completed.  Oral phase mildly impaired due to dry mouth and delayed oral transit time with regular and pureed trials intermittently.  Pt. has some difficulty coordinating oral swallow at times, and as she has had SOA, her endurance with meals may be affected.  Her RN reported some coughing with meds and pt. agreed.  Pt. has prior dx of GERD.  No overt s/s aspiration or other pharyngeal phase dysphagia during the eval, but pt. is at increased risk of aspiration from swallow-breathe dyscoordination.  Recommend:  1. mech soft diet with thin liq as julienne,  2. meds whole with pudding/applesauce,  3. no mixed consistencies,  4. small bites and sips,  5. aspiration precautions,  6. reflux precautions.           Time Calculation:   Time Calculation- SLP     Row Name 02/10/20 1453             Time Calculation- SLP    SLP Start Time  1402  -      SLP Stop Time  1415  -      SLP Time Calculation (min)  13 min  -      SLP Received On  02/10/20  -        User Key  (r) = Recorded By, (t) = Taken By, (c) = Cosigned By    Initials Name  Provider Type     Randa Mace Speech and Language Pathologist          Therapy Charges for Today     Code Description Service Date Service Provider Modifiers Qty    [id number] HC ST EVAL ORAL PHARYNG SWALLOW 4 2/10/2020 Randa Mace GN 1               Randa Mace  2/10/2020

## 2020-02-10 NOTE — THERAPY TREATMENT NOTE
Acute Care - Occupational Therapy Treatment Note   [facility]     Patient Name: Pat Morel  : 1940  MRN: [id number]  Today's Date: 2/10/2020  Onset of Illness/Injury or Date of Surgery: 20  Date of Referral to OT: 20  Referring Physician: Dr. Mercado    Admit Date: 2020       ICD-10-CM ICD-9-CM   1. Acute renal failure, unspecified acute renal failure type (CMS/[facility]) N17.9 584.9   2. Hyperkalemia E87.5 276.7   3. Atrial fibrillation, unspecified type (CMS/[facility]) I48.91 427.31   4. Acute respiratory insufficiency R06.89 518.82   5. Elevated troponin R79.89 790.6   6. Impaired mobility and ADLs Z74.09 799.89     Patient Active Problem List   Diagnosis   • Persistent atrial fibrillation   • Coronary artery disease involving native coronary artery of native heart without angina pectoris   • Essential hypertension   • Cerebrovascular accident (CVA) due to embolism (CMS/[facility])   • Dyspnea on exertion   • Pulmonary arterial hypertension (CMS/[facility])   • Interstitial lung disease (CMS/HCC)   • Hypersensitivity pneumonitis (CMS/HCC)   • Gastric ulcer   • Coagulation disorder due to circulating anticoagulants (CMS/[facility])   • Dyslipidemia   • Acquired hypothyroidism   • Chronic kidney disease, stage 3 (CMS/HCC)   • GERD without esophagitis   • Iron deficiency anemia   • Acute on chronic diastolic congestive heart failure (CMS/HCC)   • Pneumonia of both lungs due to infectious organism   • Acute respiratory failure with hypoxia (CMS/HCC)   • Diastolic heart failure (CMS/HCC)   • Acute renal failure (CMS/HCC)   • Recurrent falls   • Acute hyperkalemia     Past Medical History:   Diagnosis Date   • Acute on chronic diastolic congestive heart failure (CMS/HCC) 2019   • Anemia    • Angina at rest (CMS/[facility])    • Arthritis    • Atrial fibrillation (CMS/HCC)    • Coronary artery disease    • Disease of thyroid gland    • Elevated cholesterol    • GERD without esophagitis 2019   • History of blood  transfusion    • History of colonoscopy 11/19/2019   • History of melena 11/19/2019   • History of stomach ulcers    • Hypertension    • Impaired functional mobility, balance, gait, and endurance    • Impaired functional mobility, balance, gait, and endurance    • Osteoporosis    • Positive TB test    • Stroke (CMS/HCC)     No deficits     Past Surgical History:   Procedure Laterality Date   • BRONCHOSCOPY Bilateral 4/12/2019    Procedure: BRONCHOSCOPY;  Surgeon: Jeff Laura MD;  Location:  [facility];  Service: Pulmonary   • BYPASS GRAFT     • COLONOSCOPY N/A 10/15/2019    Procedure: COLONOSCOPY;  Surgeon: Fredi Aaron MD;  Location:  [facility];  Service: Gastroenterology   • CORONARY ANGIOPLASTY WITH STENT PLACEMENT     • CORONARY ARTERY BYPASS GRAFT  10/18/2010   • ENDOSCOPY N/A 10/14/2019    Procedure: ESOPHAGOGASTRODUODENOSCOPY;  Surgeon: Fredi Aaron MD;  Location:  [facility];  Service: Gastroenterology   • HYSTERECTOMY     • STOMACH SURGERY  08/11/2019    Upper GI bleed    • STOMACH SURGERY  10/13/2019       Therapy Treatment    Rehabilitation Treatment Summary     Row Name 02/10/20 1043             Treatment Time/Intention    Discipline  occupational therapist  -SD      Document Type  therapy note (daily note)  -SD      Subjective Information  complains of;dyspnea  -SD      Mode of Treatment  occupational therapy  -SD      Patient/Family Observations  reclined in chair, 3L O2  -SD      Care Plan Review  care plan/treatment goals reviewed  -SD      Patient Effort  adequate  -SD      Existing Precautions/Restrictions  fall;oxygen therapy device and L/min  -SD      Recorded by [SD] Italia Schumacher, OT 02/10/20 1321      Row Name 02/10/20 1043             Vital Signs    Pre SpO2 (%)  92  -SD      O2 Delivery Pre Treatment  supplemental O2  -SD      Intra SpO2 (%)  87  -SD      O2 Delivery Intra Treatment  supplemental O2  -SD      Post SpO2 (%)  92  -SD      O2 Delivery  Post Treatment  supplemental O2  -SD      Recorded by [SD] Italia Schumacher OT 02/10/20 1321      Row Name 02/10/20 1043             Grooming Assessment/Training    [place] Level (Grooming)  hair care, combing/brushing;oral care regimen;wash face, hands;supervision VCs for EC  -SD      Recorded by [SD] Italia Schumacher OT 02/10/20 1321      Row Name 02/10/20 1043             Motor Skills Assessment/Interventions    Additional Documentation  Therapeutic Exercise (Group)  -SD      Recorded by [SD] Italia Schumacher OT 02/10/20 1321      Row Name 02/10/20 1043             Therapeutic Exercise    Upper Extremity Range of Motion (Therapeutic Exercise)  shoulder flexion/extension, bilateral;shoulder abduction/adduction, bilateral;shoulder horizontal abduction/adduction, bilateral;shoulder internal/external rotation, bilateral;elbow flexion/extension, bilateral  -SD      Core Strength (Therapeutic Exercise)  -- trunk flexion x 10  -SD      Exercise Type (Therapeutic Exercise)  AROM (active range of motion)  -SD      Position (Therapeutic Exercise)  seated  -SD      Sets/Reps (Therapeutic Exercise)  1 x 10  -SD      Expected Outcome (Therapeutic Exercise)  improve functional tolerance, self-care activity  -SD      Recorded by [SD] Italia Schumacher OT 02/10/20 1321      Row Name 02/10/20 1043             Positioning and Restraints    Pre-Treatment Position  sitting in chair/recliner  -SD      Post Treatment Position  chair  -SD      In Chair  reclined;call light within reach;encouraged to call for assist  -SD      Recorded by [SD] Italia Schumacher OT 02/10/20 1321      Row Name 02/10/20 1043             Pain Scale: Numbers Pre/Post-Treatment    Pain Scale: Numbers, Pretreatment  0/10 - no pain  -SD      Pain Scale: Numbers, Post-Treatment  0/10 - no pain  -SD      Recorded by [SD] Italia Schumacher OT 02/10/20 1321      Row Name                Wound 02/07/20 0950 Left anterior;upper arm Skin Tear    Wound -  Properties Group Date first assessed: 02/07/20 [SB] Time first assessed: 0950 [SB] Side: Left [SB] Orientation: anterior;upper [SB] Location: arm [SB] Primary Wound Type: Skin tear [SB] Recorded by:  [SB] Marcelina Lindo RN 02/07/20 0950    Row Name                Wound 02/09/20 2040 Bilateral upper coccyx Pressure Injury    Wound - Properties Group Date first assessed: 02/09/20 [AC] Time first assessed: 2040 [AC] Side: Bilateral [AC] Orientation: upper [AC] Location: coccyx [AC] Primary Wound Type: Pressure inj [AC2] Stage, Pressure Injury: Stage 2 [AC3] Additional Comments: small crack open in upper part of crack [AC] Recorded by:  [AC] Madelin Kendrick RN 02/09/20 2040 [AC2] Madelin Kednrick RN 02/09/20 2048 [AC3] Madelin Kendrick RN 02/09/20 2052    Row Name                Wound 01/09/20 0800 Bilateral groin Other (comment)    Wound - Properties Group Date first assessed: 01/09/20 [EB] Time first assessed: 0800 [EB] Side: Bilateral [EB] Location: groin [EB] Primary Wound Type: Other [EB], Excoriation  Additional Comments: Excoriation to groin area [EB] Recorded by:  [EB] Juliana Palmer RN 01/09/20 0932    Row Name 02/10/20 1043             Coping    Observed Emotional State  calm;cooperative  -SD      Verbalized Emotional State  acceptance  -SD      Recorded by [SD] Italia Schumacher OT 02/10/20 1321      Row Name 02/10/20 1043             Plan of Care Review    Plan of Care Reviewed With  patient  -SD      Progress  improving  -SD      Outcome Summary  OT tx completed. Patient completed grooming tasks with VCs for EC; followed by UB AROM and trunk strengthening.   -SD      Recorded by [SD] Italia Schumacher OT 02/10/20 1321      Row Name 02/10/20 1043             Outcome Summary/Treatment Plan (OT)    Daily Summary of Progress (OT)  progress toward functional goals is gradual  -SD      Anticipated Discharge Disposition (OT)  inpatient rehabilitation facility  -SD      Recorded by [SD] Italia Schumacher OT 02/10/20  1321        User Key  (r) = Recorded By, (t) = Taken By, (c) = Cosigned By    Initials Name Effective Dates Discipline    Juliana Street, RN 01/29/18 -  Nurse    Madelin Stratton RN 05/30/17 -  Nurse    Italia Norris, OT 03/07/18 -  OT    SB Marcelina Lindo, RN 06/16/16 -  Nurse        Wound 02/07/20 0950 Left anterior;upper arm Skin Tear (Active)   Dressing Appearance dry 2/10/2020  8:00 AM   Closure Other (Comment) 2/10/2020  6:35 AM   Base dressing in place, unable to visualize 2/10/2020  8:00 AM   Periwound Temperature warm 2/10/2020  6:35 AM   Drainage Characteristics/Odor sanguineous 2/10/2020  6:35 AM   Drainage Amount small 2/10/2020  6:35 AM   Care, Wound cleansed with;sterile normal saline 2/10/2020  6:35 AM   Dressing Care, Wound dressing changed 2/10/2020  6:35 AM       Wound 02/09/20 2040 Bilateral upper coccyx Pressure Injury (Active)   Wound Image   2/9/2020  8:41 PM   Dressing Appearance open to air 2/10/2020  8:00 AM   Closure Open to air 2/10/2020  8:00 AM   Base red 2/10/2020  8:00 AM   Periwound redness 2/10/2020  8:00 AM   Periwound Temperature warm 2/10/2020  8:00 AM   Drainage Amount none 2/10/2020  8:00 AM   Care, Wound barrier applied 2/10/2020  8:00 AM           OT Recommendation and Plan  Outcome Summary/Treatment Plan (OT)  Daily Summary of Progress (OT): progress toward functional goals is gradual  Anticipated Equipment Needs at Discharge (OT): bedside commode  Anticipated Discharge Disposition (OT): inpatient rehabilitation facility  Planned Therapy Interventions (OT Eval): activity tolerance training, adaptive equipment training, BADL retraining, patient/caregiver education/training, strengthening exercise, transfer/mobility retraining  Therapy Frequency (OT Eval): 3 times/wk(5 times if indicated)  Daily Summary of Progress (OT): progress toward functional goals is gradual  Plan of Care Review  Plan of Care Reviewed With: patient  Plan of Care Reviewed With: patient  Outcome  Summary: OT tx completed. Patient completed grooming tasks with VCs for EC; followed by UB AROM and trunk strengthening.   Outcome Measures     Row Name 02/10/20 1043             How much help from another is currently needed...    Putting on and taking off regular lower body clothing?  2  -SD      Bathing (including washing, rinsing, and drying)  2  -SD      Toileting (which includes using toilet bed pan or urinal)  2  -SD      Putting on and taking off regular upper body clothing  3  -SD      Taking care of personal grooming (such as brushing teeth)  3  -SD      Eating meals  3  -SD      AM-PAC 6 Clicks Score (OT)  15  -SD         Functional Assessment    Outcome Measure Options  AM-PAC 6 Clicks Daily Activity (OT)  -SD        User Key  (r) = Recorded By, (t) = Taken By, (c) = Cosigned By    Initials Name Provider Type    Italia Norris OT Occupational Therapist           Time Calculation:   Time Calculation- OT     Row Name 02/10/20 1330             Time Calculation- OT    OT Start Time  1043  -SD      Total Timed Code Minutes- OT  23 minute(s)  -SD      OT Received On  02/10/20  -SD      OT Goal Re-Cert Due Date  02/17/20  -SD         Timed Charges    07399 - OT Therapeutic Exercise Minutes  13  -SD      58082 - OT Self Care/Mgmt Minutes  10  -SD        User Key  (r) = Recorded By, (t) = Taken By, (c) = Cosigned By    Initials Name Provider Type    Italia Norris OT Occupational Therapist        Therapy Charges for Today     Code Description Service Date Service Provider Modifiers Qty    [id number]  OT THER PROC EA 15 MIN 2/10/2020 Italia Schumacher OT GO 1    [id number]  OT SELF CARE/MGMT/TRAIN EA 15 MIN 2/10/2020 Italia Schumacher OT GO 1               Italia Schumacher OT  2/10/2020

## 2020-02-10 NOTE — THERAPY TREATMENT NOTE
Patient Name: Pat Morel  : 1940    MRN: [id number]                              Today's Date: 2/10/2020       Admit Date: 2020    Visit Dx:     ICD-10-CM ICD-9-CM   1. Acute renal failure, unspecified acute renal failure type (CMS/[facility]) N17.9 584.9   2. Hyperkalemia E87.5 276.7   3. Atrial fibrillation, unspecified type (CMS/[facility]) I48.91 427.31   4. Acute respiratory insufficiency R06.89 518.82   5. Elevated troponin R79.89 790.6   6. Impaired mobility and ADLs Z74.09 799.89     Patient Active Problem List   Diagnosis   • Persistent atrial fibrillation   • Coronary artery disease involving native coronary artery of native heart without angina pectoris   • Essential hypertension   • Cerebrovascular accident (CVA) due to embolism (CMS/[facility])   • Dyspnea on exertion   • Pulmonary arterial hypertension (CMS/[facility])   • Interstitial lung disease (CMS/[facility])   • Hypersensitivity pneumonitis (CMS/[facility])   • Gastric ulcer   • Coagulation disorder due to circulating anticoagulants (CMS/[facility])   • Dyslipidemia   • Acquired hypothyroidism   • Chronic kidney disease, stage 3 (CMS/[facility])   • GERD without esophagitis   • Iron deficiency anemia   • Acute on chronic diastolic congestive heart failure (CMS/[facility])   • Pneumonia of both lungs due to infectious organism   • Acute respiratory failure with hypoxia (CMS/[facility])   • Diastolic heart failure (CMS/[facility])   • Acute renal failure (CMS/[facility])   • Recurrent falls   • Acute hyperkalemia     Past Medical History:   Diagnosis Date   • Acute on chronic diastolic congestive heart failure (CMS/[facility]) 2019   • Anemia    • Angina at rest (CMS/[facility])    • Arthritis    • Atrial fibrillation (CMS/[facility])    • Coronary artery disease    • Disease of thyroid gland    • Elevated cholesterol    • GERD without esophagitis 2019   • History of blood transfusion    • History of colonoscopy 2019   • History of melena 2019   • History of stomach ulcers    • Hypertension    • Impaired  functional mobility, balance, gait, and endurance    • Impaired functional mobility, balance, gait, and endurance    • Osteoporosis    • Positive TB test    • Stroke (CMS/HCC)     No deficits     Past Surgical History:   Procedure Laterality Date   • BRONCHOSCOPY Bilateral 4/12/2019    Procedure: BRONCHOSCOPY;  Surgeon: Jeff Laura MD;  Location:  "[facility] ENDOSCOPY;  Service: Pulmonary   • BYPASS GRAFT     • COLONOSCOPY N/A 10/15/2019    Procedure: COLONOSCOPY;  Surgeon: Fredi Aaron MD;  Location:  [facility];  Service: Gastroenterology   • CORONARY ANGIOPLASTY WITH STENT PLACEMENT     • CORONARY ARTERY BYPASS GRAFT  10/18/2010   • ENDOSCOPY N/A 10/14/2019    Procedure: ESOPHAGOGASTRODUODENOSCOPY;  Surgeon: Fredi Aaron MD;  Location:  [facility];  Service: Gastroenterology   • HYSTERECTOMY     • STOMACH SURGERY  08/11/2019    Upper GI bleed    • STOMACH SURGERY  10/13/2019     General Information     Row Name 02/10/20 0931          PT Evaluation Time/Intention    Document Type  therapy note (daily note)  -LI     Mode of Treatment  physical therapy  -LI     Row Name 02/10/20 0931          General Information    Patient Profile Reviewed?  yes  -LI     Existing Precautions/Restrictions  fall;oxygen therapy device and L/min  -LI     Row Name 02/10/20 0931          Cognitive Assessment/Intervention- PT/OT    Orientation Status (Cognition)  oriented x 4  -LI     Row Name 02/10/20 0931          Safety Issues, Functional Mobility    Impairments Affecting Function (Mobility)  balance;endurance/activity tolerance;shortness of breath;strength;pain  -LI       User Key  (r) = Recorded By, (t) = Taken By, (c) = Cosigned By    Initials Name Provider Type    Carolina Harrington PTA Physical Therapy Assistant        Mobility     Row Name 02/10/20 0931          Bed Mobility Assessment/Treatment    Bed Mobility Assessment/Treatment  bed mobility (all) activities  -LI     [place] Level (Bed  Mobility)  contact guard assist  -LI     Supine-Sit [place] (Bed Mobility)  contact guard  -LI     Assistive Device (Bed Mobility)  bed rails;head of bed elevated  -LI     Row Name 02/10/20 0931          Sit-Stand Transfer    Sit-Stand [place] (Transfers)  contact guard;minimum assist (75% patient effort)  -LI     Assistive Device (Sit-Stand Transfers)  walker, front-wheeled 4 steps to the chair  -LI       User Key  (r) = Recorded By, (t) = Taken By, (c) = Cosigned By    Initials Name Provider Type    Carolina Harrington PTA Physical Therapy Assistant        Obj/Interventions     Row Name 02/10/20 0931          Therapeutic Exercise    Lower Extremity (Therapeutic Exercise)  LAQ (long arc quad), bilateral;marching while seated  -LI     Lower Extremity Range of Motion (Therapeutic Exercise)  ankle dorsiflexion/plantar flexion, bilateral;hip abduction/adduction, bilateral  -LI     Exercise Type (Therapeutic Exercise)  AROM (active range of motion)  -LI     Position (Therapeutic Exercise)  seated  -LI     Sets/Reps (Therapeutic Exercise)  1x10  -LI       User Key  (r) = Recorded By, (t) = Taken By, (c) = Cosigned By    Initials Name Provider Type    Carolina Harrington, HAKEEM Physical Therapy Assistant        Goals/Plan    No documentation.       Clinical Impression     Row Name 02/10/20 0931          Pain Scale: Numbers Pre/Post-Treatment    Pain Scale: Numbers, Pretreatment  0/10 - no pain  -LI     Pain Scale: Numbers, Post-Treatment  0/10 - no pain  -LI     Row Name 02/10/20 0931          Plan of Care Review    Progress  no change  -     Outcome Summary  Patient states she really wants to walk today.  Transferred to [facility] with CGA.  HR would increase and O2 would decrease with activity, so she did not feel like she would be able to walk around the bed to the chair. Transferred back to the bed and then to the chair with Min/CGA.  Completed B LE therex in sitting.  -LACI     Row Name 02/10/20 0931          Physical  Therapy Clinical Impression    Criteria for Skilled Interventions Met (PT Clinical Impression)  yes;treatment indicated  -LI     Rehab Potential (PT Clinical Summary)  good, to achieve stated therapy goals  -LI     Row Name 02/10/20 0931          Vital Signs    Pretreatment Heart Rate (beats/min)  87  -LI     Intratreatment Heart Rate (beats/min)  123  -LI     Posttreatment Heart Rate (beats/min)  99  -LI     Pre SpO2 (%)  91  -LI     O2 Delivery Pre Treatment  supplemental O2  -LI     Intra SpO2 (%)  84  -LI     O2 Delivery Intra Treatment  supplemental O2  -LI     Post SpO2 (%)  91  -LI     O2 Delivery Post Treatment  supplemental O2  -LI     Pre Patient Position  Supine  -LI     Intra Patient Position  Standing  -LI     Post Patient Position  Sitting  -LI     Row Name 02/10/20 0931          Positioning and Restraints    Pre-Treatment Position  in bed  -LI     Post Treatment Position  chair  -LI     In Chair  reclined;call light within reach  -LI       User Key  (r) = Recorded By, (t) = Taken By, (c) = Cosigned By    Initials Name Provider Type    Carolina Harrington, PTA Physical Therapy Assistant        Outcome Measures    No documentation.         PT Recommendation and Plan     Progress: no change  Outcome Summary: Patient states she really wants to walk today.  Transferred to [facility] with CGA.  HR would increase and O2 would decrease with activity, so she did not feel like she would be able to walk around the bed to the chair. Transferred back to the bed and then to the chair with Min/CGA.  Completed B KEITH díaz in sitting.     Time Calculation:   PT Charges     Row Name 02/10/20 0931             Time Calculation    Start Time  0931  -LI      Stop Time  0957  -LI      Time Calculation (min)  26 min  -LI         Timed Charges    57723 - PT Therapeutic Exercise Minutes  12  -LI      38625 - PT Therapeutic Activity Minutes  14  -LI        User Key  (r) = Recorded By, (t) = Taken By, (c) = Cosigned By    Initials Name  Provider Type    Carolina Harrington PTA Physical Therapy Assistant        Therapy Charges for Today     Code Description Service Date Service Provider Modifiers Qty    38825304640 HC PT THER PROC EA 15 MIN 2/10/2020 Carolina Milligan, HAKEEM GP 1    75339912516 HC PT THERAPEUTIC ACT EA 15 MIN 2/10/2020 Carolina Milligan, HAKEEM GP 1          PT G-Codes  Outcome Measure Options: AM-PAC 6 Clicks Daily Activity (OT)  AM-PAC 6 Clicks Score (PT): 15  AM-PAC 6 Clicks Score (OT): 15    Carolina Milligan PTA  2/10/2020

## 2020-02-10 NOTE — PROGRESS NOTES
"Nephrology Progress Note.    LOS: 4 days    Patient Care Team:  Anthony Sweet DO as PCP - General (Family Medicine)  Tien Archer MD as Consulting Physician (Cardiac Electrophysiology)  Xu Block MD (Cardiology)  Yomi Higgins MD as Consulting Physician (Cardiology)    Chief Complaint:    Chief Complaint   Patient presents with   • Weakness - Generalized       Subjective:   Follow up for RENEE and Chronic Kidney disease stage 4 / Anemia.     Interval History:   Patient Complaints: none  Patient seen and examined this morning.  Events from last night noted.  Patient denies having any fevers chills.  No nausea or vomiting no abdominal pain.  Denies any chest pain, shortness of breath or cough and sputum production.  There is no significant edema.   Patient also denies having new onset weakness of numbness of either extremity, she feels that things are slightly better has been able to eat and drink some.  She feels that she is still weak and has agreed to go to the rehab.  History taken from: patient and the son at the bedside.  I have reviewed all of the labs since admission and over the weekend, imaging records, from this hospitalization.  I also reviewed ER staff and admitting/attending/ consulting physicians H/P's, consult notes and progress notes to establish a comprehensive understanding of this patient's clinical hospital course, as well as to establish plan of care appropriately.   Objective:    Vital Signs  /88 (BP Location: Right arm, Patient Position: Lying)   Pulse 92   Temp 97.6 °F (36.4 °C) (Oral)   Resp 20   Ht 147.3 cm (58\")   Wt 64.6 kg (142 lb 8 oz)   LMP  (LMP Unknown)   SpO2 96%   BMI 29.78 kg/m²     I/O this shift:  In: -   Out: 400 [Urine:400]    Intake/Output Summary (Last 24 hours) at 2/10/2020 0629  Last data filed at 2/10/2020 0300  Gross per 24 hour   Intake 240 ml   Output 1000 ml   Net -760 ml       Physical Exam:  General Appearance: alert, oriented x 3, " no acute distress,   HEENT: Oral mucosa dry, extra occular movements intact. Sclera clear.  Skin: Warm and dry  Neck: supple, no JVD, trachea midline  Lungs:Chest shape is normal. Breath sounds heard bilaterally equally. No crackles, No wheezing.   Heart: regular rate and rhythm. normal S1 and S2, no S3, no rub, peripheral pulses weak but palpable.  Abdomen: Obese, soft, non-tender,  present bowel sounds to auscultation  : no palpable bladder.  Extremities: Trace edema, no cyanosis or clubbing.   Neuro: normal speech and mental status, grossly non focal.     Results Review:   Results from last 7 days   Lab Units 02/09/20  0548 02/08/20  0624 02/07/20  0546 02/06/20  1148   SODIUM mmol/L 138 140 140 136   POTASSIUM mmol/L 3.7 3.7 3.9 6.1*   CHLORIDE mmol/L 100 99 100 93*   CO2 mmol/L 20.4* 21.8* 20.1* 19.9*   BUN mg/dL 109* 102* 105* 103*   CREATININE mg/dL 4.05* 4.53* 4.61* 4.99*   CALCIUM mg/dL 7.9* 8.3* 8.0* 9.3   ALBUMIN g/dL 3.20* 3.40* 3.60 4.20   BILIRUBIN mg/dL  --  1.8*  --  2.9*   ALK PHOS U/L  --  195*  --  216*   ALT (SGPT) U/L  --  149*  --  163*   AST (SGOT) U/L  --  130*  --  157*   GLUCOSE mg/dL 140* 117* 146* 165*     Estimated Creatinine Clearance: 9.6 mL/min (A) (by C-G formula based on SCr of 4.05 mg/dL (H)).  Results from last 7 days   Lab Units 02/09/20  0548 02/07/20  0546   MAGNESIUM mg/dL  --  2.9*   PHOSPHORUS mg/dL 5.3* 5.8*         Results from last 7 days   Lab Units 02/09/20  0548 02/08/20  0624 02/07/20  0546 02/06/20  1148   WBC 10*3/mm3 7.60 6.99 6.03 8.18   HEMOGLOBIN g/dL 8.1* 8.7* 8.7* 9.4*   PLATELETS 10*3/mm3 145 153 171 194     Results from last 7 days   Lab Units 02/07/20  0546   INR  1.71*     Brief Urine Lab Results  (Last result in the past 365 days)      Color   Clarity   Blood   Leuk Est   Nitrite   Protein   CREAT   Urine HCG        02/06/20 2315             46.7       02/06/20 2315 Yellow Cloudy Moderate (2+) Negative Negative 100 mg/dL (2+)             No results  found for: UTPCR  Imaging Results (Last 24 Hours)     ** No results found for the last 24 hours. **          atorvastatin 40 mg Oral Nightly   bisoprolol 20 mg Oral Q24H   budesonide 0.5 mg Nebulization BID - RT   bumetanide 2 mg Oral BID   isosorbide mononitrate 60 mg Oral Daily   levothyroxine 75 mcg Oral Daily   pantoprazole 40 mg Oral Daily   predniSONE 20 mg Oral Daily   ranolazine 500 mg Oral Q12H   sodium chloride 10 mL Intravenous Q12H            Medication Review:   Current Facility-Administered Medications   Medication Dose Route Frequency Provider Last Rate Last Dose   • acetaminophen (TYLENOL) tablet 650 mg  650 mg Oral Q4H PRN Denis Mercado MD   650 mg at 02/07/20 2302    Or   • acetaminophen (TYLENOL) 160 MG/5ML solution 650 mg  650 mg Oral Q4H PRN Denis Mercado MD        Or   • acetaminophen (TYLENOL) suppository 650 mg  650 mg Rectal Q4H PRN Denis Mercado MD       • atorvastatin (LIPITOR) tablet 40 mg  40 mg Oral Nightly Denis Mercado MD   40 mg at 02/09/20 2022   • bisoprolol (ZEBeta) tablet 20 mg  20 mg Oral Q24H Nahid Horvath MD   20 mg at 02/09/20 0916   • budesonide (PULMICORT) nebulizer solution 0.5 mg  0.5 mg Nebulization BID - RT Denis Mercado MD   0.5 mg at 02/09/20 1907   • bumetanide (BUMEX) tablet 2 mg  2 mg Oral BID Gurwinder Tay MD   2 mg at 02/09/20 1801   • ipratropium-albuterol (DUO-NEB) nebulizer solution 3 mL  3 mL Nebulization Q4H PRN Denis Mercado MD   3 mL at 02/09/20 1907   • isosorbide mononitrate (IMDUR) 24 hr tablet 60 mg  60 mg Oral Daily Dneis Mercado MD   60 mg at 02/09/20 0917   • levothyroxine (SYNTHROID, LEVOTHROID) tablet 75 mcg  75 mcg Oral Daily Denis Mercado MD   75 mcg at 02/09/20 0917   • ondansetron (ZOFRAN) injection 4 mg  4 mg Intravenous Q6H PRN Denis Mercado MD       • pantoprazole (PROTONIX) EC tablet 40 mg  40 mg Oral Daily Denis Mercado MD   40 mg at 02/09/20 0917   • predniSONE (DELTASONE) tablet 20 mg  20 mg Oral Daily Maggi  Gurwinder Wise MD   20 mg at 02/09/20 0917   • ranolazine (RANEXA) 12 hr tablet 500 mg  500 mg Oral Q12H Nahid Horvath MD   500 mg at 02/09/20 2021   • sodium chloride 0.9 % flush 10 mL  10 mL Intravenous PRN Denis Mercado MD       • sodium chloride 0.9 % flush 10 mL  10 mL Intravenous Q12H Denis Mercado MD   10 mL at 02/09/20 2023   • sodium chloride 0.9 % flush 10 mL  10 mL Intravenous PRN Denis Mercado MD           Assessment/Plan:  1. Acute renal failure (CMS/HCC): It does appear that she has worsening of her baseline renal function, at this point it is not very clear if this is all acute or may have some worsening of her chronic renal failure as well.  She likely has a significant component of cardiorenal syndrome.  We will check a UA, Merchant catheter was placed in the ER with no significant increase in output.  2. Chronic kidney disease stage IV: He has known to have stage IV chronic kidney disease, she has been admitted almost every 2 weeks since middle of December.  Every time has she has some worsening of her renal function.  He was not on any ACE inhibitors or denies using any nonsteroidals.  She did not have any significant hematuria or proteinuria.  May end up requiring dialysis this admission.  3. Hyperkalemia: It has resolved with treatment.  4. Metabolic acidosis: It is above 20 with supplements of sodium bicarb.  5. Abnormal liver function: It is quite likely that she has an arrhythmia that might have led to the current picture of congestive heart failure as well as congestive liver.  6. Anemia: She had fairly normal iron stores on her last visit.  Will check iron stores again she may be needing clear if she is anemic.  Iron stores have dropped since her last admission in the last 2 weeks.  I will go ahead and start her on oral supplements.  Transfuse if hemoglobin less than 8.  7. Coronary artery disease, status post CABG, status post stent placement: May need cardiac evaluation again during  this hospitalization.  Last echo that was done in October 2019 showed mild to moderate mitral regurgitation, moderate tricuspid regurgitation with elevated pulmonary pressure of 45 to 55 mmHg.  8. Acute on chronic respiratory failure:  9. Hypersensitivity pneumonitis  10. Interstitial lung disease      Plan:  · Hold diuretics today, need to be reassessed again in the morning.  · Details were discussed with the patient as well as her son in the room.  She is agreed to go to the rehab.  · Details were also discussed with the hospitalist service.   · Surveillance labs.  · Further recommendations will depend on clinical course of the patient during the current hospitalization.    · I also discussed the details with the nursing staff.  · Rest as ordered.    Sanford Tsang MD, EVELINA  02/10/20  6:29 AM    Dictated utilizing Dragon dictation.

## 2020-02-10 NOTE — PLAN OF CARE
Problem: Patient Care Overview  Goal: Plan of Care Review  Outcome: Ongoing (interventions implemented as appropriate)  Flowsheets  Taken 2/10/2020 1321  Progress: improving  Plan of Care Reviewed With: patient  Taken 2/10/2020 1043  Outcome Summary: OT tx completed. Patient completed grooming tasks with VCs for EC; followed by UB AROM and trunk strengthening.

## 2020-02-10 NOTE — PLAN OF CARE
Vss on 3lnc.  Denies pain and nausea.  Merchant catheter d/c'd today and will monitor urinary output/retention.  Will continue to monitor labs, patient status and UOP.

## 2020-02-10 NOTE — PROGRESS NOTES
"      [facility]    Name:  Pat Morel   Age:  80 y.o.  Sex:  female  :  1940  MRN:  [id number]   Visit Number:  [id number]  Admission Date:  2020  Date Of Service:  02/10/20  Primary Care Physician:  Anthony Sweet DO     LOS: 4 days :      Chief Complaint:  Follow up renal failure and weakness        Subjective / Interval History:   The patient was seen earlier this morning, sitting in bedside chair. Dr Higgins has seen her already. She was able to participate with PT but feels tired. Her medications are being adjusted. She is severely weak still physically. No acute events occurred overnight.        \"Patient is a 80-year-old female with history including CAD with CABG, atrial fibrillation, chronic kidney disease stage III, history of CVA, diastolic CHF, iron deficiency anemia, hypersensitivity pneumonitis, hypertension, osteoporosis was brought to the emergency room by EMS with generalized weakness on 2020.  She was admitted with acute worsening of her chronic renal failure, elevated troponin levels  as well as hyperkalemia.  She does have chronic respiratory failure which has been stable on prednisone.  She is currently on prednisone 20 mg daily started this week.  She will taper it down to 10 mg every week and be continued on 10 mg daily until she sees her pulmonologist Dr. Laura.       She was seen by Dr. Tsang from nephrology who recommended gentle IV hydration.  Her renal function has slowly improved and her hyperkalemia has resolved.  She was also seen by Dr. Horvath for her elevated troponin levels.  He felt that the patient's troponin levels were related to her coronary artery disease in the setting of renal failure and anemia.  He did offer cardiac catheterization to the patient but she is currently not willing for any invasive cardiac work-up.  He has recommended triple antiplatelet/anticoagulant agents with aspirin, Plavix and " "Eliquis.  He has also added Ranexa and increased her Bystolic dose.  An echocardiogram was done and the results are currently pending.  Unfortunately, patient has history of recurrent falls and chronic anemia and triple anticoagulation/antiplatelet therapy may need to be closely watched.\" previously stated by Dr Mercado.       Review of Systems:     General ROS: Patient denies any fevers, chills or loss of consciousness.{Positive generalized weakness  Ophthalmic ROS: Denies any diplopia or transient loss of vision.  ENT ROS: Denies sore throat, nasal congestion or ear pain.   Respiratory ROS: Denies cough or shortness of breath.  Cardiovascular ROS: Denies chest pain or palpitations. No history of exertional chest pain.   Gastrointestinal ROS: Denies nausea and vomiting. Denies any abdominal pain. No diarrhea.  Genito-Urinary ROS: Denies dysuria or hematuria.  Musculoskeletal ROS: Denies chronic back pain. No muscle pain. No calf pain.  Neurological ROS: Denies any focal weakness. No loss of consciousness. Denies any numbness. Denies neck pain.   Dermatological ROS: Denies any redness or pruritis.    Vital Signs:    Temp:  [96.3 °F (35.7 °C)-98.5 °F (36.9 °C)] 97.4 °F (36.3 °C)  Heart Rate:  [] 106  Resp:  [18-22] 18  BP: (112-123)/(66-97) 112/66    Intake and output:    I/O last 3 completed shifts:  In: 240 [P.O.:240]  Out: 1000 [Urine:1000]  I/O this shift:  In: 500 [P.O.:500]  Out: 450 [Urine:450]    Physical Examination:    General Appearance:    Alert and cooperative, not in any acute distress.   Head:    Atraumatic and normocephalic, without obvious abnormality.   Eyes:            PERRLA, conjunctivae and sclerae normal, no Icterus. No pallor. Extraocular movements are within normal limits.   Throat:   No oral lesions, no thrush, oral mucosa moist.   Neck:   Supple, trachea midline, no thyromegaly, no carotid bruit.   Lungs:     Chest shape is normal. Breath sounds heard bilaterally equally.  No crackles or " wheezing. No Pleural rub or bronchial breathing.    Heart:    Normal S1 and S2, no murmur, no gallop, no rub. No JVD   Abdomen:     Normal bowel sounds, no masses, no organomegaly. Soft        non-tender, non-distended, no guarding, no rebound                tenderness   Extremities:   Moves all extremities well, trace leg edema, no cyanosis, no             clubbing   Skin:   No bleeding, bruising or rash.   Neurologic:   No tremor, sensation intact, Motor power is normal and equal bilaterally.   Laboratory results:    Lab Results (last 24 hours)     Procedure Component Value Units Date/Time    Renal Function Panel [[id number]]  (Abnormal) Collected:  02/10/20 0625    Specimen:  Blood Updated:  02/10/20 0703     Glucose 119 mg/dL       mg/dL      Creatinine 3.98 mg/dL      Sodium 139 mmol/L      Potassium 3.9 mmol/L      Chloride 99 mmol/L      CO2 20.9 mmol/L      Calcium 8.0 mg/dL      Albumin 3.30 g/dL      Phosphorus 5.8 mg/dL      Anion Gap 19.1 mmol/L      BUN/Creatinine Ratio 27.4     eGFR Non African Amer 11 mL/min/1.73      Comment: <15 Indicative of kidney failure.        eGFR   Amer --     Comment: <15 Indicative of kidney failure.       Narrative:       GFR Normal >60  Chronic Kidney Disease <60  Kidney Failure <15      CBC (No Diff) [[id number]]  (Abnormal) Collected:  02/10/20 0625    Specimen:  Blood Updated:  02/10/20 0640     WBC 7.88 10*3/mm3      RBC 2.73 10*6/mm3      Hemoglobin 8.0 g/dL      Hematocrit 25.5 %      MCV 93.4 fL      MCH 29.3 pg      MCHC 31.4 g/dL      RDW 19.5 %      RDW-SD 64.8 fl      MPV 11.4 fL      Platelets 120 10*3/mm3           I have reviewed the patient's laboratory results.    Radiology results:    Imaging Results (Last 24 Hours)     ** No results found for the last 24 hours. **          I have reviewed the patient's radiology reports.    Medication Review:     I have reviewed the patients active and prn medications.     Assessment:  1.  Acute renal failure  on chronic kidney disease stage III, present on admission, improved  2.  Recurrent falls at home secondary to uremia, present on admission.  3.  Acute hyperkalemia secondary to #1, present on admission, resolved.  4.  Demand ischemia with elevated troponin levels, present on admission.  5.  Hypersensitivity pneumonitis on prednisone therapy.  6.  Chronic hypoxic respiratory failure on home oxygen.  7.  Chronic diastolic heart failure, no evidence of exacerbation.  8.  Paroxysmal atrial fibrillation on apixaban.  9.  Coronary artery disease status post stents and CABG.  10.  Chronic pulmonary hypertension.  11.  Acquired hypothyroidism.  12.  Elevated transaminases of uncertain etiology, improving.  13. History of stroke        Plan:  Continue current medications, as adjusted by Dr Higgins. Continue tele monitoring and up with assistance.   She is severely weak and agreeable to placement in acute rehab. She appears improving with PT also.   She may have chronic gi blood loss, but no gross bleeding identified. She is not agreeable to surgery.  Continue prednisone, taper planned 20mg daily and next week 10mg daily. She will follow up Dr Laura again as outpatient.   She is DNR.     Medication risks and benefits were discussed in detail. Patient reported satisfaction with care delivered and treatment plan.     Tamara Tomlin DO  02/10/20  5:56 PM

## 2020-02-10 NOTE — PROGRESS NOTES
Continued Stay Note  [facility]     Patient Name: Pat Morel  MRN: [id number]  Today's Date: 2/10/2020    Admit Date: 2/6/2020    Discharge Plan     Row Name 02/10/20 1112       Plan    N/A Provider List Comment  [facility] has received the placement packet         Discharge Codes    No documentation.       Expected Discharge Date and Time     Expected Discharge Date Expected Discharge Time    Feb 14, 2020             Kelsey Peña RN

## 2020-02-10 NOTE — PROGRESS NOTES
Continued Stay Note  [facility]     Patient Name: Pat Morel  MRN: [id number]  Today's Date: 2/10/2020    Admit Date: 2/6/2020    Discharge Plan     Row Name 02/10/20 1201       Plan    N/A Provider List Comment  [facility] to [facility]  if she will be on dialysis prior to determining acceptance and she will also need to have a precert     Row Name 02/10/20 1112       Plan    N/A Provider List Comment  [facility]         Discharge Codes    No documentation.       Expected Discharge Date and Time     Expected Discharge Date Expected Discharge Time    Feb 14, 2020             Kelsey Peña RN

## 2020-02-10 NOTE — PLAN OF CARE
Problem: Patient Care Overview  Goal: Plan of Care Review  Outcome: Ongoing (interventions implemented as appropriate)  Flowsheets  Taken 2/10/2020 1321 by Italia Schumacher OT  Progress: improving  Taken 2/10/2020 1452 by Randa Mace  Plan of Care Reviewed With: patient;spouse  Outcome Summary: Bedside eval of swallow completed.  Oral phase mildly impaired due to dry mouth and delayed oral transit time with regular and pureed trials intermittently.  Pt. has some difficulty coordinating oral swallow at times, and as she has had SOA, her endurance with meals may be affected.  Her RN reported some coughing with meds and pt. agreed.  Pt. has prior dx of GERD.  No overt s/s aspiration or other pharyngeal phase dysphagia during the eval, but pt. is at increased risk of aspiration from swallow-breathe dyscoordination.  Recommend:  1. mech soft diet with thin liq as julienne,  2. meds whole with pudding/applesauce,  3. no mixed consistencies,  4. small bites and sips,  5. aspiration precautions,  6. reflux precautions.

## 2020-02-10 NOTE — PROGRESS NOTES
[facility] Cardiology IP Progress Note    Pat Morel  1940  [id number]  02/10/20    Subjective:   Mrs. Pat Morel is a 80 y.o. female seen in follow-up for multivessel coronary artery disease and acute on chronic diastolic heart failure.  Currently admitted with acute on chronic kidney disease.  No acute overnight events.  This morning, but denies any recurrent episodes of chest pain since starting additional antianginals.  Reports she has not yet ambulated this morning.  No other new complaints.    Review of Systems:   Review of Systems   Constitutional: Positive for fatigue. Negative for chills, diaphoresis and fever.   Respiratory: Positive for shortness of breath. Negative for cough, chest tightness and wheezing.    Cardiovascular: Negative for chest pain, palpitations and leg swelling.   Gastrointestinal: Negative for abdominal pain, anal bleeding, blood in stool and GERD.   Neurological: Negative for dizziness, syncope, weakness and light-headedness.       I have reviewed and/or updated the patient's past medical, past surgical, family history, social history, problem list and allergies as appropriate in the chart.     Physical Exam:  Vital Signs:   Vitals:    02/10/20 0300 02/10/20 0500 02/10/20 0715 02/10/20 0722   BP: 123/88   122/77   BP Location: Right arm   Right arm   Patient Position: Lying   Lying   Pulse: 92  108 112   Resp: 20  19 20   Temp: 97.6 °F (36.4 °C)   98.5 °F (36.9 °C)   TempSrc: Oral   Oral   SpO2: 96%  97% 98%   Weight:  64.6 kg (142 lb 8 oz)     Height:           Physical Exam   Constitutional: She is oriented to person, place, and time. She appears well-developed and well-nourished.   Lying in bed in no acute distress.   HENT:   Head: Normocephalic and atraumatic.   Moist Mucous Membranes.    Eyes: Pupils are equal, round, and reactive to light. EOM are normal. No scleral icterus.   Cardiovascular: Normal rate, normal heart sounds and intact distal  pulses. Exam reveals no gallop and no friction rub.   No murmur heard.  Trace bilateral lower extremity edema.  Irregularly irregular rhythm.   Pulmonary/Chest: Effort normal and breath sounds normal. No stridor. No respiratory distress. She has no wheezes. She has no rales. She exhibits no tenderness.   Abdominal: Soft. Bowel sounds are normal. She exhibits no distension. There is no tenderness. There is no rebound and no guarding.   Neurological: She is alert and oriented to person, place, and time.   Psychiatric: She has a normal mood and affect. Her behavior is normal.   Nursing note and vitals reviewed.      Results Review:   Results from last 7 days   Lab Units 02/10/20  0625 02/08/20  0624   SODIUM mmol/L 139   < > 140   POTASSIUM mmol/L 3.9   < > 3.7   CHLORIDE mmol/L 99   < > 99   CO2 mmol/L 20.9*   < > 21.8*   BUN mg/dL 109*   < > 102*   CREATININE mg/dL 3.98*   < > 4.53*   CALCIUM mg/dL 8.0*   < > 8.3*   BILIRUBIN mg/dL  --   --  1.8*   ALK PHOS U/L  --   --  195*   ALT (SGPT) U/L  --   --  149*   AST (SGOT) U/L  --   --  130*   GLUCOSE mg/dL 119*   < > 117*    < > = values in this interval not displayed.     Results from last 7 days   Lab Units 02/08/20  0624 02/07/20  0546 02/06/20  1148   TROPONIN T ng/mL 0.100* 0.103* 0.106*     Results from last 7 days   Lab Units 02/10/20  0625 02/09/20  0548 02/08/20  0624   WBC 10*3/mm3 7.88 7.60 6.99   HEMOGLOBIN g/dL 8.0* 8.1* 8.7*   HEMATOCRIT % 25.5* 26.2* 27.9*   PLATELETS 10*3/mm3 120* 145 153     Results from last 7 days   Lab Units 02/07/20  0546   INR  1.71*     Results from last 7 days   Lab Units 02/07/20  0546   MAGNESIUM mg/dL 2.9*           I personally viewed and interpreted the patient's EKG/Telemetry data     Medications:   )  Current Facility-Administered Medications:   •  acetaminophen (TYLENOL) tablet 650 mg, 650 mg, Oral, Q4H PRN, 650 mg at 02/07/20 2302 **OR** acetaminophen (TYLENOL) 160 MG/5ML solution 650 mg, 650 mg, Oral, Q4H PRN **OR**  acetaminophen (TYLENOL) suppository 650 mg, 650 mg, Rectal, Q4H PRN, Denis Mercado MD  •  atorvastatin (LIPITOR) tablet 40 mg, 40 mg, Oral, Nightly, Denis Mercado MD, 40 mg at 02/09/20 2022  •  bisoprolol (ZEBeta) tablet 20 mg, 20 mg, Oral, Q24H, Breeding, Nahid MODI MD, 20 mg at 02/10/20 0812  •  budesonide (PULMICORT) nebulizer solution 0.5 mg, 0.5 mg, Nebulization, BID - RT, Denis Mercado MD, 0.5 mg at 02/10/20 0715  •  ipratropium-albuterol (DUO-NEB) nebulizer solution 3 mL, 3 mL, Nebulization, Q4H PRN, Denis Mercado MD, 3 mL at 02/10/20 0715  •  isosorbide mononitrate (IMDUR) 24 hr tablet 60 mg, 60 mg, Oral, Daily, Denis Mercado MD, 60 mg at 02/10/20 0813  •  levothyroxine (SYNTHROID, LEVOTHROID) tablet 75 mcg, 75 mcg, Oral, Daily, Denis Mercado MD, 75 mcg at 02/10/20 0811  •  ondansetron (ZOFRAN) injection 4 mg, 4 mg, Intravenous, Q6H PRN, Denis Mercado MD  •  pantoprazole (PROTONIX) EC tablet 40 mg, 40 mg, Oral, Daily, Denis Mercado MD, 40 mg at 02/10/20 0812  •  predniSONE (DELTASONE) tablet 20 mg, 20 mg, Oral, Daily, Gurwinder Tay MD, 20 mg at 02/10/20 0813  •  ranolazine (RANEXA) 12 hr tablet 500 mg, 500 mg, Oral, Q12H, Breeding, Nahid MODI MD, 500 mg at 02/10/20 0813  •  sodium chloride 0.9 % flush 10 mL, 10 mL, Intravenous, PRN, Denis Mercado MD  •  sodium chloride 0.9 % flush 10 mL, 10 mL, Intravenous, Q12H, Denis Mercado MD, 10 mL at 02/10/20 0813  •  sodium chloride 0.9 % flush 10 mL, 10 mL, Intravenous, PRN, Denis Mercado MD    Assessment / Plan:     1.  Multivessel coronary artery disease  --History of three-vessel CABG 2012 including LIMA-LAD, SVG-diagonal, and SVG-PDA  --Last coronary angiography was within 1 year ago, revealed occlusion of the SVG- diagonal, however LIMA and SVG-PDA grafts were widely patent.  Was additionally noted to have severe in-stent stenosis in the diagonal branch with sluggish flow distally, however the distal vessel was noted to be small in caliber and less  attractive for PCI  --Suspect anginal symptoms likely exacerbation of chronic angina secondary to anemia and demand ischemia in the setting of known branch vessel coronary artery disease  --Given residual stenosis at the time of last coronary angiography was unattractive for PCI, no plans for repeat is invasive ischemic evaluation or additional intervention  --Echocardiogram continues to show preserved LV systolic function  --Continue bisoprolol, isosorbide, and Ranexa as antianginals  --If hemoglobin remains stable, then recommend restarting daily aspirin    2.  Acute on chronic diastolic heart failure   --Echocardiogram  menstruates 2 diastolic dysfunction with left atrial enlargement and elevated left atrial pressures  --Has been diuresed with IV Bumex, currently on hold today per nephrology  --BP remains controlled     3.  Persistent atrial fibrillation  --Rate controlled  --Anticoagulation on hold due to underlying concerns for anemia and possible GI bleed     4.  Pulmonary arterial hypertension   --In the setting of interstitial lung disease  --RVSP on echocardiogram 60 mmHg     5.  Acute on chronic kidney disease  --Management per nephrology     6.  Dyslipidemia  --Continue statin          Thank you for allowing me to participate in the care of your patient. Please to not hesitate to contact me with additional questions or concerns.     LA NENA Higgins MD  Interventional Cardiology   02/10/20  12:21 PM

## 2020-02-10 NOTE — PLAN OF CARE
Problem: Patient Care Overview  Goal: Plan of Care Review  2/10/2020 1420 by Carolina Milligan, PTA  Outcome: Ongoing (interventions implemented as appropriate)     Patient states she really wants to walk today.  Transferred to BSC with CGA.  HR would increase and O2 would decrease with activity, so she did not feel like she would be able to walk around the bed to the chair. Transferred back to the bed and then to the chair with Min/CGA.  Completed B KEITH díaz in sitting.

## 2020-02-11 PROBLEM — R79.89 ELEVATED LFTS: Status: ACTIVE | Noted: 2020-01-01

## 2020-02-11 PROBLEM — K92.1 STOOL COLOR BLACK: Status: ACTIVE | Noted: 2020-01-01

## 2020-02-11 PROBLEM — D64.9 NORMOCYTIC ANEMIA: Status: ACTIVE | Noted: 2020-01-01

## 2020-02-11 PROBLEM — D68.9 COAGULOPATHY (HCC): Status: ACTIVE | Noted: 2020-01-01

## 2020-02-11 PROBLEM — R10.10 UPPER ABDOMINAL PAIN: Status: ACTIVE | Noted: 2020-01-01

## 2020-02-11 NOTE — CONSULTS
In Patient Consult      Date of Consultation: 2020  Patient Name: Pat Morel  MRN: [id number]  : 1940     Referring provider: Tamara Tomlin DO    Primary care provider:  Anthony Sweet DO    Reason for consultation: Anemia suspected GI bleed  And abdominal pain    History of Present Illness: This is 80-year-old  female patient with a prior history of coronary artery disease with a CABG, chronic atrial fibrillation on Eliquis, CKD stage III history of embolic CVA, diastolic heart failure, pulmonary hypertension, chronic anemia, history of peptic ulcer disease with a GI bleed, hypersensitive pneumonitis, hypertension, was brought in by EMS on 2020 with complaints of generalized weakness and fall at home.     She also complained of progressive shortness of breathing and the decreased urine output.  Emergency room work-up revealed  kidney injury on CKD with a severe hyperkalemia.  She was subsequently admitted for further management.  Patient was on Eliquis before the admission for chronic atrial fibrillation.  Eliquis has been on hold since the admission due to anemia and for GI bleed.  GI was consulted for further evaluate before starting back on anticoagulation.     Patient states that she is on multivitamins and iron pills and her stools always been black.  Denies any recent changes in color of the stool no blood clots or any bright red rectal bleeding.  She states that she does have a abdominal pain which is mostly on the upper abdomen intermittent and has been there for at least a couple months.  Since the admission her pain gotten worse without any nausea or vomiting.  She denies any fever at home.  No prior jaundice.  Diagnosis of any gallstones.     She also had a hematemesis and melena somewhere early last year and was managed at [facility].   As per patient she was being told she had a large gastric ulcer which was bleeding.  [facility] and had an extensive work-up done as an inpatient.  She had an EGD done along with a colonoscopy in October 2020 which was unremarkable except diverticulosis.  She subsequently had a PillCam study done as well which was normal.  There was no obvious source of bleeding identified at that time.    No family history of any GI malignancy.  She was not on any over-the-counter NSAIDs.  She was taking Protonix at home.  She was also on high-dose steroid for her interstitial pneumonia.  Today just before I examined this patient she developed shortness of breathing with hypoxemia and is currently worked up by primary team.       Subjective     Past Medical History:   Diagnosis Date   • Acute on chronic diastolic congestive heart failure (CMS/HCC) 12/20/2019   • Anemia    • Angina at rest (CMS/HCC)    • Arthritis    • Atrial fibrillation (CMS/HCC)    • Coronary artery disease    • Disease of thyroid gland    • Elevated cholesterol    • GERD without esophagitis 12/2/2019   • History of blood transfusion    • History of colonoscopy 11/19/2019   • History of melena 11/19/2019   • History of stomach ulcers    • Hypertension    • Impaired functional mobility, balance, gait, and endurance    • Impaired functional mobility, balance, gait, and endurance    • Osteoporosis    • Positive TB test    • Stroke (CMS/[facility])     No deficits       Past Surgical History:   Procedure Laterality Date   • BRONCHOSCOPY Bilateral 4/12/2019    Procedure: BRONCHOSCOPY;  Surgeon: Jeff Laura MD;  Location:  [facility] ENDOSCOPY;  Service: Pulmonary   • BYPASS GRAFT     • COLONOSCOPY N/A 10/15/2019    Procedure: COLONOSCOPY;  Surgeon: Fredi Aaron MD;  Location:  [facility] ENDOSCOPY;  Service: Gastroenterology   • CORONARY ANGIOPLASTY WITH STENT PLACEMENT     • CORONARY ARTERY BYPASS GRAFT  10/18/2010   • ENDOSCOPY N/A 10/14/2019    Procedure: ESOPHAGOGASTRODUODENOSCOPY;  Surgeon: Fredi Aaron MD;  Location:  [facility]  ENDOSCOPY;  Service: Gastroenterology   • HYSTERECTOMY     • STOMACH SURGERY  08/11/2019    Upper GI bleed    • STOMACH SURGERY  10/13/2019       Family History   Problem Relation Age of Onset   • Cancer Mother    • Arthritis Mother    • No Known Problems Father    • Liver disease Sister        Social History     Socioeconomic History   • Marital status:      Spouse name: Not on file   • Number of children: Not on file   • Years of education: Not on file   • Highest education level: Not on file   Tobacco Use   • Smoking status: Never Smoker   • Smokeless tobacco: Never Used   Substance and Sexual Activity   • Alcohol use: No   • Drug use: No   • Sexual activity: Not Currently         Current Facility-Administered Medications:   •  acetaminophen (TYLENOL) tablet 650 mg, 650 mg, Oral, Q4H PRN, 650 mg at 02/07/20 2302 **OR** acetaminophen (TYLENOL) 160 MG/5ML solution 650 mg, 650 mg, Oral, Q4H PRN **OR** acetaminophen (TYLENOL) suppository 650 mg, 650 mg, Rectal, Q4H PRN, Denis Mercado MD  •  [START ON 2/12/2020] aspirin EC tablet 81 mg, 81 mg, Oral, Daily, Yomi Higgins MD  •  atorvastatin (LIPITOR) tablet 40 mg, 40 mg, Oral, Nightly, Denis Mercado MD, 40 mg at 02/10/20 2008  •  budesonide (PULMICORT) nebulizer solution 0.5 mg, 0.5 mg, Nebulization, BID - RT, Denis Mercado MD, 0.5 mg at 02/11/20 0700  •  calcium acetate (PHOS BINDER)) capsule 1,334 mg, 1,334 mg, Oral, TID With Meals, Sanford Tsang MD, FASN, 1,334 mg at 02/11/20 1346  •  dilTIAZem (CARDIZEM) tablet 30 mg, 30 mg, Oral, Q6H, Yomi Higgins MD, 30 mg at 02/11/20 1346  •  ipratropium-albuterol (DUO-NEB) nebulizer solution 3 mL, 3 mL, Nebulization, Q4H - RT, Tania Gupta DO, 3 mL at 02/11/20 1115  •  iron polysaccharides (NIFEREX) capsule 150 mg, 150 mg, Oral, Daily, Sanford Tsang MD, FASN, 150 mg at 02/11/20 1105  •  isosorbide mononitrate (IMDUR) 24 hr tablet 60 mg, 60 mg, Oral, Daily, Denis Mercado MD, 60 mg at 02/11/20  0813  •  levothyroxine (SYNTHROID, LEVOTHROID) tablet 75 mcg, 75 mcg, Oral, Daily, Denis Mercado MD, 75 mcg at 02/11/20 0813  •  [START ON 2/12/2020] metoprolol succinate XL (TOPROL-XL) 24 hr tablet 25 mg, 25 mg, Oral, Q24H, Yomi Higgins MD  •  ondansetron (ZOFRAN) injection 4 mg, 4 mg, Intravenous, Q6H PRN, Denis Mercado MD  •  pantoprazole (PROTONIX) EC tablet 40 mg, 40 mg, Oral, Daily, Denis Mercado MD, 40 mg at 02/11/20 0813  •  [START ON 2/12/2020] predniSONE (DELTASONE) tablet 10 mg, 10 mg, Oral, Daily, Tamara Tomlin DO  •  ranolazine (RANEXA) 12 hr tablet 500 mg, 500 mg, Oral, Q12H, Breeding, Nahid MODI MD, 500 mg at 02/11/20 0813  •  sodium chloride 0.9 % flush 10 mL, 10 mL, Intravenous, PRN, Denis Mercado MD  •  sodium chloride 0.9 % flush 10 mL, 10 mL, Intravenous, Q12H, Denis Mercado MD, 10 mL at 02/11/20 0814  •  sodium chloride 0.9 % flush 10 mL, 10 mL, Intravenous, PRN, Denis Mercado MD    Allergies   Allergen Reactions   • Tuberculin Tests Rash       Review of Systems   Constitutional: Positive for activity change and fatigue. Negative for chills, fever, unexpected weight gain and unexpected weight loss.   HENT: Negative for congestion, ear pain, postnasal drip, sinus pressure and sore throat.    Eyes: Negative for blurred vision and visual disturbance.   Respiratory: Positive for shortness of breath. Negative for cough and chest tightness.    Cardiovascular: Negative for chest pain, palpitations and leg swelling.   Gastrointestinal: Positive for abdominal pain. Negative for blood in stool, constipation, diarrhea, nausea, vomiting and indigestion.   Endocrine: Negative for polyphagia.   Genitourinary: Negative for dysuria and hematuria.   Musculoskeletal: Positive for arthralgias. Negative for back pain, joint swelling and neck pain.   Skin: Negative for rash, skin lesions and bruise.        Bruise abdomen and extremities   Neurological: Negative for dizziness, seizures, speech difficulty,  weakness, numbness and confusion.   Hematological: Negative for adenopathy. Does not bruise/bleed easily.   Psychiatric/Behavioral: Negative for hallucinations, suicidal ideas and depressed mood.        The following portions of the patient's history were reviewed and updated as appropriate: allergies, current medications, past family history, past medical history, past social history, past surgical history and problem list.    Objective     Vitals:    02/11/20 1125 02/11/20 1140 02/11/20 1200 02/11/20 1340   BP:  98/74 104/78    BP Location:  Right arm     Patient Position:  Lying     Pulse: 85 84 86    Resp: 16 18     Temp:  97.2 °F (36.2 °C)     TempSrc:  Oral     SpO2: 95% 97% 94% 93%   Weight:       Height:           Physical Exam   Constitutional: She is oriented to person, place, and time. She appears well-developed. She appears distressed.   Frail looking  Is in moderate res distress with tachynea   HENT:   Head: Normocephalic and atraumatic.   Right Ear: External ear normal.   Left Ear: External ear normal.   Mouth/Throat: Oropharynx is clear and moist.   Eyes: Conjunctivae and EOM are normal.   Neck: Normal range of motion. No tracheal deviation present. No thyromegaly present.   Cardiovascular: Normal rate and regular rhythm.   No murmur heard.  Pulmonary/Chest: Breath sounds normal. She is in respiratory distress (tachypneac).   Abdominal: Soft. Bowel sounds are normal. She exhibits no mass. There is tenderness (moderate epigastric  and RUQ). No hernia.   Musculoskeletal: Normal range of motion. She exhibits no edema.   Neurological: She is alert and oriented to person, place, and time. No cranial nerve deficit or sensory deficit.   Skin: Skin is warm and dry. Rash (echymosis multipe areas) noted.   Psychiatric: She has a normal mood and affect. Her behavior is normal. Judgment and thought content normal.   Nursing note and vitals reviewed.      Lab Results (last 24 hours)     Procedure Component Value  Units Date/Time    Blood Gas, Arterial With Co-Ox [[id number]]  (Abnormal) Collected:  02/11/20 1340    Specimen:  Arterial Blood Updated:  02/11/20 1341     Site Right Brachial     Tad's Test N/A     pH, Arterial 7.438 pH units      pCO2, Arterial 29.6 mm Hg      Comment: 84 Value below reference range        pO2, Arterial 64.3 mm Hg      Comment: 84 Value below reference range        HCO3, Arterial 20.0 mmol/L      Base Excess, Arterial -3.5 mmol/L      Comment: 84 Value below reference range        O2 Saturation, Arterial 92.6 %      Comment: 84 Value below reference range        Hemoglobin, Blood Gas 8.9 g/dL      Comment: 84 Value below reference range        Hematocrit, Blood Gas 27.4 %      Comment: 84 Value below reference range        Oxyhemoglobin 90.7 %      Comment: 84 Value below reference range        Methemoglobin 0.80 %      Carboxyhemoglobin 1.3 %      A-a Gradiant 46.5 mmHg      Barometric Pressure for Blood Gas 734 mmHg      Modality Nasal Cannula     Flow Rate 8.0 lpm      Ventilator Mode NA     Comment: Meter: [id number]     :  [id number]        Note --     pH, Temp Corrected --     pCO2, Temperature Corrected --     pO2, Temperature Corrected --    Renal Function Panel [[id number]]  (Abnormal) Collected:  02/11/20 0640    Specimen:  Blood Updated:  02/11/20 0747     Glucose 130 mg/dL       mg/dL      Creatinine 3.68 mg/dL      Sodium 138 mmol/L      Potassium 3.9 mmol/L      Chloride 99 mmol/L      CO2 18.6 mmol/L      Calcium 8.8 mg/dL      Albumin 3.30 g/dL      Phosphorus 6.5 mg/dL      Anion Gap 20.4 mmol/L      BUN/Creatinine Ratio 30.4     eGFR Non African Amer 12 mL/min/1.73      Comment: <15 Indicative of kidney failure.        eGFR   Amer --     Comment: <15 Indicative of kidney failure.       Narrative:       GFR Normal >60  Chronic Kidney Disease <60  Kidney Failure <15      CBC & Differential [[id number]] Collected:  02/11/20 0640    Specimen:  Blood  Updated:  02/11/20 0728    Narrative:       The following orders were created for panel order CBC & Differential.  Procedure                               Abnormality         Status                     ---------                               -----------         ------                     CBC Auto Differential[[id number]]        Abnormal            Final result                 Please view results for these tests on the individual orders.    CBC Auto Differential [[id number]]  (Abnormal) Collected:  02/11/20 0640    Specimen:  Blood Updated:  02/11/20 0728     WBC 8.22 10*3/mm3      RBC 2.94 10*6/mm3      Hemoglobin 8.5 g/dL      Hematocrit 27.0 %      MCV 91.8 fL      MCH 28.9 pg      MCHC 31.5 g/dL      RDW 19.1 %      RDW-SD 63.0 fl      MPV 11.4 fL      Platelets 119 10*3/mm3      Neutrophil % 90.1 %      Lymphocyte % 3.2 %      Monocyte % 4.9 %      Eosinophil % 0.0 %      Basophil % 0.2 %      Immature Grans % 1.6 %      Neutrophils, Absolute 7.41 10*3/mm3      Lymphocytes, Absolute 0.26 10*3/mm3      Monocytes, Absolute 0.40 10*3/mm3      Eosinophils, Absolute 0.00 10*3/mm3      Basophils, Absolute 0.02 10*3/mm3      Immature Grans, Absolute 0.13 10*3/mm3      nRBC 0.4 /100 WBC     BNP [[id number]]  (Abnormal) Collected:  02/10/20 0625    Specimen:  Blood Updated:  02/10/20 1906     proBNP >70,000.0 pg/mL     Narrative:       Among patients with dyspnea, NT-proBNP is highly sensitive for the detection of acute congestive heart failure. In addition NT-proBNP of <300 pg/ml effectively rules out acute congestive heart failure with 99% negative predictive value.    Results may be falsely decreased if patient taking Biotin.      Blood Gas, Arterial With Co-Ox [[id number]]  (Abnormal) Collected:  02/10/20 1858    Specimen:  Arterial Blood Updated:  02/10/20 1859     Site Left Radial     Tad's Test Positive     pH, Arterial 7.439 pH units      pCO2, Arterial 28.1 mm Hg      Comment: 84 Value below reference range         pO2, Arterial 62.0 mm Hg      Comment: 84 Value below reference range        HCO3, Arterial 19.0 mmol/L      Comment: 84 Value below reference range        Base Excess, Arterial -4.4 mmol/L      Comment: 84 Value below reference range        O2 Saturation, Arterial 92.0 %      Comment: 84 Value below reference range        Hemoglobin, Blood Gas 8.8 g/dL      Comment: 84 Value below reference range        Hematocrit, Blood Gas 26.9 %      Comment: 84 Value below reference range        Oxyhemoglobin 90.1 %      Comment: 84 Value below reference range        Methemoglobin 0.70 %      Carboxyhemoglobin 1.4 %      A-a Gradiant 209.1 mmHg      Barometric Pressure for Blood Gas 733 mmHg      Modality Nasal Cannula     FIO2 44 %      Flow Rate 6.0 lpm      Ventilator Mode NA     Comment: Meter: [id number]     :  [id number]        Note --     pH, Temp Corrected --     pCO2, Temperature Corrected --     pO2, Temperature Corrected --           Imaging Results (Last 24 Hours)     Procedure Component Value Units Date/Time    XR Chest 1 View [[id number]] Collected:  02/11/20 1334     Updated:  02/11/20 1337    Narrative:       PROCEDURE: XR CHEST 1 VW-     HISTORY: soa; N17.9-Acute kidney failure, unspecified;  E87.5-Hyperkalemia; I48.91-Unspecified atrial fibrillation; R06.89-Other  abnormalities of breathing; R79.89-Other specified abnormal findings of  blood chemistry; Z74.09-Other reduced mobility     COMPARISON: 02/10/2020.     FINDINGS: The heart is normal in size. The patient is status post median  sternotomy and CABG procedure. There is a moderate-sized hiatal hernia.  There is a worsening right basilar opacity which may reflect atelectasis  or pneumonia. The left lung is clear. There is no pneumothorax.  There  are no acute osseous abnormalities.       Impression:       Worsening right basilar opacity which may reflect  atelectasis or pneumonia.     Continued followup is recommended.     This report was  finalized on 2/11/2020 1:35 PM by Joaquín Shannon M.D..    XR Abdomen KUB [[id number]] Resulted:  02/11/20 1322     Updated:  02/11/20 1335    XR Chest 1 View [[id number]] Collected:  02/11/20 0740     Updated:  02/11/20 0744    Narrative:       PROCEDURE: XR CHEST 1 VW-     HISTORY: hypoxia; N17.9-Acute kidney failure, unspecified;  E87.5-Hyperkalemia; I48.91-Unspecified atrial fibrillation; R06.89-Other  abnormalities of breathing; R79.89-Other specified abnormal findings of  blood chemistry; Z74.09-Other reduced mobility     COMPARISON: 02/06/2020.     FINDINGS: The patient is status post median sternotomy and CABG  procedure. The heart is normal in size. The mediastinum is unremarkable.  There are chronic interstitial lung changes. There is a new linear  opacity in the right midlung field which may reflect atelectasis or  pneumonia. The lungs are otherwise clear. There is no pneumothorax.   There are no acute osseous abnormalities.       Impression:       New linear opacity in the right midlung field either  reflecting atelectasis or pneumonia.     Continued followup is recommended.     This report was finalized on 2/11/2020 7:42 AM by Joaquín Shannon M.D..          Assessment / Plan      Assessment/Recommendations:     1. Black colored stool   Patient has been passing black-colored stool since she was started on iron pills last year.  I suspect this is mostly from the iron pills and multivitamins rather than melena.  Rectal examination done today reveals a green-colored liquid stool in the rectum.  No signs of any current melena melena or bright red rectal bleed.   Patient did have a an episode of rectal bleeding with clots with hematemesis early last year at that time she had an EGD done which revealed a gastric ulcer as per patient.  She has been taking PPI since then.  She had an EGD done in October 2019 at [facility] which was normal and subsequently had a colonoscopy done in October 2019 was  normal as well except mild sigmoid diverticulosis.  She had a PillCam study done during the same admission which was normal as well without any small bowel source of bleeding. There was no signs of any GI bleed noted that time.     Given her CKD she has a higher chance of having any small bowel AVMs can be missed some time with the PillCam study.  With that we can consider an enteroscopy here if patient is clinically stable.  Otherwise we can simply follow her as an outpatient and if there is any future episode of any bleeding or melena we can proceed with the enteroscopy.   We will reassess her in the next 24 to 48 hours and if patient agreeable we will consider scheduling for  Enteroscopy as an inpatient if her respiratory status improve.   She can be started back on Eliquis with the PPI  for her chronic atrial fibrillation if we deferred the procedure.  She is slightly higher risk for GI bleed with anticoagulation.     2. Normocytic Anemia  Chronic normocytic anemia which is mostly multifactorial in etiology.  Mostly anemia of chronic disease and possible intermittent occult upper GI bleed in the setting of anticoagulation cannot be ruled out.  However there is no current clinical signs or symptoms suggestive of any GI bleed.  Her hemoglobin baseline runs around 8 to 9 g/dL which has been stable.     3. Upper abdominal pain  She has been having intermittent upper abdominal pain mainly in the right upper quadrant for the past few months.  Clinical examination today reveals a severe right upper quadrant tenderness suspicious for biliary colic developing cholecystitis and may have choledocholithiasis with acute elevation in liver enzymes.   Recommend a stat order urgent ultrasound abdomen to rule out cholelithiasis and choledocholithiasis      4. Elevated LFTS   Both hepatocellular and cholestatic pattern of elevated liver enzymes which is new compared to her lab work done in December 2019.  I suspect this is an  acute event and choledocholithiasis and biliary obstruction need to be ruled out first.   Other differential include the chronic passive congestion of the liver with her pulmonary hypertension and levator right ventricular systolic pressure.  No signs of any sepsis or medication causing this at this time  Recommend basic hepatitis panel for now  Ultrasound abdomen    5. Coagulopathy  Associated with anticoagulation, possible nutritional and spectated acute biliary obstruction  Recommend to monitor for now    6. RENEE on CKD, Respiratory failure managed my primary team.       Thank you very much for letting me participate in the care of this patient.  Please do not hesitate to call me if you have any questions.    Vannesa Dee MD  [facility] [place]  2/11/2020  1:48 PM    Please note that portions of this note may have been completed with a voice recognition program. Efforts were made to edit the dictations, but occasionally words are mistranscribed.

## 2020-02-11 NOTE — THERAPY TREATMENT NOTE
Patient on IV drips for blood pressure and heart rate. BP very low today.  States she doesn't feel well enough to get up. Will follow up tomorrow.

## 2020-02-11 NOTE — PROGRESS NOTES
[facility] Cardiology IP Progress Note    Pat Morel  1940  [id number]  02/11/20    Subjective:   Mrs. Pat Morel is a 80 y.o. female seen in follow-up for multivessel coronary artery disease and acute on chronic diastolic heart failure.  Currently admitted with acute on chronic kidney disease.  No acute overnight events.  This morning, the patient had an episode of atrial fibrillation with rapid ventricular rates up to the 150s.  The patient reports associated palpitations as well as mild shortness of breath.  Subsequently started on amiodarone drip.  Denies chest pain or chest discomfort.  No other new complaints.    Review of Systems:   Review of Systems   Constitutional: Positive for fatigue. Negative for activity change, appetite change, chills, diaphoresis, fever, unexpected weight gain and unexpected weight loss.   Respiratory: Positive for shortness of breath. Negative for cough, chest tightness and wheezing.    Cardiovascular: Positive for palpitations and leg swelling. Negative for chest pain.   Gastrointestinal: Negative for abdominal pain, anal bleeding, blood in stool and GERD.   Endocrine: Negative for cold intolerance and heat intolerance.   Genitourinary: Negative for hematuria.   Neurological: Negative for dizziness, syncope, weakness and light-headedness.   Hematological: Does not bruise/bleed easily.   Psychiatric/Behavioral: Negative for depressed mood and stress. The patient is not nervous/anxious.        I have reviewed and/or updated the patient's past medical, past surgical, family history, social history, problem list and allergies as appropriate in the chart.     Physical Exam:  Vital Signs:   Vitals:    02/11/20 1100 02/11/20 1115 02/11/20 1125 02/11/20 1140   BP: 98/74   98/74   BP Location:    Right arm   Patient Position:    Lying   Pulse: 75 86 85 84   Resp:  18 16 18   Temp:    97.2 °F (36.2 °C)   TempSrc:    Oral   SpO2: 94% 93% 95% 97%   Weight:        Height:           Physical Exam   Constitutional: She appears well-developed and well-nourished. No distress.   HENT:   Head: Normocephalic and atraumatic.   Moist Mucous Membranes.    Eyes: Pupils are equal, round, and reactive to light. EOM are normal. No scleral icterus.   Cardiovascular: Normal rate, normal heart sounds and intact distal pulses. Exam reveals no gallop and no friction rub.   No murmur heard.  Irregularly irregular rhythm.  1+ bilateral lower extremity edema.   Pulmonary/Chest: Effort normal and breath sounds normal. No stridor. No respiratory distress. She has no wheezes. She has no rales. She exhibits no tenderness.   On supplemental O2.   Abdominal: Soft. Bowel sounds are normal. She exhibits no distension. There is no tenderness. There is no rebound and no guarding.   Musculoskeletal: Normal range of motion. She exhibits edema.   Nursing note and vitals reviewed.      Results Review:   Results from last 7 days   Lab Units 02/11/20  0640  02/08/20  0624   SODIUM mmol/L 138   < > 140   POTASSIUM mmol/L 3.9   < > 3.7   CHLORIDE mmol/L 99   < > 99   CO2 mmol/L 18.6*   < > 21.8*   BUN mg/dL 112*   < > 102*   CREATININE mg/dL 3.68*   < > 4.53*   CALCIUM mg/dL 8.8   < > 8.3*   BILIRUBIN mg/dL  --   --  1.8*   ALK PHOS U/L  --   --  195*   ALT (SGPT) U/L  --   --  149*   AST (SGOT) U/L  --   --  130*   GLUCOSE mg/dL 130*   < > 117*    < > = values in this interval not displayed.     Results from last 7 days   Lab Units 02/08/20  0624 02/07/20  0546 02/06/20  1148   TROPONIN T ng/mL 0.100* 0.103* 0.106*     Results from last 7 days   Lab Units 02/11/20  0640 02/10/20  0625 02/09/20  0548   WBC 10*3/mm3 8.22 7.88 7.60   HEMOGLOBIN g/dL 8.5* 8.0* 8.1*   HEMATOCRIT % 27.0* 25.5* 26.2*   PLATELETS 10*3/mm3 119* 120* 145     Results from last 7 days   Lab Units 02/07/20  0546   INR  1.71*     Results from last 7 days   Lab Units 02/07/20  0546   MAGNESIUM mg/dL 2.9*           I personally viewed and  interpreted the patient's EKG/Telemetry data     Medications:   )  Current Facility-Administered Medications:   •  acetaminophen (TYLENOL) tablet 650 mg, 650 mg, Oral, Q4H PRN, 650 mg at 02/07/20 2302 **OR** acetaminophen (TYLENOL) 160 MG/5ML solution 650 mg, 650 mg, Oral, Q4H PRN **OR** acetaminophen (TYLENOL) suppository 650 mg, 650 mg, Rectal, Q4H PRN, Denis Mercado MD  •  atorvastatin (LIPITOR) tablet 40 mg, 40 mg, Oral, Nightly, Denis Mercado MD, 40 mg at 02/10/20 2008  •  budesonide (PULMICORT) nebulizer solution 0.5 mg, 0.5 mg, Nebulization, BID - RT, Denis Mercado MD, 0.5 mg at 02/11/20 0700  •  calcium acetate (PHOS BINDER)) capsule 1,334 mg, 1,334 mg, Oral, TID With Meals, Sanford Tsang MD, FASN  •  dilTIAZem (CARDIZEM) tablet 30 mg, 30 mg, Oral, Q6H, Yomi Higgins MD  •  ipratropium-albuterol (DUO-NEB) nebulizer solution 3 mL, 3 mL, Nebulization, Q4H - RT, Tania Gupta DO, 3 mL at 02/11/20 1115  •  iron polysaccharides (NIFEREX) capsule 150 mg, 150 mg, Oral, Daily, Sanford Tsang MD, FASN, 150 mg at 02/11/20 1105  •  isosorbide mononitrate (IMDUR) 24 hr tablet 60 mg, 60 mg, Oral, Daily, Denis Mercado MD, 60 mg at 02/11/20 0813  •  levothyroxine (SYNTHROID, LEVOTHROID) tablet 75 mcg, 75 mcg, Oral, Daily, Denis Mercado MD, 75 mcg at 02/11/20 0813  •  [START ON 2/12/2020] metoprolol succinate XL (TOPROL-XL) 24 hr tablet 25 mg, 25 mg, Oral, Q24H, Yomi Higgins MD  •  ondansetron (ZOFRAN) injection 4 mg, 4 mg, Intravenous, Q6H PRN, Denis Mercado MD  •  pantoprazole (PROTONIX) EC tablet 40 mg, 40 mg, Oral, Daily, Denis Mercado MD, 40 mg at 02/11/20 0813  •  predniSONE (DELTASONE) tablet 20 mg, 20 mg, Oral, Daily, Gurwinder Tay MD, 20 mg at 02/11/20 0813  •  ranolazine (RANEXA) 12 hr tablet 500 mg, 500 mg, Oral, Q12H, Breeding, Nahid MODI MD, 500 mg at 02/11/20 0813  •  sodium chloride 0.9 % flush 10 mL, 10 mL, Intravenous, PRN, Denis Mercado MD  •  sodium chloride 0.9 % flush  10 mL, 10 mL, Intravenous, Q12H, Denis Mercado MD, 10 mL at 02/11/20 0814  •  sodium chloride 0.9 % flush 10 mL, 10 mL, Intravenous, PRN, Denis Mercado MD    Assessment / Plan:     1.  Multivessel coronary artery disease  --History of three-vessel CABG 2012 including LIMA-LAD, SVG-diagonal, and SVG-PDA  --Known occlusion of the SVG-diagonal graft with severe in-stent stenosis in the native diagonal branch, unattractive for PCI due to small caliber vessel  --Echocardiogram shows preserved LV systolic function  --Anginal symptoms on presentation likely exacerbation of chronic stable angina secondary to anemia, volume overload, etc.  --Plan to continue medical management, with no plans for additional invasive evaluation at this time  --Continue bisoprolol, isosorbide, and Ranexa as antianginals  --Given hemoglobin is stable, will restart daily aspirin     2.  Acute on chronic diastolic heart failure   --Echocardiogram  menstruates 2 diastolic dysfunction with left atrial enlargement and elevated left atrial pressures  --Remains somewhat volume overloaded on exam  --Agree with restarting Bumex today  --Monitor renal function closely with diuresis     3.  Persistent atrial fibrillation  --Episode of rapid ventricular rates this morning  --Started on amiodarone bolus and infusion, however given underlying interstitial lung disease and pulmonary hypertension the patient is a poor candidate for rhythm control strategy with amiodarone, will therefore discontinue  --Will change bisoprolol to metoprolol XL and start every 6 diltiazem for additional rate control, with plans to transition to daily diltiazem dosing prior to discharge  --Anticoagulation on hold due to underlying concerns for anemia and possible GI bleed     4.  Pulmonary arterial hypertension   --In the setting of interstitial lung disease  --RVSP on echocardiogram 60 mmHg  --Likely contributing to dyspnea  --Given symptoms previously improved with diuresis, will  defer further evaluation of pulmonary hypertension at this time     5.  Acute on chronic kidney disease  --Management per nephrology  --Renal function slowly improving    6.  Dyslipidemia  --Continue statin        Thank you for allowing me to participate in the care of your patient. Please to not hesitate to contact me with additional questions or concerns.     LA NENA Higgins MD  Interventional Cardiology   02/11/20  12:35 PM

## 2020-02-11 NOTE — PROGRESS NOTES
[facility]    Name:  Pat Morel   Age:  80 y.o.  Sex:  female  :  1940  MRN:  [id number]   Visit Number:  [id number]  Admission Date:  2020  Date Of Service:  20  Primary Care Physician:  Anthony Sweet DO     LOS: 5 days :      Chief Complaint:  Follow up renal failure and respiratory failure        Subjective / Interval History:   The patient was seen again today.  Earlier today, she was sitting up in bed on 3 L oxygen and feeling tired and in no distress.  Later this afternoon, she has developed worsening dyspnea and has had progressive hypoxic respiratory failure requiring high flow oxygen.  In addition, the patient continues to have anemia although this is slightly better after diuresis.  She did have a Hemoccult positive stool.  Her anticoagulation has remained on hold with anemia.  She does have dyspnea on exertion although she still appears to be fluid overloaded.  I discussed the case with Dr. Tsang as well and with chronic hypersensitivity pneumonitis, she is continued on a prednisone taper that will continued to be tapered downward.  Her repeat chest x-ray shows increased atelectasis in the right middle lobe which is atelectasis versus pneumonia.  The patient does not have any fever, cough, or pneumonia type symptoms.    She did develop A. fib with RVR overnight although the patient did not tell me this.  She initially was started on amiodarone which has been changed to Cardizem and metoprolol instead due to chronic pulmonary disease.    The patient is a very poor historian and reports no acute issues overnight.  She does state though that she is severely weak having difficulty even standing and moving around out of bed.  She states that she much prefers just staying in bed but requires increased assistance for ambulation.    In addition, the patient does have increased epigastric and right upper quadrant abdominal pain today.   "Of note, the patient has had elevated liver enzymes since admission.  She has increased nausea and poor appetite with poor oral intake.  I discussed the case in consultation today with gastroenterology.       \"Patient is a 80-year-old female with history including CAD with CABG, atrial fibrillation, chronic kidney disease stage III, history of CVA, diastolic CHF, iron deficiency anemia, hypersensitivity pneumonitis, hypertension, osteoporosis was brought to the emergency room by EMS with generalized weakness on 2/6/2020.  She was admitted with acute worsening of her chronic renal failure, elevated troponin levels  as well as hyperkalemia.  She does have chronic respiratory failure which has been stable on prednisone.  She is currently on prednisone 20 mg daily started this week.  She will taper it down to 10 mg every week and be continued on 10 mg daily until she sees her pulmonologist Dr. Laura.       She was seen by Dr. Tsang from nephrology who recommended gentle IV hydration.  Her renal function has slowly improved and her hyperkalemia has resolved.  She was also seen by Dr. Horvath for her elevated troponin levels.  He felt that the patient's troponin levels were related to her coronary artery disease in the setting of renal failure and anemia.  He did offer cardiac catheterization to the patient but she is currently not willing for any invasive cardiac work-up.  He has recommended triple antiplatelet/anticoagulant agents with aspirin, Plavix and Eliquis.  He has also added Ranexa and increased her Bystolic dose.  An echocardiogram was done and the results are currently pending.  Unfortunately, patient has history of recurrent falls and chronic anemia and triple anticoagulation/antiplatelet therapy may need to be closely watched.\" previously stated by Dr Mercado.       Review of Systems:     General ROS: Patient denies any fevers, chills or loss of consciousness.  Diffuse generalized  weakness  Ophthalmic " ROS: Denies any diplopia or transient loss of vision.  ENT ROS: Denies sore throat, nasal congestion or ear pain.   Respiratory ROS: Denies cough with positive exertional shortness of breath.  Cardiovascular ROS: Denies chest pain or palpitations. No history of exertional chest pain.   Gastrointestinal ROS: Poor appetite with positive nausea no vomiting.  Right upper quadrant and epigastric abdominal pain. No diarrhea.  Genito-Urinary ROS: Denies dysuria or hematuria.  Musculoskeletal ROS: Denies chronic back pain. No muscle pain. No calf pain.  Neurological ROS: Denies any focal weakness. No loss of consciousness. Denies any numbness. Denies neck pain.   Dermatological ROS: Denies any redness or pruritis.    Vital Signs:    Temp:  [97.2 °F (36.2 °C)-98 °F (36.7 °C)] 97.8 °F (36.6 °C)  Heart Rate:  [] 89  Resp:  [15-22] 18  BP: ()/(57-90) 108/66    Intake and output:    I/O last 3 completed shifts:  In: 500 [P.O.:500]  Out: 1050 [Urine:1050]  I/O this shift:  In: 718 [P.O.:480; I.V.:238]  Out: 0     Physical Examination:    General Appearance:   Elderly lady.  Chronically ill-appearing.  Alert and cooperative, not in any acute distress.   Head:    Atraumatic and normocephalic, without obvious abnormality.   Eyes:            PERRLA, conjunctivae and sclerae normal, no Icterus. No pallor. Extraocular movements are within normal limits.   Throat:   No oral lesions, no thrush, oral mucosa moist.   Neck:   Supple, trachea midline, no thyromegaly   Lungs:     Chest shape is normal. Breath sounds heard bilaterally equally.  No crackles or wheezing. No Pleural rub or bronchial breathing.    Heart:    Normal S1 and S2, no murmur, no gallop, no rub. No JVD   Abdomen:     Normal bowel sounds, no masses, no organomegaly. Soft        mild epigastric/right upper quadrant area tender, non-distended, no guarding, no rebound                tenderness, obese   Extremities:   Moves all extremities well, increased 1+ equal  leg edema, no cyanosis, no             clubbing   Skin:   No bleeding, bruising or rash.   Neurologic:   No tremor, sensation intact, Motor power is normal and equal bilaterally.   Laboratory results:    Lab Results (last 24 hours)     Procedure Component Value Units Date/Time    Lactic Acid, Plasma [[id number]] Collected:  02/11/20 1509    Specimen:  Blood Updated:  02/11/20 1517    Procalcitonin [[id number]] Collected:  02/11/20 1509    Specimen:  Blood Updated:  02/11/20 1517    Blood Culture With LESLYE - Blood, Arm, Left [[id number]] Collected:  02/11/20 1510    Specimen:  Blood from Arm, Left Updated:  02/11/20 1517    Blood Gas, Arterial With Co-Ox [[id number]]  (Abnormal) Collected:  02/11/20 1340    Specimen:  Arterial Blood Updated:  02/11/20 1341     Site Right Brachial     Tad's Test N/A     pH, Arterial 7.438 pH units      pCO2, Arterial 29.6 mm Hg      Comment: 84 Value below reference range        pO2, Arterial 64.3 mm Hg      Comment: 84 Value below reference range        HCO3, Arterial 20.0 mmol/L      Base Excess, Arterial -3.5 mmol/L      Comment: 84 Value below reference range        O2 Saturation, Arterial 92.6 %      Comment: 84 Value below reference range        Hemoglobin, Blood Gas 8.9 g/dL      Comment: 84 Value below reference range        Hematocrit, Blood Gas 27.4 %      Comment: 84 Value below reference range        Oxyhemoglobin 90.7 %      Comment: 84 Value below reference range        Methemoglobin 0.80 %      Carboxyhemoglobin 1.3 %      A-a Gradiant 46.5 mmHg      Barometric Pressure for Blood Gas 734 mmHg      Modality Nasal Cannula     Flow Rate 8.0 lpm      Ventilator Mode NA     Comment: Meter: [id number]     :  [id number]        Note --     pH, Temp Corrected --     pCO2, Temperature Corrected --     pO2, Temperature Corrected --    Renal Function Panel [[id number]]  (Abnormal) Collected:  02/11/20 0640    Specimen:  Blood Updated:  02/11/20 0747     Glucose 130 mg/dL        mg/dL      Creatinine 3.68 mg/dL      Sodium 138 mmol/L      Potassium 3.9 mmol/L      Chloride 99 mmol/L      CO2 18.6 mmol/L      Calcium 8.8 mg/dL      Albumin 3.30 g/dL      Phosphorus 6.5 mg/dL      Anion Gap 20.4 mmol/L      BUN/Creatinine Ratio 30.4     eGFR Non African Amer 12 mL/min/1.73      Comment: <15 Indicative of kidney failure.        eGFR   Amer --     Comment: <15 Indicative of kidney failure.       Narrative:       GFR Normal >60  Chronic Kidney Disease <60  Kidney Failure <15      CBC & Differential [[id number]] Collected:  02/11/20 0640    Specimen:  Blood Updated:  02/11/20 0728    Narrative:       The following orders were created for panel order CBC & Differential.  Procedure                               Abnormality         Status                     ---------                               -----------         ------                     CBC Auto Differential[[id number]]        Abnormal            Final result                 Please view results for these tests on the individual orders.    CBC Auto Differential [[id number]]  (Abnormal) Collected:  02/11/20 0640    Specimen:  Blood Updated:  02/11/20 0728     WBC 8.22 10*3/mm3      RBC 2.94 10*6/mm3      Hemoglobin 8.5 g/dL      Hematocrit 27.0 %      MCV 91.8 fL      MCH 28.9 pg      MCHC 31.5 g/dL      RDW 19.1 %      RDW-SD 63.0 fl      MPV 11.4 fL      Platelets 119 10*3/mm3      Neutrophil % 90.1 %      Lymphocyte % 3.2 %      Monocyte % 4.9 %      Eosinophil % 0.0 %      Basophil % 0.2 %      Immature Grans % 1.6 %      Neutrophils, Absolute 7.41 10*3/mm3      Lymphocytes, Absolute 0.26 10*3/mm3      Monocytes, Absolute 0.40 10*3/mm3      Eosinophils, Absolute 0.00 10*3/mm3      Basophils, Absolute 0.02 10*3/mm3      Immature Grans, Absolute 0.13 10*3/mm3      nRBC 0.4 /100 WBC     BNP [[id number]]  (Abnormal) Collected:  02/10/20 0625    Specimen:  Blood Updated:  02/10/20 1906     proBNP >70,000.0 pg/mL     Narrative:        Among patients with dyspnea, NT-proBNP is highly sensitive for the detection of acute congestive heart failure. In addition NT-proBNP of <300 pg/ml effectively rules out acute congestive heart failure with 99% negative predictive value.    Results may be falsely decreased if patient taking Biotin.      Blood Gas, Arterial With Co-Ox [[id number]]  (Abnormal) Collected:  02/10/20 1858    Specimen:  Arterial Blood Updated:  02/10/20 1859     Site Left Radial     Tad's Test Positive     pH, Arterial 7.439 pH units      pCO2, Arterial 28.1 mm Hg      Comment: 84 Value below reference range        pO2, Arterial 62.0 mm Hg      Comment: 84 Value below reference range        HCO3, Arterial 19.0 mmol/L      Comment: 84 Value below reference range        Base Excess, Arterial -4.4 mmol/L      Comment: 84 Value below reference range        O2 Saturation, Arterial 92.0 %      Comment: 84 Value below reference range        Hemoglobin, Blood Gas 8.8 g/dL      Comment: 84 Value below reference range        Hematocrit, Blood Gas 26.9 %      Comment: 84 Value below reference range        Oxyhemoglobin 90.1 %      Comment: 84 Value below reference range        Methemoglobin 0.70 %      Carboxyhemoglobin 1.4 %      A-a Gradiant 209.1 mmHg      Barometric Pressure for Blood Gas 733 mmHg      Modality Nasal Cannula     FIO2 44 %      Flow Rate 6.0 lpm      Ventilator Mode NA     Comment: Meter: [id number]     :  [id number]        Note --     pH, Temp Corrected --     pCO2, Temperature Corrected --     pO2, Temperature Corrected --          I have reviewed the patient's laboratory results.    Radiology results:    Imaging Results (Last 24 Hours)     Procedure Component Value Units Date/Time    XR Abdomen KUB [[id number]] Collected:  02/11/20 1346     Updated:  02/11/20 1350    Narrative:       PROCEDURE: XR ABDOMEN KUB-     HISTORY: abdominal pain; N17.9-Acute kidney failure, unspecified;  E87.5-Hyperkalemia;  I48.91-Unspecified atrial fibrillation; R06.89-Other  abnormalities of breathing; R79.89-Other specified abnormal findings of  blood chemistry; Z74.09-Other reduced mobility     COMPARISON: None.     FINDINGS: An AP view of the abdomen and pelvis demonstrates an  unremarkable bowel gas pattern with no evidence of obstruction. There  are calcifications in the right upper quadrant likely gallstones. A  phlebolith is seen in the right hemipelvis. No other abnormal  calcifications are seen overlying the abdomen or pelvis.       Impression:       Calcifications in the right upper quadrant likely  gallstones.           This report was finalized on 2/11/2020 1:47 PM by Joaquín Shannon M.D..    XR Chest 1 View [[id number]] Collected:  02/11/20 1334     Updated:  02/11/20 1337    Narrative:       PROCEDURE: XR CHEST 1 VW-     HISTORY: soa; N17.9-Acute kidney failure, unspecified;  E87.5-Hyperkalemia; I48.91-Unspecified atrial fibrillation; R06.89-Other  abnormalities of breathing; R79.89-Other specified abnormal findings of  blood chemistry; Z74.09-Other reduced mobility     COMPARISON: 02/10/2020.     FINDINGS: The heart is normal in size. The patient is status post median  sternotomy and CABG procedure. There is a moderate-sized hiatal hernia.  There is a worsening right basilar opacity which may reflect atelectasis  or pneumonia. The left lung is clear. There is no pneumothorax.  There  are no acute osseous abnormalities.       Impression:       Worsening right basilar opacity which may reflect  atelectasis or pneumonia.     Continued followup is recommended.     This report was finalized on 2/11/2020 1:35 PM by Joaquín Shannon M.D..    XR Chest 1 View [[id number]] Collected:  02/11/20 0740     Updated:  02/11/20 0744    Narrative:       PROCEDURE: XR CHEST 1 VW-     HISTORY: hypoxia; N17.9-Acute kidney failure, unspecified;  E87.5-Hyperkalemia; I48.91-Unspecified atrial fibrillation; R06.89-Other  abnormalities of  breathing; R79.89-Other specified abnormal findings of  blood chemistry; Z74.09-Other reduced mobility     COMPARISON: 02/06/2020.     FINDINGS: The patient is status post median sternotomy and CABG  procedure. The heart is normal in size. The mediastinum is unremarkable.  There are chronic interstitial lung changes. There is a new linear  opacity in the right midlung field which may reflect atelectasis or  pneumonia. The lungs are otherwise clear. There is no pneumothorax.   There are no acute osseous abnormalities.       Impression:       New linear opacity in the right midlung field either  reflecting atelectasis or pneumonia.     Continued followup is recommended.     This report was finalized on 2/11/2020 7:42 AM by Joaquín Shannon M.D..          I have reviewed the patient's radiology reports.    Medication Review:     I have reviewed the patients active and prn medications.     Assessment:  1.  Acute renal failure on chronic kidney disease stage III, present on admission, improving  2.  Recurrent falls at home secondary to uremia, present on admission.  3.  Acute hyperkalemia secondary to #1, present on admission, resolved.  4.  Demand ischemia with elevated troponin levels, present on admission.  5.  Hypersensitivity pneumonitis on prednisone therapy.  6.  Chronic hypoxic respiratory failure on home oxygen.  7.  Chronic diastolic heart failure, no evidence of exacerbation.  8.  Paroxysmal atrial fibrillation on apixaban, held with anemia  9.  Coronary artery disease status post stents and CABG.  10.  Chronic pulmonary hypertension.  11.  Acquired hypothyroidism.  12.  Elevated transaminases of uncertain etiology. With epigastric and right upper abdominal pain  13. History of stroke with poor historian suspect chronic dementia  14. Acute on chronic anemia, stable for now without gross bleeding  15. Positive hemoccult stool        Plan:  The patient is continued in the hospital on telemetry.  She has  multiple issues and comorbidities requiring multiple consultants and I have discussed the case with each 1 of them today.  Cardiac: Continue metoprolol with Cardizem and close telemetry monitoring.  Renal: With progressive respiratory failure and fluid overload, I gave an extra dose of Bumex and I will discuss further with nephrology again if there are any other recommendations.  Pulmonary: I have placed her on CPAP and high flow oxygen as needed.  I doubt she has any pneumonia.  He has a normal white blood cell count and no fever so I have added blood cultures and a procalcitonin for further review.  She has chronic hypersensitivity pneumonitis and sees Dr. Laura pulmonary outpatient.  She is on chronic prednisone for which this is being tapered down from a burst dose on admission.  GI: Appreciate GI consultation.  With no gross bleeding for now, plan to monitor closely and restart anticoagulation.  I discussed this with cardiology, Dr. Higgins, who may restart aspirin tomorrow if hemoglobin remains stable again.  For now, with elevated liver enzymes, a ultrasound of the abdomen is pending to rule out possible CBD stone as she does appear to have gallstones seen on the x-ray.    She is severely weak and continued to provide physical and Occupational Therapy and up with assistance.  There is no family at the bedside.  She is DNR.  Her prognosis remains very poor.    Medication risks and benefits were discussed in detail. Patient reported satisfaction with care delivered and treatment plan.     Tamara Tomlin DO  02/11/20  3:40 PM

## 2020-02-11 NOTE — PROGRESS NOTES
Continued Stay Note   Jose     Patient Name: Pat Morel  MRN: [id number]  Today's Date: 2/11/2020    Admit Date: 2/6/2020    Discharge Plan     Row Name 02/11/20 1323       Plan    N/A Provider List Comment  [facility] Will start pre cert Pt is accepting of this         Discharge Codes    No documentation.       Expected Discharge Date and Time     Expected Discharge Date Expected Discharge Time    Feb 14, 2020             Kelsey Peña RN

## 2020-02-11 NOTE — PLAN OF CARE
Patient states she is feeling better. Oxygen tapered down to 2L nc by 230 am and saturation is still in the low 90's. Dressing changed on skin tear. Vitals stable and will continue to monitor patient.

## 2020-02-11 NOTE — PROGRESS NOTES
"Nephrology Progress Note.    LOS: 5 days    Patient Care Team:  Anthony Sweet DO as PCP - General (Family Medicine)  Tien Archer MD as Consulting Physician (Cardiac Electrophysiology)  Xu Block MD (Cardiology)  Yomi Higgins MD as Consulting Physician (Cardiology)    Chief Complaint:    Chief Complaint   Patient presents with   • Weakness - Generalized       Subjective:   Follow up for RENEE and Chronic Kidney disease stage 4 / Anemia.     Interval History:   Patient Complaints: none  Patient seen and examined this morning.  Events from last night noted.  Patient denies having any fevers chills.  No nausea or vomiting no abdominal pain.  Denies any chest pain, shortness of breath or cough and sputum production.  There is no significant edema.   Patient also denies having new onset weakness of numbness of either extremity, she feels that things are slightly better has been able to eat and drink some.  She feels that she is still weak and has agreed to go to the rehab.  She went into A. fib and had rapid ventricular rate as well started on amiodarone drip.  She did receive 1 dose of diuretic secondary to worsening shortness of breath.  History taken from: patient and the son at the bedside.    Objective:    Vital Signs  /87 (BP Location: Right arm, Patient Position: Lying)   Pulse 95   Temp 97.2 °F (36.2 °C) (Oral)   Resp 16   Ht 147.3 cm (58\")   Wt 64 kg (141 lb 3.2 oz)   LMP  (LMP Unknown)   SpO2 100%   BMI 29.51 kg/m²     No intake/output data recorded.    Intake/Output Summary (Last 24 hours) at 2/11/2020 0752  Last data filed at 2/11/2020 0100  Gross per 24 hour   Intake 500 ml   Output 650 ml   Net -150 ml       Physical Exam:  General Appearance: alert, oriented x 3, no acute distress,   HEENT: Oral mucosa dry, extra occular movements intact. Sclera clear.  Skin: Warm and dry  Neck: supple, no JVD, trachea midline  Lungs:Chest shape is normal. Breath sounds heard bilaterally " equally. No crackles, No wheezing.   Heart: regular rate and rhythm. normal S1 and S2, no S3, no rub, peripheral pulses weak but palpable.  Abdomen: Obese, soft, non-tender,  present bowel sounds to auscultation  : no palpable bladder.  Extremities: Trace edema, no cyanosis or clubbing.   Neuro: normal speech and mental status, grossly non focal.     Results Review:   Results from last 7 days   Lab Units 02/11/20  0640 02/10/20  0625 02/09/20  0548 02/08/20  0624  02/06/20  1148   SODIUM mmol/L 138 139 138 140   < > 136   POTASSIUM mmol/L 3.9 3.9 3.7 3.7   < > 6.1*   CHLORIDE mmol/L 99 99 100 99   < > 93*   CO2 mmol/L 18.6* 20.9* 20.4* 21.8*   < > 19.9*   BUN mg/dL 112* 109* 109* 102*   < > 103*   CREATININE mg/dL 3.68* 3.98* 4.05* 4.53*   < > 4.99*   CALCIUM mg/dL 8.8 8.0* 7.9* 8.3*   < > 9.3   ALBUMIN g/dL 3.30* 3.30* 3.20* 3.40*   < > 4.20   BILIRUBIN mg/dL  --   --   --  1.8*  --  2.9*   ALK PHOS U/L  --   --   --  195*  --  216*   ALT (SGPT) U/L  --   --   --  149*  --  163*   AST (SGOT) U/L  --   --   --  130*  --  157*   GLUCOSE mg/dL 130* 119* 140* 117*   < > 165*    < > = values in this interval not displayed.     Estimated Creatinine Clearance: 10.5 mL/min (A) (by C-G formula based on SCr of 3.68 mg/dL (H)).  Results from last 7 days   Lab Units 02/11/20  0640 02/10/20  0625 02/09/20  0548 02/07/20  0546   MAGNESIUM mg/dL  --   --   --  2.9*   PHOSPHORUS mg/dL 6.5* 5.8* 5.3* 5.8*         Results from last 7 days   Lab Units 02/11/20  0640 02/10/20  0625 02/09/20  0548 02/08/20  0624 02/07/20  0546   WBC 10*3/mm3 8.22 7.88 7.60 6.99 6.03   HEMOGLOBIN g/dL 8.5* 8.0* 8.1* 8.7* 8.7*   PLATELETS 10*3/mm3 119* 120* 145 153 171     Results from last 7 days   Lab Units 02/07/20  0546   INR  1.71*     Brief Urine Lab Results  (Last result in the past 365 days)      Color   Clarity   Blood   Leuk Est   Nitrite   Protein   CREAT   Urine HCG        02/06/20 2315             46.7       02/06/20 2315 Yellow Cloudy  Moderate (2+) Negative Negative 100 mg/dL (2+)             No results found for: UTPCR  Imaging Results (Last 24 Hours)     Procedure Component Value Units Date/Time    XR Chest 1 View [[id number]] Collected:  02/11/20 0740     Updated:  02/11/20 0744    Narrative:       PROCEDURE: XR CHEST 1 VW-     HISTORY: hypoxia; N17.9-Acute kidney failure, unspecified;  E87.5-Hyperkalemia; I48.91-Unspecified atrial fibrillation; R06.89-Other  abnormalities of breathing; R79.89-Other specified abnormal findings of  blood chemistry; Z74.09-Other reduced mobility     COMPARISON: 02/06/2020.     FINDINGS: The patient is status post median sternotomy and CABG  procedure. The heart is normal in size. The mediastinum is unremarkable.  There are chronic interstitial lung changes. There is a new linear  opacity in the right midlung field which may reflect atelectasis or  pneumonia. The lungs are otherwise clear. There is no pneumothorax.   There are no acute osseous abnormalities.       Impression:       New linear opacity in the right midlung field either  reflecting atelectasis or pneumonia.     Continued followup is recommended.     This report was finalized on 2/11/2020 7:42 AM by Joaquín Shannon M.D..          atorvastatin 40 mg Oral Nightly   bisoprolol 20 mg Oral Q24H   budesonide 0.5 mg Nebulization BID - RT   ipratropium-albuterol 3 mL Nebulization Q4H - RT   isosorbide mononitrate 60 mg Oral Daily   levothyroxine 75 mcg Oral Daily   pantoprazole 40 mg Oral Daily   predniSONE 20 mg Oral Daily   ranolazine 500 mg Oral Q12H   sodium chloride 10 mL Intravenous Q12H            Medication Review:   Current Facility-Administered Medications   Medication Dose Route Frequency Provider Last Rate Last Dose   • acetaminophen (TYLENOL) tablet 650 mg  650 mg Oral Q4H PRN Denis Mercado MD   650 mg at 02/07/20 2302    Or   • acetaminophen (TYLENOL) 160 MG/5ML solution 650 mg  650 mg Oral Q4H PRN Denis Mercado MD        Or   •  acetaminophen (TYLENOL) suppository 650 mg  650 mg Rectal Q4H PRN Denis Mercado MD       • atorvastatin (LIPITOR) tablet 40 mg  40 mg Oral Nightly Denis Mercado MD   40 mg at 02/10/20 2008   • bisoprolol (ZEBeta) tablet 20 mg  20 mg Oral Q24H Nahid Horvath MD   20 mg at 02/10/20 0812   • budesonide (PULMICORT) nebulizer solution 0.5 mg  0.5 mg Nebulization BID - RT Denis Mercado MD   0.5 mg at 02/11/20 0700   • ipratropium-albuterol (DUO-NEB) nebulizer solution 3 mL  3 mL Nebulization Q4H - RT Tania Gupta DO   3 mL at 02/11/20 0700   • isosorbide mononitrate (IMDUR) 24 hr tablet 60 mg  60 mg Oral Daily Denis Mercado MD   60 mg at 02/10/20 0813   • levothyroxine (SYNTHROID, LEVOTHROID) tablet 75 mcg  75 mcg Oral Daily Denis Mercado MD   75 mcg at 02/10/20 0811   • ondansetron (ZOFRAN) injection 4 mg  4 mg Intravenous Q6H PRN Denis Mercado MD       • pantoprazole (PROTONIX) EC tablet 40 mg  40 mg Oral Daily Denis Mercado MD   40 mg at 02/10/20 0812   • predniSONE (DELTASONE) tablet 20 mg  20 mg Oral Daily Gurwinder Tay MD   20 mg at 02/10/20 0813   • ranolazine (RANEXA) 12 hr tablet 500 mg  500 mg Oral Q12H Nahid Horvath MD   500 mg at 02/10/20 2008   • sodium chloride 0.9 % flush 10 mL  10 mL Intravenous PRN Denis Mercado MD       • sodium chloride 0.9 % flush 10 mL  10 mL Intravenous Q12H Denis Mercado MD   10 mL at 02/10/20 2009   • sodium chloride 0.9 % flush 10 mL  10 mL Intravenous PRN Denis Mercado MD           Assessment/Plan:  1. Acute renal failure (CMS/HCC): It does appear that she has worsening of her baseline renal function, at this point it is not very clear if this is all acute or may have some worsening of her chronic renal failure as well.  She likely has a significant component of cardiorenal syndrome.  We will check a UA, Merchant catheter was placed in the ER with no significant increase in output.  2. Chronic kidney disease stage IV: He has known to have stage IV  chronic kidney disease, she has been admitted almost every 2 weeks since middle of December.  Every time has she has some worsening of her renal function.  He was not on any ACE inhibitors or denies using any nonsteroidals.  She did not have any significant hematuria or proteinuria.  May end up requiring dialysis this admission.  3. Hyperkalemia: It has resolved with treatment.  4. Metabolic acidosis: It is above 20 with supplements of sodium bicarb.  5. Abnormal liver function: It is quite likely that she has an arrhythmia that might have led to the current picture of congestive heart failure as well as congestive liver.  6. Anemia: She had fairly normal iron stores on her last visit.  Will check iron stores again she may be needing clear if she is anemic.  Iron stores have dropped since her last admission in the last 2 weeks.  I will go ahead and start her on oral supplements.  Transfuse if hemoglobin less than 8.  7. Coronary artery disease, status post CABG, status post stent placement: May need cardiac evaluation again during this hospitalization.  Last echo that was done in October 2019 showed mild to moderate mitral regurgitation, moderate tricuspid regurgitation with elevated pulmonary pressure of 45 to 55 mmHg.  8. Acute on chronic respiratory failure:  9. Hypersensitivity pneumonitis  10. Interstitial lung disease      Plan:  · Hold diuretics today, need to be reassessed again in the morning.  · We will try to wean her off the steroids.  · New onset A. fib and RVR being treated by the cardiology.  Heart rate is much better this morning.  · Increased phosphorus noted we will go ahead and start the binders.  · Details were discussed with the patient as well as her son in the room.  She is agreed to go to the rehab.  · Details were also discussed with the hospitalist service.   · Surveillance labs.  · Further recommendations will depend on clinical course of the patient during the current hospitalization.     · I also discussed the details with the nursing staff.  · Rest as ordered.    Sanford Tsang MD, JULIANN  02/11/20  7:52 AM    Dictated utilizing Dragon dictation.

## 2020-02-11 NOTE — PLAN OF CARE
Pt's Afib rate in 140's this am and soa noted.  Md notified and amiodarone gtt started.  HR corrected, BP dropped but stable.  At lunch, while eating, patient in respiratory distress, increased O2 to 8lnc for pt to recover.  Orders received from MD.  CXR, Bipap, ABG's, KUB, Bcx2.  Placed eid catheter due to urinary retention.  Amiodarone gtt stopped per orders.  Pt became more stable, but can't tolerate activity in or out of bed.  Pt becomes very soa and tachypneic.  Pt having trouble eating due  to soa.  Diet changed to clear liquids.  Will continue to use bipap, monitor labs, vs, and respiratory status and telemetry.

## 2020-02-12 NOTE — PROGRESS NOTES
"      [facility]    Name:  Pat Morel   Age:  80 y.o.  Sex:  female  :  1940  MRN:  [id number]   Visit Number:  [id number]  Admission Date:  2020  Date Of Service:  20  Primary Care Physician:  Anthony Sweet DO     LOS: 6 days :      Chief Complaint:  Follow up renal failure and respiratory failure        Subjective / Interval History:  Patient was seen again this morning.  She was sitting up in bed on 4 L nasal cannula oxygen smiling and talking to her  and her son.  Yesterday, she had intermittent respiratory distress now improved after restarting her diuretic therapy with Bumex.  Today, as usual, the patient is a poor historian and provides little complaints.  Initially, she denied abdominal pain, nausea, vomiting, chest pain, or any shortness of breath.  On further discussion, she does admit to having some nausea and having right upper quadrant intermittent abdominal pain.  She does have elevated liver enzymes on her lab work but this is fairly stable from admission.    Family tells me they want to meet because they are not sure patient would ever want dialysis even if needed.     \"Patient is a 80-year-old female with history including CAD with CABG, atrial fibrillation, chronic kidney disease stage III, history of CVA, diastolic CHF, iron deficiency anemia, hypersensitivity pneumonitis, hypertension, osteoporosis was brought to the emergency room by EMS with generalized weakness on 2020.  She was admitted with acute worsening of her chronic renal failure, elevated troponin levels  as well as hyperkalemia.  She does have chronic respiratory failure which has been stable on prednisone.  She is currently on prednisone 20 mg daily started this week.  She will taper it down to 10 mg every week and be continued on 10 mg daily until she sees her pulmonologist Dr. Laura.       She was seen by Dr. Tsang from nephrology who recommended " "gentle IV hydration.  Her renal function has slowly improved and her hyperkalemia has resolved.  She was also seen by Dr. Horvath for her elevated troponin levels.  He felt that the patient's troponin levels were related to her coronary artery disease in the setting of renal failure and anemia.  He did offer cardiac catheterization to the patient but she is currently not willing for any invasive cardiac work-up.  He has recommended triple antiplatelet/anticoagulant agents with aspirin, Plavix and Eliquis.  He has also added Ranexa and increased her Bystolic dose.  An echocardiogram was done and the results are currently pending.  Unfortunately, patient has history of recurrent falls and chronic anemia and triple anticoagulation/antiplatelet therapy may need to be closely watched.\" previously stated by Dr Mercado.       Review of Systems: difficult to obtain with likely dementia    General ROS: Patient denies any fevers, chills or loss of consciousness.positive generalized  weakness  Ophthalmic ROS: Denies any diplopia or transient loss of vision.  ENT ROS: Denies sore throat, nasal congestion or ear pain.   Respiratory ROS: Denies cough with positive exertional improving shortness of breath.  Cardiovascular ROS: Denies chest pain or palpitations. No history of exertional chest pain.   Gastrointestinal ROS: Same poor appetite with positive nausea no vomiting.  Right upper quadrant same abdominal pain. No diarrhea.  Genito-Urinary ROS: Denies dysuria or hematuria.  Musculoskeletal ROS: Denies chronic back pain. No muscle pain. No calf pain.  Neurological ROS: Denies any focal weakness. No loss of consciousness. Denies any numbness. Denies neck pain.   Dermatological ROS: Denies any redness or pruritis.    Vital Signs:    Temp:  [97.5 °F (36.4 °C)-98.6 °F (37 °C)] 97.8 °F (36.6 °C)  Heart Rate:  [73-94] 74  Resp:  [13-18] 18  BP: (106-117)/(58-97) 113/97    Intake and output:    I/O last 3 completed shifts:  In: 1008 " [P.O.:720; I.V.:238; IV Piggyback:50]  Out: 750 [Urine:750]  I/O this shift:  In: 120 [P.O.:120]  Out: 400 [Urine:400]    Physical Examination:examined again today    General Appearance:   Elderly lady.  Chronically ill-appearing.  Alert and cooperative, not in any acute distress.smiling   Head:    Atraumatic and normocephalic, without obvious abnormality.   Eyes:            PERRLA, conjunctivae and sclerae normal, no Icterus. No pallor. Extraocular movements are within normal limits.   Throat:   No oral lesions, no thrush, oral mucosa moist.   Neck:   Supple, trachea midline, no thyromegaly   Lungs:     Chest shape is normal. Breath sounds heard bilaterally equally.  No crackles or wheezing. No Pleural rub or bronchial breathing.    Heart:    Normal S1 and S2, no murmur, no gallop, no rub. No JVD   Abdomen:     Normal bowel sounds, no masses, no organomegaly. Soft        mild epigastric/right upper quadrant area tender, non-distended, no guarding, no rebound                tenderness, obese   Extremities:   Moves all extremities well, increased 1+ equal leg edema, no cyanosis, no             clubbing   Skin:   No bleeding, bruising or rash.   Neurologic:   No tremor, sensation intact, Motor power is normal and equal bilaterally.   Laboratory results:    Lab Results (last 24 hours)     Procedure Component Value Units Date/Time    Comprehensive Metabolic Panel [[id number]]  (Abnormal) Collected:  02/12/20 0558    Specimen:  Blood Updated:  02/12/20 0652     Glucose 133 mg/dL       mg/dL      Creatinine 3.93 mg/dL      Sodium 139 mmol/L      Potassium 4.2 mmol/L      Chloride 97 mmol/L      CO2 21.2 mmol/L      Calcium 9.0 mg/dL      Total Protein 5.7 g/dL      Albumin 2.90 g/dL      ALT (SGPT) 129 U/L      AST (SGOT) 102 U/L      Alkaline Phosphatase 275 U/L      Total Bilirubin 1.5 mg/dL      eGFR Non African Amer 11 mL/min/1.73      Comment: <15 Indicative of kidney failure.        eGFR   Amer --      Comment: <15 Indicative of kidney failure.        Globulin 2.8 gm/dL      A/G Ratio 1.0 g/dL      BUN/Creatinine Ratio 30.3     Anion Gap 20.8 mmol/L     Narrative:       GFR Normal >60  Chronic Kidney Disease <60  Kidney Failure <15      Magnesium [[id number]]  (Abnormal) Collected:  02/12/20 0558    Specimen:  Blood Updated:  02/12/20 0645     Magnesium 2.8 mg/dL     CBC & Differential [[id number]] Collected:  02/12/20 0558    Specimen:  Blood Updated:  02/12/20 0626    Narrative:       The following orders were created for panel order CBC & Differential.  Procedure                               Abnormality         Status                     ---------                               -----------         ------                     CBC Auto Differential[[id number]]        Abnormal            Final result                 Please view results for these tests on the individual orders.    CBC Auto Differential [[id number]]  (Abnormal) Collected:  02/12/20 0558    Specimen:  Blood Updated:  02/12/20 0626     WBC 8.34 10*3/mm3      RBC 2.68 10*6/mm3      Hemoglobin 7.8 g/dL      Hematocrit 25.6 %      MCV 95.5 fL      MCH 29.1 pg      MCHC 30.5 g/dL      RDW 19.4 %      RDW-SD 66.8 fl      MPV 11.8 fL      Platelets 109 10*3/mm3      Neutrophil % 91.2 %      Lymphocyte % 2.4 %      Monocyte % 4.6 %      Eosinophil % 0.0 %      Basophil % 0.1 %      Immature Grans % 1.7 %      Neutrophils, Absolute 7.61 10*3/mm3      Lymphocytes, Absolute 0.20 10*3/mm3      Monocytes, Absolute 0.38 10*3/mm3      Eosinophils, Absolute 0.00 10*3/mm3      Basophils, Absolute 0.01 10*3/mm3      Immature Grans, Absolute 0.14 10*3/mm3      nRBC 0.4 /100 WBC     Blood Culture With LESLYE - Blood, Arm, Left [[id number]] Collected:  02/11/20 1804    Specimen:  Blood from Arm, Left Updated:  02/12/20 0615     Blood Culture No growth at less than 24 hours    Blood Culture With LESLYE - Blood, Arm, Left [[id number]] Collected:  02/11/20 1510    Specimen:  Blood from  "Arm, Left Updated:  02/12/20 0331     Blood Culture No growth at less than 24 hours    Lactic Acid, Reflex [[id number]]  (Abnormal) Collected:  02/11/20 1901    Specimen:  Blood Updated:  02/11/20 1930     Lactate 2.8 mmol/L     Lactic Acid, Reflex Timer (This will reflex a repeat order 3-3:15 hours after ordered.) [[id number]] Collected:  02/11/20 1509    Specimen:  Blood Updated:  02/11/20 1901     Extra Tube Hold for add-ons.     Comment: Auto resulted.       Hepatic Function Panel [[id number]]  (Abnormal) Collected:  02/11/20 1509    Specimen:  Blood Updated:  02/11/20 1610     Total Protein 6.0 g/dL      Albumin 3.40 g/dL      ALT (SGPT) 138 U/L      AST (SGOT) 106 U/L      Alkaline Phosphatase 253 U/L      Total Bilirubin 1.6 mg/dL      Bilirubin, Direct 1.2 mg/dL      Bilirubin, Indirect 0.4 mg/dL     Lactic Acid, Plasma [[id number]]  (Abnormal) Collected:  02/11/20 1509    Specimen:  Blood Updated:  02/11/20 1556     Lactate 2.4 mmol/L     Procalcitonin [[id number]]  (Abnormal) Collected:  02/11/20 1509    Specimen:  Blood Updated:  02/11/20 1556     Procalcitonin 0.33 ng/mL     Narrative:       As a Marker for Sepsis (Non-Neonates):   1. <0.5 ng/mL represents a low risk of severe sepsis and/or septic shock.  1. >2 ng/mL represents a high risk of severe sepsis and/or septic shock.    As a Marker for Lower Respiratory Tract Infections that require antibiotic therapy:  PCT on Admission     Antibiotic Therapy             6-12 Hrs later  > 0.5                Strongly Recommended            >0.25 - <0.5         Recommended  0.1 - 0.25           Discouraged                   Remeasure/reassess PCT  <0.1                 Strongly Discouraged          Remeasure/reassess PCT      As 28 day mortality risk marker: \"Change in Procalcitonin Result\" (> 80 % or <=80 %) if Day 0 (or Day 1) and Day 4 values are available. Refer to http://www.[facility]-pct-calculator.com/   Change in PCT <=80 %   A decrease of PCT levels below or " equal to 80 % defines a positive change in PCT test result representing a higher risk for 28-day all-cause mortality of patients diagnosed with severe sepsis or septic shock.  Change in PCT > 80 %   A decrease of PCT levels of more than 80 % defines a negative change in PCT result representing a lower risk for 28-day all-cause mortality of patients diagnosed with severe sepsis or septic shock.                Results may be falsely decreased if patient taking Biotin.           I have reviewed the patient's laboratory results.    Radiology results:    Imaging Results (Last 24 Hours)     Procedure Component Value Units Date/Time    US Abdomen Complete [[id number]] Collected:  02/11/20 1856     Updated:  02/11/20 1857    Narrative:       FINAL REPORT    TECHNIQUE:  Ultrasound images through the abdomen were obtained.    CLINICAL HISTORY:  SWELLING    FINDINGS:  There is bilateral renal atrophy.  The visualized solid  abdominal organs are otherwise unremarkable.  The pancreas is  obscured by overlying bowel gas.  There are multiple shadowing  gallstones.  There is a small amount of perihepatic ascites.  The visualized portions of the aorta and the IVC are normal.  A  right pleural effusion is noted.      Impression:       Cholelithiasis.  Small amount of perihepatic ascites.  Right  pleural effusion incidentally noted.  Bilateral renal atrophy.    Authenticated by Pablo Manley MD on 02/11/2020 06:56:12  PM          I have reviewed the patient's radiology reports.    Medication Review:     I have reviewed the patients active and prn medications.     Assessment:  1.  Acute renal failure on chronic kidney disease stage III, present on admission  2.  Recurrent falls at home secondary to uremia, present on admission.  3.  Acute hyperkalemia secondary to #1, present on admission, resolved.  4.  Demand ischemia with elevated troponin levels, present on admission.  5.  Hypersensitivity pneumonitis on prednisone  "therapy.  6.  Acute on Chronic hypoxic respiratory failure on home oxygen, now requiring intermittent cpap therapy  7.  Acute on Chronic diastolic heart failure exacerbation  8.  Paroxysmal atrial fibrillation on apixaban, held with anemia; post Afib with RVR, now improved control  9.  Coronary artery disease status post stents and CABG.  10.  Chronic pulmonary hypertension.  11.  Acquired hypothyroidism.  12.  Elevated transaminases of uncertain etiology. With epigastric and right upper abdominal pain; cannot rule out Cholangitis and patient declined workup  13. History of stroke with poor historian suspect chronic dementia  14. Acute on chronic anemia, stable for now without gross bleeding  15. Positive hemoccult stool        Plan:  Continue to monitor on telemetry.  Her heart rate is improved today.  She was given metoprolol and Cardizem with improvement.  She is currently weaned off high flow oxygen with the extra diuretic however her creatinine is elevated today.  Dr. Higgins and Dr. Tsang continue to follow in consultation.  She is continued on chronic prednisone at 10 mg daily as prior to admission. Dr Dee following for GI consultation. She has no gross bleeding but hemoglobin slightly lower today.     She is severely weak and continued to provide physical and Occupational Therapy and up with assistance.  Patient and family had previously given the okay to transfer to acute rehabilitation facility once discharged.    She is DNR.  Her prognosis remains very poor.    I had a long discussion with the patient's son who is a nurse practitioner in the surgery department for [employer].  We spoke with the patient and her  at the same time.  The patient's son, Asaf \"Jacinto\", states that he does not want her to have an MRCP as he does not want her to have any more surgeries due to severe chronic poor prognosis.  The patient states that she does not want any surgery or procedures either.  They report " feeling too fearful that she would become unresponsive or have progressively worsening respiratory failure while lying flat in an MRI machine for further testing when they do not even want the treatment for this even if it is life-threatening.  We discussed the possibility that she might have cholangitis.  They have expressed wishes to not pursue any further testing or surgeries.  I notified GI specialty, Dr Dee.   Medication risks and benefits were discussed in detail. Patient reported satisfaction with care delivered and treatment plan.     Tamara Tomlin,   02/12/20  3:30 PM

## 2020-02-12 NOTE — PLAN OF CARE
Vss on 2l humidified hfnc.  Pt with increased soa with any activity including eating.  No complaints of pain aside from abdominal tenderness upon palpation.  Pt's son Jacinto spoke with all MD's today at length. Final decision was to not do any invasive procedures or anything that would cause patient distress.  Jacinto is to meet with Dr Tsang to discuss possiblity of HD.  Family is still considering HD.  Family/Pt refused MRCP since  pt would have respiratory difficulty laying for MRI and due to fact they don't want her transferred to higher level of care or any other procedures done if something was found wrong.  Will continue to monitor and provide comfort care.

## 2020-02-12 NOTE — PLAN OF CARE
Patient did well over night. Heart rate remained in the 70s and 80s. Vital signs are good and no complaints. Patient wore her cpap overnight with no issues. Will continue to monitor patient.

## 2020-02-12 NOTE — THERAPY TREATMENT NOTE
Pt declined participating with PT this date stating she is too SOA and fatigued to try to advance activity at this time. PT will follow up with patient tomorrow and progress activity as tolerated.

## 2020-02-12 NOTE — PROGRESS NOTES
In Patient Consult      Date of Consultation: 2020  Patient Name: Pat Morel  MRN: [id number]  : 1940     Referring provider: Tamara Tomlin DO    Primary care provider:  Anthony Sweet DO    Reason for consultation: Anemia, abdominal pain, elevated liver enzymes    Interval history:   This is 80-year-old  female patient with a prior history of coronary artery disease with a CABG, chronic atrial fibrillation on Eliquis, CKD stage III history of embolic CVA, diastolic heart failure, pulmonary hypertension, chronic anemia, history of peptic ulcer disease with a GI bleed, hypersensitive pneumonitis, hypertension, was brought in by EMS on 2020 with complaints of generalized weakness and fall at home.   Emergency room work-up revealed acute kidney injury on CKD with a severe hyperkalemia.  She was subsequently admitted for further management.      Her breathing got worse yesterday with acute hypoxemia.  Today she feels better.  No bowel movement since yesterday.  She continued to complain of upper abdominal pain no nausea or vomiting today.  She had an ultrasound done yesterday which revealed a gallstone.  Given her elevated liver enzymes MRCP was requested to rule out CBD, which is pending.       Subjective     Past Medical History:   Diagnosis Date   • Acute on chronic diastolic congestive heart failure (CMS/HCC) 2019   • Anemia    • Angina at rest (CMS/HCC)    • Arthritis    • Atrial fibrillation (CMS/HCC)    • Coronary artery disease    • Disease of thyroid gland    • Elevated cholesterol    • GERD without esophagitis 2019   • History of blood transfusion    • History of colonoscopy 2019   • History of melena 2019   • History of stomach ulcers    • Hypertension    • Impaired functional mobility, balance, gait, and endurance    • Impaired functional mobility, balance, gait, and endurance    • Osteoporosis    • Positive TB test    • Stroke  (CMS/[facility])     No deficits       Past Surgical History:   Procedure Laterality Date   • BRONCHOSCOPY Bilateral 4/12/2019    Procedure: BRONCHOSCOPY;  Surgeon: Jeff Laura MD;  Location:  [facility];  Service: Pulmonary   • BYPASS GRAFT     • COLONOSCOPY N/A 10/15/2019    Procedure: COLONOSCOPY;  Surgeon: Fredi Aaron MD;  Location:  [facility];  Service: Gastroenterology   • CORONARY ANGIOPLASTY WITH STENT PLACEMENT     • CORONARY ARTERY BYPASS GRAFT  10/18/2010   • ENDOSCOPY N/A 10/14/2019    Procedure: ESOPHAGOGASTRODUODENOSCOPY;  Surgeon: Fredi Aaron MD;  Location:  [facility];  Service: Gastroenterology   • HYSTERECTOMY     • STOMACH SURGERY  08/11/2019    Upper GI bleed    • STOMACH SURGERY  10/13/2019       Family History   Problem Relation Age of Onset   • Cancer Mother    • Arthritis Mother    • No Known Problems Father    • Liver disease Sister        Social History     Socioeconomic History   • Marital status:      Spouse name: Not on file   • Number of children: Not on file   • Years of education: Not on file   • Highest education level: Not on file   Tobacco Use   • Smoking status: Never Smoker   • Smokeless tobacco: Never Used   Substance and Sexual Activity   • Alcohol use: No   • Drug use: No   • Sexual activity: Not Currently         Current Facility-Administered Medications:   •  acetaminophen (TYLENOL) tablet 650 mg, 650 mg, Oral, Q4H PRN, 650 mg at 02/07/20 2302 **OR** acetaminophen (TYLENOL) 160 MG/5ML solution 650 mg, 650 mg, Oral, Q4H PRN **OR** acetaminophen (TYLENOL) suppository 650 mg, 650 mg, Rectal, Q4H PRN, Denis Mercado MD  •  aspirin EC tablet 81 mg, 81 mg, Oral, Daily, Yomi Higgins MD, 81 mg at 02/12/20 0937  •  atorvastatin (LIPITOR) tablet 40 mg, 40 mg, Oral, Nightly, Denis Mercado MD, 40 mg at 02/11/20 2106  •  [START ON 2/13/2020] bumetanide (BUMEX) tablet 2 mg, 2 mg, Oral, BID, Sanford Tsang MD, FASN  •  calcium acetate (PHOS  BINDER)) capsule 1,334 mg, 1,334 mg, Oral, TID With Meals, Sanford Tsang MD, FASN, 1,334 mg at 02/12/20 1230  •  dilTIAZem (CARDIZEM) tablet 30 mg, 30 mg, Oral, Q6H, Yomi Higgins MD, 30 mg at 02/12/20 1230  •  ipratropium-albuterol (DUO-NEB) nebulizer solution 3 mL, 3 mL, Nebulization, Q4H - RT, Tania Gupta DO, 3 mL at 02/12/20 0722  •  iron polysaccharides (NIFEREX) capsule 150 mg, 150 mg, Oral, Daily, Sanford Tsang MD, FASN, 150 mg at 02/12/20 0936  •  isosorbide mononitrate (IMDUR) 24 hr tablet 60 mg, 60 mg, Oral, Daily, Denis Mercado MD, 60 mg at 02/12/20 0941  •  levothyroxine (SYNTHROID, LEVOTHROID) tablet 75 mcg, 75 mcg, Oral, Daily, Denis Mercado MD, 75 mcg at 02/12/20 0939  •  metoprolol succinate XL (TOPROL-XL) 24 hr tablet 25 mg, 25 mg, Oral, Q24H, Yomi Higgins MD, 25 mg at 02/12/20 0939  •  ondansetron (ZOFRAN) injection 4 mg, 4 mg, Intravenous, Q6H PRN, Denis Mercado MD  •  pantoprazole (PROTONIX) EC tablet 40 mg, 40 mg, Oral, Daily, Denis Mercado MD, 40 mg at 02/12/20 0941  •  Pharmacy to Dose Zosyn, , Does not apply, Continuous PRN, Tamara Tomlin DO  •  piperacillin-tazobactam (ZOSYN) 3.375 g in iso-osmotic dextrose 50 ml (premix), 3.375 g, Intravenous, Q12H, Tamara Tomlin DO, 3.375 g at 02/12/20 1230  •  predniSONE (DELTASONE) tablet 10 mg, 10 mg, Oral, Daily, Tamara Tomlin DO, 10 mg at 02/12/20 0941  •  ranolazine (RANEXA) 12 hr tablet 500 mg, 500 mg, Oral, Q12H, BreedingNahid MD, 500 mg at 02/12/20 0942  •  sodium chloride 0.9 % flush 10 mL, 10 mL, Intravenous, PRN, Denis Mercado MD  •  sodium chloride 0.9 % flush 10 mL, 10 mL, Intravenous, Q12H, Denis Mercado MD, 10 mL at 02/12/20 0900  •  sodium chloride 0.9 % flush 10 mL, 10 mL, Intravenous, PRN, Denis Mercado MD    Allergies   Allergen Reactions   • Tuberculin Tests Rash       Review of Systems   Constitutional: Negative for appetite change, fatigue and fever.   Respiratory: Negative for chest  tightness, shortness of breath and wheezing.    Gastrointestinal: Positive for abdominal pain. Negative for abdominal distention, blood in stool, diarrhea, nausea and vomiting.       The following portions of the patient's history were reviewed and updated as appropriate: allergies, current medications, past family history, past medical history, past social history, past surgical history and problem list.    Objective     Vitals:    02/12/20 0545 02/12/20 0654 02/12/20 0722 02/12/20 0939   BP: 107/58 106/68  113/97   BP Location:    Right arm   Patient Position:    Lying   Pulse: 82 73 80 74   Resp:   17 18   Temp:    97.8 °F (36.6 °C)   TempSrc:    Oral   SpO2:   98% 93%   Weight:       Height:           Physical Exam   Constitutional: She is oriented to person, place, and time. She appears well-developed and well-nourished.   HENT:   Mouth/Throat: Oropharynx is clear and moist.   Eyes: EOM are normal.   Pallor present  Mild icterus noted   Neck: Neck supple.   Cardiovascular: Normal rate and regular rhythm.   No murmur heard.  Pulmonary/Chest: Effort normal and breath sounds normal. No respiratory distress.   Abdominal: Soft. Bowel sounds are normal. She exhibits distension (Mild gaseous distention). She exhibits no mass. There is tenderness (Upper abdomen). No hernia.   Lymphadenopathy:     She has no cervical adenopathy.   Neurological: She is alert and oriented to person, place, and time. No sensory deficit.   Skin: Skin is warm and dry.   Bruises all over the upper and lower extremity noted   Nursing note and vitals reviewed.      Lab Results (last 24 hours)     Procedure Component Value Units Date/Time    Blood Culture With LESLYE - Blood, Arm, Left [[id number]] Collected:  02/11/20 1510    Specimen:  Blood from Arm, Left Updated:  02/12/20 1531     Blood Culture No growth at 24 hours    Comprehensive Metabolic Panel [[id number]]  (Abnormal) Collected:  02/12/20 0558    Specimen:  Blood Updated:  02/12/20 0652      Glucose 133 mg/dL       mg/dL      Creatinine 3.93 mg/dL      Sodium 139 mmol/L      Potassium 4.2 mmol/L      Chloride 97 mmol/L      CO2 21.2 mmol/L      Calcium 9.0 mg/dL      Total Protein 5.7 g/dL      Albumin 2.90 g/dL      ALT (SGPT) 129 U/L      AST (SGOT) 102 U/L      Alkaline Phosphatase 275 U/L      Total Bilirubin 1.5 mg/dL      eGFR Non African Amer 11 mL/min/1.73      Comment: <15 Indicative of kidney failure.        eGFR   Amer --     Comment: <15 Indicative of kidney failure.        Globulin 2.8 gm/dL      A/G Ratio 1.0 g/dL      BUN/Creatinine Ratio 30.3     Anion Gap 20.8 mmol/L     Narrative:       GFR Normal >60  Chronic Kidney Disease <60  Kidney Failure <15      Magnesium [[id number]]  (Abnormal) Collected:  02/12/20 0558    Specimen:  Blood Updated:  02/12/20 0645     Magnesium 2.8 mg/dL     CBC & Differential [[id number]] Collected:  02/12/20 0558    Specimen:  Blood Updated:  02/12/20 0626    Narrative:       The following orders were created for panel order CBC & Differential.  Procedure                               Abnormality         Status                     ---------                               -----------         ------                     CBC Auto Differential[[id number]]        Abnormal            Final result                 Please view results for these tests on the individual orders.    CBC Auto Differential [[id number]]  (Abnormal) Collected:  02/12/20 0558    Specimen:  Blood Updated:  02/12/20 0626     WBC 8.34 10*3/mm3      RBC 2.68 10*6/mm3      Hemoglobin 7.8 g/dL      Hematocrit 25.6 %      MCV 95.5 fL      MCH 29.1 pg      MCHC 30.5 g/dL      RDW 19.4 %      RDW-SD 66.8 fl      MPV 11.8 fL      Platelets 109 10*3/mm3      Neutrophil % 91.2 %      Lymphocyte % 2.4 %      Monocyte % 4.6 %      Eosinophil % 0.0 %      Basophil % 0.1 %      Immature Grans % 1.7 %      Neutrophils, Absolute 7.61 10*3/mm3      Lymphocytes, Absolute 0.20 10*3/mm3      Monocytes,  Absolute 0.38 10*3/mm3      Eosinophils, Absolute 0.00 10*3/mm3      Basophils, Absolute 0.01 10*3/mm3      Immature Grans, Absolute 0.14 10*3/mm3      nRBC 0.4 /100 WBC     Blood Culture With LESLYE - Blood, Arm, Left [[id number]] Collected:  02/11/20 1804    Specimen:  Blood from Arm, Left Updated:  02/12/20 0615     Blood Culture No growth at less than 24 hours    Lactic Acid, Reflex [[id number]]  (Abnormal) Collected:  02/11/20 1901    Specimen:  Blood Updated:  02/11/20 1930     Lactate 2.8 mmol/L     Lactic Acid, Reflex Timer (This will reflex a repeat order 3-3:15 hours after ordered.) [[id number]] Collected:  02/11/20 1509    Specimen:  Blood Updated:  02/11/20 1901     Extra Tube Hold for add-ons.     Comment: Auto resulted.       Hepatic Function Panel [[id number]]  (Abnormal) Collected:  02/11/20 1509    Specimen:  Blood Updated:  02/11/20 1610     Total Protein 6.0 g/dL      Albumin 3.40 g/dL      ALT (SGPT) 138 U/L      AST (SGOT) 106 U/L      Alkaline Phosphatase 253 U/L      Total Bilirubin 1.6 mg/dL      Bilirubin, Direct 1.2 mg/dL      Bilirubin, Indirect 0.4 mg/dL     Lactic Acid, Plasma [[id number]]  (Abnormal) Collected:  02/11/20 1509    Specimen:  Blood Updated:  02/11/20 1556     Lactate 2.4 mmol/L     Procalcitonin [[id number]]  (Abnormal) Collected:  02/11/20 1509    Specimen:  Blood Updated:  02/11/20 1556     Procalcitonin 0.33 ng/mL     Narrative:       As a Marker for Sepsis (Non-Neonates):   1. <0.5 ng/mL represents a low risk of severe sepsis and/or septic shock.  1. >2 ng/mL represents a high risk of severe sepsis and/or septic shock.    As a Marker for Lower Respiratory Tract Infections that require antibiotic therapy:  PCT on Admission     Antibiotic Therapy             6-12 Hrs later  > 0.5                Strongly Recommended            >0.25 - <0.5         Recommended  0.1 - 0.25           Discouraged                   Remeasure/reassess PCT  <0.1                 Strongly Discouraged        "   Remeasure/reassess PCT      As 28 day mortality risk marker: \"Change in Procalcitonin Result\" (> 80 % or <=80 %) if Day 0 (or Day 1) and Day 4 values are available. Refer to [email or web address]   Change in PCT <=80 %   A decrease of PCT levels below or equal to 80 % defines a positive change in PCT test result representing a higher risk for 28-day all-cause mortality of patients diagnosed with severe sepsis or septic shock.  Change in PCT > 80 %   A decrease of PCT levels of more than 80 % defines a negative change in PCT result representing a lower risk for 28-day all-cause mortality of patients diagnosed with severe sepsis or septic shock.                Results may be falsely decreased if patient taking Biotin.            Imaging Results (Last 24 Hours)     Procedure Component Value Units Date/Time    US Abdomen Complete [[id number]] Collected:  02/11/20 1856     Updated:  02/11/20 1857    Narrative:       FINAL REPORT    TECHNIQUE:  Ultrasound images through the abdomen were obtained.    CLINICAL HISTORY:  SWELLING    FINDINGS:  There is bilateral renal atrophy.  The visualized solid  abdominal organs are otherwise unremarkable.  The pancreas is  obscured by overlying bowel gas.  There are multiple shadowing  gallstones.  There is a small amount of perihepatic ascites.  The visualized portions of the aorta and the IVC are normal.  A  right pleural effusion is noted.      Impression:       Cholelithiasis.  Small amount of perihepatic ascites.  Right  pleural effusion incidentally noted.  Bilateral renal atrophy.    Authenticated by Pablo Manley MD on 02/11/2020 06:56:12  PM             Assessment / Plan    Assessment/Recommendations:  1. Black colored stool   2/12/2020   There is no signs of any current GI bleed.  Given her significant respiratory failure with the interstitial pneumonia she is high risk for any endoscopic procedure.  She had a more or less complete anemia work-up done " at [facility] 2 months ago with a EGD colonoscopy and PillCam study.  All the studies did not reveal any source of GI bleed.  There is no history convincing of any melena now.  She is a high risk for GI bleed given her recent history of peptic ulcer disease and anticoagulation use  I had a detailed discussion with herself and her son today.  For now they do not want to proceed with any endoscopic procedures unless there is any overt bleed that need any intervention.  And hence we will simply monitor her for now and will put her back on iron pills.   She can be started back on Eliquis, continue Protonix 40 mg p.o. daily    2/11/20 20  Patient has been passing black-colored stool since she was started on iron pills last year.  I suspect this is mostly from the iron pills and multivitamins rather than melena.  Rectal examination done today reveals a green-colored liquid stool in the rectum.  No signs of any current melena melena or bright red rectal bleed.   Patient did have a an episode of rectal bleeding with clots with hematemesis early last year at that time she had an EGD done which revealed a gastric ulcer as per patient.  She has been taking PPI since then.  She had an EGD done in October 2019 at [facility] which was normal and subsequently had a colonoscopy done in October 2019 was normal as well except mild sigmoid diverticulosis.  She had a PillCam study done during the same admission which was normal as well without any small bowel source of bleeding. There was no signs of any GI bleed noted that time.      Given her CKD she has a higher chance of having any small bowel AVMs can be missed some time with the PillCam study.  With that we can consider an enteroscopy here if patient is clinically stable.  Otherwise we can simply follow her as an outpatient and if there is any future episode of any bleeding or melena we can proceed with the enteroscopy.   We will reassess her in the next 24  to 48 hours and if patient agreeable we will consider scheduling for  Enteroscopy as an inpatient if her respiratory status improve.   She can be started back on Eliquis with the PPI  for her chronic atrial fibrillation if we deferred the procedure.  She is slightly higher risk for GI bleed with anticoagulation.      2. Normocytic Anemia  2/12/2020   Stable now.  We will start her back on iron pills    2/11/20 20  Chronic normocytic anemia which is mostly multifactorial in etiology.  Mostly anemia of chronic disease and possible intermittent occult upper GI bleed in the setting of anticoagulation cannot be ruled out.  However there is no current clinical signs or symptoms suggestive of any GI bleed.  Her hemoglobin baseline runs around 8 to 9 g/dL which has been stable.      3. Upper abdominal pain  2/12/2020   Still has a significant upper abdominal pain in the epigastrium and the right upper quadrant.   Ultrasound abdomen revealed gallstones without any obvious signs of cholecystitis,  CBD could not be visualized due to bowel gas.  Recommend an MRCP for further evaluation to rule out choledocholithiasis.   Family at this time does not want to proceed with any ERCP even if they find anything with MRCP and hence declined MRCP.  Possible complications including the cholangitis sepsis and death have been discussed with the family the patient.     2/11/20 20  She has been having intermittent upper abdominal pain mainly in the right upper quadrant for the past few months.  Clinical examination today reveals a severe right upper quadrant tenderness suspicious for biliary colic developing cholecystitis and may have choledocholithiasis with acute elevation in liver enzymes.   Recommend a stat order urgent ultrasound abdomen to rule out cholelithiasis and choledocholithiasis        4. Elevated LFTS   2/12/2020   Liver enzymes are still elevated, suspicion for cholestasis with the biliary obstruction.   Patient and the  family declined MRCP.  Scan complications explained to the patient    2/11/20 20  Both hepatocellular and cholestatic pattern of elevated liver enzymes which is new compared to her lab work done in December 2019.  I suspect this is an acute event and choledocholithiasis and biliary obstruction need to be ruled out first.   Other differential include the chronic passive congestion of the liver with her pulmonary hypertension and levator right ventricular systolic pressure.  No signs of any sepsis or medication causing this at this time  Recommend basic hepatitis panel for now  Ultrasound abdomen         Thank you very much for letting me participate in the care of this patient.  Please do not hesitate to call me if you have any questions.    Vannesa Dee MD  [facility] [place]  2/12/2020  3:43 PM    Please note that portions of this note may have been completed with a voice recognition program. Efforts were made to edit the dictations, but occasionally words are mistranscribed.

## 2020-02-12 NOTE — PROGRESS NOTES
"Nephrology Progress Note.    LOS: 6 days    Patient Care Team:  Anthony Sweet DO as PCP - General (Family Medicine)  Tien Archer MD as Consulting Physician (Cardiac Electrophysiology)  Xu Block MD (Cardiology)  Yomi Higgins MD as Consulting Physician (Cardiology)    Chief Complaint:    Chief Complaint   Patient presents with   • Weakness - Generalized       Subjective:   Follow up for RENEE and Chronic Kidney disease stage 4 / Anemia.     Interval History:   Patient Complaints: none  Patient seen and examined this morning.  Events from last night noted.  Patient denies having any fevers chills.  No nausea or vomiting no abdominal pain.  Denies any chest pain, shortness of breath or cough and sputum production.  There is no significant edema.   Patient also denies having new onset weakness of numbness of either extremity, she feels that things are slightly better has been able to eat and drink some.  She feels that she is still weak and has agreed to go to the rehab.  She went into A. fib and had rapid ventricular rate as well started on amiodarone drip.  She did receive 1 dose of diuretic secondary to worsening shortness of breath.  History taken from: patient and the son at the bedside.    Objective:    Vital Signs  /68   Pulse 73   Temp 97.5 °F (36.4 °C) (Axillary)   Resp 14   Ht 147.3 cm (58\")   Wt 64 kg (141 lb 3.2 oz)   LMP  (LMP Unknown)   SpO2 98%   BMI 29.51 kg/m²     No intake/output data recorded.    Intake/Output Summary (Last 24 hours) at 2/12/2020 0748  Last data filed at 2/12/2020 0325  Gross per 24 hour   Intake 1008 ml   Output 550 ml   Net 458 ml       Physical Exam:  General Appearance: alert, oriented x 3, no acute distress,   HEENT: Oral mucosa dry, extra occular movements intact. Sclera clear.  Skin: Warm and dry  Neck: supple, no JVD, trachea midline  Lungs:Chest shape is normal. Breath sounds heard bilaterally equally. No crackles, No wheezing.   Heart: " regular rate and rhythm. normal S1 and S2, no S3, no rub, peripheral pulses weak but palpable.  Abdomen: Obese, soft, non-tender,  present bowel sounds to auscultation  : no palpable bladder.  Extremities: Trace edema, no cyanosis or clubbing.   Neuro: normal speech and mental status, grossly non focal.     Results Review:   Results from last 7 days   Lab Units 02/12/20  0558 02/11/20  1509 02/11/20  0640 02/10/20  0625 02/08/20  0624   SODIUM mmol/L 139  --  138 139   < > 140   POTASSIUM mmol/L 4.2  --  3.9 3.9   < > 3.7   CHLORIDE mmol/L 97*  --  99 99   < > 99   CO2 mmol/L 21.2*  --  18.6* 20.9*   < > 21.8*   BUN mg/dL 119*  --  112* 109*   < > 102*   CREATININE mg/dL 3.93*  --  3.68* 3.98*   < > 4.53*   CALCIUM mg/dL 9.0  --  8.8 8.0*   < > 8.3*   ALBUMIN g/dL 2.90* 3.40* 3.30* 3.30*   < > 3.40*   BILIRUBIN mg/dL 1.5* 1.6*  --   --   --  1.8*   ALK PHOS U/L 275* 253*  --   --   --  195*   ALT (SGPT) U/L 129* 138*  --   --   --  149*   AST (SGOT) U/L 102* 106*  --   --   --  130*   GLUCOSE mg/dL 133*  --  130* 119*   < > 117*    < > = values in this interval not displayed.     Estimated Creatinine Clearance: 9.9 mL/min (A) (by C-G formula based on SCr of 3.93 mg/dL (H)).  Results from last 7 days   Lab Units 02/12/20 0558 02/11/20  0640 02/10/20  0625 02/09/20  0548 02/07/20  0546   MAGNESIUM mg/dL 2.8*  --   --   --  2.9*   PHOSPHORUS mg/dL  --  6.5* 5.8* 5.3* 5.8*         Results from last 7 days   Lab Units 02/12/20  0558 02/11/20  0640 02/10/20  0625 02/09/20  0548 02/08/20  0624   WBC 10*3/mm3 8.34 8.22 7.88 7.60 6.99   HEMOGLOBIN g/dL 7.8* 8.5* 8.0* 8.1* 8.7*   PLATELETS 10*3/mm3 109* 119* 120* 145 153     Results from last 7 days   Lab Units 02/07/20  0546   INR  1.71*     Brief Urine Lab Results  (Last result in the past 365 days)      Color   Clarity   Blood   Leuk Est   Nitrite   Protein   CREAT   Urine HCG        02/06/20 2315             46.7       02/06/20 2315 Yellow Cloudy Moderate (2+)  Negative Negative 100 mg/dL (2+)             No results found for: UTPCR  Imaging Results (Last 24 Hours)     Procedure Component Value Units Date/Time    US Abdomen Complete [[id number]] Collected:  02/11/20 1856     Updated:  02/11/20 1857    Narrative:       FINAL REPORT    TECHNIQUE:  Ultrasound images through the abdomen were obtained.    CLINICAL HISTORY:  SWELLING    FINDINGS:  There is bilateral renal atrophy.  The visualized solid  abdominal organs are otherwise unremarkable.  The pancreas is  obscured by overlying bowel gas.  There are multiple shadowing  gallstones.  There is a small amount of perihepatic ascites.  The visualized portions of the aorta and the IVC are normal.  A  right pleural effusion is noted.      Impression:       Cholelithiasis.  Small amount of perihepatic ascites.  Right  pleural effusion incidentally noted.  Bilateral renal atrophy.    Authenticated by Pablo Manley MD on 02/11/2020 06:56:12  PM    XR Abdomen KUB [[id number]] Collected:  02/11/20 1346     Updated:  02/11/20 1350    Narrative:       PROCEDURE: XR ABDOMEN KUB-     HISTORY: abdominal pain; N17.9-Acute kidney failure, unspecified;  E87.5-Hyperkalemia; I48.91-Unspecified atrial fibrillation; R06.89-Other  abnormalities of breathing; R79.89-Other specified abnormal findings of  blood chemistry; Z74.09-Other reduced mobility     COMPARISON: None.     FINDINGS: An AP view of the abdomen and pelvis demonstrates an  unremarkable bowel gas pattern with no evidence of obstruction. There  are calcifications in the right upper quadrant likely gallstones. A  phlebolith is seen in the right hemipelvis. No other abnormal  calcifications are seen overlying the abdomen or pelvis.       Impression:       Calcifications in the right upper quadrant likely  gallstones.           This report was finalized on 2/11/2020 1:47 PM by Joaquín Shannon M.D..    XR Chest 1 View [[id number]] Collected:  02/11/20 1334     Updated:  02/11/20  6233    Narrative:       PROCEDURE: XR CHEST 1 VW-     HISTORY: soa; N17.9-Acute kidney failure, unspecified;  E87.5-Hyperkalemia; I48.91-Unspecified atrial fibrillation; R06.89-Other  abnormalities of breathing; R79.89-Other specified abnormal findings of  blood chemistry; Z74.09-Other reduced mobility     COMPARISON: 02/10/2020.     FINDINGS: The heart is normal in size. The patient is status post median  sternotomy and CABG procedure. There is a moderate-sized hiatal hernia.  There is a worsening right basilar opacity which may reflect atelectasis  or pneumonia. The left lung is clear. There is no pneumothorax.  There  are no acute osseous abnormalities.       Impression:       Worsening right basilar opacity which may reflect  atelectasis or pneumonia.     Continued followup is recommended.     This report was finalized on 2/11/2020 1:35 PM by Joaquín Shannon M.D..          aspirin 81 mg Oral Daily   atorvastatin 40 mg Oral Nightly   bumetanide 2 mg Intravenous Q6H   calcium acetate 1,334 mg Oral TID With Meals   dilTIAZem 30 mg Oral Q6H   ipratropium-albuterol 3 mL Nebulization Q4H - RT   iron polysaccharides 150 mg Oral Daily   isosorbide mononitrate 60 mg Oral Daily   levothyroxine 75 mcg Oral Daily   metoprolol succinate XL 25 mg Oral Q24H   pantoprazole 40 mg Oral Daily   piperacillin-tazobactam 3.375 g Intravenous Q12H   predniSONE 10 mg Oral Daily   ranolazine 500 mg Oral Q12H   sodium chloride 10 mL Intravenous Q12H       Pharmacy to Dose Zosyn          Medication Review:   Current Facility-Administered Medications   Medication Dose Route Frequency Provider Last Rate Last Dose   • acetaminophen (TYLENOL) tablet 650 mg  650 mg Oral Q4H PRN Denis Mercado MD   650 mg at 02/07/20 2302    Or   • acetaminophen (TYLENOL) 160 MG/5ML solution 650 mg  650 mg Oral Q4H PRN Denis Mercado MD        Or   • acetaminophen (TYLENOL) suppository 650 mg  650 mg Rectal Q4H PRN Denis Mercado MD       • aspirin EC tablet  81 mg  81 mg Oral Daily Yomi Higgins MD       • atorvastatin (LIPITOR) tablet 40 mg  40 mg Oral Nightly Denis Mercado MD   40 mg at 02/11/20 2106   • bumetanide (BUMEX) injection 2 mg  2 mg Intravenous Q6H Sanford Tsang MD, FASN   2 mg at 02/12/20 0545   • calcium acetate (PHOS BINDER)) capsule 1,334 mg  1,334 mg Oral TID With Meals Sanford Tsang MD, FASN   1,334 mg at 02/11/20 1759   • dilTIAZem (CARDIZEM) tablet 30 mg  30 mg Oral Q6H Yomi Higgins MD   30 mg at 02/12/20 0654   • ipratropium-albuterol (DUO-NEB) nebulizer solution 3 mL  3 mL Nebulization Q4H - RT Tania Gupta DO   3 mL at 02/12/20 0722   • iron polysaccharides (NIFEREX) capsule 150 mg  150 mg Oral Daily Sanford Tsang MD, FASN   150 mg at 02/11/20 1105   • isosorbide mononitrate (IMDUR) 24 hr tablet 60 mg  60 mg Oral Daily Denis Mercado MD   60 mg at 02/11/20 0813   • levothyroxine (SYNTHROID, LEVOTHROID) tablet 75 mcg  75 mcg Oral Daily Denis Mercado MD   75 mcg at 02/11/20 0813   • metoprolol succinate XL (TOPROL-XL) 24 hr tablet 25 mg  25 mg Oral Q24H Yomi Higgins MD       • ondansetron (ZOFRAN) injection 4 mg  4 mg Intravenous Q6H PRN Denis Mercado MD       • pantoprazole (PROTONIX) EC tablet 40 mg  40 mg Oral Daily Denis Mercado MD   40 mg at 02/11/20 0813   • Pharmacy to Dose Zosyn   Does not apply Continuous PRN Tamara Tomlin DO       • piperacillin-tazobactam (ZOSYN) 3.375 g in iso-osmotic dextrose 50 ml (premix)  3.375 g Intravenous Q12H Tamara Tomlin DO   3.375 g at 02/12/20 0140   • predniSONE (DELTASONE) tablet 10 mg  10 mg Oral Daily Tamara Tomlin, DO       • ranolazine (RANEXA) 12 hr tablet 500 mg  500 mg Oral Q12H Nahid Horvath MD   500 mg at 02/11/20 2106   • sodium chloride 0.9 % flush 10 mL  10 mL Intravenous PRN Denis Mercado MD       • sodium chloride 0.9 % flush 10 mL  10 mL Intravenous Q12H Denis Mercado MD   10 mL at 02/11/20 2100   • sodium chloride 0.9 % flush 10 mL  10 mL  Intravenous PRN Denis Mercado MD           Assessment/Plan:  1. Acute renal failure (CMS/HCC): It does appear that she has worsening of her baseline renal function, at this point it is not very clear if this is all acute or may have some worsening of her chronic renal failure as well.  She likely has a significant component of cardiorenal syndrome.  We will check a UA, Merchant catheter was placed in the ER with no significant increase in output.  2. Chronic kidney disease stage IV: He has known to have stage IV chronic kidney disease, she has been admitted almost every 2 weeks since middle of December.  Every time has she has some worsening of her renal function.  He was not on any ACE inhibitors or denies using any nonsteroidals.  She did not have any significant hematuria or proteinuria.  May end up requiring dialysis this admission.  3. Hyperkalemia: It has resolved with treatment.  4. Metabolic acidosis: It is above 20 with supplements of sodium bicarb.  5. Abnormal liver function: It is quite likely that she has an arrhythmia that might have led to the current picture of congestive heart failure as well as congestive liver.  6. Anemia: She had fairly normal iron stores on her last visit.  Will check iron stores again she may be needing clear if she is anemic.  Iron stores have dropped since her last admission in the last 2 weeks.  I will go ahead and start her on oral supplements.  Transfuse if hemoglobin less than 8.  7. Coronary artery disease, status post CABG, status post stent placement: May need cardiac evaluation again during this hospitalization.  Last echo that was done in October 2019 showed mild to moderate mitral regurgitation, moderate tricuspid regurgitation with elevated pulmonary pressure of 45 to 55 mmHg.  8. Acute on chronic respiratory failure:  9. Hypersensitivity pneumonitis  10. Interstitial lung disease      Plan:  · She needed diuretics because of shortness of air and done better with it,  she will get 2 more doses of Bumex to day, change it to oral dose starting tomorrow.   · Hold off the blood for now, because of her cardiac issues.  · We will try to wean her off the steroids. Decrease dose noted.  · New onset A. fib and RVR being treated by the cardiology.  Heart rate is much better this morning.  · Details about the dialysis discussed and will check the 24 hour urine for creatinine clearance and see, it does appear she has fairly low clearance.  · Details were discussed with the patient as well as her son in the room.  She is agreed to go to the rehab.  · Details were also discussed with the hospitalist service.   · Surveillance labs.  · Further recommendations will depend on clinical course of the patient during the current hospitalization.    · I also discussed the details with the nursing staff.  · Rest as ordered.    Sanford Tsang MD, EVELINA  02/12/20  7:48 AM    Dictated utilizing Dragon dictation.

## 2020-02-12 NOTE — THERAPY TREATMENT NOTE
Hold OT treatment this date due to pt very SOA and labored breathing just with talking this date.  OT will follow with pt tomorrow.

## 2020-02-13 NOTE — THERAPY TREATMENT NOTE
Patient unavailable for PT.  Daughter states patient has been asleep most of the day and is now out for an abdominal scan.  Will follow up tomorrow.

## 2020-02-13 NOTE — PROGRESS NOTES
[facility] Cardiology IP Progress Note    Pat Morel  1940  [id number]  02/13/20    Subjective:   Mrs. Pat Morel is a 80 y.o. female seen in follow-up for multivessel coronary artery disease and acute on chronic diastolic heart failure.  No acute overnight events.  Overnight, the patient does report increased lower extremity edema as well as shortness of breath.  Continues to deny chest pain or chest discomfort.  Poor response to IV diuresis yesterday.  No other new complaints.    Review of Systems:   Review of Systems   Constitutional: Positive for fatigue. Negative for chills, diaphoresis and fever.   Respiratory: Positive for shortness of breath. Negative for cough, chest tightness and wheezing.    Cardiovascular: Positive for leg swelling. Negative for chest pain and palpitations.   Gastrointestinal: Negative for abdominal pain and GERD.   Neurological: Negative for dizziness, syncope, weakness and light-headedness.       I have reviewed and/or updated the patient's past medical, past surgical, family history, social history, problem list and allergies as appropriate in the chart.     Physical Exam:  Vital Signs:   Vitals:    02/13/20 0330 02/13/20 0335 02/13/20 0500 02/13/20 0627   BP:       BP Location:       Patient Position:       Pulse: 79 79 77 81   Resp: 20 20     Temp:       TempSrc:       SpO2: 91%  96%    Weight:   66 kg (145 lb 9.6 oz)    Height:           Physical Exam   Constitutional: She is oriented to person, place, and time. She appears well-developed and well-nourished.   Lying in bed in no acute distress.   HENT:   Head: Normocephalic and atraumatic.   Moist Mucous Membranes.    Eyes: Pupils are equal, round, and reactive to light. EOM are normal. No scleral icterus.   Cardiovascular: Normal rate, normal heart sounds and intact distal pulses. Exam reveals no gallop and no friction rub.   No murmur heard.  Irregularly irregular.  Mildly elevated JVD.  1-2+  bilateral lower extremity pitting edema.   Pulmonary/Chest: No stridor. No respiratory distress. She has no wheezes. She exhibits no tenderness.   Mildly increased work of breathing.  Scattered crackles.   Abdominal: Soft. Bowel sounds are normal. She exhibits no distension. There is no tenderness. There is no rebound and no guarding.   Musculoskeletal: Normal range of motion. She exhibits edema.   Neurological: She is alert and oriented to person, place, and time.   Psychiatric: She has a normal mood and affect. Her behavior is normal.   Nursing note and vitals reviewed.      Results Review:   Results from last 7 days   Lab Units 02/13/20  0557   SODIUM mmol/L 136   POTASSIUM mmol/L 4.3   CHLORIDE mmol/L 96*   CO2 mmol/L 20.0*   BUN mg/dL 119*   CREATININE mg/dL 4.14*   CALCIUM mg/dL 9.6   BILIRUBIN mg/dL 1.5*   ALK PHOS U/L 234*   ALT (SGPT) U/L 119*   AST (SGOT) U/L 83*   GLUCOSE mg/dL 122*     Results from last 7 days   Lab Units 02/08/20  0624 02/07/20  0546 02/06/20  1148   TROPONIN T ng/mL 0.100* 0.103* 0.106*     Results from last 7 days   Lab Units 02/13/20  0557 02/12/20  0558 02/11/20  0640   WBC 10*3/mm3 7.97 8.34 8.22   HEMOGLOBIN g/dL 7.6* 7.8* 8.5*   HEMATOCRIT % 25.2* 25.6* 27.0*   PLATELETS 10*3/mm3 107* 109* 119*     Results from last 7 days   Lab Units 02/07/20  0546   INR  1.71*     Results from last 7 days   Lab Units 02/12/20  0558   MAGNESIUM mg/dL 2.8*           I personally viewed and interpreted the patient's EKG/Telemetry data     Medications:   )  Current Facility-Administered Medications:   •  acetaminophen (TYLENOL) tablet 650 mg, 650 mg, Oral, Q4H PRN, 650 mg at 02/07/20 2302 **OR** acetaminophen (TYLENOL) 160 MG/5ML solution 650 mg, 650 mg, Oral, Q4H PRN **OR** acetaminophen (TYLENOL) suppository 650 mg, 650 mg, Rectal, Q4H PRN, Jess, Denis, MD  •  aspirin EC tablet 81 mg, 81 mg, Oral, Daily, Yomi Higgins MD, 81 mg at 02/12/20 0937  •  atorvastatin (LIPITOR) tablet 40 mg, 40  mg, Oral, Nightly, Denis Mercado MD, 40 mg at 02/12/20 2208  •  bumetanide (BUMEX) tablet 2 mg, 2 mg, Oral, BID, Sanford Tsang MD, EVELINA  •  calcium acetate (PHOS BINDER)) capsule 1,334 mg, 1,334 mg, Oral, TID With Meals, Sanford Tsang MD, JULIANN, 1,334 mg at 02/12/20 1817  •  dilTIAZem (CARDIZEM) tablet 30 mg, 30 mg, Oral, Q6H, Yomi Higgins MD, 30 mg at 02/13/20 0627  •  ipratropium-albuterol (DUO-NEB) nebulizer solution 3 mL, 3 mL, Nebulization, Q4H - RT, Tania Gupta DO, 3 mL at 02/13/20 0330  •  iron polysaccharides (NIFEREX) capsule 150 mg, 150 mg, Oral, Daily, Sanford Tsang MD, JULIANN, 150 mg at 02/12/20 0936  •  isosorbide mononitrate (IMDUR) 24 hr tablet 60 mg, 60 mg, Oral, Daily, Denis Mercado MD, 60 mg at 02/12/20 0941  •  levothyroxine (SYNTHROID, LEVOTHROID) tablet 75 mcg, 75 mcg, Oral, Daily, Denis Mercado MD, 75 mcg at 02/12/20 0939  •  metoprolol succinate XL (TOPROL-XL) 24 hr tablet 25 mg, 25 mg, Oral, Q24H, Yomi Higgins MD, 25 mg at 02/12/20 0939  •  ondansetron (ZOFRAN) injection 4 mg, 4 mg, Intravenous, Q6H PRN, Denis Mercado MD  •  pantoprazole (PROTONIX) EC tablet 40 mg, 40 mg, Oral, Daily, Denis Mercado MD, 40 mg at 02/12/20 0941  •  Pharmacy to Dose Zosyn, , Does not apply, Continuous PRN, Tamara Tomlin G, DO  •  piperacillin-tazobactam (ZOSYN) 3.375 g in iso-osmotic dextrose 50 ml (premix), 3.375 g, Intravenous, Q12H, Tamara Tomlin DO, 3.375 g at 02/13/20 0025  •  predniSONE (DELTASONE) tablet 10 mg, 10 mg, Oral, Daily, Tamara Tomlin DO, 10 mg at 02/12/20 0941  •  ranolazine (RANEXA) 12 hr tablet 500 mg, 500 mg, Oral, Q12H, Breeding, Nahid MODI MD, 500 mg at 02/12/20 2208  •  sodium chloride 0.9 % flush 10 mL, 10 mL, Intravenous, PRN, Denis Mercado MD  •  sodium chloride 0.9 % flush 10 mL, 10 mL, Intravenous, Q12H, Denis Mercado MD, 10 mL at 02/12/20 0900  •  sodium chloride 0.9 % flush 10 mL, 10 mL, Intravenous, PRJess DE LEÓN Roshan, MD    Assessment / Plan:      1.  Acute on chronic diastolic heart failure   --Grade 2 diastolic dysfunction with elevated left atrial pressures on echocardiogram  --Volume overload this morning with poor response to IV diuresis yesterday  --Diuresis now being limited by worsening renal failure  --Could consider the addition of metolazone to Bumex today and attempt to force diuresis vs proceeding with HD for volume removal, will defer to nephrology     2.  Multivessel coronary artery disease  --History of three-vessel CABG 2012 including LIMA-LAD, SVG-diagonal, and SVG-PDA  --Known occlusion of the SVG-diagonal graft with severe in-stent stenosis in the native diagonal branch, unattractive for PCI due to small caliber vessel=  --Has a history of chronic stable angina, likely exacerbated on presentation due to anemia, volume overload, worsening renal failure, etc.  --Anginal symptoms now well controlled  --Continue bisoprolol, isosorbide, and Ranexa as antianginals  --Continue daily aspirin  --No plans for invasive intervention     3.  Persistent atrial fibrillation  --Known history of atrial fibrillation, previously failed several antiarrhythmics and declined Tikosyn initiation  --Poor candidate for amiodarone due to interstitial lung disease  --Currently rate controlled  --Continue current dose of metoprolol, and changed diltiazem to long-acting dosing this morning  --Continue to hold therapeutic anticoagulation due to likely need for HD catheter as well as anemia with possible GI bleed     4.  Pulmonary arterial hypertension   --In the setting of interstitial lung disease  --RVSP on echocardiogram 60 mmHg     5.  Acute on chronic kidney disease  --Management per nephrology  --Considering possible initiation of HD     6.  Dyslipidemia  --Continue statin       Thank you for allowing me to participate in the care of your patient. Please to not hesitate to contact me with additional questions or concerns.     LA NENA Higgins MD  Interventional  Cardiology   02/13/20  8:18 AM

## 2020-02-13 NOTE — PROGRESS NOTES
Continued Stay Note   Jose     Patient Name: Pat Morel  MRN: [id number]  Today's Date: 2/13/2020    Admit Date: 2/6/2020    Discharge Plan     Row Name 02/13/20 1455       Plan    Plan  Aware of possible dialysis. Will follow to assist for set up if needed.        Discharge Codes    No documentation.       Expected Discharge Date and Time     Expected Discharge Date Expected Discharge Time    Feb 14, 2020             Albina Sands LCSW

## 2020-02-13 NOTE — PLAN OF CARE
Problem: Patient Care Overview  Goal: Plan of Care Review  Flowsheets (Taken 2/13/2020 1146)  Plan of Care Reviewed With: patient; daughter; son  Note:   Spoke with pt and her daughter and son at her bedside.  Pt was alert and talkative.  I listened and provided support as pt and family talked about the patient's reason for hospitalization.

## 2020-02-13 NOTE — PLAN OF CARE
Problem: Patient Care Overview  Goal: Plan of Care Review  Outcome: Ongoing (interventions implemented as appropriate)  Flowsheets (Taken 2/13/2020 2949)  Progress: no change  Plan of Care Reviewed With: patient  Outcome Summary: VSS.  Patient rested well throughout the night.  No complaints of pain or discomfort at this time.

## 2020-02-13 NOTE — THERAPY TREATMENT NOTE
Patient off floor for procedure, daughter states she has been very lethargic today; will follow up at later date.

## 2020-02-13 NOTE — PROGRESS NOTES
Continued Stay Note  TAYLOR Garcia     Patient Name: Pat Morel  MRN: [id number]  Today's Date: 2/13/2020    Admit Date: 2/6/2020    Discharge Plan     Row Name 02/13/20 0807       Plan    N/A Provider List Comment  Pt has Been accepted to [facility] they need the Dc summary  by 12 due to changing of pharmacy Fax to [phone] Nursing to call report to [phone]         Discharge Codes    No documentation.       Expected Discharge Date and Time     Expected Discharge Date Expected Discharge Time    Feb 14, 2020             Kelsey Peña RN

## 2020-02-13 NOTE — PROGRESS NOTES
"Nephrology Progress Note.    LOS: 7 days    Patient Care Team:  Anthony Sweet DO as PCP - General (Family Medicine)  Tien Archer MD as Consulting Physician (Cardiac Electrophysiology)  Xu Block MD (Cardiology)  Yomi Higgins MD as Consulting Physician (Cardiology)    Chief Complaint:    Chief Complaint   Patient presents with   • Weakness - Generalized       Subjective:   Follow up for RENEE and Chronic Kidney disease stage 4 / Anemia.     Interval History:   Patient Complaints: none  Patient seen and examined this morning.  Events from last night noted.  Patient denies having any fevers chills.  No nausea or vomiting no abdominal pain.  Denies any chest pain, shortness of breath or cough and sputum production.  There is no significant edema.   Patient also denies having new onset weakness of numbness of either extremity, she feels that things are slightly better has been able to eat and drink some.  She feels that she is still weak and has agreed to go to the rehab.  She went into A. fib and had rapid ventricular rate as well started on amiodarone drip.  She did receive 1 dose of diuretic secondary to worsening shortness of breath.  History taken from: patient and the son at the bedside.    Objective:    Vital Signs  BP 90/50 (BP Location: Right arm, Patient Position: Lying)   Pulse 78   Temp 96.1 °F (35.6 °C) (Axillary)   Resp 19   Ht 147.3 cm (58\")   Wt 66 kg (145 lb 9.6 oz)   LMP  (LMP Unknown)   SpO2 (!) 89%   BMI 30.43 kg/m²     I/O this shift:  In: 240 [P.O.:240]  Out: -     Intake/Output Summary (Last 24 hours) at 2/13/2020 0907  Last data filed at 2/13/2020 0800  Gross per 24 hour   Intake 720 ml   Output 860 ml   Net -140 ml       Physical Exam:  General Appearance: alert, oriented x 3, no acute distress,   HEENT: Oral mucosa dry, extra occular movements intact. Sclera clear.  Skin: Warm and dry  Neck: supple, no JVD, trachea midline  Lungs:Chest shape is normal. Breath sounds " heard bilaterally equally.  Basal crackles, No wheezing.   Heart: regular rate and rhythm. normal S1 and S2, no S3, no rub, peripheral pulses weak but palpable.  Abdomen: Obese, soft, non-tender,  present bowel sounds to auscultation  : no palpable bladder.  Extremities: Trace edema, no cyanosis or clubbing.   Neuro: normal speech and mental status, grossly non focal.     Results Review:   Results from last 7 days   Lab Units 02/13/20  0557 02/12/20  0558 02/11/20  1509 02/11/20  0640   SODIUM mmol/L 136 139  --  138   POTASSIUM mmol/L 4.3 4.2  --  3.9   CHLORIDE mmol/L 96* 97*  --  99   CO2 mmol/L 20.0* 21.2*  --  18.6*   BUN mg/dL 119* 119*  --  112*   CREATININE mg/dL 4.14* 3.93*  --  3.68*   CALCIUM mg/dL 9.6 9.0  --  8.8   ALBUMIN g/dL 3.10* 2.90* 3.40* 3.30*   BILIRUBIN mg/dL 1.5* 1.5* 1.6*  --    ALK PHOS U/L 234* 275* 253*  --    ALT (SGPT) U/L 119* 129* 138*  --    AST (SGOT) U/L 83* 102* 106*  --    GLUCOSE mg/dL 122* 133*  --  130*     Estimated Creatinine Clearance: 9.5 mL/min (A) (by C-G formula based on SCr of 4.14 mg/dL (H)).  Results from last 7 days   Lab Units 02/12/20  0558 02/11/20  0640 02/10/20  0625 02/09/20  0548 02/07/20  0546   MAGNESIUM mg/dL 2.8*  --   --   --  2.9*   PHOSPHORUS mg/dL  --  6.5* 5.8* 5.3* 5.8*         Results from last 7 days   Lab Units 02/13/20  0557 02/12/20  0558 02/11/20  0640 02/10/20  0625 02/09/20  0548   WBC 10*3/mm3 7.97 8.34 8.22 7.88 7.60   HEMOGLOBIN g/dL 7.6* 7.8* 8.5* 8.0* 8.1*   PLATELETS 10*3/mm3 107* 109* 119* 120* 145     Results from last 7 days   Lab Units 02/07/20  0546   INR  1.71*     Brief Urine Lab Results  (Last result in the past 365 days)      Color   Clarity   Blood   Leuk Est   Nitrite   Protein   CREAT   Urine HCG        02/06/20 2315             46.7       02/06/20 2315 Yellow Cloudy Moderate (2+) Negative Negative 100 mg/dL (2+)             No results found for: UTPCR  Imaging Results (Last 24 Hours)     ** No results found for the last  24 hours. **          aspirin 81 mg Oral Daily   atorvastatin 40 mg Oral Nightly   bumetanide 2 mg Oral BID   calcium acetate 1,334 mg Oral TID With Meals   dilTIAZem  mg Oral Q24H   ipratropium-albuterol 3 mL Nebulization Q4H - RT   iron polysaccharides 150 mg Oral Daily   isosorbide mononitrate 60 mg Oral Daily   levothyroxine 75 mcg Oral Daily   metoprolol succinate XL 25 mg Oral Q24H   pantoprazole 40 mg Oral Daily   piperacillin-tazobactam 3.375 g Intravenous Q12H   predniSONE 10 mg Oral Daily   ranolazine 500 mg Oral Q12H   sodium chloride 10 mL Intravenous Q12H       Pharmacy to Dose Zosyn          Medication Review:   Current Facility-Administered Medications   Medication Dose Route Frequency Provider Last Rate Last Dose   • acetaminophen (TYLENOL) tablet 650 mg  650 mg Oral Q4H PRN Denis Mercado MD   650 mg at 02/07/20 2302    Or   • acetaminophen (TYLENOL) 160 MG/5ML solution 650 mg  650 mg Oral Q4H PRN Denis Mercado MD        Or   • acetaminophen (TYLENOL) suppository 650 mg  650 mg Rectal Q4H PRN Denis Mercado MD       • aspirin EC tablet 81 mg  81 mg Oral Daily Yomi Higgins MD   81 mg at 02/12/20 0937   • atorvastatin (LIPITOR) tablet 40 mg  40 mg Oral Nightly Denis Mercado MD   40 mg at 02/12/20 2208   • bumetanide (BUMEX) tablet 2 mg  2 mg Oral BID Sanford Tsang MD, FASN       • calcium acetate (PHOS BINDER)) capsule 1,334 mg  1,334 mg Oral TID With Meals Sanford Tsang MD, FASN   1,334 mg at 02/12/20 1817   • dilTIAZem CD (CARDIZEM CD) 24 hr capsule 120 mg  120 mg Oral Q24H Yomi Higgins MD       • ipratropium-albuterol (DUO-NEB) nebulizer solution 3 mL  3 mL Nebulization Q4H - RT Tania Gupta DO   3 mL at 02/13/20 0654   • iron polysaccharides (NIFEREX) capsule 150 mg  150 mg Oral Daily Sanford Tsang MD, FASN   150 mg at 02/12/20 0936   • isosorbide mononitrate (IMDUR) 24 hr tablet 60 mg  60 mg Oral Daily Denis Mercado MD   60 mg at 02/12/20 0941   • levothyroxine  (SYNTHROID, LEVOTHROID) tablet 75 mcg  75 mcg Oral Daily Denis Mercado MD   75 mcg at 02/12/20 0939   • metoprolol succinate XL (TOPROL-XL) 24 hr tablet 25 mg  25 mg Oral Q24H Yomi Higgins MD   25 mg at 02/12/20 0939   • ondansetron (ZOFRAN) injection 4 mg  4 mg Intravenous Q6H PRN Denis Mercado MD       • pantoprazole (PROTONIX) EC tablet 40 mg  40 mg Oral Daily Denis Mercado MD   40 mg at 02/12/20 0941   • Pharmacy to Dose Zosyn   Does not apply Continuous PRN Tamara Tomlin DO       • piperacillin-tazobactam (ZOSYN) 3.375 g in iso-osmotic dextrose 50 ml (premix)  3.375 g Intravenous Q12H Tamara Tomlin DO   3.375 g at 02/13/20 0025   • predniSONE (DELTASONE) tablet 10 mg  10 mg Oral Daily Tamara Tomlin DO   10 mg at 02/12/20 0941   • ranolazine (RANEXA) 12 hr tablet 500 mg  500 mg Oral Q12H Nahid Horvath MD   500 mg at 02/12/20 2208   • sodium chloride 0.9 % flush 10 mL  10 mL Intravenous PRN Denis Mercado MD       • sodium chloride 0.9 % flush 10 mL  10 mL Intravenous Q12H Denis Mercado MD   10 mL at 02/12/20 0900   • sodium chloride 0.9 % flush 10 mL  10 mL Intravenous PRN Denis Mercado MD           Assessment/Plan:  1. Acute renal failure (CMS/HCC): It does appear that she has worsening of her baseline renal function, at this point it is not very clear if this is all acute or may have some worsening of her chronic renal failure as well.  She likely has a significant component of cardiorenal syndrome.  We will check a UA, Merchant catheter was placed in the ER with no significant increase in output.  2. Chronic kidney disease stage IV: He has known to have stage IV chronic kidney disease, she has been admitted almost every 2 weeks since middle of December.  Every time has she has some worsening of her renal function.  He was not on any ACE inhibitors or denies using any nonsteroidals.  She did not have any significant hematuria or proteinuria.  May end up requiring dialysis this  admission.  3. Hyperkalemia: It has resolved with treatment.  4. Metabolic acidosis: It is above 20 with supplements of sodium bicarb.  5. Abnormal liver function: It is quite likely that she has an arrhythmia that might have led to the current picture of congestive heart failure as well as congestive liver.  6. Anemia: She had fairly normal iron stores on her last visit.  Will check iron stores again she may be needing clear if she is anemic.  Iron stores have dropped since her last admission in the last 2 weeks.  I will go ahead and start her on oral supplements.  Transfuse if hemoglobin less than 8.  7. Coronary artery disease, status post CABG, status post stent placement: May need cardiac evaluation again during this hospitalization.  Last echo that was done in October 2019 showed mild to moderate mitral regurgitation, moderate tricuspid regurgitation with elevated pulmonary pressure of 45 to 55 mmHg.  8. Acute on chronic respiratory failure:  9. Hypersensitivity pneumonitis  10. Interstitial lung disease      Plan:  · She will be continued with oral diuretics for now.  · Hold off the blood for now, because of her cardiac issues.  She has agreed to get a tunneled dialysis catheter placed tomorrow to initiate dialysis, we can give 1 unit of blood for the first dialysis and if needed can be given another unit for the second dialysis.  · New onset A. fib and RVR being treated by the cardiology.  Heart rate is much better this morning.  · Details about the dialysis discussed and will check the 24 hour urine for creatinine clearance and see, it does appear she has fairly low clearance.  · Details were discussed with the patient as well as her son in the room.  She is agreed to go to the rehab and start dialysis if needed.  · Details were also discussed with the hospitalist service.   · Surveillance labs.  · Further recommendations will depend on clinical course of the patient during the current hospitalization.     · I also discussed the details with the nursing staff.  · Rest as ordered.    Sanford Tsang MD, JULIANN  02/13/20  9:07 AM    Dictated utilizing Dragon dictation.

## 2020-02-14 NOTE — PROGRESS NOTES
Continued Stay Note  [facility]     Patient Name: Pat Morel  MRN: [id number]  Today's Date: 2/14/2020    Admit Date: 2/6/2020    Discharge Plan     Row Name 02/14/20 1612       Plan    Plan  Call from [facility]. Reviewed family preference for MWF 8 am chair time. They will call clinic and attempt to get this chair time and notify CM.    Row Name 02/14/20 1517       Plan    Plan  Spoke to pt.'s daughter, reviewed dialysis set up procedures and transport options. Daughter states she is fine for mom to go to [facility] and rehab and they could pay 12 dollars a week for [facility] transport. Discussed medicaid, and medical transport if needed. Notified CM.    Row Name 02/14/20 4506       Plan    Plan  Spoke to pt., , and son regarding dialysis set up. Preference is [facility], MWF. Sending clinicals. Discussed transport options with family. When chair time is confirmed, will assist further with transport. Notified CM.         Discharge Codes    No documentation.       Expected Discharge Date and Time     Expected Discharge Date Expected Discharge Time    Feb 17, 2020             Albina Sands LCSW

## 2020-02-14 NOTE — PROGRESS NOTES
Continued Stay Note   [facility]     Patient Name: Pat Morel  MRN: [id number]  Today's Date: 2/14/2020    Admit Date: 2/6/2020    Discharge Plan     Row Name 02/14/20 0597       Plan    Plan  Spoke to pt.'s daughter, reviewed dialysis set up procedures and transport options. Daughter states she is fine for mom to go to [facility] and rehab and they could pay 12 dollars a week for [facility] transport. Discussed medicaid, and medical transport if needed. Notified CM.    Row Name 02/14/20 2069       Plan    Plan  Spoke to pt., , and son regarding dialysis set up. Preference is [facility]. Sending clinicals. Discussed transport options with family. When chair time is confirmed, will assist further with transport. Notified CM.     Row Name 02/14/20 2621       Plan    Plan                                                      Discharge Codes    No documentation.       Expected Discharge Date and Time     Expected Discharge Date Expected Discharge Time    Feb 17, 2020             Albina Sands LCSW

## 2020-02-14 NOTE — DISCHARGE PLACEMENT REQUEST
"Vilma Morel (80 y.o. Female)     Date of Birth Social Security Number Address Home Phone MRN    1940  [address]  [place] [address] [phone] [id number]    Anglican Marital Status          Quaker        Admission Date Admission Type Admitting Provider Attending Provider Department, Room/Bed    2/6/20 Emergency Denis Mercado MD Gandee, Julie G, DO [facility] MED SURG  3, 319/1    Discharge Date Discharge Disposition Discharge Destination                       Attending Provider:  Tamara Tomlin DO    Allergies:  Tuberculin Tests    Isolation:  None   Infection:  None   Code Status:  No CPR    Ht:  147.3 cm (58\")   Wt:  66 kg (145 lb 9.6 oz)    Admission Cmt:  None   Principal Problem:  Acute renal failure (CMS/HCC) [N17.9]                 Active Insurance as of 2/6/2020     Primary Coverage     Payor Plan Insurance Group Employer/Plan Group    HUMANA MEDICARE REPLACEMENT HUMANA MEDICARE REPLACEMENT [id number]     Payor Plan Address Payor Plan Phone Number Payor Plan Fax Number Effective Dates    [address] [address] [phone]  1/1/2018 - None Entered    [facility] [id number]       Subscriber Name Subscriber Birth Date Member ID       VILMA MOREL 1940 [id number]                 Emergency Contacts      (Rel.) Home Phone Work Phone Mobile Phone    Dell Morel (Spouse) -- -- [phone]    Yary Meade (Daughter) -- -- [phone]    She Casey (Son) -- -- [phone]            Emergency Contact Information     Name Relation Home Work Mobile    Dell Morel Spouse   [phone]    Yary Meade Daughter   [phone]    She Casey Son   [phone]          Insurance Information                HUMANA MEDICARE REPLACEMENT/HUMANA MEDICARE REPLACEMENT Phone: [phone]    Subscriber: Vilma Morel Subscriber#: [id number]    Group#: [id number] Precert#:              History & Physical      Denis Mercado MD at 02/06/20 1541      "         [facility]   HISTORY AND PHYSICAL      Name:  Pat Morel   Age:  80 y.o.  Sex:  female  :  1940  MRN:  [id number]   Visit Number:  [id number]  Admission Date:  2020  Date Of Service:  20  Primary Care Physician:  Anthony Sweet DO    Chief Complaint:     Generalized weakness.    History Of Presenting Illness:      Patient is a 80-year-old female with history including CAD with CABG, atrial fibrillation, chronic kidney disease stage III, history of CVA, diastolic CHF, iron deficiency anemia, hypersensitivity pneumonitis, hypertension, osteoporosis was brought to the emergency room by EMS with generalized weakness.  Patient has had progressive decline in her health with generalized weakness and multiple falls recently.  She apparently was getting down from her bed to go to the bathroom but gently slumped to the floor.  There was no history of any head trauma.  She does complain of mild shortness of breath and decreased urine output.  However, her shortness of breath is much better compared to in the past after initiation of high-dose prednisone.  No history of fever, nausea or vomiting.  No history of abdominal pain.    In the emergency room, she was afebrile but slightly tachycardic at 107 with initial pulse oxygen saturation of 86% on room air.  Blood pressure was 143/89.  Blood work done in the emergency room however revealed significant worsening of her renal failure with a creatinine of 4.99 (last creatinine at 2.47 on 2020),  and a potassium of 6.1.  CBC was unremarkable except for a hemoglobin of 9.4.  Troponin was 0.106 and BNP was more than 70,000.  Chest x-ray revealed chronic changes without any acute process.  Patient was given 500 mL of normal saline.  Dr. Finnegan was called from the emergency room and he recommended IV Bumex 4 mg, insulin and dextrose for hyperkalemia.  Patient was subsequently admitted to the medical floor with  telemetry.    She was recently admitted to [facility] from 1/17/2020 to 1/20/2020 with similar presentation.  At that time she was treated for diastolic heart failure as well as acute on chronic renal failure.    Review Of Systems:     General ROS: Patient denies any fevers, chills or loss of consciousness. Complains of generalized weakness.   Psychological ROS: No history of any hallucinations and delusions.  Ophthalmic ROS: No history of any diplopia or transient loss of vision.  ENT ROS: No history of sore throat, nasal congestion or ear pain.   Allergy and Immunology ROS: No history of rash or itching.  Hematological and Lymphatic ROS: No history of neck swelling or easy bleeding.  Endocrine ROS: No history of any recent unintentional weight gain or loss.  Respiratory ROS: History of shortness of breath and cough.  Cardiovascular ROS: No history of chest pain or palpitations.  Gastrointestinal ROS: No history of nausea and vomiting. Denies any abdominal pain.   Genito-Urinary ROS: History of decreased urine output.  Musculoskeletal ROS: No muscle pain. No calf pain.  Neurological ROS: No history of any focal weakness. No loss of consciousness.   Dermatological ROS: No history of any redness or pruritis.     Past Medical History:    Past Medical History:   Diagnosis Date   • Acute on chronic diastolic congestive heart failure (CMS/HCC) 12/20/2019   • Anemia    • Angina at rest (CMS/HCC)    • Arthritis    • Atrial fibrillation (CMS/HCC)    • Coronary artery disease    • Disease of thyroid gland    • Elevated cholesterol    • GERD without esophagitis 12/2/2019   • History of blood transfusion    • History of colonoscopy 11/19/2019   • History of melena 11/19/2019   • History of stomach ulcers    • Hypertension    • Impaired functional mobility, balance, gait, and endurance    • Osteoporosis    • Positive TB test    • Stroke (CMS/HCC)     No deficits     Past Surgical history:    Past Surgical History:    Procedure Laterality Date   • BRONCHOSCOPY Bilateral 4/12/2019    Procedure: BRONCHOSCOPY;  Surgeon: Jeff Laura MD;  Location:  [facility];  Service: Pulmonary   • BYPASS GRAFT     • COLONOSCOPY N/A 10/15/2019    Procedure: COLONOSCOPY;  Surgeon: Fredi Aaron MD;  Location:  [facility];  Service: Gastroenterology   • CORONARY ANGIOPLASTY WITH STENT PLACEMENT     • CORONARY ARTERY BYPASS GRAFT  10/18/2010   • ENDOSCOPY N/A 10/14/2019    Procedure: ESOPHAGOGASTRODUODENOSCOPY;  Surgeon: Fredi Aaron MD;  Location:  [facility];  Service: Gastroenterology   • HYSTERECTOMY     • STOMACH SURGERY  08/11/2019    Upper GI bleed    • STOMACH SURGERY  10/13/2019     Social History:    Social History     Socioeconomic History   • Marital status:      Spouse name: Not on file   • Number of children: Not on file   • Years of education: Not on file   • Highest education level: Not on file   Tobacco Use   • Smoking status: Never Smoker   • Smokeless tobacco: Never Used   Substance and Sexual Activity   • Alcohol use: No   • Drug use: No   • Sexual activity: Not Currently     Family History:    Family History   Problem Relation Age of Onset   • Cancer Mother    • Arthritis Mother    • No Known Problems Father    • Liver disease Sister      Allergies:      Tuberculin tests    Home Medications:    Prior to Admission Medications     Prescriptions Last Dose Informant Patient Reported? Taking?    apixaban (ELIQUIS) 2.5 MG tablet tablet 2/6/2020  Yes Yes    Take 2.5 mg by mouth 2 (Two) Times a Day.    atorvastatin (LIPITOR) 40 MG tablet 2/5/2020  No Yes    Take 1 tablet by mouth Every Night.    bisoprolol (ZEBeta) 5 MG tablet 2/6/2020  No Yes    Take 1 tablet by mouth 2 (Two) Times a Day.    budesonide (PULMICORT) 0.5 MG/2ML nebulizer solution 2/5/2020  No Yes    Inhale contents of 1 vial through a nebulizer 2 (Two) Times a Day    bumetanide (BUMEX) 2 MG tablet 2/6/2020  No Yes    Take 1  tablet by mouth 2 (Two) Times a Day.    ferrous sulfate 325 (65 FE) MG tablet 2/5/2020  No Yes    Take twice daily on Monday, Wednesday, and Friday    ipratropium-albuterol (DUO-NEB) 0.5-2.5 mg/3 ml nebulizer 2/5/2020  No Yes    Take 3 mL by nebulization Every 4 (Four) Hours As Needed for Wheezing or Shortness of Air (cough).    isosorbide mononitrate (IMDUR) 120 MG 24 hr tablet 2/6/2020  No Yes    Take 0.5 tablets by mouth Daily.    levothyroxine (SYNTHROID, LEVOTHROID) 75 MCG tablet 2/6/2020  No Yes    Take 1 tablet by mouth Daily.    pantoprazole (PROTONIX) 40 MG EC tablet 2/6/2020  No Yes    Take 1 tablet by mouth Daily.    potassium chloride (MICRO-K) 10 MEQ CR capsule 2/6/2020  No Yes    Take 2 capsules by mouth Daily.    predniSONE (DELTASONE) 20 MG tablet 2/6/2020  No Yes    Take 2 tablets by mouth Daily With Breakfast.    Vitamin D, Cholecalciferol, 50 MCG (2000 UT) capsule 2/6/2020  Yes Yes    Take 1 capsule by mouth Daily.    nitroglycerin (NITROSTAT) 0.4 MG SL tablet Unknown  Yes No    Place 0.4 mg under the tongue Every 5 (Five) Minutes As Needed.    predniSONE (DELTASONE) 10 MG tablet   No No    Take 1 tablet by mouth Daily.          ED Medications:    Medications   sodium chloride 0.9 % flush 10 mL (has no administration in time range)   sodium chloride 0.9 % bolus 500 mL (0 mL Intravenous Stopped 2/6/20 1416)   dextrose (D50W) 25 g/ 50mL Intravenous Solution 50 mL (50 mL Intravenous Given 2/6/20 1253)   insulin regular (humuLIN R,novoLIN R) injection 10 Units (10 Units Intravenous Given 2/6/20 1253)   albuterol (PROVENTIL) nebulizer solution 0.083% 2.5 mg/3mL (10 mg Nebulization Given 2/6/20 1350)   bumetanide (BUMEX) injection 4 mg (4 mg Intravenous Given 2/6/20 1414)     Vital Signs:    Temp:  [97.6 °F (36.4 °C)] 97.6 °F (36.4 °C)  Heart Rate:  [] 99  Resp:  [18-20] 19  BP: (126-143)/(78-89) 126/78        02/06/20  1139 02/06/20  1350   Weight: 60.8 kg (134 lb) 63.5 kg (140 lb 1.6 oz)      Body mass index is 29.28 kg/m².    Physical Exam:    General Appearance:  Alert and cooperative, not in any acute distress.   Head:  Atraumatic and normocephalic, without obvious abnormality.   Eyes:          PERRLA, conjunctivae and sclerae normal, no Icterus. No pallor. Extraocular movements are within normal limits.   Ears:  Ears appear intact with no abnormalities noted.   Throat: No oral lesions, no thrush, oral mucosa moist.   Neck: Supple, trachea midline, no thyromegaly, no carotid bruit.   Back:   No kyphoscoliosis present. No tenderness to palpation,   range of motion normal.   Lungs:   Chest shape is normal. Breath sounds heard bilaterally equally.  No wheezing.  Bilateral coarse crackles heard.  No Pleural rub or bronchial breathing.   Heart:  Normal S1 and S2, no murmur, no gallop, no rub. No JVD.  Midline CABG scar noted.   Abdomen:   Normal bowel sounds, no masses, no organomegaly. Soft, non-tender, non-distended, no guarding, no rebound tenderness.  Large area of ecchymosis noted in the mid and lower abdomen bilaterally.   Extremities: Moves all extremities well, 1+ edema, no cyanosis, no clubbing.   Pulses: Pulses palpable and equal bilaterally.   Skin: No bleeding or rash.   Neurologic: Alert and oriented x 3. Moves all four limbs equally. No tremors. No facial asymmetry.     Laboratory data:    I have reviewed the labs done in the emergency room.    Results from last 7 days   Lab Units 02/06/20  1148   SODIUM mmol/L 136   POTASSIUM mmol/L 6.1*   CHLORIDE mmol/L 93*   CO2 mmol/L 19.9*   BUN mg/dL 103*   CREATININE mg/dL 4.99*   CALCIUM mg/dL 9.3   BILIRUBIN mg/dL 2.9*   ALK PHOS U/L 216*   ALT (SGPT) U/L 163*   AST (SGOT) U/L 157*   GLUCOSE mg/dL 165*     Results from last 7 days   Lab Units 02/06/20  1148   WBC 10*3/mm3 8.18   HEMOGLOBIN g/dL 9.4*   HEMATOCRIT % 30.3*   PLATELETS 10*3/mm3 194         Results from last 7 days   Lab Units 02/06/20  1148   TROPONIN T ng/mL 0.106*     Results from  last 7 days   Lab Units 02/06/20  1148   PROBNP pg/mL >70,000.0*     EKG:      EKG done in the emergency room was reviewed by me.  It shows atrial fibrillation with a rate of 100 bpm.  Indeterminate axis.  Nonspecific ST-T changes were noted.  Low voltage QRS complexes noted throughout.    Radiology:    Imaging Results (Last 72 Hours)     Procedure Component Value Units Date/Time    XR Chest 1 View [[id number]] Collected:  02/06/20 1229     Updated:  02/06/20 1232    Narrative:       PROCEDURE: XR CHEST 1 VW-     HISTORY: dyspnea     COMPARISON: 01/09/2020 and 01/06/2020.     FINDINGS: The heart is mildly enlarged. The mediastinum is unremarkable.  There are chronic interstitial lung changes. No focal opacities were  effusions are evident. There is no pneumothorax.  There are no acute  osseous abnormalities.       Impression:       Chronic lung changes with no acute cardiopulmonary process.     Continued followup is recommended.     This report was finalized on 2/6/2020 12:29 PM by Joaquín Shannon M.D..        Assessment:    1.  Acute renal failure on chronic kidney disease stage III, present on admission.  2.  Recurrent falls at home secondary to uremia, present on admission.  3.  Acute hyperkalemia secondary to #1, present on admission.  4.  Hypersensitivity pneumonitis on prednisone therapy.  5.  Chronic hypoxic respiratory failure on home oxygen.  6.  Chronic diastolic heart failure, no evidence of exacerbation.  7.  Paroxysmal atrial fibrillation on apixaban.  8.  Coronary artery disease status post stents and CABG.  9.  Chronic pulmonary hypertension.  10.  Acquired hypothyroidism.    Plan:    Ms. Morel is currently being admitted to the medical floor with telemetry as an inpatient.  I anticipate more than 3 days of inpatient hospital stay.  Unfortunately her renal failure has significantly worsened.  She has elevated level of BUN and may have underlying uremic encephalopathy causing her recurrent falls.   She is also on high-dose prednisone and may have underlying proximal myopathy as well.  She will be placed on gentle IV hydration.  We will consult Dr. Tsang from nephrology for further recommendations regarding her worsening renal failure.  He has recommended placement of a Merchant catheter for accurate urine output measurement.    She will be started on physical and occupational therapy for her generalized weakness and recurrent falls.  Her home medications will be continued.  She will be continued on Eliquis for her atrial fibrillation.  Unfortunately she has had recent multiple falls in the question will be whether to continue her anticoagulation.  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  We will await evaluation by physical therapy.    She does have elevated troponin levels but denies any chest pain.  I suspect this is secondary to her renal failure and decreased clearance of troponins.  I have discussed advanced directives with the patient and she wants to be DNR according to her living will.  Further recommendations depend upon her clinical course.    Denis Mercado MD  02/06/20  3:41 PM    Dictated utilizing Dragon dictation.    Electronically signed by Denis Mercado MD at 02/07/20 0837       Oxygen Therapy (last day)     Date/Time   SpO2   Device (Oxygen Therapy)   Flow (L/min)   Oxygen Concentration (%)   ETCO2 (mmHg)    02/14/20 1230   94   --   --   --   --    02/14/20 1200   93   nasal cannula   --   --   --    02/14/20 0906   --   nasal cannula   2   --   --    02/14/20 0900   98   nasal cannula   2   --   --    02/14/20 0845   98   nasal cannula   2   --   --    02/14/20 0832   95   nasal cannula   2   --   --    02/14/20 0827   95   nasal cannula   2   --   --    02/14/20 0822   93   nasal cannula   2   --   --    02/14/20 0817   91   nasal cannula   2   --   --    02/14/20 0812   (!) 88   nasal cannula   2   --   --    02/14/20 0810   --    nasal cannula   2   --   --    02/14/20 0720   94   room air   --   --   --    02/14/20 0625   90   nasal cannula   2   --   --    02/14/20 0446   95   --   --   --   --    02/14/20 0400   --   NPPV/NIV   --   --   --    02/14/20 0031   94   --   --   --   --    02/14/20 0000   --   NPPV/NIV   --   --   --    02/13/20 2000   --   nasal cannula   2   --   --    02/13/20 1922   94   --   --   --   --    02/13/20 1600   --   nasal cannula   2   --   --    02/13/20 1536   100   --   --   --   --    02/13/20 1532   99   humidified;nasal cannula   --   --   --    02/13/20 1528   94   nasal cannula;humidified   2   --   --    02/13/20 1204   93   --   --   --   --    02/13/20 1200   --   nasal cannula   2   --   --    02/13/20 0852   (!) 89   --   --   --   --    02/13/20 0800   --   nasal cannula   2   --   --    02/13/20 0711   95   --   --   --   --    02/13/20 0654   92   nasal cannula   2   --   --    02/13/20 0500   96   --   --   --   --    02/13/20 0400   --   nasal cannula   2   --   --    02/13/20 0330   91   nasal cannula   2   --   --    02/13/20 0303   94   --   --   --   --    02/13/20 0000   --   nasal cannula   2   --   --              Lines, Drains & Airways    Active LDAs     Name:   Placement date:   Placement time:   Site:   Days:    Peripheral IV 02/11/20 1533 Anterior;Left Wrist   02/11/20    1533    Wrist   2    Urethral Catheter Silicone 16 Fr.   02/11/20    1359     2    Hemodialysis Cath Double with Pigtail   02/14/20    --    --   less than 1                  Current Facility-Administered Medications   Medication Dose Route Frequency Provider Last Rate Last Dose   • acetaminophen (TYLENOL) tablet 650 mg  650 mg Oral Q4H PRN Familia Paez MD   650 mg at 02/07/20 2302    Or   • acetaminophen (TYLENOL) 160 MG/5ML solution 650 mg  650 mg Oral Q4H PRN Familia Paez MD        Or   • acetaminophen (TYLENOL) suppository 650 mg  650 mg Rectal Q4H PRN Familia Paez MD       • aspirin EC  tablet 81 mg  81 mg Oral Daily Familia Paez MD   81 mg at 02/14/20 0955   • calcium acetate (PHOS BINDER)) capsule 1,334 mg  1,334 mg Oral TID With Meals Familia Paez MD   1,334 mg at 02/13/20 1738   • dilTIAZem CD (CARDIZEM CD) 24 hr capsule 120 mg  120 mg Oral Q24H Familia Paez MD   120 mg at 02/14/20 0956   • ipratropium-albuterol (DUO-NEB) nebulizer solution 3 mL  3 mL Nebulization Q4H PRN Familia Paez MD       • iron polysaccharides (NIFEREX) capsule 150 mg  150 mg Oral Daily Familia Paez MD   150 mg at 02/14/20 0954   • isosorbide mononitrate (IMDUR) 24 hr tablet 60 mg  60 mg Oral Daily Familia Paez MD   60 mg at 02/14/20 0955   • levothyroxine (SYNTHROID, LEVOTHROID) tablet 75 mcg  75 mcg Oral Daily Familia Paez MD   75 mcg at 02/14/20 0957   • metoprolol succinate XL (TOPROL-XL) 24 hr tablet 25 mg  25 mg Oral Q24H Familia Paez MD   25 mg at 02/14/20 0600   • ondansetron (ZOFRAN) injection 4 mg  4 mg Intravenous Q6H PRN Familia Paez MD       • pantoprazole (PROTONIX) EC tablet 40 mg  40 mg Oral Daily Familia Paez MD   40 mg at 02/14/20 0955   • Pharmacy to Dose Zosyn   Does not apply Continuous PRN Familia Paez MD       • piperacillin-tazobactam (ZOSYN) 3.375 g in iso-osmotic dextrose 50 ml (premix)  3.375 g Intravenous Q12H Familia Paez MD   3.375 g at 02/14/20 0106   • predniSONE (DELTASONE) tablet 10 mg  10 mg Oral Daily Familia Paez MD   10 mg at 02/14/20 0954   • ranolazine (RANEXA) 12 hr tablet 500 mg  500 mg Oral Q12H Familia Paez MD   500 mg at 02/14/20 0955   • sodium chloride 0.9 % bolus 1,000 mL  1,000 mL Intravenous PRN Sanford Tsang MD, EVELINA       • sodium chloride 0.9 % bolus 1,000 mL  1,000 mL Intravenous PRN Sanford Tsang MD, JULIANN       • sodium chloride 0.9 % flush 10 mL  10 mL Intravenous PRN Familia Paez MD       • sodium chloride 0.9 % flush 10 mL  10 mL Intravenous Q12H Familia Paez MD   10  mL at 02/14/20 0957   • sodium chloride 0.9 % flush 10 mL  10 mL Intravenous PRFamilia Noyola MD         Orders (last 24 hrs)      Start     Ordered    02/15/20 0600  Hemodialysis Inpatient  Once,   Status:  Canceled      02/14/20 1048    02/15/20 0600  Hemodialysis Inpatient  Once     Comments:  If hemoglobin 8 or less in the morning please arrange for another unit of blood with dialysis    02/14/20 1050    02/14/20 1120  sodium chloride 0.9 % bolus 1,000 mL  As Needed      02/14/20 1120    02/14/20 1118  Hepatitis Panel, Acute  Once      02/14/20 1117    02/14/20 1118  Hepatitis Panel, Acute  PROCEDURE ONCE      02/14/20 1117    02/14/20 1118  Hep B Confirmation Tube  PROCEDURE ONCE      02/14/20 1117    02/14/20 1048  Type & Screen  Once      02/14/20 1047    02/14/20 1048  Hemodialysis Inpatient  Once     Comments:  Transfuse 1 unit of blood with dialysis today.    02/14/20 1047    02/14/20 1047  Verify Informed Consent for Blood Product Administration  Once      02/14/20 1047    02/14/20 1047  Prepare RBC, 1 Units  Blood - Once      02/14/20 1047    02/14/20 1046  Transfuse RBC, 1 Units Infuse Each Unit Over: 3.5H  Transfusion      02/14/20 1047    02/14/20 1044  sodium chloride 0.9 % bolus 1,000 mL  As Needed      02/14/20 1044    02/14/20 1015  Hemodialysis Inpatient  Once,   Status:  Canceled      02/14/20 1014    02/14/20 0927  CPR - Full Support in OR  Once,   Status:  Canceled      02/14/20 0926    02/14/20 0927  Anesthesia Follow-Up  Once     Provider:  (Not yet assigned)    02/14/20 0926    02/14/20 0816  Vital signs every 5 minutes for 15 minutes, every 15 minutes thereafter.  Once      02/14/20 0815    02/14/20 0816  Notify Anesthesia of Any Acute Changes in Patient Condition  Until Discontinued,   Status:  Canceled      02/14/20 0815    02/14/20 0816  Notify Anesthesia for Unrelieved Pain  Until Discontinued,   Status:  Canceled      02/14/20 0815    02/14/20 0816  Once DC criteria to floor  met, follow surgeon's orders.  Continuous,   Status:  Canceled      02/14/20 0815    02/14/20 0816  Discharge patient from PACU when discharge criteria is met.  Continuous,   Status:  Canceled      02/14/20 0815    02/14/20 0816  XR Chest 1 View  1 Time Imaging      02/14/20 0815    02/14/20 0815  ceFAZolin Sodium-Dextrose (ANCEF) IVPB (duplex) 2 g  Once      02/14/20 0716    02/14/20 0815  sodium chloride 0.9 % infusion 500 mL  Continuous,   Status:  Discontinued      02/14/20 0716    02/14/20 0752  lidocaine (XYLOCAINE) 1 % injection  As Needed,   Status:  Discontinued      02/14/20 0752    02/14/20 0747  sterile water irrigation solution  As Needed,   Status:  Discontinued      02/14/20 0747    02/14/20 0745  sodium chloride 100 mL with heparin (porcine) 1,000 Units mixture  As Needed,   Status:  Discontinued      02/14/20 0746    02/14/20 0718  CPR - Full Support in OR  Once,   Status:  Canceled      02/14/20 0736    02/14/20 0716  Maintain IV Access  Continuous,   Status:  Canceled      02/14/20 0716    02/14/20 0716  Obtain Informed Consent  Once,   Status:  Canceled      02/14/20 0716    02/14/20 0652  FL C Arm During Surgery  1 Time Imaging      02/14/20 0652    02/14/20 0600  CBC & Differential  Morning Draw      02/13/20 2146    02/14/20 0600  Comprehensive Metabolic Panel  Morning Draw      02/13/20 2146    02/14/20 0600  CBC Auto Differential  PROCEDURE ONCE      02/14/20 0003    02/14/20 0001  NPO Diet  Diet Effective Midnight,   Status:  Canceled      02/13/20 1008    02/14/20 0001  NPO Diet  Diet Effective Midnight      02/13/20 2245    02/13/20 1645  ipratropium-albuterol (DUO-NEB) nebulizer solution 3 mL  Every 4 Hours PRN      02/13/20 1633    02/13/20 1100  dilTIAZem CD (CARDIZEM CD) 24 hr capsule 120 mg  Every 24 Hours Scheduled      02/13/20 0839    02/13/20 0900  bumetanide (BUMEX) tablet 2 mg  2 Times Daily,   Status:  Discontinued      02/12/20 1104    02/12/20 0900  metoprolol succinate XL  (TOPROL-XL) 24 hr tablet 25 mg  Every 24 Hours Scheduled      02/11/20 1234    02/12/20 0900  predniSONE (DELTASONE) tablet 10 mg  Daily      02/11/20 1238    02/12/20 0900  aspirin EC tablet 81 mg  Daily      02/11/20 1248    02/12/20 0100  piperacillin-tazobactam (ZOSYN) 3.375 g in iso-osmotic dextrose 50 ml (premix)  Every 12 Hours      02/11/20 1613    02/11/20 1607  Pharmacy to Dose Zosyn  Continuous PRN      02/11/20 1607    02/11/20 1200  calcium acetate (PHOS BINDER)) capsule 1,334 mg  3 Times Daily With Meals      02/11/20 0954    02/11/20 1045  iron polysaccharides (NIFEREX) capsule 150 mg  Daily      02/11/20 0954    02/10/20 2000  ipratropium-albuterol (DUO-NEB) nebulizer solution 3 mL  Every 4 Hours - RT,   Status:  Discontinued      02/10/20 1949    02/10/20 1800  Dietary Nutrition Supplements Arginaid  2 Times Daily      02/10/20 0828    02/08/20 1115  ranolazine (RANEXA) 12 hr tablet 500 mg  Every 12 Hours Scheduled      02/08/20 1016    02/07/20 1200  Dietary Nutrition Supplements Novasource Renal (Nepro)  Daily With Lunch      02/07/20 1015    02/07/20 0900  isosorbide mononitrate (IMDUR) 24 hr tablet 60 mg  Daily      02/06/20 1558    02/07/20 0900  levothyroxine (SYNTHROID, LEVOTHROID) tablet 75 mcg  Daily      02/06/20 1558    02/07/20 0900  pantoprazole (PROTONIX) EC tablet 40 mg  Daily      02/06/20 1558    02/06/20 2100  sodium chloride 0.9 % flush 10 mL  Every 12 Hours Scheduled      02/06/20 1558    02/06/20 1556  ondansetron (ZOFRAN) injection 4 mg  Every 6 Hours PRN      02/06/20 1558    02/06/20 1556  sodium chloride 0.9 % flush 10 mL  As Needed      02/06/20 1558    02/06/20 1556  acetaminophen (TYLENOL) tablet 650 mg  Every 4 Hours PRN      02/06/20 1558    02/06/20 1556  acetaminophen (TYLENOL) 160 MG/5ML solution 650 mg  Every 4 Hours PRN      02/06/20 1558    02/06/20 1556  acetaminophen (TYLENOL) suppository 650 mg  Every 4 Hours PRN      02/06/20 1558    02/06/20 1142  sodium  chloride 0.9 % flush 10 mL  As Needed      02/06/20 1143    Unscheduled  Up With Assistance  As Needed      02/06/20 1558    Unscheduled  Bladder Scan if Patient Unable to Void 4-6 Hours After Catheter Removal  As Needed      02/10/20 1447    Unscheduled  If Bladder Scan Volume is Less Than 500mL & Patient is Without Symptoms of Bladder Discomfort / Distention Monitor Every 1-2 Hours for Spontaneous Void  As Needed      02/10/20 1447    Unscheduled  Straight Cath Every 4-6 Hours As Needed If Patient is Unable to Void After 4-6 Hours, Bladder Scan Volume is Greater Than 500mL & Patient Has Symptoms of Bladder Discomfort / Distention  As Needed      02/10/20 1447    Unscheduled  Schedule / Prompt Voiding For Patients With Urinary Incontinence  As Needed      02/10/20 1447    Unscheduled  Oxygen Therapy- Nasal Cannula; 2 LPM; Titrate for SPO2: equal to or greater than, per policy, 90%  Continuous PRN      02/14/20 0926    Unscheduled  Pulse Oximetry, Continuous  Continuous PRN      02/14/20 0926    --  SCANNED - TELEMETRY        02/06/20 0000    --  SCANNED - TELEMETRY        02/06/20 0000    --  SCANNED - TELEMETRY        02/06/20 0000    --  SCANNED - TELEMETRY        02/06/20 0000                     Operative/Procedure Notes (last 24 hours) (Notes from 02/13/20 1239 through 02/14/20 1239)      Familia Paez MD at 02/14/20 0740        PATIENT:    Pat Morel    DATE OF SURGERY:  2/14/2020    PHYSICIAN:    Familia Paez MD    REFERRING PHYSICIAN:  Anthony Sweet DO     YOB: 1940    PREOPERATIVE DIAGNOSIS:  End-stage renal failure    POSTOPERATIVE DIAGNOSIS:  End-stage renal failure    PROCEDURE:  Tunneled dialysis catheter placement with fluoroscopy      ANESTHESIA:  MAC    OPERATIVE PROCEDURE:  The patient was taken to the operating room, placed in the supine position.  Anesthesia had been delivered.  The neck and chest were prepped and draped in the usual fashion.  The right  internal jugular vein was accessed using a large-bore needle using ultrasound guidance.  Through this guidewire is placed and the rest of this procedure was performed under fluoroscopic guidance.  Over the guidewire the introducer sheath was then advanced and appropriately positioned.  Through the sheath the catheter was then advanced and appropriately positioned again using fluoroscopic guidance.  Through a lateral stab wound the catheter was then tunneled and the Y connector was attached and both ports aspirated and flushed easily.  A final flush of the thousand units of heparin was then instilled into the catheter.  Initial incision was closed with interrupted simple nylon closure.  Dressings were applied.  There was no significant bleeding and hemostasis Well-controlled.  There were no intraoperative complications      The patient was stable at this point in time and subsequently transferred back to the recovery room in stable condition.       Familia Paez MD  2/14/2020  8:04 AM    Electronically signed by Familia Paez MD at 02/14/20 0804          Physician Progress Notes (last 24 hours) (Notes from 02/13/20 1239 through 02/14/20 1239)      Sanford Tsang MD, FASN at 02/14/20 1051          Nephrology Progress Note.    LOS: 8 days    Patient Care Team:  Anthony Sweet DO as PCP - General (Family Medicine)  Tien Archer MD as Consulting Physician (Cardiac Electrophysiology)  Xu Block MD (Cardiology)  Yomi Higgins MD as Consulting Physician (Cardiology)    Chief Complaint:    Chief Complaint   Patient presents with   • Weakness - Generalized       Subjective:   Follow up for ESRD and Anemia.     Interval History:   Patient Complaints: none  Patient seen and examined this morning.  Events from last night noted.  Patient denies having any fevers chills.  No nausea or vomiting no abdominal pain.  Denies any chest pain, shortness of breath or cough and sputum production.  There is no  "significant edema.   Patient also denies having new onset weakness of numbness of either extremity.  She just got back from getting a new tunneled dialysis catheter placement.    Objective:    Vital Signs  BP 97/47 (BP Location: Right arm, Patient Position: Lying)   Pulse 72   Temp 97.8 °F (36.6 °C) (Oral)   Resp 15   Ht 147.3 cm (58\")   Wt 66 kg (145 lb 9.6 oz)   LMP  (LMP Unknown)   SpO2 98%   BMI 30.43 kg/m²      I/O this shift:  In: 100 [I.V.:100]  Out: -     Intake/Output Summary (Last 24 hours) at 2/14/2020 1051  Last data filed at 2/14/2020 0814  Gross per 24 hour   Intake 755 ml   Output 250 ml   Net 505 ml       Physical Exam:  General Appearance: alert, oriented x 3, no acute distress,   HEENT: Oral mucosa dry, extra occular movements intact. Sclera clear.  Skin: Warm and dry  Neck: supple, no JVD, trachea midline  Lungs:Chest shape is normal. Breath sounds heard bilaterally equally.  Basal crackles, No wheezing.   Heart: Irregular rate and rhythm.  peripheral pulses weak but palpable.  Abdomen: Obese, soft, non-tender,  present bowel sounds to auscultation  : no palpable bladder.  Extremities: Trace edema, no cyanosis or clubbing.   Neuro: normal speech and mental status, grossly non focal.     Results Review:   Results from last 7 days   Lab Units 02/14/20  0601 02/13/20  0557 02/12/20  0558   SODIUM mmol/L 137 136 139   POTASSIUM mmol/L 4.7 4.3 4.2   CHLORIDE mmol/L 96* 96* 97*   CO2 mmol/L 21.3* 20.0* 21.2*   BUN mg/dL 132* 119* 119*   CREATININE mg/dL 4.74* 4.14* 3.93*   CALCIUM mg/dL 9.5 9.6 9.0   ALBUMIN g/dL 3.20* 3.10* 2.90*   BILIRUBIN mg/dL 1.4* 1.5* 1.5*   ALK PHOS U/L 215* 234* 275*   ALT (SGPT) U/L 106* 119* 129*   AST (SGOT) U/L 73* 83* 102*   GLUCOSE mg/dL 128* 122* 133*     Estimated Creatinine Clearance: 8.3 mL/min (A) (by C-G formula based on SCr of 4.74 mg/dL (H)).  Results from last 7 days   Lab Units 02/12/20  0558 02/11/20  0640 02/10/20  0625 02/09/20  0548   MAGNESIUM " mg/dL 2.8*  --   --   --    PHOSPHORUS mg/dL  --  6.5* 5.8* 5.3*         Results from last 7 days   Lab Units 02/14/20  0601 02/13/20  0557 02/12/20  0558 02/11/20  0640 02/10/20  0625   WBC 10*3/mm3 8.51 7.97 8.34 8.22 7.88   HEMOGLOBIN g/dL 7.9* 7.6* 7.8* 8.5* 8.0*   PLATELETS 10*3/mm3 104* 107* 109* 119* 120*         Brief Urine Lab Results  (Last result in the past 365 days)      Color   Clarity   Blood   Leuk Est   Nitrite   Protein   CREAT   Urine HCG        02/13/20 1329             43.3       02/13/20 1329             43.7           No results found for: UTPCR  Imaging Results (Last 24 Hours)     Procedure Component Value Units Date/Time    XR Chest 1 View [[id number]] Collected:  02/14/20 0840     Updated:  02/14/20 0843    Narrative:       PROCEDURE: XR CHEST 1 VW-     HISTORY: line placement; N17.9-Acute kidney failure, unspecified;  E87.5-Hyperkalemia; I48.91-Unspecified atrial fibrillation; R06.89-Other  abnormalities of breathing; R79.89-Other specified abnormal findings of  blood chemistry; Z74.09-Other reduced mobility     COMPARISON: 02/11/2020.     FINDINGS: A right internal jugular dialysis catheter is in place with  the tip in the right atrium. The patient is status post median  sternotomy and CABG procedure. The heart is normal in size. The  mediastinum is unremarkable. There is patchy bilateral airspace disease  and effusions which appear worse in the left lung base compared to the  prior exam. There is no pneumothorax.  There are no acute osseous  abnormalities.       Impression:       Placement of a right internal jugular dialysis catheter with  no evidence of pneumothorax.     Continued followup is recommended.     This report was finalized on 2/14/2020 8:41 AM by Joaquín Shannon M.D..    RHETT ALCAZAR Arm During Surgery [[id number]] Resulted:  02/14/20 0800     Updated:  02/14/20 0800    Narrative:       This procedure was auto-finalized with no dictation required.    MRI abdomen wo contrast mrcp  [[id number]] Collected:  02/13/20 1358     Updated:  02/13/20 1401    Narrative:       PROCEDURE: MRI ABDOMEN WO CONTRAST MRCP-     HISTORY: Abnormal liver function tests (LFTs)     PROCEDURE: Multiplanar multisequence imaging of the abdomen was  performed without the use of intravenous contrast. 3-D MRCP images were  obtained.     COMPARISON: None.     FINDINGS: The exam is limited secondary to motion artifact. MRCP images  are nondiagnostic. Stones are present within the gallbladder. The liver,  adrenals, kidneys, spleen and pancreas are grossly unremarkable. There  is a large hiatal hernia. There are small effusions a the visualized  lung bases.       Impression:       Gallstones. The exam is otherwise grossly unremarkable.           This report was finalized on 2/13/2020 1:59 PM by Joaquín Shannon M.D..          aspirin 81 mg Oral Daily   calcium acetate 1,334 mg Oral TID With Meals   dilTIAZem  mg Oral Q24H   iron polysaccharides 150 mg Oral Daily   isosorbide mononitrate 60 mg Oral Daily   levothyroxine 75 mcg Oral Daily   metoprolol succinate XL 25 mg Oral Q24H   pantoprazole 40 mg Oral Daily   piperacillin-tazobactam 3.375 g Intravenous Q12H   predniSONE 10 mg Oral Daily   ranolazine 500 mg Oral Q12H   sodium chloride 10 mL Intravenous Q12H       Pharmacy to Dose Zosyn          Medication Review:   Current Facility-Administered Medications   Medication Dose Route Frequency Provider Last Rate Last Dose   • acetaminophen (TYLENOL) tablet 650 mg  650 mg Oral Q4H PRN Familia Paez MD   650 mg at 02/07/20 2302    Or   • acetaminophen (TYLENOL) 160 MG/5ML solution 650 mg  650 mg Oral Q4H PRN Familia Paez MD        Or   • acetaminophen (TYLENOL) suppository 650 mg  650 mg Rectal Q4H PRN Familia Paez MD       • aspirin EC tablet 81 mg  81 mg Oral Daily Familia Paez MD   81 mg at 02/14/20 0955   • calcium acetate (PHOS BINDER)) capsule 1,334 mg  1,334 mg Oral TID With Meals Jeannine  MD Familia   1,334 mg at 02/13/20 1738   • dilTIAZem CD (CARDIZEM CD) 24 hr capsule 120 mg  120 mg Oral Q24H Familia Paez MD   120 mg at 02/14/20 0956   • ipratropium-albuterol (DUO-NEB) nebulizer solution 3 mL  3 mL Nebulization Q4H PRN Familia Paez MD       • iron polysaccharides (NIFEREX) capsule 150 mg  150 mg Oral Daily Familia Paez MD   150 mg at 02/14/20 0954   • isosorbide mononitrate (IMDUR) 24 hr tablet 60 mg  60 mg Oral Daily Familia Paez MD   60 mg at 02/14/20 0955   • levothyroxine (SYNTHROID, LEVOTHROID) tablet 75 mcg  75 mcg Oral Daily Familia Paez MD   75 mcg at 02/14/20 0957   • metoprolol succinate XL (TOPROL-XL) 24 hr tablet 25 mg  25 mg Oral Q24H Familia Paez MD   25 mg at 02/14/20 0600   • ondansetron (ZOFRAN) injection 4 mg  4 mg Intravenous Q6H PRN Familia Paez MD       • pantoprazole (PROTONIX) EC tablet 40 mg  40 mg Oral Daily Familia Paez MD   40 mg at 02/14/20 0955   • Pharmacy to Dose Zosyn   Does not apply Continuous PRN Familia Paez MD       • piperacillin-tazobactam (ZOSYN) 3.375 g in iso-osmotic dextrose 50 ml (premix)  3.375 g Intravenous Q12H Familia Paez MD   3.375 g at 02/14/20 0106   • predniSONE (DELTASONE) tablet 10 mg  10 mg Oral Daily Familia Paez MD   10 mg at 02/14/20 0954   • ranolazine (RANEXA) 12 hr tablet 500 mg  500 mg Oral Q12H Familia Paez MD   500 mg at 02/14/20 0955   • sodium chloride 0.9 % bolus 1,000 mL  1,000 mL Intravenous PRN Sanford Tsang MD, FASN       • sodium chloride 0.9 % flush 10 mL  10 mL Intravenous PRN Familia Paez MD       • sodium chloride 0.9 % flush 10 mL  10 mL Intravenous Q12H Familia Paez MD   10 mL at 02/14/20 0957   • sodium chloride 0.9 % flush 10 mL  10 mL Intravenous PRN Familia Paez MD           Assessment/Plan:  1. Acute renal failure (CMS/HCC): It does appear that she has worsening of her baseline renal function, at this point it is not very  clear if this is all acute or may have some worsening of her chronic renal failure as well.  She likely has a significant component of cardiorenal syndrome.  We will check a UA, Merchant catheter was placed in the ER with no significant increase in output.  2. Chronic kidney disease stage IV: He has known to have stage IV chronic kidney disease, she has been admitted almost every 2 weeks since middle of December.  Every time has she has some worsening of her renal function.  He was not on any ACE inhibitors or denies using any nonsteroidals.  She did not have any significant hematuria or proteinuria.  May end up requiring dialysis this admission.  3. Hyperkalemia: It has resolved with treatment.  4. Metabolic acidosis: It is above 20 with supplements of sodium bicarb.  5. Abnormal liver function: It is quite likely that she has an arrhythmia that might have led to the current picture of congestive heart failure as well as congestive liver.  6. Anemia: She had fairly normal iron stores on her last visit.  Will check iron stores again she may be needing clear if she is anemic.  Iron stores have dropped since her last admission in the last 2 weeks.  I will go ahead and start her on oral supplements.  Transfuse if hemoglobin less than 8.  7. Coronary artery disease, status post CABG, status post stent placement: May need cardiac evaluation again during this hospitalization.  Last echo that was done in October 2019 showed mild to moderate mitral regurgitation, moderate tricuspid regurgitation with elevated pulmonary pressure of 45 to 55 mmHg.  8. Acute on chronic respiratory failure:  9. Hypersensitivity pneumonitis  10. Interstitial lung disease      Plan:  · Tunneled dialysis catheter was placed we will go ahead and initiate dialysis today.  · Transfuse 1 unit of blood with dialysis, when I talked to the patient she is fine to get blood.  · Her creatinine clearance came back as 3.  I really doubt that she will recover  "from this, she will be deemed end-stage renal disease at this point.  · Go ahead and make arrangements for an outpatient clinic, she will be going to [facility].  · Details were also discussed with the hospitalist service.   · Surveillance labs.  · Further recommendations will depend on clinical course of the patient during the current hospitalization.    · I also discussed the details with the nursing staff.  · Rest as ordered.    Sanford Tsang MD, FASN  20  10:51 AM    Dictated utilizing Dragon dictation.    Electronically signed by Sanford Tsang MD, FASN at 20 [phone]     Tamara Tomlin DO at 20 2130                [facility]    [facility]ESS [facility]    Name:  Pat Morel   Age:  80 y.o.  Sex:  female  :  1940  MRN:  [id number]   Visit Number:  [id number]  Admission Date:  2020  Date Of Service:  20  Primary Care Physician:  Anthony Sweet DO     LOS: 7 days :      Chief Complaint:  Follow up renal failure and respiratory failure        Subjective / Interval History:  Patient \"ate a few bites\" but continues to have poor appetite. She continues to speak very little, with her son and daughter talking most. LFTs still elevated. They had previously declined MRCP due to dyspnea and respiratory failure. They are agreeable today since respiratory failure improved after increasing diuretic. Family reports yesterday desiring to start hemodialysis \"for comfort\" \"only to help her breathing\". They report desiring NO surgery of any kind and will consider whether they would want dialysis after all. Family stressed importance of giving her comfort, as she is demented and bedridden mostly at this point, with little oral intake and no appetite.     Later today fortunately MRCP negative for CBD stone.       \"Patient is a 80-year-old female with history including CAD with CABG, atrial fibrillation, chronic kidney disease stage III, history of CVA, " "diastolic CHF, iron deficiency anemia, hypersensitivity pneumonitis, hypertension, osteoporosis was brought to the emergency room by EMS with generalized weakness on 2/6/2020.  She was admitted with acute worsening of her chronic renal failure, elevated troponin levels  as well as hyperkalemia.  She does have chronic respiratory failure which has been stable on prednisone.  She is currently on prednisone 20 mg daily started this week.  She will taper it down to 10 mg every week and be continued on 10 mg daily until she sees her pulmonologist Dr. Laura.       She was seen by Dr. Tsang from nephrology who recommended gentle IV hydration.  Her renal function has slowly improved and her hyperkalemia has resolved.  She was also seen by Dr. Horvath for her elevated troponin levels.  He felt that the patient's troponin levels were related to her coronary artery disease in the setting of renal failure and anemia.  He did offer cardiac catheterization to the patient but she is currently not willing for any invasive cardiac work-up.  He has recommended triple antiplatelet/anticoagulant agents with aspirin, Plavix and Eliquis.  He has also added Ranexa and increased her Bystolic dose.  An echocardiogram was done and the results are currently pending.  Unfortunately, patient has history of recurrent falls and chronic anemia and triple anticoagulation/antiplatelet therapy may need to be closely watched.\" previously stated by Dr Mercado.       Review of Systems: difficult to obtain with likely dementia    General ROS: Patient denies any fevers, chills or loss of consciousness.positive generalized  weakness  Ophthalmic ROS: Denies any diplopia or transient loss of vision.  ENT ROS: Denies sore throat, nasal congestion or ear pain.   Respiratory ROS: Denies cough with positive exertional improving shortness of breath.  Cardiovascular ROS: Denies chest pain or palpitations. No history of exertional chest pain.   Gastrointestinal " ROS: Same poor appetite with positive nausea no vomiting.  Right upper quadrant same abdominal pain. No diarrhea.  Genito-Urinary ROS: Denies dysuria or hematuria.  Musculoskeletal ROS: Denies chronic back pain. No muscle pain. No calf pain.  Neurological ROS: Denies any focal weakness. No loss of consciousness. Denies any numbness. Denies neck pain.   Dermatological ROS: Denies any redness or pruritis.    Vital Signs:    Temp:  [96.1 °F (35.6 °C)-98.1 °F (36.7 °C)] 96.5 °F (35.8 °C)  Heart Rate:  [62-95] 69  Resp:  [16-20] 17  BP: ()/(50-69) 99/53    Intake and output:    I/O last 3 completed shifts:  In: 1200 [P.O.:1200]  Out: 910 [Urine:910]  No intake/output data recorded.    Physical Examination: Examined today    General Appearance:   Elderly lady.  Chronically ill-appearing.  Alert and cooperative, not in any acute distress.smiling   Head:    Atraumatic and normocephalic, without obvious abnormality.   Eyes:            PERRLA, conjunctivae and sclerae normal, no Icterus. No pallor. Extraocular movements are within normal limits.   Throat:   No oral lesions, no thrush, oral mucosa moist.   Neck:   Supple, trachea midline, no thyromegaly   Lungs:     Chest shape is normal. Breath sounds heard bilaterally equally.  No crackles or wheezing. No Pleural rub or bronchial breathing.    Heart:    Normal S1 and S2, no murmur, no gallop, no rub. No JVD   Abdomen:     Normal bowel sounds, no masses, no organomegaly. Soft        persistent epigastric/right upper quadrant area tender, non-distended, no guarding, no rebound tenderness, obese   Extremities:   Moves all extremities well, stable  1+ equal leg edema, no cyanosis   Skin:   No bleeding, bruising or rash.   Neurologic:   No tremor, sensation intact, Motor power is normal and equal bilaterally.   Laboratory results:    Lab Results (last 24 hours)     Procedure Component Value Units Date/Time    Blood Culture With LESLYE - Blood, Arm, Left [[id number]] Collected:   02/11/20 1804    Specimen:  Blood from Arm, Left Updated:  02/13/20 1815     Blood Culture No growth at 2 days    Blood Culture With LESLYE - Blood, Arm, Left [[id number]] Collected:  02/11/20 1510    Specimen:  Blood from Arm, Left Updated:  02/13/20 1531     Blood Culture No growth at 2 days    Protein, Urine, 24 Hour - Urine, Clean Catch [[id number]]  (Abnormal) Collected:  02/13/20 1329    Specimen:  24 Hour Urine from Urine, Clean Catch Updated:  02/13/20 1432     Protein, 24H Urine 312.0 mg/24hours     Creatinine Clearance - [[id number]] Collected:  02/13/20 1329    Specimen:  24 Hour Urine Updated:  02/13/20 1352    Narrative:       The following orders were created for panel order Creatinine Clearance -.  Procedure                               Abnormality         Status                     ---------                               -----------         ------                     Creatinine Clearance, Ur...[[id number]]                      In process                   Please view results for these tests on the individual orders.    Creatinine Clearance, Urine, 24 Hour - [[id number]] Collected:  02/13/20 1329    Specimen:  24 Hour Urine Updated:  02/13/20 1352    Creatinine, Urine, 24 Hour - Urine, Clean Catch [[id number]] Collected:  02/13/20 1329    Specimen:  24 Hour Urine from Urine, Clean Catch Updated:  02/13/20 1352    Comprehensive Metabolic Panel [[id number]]  (Abnormal) Collected:  02/13/20 0557    Specimen:  Blood Updated:  02/13/20 0736     Glucose 122 mg/dL       mg/dL      Creatinine 4.14 mg/dL      Sodium 136 mmol/L      Potassium 4.3 mmol/L      Chloride 96 mmol/L      CO2 20.0 mmol/L      Calcium 9.6 mg/dL      Total Protein 6.0 g/dL      Albumin 3.10 g/dL      ALT (SGPT) 119 U/L      AST (SGOT) 83 U/L      Alkaline Phosphatase 234 U/L      Total Bilirubin 1.5 mg/dL      eGFR Non African Amer 10 mL/min/1.73      Comment: <15 Indicative of kidney failure.        eGFR   Amer --     Comment: <15  Indicative of kidney failure.        Globulin 2.9 gm/dL      A/G Ratio 1.1 g/dL      BUN/Creatinine Ratio 28.7     Anion Gap 20.0 mmol/L     Narrative:       GFR Normal >60  Chronic Kidney Disease <60  Kidney Failure <15      CBC & Differential [[id number]] Collected:  02/13/20 0557    Specimen:  Blood Updated:  02/13/20 0613    Narrative:       The following orders were created for panel order CBC & Differential.  Procedure                               Abnormality         Status                     ---------                               -----------         ------                     CBC Auto Differential[[id number]]        Abnormal            Final result                 Please view results for these tests on the individual orders.    CBC Auto Differential [[id number]]  (Abnormal) Collected:  02/13/20 0557    Specimen:  Blood Updated:  02/13/20 0613     WBC 7.97 10*3/mm3      RBC 2.65 10*6/mm3      Hemoglobin 7.6 g/dL      Hematocrit 25.2 %      MCV 95.1 fL      MCH 28.7 pg      MCHC 30.2 g/dL      RDW 19.4 %      RDW-SD 66.5 fl      MPV 12.4 fL      Platelets 107 10*3/mm3      Neutrophil % 92.0 %      Lymphocyte % 2.9 %      Monocyte % 3.5 %      Eosinophil % 0.0 %      Basophil % 0.1 %      Immature Grans % 1.5 %      Neutrophils, Absolute 7.33 10*3/mm3      Lymphocytes, Absolute 0.23 10*3/mm3      Monocytes, Absolute 0.28 10*3/mm3      Eosinophils, Absolute 0.00 10*3/mm3      Basophils, Absolute 0.01 10*3/mm3      Immature Grans, Absolute 0.12 10*3/mm3      nRBC 0.5 /100 WBC           I have reviewed the patient's laboratory results.    Radiology results:    Imaging Results (Last 24 Hours)     Procedure Component Value Units Date/Time    MRI abdomen wo contrast mrcp [[id number]] Collected:  02/13/20 1358     Updated:  02/13/20 1401    Narrative:       PROCEDURE: MRI ABDOMEN WO CONTRAST MRCP-     HISTORY: Abnormal liver function tests (LFTs)     PROCEDURE: Multiplanar multisequence imaging of the abdomen  was  performed without the use of intravenous contrast. 3-D MRCP images were  obtained.     COMPARISON: None.     FINDINGS: The exam is limited secondary to motion artifact. MRCP images  are nondiagnostic. Stones are present within the gallbladder. The liver,  adrenals, kidneys, spleen and pancreas are grossly unremarkable. There  is a large hiatal hernia. There are small effusions a the visualized  lung bases.       Impression:       Gallstones. The exam is otherwise grossly unremarkable.           This report was finalized on 2/13/2020 1:59 PM by Joaquín Shannon M.D..          I have reviewed the patient's radiology reports.    Medication Review:     I have reviewed the patients active and prn medications.     Assessment:  1.  Acute renal failure on chronic kidney disease stage III, present on admission  2.  Recurrent falls at home secondary to uremia, present on admission.  3.  Acute hyperkalemia secondary to #1, present on admission, resolved.  4.  Demand ischemia with elevated troponin levels, present on admission.  5.  Hypersensitivity pneumonitis on prednisone therapy.  6.  Acute on Chronic hypoxic respiratory failure on home oxygen, now requiring intermittent cpap therapy  7.  Acute on Chronic diastolic heart failure exacerbation  8.  Paroxysmal atrial fibrillation on apixaban, held with anemia; post Afib with RVR, now improved control  9.  Coronary artery disease status post stents and CABG.  10.  Chronic pulmonary hypertension.  11.  Acquired hypothyroidism.  12.  Elevated transaminases of uncertain etiology. With epigastric and right upper abdominal pain; cannot rule out Cholangitis and patient declined workup  13. History of stroke with poor historian suspect chronic dementia  14. Acute on chronic anemia, stable for now without gross bleeding  15. Positive hemoccult stool        Plan:  Continue to monitor. Discussed case with Dr Tsang. Patient planned for hemodialysis catheter tomorrow if agreeable.  MRCP negative for stone. HR improved. Appetite still very poor.     With negative stone, and elevated LFTs, I discussed statin therapy with family and then want to stop. They report she is hardly taking in any oral feeding also.     Dr Higgins following. Dr Dee following. Patient declined any surgeries or aggressive procedures or testing.     Family states they want to consider palliative care vs hospice in near future.      She is severely weak and continued to provide physical and Occupational Therapy and up with assistance.  Patient and family had previously given the okay to transfer to acute rehabilitation facility once discharged.    She is DNR.  Her prognosis remains very poor.      Medication risks and benefits were discussed in detail. Patient reported satisfaction with care delivered and treatment plan.     Tamara Tomlin DO  02/13/20  9:30 PM            Electronically signed by Tamara Tomlin DO at 02/13/20 2145          Consult Notes (last 24 hours) (Notes from 02/13/20 1239 through 02/14/20 1239)      Familia Paez MD at 02/14/20 0717      Consult Orders    1. Inpatient General Surgery Consult [[id number]] ordered by Sanford Tsang MD, FASN at 02/13/20 1007                Inpatient General Surgery Consult  Consult performed by: Familia Paez MD  Consult ordered by: Sanford Tsang MD, FASN            Referring Provider: No ref. provider found    Reason for Consultation: Dialysis catheter placement    Patient Care Team:  Anthony Sweet DO as PCP - General (Family Medicine)  Tien Archer MD as Consulting Physician (Cardiac Electrophysiology)  Xu Block MD (Cardiology)  Yomi Higgins MD as Consulting Physician (Cardiology)    Chief complaint   Chief Complaint   Patient presents with   • Weakness - Generalized       SUBJECTIVE:    History of present illness: I have been asked to see this patient for a dialysis access.  She gives no other history relating to  this.    Review of Systems:    Review of Systems -generalized weakness malaise or night sweats  Psychological ROS: negative for - Depression or anxiety    Respiratory ROS: Has had shortness of breath  Cardiovascular ROS: No current chest pain      History  Past Medical History:   Diagnosis Date   • Acute on chronic diastolic congestive heart failure (CMS/HCC) 12/20/2019   • Anemia    • Angina at rest (CMS/[facility])    • Arthritis    • Atrial fibrillation (CMS/[facility])    • Coronary artery disease    • Disease of thyroid gland    • Elevated cholesterol    • GERD without esophagitis 12/2/2019   • History of blood transfusion    • History of colonoscopy 11/19/2019   • History of melena 11/19/2019   • History of stomach ulcers    • Hypertension    • Impaired functional mobility, balance, gait, and endurance    • Impaired functional mobility, balance, gait, and endurance    • Osteoporosis    • Positive TB test    • Stroke (CMS/[facility])     No deficits       Past Surgical History:   Procedure Laterality Date   • BRONCHOSCOPY Bilateral 4/12/2019    Procedure: BRONCHOSCOPY;  Surgeon: Jeff Laura MD;  Location:  [facility];  Service: Pulmonary   • BYPASS GRAFT     • COLONOSCOPY N/A 10/15/2019    Procedure: COLONOSCOPY;  Surgeon: Fredi Aaron MD;  Location:  [facility];  Service: Gastroenterology   • CORONARY ANGIOPLASTY WITH STENT PLACEMENT     • CORONARY ARTERY BYPASS GRAFT  10/18/2010   • ENDOSCOPY N/A 10/14/2019    Procedure: ESOPHAGOGASTRODUODENOSCOPY;  Surgeon: Fredi Aaron MD;  Location:  [facility];  Service: Gastroenterology   • HYSTERECTOMY     • STOMACH SURGERY  08/11/2019    Upper GI bleed    • STOMACH SURGERY  10/13/2019       Family History   Problem Relation Age of Onset   • Cancer Mother    • Arthritis Mother    • No Known Problems Father    • Liver disease Sister        Social History     Socioeconomic History   • Marital status:      Spouse name: Not on file   • Number of  children: Not on file   • Years of education: Not on file   • Highest education level: Not on file   Tobacco Use   • Smoking status: Never Smoker   • Smokeless tobacco: Never Used   Substance and Sexual Activity   • Alcohol use: No   • Drug use: No   • Sexual activity: Not Currently       Allergies   Allergen Reactions   • Tuberculin Tests Rash       OBJECTIVE:    Medications  Current Facility-Administered Medications   Medication Dose Route Frequency Provider Last Rate Last Dose   • [MAR Hold] acetaminophen (TYLENOL) tablet 650 mg  650 mg Oral Q4H PRN Denis Mercado MD   650 mg at 02/07/20 2302    Or   • [MAR Hold] acetaminophen (TYLENOL) 160 MG/5ML solution 650 mg  650 mg Oral Q4H PRN Denis Mercado MD        Or   • [MAR Hold] acetaminophen (TYLENOL) suppository 650 mg  650 mg Rectal Q4H PRN Denis Mercado MD       • [MAR Hold] aspirin EC tablet 81 mg  81 mg Oral Daily Yomi Higgins MD   81 mg at 02/13/20 0916   • [MAR Hold] bumetanide (BUMEX) tablet 2 mg  2 mg Oral BID Sanford Tsang MD, FASN   2 mg at 02/13/20 1737   • [MAR Hold] calcium acetate (PHOS BINDER)) capsule 1,334 mg  1,334 mg Oral TID With Meals Sanford Tsang MD FASSARTHAK   1,334 mg at 02/13/20 1738   • ceFAZolin Sodium-Dextrose (ANCEF) IVPB (duplex) 2 g  2 g Intravenous Once Familia Paez MD       • dilTIAZem CD (CARDIZEM CD) 24 hr capsule 120 mg  120 mg Oral Q24H Yomi Higgins MD   120 mg at 02/13/20 1253   • [MAR Hold] ipratropium-albuterol (DUO-NEB) nebulizer solution 3 mL  3 mL Nebulization Q4H PRN Tamara Tomlin DO       • [MAR Hold] iron polysaccharides (NIFEREX) capsule 150 mg  150 mg Oral Daily Sanford Tsang MD, FASN   150 mg at 02/13/20 0916   • [MAR Hold] isosorbide mononitrate (IMDUR) 24 hr tablet 60 mg  60 mg Oral Daily Denis Mercado MD   60 mg at 02/13/20 0915   • [MAR Hold] levothyroxine (SYNTHROID, LEVOTHROID) tablet 75 mcg  75 mcg Oral Daily Denis Mercado MD   75 mcg at 02/13/20 0916   • metoprolol succinate XL  (TOPROL-XL) 24 hr tablet 25 mg  25 mg Oral Q24H Yomi Higgins MD   25 mg at 02/14/20 0600   • [MAR Hold] ondansetron (ZOFRAN) injection 4 mg  4 mg Intravenous Q6H PRN Denis Mercado MD       • [MAR Hold] pantoprazole (PROTONIX) EC tablet 40 mg  40 mg Oral Daily Denis Mercado MD   40 mg at 02/13/20 0915   • Pharmacy to Dose Zosyn   Does not apply Continuous PRN Tamara Tomlin DO       • [MAR Hold] piperacillin-tazobactam (ZOSYN) 3.375 g in iso-osmotic dextrose 50 ml (premix)  3.375 g Intravenous Q12H Tamara Tomlin DO   3.375 g at 02/14/20 0106   • [MAR Hold] predniSONE (DELTASONE) tablet 10 mg  10 mg Oral Daily Tamara Tomlin DO   10 mg at 02/13/20 0916   • [MAR Hold] ranolazine (RANEXA) 12 hr tablet 500 mg  500 mg Oral Q12H Nahid Horvath MD   500 mg at 02/13/20 2031   • [MAR Hold] sodium chloride 0.9 % flush 10 mL  10 mL Intravenous PRN Denis Mercado MD       • [MAR Hold] sodium chloride 0.9 % flush 10 mL  10 mL Intravenous Q12H Denis Mercado MD   10 mL at 02/13/20 2031   • [MAR Hold] sodium chloride 0.9 % flush 10 mL  10 mL Intravenous PRN Denis Mercado MD       • sodium chloride 0.9 % infusion 500 mL  500 mL Intravenous Continuous Familia Paez MD             Vital Signs   Temp:  [95.9 °F (35.5 °C)-98.1 °F (36.7 °C)] 95.9 °F (35.5 °C)  Heart Rate:  [62-78] 69  Resp:  [16-19] 16  BP: ()/(50-76) 106/67    Physical Exam:     General Appearance:  Alert and cooperative, not in any acute distress.   Head:  Atraumatic and normocephalic, without obvious abnormality.   Eyes:          PERRLA, conjunctivae and sclerae normal, no Icterus. No pallor. Extraocular movements are within normal limits.   Respiratory/Lungs:   Breath sounds heard bilaterally equally.  No crackles or wheezing. No Pleural rub or bronchial breathing. Normal respiratory effort.    Cardiovascular/Heart:  Normal S1 and S2,    GI/Abdomen:    Soft with no significant tenderness                Psychiatric : Alert and oriented ×3.   No depression or anxiety    Neurologic: Cranial nerves 2 - 12 grossly intact, sensation intact, Motor power is normal and equal bilaterally.     Results Review:  Lab Results (last 24 hours)     Procedure Component Value Units Date/Time    Comprehensive Metabolic Panel [[id number]]  (Abnormal) Collected:  02/14/20 0601    Specimen:  Blood Updated:  02/14/20 0649     Glucose 128 mg/dL       mg/dL      Creatinine 4.74 mg/dL      Sodium 137 mmol/L      Potassium 4.7 mmol/L      Chloride 96 mmol/L      CO2 21.3 mmol/L      Calcium 9.5 mg/dL      Total Protein 6.2 g/dL      Albumin 3.20 g/dL      ALT (SGPT) 106 U/L      AST (SGOT) 73 U/L      Alkaline Phosphatase 215 U/L      Total Bilirubin 1.4 mg/dL      eGFR Non African Amer 9 mL/min/1.73      Comment: <15 Indicative of kidney failure.        eGFR   Amer --     Comment: <15 Indicative of kidney failure.        Globulin 3.0 gm/dL      A/G Ratio 1.1 g/dL      BUN/Creatinine Ratio 27.8     Anion Gap 19.7 mmol/L     Narrative:       GFR Normal >60  Chronic Kidney Disease <60  Kidney Failure <15      CBC & Differential [[id number]] Collected:  02/14/20 0601    Specimen:  Blood Updated:  02/14/20 0632    Narrative:       The following orders were created for panel order CBC & Differential.  Procedure                               Abnormality         Status                     ---------                               -----------         ------                     CBC Auto Differential[[id number]]        Abnormal            Final result                 Please view results for these tests on the individual orders.    CBC Auto Differential [[id number]]  (Abnormal) Collected:  02/14/20 0601    Specimen:  Blood Updated:  02/14/20 0632     WBC 8.51 10*3/mm3      RBC 2.73 10*6/mm3      Hemoglobin 7.9 g/dL      Hematocrit 25.2 %      MCV 92.3 fL      MCH 28.9 pg      MCHC 31.3 g/dL      RDW 19.3 %      RDW-SD 63.6 fl      MPV 11.9 fL      Platelets 104 10*3/mm3       Neutrophil % 91.7 %      Lymphocyte % 2.4 %      Monocyte % 4.0 %      Eosinophil % 0.0 %      Basophil % 0.1 %      Immature Grans % 1.8 %      Neutrophils, Absolute 7.81 10*3/mm3      Lymphocytes, Absolute 0.20 10*3/mm3      Monocytes, Absolute 0.34 10*3/mm3      Eosinophils, Absolute 0.00 10*3/mm3      Basophils, Absolute 0.01 10*3/mm3      Immature Grans, Absolute 0.15 10*3/mm3      nRBC 1.1 /100 WBC     Creatinine Clearance - [[id number]] Collected:  02/13/20 1329    Specimen:  24 Hour Urine Updated:  02/13/20 2251    Narrative:       The following orders were created for panel order Creatinine Clearance -.  Procedure                               Abnormality         Status                     ---------                               -----------         ------                     Creatinine Clearance, Ur...[[id number]]  Abnormal            Final result                 Please view results for these tests on the individual orders.    Creatinine Clearance, Urine, 24 Hour - [[id number]]  (Abnormal) Collected:  02/13/20 1329    Specimen:  24 Hour Urine Updated:  02/13/20 2251     Creatinine Clearance 3.2 ml/min      Creatinine, Urine 43.3 mg/dL      Time (Hours) 24 hrs      Creatinine, 24H 0.17 g/24 hr      CREATININE CLEARANCE L/24 HOUR 4.6 L/24 hr      24H Urine Volume 400 mL     Creatinine, Urine, 24 Hour - Urine, Clean Catch [[id number]]  (Abnormal) Collected:  02/13/20 1329    Specimen:  24 Hour Urine from Urine, Clean Catch Updated:  02/13/20 2244     Creatinine, 24H 0.17 g/24 hr      Creatinine, Urine 43.7 mg/dL      24H Urine Volume 400 mL      Time (Hours) 24 hrs     Blood Culture With LESLYE - Blood, Arm, Left [[id number]] Collected:  02/11/20 1804    Specimen:  Blood from Arm, Left Updated:  02/13/20 1815     Blood Culture No growth at 2 days    Blood Culture With LESLYE - Blood, Arm, Left [[id number]] Collected:  02/11/20 1510    Specimen:  Blood from Arm, Left Updated:  02/13/20 1531     Blood Culture No growth at 2  days    Protein, Urine, 24 Hour - Urine, Clean Catch [[id number]]  (Abnormal) Collected:  02/13/20 1329    Specimen:  24 Hour Urine from Urine, Clean Catch Updated:  02/13/20 1432     Protein, 24H Urine 312.0 mg/24hours     Comprehensive Metabolic Panel [[id number]]  (Abnormal) Collected:  02/13/20 0557    Specimen:  Blood Updated:  02/13/20 0736     Glucose 122 mg/dL       mg/dL      Creatinine 4.14 mg/dL      Sodium 136 mmol/L      Potassium 4.3 mmol/L      Chloride 96 mmol/L      CO2 20.0 mmol/L      Calcium 9.6 mg/dL      Total Protein 6.0 g/dL      Albumin 3.10 g/dL      ALT (SGPT) 119 U/L      AST (SGOT) 83 U/L      Alkaline Phosphatase 234 U/L      Total Bilirubin 1.5 mg/dL      eGFR Non African Amer 10 mL/min/1.73      Comment: <15 Indicative of kidney failure.        eGFR   Amer --     Comment: <15 Indicative of kidney failure.        Globulin 2.9 gm/dL      A/G Ratio 1.1 g/dL      BUN/Creatinine Ratio 28.7     Anion Gap 20.0 mmol/L     Narrative:       GFR Normal >60  Chronic Kidney Disease <60  Kidney Failure <15            ASSESSMENT/PLAN:      Acute renal failure (CMS/HCC)    Persistent atrial fibrillation    Coronary artery disease involving native coronary artery of native heart without angina pectoris    Essential hypertension    Pulmonary arterial hypertension (CMS/HCC)    Interstitial lung disease (CMS/HCC)    Hypersensitivity pneumonitis (CMS/HCC)    Acquired hypothyroidism    Chronic kidney disease, stage 3 (CMS/HCC)    GERD without esophagitis    Diastolic heart failure (CMS/HCC)    Recurrent falls    Acute hyperkalemia    Normocytic anemia    Stool color black    Upper abdominal pain    Elevated LFTs    Coagulopathy (CMS/HCC)      Renal failure requiring dialysis.  I have offered her a tunneled dialysis catheter.  She understands the procedure and the reason for the procedure.  She also understands the risks of bleeding, infection as well as pneumothorax.  Cardiopulmonary risks are  also possibility.  She understands these and has consented to proceed.  Familia Paez MD  02/14/20  7:18 AM        Electronically signed by Familia Paez MD at 02/14/20 0720       Nutrition Notes (last 24 hours) (Notes from 02/13/20 1239 through 02/14/20 1239)    No notes exist for this encounter.            Physical Therapy Notes (last 24 hours) (Notes from 02/13/20 1239 through 02/14/20 1239)      Carolina Milligan PTA at 02/13/20 1328  Version 1 of 1       Patient unavailable for PT.  Daughter states patient has been asleep most of the day and is now out for an abdominal scan.  Will follow up tomorrow.        Electronically signed by Carolina Milligan PTA at 02/13/20 1346          Occupational Therapy Notes (last 24 hours) (Notes from 02/13/20 1239 through 02/14/20 1239)      Italia Schumacher OT at 02/13/20 1335        Patient off floor for procedure, daughter states she has been very lethargic today; will follow up at later date.     Electronically signed by Italia Schumacher OT at 02/13/20 1336       Speech Language Pathology Notes (last 24 hours) (Notes from 02/13/20 1239 through 02/14/20 1239)    No notes exist for this encounter.         Respiratory Therapy Notes (last 24 hours) (Notes from 02/13/20 1239 through 02/14/20 1239)    No notes exist for this encounter.

## 2020-02-14 NOTE — ANESTHESIA PREPROCEDURE EVALUATION
Anesthesia Evaluation     Patient summary reviewed and Nursing notes reviewed   no history of anesthetic complications:  NPO Solid Status: > 8 hours  NPO Liquid Status: > 8 hours           Airway   Mallampati: I  TM distance: >3 FB  Neck ROM: full  no difficulty expected  Dental - normal exam     Pulmonary    (+) pneumonia worsening , home oxygen, shortness of breath, decreased breath sounds, rales,     ROS comment: Pulmonary HTN    CXR 02/07/20  IMPRESSION:  Worsening right basilar opacity which may reflect  atelectasis or pneumonia.  Cardiovascular - normal exam    PT is on anticoagulation therapy  Patient on routine beta blocker  Rhythm: regular  Rate: normal    (+) hypertension, valvular problems/murmurs MR and TI, CAD, CABG >6 Months, cardiac stents Drug eluting stent dysrhythmias Atrial Fib, angina, CHF Diastolic >=55%, hyperlipidemia,     ROS comment: Echo 02/07/2020  Interpretation Summary     1.  Normal left ventricular size and systolic function, LVEF 65-70%.  2.  Mild to moderate concentric LVH.  3.  Impaired LV diastolic filling pattern consistent with grade 2 diastolic dysfunction and elevated left atrial pressure.  4.  Mild to moderate right ventricular dilation, with normal RV systolic function  5.  Moderate to severe bilateral atrial enlargement.  6.  Mild to moderate mitral regurgitation.  7.  Moderate to severe tricuspid regurgitation.  8.  Suspected PFO.  9.  Pulmonary hypertension, with RVSP 60 mmHg.        Neuro/Psych  (+) CVA,     GI/Hepatic/Renal/Endo    (+) obesity,  GERD,  liver disease history of elevated LFT, renal disease ARF, ESRD and dialysis, thyroid problem hypothyroidism    Musculoskeletal     Abdominal  - normal exam    Abdomen: soft.  Bowel sounds: normal.   Substance History      OB/GYN          Other   arthritis,      ROS/Med Hx Other: Assessment:     1.  Acute renal failure on chronic kidney disease stage III, present on admission.  2.  Recurrent falls at home secondary to  uremia, present on admission.  3.  Acute hyperkalemia secondary to #1, present on admission.  4.  Hypersensitivity pneumonitis on prednisone therapy.  5.  Chronic hypoxic respiratory failure on home oxygen.  6.  Chronic diastolic heart failure, no evidence of exacerbation.  7.  Paroxysmal atrial fibrillation on apixaban.  8.  Coronary artery disease status post stents and CABG.  9.  Chronic pulmonary hypertension.  10.  Acquired hypothyroidism.    Crt 4.7  K 4.7                Anesthesia Plan    ASA 4     MAC   (Risks and benefits discussed including risk of aspiration, recall and dental damage. All patient questions answered. Will continue with POC.)  intravenous induction     Anesthetic plan, all risks, benefits, and alternatives have been provided, discussed and informed consent has been obtained with: patient.

## 2020-02-14 NOTE — CONSULTS
Inpatient General Surgery Consult  Consult performed by: Familia Paez MD  Consult ordered by: Sanford Tsang MD, FASN            Referring Provider: No ref. provider found    Reason for Consultation: Dialysis catheter placement    Patient Care Team:  Anthony Sweet DO as PCP - General (Family Medicine)  Tien Archer MD as Consulting Physician (Cardiac Electrophysiology)  Xu Block MD (Cardiology)  Yomi Higgins MD as Consulting Physician (Cardiology)    Chief complaint   Chief Complaint   Patient presents with   • Weakness - Generalized       SUBJECTIVE:    History of present illness: I have been asked to see this patient for a dialysis access.  She gives no other history relating to this.    Review of Systems:    Review of Systems -generalized weakness malaise or night sweats  Psychological ROS: negative for - Depression or anxiety    Respiratory ROS: Has had shortness of breath  Cardiovascular ROS: No current chest pain      History  Past Medical History:   Diagnosis Date   • Acute on chronic diastolic congestive heart failure (CMS/HCC) 12/20/2019   • Anemia    • Angina at rest (CMS/[facility])    • Arthritis    • Atrial fibrillation (CMS/HCC)    • Coronary artery disease    • Disease of thyroid gland    • Elevated cholesterol    • GERD without esophagitis 12/2/2019   • History of blood transfusion    • History of colonoscopy 11/19/2019   • History of melena 11/19/2019   • History of stomach ulcers    • Hypertension    • Impaired functional mobility, balance, gait, and endurance    • Impaired functional mobility, balance, gait, and endurance    • Osteoporosis    • Positive TB test    • Stroke (CMS/HCC)     No deficits       Past Surgical History:   Procedure Laterality Date   • BRONCHOSCOPY Bilateral 4/12/2019    Procedure: BRONCHOSCOPY;  Surgeon: Jeff Laura MD;  Location: [facility] ENDOSCOPY;  Service: Pulmonary   • BYPASS GRAFT     • COLONOSCOPY N/A 10/15/2019    Procedure:  COLONOSCOPY;  Surgeon: Fredi Aaron MD;  Location:  [facility];  Service: Gastroenterology   • CORONARY ANGIOPLASTY WITH STENT PLACEMENT     • CORONARY ARTERY BYPASS GRAFT  10/18/2010   • ENDOSCOPY N/A 10/14/2019    Procedure: ESOPHAGOGASTRODUODENOSCOPY;  Surgeon: Fredi Aaron MD;  Location:  [facility];  Service: Gastroenterology   • HYSTERECTOMY     • STOMACH SURGERY  08/11/2019    Upper GI bleed    • STOMACH SURGERY  10/13/2019       Family History   Problem Relation Age of Onset   • Cancer Mother    • Arthritis Mother    • No Known Problems Father    • Liver disease Sister        Social History     Socioeconomic History   • Marital status:      Spouse name: Not on file   • Number of children: Not on file   • Years of education: Not on file   • Highest education level: Not on file   Tobacco Use   • Smoking status: Never Smoker   • Smokeless tobacco: Never Used   Substance and Sexual Activity   • Alcohol use: No   • Drug use: No   • Sexual activity: Not Currently       Allergies   Allergen Reactions   • Tuberculin Tests Rash       OBJECTIVE:    Medications  Current Facility-Administered Medications   Medication Dose Route Frequency Provider Last Rate Last Dose   • [MAR Hold] acetaminophen (TYLENOL) tablet 650 mg  650 mg Oral Q4H PRN Denis Mercado MD   650 mg at 02/07/20 2302    Or   • [MAR Hold] acetaminophen (TYLENOL) 160 MG/5ML solution 650 mg  650 mg Oral Q4H PRN Denis Mercado MD        Or   • [MAR Hold] acetaminophen (TYLENOL) suppository 650 mg  650 mg Rectal Q4H PRN Denis Mercado MD       • [MAR Hold] aspirin EC tablet 81 mg  81 mg Oral Daily Yomi Higgins MD   81 mg at 02/13/20 0916   • [MAR Hold] bumetanide (BUMEX) tablet 2 mg  2 mg Oral BID Sanford Tsang MD, FASN   2 mg at 02/13/20 1737   • [MAR Hold] calcium acetate (PHOS BINDER)) capsule 1,334 mg  1,334 mg Oral TID With Meals Sanford Tsang MD, FASN   1,334 mg at 02/13/20 1738   • ceFAZolin Sodium-Dextrose (ANCEF) IVPB  (duplex) 2 g  2 g Intravenous Once Familia Paez MD       • dilTIAZem CD (CARDIZEM CD) 24 hr capsule 120 mg  120 mg Oral Q24H Yomi Higgins MD   120 mg at 02/13/20 1253   • [MAR Hold] ipratropium-albuterol (DUO-NEB) nebulizer solution 3 mL  3 mL Nebulization Q4H PRN Tamara Tomlin DO       • [MAR Hold] iron polysaccharides (NIFEREX) capsule 150 mg  150 mg Oral Daily Sanford Tsang MD, FASN   150 mg at 02/13/20 0916   • [MAR Hold] isosorbide mononitrate (IMDUR) 24 hr tablet 60 mg  60 mg Oral Daily Denis Mercado MD   60 mg at 02/13/20 0915   • [MAR Hold] levothyroxine (SYNTHROID, LEVOTHROID) tablet 75 mcg  75 mcg Oral Daily Denis Mercado MD   75 mcg at 02/13/20 0916   • metoprolol succinate XL (TOPROL-XL) 24 hr tablet 25 mg  25 mg Oral Q24H Yomi Higgins MD   25 mg at 02/14/20 0600   • [MAR Hold] ondansetron (ZOFRAN) injection 4 mg  4 mg Intravenous Q6H PRN Denis Mercado MD       • [MAR Hold] pantoprazole (PROTONIX) EC tablet 40 mg  40 mg Oral Daily Denis Mercado MD   40 mg at 02/13/20 0915   • Pharmacy to Dose Zosyn   Does not apply Continuous PRN Tamara Tomlin DO       • [MAR Hold] piperacillin-tazobactam (ZOSYN) 3.375 g in iso-osmotic dextrose 50 ml (premix)  3.375 g Intravenous Q12H Tamara Tomlin DO   3.375 g at 02/14/20 0106   • [MAR Hold] predniSONE (DELTASONE) tablet 10 mg  10 mg Oral Daily Tamara Tomlin DO   10 mg at 02/13/20 0916   • [MAR Hold] ranolazine (RANEXA) 12 hr tablet 500 mg  500 mg Oral Q12H Nahid Horvath MD   500 mg at 02/13/20 2031   • [MAR Hold] sodium chloride 0.9 % flush 10 mL  10 mL Intravenous PRN Denis Mercado MD       • [MAR Hold] sodium chloride 0.9 % flush 10 mL  10 mL Intravenous Q12H Denis Mercado MD   10 mL at 02/13/20 2031   • [MAR Hold] sodium chloride 0.9 % flush 10 mL  10 mL Intravenous PRN Denis Mercado MD       • sodium chloride 0.9 % infusion 500 mL  500 mL Intravenous Continuous Familia Paez MD             Vital Signs   Temp:  [95.9 °F  (35.5 °C)-98.1 °F (36.7 °C)] 95.9 °F (35.5 °C)  Heart Rate:  [62-78] 69  Resp:  [16-19] 16  BP: ()/(50-76) 106/67    Physical Exam:     General Appearance:  Alert and cooperative, not in any acute distress.   Head:  Atraumatic and normocephalic, without obvious abnormality.   Eyes:          PERRLA, conjunctivae and sclerae normal, no Icterus. No pallor. Extraocular movements are within normal limits.   Respiratory/Lungs:   Breath sounds heard bilaterally equally.  No crackles or wheezing. No Pleural rub or bronchial breathing. Normal respiratory effort.    Cardiovascular/Heart:  Normal S1 and S2,    GI/Abdomen:    Soft with no significant tenderness                Psychiatric : Alert and oriented ×3.  No depression or anxiety    Neurologic: Cranial nerves 2 - 12 grossly intact, sensation intact, Motor power is normal and equal bilaterally.     Results Review:  Lab Results (last 24 hours)     Procedure Component Value Units Date/Time    Comprehensive Metabolic Panel [[id number]]  (Abnormal) Collected:  02/14/20 0601    Specimen:  Blood Updated:  02/14/20 0649     Glucose 128 mg/dL       mg/dL      Creatinine 4.74 mg/dL      Sodium 137 mmol/L      Potassium 4.7 mmol/L      Chloride 96 mmol/L      CO2 21.3 mmol/L      Calcium 9.5 mg/dL      Total Protein 6.2 g/dL      Albumin 3.20 g/dL      ALT (SGPT) 106 U/L      AST (SGOT) 73 U/L      Alkaline Phosphatase 215 U/L      Total Bilirubin 1.4 mg/dL      eGFR Non African Amer 9 mL/min/1.73      Comment: <15 Indicative of kidney failure.        eGFR   Amer --     Comment: <15 Indicative of kidney failure.        Globulin 3.0 gm/dL      A/G Ratio 1.1 g/dL      BUN/Creatinine Ratio 27.8     Anion Gap 19.7 mmol/L     Narrative:       GFR Normal >60  Chronic Kidney Disease <60  Kidney Failure <15      CBC & Differential [[id number]] Collected:  02/14/20 0601    Specimen:  Blood Updated:  02/14/20 0632    Narrative:       The following orders were created for  panel order CBC & Differential.  Procedure                               Abnormality         Status                     ---------                               -----------         ------                     CBC Auto Differential[[id number]]        Abnormal            Final result                 Please view results for these tests on the individual orders.    CBC Auto Differential [[id number]]  (Abnormal) Collected:  02/14/20 0601    Specimen:  Blood Updated:  02/14/20 0632     WBC 8.51 10*3/mm3      RBC 2.73 10*6/mm3      Hemoglobin 7.9 g/dL      Hematocrit 25.2 %      MCV 92.3 fL      MCH 28.9 pg      MCHC 31.3 g/dL      RDW 19.3 %      RDW-SD 63.6 fl      MPV 11.9 fL      Platelets 104 10*3/mm3      Neutrophil % 91.7 %      Lymphocyte % 2.4 %      Monocyte % 4.0 %      Eosinophil % 0.0 %      Basophil % 0.1 %      Immature Grans % 1.8 %      Neutrophils, Absolute 7.81 10*3/mm3      Lymphocytes, Absolute 0.20 10*3/mm3      Monocytes, Absolute 0.34 10*3/mm3      Eosinophils, Absolute 0.00 10*3/mm3      Basophils, Absolute 0.01 10*3/mm3      Immature Grans, Absolute 0.15 10*3/mm3      nRBC 1.1 /100 WBC     Creatinine Clearance - [[id number]] Collected:  02/13/20 1329    Specimen:  24 Hour Urine Updated:  02/13/20 2251    Narrative:       The following orders were created for panel order Creatinine Clearance -.  Procedure                               Abnormality         Status                     ---------                               -----------         ------                     Creatinine Clearance, Ur...[[id number]]  Abnormal            Final result                 Please view results for these tests on the individual orders.    Creatinine Clearance, Urine, 24 Hour - [[id number]]  (Abnormal) Collected:  02/13/20 1329    Specimen:  24 Hour Urine Updated:  02/13/20 2251     Creatinine Clearance 3.2 ml/min      Creatinine, Urine 43.3 mg/dL      Time (Hours) 24 hrs      Creatinine, 24H 0.17 g/24 hr      CREATININE  CLEARANCE L/24 HOUR 4.6 L/24 hr      24H Urine Volume 400 mL     Creatinine, Urine, 24 Hour - Urine, Clean Catch [[id number]]  (Abnormal) Collected:  02/13/20 1329    Specimen:  24 Hour Urine from Urine, Clean Catch Updated:  02/13/20 2244     Creatinine, 24H 0.17 g/24 hr      Creatinine, Urine 43.7 mg/dL      24H Urine Volume 400 mL      Time (Hours) 24 hrs     Blood Culture With LESLYE - Blood, Arm, Left [[id number]] Collected:  02/11/20 1804    Specimen:  Blood from Arm, Left Updated:  02/13/20 1815     Blood Culture No growth at 2 days    Blood Culture With LESLYE - Blood, Arm, Left [[id number]] Collected:  02/11/20 1510    Specimen:  Blood from Arm, Left Updated:  02/13/20 1531     Blood Culture No growth at 2 days    Protein, Urine, 24 Hour - Urine, Clean Catch [[id number]]  (Abnormal) Collected:  02/13/20 1329    Specimen:  24 Hour Urine from Urine, Clean Catch Updated:  02/13/20 1432     Protein, 24H Urine 312.0 mg/24hours     Comprehensive Metabolic Panel [[id number]]  (Abnormal) Collected:  02/13/20 0557    Specimen:  Blood Updated:  02/13/20 0736     Glucose 122 mg/dL       mg/dL      Creatinine 4.14 mg/dL      Sodium 136 mmol/L      Potassium 4.3 mmol/L      Chloride 96 mmol/L      CO2 20.0 mmol/L      Calcium 9.6 mg/dL      Total Protein 6.0 g/dL      Albumin 3.10 g/dL      ALT (SGPT) 119 U/L      AST (SGOT) 83 U/L      Alkaline Phosphatase 234 U/L      Total Bilirubin 1.5 mg/dL      eGFR Non African Amer 10 mL/min/1.73      Comment: <15 Indicative of kidney failure.        eGFR   Amer --     Comment: <15 Indicative of kidney failure.        Globulin 2.9 gm/dL      A/G Ratio 1.1 g/dL      BUN/Creatinine Ratio 28.7     Anion Gap 20.0 mmol/L     Narrative:       GFR Normal >60  Chronic Kidney Disease <60  Kidney Failure <15            ASSESSMENT/PLAN:      Acute renal failure (CMS/HCC)    Persistent atrial fibrillation    Coronary artery disease involving native coronary artery of native heart without  angina pectoris    Essential hypertension    Pulmonary arterial hypertension (CMS/HCC)    Interstitial lung disease (CMS/HCC)    Hypersensitivity pneumonitis (CMS/HCC)    Acquired hypothyroidism    Chronic kidney disease, stage 3 (CMS/HCC)    GERD without esophagitis    Diastolic heart failure (CMS/HCC)    Recurrent falls    Acute hyperkalemia    Normocytic anemia    Stool color black    Upper abdominal pain    Elevated LFTs    Coagulopathy (CMS/HCC)      Renal failure requiring dialysis.  I have offered her a tunneled dialysis catheter.  She understands the procedure and the reason for the procedure.  She also understands the risks of bleeding, infection as well as pneumothorax.  Cardiopulmonary risks are also possibility.  She understands these and has consented to proceed.  Familia Paez MD  02/14/20  7:18 AM

## 2020-02-14 NOTE — PROGRESS NOTES
"      [facility]    Name:  Pat Morel   Age:  80 y.o.  Sex:  female  :  1940  MRN:  [id number]   Visit Number:  [id number]  Admission Date:  2020  Date Of Service:  20  Primary Care Physician:  Anthony Sweet DO     LOS: 8 days :      Chief Complaint:  Follow up renal failure and respiratory failure        Subjective / Interval History:  The patient was seen this afternoon.  She is pale and weak but she is finally hungry.  She denies nausea, vomiting, abdominal pain.  She has stable shortness of breath and cough.  She has agreed and already had placed a tunneled hemodialysis catheter to start hemodialysis.  Son at the bedside satisfied thus far.      \"Patient is a 80-year-old female with history including CAD with CABG, atrial fibrillation, chronic kidney disease stage III, history of CVA, diastolic CHF, iron deficiency anemia, hypersensitivity pneumonitis, hypertension, osteoporosis was brought to the emergency room by EMS with generalized weakness on 2020.  She was admitted with acute worsening of her chronic renal failure, elevated troponin levels  as well as hyperkalemia.  She does have chronic respiratory failure which has been stable on prednisone.  She is currently on prednisone 20 mg daily started this week.  She will taper it down to 10 mg every week and be continued on 10 mg daily until she sees her pulmonologist Dr. Laura.       She was seen by Dr. Tsang from nephrology who recommended gentle IV hydration.  Her renal function has slowly improved and her hyperkalemia has resolved.  She was also seen by Dr. Horvath for her elevated troponin levels.  He felt that the patient's troponin levels were related to her coronary artery disease in the setting of renal failure and anemia.  He did offer cardiac catheterization to the patient but she is currently not willing for any invasive cardiac work-up.  He has recommended triple " "antiplatelet/anticoagulant agents with aspirin, Plavix and Eliquis.  He has also added Ranexa and increased her Bystolic dose.  An echocardiogram was done and the results are currently pending.  Unfortunately, patient has history of recurrent falls and chronic anemia and triple anticoagulation/antiplatelet therapy may need to be closely watched.\" previously stated by Dr Mercado.       Review of Systems: difficult to obtain with likely dementia    General ROS: Patient denies any fevers, chills or loss of consciousness.persistent generalized  weakness  Ophthalmic ROS: Denies any diplopia or transient loss of vision.  ENT ROS: Denies sore throat, nasal congestion or ear pain.   Respiratory ROS: Improved cough with positive shortness of breath.  Cardiovascular ROS: Denies chest pain or palpitations. No history of exertional chest pain.   Gastrointestinal ROS: Same poor appetite with positive nausea no vomiting.  Right upper quadrant same abdominal pain. No diarrhea.  Genito-Urinary ROS: Denies dysuria or hematuria.  Musculoskeletal ROS: Denies chronic back pain. No muscle pain. No calf pain.  Neurological ROS: Denies any focal weakness. No loss of consciousness. Denies any numbness. Denies neck pain.   Dermatological ROS: Denies any redness or pruritis.    Vital Signs:    Temp:  [95.9 °F (35.5 °C)-98 °F (36.7 °C)] 97.8 °F (36.6 °C)  Heart Rate:  [66-84] 69  Resp:  [12-18] 18  BP: ()/(47-76) 108/65    Intake and output:    I/O last 3 completed shifts:  In: 1135 [P.O.:960; I.V.:175]  Out: 510 [Urine:510]  I/O this shift:  In: 400 [I.V.:100; Blood:300]  Out: 1000 [Other:1000]    Physical Examination: Examined again today    General Appearance:   Elderly lady.  Chronically ill-appearing.  Alert and cooperative, not in any acute distress.smiling   Head:    Atraumatic and normocephalic, without obvious abnormality.   Eyes:            PERRLA, conjunctivae and sclerae normal, no Icterus. No pallor. Extraocular movements are " within normal limits.   Throat:   No oral lesions, no thrush, oral mucosa moist.   Neck:   Supple, trachea midline, no thyromegaly   Lungs:     Chest shape is normal. Breath sounds heard bilaterally equally.  No crackles or wheezing. No Pleural rub or bronchial breathing.    Heart:    Normal S1 and S2, no murmur, no gallop, no rub. No JVD   Abdomen:     Normal bowel sounds, no masses, no organomegaly.  Resolved abdominal pain, soft, non-distended, no guarding, no rebound tenderness, obese   Extremities:   Moves all extremities well, stable to + equal leg edema, no cyanosis   Skin:  Trace bleeding around postop line placement with proximal skin bruising, or rash.   Neurologic:   No tremor, sensation intact, diffuse bilateral equal arm and leg generalized weakness   Laboratory results:    Lab Results (last 24 hours)     Procedure Component Value Units Date/Time    Blood Culture With LESLYE - Blood, Arm, Left [[id number]] Collected:  02/11/20 1510    Specimen:  Blood from Arm, Left Updated:  02/14/20 1530     Blood Culture No growth at 3 days    Hepatitis Panel, Acute [[id number]] Collected:  02/14/20 1125    Specimen:  Blood Updated:  02/14/20 1128    Narrative:       The following orders were created for panel order Hepatitis Panel, Acute.  Procedure                               Abnormality         Status                     ---------                               -----------         ------                     Hepatitis Panel, Acute[[id number]]                           In process                 Hep B Confirmation Tube[[id number]]                          Final result                 Please view results for these tests on the individual orders.    Hep B Confirmation Tube [[id number]] Collected:  02/14/20 1125    Specimen:  Blood Updated:  02/14/20 1128     Extra Tube HOLD    Hepatitis Panel, Acute [[id number]] Collected:  02/14/20 1125    Specimen:  Blood Updated:  02/14/20 1127    Comprehensive Metabolic Panel [[id number]]   (Abnormal) Collected:  02/14/20 0601    Specimen:  Blood Updated:  02/14/20 0649     Glucose 128 mg/dL       mg/dL      Creatinine 4.74 mg/dL      Sodium 137 mmol/L      Potassium 4.7 mmol/L      Chloride 96 mmol/L      CO2 21.3 mmol/L      Calcium 9.5 mg/dL      Total Protein 6.2 g/dL      Albumin 3.20 g/dL      ALT (SGPT) 106 U/L      AST (SGOT) 73 U/L      Alkaline Phosphatase 215 U/L      Total Bilirubin 1.4 mg/dL      eGFR Non African Amer 9 mL/min/1.73      Comment: <15 Indicative of kidney failure.        eGFR   Amer --     Comment: <15 Indicative of kidney failure.        Globulin 3.0 gm/dL      A/G Ratio 1.1 g/dL      BUN/Creatinine Ratio 27.8     Anion Gap 19.7 mmol/L     Narrative:       GFR Normal >60  Chronic Kidney Disease <60  Kidney Failure <15      CBC & Differential [[id number]] Collected:  02/14/20 0601    Specimen:  Blood Updated:  02/14/20 0632    Narrative:       The following orders were created for panel order CBC & Differential.  Procedure                               Abnormality         Status                     ---------                               -----------         ------                     CBC Auto Differential[[id number]]        Abnormal            Final result                 Please view results for these tests on the individual orders.    CBC Auto Differential [[id number]]  (Abnormal) Collected:  02/14/20 0601    Specimen:  Blood Updated:  02/14/20 0632     WBC 8.51 10*3/mm3      RBC 2.73 10*6/mm3      Hemoglobin 7.9 g/dL      Hematocrit 25.2 %      MCV 92.3 fL      MCH 28.9 pg      MCHC 31.3 g/dL      RDW 19.3 %      RDW-SD 63.6 fl      MPV 11.9 fL      Platelets 104 10*3/mm3      Neutrophil % 91.7 %      Lymphocyte % 2.4 %      Monocyte % 4.0 %      Eosinophil % 0.0 %      Basophil % 0.1 %      Immature Grans % 1.8 %      Neutrophils, Absolute 7.81 10*3/mm3      Lymphocytes, Absolute 0.20 10*3/mm3      Monocytes, Absolute 0.34 10*3/mm3      Eosinophils, Absolute  0.00 10*3/mm3      Basophils, Absolute 0.01 10*3/mm3      Immature Grans, Absolute 0.15 10*3/mm3      nRBC 1.1 /100 WBC     Creatinine Clearance - [[id number]] Collected:  02/13/20 1329    Specimen:  24 Hour Urine Updated:  02/13/20 2251    Narrative:       The following orders were created for panel order Creatinine Clearance -.  Procedure                               Abnormality         Status                     ---------                               -----------         ------                     Creatinine Clearance, Ur...[[id number]]  Abnormal            Final result                 Please view results for these tests on the individual orders.    Creatinine Clearance, Urine, 24 Hour - [[id number]]  (Abnormal) Collected:  02/13/20 1329    Specimen:  24 Hour Urine Updated:  02/13/20 2251     Creatinine Clearance 3.2 ml/min      Creatinine, Urine 43.3 mg/dL      Time (Hours) 24 hrs      Creatinine, 24H 0.17 g/24 hr      CREATININE CLEARANCE L/24 HOUR 4.6 L/24 hr      24H Urine Volume 400 mL     Creatinine, Urine, 24 Hour - Urine, Clean Catch [[id number]]  (Abnormal) Collected:  02/13/20 1329    Specimen:  24 Hour Urine from Urine, Clean Catch Updated:  02/13/20 2244     Creatinine, 24H 0.17 g/24 hr      Creatinine, Urine 43.7 mg/dL      24H Urine Volume 400 mL      Time (Hours) 24 hrs     Blood Culture With LESLYE - Blood, Arm, Left [[id number]] Collected:  02/11/20 1804    Specimen:  Blood from Arm, Left Updated:  02/13/20 1815     Blood Culture No growth at 2 days          I have reviewed the patient's laboratory results.    Radiology results:    Imaging Results (Last 24 Hours)     Procedure Component Value Units Date/Time    XR Chest 1 View [[id number]] Collected:  02/14/20 0840     Updated:  02/14/20 0843    Narrative:       PROCEDURE: XR CHEST 1 VW-     HISTORY: line placement; N17.9-Acute kidney failure, unspecified;  E87.5-Hyperkalemia; I48.91-Unspecified atrial fibrillation; R06.89-Other  abnormalities of  breathing; R79.89-Other specified abnormal findings of  blood chemistry; Z74.09-Other reduced mobility     COMPARISON: 02/11/2020.     FINDINGS: A right internal jugular dialysis catheter is in place with  the tip in the right atrium. The patient is status post median  sternotomy and CABG procedure. The heart is normal in size. The  mediastinum is unremarkable. There is patchy bilateral airspace disease  and effusions which appear worse in the left lung base compared to the  prior exam. There is no pneumothorax.  There are no acute osseous  abnormalities.       Impression:       Placement of a right internal jugular dialysis catheter with  no evidence of pneumothorax.     Continued followup is recommended.     This report was finalized on 2/14/2020 8:41 AM by Joaquín Shannon M.D..    FL C Arm During Surgery [[id number]] Resulted:  02/14/20 0800     Updated:  02/14/20 0800    Narrative:       This procedure was auto-finalized with no dictation required.          I have reviewed the patient's radiology reports.    Medication Review:     I have reviewed the patients active and prn medications.     Assessment:  1.  Acute renal failure on chronic kidney disease stage III, present on admission  2.  Recurrent falls at home secondary to uremia, present on admission.  3.  Acute hyperkalemia secondary to #1, present on admission, resolved.  4.  Demand ischemia with elevated troponin levels, present on admission.  5.  Hypersensitivity pneumonitis on prednisone therapy.  6.  Acute on Chronic hypoxic respiratory failure on home oxygen, now requiring intermittent cpap therapy  7.  Acute on Chronic diastolic heart failure exacerbation  8.  Paroxysmal atrial fibrillation on apixaban, held with anemia; post Afib with RVR, now improved control  9.  Coronary artery disease status post stents and CABG.  10.  Chronic pulmonary hypertension.  11.  Acquired hypothyroidism.  12.  Elevated transaminases of uncertain etiology. With  epigastric and right upper abdominal pain; cannot rule out Cholangitis and patient declined workup  13. History of stroke with poor historian suspect chronic dementia  14. Acute on chronic anemia, stable for now without gross bleeding  15. Positive hemoccult stool        Plan:  Continue to monitor inpatient.  Discussed the case with Dr. Tsang and the patient is post dialysis catheter placement today.  She will get a unit of blood and hemodialysis.  Her LFTs have improved.  Continue to hold the statin therapy and consider that she may have passed a stone.  Finish out 5 days of Zosyn.  Patient son reports great satisfaction with the improvement thus far.  Continue physical and occupational therapy as tolerated.  Dr. Higgins continues to follow in consultation for cardiology.      She is DNR.  Her prognosis remains very poor.      Medication risks and benefits were discussed in detail. Patient reported satisfaction with care delivered and treatment plan.     Tamara Tomlin DO  02/14/20  5:42 PM

## 2020-02-14 NOTE — PROGRESS NOTES
Continued Stay Note  [facility]     Patient Name: Pat Morel  MRN: [id number]  Today's Date: 2/14/2020    Admit Date: 2/6/2020    Discharge Plan     Row Name 02/14/20 5958       Plan    Plan  Spoke to pt., , and son regarding dialysis set up. Preference is [facility]. Sending clinicals. Discussed transport options with family. When chair time is confirmed, will assist further with transport. Notified CM.     Row Name 02/14/20 7985       Plan    Plan                                                      Discharge Codes    No documentation.       Expected Discharge Date and Time     Expected Discharge Date Expected Discharge Time    Feb 17, 2020             Albina Sands LCSW

## 2020-02-14 NOTE — PROGRESS NOTES
"Nephrology Progress Note.    LOS: 8 days    Patient Care Team:  Anthony Sweet DO as PCP - General (Family Medicine)  Tien Archer MD as Consulting Physician (Cardiac Electrophysiology)  Xu Block MD (Cardiology)  Yomi Higgins MD as Consulting Physician (Cardiology)    Chief Complaint:    Chief Complaint   Patient presents with   • Weakness - Generalized       Subjective:   Follow up for ESRD and Anemia.     Interval History:   Patient Complaints: none  Patient seen and examined this morning.  Events from last night noted.  Patient denies having any fevers chills.  No nausea or vomiting no abdominal pain.  Denies any chest pain, shortness of breath or cough and sputum production.  There is no significant edema.   Patient also denies having new onset weakness of numbness of either extremity.  She just got back from getting a new tunneled dialysis catheter placement.    Objective:    Vital Signs  BP 97/47 (BP Location: Right arm, Patient Position: Lying)   Pulse 72   Temp 97.8 °F (36.6 °C) (Oral)   Resp 15   Ht 147.3 cm (58\")   Wt 66 kg (145 lb 9.6 oz)   LMP  (LMP Unknown)   SpO2 98%   BMI 30.43 kg/m²     I/O this shift:  In: 100 [I.V.:100]  Out: -     Intake/Output Summary (Last 24 hours) at 2/14/2020 1051  Last data filed at 2/14/2020 0814  Gross per 24 hour   Intake 755 ml   Output 250 ml   Net 505 ml       Physical Exam:  General Appearance: alert, oriented x 3, no acute distress,   HEENT: Oral mucosa dry, extra occular movements intact. Sclera clear.  Skin: Warm and dry  Neck: supple, no JVD, trachea midline  Lungs:Chest shape is normal. Breath sounds heard bilaterally equally.  Basal crackles, No wheezing.   Heart: Irregular rate and rhythm.  peripheral pulses weak but palpable.  Abdomen: Obese, soft, non-tender,  present bowel sounds to auscultation  : no palpable bladder.  Extremities: Trace edema, no cyanosis or clubbing.   Neuro: normal speech and mental status, grossly non " focal.     Results Review:   Results from last 7 days   Lab Units 02/14/20  0601 02/13/20  0557 02/12/20  0558   SODIUM mmol/L 137 136 139   POTASSIUM mmol/L 4.7 4.3 4.2   CHLORIDE mmol/L 96* 96* 97*   CO2 mmol/L 21.3* 20.0* 21.2*   BUN mg/dL 132* 119* 119*   CREATININE mg/dL 4.74* 4.14* 3.93*   CALCIUM mg/dL 9.5 9.6 9.0   ALBUMIN g/dL 3.20* 3.10* 2.90*   BILIRUBIN mg/dL 1.4* 1.5* 1.5*   ALK PHOS U/L 215* 234* 275*   ALT (SGPT) U/L 106* 119* 129*   AST (SGOT) U/L 73* 83* 102*   GLUCOSE mg/dL 128* 122* 133*     Estimated Creatinine Clearance: 8.3 mL/min (A) (by C-G formula based on SCr of 4.74 mg/dL (H)).  Results from last 7 days   Lab Units 02/12/20  0558 02/11/20  0640 02/10/20  0625 02/09/20  0548   MAGNESIUM mg/dL 2.8*  --   --   --    PHOSPHORUS mg/dL  --  6.5* 5.8* 5.3*         Results from last 7 days   Lab Units 02/14/20  0601 02/13/20  0557 02/12/20  0558 02/11/20  0640 02/10/20  0625   WBC 10*3/mm3 8.51 7.97 8.34 8.22 7.88   HEMOGLOBIN g/dL 7.9* 7.6* 7.8* 8.5* 8.0*   PLATELETS 10*3/mm3 104* 107* 109* 119* 120*         Brief Urine Lab Results  (Last result in the past 365 days)      Color   Clarity   Blood   Leuk Est   Nitrite   Protein   CREAT   Urine HCG        02/13/20 1329             43.3       02/13/20 1329             43.7           No results found for: UTPCR  Imaging Results (Last 24 Hours)     Procedure Component Value Units Date/Time    XR Chest 1 View [[id number]] Collected:  02/14/20 0840     Updated:  02/14/20 0843    Narrative:       PROCEDURE: XR CHEST 1 VW-     HISTORY: line placement; N17.9-Acute kidney failure, unspecified;  E87.5-Hyperkalemia; I48.91-Unspecified atrial fibrillation; R06.89-Other  abnormalities of breathing; R79.89-Other specified abnormal findings of  blood chemistry; Z74.09-Other reduced mobility     COMPARISON: 02/11/2020.     FINDINGS: A right internal jugular dialysis catheter is in place with  the tip in the right atrium. The patient is status post median  sternotomy  and CABG procedure. The heart is normal in size. The  mediastinum is unremarkable. There is patchy bilateral airspace disease  and effusions which appear worse in the left lung base compared to the  prior exam. There is no pneumothorax.  There are no acute osseous  abnormalities.       Impression:       Placement of a right internal jugular dialysis catheter with  no evidence of pneumothorax.     Continued followup is recommended.     This report was finalized on 2/14/2020 8:41 AM by Joaquín Shannon M.D..    FL C Arm During Surgery [[id number]] Resulted:  02/14/20 0800     Updated:  02/14/20 0800    Narrative:       This procedure was auto-finalized with no dictation required.    MRI abdomen wo contrast mrcp [[id number]] Collected:  02/13/20 1358     Updated:  02/13/20 1401    Narrative:       PROCEDURE: MRI ABDOMEN WO CONTRAST MRCP-     HISTORY: Abnormal liver function tests (LFTs)     PROCEDURE: Multiplanar multisequence imaging of the abdomen was  performed without the use of intravenous contrast. 3-D MRCP images were  obtained.     COMPARISON: None.     FINDINGS: The exam is limited secondary to motion artifact. MRCP images  are nondiagnostic. Stones are present within the gallbladder. The liver,  adrenals, kidneys, spleen and pancreas are grossly unremarkable. There  is a large hiatal hernia. There are small effusions a the visualized  lung bases.       Impression:       Gallstones. The exam is otherwise grossly unremarkable.           This report was finalized on 2/13/2020 1:59 PM by Joaquín Shannon M.D..          aspirin 81 mg Oral Daily   calcium acetate 1,334 mg Oral TID With Meals   dilTIAZem  mg Oral Q24H   iron polysaccharides 150 mg Oral Daily   isosorbide mononitrate 60 mg Oral Daily   levothyroxine 75 mcg Oral Daily   metoprolol succinate XL 25 mg Oral Q24H   pantoprazole 40 mg Oral Daily   piperacillin-tazobactam 3.375 g Intravenous Q12H   predniSONE 10 mg Oral Daily   ranolazine 500 mg  Oral Q12H   sodium chloride 10 mL Intravenous Q12H       Pharmacy to Dose Zosyn          Medication Review:   Current Facility-Administered Medications   Medication Dose Route Frequency Provider Last Rate Last Dose   • acetaminophen (TYLENOL) tablet 650 mg  650 mg Oral Q4H PRN Familia Paez MD   650 mg at 02/07/20 2302    Or   • acetaminophen (TYLENOL) 160 MG/5ML solution 650 mg  650 mg Oral Q4H PRN Familia Paez MD        Or   • acetaminophen (TYLENOL) suppository 650 mg  650 mg Rectal Q4H PRN Familia Paez MD       • aspirin EC tablet 81 mg  81 mg Oral Daily Familia Paez MD   81 mg at 02/14/20 0955   • calcium acetate (PHOS BINDER)) capsule 1,334 mg  1,334 mg Oral TID With Meals Familia Paez MD   1,334 mg at 02/13/20 1738   • dilTIAZem CD (CARDIZEM CD) 24 hr capsule 120 mg  120 mg Oral Q24H Familia Paez MD   120 mg at 02/14/20 0956   • ipratropium-albuterol (DUO-NEB) nebulizer solution 3 mL  3 mL Nebulization Q4H PRN Familia Paez MD       • iron polysaccharides (NIFEREX) capsule 150 mg  150 mg Oral Daily Familia Paez MD   150 mg at 02/14/20 0954   • isosorbide mononitrate (IMDUR) 24 hr tablet 60 mg  60 mg Oral Daily Familia Paez MD   60 mg at 02/14/20 0955   • levothyroxine (SYNTHROID, LEVOTHROID) tablet 75 mcg  75 mcg Oral Daily Familia Paez MD   75 mcg at 02/14/20 0957   • metoprolol succinate XL (TOPROL-XL) 24 hr tablet 25 mg  25 mg Oral Q24H Familia Paez MD   25 mg at 02/14/20 0600   • ondansetron (ZOFRAN) injection 4 mg  4 mg Intravenous Q6H PRN Familia Paez MD       • pantoprazole (PROTONIX) EC tablet 40 mg  40 mg Oral Daily Familia Paez MD   40 mg at 02/14/20 0955   • Pharmacy to Dose Zosyn   Does not apply Continuous PRN Familia Paez MD       • piperacillin-tazobactam (ZOSYN) 3.375 g in iso-osmotic dextrose 50 ml (premix)  3.375 g Intravenous Q12H Familia Paez MD   3.375 g at 02/14/20 0106   • predniSONE (DELTASONE)  tablet 10 mg  10 mg Oral Daily Familia Paez MD   10 mg at 02/14/20 0954   • ranolazine (RANEXA) 12 hr tablet 500 mg  500 mg Oral Q12H Familia Paez MD   500 mg at 02/14/20 0955   • sodium chloride 0.9 % bolus 1,000 mL  1,000 mL Intravenous PRN Sanford Tsang MD, FASN       • sodium chloride 0.9 % flush 10 mL  10 mL Intravenous PRN Familia Paez MD       • sodium chloride 0.9 % flush 10 mL  10 mL Intravenous Q12H Familia Paez MD   10 mL at 02/14/20 0957   • sodium chloride 0.9 % flush 10 mL  10 mL Intravenous PRN Familia Paez MD           Assessment/Plan:  1. Acute renal failure (CMS/HCC): It does appear that she has worsening of her baseline renal function, at this point it is not very clear if this is all acute or may have some worsening of her chronic renal failure as well.  She likely has a significant component of cardiorenal syndrome.  We will check a UA, Merchant catheter was placed in the ER with no significant increase in output.  2. Chronic kidney disease stage IV: He has known to have stage IV chronic kidney disease, she has been admitted almost every 2 weeks since middle of December.  Every time has she has some worsening of her renal function.  He was not on any ACE inhibitors or denies using any nonsteroidals.  She did not have any significant hematuria or proteinuria.  May end up requiring dialysis this admission.  3. Hyperkalemia: It has resolved with treatment.  4. Metabolic acidosis: It is above 20 with supplements of sodium bicarb.  5. Abnormal liver function: It is quite likely that she has an arrhythmia that might have led to the current picture of congestive heart failure as well as congestive liver.  6. Anemia: She had fairly normal iron stores on her last visit.  Will check iron stores again she may be needing clear if she is anemic.  Iron stores have dropped since her last admission in the last 2 weeks.  I will go ahead and start her on oral supplements.  Transfuse  if hemoglobin less than 8.  7. Coronary artery disease, status post CABG, status post stent placement: May need cardiac evaluation again during this hospitalization.  Last echo that was done in October 2019 showed mild to moderate mitral regurgitation, moderate tricuspid regurgitation with elevated pulmonary pressure of 45 to 55 mmHg.  8. Acute on chronic respiratory failure:  9. Hypersensitivity pneumonitis  10. Interstitial lung disease      Plan:  · Tunneled dialysis catheter was placed we will go ahead and initiate dialysis today.  · Transfuse 1 unit of blood with dialysis, when I talked to the patient she is fine to get blood.  · Her creatinine clearance came back as 3.  I really doubt that she will recover from this, she will be deemed end-stage renal disease at this point.  · Go ahead and make arrangements for an outpatient clinic, she will be going to [facility].  · Details were also discussed with the hospitalist service.   · Surveillance labs.  · Further recommendations will depend on clinical course of the patient during the current hospitalization.    · I also discussed the details with the nursing staff.  · Rest as ordered.    Sanford Tsang MD, JULIANN  02/14/20  10:51 AM    Dictated utilizing Dragon dictation.

## 2020-02-14 NOTE — OP NOTE
PATIENT:    Pat Morel    DATE OF SURGERY:  2/14/2020    PHYSICIAN:    Familia Paez MD    REFERRING PHYSICIAN:  Anthony Sweet DO     YOB: 1940    PREOPERATIVE DIAGNOSIS:  End-stage renal failure    POSTOPERATIVE DIAGNOSIS:  End-stage renal failure    PROCEDURE:  Tunneled dialysis catheter placement with fluoroscopy      ANESTHESIA:  MAC    OPERATIVE PROCEDURE:  The patient was taken to the operating room, placed in the supine position.  Anesthesia had been delivered.  The neck and chest were prepped and draped in the usual fashion.  The right internal jugular vein was accessed using a large-bore needle using ultrasound guidance.  Through this guidewire is placed and the rest of this procedure was performed under fluoroscopic guidance.  Over the guidewire the introducer sheath was then advanced and appropriately positioned.  Through the sheath the catheter was then advanced and appropriately positioned again using fluoroscopic guidance.  Through a lateral stab wound the catheter was then tunneled and the Y connector was attached and both ports aspirated and flushed easily.  A final flush of the thousand units of heparin was then instilled into the catheter.  Initial incision was closed with interrupted simple nylon closure.  Dressings were applied.  There was no significant bleeding and hemostasis Well-controlled.  There were no intraoperative complications      The patient was stable at this point in time and subsequently transferred back to the recovery room in stable condition.       Familia Paez MD  2/14/2020  8:04 AM

## 2020-02-14 NOTE — PLAN OF CARE
Problem: Patient Care Overview  Goal: Plan of Care Review  Outcome: Ongoing (interventions implemented as appropriate)  Flowsheets (Taken 2/14/2020 1779)  Progress: no change  Plan of Care Reviewed With: patient  Outcome Summary: VSS; pt to have dialysis access placed in am; pt wore bipap during night; plan for dialysis after access placed

## 2020-02-14 NOTE — ANESTHESIA POSTPROCEDURE EVALUATION
Patient: Pat Morel    Procedure Summary     Date:  02/14/20 Room / Location:  [facility] OR  /  [facility]    Anesthesia Start:  0722 Anesthesia Stop:      Procedure:  HEMODIALYSIS CATHETER INSERTION(DINO) (N/A ) Diagnosis:      Surgeon:  Familia Paez MD Provider:  Milad Hannah CRNA    Anesthesia Type:  MAC ASA Status:  4          Anesthesia Type: MAC    Vitals  HR 70  Sat 91  Resp 14  Temp 98  B/P 101/52      Post Anesthesia Care and Evaluation    Patient location during evaluation: PACU  Patient participation: complete - patient participated  Level of consciousness: awake and alert  Pain score: 0  Pain management: satisfactory to patient  Airway patency: patent  Anesthetic complications: No anesthetic complications  PONV Status: none  Cardiovascular status: acceptable and stable  Respiratory status: acceptable and nasal cannula  Hydration status: acceptable

## 2020-02-14 NOTE — PROGRESS NOTES
"      [facility]    Name:  Pat Morel   Age:  80 y.o.  Sex:  female  :  1940  MRN:  [id number]   Visit Number:  [id number]  Admission Date:  2020  Date Of Service:  20  Primary Care Physician:  Anthony Sweet DO     LOS: 7 days :      Chief Complaint:  Follow up renal failure and respiratory failure        Subjective / Interval History:  Patient \"ate a few bites\" but continues to have poor appetite. She continues to speak very little, with her son and daughter talking most. LFTs still elevated. They had previously declined MRCP due to dyspnea and respiratory failure. They are agreeable today since respiratory failure improved after increasing diuretic. Family reports yesterday desiring to start hemodialysis \"for comfort\" \"only to help her breathing\". They report desiring NO surgery of any kind and will consider whether they would want dialysis after all. Family stressed importance of giving her comfort, as she is demented and bedridden mostly at this point, with little oral intake and no appetite.     Later today fortunately MRCP negative for CBD stone.       \"Patient is a 80-year-old female with history including CAD with CABG, atrial fibrillation, chronic kidney disease stage III, history of CVA, diastolic CHF, iron deficiency anemia, hypersensitivity pneumonitis, hypertension, osteoporosis was brought to the emergency room by EMS with generalized weakness on 2020.  She was admitted with acute worsening of her chronic renal failure, elevated troponin levels  as well as hyperkalemia.  She does have chronic respiratory failure which has been stable on prednisone.  She is currently on prednisone 20 mg daily started this week.  She will taper it down to 10 mg every week and be continued on 10 mg daily until she sees her pulmonologist Dr. Laura.       She was seen by Dr. Tsang from nephrology who recommended gentle IV hydration.  Her renal " "function has slowly improved and her hyperkalemia has resolved.  She was also seen by Dr. Horvath for her elevated troponin levels.  He felt that the patient's troponin levels were related to her coronary artery disease in the setting of renal failure and anemia.  He did offer cardiac catheterization to the patient but she is currently not willing for any invasive cardiac work-up.  He has recommended triple antiplatelet/anticoagulant agents with aspirin, Plavix and Eliquis.  He has also added Ranexa and increased her Bystolic dose.  An echocardiogram was done and the results are currently pending.  Unfortunately, patient has history of recurrent falls and chronic anemia and triple anticoagulation/antiplatelet therapy may need to be closely watched.\" previously stated by Dr Mercado.       Review of Systems: difficult to obtain with likely dementia    General ROS: Patient denies any fevers, chills or loss of consciousness.positive generalized  weakness  Ophthalmic ROS: Denies any diplopia or transient loss of vision.  ENT ROS: Denies sore throat, nasal congestion or ear pain.   Respiratory ROS: Denies cough with positive exertional improving shortness of breath.  Cardiovascular ROS: Denies chest pain or palpitations. No history of exertional chest pain.   Gastrointestinal ROS: Same poor appetite with positive nausea no vomiting.  Right upper quadrant same abdominal pain. No diarrhea.  Genito-Urinary ROS: Denies dysuria or hematuria.  Musculoskeletal ROS: Denies chronic back pain. No muscle pain. No calf pain.  Neurological ROS: Denies any focal weakness. No loss of consciousness. Denies any numbness. Denies neck pain.   Dermatological ROS: Denies any redness or pruritis.    Vital Signs:    Temp:  [96.1 °F (35.6 °C)-98.1 °F (36.7 °C)] 96.5 °F (35.8 °C)  Heart Rate:  [62-95] 69  Resp:  [16-20] 17  BP: ()/(50-69) 99/53    Intake and output:    I/O last 3 completed shifts:  In: 1200 [P.O.:1200]  Out: 910 " [Urine:910]  No intake/output data recorded.    Physical Examination: Examined today    General Appearance:   Elderly lady.  Chronically ill-appearing.  Alert and cooperative, not in any acute distress.smiling   Head:    Atraumatic and normocephalic, without obvious abnormality.   Eyes:            PERRLA, conjunctivae and sclerae normal, no Icterus. No pallor. Extraocular movements are within normal limits.   Throat:   No oral lesions, no thrush, oral mucosa moist.   Neck:   Supple, trachea midline, no thyromegaly   Lungs:     Chest shape is normal. Breath sounds heard bilaterally equally.  No crackles or wheezing. No Pleural rub or bronchial breathing.    Heart:    Normal S1 and S2, no murmur, no gallop, no rub. No JVD   Abdomen:     Normal bowel sounds, no masses, no organomegaly. Soft        persistent epigastric/right upper quadrant area tender, non-distended, no guarding, no rebound tenderness, obese   Extremities:   Moves all extremities well, stable  1+ equal leg edema, no cyanosis   Skin:   No bleeding, bruising or rash.   Neurologic:   No tremor, sensation intact, Motor power is normal and equal bilaterally.   Laboratory results:    Lab Results (last 24 hours)     Procedure Component Value Units Date/Time    Blood Culture With LESLYE - Blood, Arm, Left [[id number]] Collected:  02/11/20 1804    Specimen:  Blood from Arm, Left Updated:  02/13/20 1815     Blood Culture No growth at 2 days    Blood Culture With LESLYE - Blood, Arm, Left [[id number]] Collected:  02/11/20 1510    Specimen:  Blood from Arm, Left Updated:  02/13/20 1531     Blood Culture No growth at 2 days    Protein, Urine, 24 Hour - Urine, Clean Catch [[id number]]  (Abnormal) Collected:  02/13/20 1329    Specimen:  24 Hour Urine from Urine, Clean Catch Updated:  02/13/20 1432     Protein, 24H Urine 312.0 mg/24hours     Creatinine Clearance - [[id number]] Collected:  02/13/20 1329    Specimen:  24 Hour Urine Updated:  02/13/20 1352    Narrative:       The  following orders were created for panel order Creatinine Clearance -.  Procedure                               Abnormality         Status                     ---------                               -----------         ------                     Creatinine Clearance, Ur...[[id number]]                      In process                   Please view results for these tests on the individual orders.    Creatinine Clearance, Urine, 24 Hour - [[id number]] Collected:  02/13/20 1329    Specimen:  24 Hour Urine Updated:  02/13/20 1352    Creatinine, Urine, 24 Hour - Urine, Clean Catch [[id number]] Collected:  02/13/20 1329    Specimen:  24 Hour Urine from Urine, Clean Catch Updated:  02/13/20 1352    Comprehensive Metabolic Panel [[id number]]  (Abnormal) Collected:  02/13/20 0557    Specimen:  Blood Updated:  02/13/20 0736     Glucose 122 mg/dL       mg/dL      Creatinine 4.14 mg/dL      Sodium 136 mmol/L      Potassium 4.3 mmol/L      Chloride 96 mmol/L      CO2 20.0 mmol/L      Calcium 9.6 mg/dL      Total Protein 6.0 g/dL      Albumin 3.10 g/dL      ALT (SGPT) 119 U/L      AST (SGOT) 83 U/L      Alkaline Phosphatase 234 U/L      Total Bilirubin 1.5 mg/dL      eGFR Non African Amer 10 mL/min/1.73      Comment: <15 Indicative of kidney failure.        eGFR   Amer --     Comment: <15 Indicative of kidney failure.        Globulin 2.9 gm/dL      A/G Ratio 1.1 g/dL      BUN/Creatinine Ratio 28.7     Anion Gap 20.0 mmol/L     Narrative:       GFR Normal >60  Chronic Kidney Disease <60  Kidney Failure <15      CBC & Differential [[id number]] Collected:  02/13/20 0557    Specimen:  Blood Updated:  02/13/20 0613    Narrative:       The following orders were created for panel order CBC & Differential.  Procedure                               Abnormality         Status                     ---------                               -----------         ------                     CBC Auto Differential[[id number]]        Abnormal             Final result                 Please view results for these tests on the individual orders.    CBC Auto Differential [[id number]]  (Abnormal) Collected:  02/13/20 0557    Specimen:  Blood Updated:  02/13/20 0613     WBC 7.97 10*3/mm3      RBC 2.65 10*6/mm3      Hemoglobin 7.6 g/dL      Hematocrit 25.2 %      MCV 95.1 fL      MCH 28.7 pg      MCHC 30.2 g/dL      RDW 19.4 %      RDW-SD 66.5 fl      MPV 12.4 fL      Platelets 107 10*3/mm3      Neutrophil % 92.0 %      Lymphocyte % 2.9 %      Monocyte % 3.5 %      Eosinophil % 0.0 %      Basophil % 0.1 %      Immature Grans % 1.5 %      Neutrophils, Absolute 7.33 10*3/mm3      Lymphocytes, Absolute 0.23 10*3/mm3      Monocytes, Absolute 0.28 10*3/mm3      Eosinophils, Absolute 0.00 10*3/mm3      Basophils, Absolute 0.01 10*3/mm3      Immature Grans, Absolute 0.12 10*3/mm3      nRBC 0.5 /100 WBC           I have reviewed the patient's laboratory results.    Radiology results:    Imaging Results (Last 24 Hours)     Procedure Component Value Units Date/Time    MRI abdomen wo contrast mrcp [[id number]] Collected:  02/13/20 1358     Updated:  02/13/20 1401    Narrative:       PROCEDURE: MRI ABDOMEN WO CONTRAST MRCP-     HISTORY: Abnormal liver function tests (LFTs)     PROCEDURE: Multiplanar multisequence imaging of the abdomen was  performed without the use of intravenous contrast. 3-D MRCP images were  obtained.     COMPARISON: None.     FINDINGS: The exam is limited secondary to motion artifact. MRCP images  are nondiagnostic. Stones are present within the gallbladder. The liver,  adrenals, kidneys, spleen and pancreas are grossly unremarkable. There  is a large hiatal hernia. There are small effusions a the visualized  lung bases.       Impression:       Gallstones. The exam is otherwise grossly unremarkable.           This report was finalized on 2/13/2020 1:59 PM by Joaquín Shannon M.D..          I have reviewed the patient's radiology reports.    Medication  Review:     I have reviewed the patients active and prn medications.     Assessment:  1.  Acute renal failure on chronic kidney disease stage III, present on admission  2.  Recurrent falls at home secondary to uremia, present on admission.  3.  Acute hyperkalemia secondary to #1, present on admission, resolved.  4.  Demand ischemia with elevated troponin levels, present on admission.  5.  Hypersensitivity pneumonitis on prednisone therapy.  6.  Acute on Chronic hypoxic respiratory failure on home oxygen, now requiring intermittent cpap therapy  7.  Acute on Chronic diastolic heart failure exacerbation  8.  Paroxysmal atrial fibrillation on apixaban, held with anemia; post Afib with RVR, now improved control  9.  Coronary artery disease status post stents and CABG.  10.  Chronic pulmonary hypertension.  11.  Acquired hypothyroidism.  12.  Elevated transaminases of uncertain etiology. With epigastric and right upper abdominal pain; cannot rule out Cholangitis and patient declined workup  13. History of stroke with poor historian suspect chronic dementia  14. Acute on chronic anemia, stable for now without gross bleeding  15. Positive hemoccult stool        Plan:  Continue to monitor. Discussed case with Dr Tsang. Patient planned for hemodialysis catheter tomorrow if agreeable. MRCP negative for stone. HR improved. Appetite still very poor.     With negative stone, and elevated LFTs, I discussed statin therapy with family and then want to stop. They report she is hardly taking in any oral feeding also.     Dr Higgins following. Dr Dee following. Patient declined any surgeries or aggressive procedures or testing.     Family states they want to consider palliative care vs hospice in near future.      She is severely weak and continued to provide physical and Occupational Therapy and up with assistance.  Patient and family had previously given the okay to transfer to acute rehabilitation facility once  discharged.    She is DNR.  Her prognosis remains very poor.      Medication risks and benefits were discussed in detail. Patient reported satisfaction with care delivered and treatment plan.     Tamara Tomlin DO  02/13/20  9:30 PM

## 2020-02-14 NOTE — PROGRESS NOTES
Continued Stay Note   Jose     Patient Name: Pat Morel  MRN: [id number]  Today's Date: 2/14/2020    Admit Date: 2/6/2020    Discharge Plan     Row Name 02/14/20 0943       Plan    Plan Comments  Spoke to pt and her   She is post op from dialysis catheter placement Spoke to pt  regarding transportation to Dialysis that they will either have to pay for the van or transport himself  He will talk to his family regarding this They live in [place] Faxed updated notes to [facility]           1338   Spoke to pt son he reports they prefer to stay with [facility] and unless the fee is unreasonable they will be able to pay they report that the family cannot transport  at this time             Discharge Codes    No documentation.       Expected Discharge Date and Time     Expected Discharge Date Expected Discharge Time    Feb 17, 2020             Kesley Peña RN

## 2020-02-14 NOTE — PROGRESS NOTES
[facility] IP Progress Note    Pat Morel  1940  [id number]  02/14/20    Subjective:   Mrs. Pat Morel is a 80 y.o. female seen in follow-up for multivessel coronary artery disease and acute on chronic diastolic heart failure.  No acute overnight events.  Remains in A. fib, rate controlled.  This morning, the patient states she is feeling somewhat better.  Underwent HD catheter placement this morning without complication.  Denies chest pain or chest discomfort.  Shortness of breath remained stable.    Review of Systems:   Review of Systems   Constitutional: Positive for fatigue. Negative for activity change, chills, diaphoresis, fever, unexpected weight gain and unexpected weight loss.   Respiratory: Positive for shortness of breath. Negative for cough, chest tightness and wheezing.    Cardiovascular: Positive for leg swelling. Negative for chest pain and palpitations.   Gastrointestinal: Negative for abdominal pain and GERD.   Neurological: Negative for dizziness, syncope, weakness and light-headedness.       I have reviewed and/or updated the patient's past medical, past surgical, family history, social history, problem list and allergies as appropriate in the chart.     Physical Exam:  Vital Signs:   Vitals:    02/14/20 0900 02/14/20 0905 02/14/20 1200 02/14/20 1230   BP: 97/47  116/74 (!) 84/53   BP Location: Right arm      Patient Position: Lying      Pulse: 72  78 80   Resp: 14 15 18 18   Temp: 97.9 °F (36.6 °C) 97.8 °F (36.6 °C) 97.6 °F (36.4 °C) 97.6 °F (36.4 °C)   TempSrc: Temporal Oral Temporal    SpO2: 98%  93% 94%   Weight:       Height:           Physical Exam   Constitutional: She is oriented to person, place, and time. She appears well-developed and well-nourished.   Lying in bed in no acute distress.   HENT:   Head: Normocephalic and atraumatic.   Moist Mucous Membranes.    Eyes: Pupils are equal, round, and reactive to light. EOM are normal. No scleral  icterus.   Cardiovascular: Normal rate, normal heart sounds and intact distal pulses. Exam reveals no gallop and no friction rub.   No murmur heard.  Regularly irregular rhythm.  2+ bilateral lower extremity pitting edema.   Pulmonary/Chest: Effort normal and breath sounds normal. No stridor. No respiratory distress. She has no wheezes. She has no rales. She exhibits no tenderness.   Abdominal: Soft. Bowel sounds are normal. She exhibits no distension. There is no tenderness. There is no rebound and no guarding.   Musculoskeletal: Normal range of motion. She exhibits edema.   Neurological: She is alert and oriented to person, place, and time.   Psychiatric: She has a normal mood and affect. Her behavior is normal.   Nursing note and vitals reviewed.      Results Review:   Results from last 7 days   Lab Units 02/14/20  0601   SODIUM mmol/L 137   POTASSIUM mmol/L 4.7   CHLORIDE mmol/L 96*   CO2 mmol/L 21.3*   BUN mg/dL 132*   CREATININE mg/dL 4.74*   CALCIUM mg/dL 9.5   BILIRUBIN mg/dL 1.4*   ALK PHOS U/L 215*   ALT (SGPT) U/L 106*   AST (SGOT) U/L 73*   GLUCOSE mg/dL 128*     Results from last 7 days   Lab Units 02/08/20  0624   TROPONIN T ng/mL 0.100*     Results from last 7 days   Lab Units 02/14/20  0601 02/13/20  0557 02/12/20  0558   WBC 10*3/mm3 8.51 7.97 8.34   HEMOGLOBIN g/dL 7.9* 7.6* 7.8*   HEMATOCRIT % 25.2* 25.2* 25.6*   PLATELETS 10*3/mm3 104* 107* 109*         Results from last 7 days   Lab Units 02/12/20  0558   MAGNESIUM mg/dL 2.8*           I personally viewed and interpreted the patient's EKG/Telemetry data     Medications:   )  Current Facility-Administered Medications:   •  acetaminophen (TYLENOL) tablet 650 mg, 650 mg, Oral, Q4H PRN, 650 mg at 02/07/20 2302 **OR** acetaminophen (TYLENOL) 160 MG/5ML solution 650 mg, 650 mg, Oral, Q4H PRN **OR** acetaminophen (TYLENOL) suppository 650 mg, 650 mg, Rectal, Q4H PRN, Jeannine, Shinto, MD  •  aspirin EC tablet 81 mg, 81 mg, Oral, Daily, Jeannine,  MD Familia, 81 mg at 02/14/20 0955  •  calcium acetate (PHOS BINDER)) capsule 1,334 mg, 1,334 mg, Oral, TID With Meals, Familia Paez MD, 1,334 mg at 02/13/20 1738  •  dilTIAZem CD (CARDIZEM CD) 24 hr capsule 120 mg, 120 mg, Oral, Q24H, Familia Paez MD, 120 mg at 02/14/20 0956  •  ipratropium-albuterol (DUO-NEB) nebulizer solution 3 mL, 3 mL, Nebulization, Q4H PRN, Familia Paez MD  •  iron polysaccharides (NIFEREX) capsule 150 mg, 150 mg, Oral, Daily, Familia Paez MD, 150 mg at 02/14/20 0954  •  isosorbide mononitrate (IMDUR) 24 hr tablet 60 mg, 60 mg, Oral, Daily, Familia Paez MD, 60 mg at 02/14/20 0955  •  levothyroxine (SYNTHROID, LEVOTHROID) tablet 75 mcg, 75 mcg, Oral, Daily, Familia Paez MD, 75 mcg at 02/14/20 0957  •  metoprolol succinate XL (TOPROL-XL) 24 hr tablet 25 mg, 25 mg, Oral, Q24H, Familia Peaz MD, 25 mg at 02/14/20 0600  •  ondansetron (ZOFRAN) injection 4 mg, 4 mg, Intravenous, Q6H PRN, Familia Paez MD  •  pantoprazole (PROTONIX) EC tablet 40 mg, 40 mg, Oral, Daily, Familia Paez MD, 40 mg at 02/14/20 0955  •  Pharmacy to Dose Zosyn, , Does not apply, Continuous PRN, Familia Paez MD  •  piperacillin-tazobactam (ZOSYN) 3.375 g in iso-osmotic dextrose 50 ml (premix), 3.375 g, Intravenous, Q12H, Familia Paez MD, 3.375 g at 02/14/20 0106  •  predniSONE (DELTASONE) tablet 10 mg, 10 mg, Oral, Daily, Familia Paez MD, 10 mg at 02/14/20 0954  •  ranolazine (RANEXA) 12 hr tablet 500 mg, 500 mg, Oral, Q12H, Familia Paez MD, 500 mg at 02/14/20 0955  •  sodium chloride 0.9 % bolus 1,000 mL, 1,000 mL, Intravenous, PRN, Sanford Tsang MD, FASN  •  sodium chloride 0.9 % bolus 1,000 mL, 1,000 mL, Intravenous, PRN, Sanford Tsang MD, FASN  •  sodium chloride 0.9 % flush 10 mL, 10 mL, Intravenous, PRN, Familia Paez MD  •  sodium chloride 0.9 % flush 10 mL, 10 mL, Intravenous, Q12H, Familia Paez MD, 10 mL at 02/14/20 0957  •   sodium chloride 0.9 % flush 10 mL, 10 mL, Intravenous, PRN, Familia Paez MD    Assessment / Plan:     1.  Acute on chronic diastolic heart failure   --Grade 2 diastolic dysfunction with elevated left atrial pressures on echocardiogram  --Remains volume overloaded with worsening renal function  --BP well controlled  --HD catheter placed this morning, planning for volume removal with initiation of HD     2.  Multivessel coronary artery disease  --History of 3V CABG 2012 including LIMA-LAD, SVG-diagonal, and SVG-PDA  --Known occlusion of the SVG-diagonal graft with severe in-stent stenosis in the native diagonal branch, unattractive for PCI due to small caliber vessel  --Chest pain now well controlled with antianginals  --Continue metoprolol and Ranexa  --Will discontinue isosorbide given hypotension  --Continue daily aspirin  --No plans for invasive intervention     3.  Persistent atrial fibrillation  --Known history of atrial fibrillation, previously failed several antiarrhythmics and declined Tikosyn initiation  --Poor candidate for amiodarone due to interstitial lung disease  --Remains rate controlled  --Continue current dose of metoprolol and diltiazem  --Holding therapeutic anticoagulation due to anemia with possible GI bleed, consider restarting if hemoglobin remains stable     4.  Pulmonary arterial hypertension   --In the setting of interstitial lung disease  --RVSP on echocardiogram 60 mmHg     5.  Acute on chronic kidney disease  --Management per nephrology  --Planning for initiation of HD today     6.  Dyslipidemia  --Continue statin       Thank you for allowing me to participate in the care of your patient. Please to not hesitate to contact me with additional questions or concerns.     LA NENA Higgins MD  Interventional Cardiology   02/14/20  12:37 PM

## 2020-02-15 NOTE — PROGRESS NOTES
"Nephrology Progress Note.    LOS: 9 days    Patient Care Team:  Anthony Sweet DO as PCP - General (Family Medicine)  Tien Archer MD as Consulting Physician (Cardiac Electrophysiology)  Xu Block MD (Cardiology)  Yomi Higgins MD as Consulting Physician (Cardiology)    Chief Complaint:    Chief Complaint   Patient presents with   • Weakness - Generalized       Subjective:   Follow up for ESRD and Anemia.     Interval History:   The patient is seen and examined while on dialysis, she is feeling better, denies any chest pain or shortness of air, no nausea or vomiting, no abdominal pain.  Tolerating her dialysis without any difficulties    Objective:    Vital Signs  /66 (BP Location: Right arm, Patient Position: Lying)   Pulse 66   Temp 97.4 °F (36.3 °C) (Axillary)   Resp 18   Ht 147.3 cm (58\")   Wt 66.7 kg (147 lb)   LMP  (LMP Unknown)   SpO2 94%   BMI 30.72 kg/m²     No intake/output data recorded.    Intake/Output Summary (Last 24 hours) at 2/15/2020 1127  Last data filed at 2/15/2020 0600  Gross per 24 hour   Intake 552 ml   Output 1150 ml   Net -598 ml       Physical Exam:  Blood pressure 102/62, heart rate 77/min, ultrafiltration goal is 1500 cc,  cc General Appearance: alert, oriented x 3, no acute distress, patient appears to be chronically ill and frail  HEENT: Oral mucosa dry, extra occular movements intact. Sclera clear.  Skin: Warm and dry  Neck: supple, no JVD, trachea midline, she has tunneled dialysis catheter in the right IJ with exit site below the right clavicle with significant hematoma and ecchymoses over the insertion site the lower side of her neck  Lungs: Scattered rhonchi, unlabored breathing effort heart: Irregular rate and rhythm.  peripheral pulses weak but palpable.  Abdomen: Obese, soft, non-tender,  present bowel sounds to auscultation  : no palpable bladder.  Extremities: 2+ lower extremity edema, no cyanosis or clubbing.   Neuro: normal speech " and mental status, grossly non focal.     Results Review:   Results from last 7 days   Lab Units 02/15/20  0540 02/14/20  0601 02/13/20  0557   SODIUM mmol/L 139 137 136   POTASSIUM mmol/L 4.3 4.7 4.3   CHLORIDE mmol/L 101 96* 96*   CO2 mmol/L 22.4 21.3* 20.0*   BUN mg/dL 75* 132* 119*   CREATININE mg/dL 3.25* 4.74* 4.14*   CALCIUM mg/dL 9.1 9.5 9.6   ALBUMIN g/dL 3.20* 3.20* 3.10*   BILIRUBIN mg/dL 1.3* 1.4* 1.5*   ALK PHOS U/L 210* 215* 234*   ALT (SGPT) U/L 70* 106* 119*   AST (SGOT) U/L 65* 73* 83*   GLUCOSE mg/dL 119* 128* 122*     Estimated Creatinine Clearance: 12.2 mL/min (A) (by C-G formula based on SCr of 3.25 mg/dL (H)).  Results from last 7 days   Lab Units 02/12/20  0558 02/11/20  0640 02/10/20  0625 02/09/20  0548   MAGNESIUM mg/dL 2.8*  --   --   --    PHOSPHORUS mg/dL  --  6.5* 5.8* 5.3*         Results from last 7 days   Lab Units 02/15/20  0540 02/14/20  0601 02/13/20  0557 02/12/20  0558 02/11/20  0640   WBC 10*3/mm3 8.86 8.51 7.97 8.34 8.22   HEMOGLOBIN g/dL 8.7* 7.9* 7.6* 7.8* 8.5*   PLATELETS 10*3/mm3 77* 104* 107* 109* 119*         Brief Urine Lab Results  (Last result in the past 365 days)      Color   Clarity   Blood   Leuk Est   Nitrite   Protein   CREAT   Urine HCG        02/13/20 1329             43.3       02/13/20 1329             43.7           No results found for: UTPCR  Imaging Results (Last 24 Hours)     ** No results found for the last 24 hours. **          aspirin 81 mg Oral Daily   calcium acetate 1,334 mg Oral TID With Meals   dilTIAZem  mg Oral Q24H   iron polysaccharides 150 mg Oral Daily   levothyroxine 75 mcg Oral Daily   metoprolol succinate XL 25 mg Oral Q24H   pantoprazole 40 mg Oral Daily   piperacillin-tazobactam 3.375 g Intravenous Q12H   predniSONE 10 mg Oral Daily   ranolazine 500 mg Oral Q12H   sodium chloride 10 mL Intravenous Q12H       Pharmacy to Dose Zosyn          Medication Review:   Current Facility-Administered Medications   Medication Dose Route  Frequency Provider Last Rate Last Dose   • acetaminophen (TYLENOL) tablet 650 mg  650 mg Oral Q4H PRN Familia Paez MD   650 mg at 02/14/20 2043    Or   • acetaminophen (TYLENOL) 160 MG/5ML solution 650 mg  650 mg Oral Q4H PRN Familia Paez MD        Or   • acetaminophen (TYLENOL) suppository 650 mg  650 mg Rectal Q4H PRN Familia Paez MD       • aspirin EC tablet 81 mg  81 mg Oral Daily Familia Paez MD   81 mg at 02/14/20 0955   • calcium acetate (PHOS BINDER)) capsule 1,334 mg  1,334 mg Oral TID With Meals Familia Paez MD   1,334 mg at 02/14/20 1837   • dilTIAZem CD (CARDIZEM CD) 24 hr capsule 120 mg  120 mg Oral Q24H Familia Paez MD   120 mg at 02/14/20 0956   • ipratropium-albuterol (DUO-NEB) nebulizer solution 3 mL  3 mL Nebulization Q4H PRN Familia Paez MD       • iron polysaccharides (NIFEREX) capsule 150 mg  150 mg Oral Daily Familia Paez MD   150 mg at 02/14/20 0954   • levothyroxine (SYNTHROID, LEVOTHROID) tablet 75 mcg  75 mcg Oral Daily Familia Paez MD   75 mcg at 02/14/20 0957   • metoprolol succinate XL (TOPROL-XL) 24 hr tablet 25 mg  25 mg Oral Q24H Familia Paez MD   25 mg at 02/14/20 0600   • ondansetron (ZOFRAN) injection 4 mg  4 mg Intravenous Q6H PRN Familia Paez MD       • pantoprazole (PROTONIX) EC tablet 40 mg  40 mg Oral Daily Familia Paez MD   40 mg at 02/14/20 0955   • Pharmacy to Dose Zosyn   Does not apply Continuous PRN Familia Paez MD       • piperacillin-tazobactam (ZOSYN) 3.375 g in iso-osmotic dextrose 50 ml (premix)  3.375 g Intravenous Q12H Familia Paez MD   3.375 g at 02/15/20 0102   • predniSONE (DELTASONE) tablet 10 mg  10 mg Oral Daily Familia Paez MD   10 mg at 02/14/20 0954   • ranolazine (RANEXA) 12 hr tablet 500 mg  500 mg Oral Q12H Familia Paez MD   500 mg at 02/14/20 2043   • sodium chloride 0.9 % flush 10 mL  10 mL Intravenous PRN Familia Paez MD       • sodium chloride 0.9  % flush 10 mL  10 mL Intravenous Q12H Familia Paez MD   10 mL at 02/14/20 0957   • sodium chloride 0.9 % flush 10 mL  10 mL Intravenous PRN Familia Paez MD           Assessment/Plan:  1. Acute kidney injury on top of chronic kidney disease currently receiving dialysis due to worsening renal function and fluid excess.  She appears to be tolerating the treatment we will try to challenge her fluid removal by 500 cc today.   2. Chronic kidney disease stage IV: He has known to have stage IV chronic kidney disease, it has been worsening gradually with multiple hospitalization  3. Hyperkalemia: Solved  4. Metabolic acidosis: Improved with oral sodium bicarb and dialysis  5. Abnormal liver function:   6. Anemia: Was transfused yesterday and her hemoglobin today is 8.7   7. coronary artery disease, status post CABG, status post stent placement:  8. elevated pulmonary pressure of 45 to 55 mmHg.  9. Acute on chronic respiratory failure: Improved  10. Hypersensitivity pneumonitis  11. Interstitial lung disease  12. Thrombocytopenia, platelet is 77,000.      Plan:  · Continue the same treatment  · Surveillance labs.  · Will reevaluate tomorrow and decide if she needs dialysis sooner than Monday    Jayson Milner MD  02/15/20  11:27 AM    Dictated utilizing Dragon dictation.

## 2020-02-15 NOTE — PROGRESS NOTES
"Patient: Pat Morel  Procedure(s):  HEMODIALYSIS CATHETER INSERTION(DINO)  Anesthesia type: Monitored Anesthesia Care    Patient location: [facility] Surgical Floor  Last vitals: /66 (BP Location: Right arm, Patient Position: Lying)   Pulse 66   Temp 97.4 °F (36.3 °C) (Axillary)   Resp 18   Ht 147.3 cm (58\")   Wt 66.7 kg (147 lb)   LMP  (LMP Unknown)   SpO2 94%   BMI 30.72 kg/m²   Level of consciousness: awake, alert and oriented    Post-anesthesia pain: adequate analgesia  Airway patency: patent  Respiratory: unassisted  Cardiovascular: stable and blood pressure at baseline  Hydration: euvolemic    Anesthetic complications: no     "

## 2020-02-15 NOTE — PLAN OF CARE
Problem: NPPV/CPAP (Adult)  Goal: Signs and Symptoms of Listed Potential Problems Will be Absent, Minimized or Managed (NPPV/CPAP)  Outcome: Ongoing (interventions implemented as appropriate)  Flowsheets (Taken 2/15/2020 2279)  Problems Assessed (NPPV/CPAP): all  Problems Present (NPPV/CPAP): none

## 2020-02-15 NOTE — PLAN OF CARE
Problem: Patient Care Overview  Goal: Plan of Care Review  Outcome: Ongoing (interventions implemented as appropriate)  Flowsheets (Taken 2/15/2020 0835)  Progress: improving  Plan of Care Reviewed With: patient  Outcome Summary: VSS; plan for possible dialysis in am; continue antibiotics; pt wore bipap until 2651

## 2020-02-15 NOTE — PROGRESS NOTES
[facility]    [facility]    Name:  Pat Morel   Age:  80 y.o.  Sex:  female  :  1940  MRN:  [id number]   Visit Number:  [id number]  Admission Date:  2020  Date Of Service:  02/15/20  Primary Care Physician:  Anthony Sweet DO     LOS: 9 days :      Chief Complaint:  Follow up renal failure and respiratory failure        Subjective / Interval History:  The patient was seen again this morning during hemodialysis.  She appears to have less pallor today post blood transfusion yesterday.  Her hemoglobin has improved.  With dementia, she has difficulty providing adequate chief complaint and review of systems.  She denies any pain, chest pain, shortness of breath, nausea or vomiting.     The patient is a 80-year-old lady with ANDRE brooke, coronary artery disease with history of CABG, CKD 3, history of CVA, chronic diastolic CHF, chronic iron deficiency anemia, hypersensitivity pneumonitis on chronic prednisone, hypertension.  The patient has chronic respiratory failure with acute on chronic respiratory failure during her hospitalization with worsening dyspnea related to worsening fluid overload and renal failure.  She had required increased doses of diuretics and had also acute on chronic worsening anemia as well as worsening creatinine requiring initiation of hemodialysis.  On admission, she had increased prednisone burst tapered back down to 10 mg daily until she follows back up with outpatient pulmonologist Dr. Laura.  I think questioning herself is normal but    Dr. Higgins followed in consultation with intermittent ANDRE brooke with RVR, currently controlled on diltiazem and metoprolol.  She has not yet been restarted on anticoagulation due to previous anemia and concern previously for gastrointestinal bleeding as well as elevated liver enzymes/possible cholangitis.  Then, the patient had planned tunneled dialysis catheter placement and has been started on aspirin daily.  She was previously on plavix and eliquis.     Dr Tsang followed with progressive renal failure.        Review of Systems: difficult to obtain with likely dementia    General ROS: Patient denies any fevers, chills or loss of consciousness. Positive generalized  weakness  Ophthalmic ROS: Denies any diplopia or transient loss of vision.  ENT ROS: Denies sore throat, nasal congestion or ear pain.   Respiratory ROS: Improved cough with resolved shortness of breath.  Cardiovascular ROS: Denies chest pain or palpitations. No history of exertional chest pain.   Gastrointestinal ROS: Improved appetite with  no vomiting.  Resolved abdominal pain. No diarrhea.  Genito-Urinary ROS: Denies dysuria or hematuria.  Musculoskeletal ROS: Denies chronic back pain. No muscle pain. No calf pain.  Neurological ROS: Denies any focal weakness. No loss of consciousness. Denies any numbness. Denies neck pain.   Dermatological ROS: Denies any redness or pruritis.    Vital Signs:    Temp:  [96.3 °F (35.7 °C)-97.8 °F (36.6 °C)] 97.3 °F (36.3 °C)  Heart Rate:  [62-84] 66  Resp:  [16-18] 18  BP: ()/(58-67) 113/58    Intake and output:    I/O last 3 completed shifts:  In: 827 [P.O.:120; I.V.:407; Blood:300]  Out: 1350 [Urine:350; Other:1000]  I/O this shift:  In: -   Out: 1500 [Other:1500]    Physical Examination: Examined today    General Appearance:   Elderly lady.  Chronically ill-appearing.  Alert and oriented.    Head:    Atraumatic and normocephalic, without obvious abnormality.   Eyes:            PERRLA, conjunctivae and sclerae normal, no Icterus. No pallor. Extraocular movements are within normal limits.   Throat:   No oral lesions, no thrush, oral mucosa moist.   Neck:   Supple, trachea midline, no thyromegaly   Lungs:     Chest shape is normal. Breath sounds heard bilaterally equally.  Trace right lower lobe crackles no wheezing. No Pleural rub or bronchial breathing.    Heart:    Normal S1 and S2, no murmur, no gallop, no rub.  "No JVD   Abdomen:     Normal bowel sounds, no masses, no organomegaly.  Resolved abdominal pain, soft, non-distended, no guarding, no rebound tenderness, obese   Extremities:   Moves all extremities well, stable equal leg edema, no cyanosis   Skin:  Bruise around tunneled dialysis catheter sutured sites without active bleedingor rash.   Neurologic:   No tremor, sensation intact, diffuse bilateral equal arm and leg generalized weakness     Laboratory results:    Lab Results (last 24 hours)     Procedure Component Value Units Date/Time    Procalcitonin [[id number]]  (Abnormal) Collected:  02/15/20 0540    Specimen:  Blood Updated:  02/15/20 0641     Procalcitonin 0.43 ng/mL     Narrative:       As a Marker for Sepsis (Non-Neonates):   1. <0.5 ng/mL represents a low risk of severe sepsis and/or septic shock.  1. >2 ng/mL represents a high risk of severe sepsis and/or septic shock.    As a Marker for Lower Respiratory Tract Infections that require antibiotic therapy:  PCT on Admission     Antibiotic Therapy             6-12 Hrs later  > 0.5                Strongly Recommended            >0.25 - <0.5         Recommended  0.1 - 0.25           Discouraged                   Remeasure/reassess PCT  <0.1                 Strongly Discouraged          Remeasure/reassess PCT      As 28 day mortality risk marker: \"Change in Procalcitonin Result\" (> 80 % or <=80 %) if Day 0 (or Day 1) and Day 4 values are available. Refer to http://www.[employer]-pct-calculator.com/   Change in PCT <=80 %   A decrease of PCT levels below or equal to 80 % defines a positive change in PCT test result representing a higher risk for 28-day all-cause mortality of patients diagnosed with severe sepsis or septic shock.  Change in PCT > 80 %   A decrease of PCT levels of more than 80 % defines a negative change in PCT result representing a lower risk for 28-day all-cause mortality of patients diagnosed with severe sepsis or septic shock.                Results " may be falsely decreased if patient taking Biotin.     Comprehensive Metabolic Panel [[id number]]  (Abnormal) Collected:  02/15/20 0540    Specimen:  Blood Updated:  02/15/20 0606     Glucose 119 mg/dL      BUN 75 mg/dL      Creatinine 3.25 mg/dL      Sodium 139 mmol/L      Potassium 4.3 mmol/L      Chloride 101 mmol/L      CO2 22.4 mmol/L      Calcium 9.1 mg/dL      Total Protein 5.8 g/dL      Albumin 3.20 g/dL      ALT (SGPT) 70 U/L      AST (SGOT) 65 U/L      Alkaline Phosphatase 210 U/L      Total Bilirubin 1.3 mg/dL      eGFR Non African Amer 14 mL/min/1.73      Comment: <15 Indicative of kidney failure.        eGFR   Amer --     Comment: <15 Indicative of kidney failure.        Globulin 2.6 gm/dL      A/G Ratio 1.2 g/dL      BUN/Creatinine Ratio 23.1     Anion Gap 15.6 mmol/L     Narrative:       GFR Normal >60  Chronic Kidney Disease <60  Kidney Failure <15      CBC & Differential [[id number]] Collected:  02/15/20 0540    Specimen:  Blood Updated:  02/15/20 0552    Narrative:       The following orders were created for panel order CBC & Differential.  Procedure                               Abnormality         Status                     ---------                               -----------         ------                     CBC Auto Differential[[id number]]        Abnormal            Final result                 Please view results for these tests on the individual orders.    CBC Auto Differential [[id number]]  (Abnormal) Collected:  02/15/20 0540    Specimen:  Blood Updated:  02/15/20 0552     WBC 8.86 10*3/mm3      RBC 2.96 10*6/mm3      Hemoglobin 8.7 g/dL      Hematocrit 27.4 %      MCV 92.6 fL      MCH 29.4 pg      MCHC 31.8 g/dL      RDW 18.4 %      RDW-SD 59.7 fl      MPV 11.7 fL      Platelets 77 10*3/mm3      Neutrophil % 93.0 %      Lymphocyte % 2.1 %      Monocyte % 3.5 %      Eosinophil % 0.0 %      Basophil % 0.2 %      Immature Grans % 1.2 %      Neutrophils, Absolute 8.23 10*3/mm3       Lymphocytes, Absolute 0.19 10*3/mm3      Monocytes, Absolute 0.31 10*3/mm3      Eosinophils, Absolute 0.00 10*3/mm3      Basophils, Absolute 0.02 10*3/mm3      Immature Grans, Absolute 0.11 10*3/mm3      nRBC 0.9 /100 WBC     Hepatitis Panel, Acute [[id number]] Collected:  02/14/20 1125    Specimen:  Blood Updated:  02/14/20 1847    Narrative:       The following orders were created for panel order Hepatitis Panel, Acute.  Procedure                               Abnormality         Status                     ---------                               -----------         ------                     Hepatitis Panel, Acute[[id number]]       Normal              Final result               Hep B Confirmation Tube[[id number]]                          Final result                 Please view results for these tests on the individual orders.    Hepatitis Panel, Acute [[id number]]  (Normal) Collected:  02/14/20 1125    Specimen:  Blood Updated:  02/14/20 1847     Hepatitis B Surface Ag Non-Reactive     Hep A IgM Non-Reactive     Hep B C IgM Non-Reactive     Hepatitis C Ab Non-Reactive    Narrative:       Results may be falsely decreased if patient taking Biotin.     Blood Culture With LESLYE - Blood, Arm, Left [[id number]] Collected:  02/11/20 1804    Specimen:  Blood from Arm, Left Updated:  02/14/20 1815     Blood Culture No growth at 3 days    Blood Culture With LESLYE - Blood, Arm, Left [[id number]] Collected:  02/11/20 1510    Specimen:  Blood from Arm, Left Updated:  02/14/20 1530     Blood Culture No growth at 3 days          I have reviewed the patient's laboratory results.    Radiology results:    Imaging Results (Last 24 Hours)     ** No results found for the last 24 hours. **          I have reviewed the patient's radiology reports.    Medication Review:     I have reviewed the patients active and prn medications.     Assessment:  1.  Acute renal failure on chronic kidney disease stage III, present on admission, requiring  initiation of hemodialysis  2.  Recurrent falls at home secondary to uremia, present on admission.  3.  Acute hyperkalemia secondary to #1, present on admission, resolved.  4.  Demand ischemia with elevated troponin levels, present on admission.  5.  Hypersensitivity pneumonitis on prednisone therapy.  6.  Acute on Chronic hypoxic respiratory failure on home oxygen, now requiring intermittent bipap therapy  7.  Acute on Chronic diastolic heart failure exacerbation  8.  Paroxysmal atrial fibrillation on apixaban, held with anemia; post Afib with RVR, now improved control  9.  Coronary artery disease status post stents and CABG.  10.  Chronic pulmonary hypertension.  11.  Acquired hypothyroidism.  12.  Elevated transaminases of uncertain etiology. With epigastric and right upper abdominal pain; cannot rule out Cholangitis and patient declined workup  13. History of stroke with poor historian suspect chronic dementia  14. Acute worsening chronic anemia, requiring 1 unit PRBC transfusion with hemodialysis  15. Positive hemoccult stool, without gross bleeding, declined intervention or further workup        Plan:  Continue to monitor inpatient.  She is receiving hemodialysis daily as directed by nephrology.  LFTs continue to improve and consider that she may have passed a gallstone.  She will complete 5 days of Zosyn.  The patient is overall improved with improved appetite.  She remains severely weak requiring aggressive inpatient PT and OT and due to be transferred to acute rehab once stable for discharge.  I anticipate she may be improved for discharge early next week.    Dr. Higgins has been following in consultation.  Continue current medical therapy for cardiac disease.  She is DNR.  Her prognosis remains very poor. She may desire future palliative care at nursing facility on discharge. The patient told her family she would endur hemodialysis for at least the next 1-2 weeks, until her granddaughter returns from vacation  in [place]. If she worsens after that time, she may consider future Hospice Care.     Medication risks and benefits were discussed in detail. Patient reported satisfaction with care delivered and treatment plan.     Tamara Tomlin DO  02/15/20  3:01 PM

## 2020-02-16 NOTE — PROGRESS NOTES
[facility]    Name:  Pat Morel   Age:  80 y.o.  Sex:  female  :  1940  MRN:  [id number]   Visit Number:  [id number]  Admission Date:  2020  Date Of Service:  20  Primary Care Physician:  Anthony Sweet DO     LOS: 10 days :      Chief Complaint:  Follow up renal failure and respiratory failure        Subjective / Interval History:  The patient was seen again today.  She reports feeling extremely weak after having dialysis for 2 days in a row.  She denies nausea or vomiting or abdominal pain.  She denies any chest pain or shortness of breath.  No acute events occurred overnight.  Her blood pressure is lower today.  Her medications been adjusted and I did give her a bolus of albumin.  Nephrology will decide if she is dialyzed again.    The patient is a 80-year-old lady with A. fib, coronary artery disease with history of CABG, CKD 3, history of CVA, chronic diastolic CHF, chronic iron deficiency anemia, hypersensitivity pneumonitis on chronic prednisone, hypertension.  The patient has chronic respiratory failure with acute on chronic respiratory failure during her hospitalization with worsening dyspnea related to worsening fluid overload and renal failure.  She had required increased doses of diuretics and had also acute on chronic worsening anemia as well as worsening creatinine requiring initiation of hemodialysis.  On admission, she had increased prednisone burst tapered back down to 10 mg daily until she follows back up with outpatient pulmonologist Dr. Laura.  I think questioning herself is normal but    Dr. Higgins followed in consultation with intermittent ANDRE brooke with RVR, currently controlled on diltiazem and metoprolol.  She has not yet been restarted on anticoagulation due to previous anemia and concern previously for gastrointestinal bleeding as well as elevated liver enzymes/possible cholangitis.  Then, the patient had planned  tunneled dialysis catheter placement and has been started on aspirin daily. She was previously on plavix and eliquis.     Dr Tsang followed with progressive renal failure.        Review of Systems: difficult to obtain with dementia    General ROS: Patient denies any fevers, chills or loss of consciousness.  Persistent generalized  weakness  Ophthalmic ROS: Denies any diplopia or transient loss of vision.  ENT ROS: Denies sore throat, nasal congestion or ear pain.   Respiratory ROS: Improved cough with resolved shortness of breath.  Cardiovascular ROS: Denies chest pain or palpitations. No history of exertional chest pain.   Gastrointestinal ROS: Still improved appetite with  no vomiting.  Still resolved abdominal pain. No diarrhea.  Genito-Urinary ROS: Denies dysuria or hematuria.  Musculoskeletal ROS: Denies chronic back pain. No muscle pain. No calf pain.  Neurological ROS: Denies any focal weakness. No loss of consciousness. Denies any numbness. Denies neck pain.   Dermatological ROS: Denies any redness or pruritis.    Vital Signs:    Temp:  [96.6 °F (35.9 °C)-97.5 °F (36.4 °C)] 97.5 °F (36.4 °C)  Heart Rate:  [65-84] 68  Resp:  [18] 18  BP: ()/(52-67) 95/53    Intake and output:    I/O last 3 completed shifts:  In: 1142 [P.O.:960; I.V.:132; IV Piggyback:50]  Out: 1700 [Urine:200; Other:1500]  I/O this shift:  In: -   Out: 50 [Urine:50]    Physical Examination: Examined again today    General Appearance:   Elderly lady.  Chronically ill-appearing.  Alert and oriented.  Malaised appearing   Head:    Atraumatic and normocephalic, without obvious abnormality.   Eyes:            PERRLA, conjunctivae and sclerae normal, no Icterus. No pallor. Extraocular movements are within normal limits.   Throat:   No oral lesions, no thrush, oral mucosa moist.   Neck:   Supple, trachea midline, no thyromegaly   Lungs:     Chest shape is normal. Breath sounds heard bilaterally equally.  No crackles no wheezing. No Pleural  rub or bronchial breathing.    Heart:    Normal S1 and S2, no murmur, no gallop, no rub. No JVD   Abdomen:     Normal bowel sounds, no masses, no organomegaly.  Resolved abdominal pain, soft, non-distended, no guarding, no rebound tenderness, obese   Extremities:   Moves all extremities well, improved diffuse edema, no cyanosis   Skin:  Stable improving bruise/small hematoma, around tunneled dialysis catheter sutured sites without active bleedingor rash.   Neurologic:   No tremor, sensation intact, diffuse bilateral equal arm and leg generalized weakness     Laboratory results:    Lab Results (last 24 hours)     Procedure Component Value Units Date/Time    Phosphorus [[id number]]  (Normal) Collected:  02/16/20 0602    Specimen:  Blood Updated:  02/16/20 0635     Phosphorus 3.4 mg/dL     Hepatic Function Panel [[id number]]  (Abnormal) Collected:  02/16/20 0602    Specimen:  Blood Updated:  02/16/20 0632     Total Protein 5.7 g/dL      Albumin 3.10 g/dL      ALT (SGPT) 53 U/L      AST (SGOT) 66 U/L      Alkaline Phosphatase 233 U/L      Total Bilirubin 1.4 mg/dL      Bilirubin, Direct 0.9 mg/dL      Bilirubin, Indirect 0.5 mg/dL     Basic Metabolic Panel [[id number]]  (Abnormal) Collected:  02/16/20 0602    Specimen:  Blood Updated:  02/16/20 0632     Glucose 118 mg/dL      BUN 49 mg/dL      Creatinine 2.54 mg/dL      Sodium 138 mmol/L      Potassium 4.1 mmol/L      Chloride 103 mmol/L      CO2 21.1 mmol/L      Calcium 9.3 mg/dL      eGFR Non African Amer 18 mL/min/1.73      BUN/Creatinine Ratio 19.3     Anion Gap 13.9 mmol/L     Narrative:       GFR Normal >60  Chronic Kidney Disease <60  Kidney Failure <15      Magnesium [[id number]]  (Normal) Collected:  02/16/20 0602    Specimen:  Blood Updated:  02/16/20 0632     Magnesium 2.4 mg/dL     CBC & Differential [[id number]] Collected:  02/16/20 0602    Specimen:  Blood Updated:  02/16/20 0624    Narrative:       The following orders were created for panel order CBC &  Differential.  Procedure                               Abnormality         Status                     ---------                               -----------         ------                     CBC Auto Differential[[id number]]        Abnormal            Final result                 Please view results for these tests on the individual orders.    CBC Auto Differential [[id number]]  (Abnormal) Collected:  02/16/20 0602    Specimen:  Blood Updated:  02/16/20 0624     WBC 10.53 10*3/mm3      RBC 3.12 10*6/mm3      Hemoglobin 9.0 g/dL      Hematocrit 29.5 %      MCV 94.6 fL      MCH 28.8 pg      MCHC 30.5 g/dL      RDW 18.8 %      RDW-SD 63.0 fl      MPV 13.7 fL      Platelets 80 10*3/mm3      Neutrophil % 93.4 %      Lymphocyte % 1.9 %      Monocyte % 2.9 %      Eosinophil % 0.0 %      Basophil % 0.2 %      Immature Grans % 1.6 %      Neutrophils, Absolute 9.83 10*3/mm3      Lymphocytes, Absolute 0.20 10*3/mm3      Monocytes, Absolute 0.31 10*3/mm3      Eosinophils, Absolute 0.00 10*3/mm3      Basophils, Absolute 0.02 10*3/mm3      Immature Grans, Absolute 0.17 10*3/mm3      nRBC 0.7 /100 WBC     Blood Culture With LESLYE - Blood, Arm, Left [[id number]] Collected:  02/11/20 1804    Specimen:  Blood from Arm, Left Updated:  02/15/20 1815     Blood Culture No growth at 4 days    Blood Culture With LESLYE - Blood, Arm, Left [[id number]] Collected:  02/11/20 1510    Specimen:  Blood from Arm, Left Updated:  02/15/20 1530     Blood Culture No growth at 4 days          I have reviewed the patient's laboratory results.    Radiology results:    Imaging Results (Last 24 Hours)     ** No results found for the last 24 hours. **          I have reviewed the patient's radiology reports.    Medication Review:     I have reviewed the patients active and prn medications.     Assessment:  1.  Acute renal failure on chronic kidney disease stage III, present on admission, requiring initiation of hemodialysis  2.  Recurrent falls at home secondary to  uremia, present on admission.  3.  Acute hyperkalemia secondary to #1, present on admission, resolved.  4.  Demand ischemia with elevated troponin levels, present on admission.  5.  Hypersensitivity pneumonitis on prednisone therapy.  6.  Acute on Chronic hypoxic respiratory failure on home oxygen, improved after initiation of hemodialysis. Using Bipap nightly  7.  Acute on Chronic diastolic heart failure exacerbation  8.  Paroxysmal atrial fibrillation on apixaban, held with anemia; post Afib with RVR, now improved control  9.  Coronary artery disease status post stents and CABG.  10.  Chronic pulmonary hypertension.  11.  Acquired hypothyroidism.  12.  Elevated transaminases of uncertain etiology. With epigastric and right upper abdominal pain; cannot rule out Cholangitis and patient declined workup  13. History of stroke with poor historian suspect chronic dementia  14. Acute worsening chronic anemia, requiring 1 unit PRBC transfusion with hemodialysis  15. Positive hemoccult stool, without gross bleeding, declined intervention or further workup        Plan:  Continue to monitor her in the hospital awaiting frequent hemodialysis per nephrology.  Her anemia improved with 1 unit of packed red blood cells.  Her LFTs have improved and consider she may have passed a gallstone with possible cholangitis concerns but since her symptoms have improved after MR LONA.  GI has been following.  The patient wants no interventions or aggressive therapies.  She has completed 5 days of Zosyn.  She is now able to eat and drink without nausea and vomiting.  Her abdominal pain has resolved.  Her LFTs have improved and no though  That her myalgias and pain in her legs as well as her LFTs have improved after stopping statin therapy.  I discussed the risks and benefits of the medication and the patient and son have decided against taking statin.    She is continued on oxygen and bipap nightly. She is severely weak today after 2 days in a  row of hemodialysis. She will try to continue to work with PT, OT as possible.     Dr. Higgins has been following in consultation.  Continue current medical therapy for cardiac disease.  She is DNR.  Her prognosis remains very poor. She may desire future palliative care at nursing facility on discharge. The patient told her family she would endur hemodialysis for at least the next 1-2 weeks, until her granddaughter returns from vacation in [place]. If she worsens after that time, she may consider future Hospice Care.   Remove eid catheter and in and out cath as needed.   Medication risks and benefits were discussed in detail. Patient reported satisfaction with care delivered and treatment plan.     Tamara Tomlin,   02/16/20  12:57 PM

## 2020-02-16 NOTE — PROGRESS NOTES
"Nephrology Progress Note.    LOS: 10 days    Patient Care Team:  Anthony Sweet DO as PCP - General (Family Medicine)  Tien Archer MD as Consulting Physician (Cardiac Electrophysiology)  Xu Block MD (Cardiology)  Yomi Higgins MD as Consulting Physician (Cardiology)    Chief Complaint:    Chief Complaint   Patient presents with   • Weakness - Generalized       Subjective:   Follow up for ESRD and Anemia.     Interval History:   The patient is feeling somewhat better, remains with poor appetite and significant edema, denies any chest pain, her shortness of air has improved, no nausea or vomiting, no dysuria or gross hematuria.  She had dialysis yesterday without any difficulty    Objective:    Vital Signs  BP 95/53 (BP Location: Right arm, Patient Position: Sitting)   Pulse 68   Temp 97.5 °F (36.4 °C) (Axillary)   Resp 18   Ht 147.3 cm (58\")   Wt 64.5 kg (142 lb 3.2 oz)   LMP  (LMP Unknown)   SpO2 94%   BMI 29.72 kg/m²     I/O this shift:  In: 240 [P.O.:240]  Out: 50 [Urine:50]    Intake/Output Summary (Last 24 hours) at 2/16/2020 1509  Last data filed at 2/16/2020 1100  Gross per 24 hour   Intake 890 ml   Output 100 ml   Net 790 ml       Physical Exam: Appearance: alert, oriented x 3, no acute distress, patient appears to be chronically ill and frail  HEENT: Oral mucosa dry, extra occular movements intact. Sclera clear.  Skin: Warm and dry  Neck: supple, no JVD, trachea midline, she has tunneled dialysis catheter in the right IJ with exit site below the right clavicle with significant hematoma and ecchymoses over the insertion site the lower side of her neck  Lungs: Scattered rhonchi, unlabored breathing effort heart: Irregular rate and rhythm.  peripheral pulses weak but palpable.  Abdomen: Obese, soft, non-tender,  present bowel sounds to auscultation  : no palpable bladder.  Extremities: 3+ upper and lower extremities edema , no cyanosis or clubbing.   Neuro: normal speech and " mental status, grossly non focal.     Results Review:   Results from last 7 days   Lab Units 02/16/20  0602 02/15/20  0540 02/14/20  0601   SODIUM mmol/L 138 139 137   POTASSIUM mmol/L 4.1 4.3 4.7   CHLORIDE mmol/L 103 101 96*   CO2 mmol/L 21.1* 22.4 21.3*   BUN mg/dL 49* 75* 132*   CREATININE mg/dL 2.54* 3.25* 4.74*   CALCIUM mg/dL 9.3 9.1 9.5   ALBUMIN g/dL 3.10* 3.20* 3.20*   BILIRUBIN mg/dL 1.4* 1.3* 1.4*   ALK PHOS U/L 233* 210* 215*   ALT (SGPT) U/L 53* 70* 106*   AST (SGOT) U/L 66* 65* 73*   GLUCOSE mg/dL 118* 119* 128*     Estimated Creatinine Clearance: 15.3 mL/min (A) (by C-G formula based on SCr of 2.54 mg/dL (H)).  Results from last 7 days   Lab Units 02/16/20  0602 02/12/20  0558 02/11/20  0640 02/10/20  0625   MAGNESIUM mg/dL 2.4 2.8*  --   --    PHOSPHORUS mg/dL 3.4  --  6.5* 5.8*         Results from last 7 days   Lab Units 02/16/20  0602 02/15/20  0540 02/14/20  0601 02/13/20  0557 02/12/20  0558   WBC 10*3/mm3 10.53 8.86 8.51 7.97 8.34   HEMOGLOBIN g/dL 9.0* 8.7* 7.9* 7.6* 7.8*   PLATELETS 10*3/mm3 80* 77* 104* 107* 109*         Brief Urine Lab Results  (Last result in the past 365 days)      Color   Clarity   Blood   Leuk Est   Nitrite   Protein   CREAT   Urine HCG        02/13/20 1329             43.3       02/13/20 1329             43.7           No results found for: UTPCR  Imaging Results (Last 24 Hours)     ** No results found for the last 24 hours. **          aspirin 81 mg Oral Daily   calcium acetate 1,334 mg Oral TID With Meals   dilTIAZem  mg Oral Q24H   iron polysaccharides 150 mg Oral Daily   levothyroxine 75 mcg Oral Daily   metoprolol succinate XL 25 mg Oral Q24H   pantoprazole 40 mg Oral Daily   piperacillin-tazobactam 3.375 g Intravenous Q12H   predniSONE 10 mg Oral Daily   ranolazine 500 mg Oral Q12H   sodium chloride 10 mL Intravenous Q12H       Pharmacy to Dose Zosyn          Medication Review:   Current Facility-Administered Medications   Medication Dose Route Frequency  Provider Last Rate Last Dose   • acetaminophen (TYLENOL) tablet 650 mg  650 mg Oral Q4H PRN Familia Paez MD   650 mg at 02/14/20 2043    Or   • acetaminophen (TYLENOL) 160 MG/5ML solution 650 mg  650 mg Oral Q4H PRN Familia Paez MD        Or   • acetaminophen (TYLENOL) suppository 650 mg  650 mg Rectal Q4H PRN Familia Paez MD       • aspirin EC tablet 81 mg  81 mg Oral Daily Familia Paez MD   81 mg at 02/16/20 0927   • calcium acetate (PHOS BINDER)) capsule 1,334 mg  1,334 mg Oral TID With Meals Familia Paez MD   Stopped at 02/16/20 1200   • dilTIAZem CD (CARDIZEM CD) 24 hr capsule 120 mg  120 mg Oral Q24H Familia Paez MD   120 mg at 02/16/20 1144   • ipratropium-albuterol (DUO-NEB) nebulizer solution 3 mL  3 mL Nebulization Q4H PRN Familia Paez MD       • iron polysaccharides (NIFEREX) capsule 150 mg  150 mg Oral Daily Familia Paez MD   150 mg at 02/16/20 0927   • levothyroxine (SYNTHROID, LEVOTHROID) tablet 75 mcg  75 mcg Oral Daily Familia Paez MD   75 mcg at 02/16/20 0927   • metoprolol succinate XL (TOPROL-XL) 24 hr tablet 25 mg  25 mg Oral Q24H Familia Paez MD   25 mg at 02/16/20 1144   • ondansetron (ZOFRAN) injection 4 mg  4 mg Intravenous Q6H PRN Familia Paez MD       • pantoprazole (PROTONIX) EC tablet 40 mg  40 mg Oral Daily Familia Paez MD   40 mg at 02/16/20 0927   • Pharmacy to Dose Zosyn   Does not apply Continuous PRN Familia Paez MD       • piperacillin-tazobactam (ZOSYN) 3.375 g in iso-osmotic dextrose 50 ml (premix)  3.375 g Intravenous Q12H Familia Paez MD   3.375 g at 02/16/20 1446   • polyethylene glycol (MIRALAX) packet 17 g  17 g Oral Daily PRN Tamara Tomlin DO       • predniSONE (DELTASONE) tablet 10 mg  10 mg Oral Daily Familia Paez MD   10 mg at 02/16/20 0927   • ranolazine (RANEXA) 12 hr tablet 500 mg  500 mg Oral Q12H Familia Paez MD   Stopped at 02/16/20 1055   • sodium chloride 0.9 %  flush 10 mL  10 mL Intravenous PRN Familia Paez MD       • sodium chloride 0.9 % flush 10 mL  10 mL Intravenous Q12H Fmailia Paez MD   10 mL at 02/16/20 0934   • sodium chloride 0.9 % flush 10 mL  10 mL Intravenous PRN Familia Paez MD           Assessment/Plan:  1. Acute kidney injury on top of chronic kidney disease currently receiving dialysis due to worsening renal function and fluid excess.  Had dialysis yesterday, continue to have significant fluid excess, her electrolytes within acceptable range, her albumin is 3.1, magnesium 2.4, phosphorus 3.4.  2. Chronic kidney disease stage IV: He has known to have stage IV chronic kidney disease, it has been worsening gradually with multiple hospitalization  3. Hyperkalemia: Solved  4. Metabolic acidosis: Improved with oral sodium bicarb and dialysis  5. Abnormal liver function:   6. Anemia: Was transfused yesterday and her hemoglobin today is 9  7. coronary artery disease, status post CABG, status post stent placement:  8. elevated pulmonary pressure of 45 to 55 mmHg.  9. Acute on chronic respiratory failure: Improved  10. Hypersensitivity pneumonitis  11. Interstitial lung disease  12. Thrombocytopenia, platelet is 80,000.      Plan:  · Continue the same treatment  · Surveillance labs.  · Hemodialysis tomorrow and will challenge fluid removal.    Jayson Milner MD  02/16/20  3:09 PM    Dictated utilizing Dragon dictation.

## 2020-02-16 NOTE — PLAN OF CARE
"  Problem: Patient Care Overview  Goal: Plan of Care Review  Outcome: Ongoing (interventions implemented as appropriate)  Flowsheets (Taken 2/16/2020 1810)  Progress: improving  Plan of Care Reviewed With: patient  Outcome Summary: During morning pt felt more lethargic, BP 90-50, wore bipap. After lunch and IV albumin pt felt \"better\" with /60s. Pt remains weak with improved bed mobility, f/c discontinued today. Will continue to monitor.     "

## 2020-02-17 NOTE — THERAPY TREATMENT NOTE
Acute Care - Occupational Therapy Treatment Note   Jose     Patient Name: Pat Morel  : 1940  MRN: [id number]  Today's Date: 2020  Onset of Illness/Injury or Date of Surgery: 20  Date of Referral to OT: 20  Referring Physician: Dr. Mercado    Admit Date: 2020       ICD-10-CM ICD-9-CM   1. Acute renal failure, unspecified acute renal failure type (CMS/HCC) N17.9 584.9   2. Hyperkalemia E87.5 276.7   3. Atrial fibrillation, unspecified type (CMS/HCC) I48.91 427.31   4. Acute respiratory insufficiency R06.89 518.82   5. Elevated troponin R79.89 790.6   6. Impaired mobility and ADLs Z74.09 799.89     Patient Active Problem List   Diagnosis   • Persistent atrial fibrillation   • Coronary artery disease involving native coronary artery of native heart without angina pectoris   • Essential hypertension   • Cerebrovascular accident (CVA) due to embolism (CMS/HCC)   • Dyspnea on exertion   • Pulmonary arterial hypertension (CMS/HCC)   • Interstitial lung disease (CMS/HCC)   • Hypersensitivity pneumonitis (CMS/HCC)   • Gastric ulcer   • Coagulation disorder due to circulating anticoagulants (CMS/HCC)   • Dyslipidemia   • Acquired hypothyroidism   • Chronic kidney disease, stage 3 (CMS/HCC)   • GERD without esophagitis   • Iron deficiency anemia   • Acute on chronic diastolic congestive heart failure (CMS/HCC)   • Pneumonia of both lungs due to infectious organism   • Acute respiratory failure with hypoxia (CMS/HCC)   • Diastolic heart failure (CMS/HCC)   • Acute renal failure (CMS/HCC)   • Recurrent falls   • Acute hyperkalemia   • Normocytic anemia   • Stool color black   • Upper abdominal pain   • Elevated LFTs   • Coagulopathy (CMS/HCC)     Past Medical History:   Diagnosis Date   • Acute on chronic diastolic congestive heart failure (CMS/HCC) 2019   • Anemia    • Angina at rest (CMS/HCC)    • Arthritis    • Atrial fibrillation (CMS/HCC)    • Coronary artery disease    • Disease  of thyroid gland    • Elevated cholesterol    • GERD without esophagitis 12/2/2019   • History of blood transfusion    • History of colonoscopy 11/19/2019   • History of melena 11/19/2019   • History of stomach ulcers    • Hypertension    • Impaired functional mobility, balance, gait, and endurance    • Impaired functional mobility, balance, gait, and endurance    • Osteoporosis    • Positive TB test    • Stroke (CMS/HCC)     No deficits     Past Surgical History:   Procedure Laterality Date   • BRONCHOSCOPY Bilateral 4/12/2019    Procedure: BRONCHOSCOPY;  Surgeon: Jeff Laura MD;  Location:  [facility];  Service: Pulmonary   • BYPASS GRAFT     • COLONOSCOPY N/A 10/15/2019    Procedure: COLONOSCOPY;  Surgeon: Fredi Aaron MD;  Location:  [facility];  Service: Gastroenterology   • CORONARY ANGIOPLASTY WITH STENT PLACEMENT     • CORONARY ARTERY BYPASS GRAFT  10/18/2010   • ENDOSCOPY N/A 10/14/2019    Procedure: ESOPHAGOGASTRODUODENOSCOPY;  Surgeon: Fredi Aaron MD;  Location:  [facility];  Service: Gastroenterology   • HYSTERECTOMY     • INSERTION HEMODIALYSIS CATHETER N/A 2/14/2020    Procedure: HEMODIALYSIS CATHETER INSERTION(DINO);  Surgeon: Familia Paez MD;  Location:  [facility];  Service: General;  Laterality: N/A;   • STOMACH SURGERY  08/11/2019    Upper GI bleed    • STOMACH SURGERY  10/13/2019       Therapy Treatment    Rehabilitation Treatment Summary     Row Name 02/17/20 1559             Treatment Time/Intention    Discipline  occupational therapist  -SD      Document Type  therapy note (daily note)  -SD      Subjective Information  weakness;fatigue  -SD      Mode of Treatment  occupational therapy  -SD      Patient/Family Observations  reclined in chair, c/o bottom being sore, requests back to bed  -SD      Care Plan Review  care plan/treatment goals reviewed  -SD      Patient Effort  adequate  -SD      Existing Precautions/Restrictions  fall;oxygen therapy  device and L/min  -SD      Treatment Considerations/Comments  2L  -SD      Recorded by [SD] Italia Schumacher OT 02/17/20 1714      Row Name 02/17/20 1559             Vital Signs    Pre SpO2 (%)  92  -SD      O2 Delivery Pre Treatment  supplemental O2  -SD      Intra SpO2 (%)  88  -SD      O2 Delivery Intra Treatment  supplemental O2  -SD      Post SpO2 (%)  92  -SD      O2 Delivery Post Treatment  supplemental O2  -SD      Recorded by [SD] Italia Schumacher OT 02/17/20 1714      Row Name 02/17/20 1559             Bed Mobility Assessment/Treatment    Bed Mobility Assessment/Treatment  sit-supine  -SD      Rolling Left [place] (Bed Mobility)  minimum assist (75% patient effort);moderate assist (50% patient effort)  -SD      Rolling Right [place] (Bed Mobility)  minimum assist (75% patient effort);moderate assist (50% patient effort)  -SD      Sit-Supine [place] (Bed Mobility)  maximum assist (25% patient effort)  -SD      Assistive Device (Bed Mobility)  bed rails;draw sheet;head of bed elevated  -SD      Recorded by [SD] Italia Schumacher OT 02/17/20 1714      Row Name 02/17/20 1559             Transfer Assessment/Treatment    Transfer Assessment/Treatment  chair-bed transfer  -SD      Recorded by [SD] Italia Schumacher OT 02/17/20 1714      Row Name 02/17/20 1559             Chair-Bed Transfer    Chair-Bed [place] (Transfers)  maximum assist (25% patient effort);2 person assist  -SD      Recorded by [SD] Italia Schumacher OT 02/17/20 1714      Row Name 02/17/20 1559             Toileting Assessment/Training    Crockett Level (Toileting)  change pad/brief;perform perineal hygiene;maximum assist (25% patient effort)  -SD      Recorded by [SD] Italia Schumacher OT 02/17/20 1714      Row Name 02/17/20 1559             Positioning and Restraints    Pre-Treatment Position  sitting in chair/recliner  -SD      Post Treatment Position  bed  -SD      In Bed  supine;call light within reach;encouraged  to call for assist  -SD      Recorded by [SD] Italia Schumacher OT 02/17/20 1714      Row Name 02/17/20 1559             Pain Scale: Numbers Pre/Post-Treatment    Pain Scale: Numbers, Pretreatment  0/10 - no pain  -SD      Pain Scale: Numbers, Post-Treatment  0/10 - no pain  -SD      Recorded by [SD] Italia Schumacher OT 02/17/20 1714      Row Name                Wound 02/07/20 0950 Left anterior;upper arm Skin Tear    Wound - Properties Group Date first assessed: 02/07/20 [SB] Time first assessed: 0950 [SB] Side: Left [SB] Orientation: anterior;upper [SB] Location: arm [SB] Primary Wound Type: Skin tear [SB] Recorded by:  [SB] Marcelina Lindo RN 02/07/20 0950    Row Name                Wound 01/09/20 0800 Bilateral groin Other (comment)    Wound - Properties Group Date first assessed: 01/09/20 [EB] Time first assessed: 0800 [EB] Side: Bilateral [EB] Location: groin [EB] Primary Wound Type: Other [EB], Excoriation  Additional Comments: Excoriation to groin area [EB] Recorded by:  [EB] Juliana Palmer RN 01/09/20 0932    Row Name                Wound 02/14/20 0749 Right clavicle Incision    Wound - Properties Group Date first assessed: 02/14/20 [JS] Time first assessed: 0749 [JS] Present on Hospital Admission: N [JS] Side: Right [JS] Location: clavicle [JS] Primary Wound Type: Incision [JS] Recorded by:  [JS] Tamara Cervantes RN 02/14/20 0749    Row Name                Wound 02/17/20 0830 Left anterior;lower arm Skin Tear    Wound - Properties Group Date first assessed: 02/17/20 [CB] Time first assessed: 0830 [CB] Side: Left [CB] Orientation: anterior;lower [CB] Location: arm [CB] Primary Wound Type: Skin tear [CB] Recorded by:  [CB] Charlette Cox RN 02/17/20 1710    Row Name 02/17/20 1559             Coping    Observed Emotional State  cooperative  -SD      Verbalized Emotional State  acceptance  -SD      Recorded by [SD] Italia Schumacher OT 02/17/20 1714      Row Name 02/17/20 3669             Plan of Care  Review    Plan of Care Reviewed With  patient  -SD      Progress  no change  -SD      Outcome Summary  OT tx completed. Patient required max x 2 for transfer back to bed; required max A for brief change, mod-max for bed mobility. Continue OT POC  -SD      Recorded by [SD] Italia Schumacher OT 02/17/20 1714      Row Name 02/17/20 1559             Outcome Summary/Treatment Plan (OT)    Daily Summary of Progress (OT)  progress toward functional goals is gradual  -SD      Anticipated Discharge Disposition (OT)  inpatient rehabilitation facility  -SD      Recorded by [SD] Italia Schumacher OT 02/17/20 1714        User Key  (r) = Recorded By, (t) = Taken By, (c) = Cosigned By    Initials Name Effective Dates Discipline    JS Tamara Cervantes RN 07/14/16 -  Nurse    Charlette Brunson RN 10/26/16 -  Nurse    Juliana Street RN 01/29/18 -  Nurse    Italia Norris OT 03/07/18 -  OT    SB Marcelina Lindo, RN 06/16/16 -  Nurse        Wound 02/07/20 0950 Left anterior;upper arm Skin Tear (Active)   Dressing Appearance dry;intact 2/17/2020 12:30 PM   Closure None 2/17/2020  8:00 AM       Wound 02/14/20 0749 Right clavicle Incision (Active)   Dressing Appearance dry;intact;dried drainage 2/17/2020 12:30 PM   Closure Sutures 2/17/2020 12:30 PM   Base maroon/purple 2/17/2020 12:30 PM       Wound 02/17/20 0830 Left anterior;lower arm Skin Tear (Active)   Dressing Appearance dry;intact 2/17/2020  4:00 PM   Closure Surface sutures 2/17/2020  4:00 PM   Base moist;bleeding 2/17/2020  4:00 PM           OT Recommendation and Plan  Outcome Summary/Treatment Plan (OT)  Daily Summary of Progress (OT): progress toward functional goals is gradual  Anticipated Equipment Needs at Discharge (OT): bedside commode  Anticipated Discharge Disposition (OT): inpatient rehabilitation facility  Planned Therapy Interventions (OT Eval): activity tolerance training, adaptive equipment training, BADL retraining, patient/caregiver education/training,  strengthening exercise, transfer/mobility retraining  Therapy Frequency (OT Eval): 3 times/wk(5 times if indicated)  Daily Summary of Progress (OT): progress toward functional goals is gradual  Plan of Care Review  Plan of Care Reviewed With: patient  Plan of Care Reviewed With: patient  Outcome Summary: OT tx completed. Patient required max x 2 for transfer back to bed; required max A for brief change, mod-max for bed mobility. Continue OT POC  Outcome Measures     Row Name 02/17/20 1559             How much help from another is currently needed...    Putting on and taking off regular lower body clothing?  2  -SD      Bathing (including washing, rinsing, and drying)  2  -SD      Toileting (which includes using toilet bed pan or urinal)  2  -SD      Putting on and taking off regular upper body clothing  2  -SD      Taking care of personal grooming (such as brushing teeth)  2  -SD      Eating meals  3  -SD      AM-PAC 6 Clicks Score (OT)  13  -SD         Functional Assessment    Outcome Measure Options  AM-PAC 6 Clicks Daily Activity (OT)  -SD        User Key  (r) = Recorded By, (t) = Taken By, (c) = Cosigned By    Initials Name Provider Type    Italia Norris OT Occupational Therapist           Time Calculation:   Time Calculation- OT     Row Name 02/17/20 1715             Time Calculation- OT    OT Start Time  1559  -SD      OT Received On  02/17/20  -SD      OT Goal Re-Cert Due Date  02/18/20  -SD         Timed Charges    33647 - OT Therapeutic Activity Minutes  10  -SD      38186 - OT Self Care/Mgmt Minutes  13  -SD        User Key  (r) = Recorded By, (t) = Taken By, (c) = Cosigned By    Initials Name Provider Type    Italia Norris OT Occupational Therapist        Therapy Charges for Today     Code Description Service Date Service Provider Modifiers Qty    31560531454  OT THERAPEUTIC ACT EA 15 MIN 2/17/2020 Italia Schumacher OT GO 1    [id number] HC OT SELF CARE/MGMT/TRAIN EA 15 MIN 2/17/2020  Winsome, Italia, OT GO 1               Italia Schumacher, OT  2/17/2020

## 2020-02-17 NOTE — PLAN OF CARE
Problem: Patient Care Overview  Goal: Plan of Care Review  Outcome: Ongoing (interventions implemented as appropriate)  Flowsheets  Taken 2/17/2020 1714  Progress: no change  Plan of Care Reviewed With: patient  Taken 2/17/2020 2241  Outcome Summary: OT tx completed. Patient required max x 2 for transfer back to bed; required max A for brief change, mod-max for bed mobility. Continue OT POC

## 2020-02-17 NOTE — PLAN OF CARE
Problem: Patient Care Overview  Goal: Plan of Care Review  Outcome: Ongoing (interventions implemented as appropriate)  Flowsheets (Taken 2/17/2020 1524)  Progress: improving  Plan of Care Reviewed With: patient

## 2020-02-17 NOTE — PLAN OF CARE
Problem: Patient Care Overview  Goal: Plan of Care Review  Outcome: Ongoing (interventions implemented as appropriate)   Patient reports feeling a little better today.  She had just finished lunch and had been to dialysis this morning.  Completed B LE therex in supine.  Mod A required for bed mobility tasks and brief and gown change.  Mod A of 2 required for standing and ambulating 4 feet with HHA of 2 to the chair.

## 2020-02-17 NOTE — PROGRESS NOTES
Continued Stay Note  [facility]     Patient Name: Pat Morel  MRN: [id number]  Today's Date: 2/17/2020    Admit Date: 2/6/2020    Discharge Plan     Row Name 02/17/20 1351       Plan    Plan  Called from [facility] with chair times and family chose 12:15. Will call [facility] after holiday to arrange transport.         Discharge Codes    No documentation.       Expected Discharge Date and Time     Expected Discharge Date Expected Discharge Time    Feb 17, 2020             Albina Sands LCSW

## 2020-02-17 NOTE — PROGRESS NOTES
Continued Stay Note  [facility]     Patient Name: Pat Morel  MRN: [id number]  Today's Date: 2/17/2020    Admit Date: 2/6/2020    Discharge Plan     Row Name 02/17/20 2571       Plan    Plan  [facility]    Patient/Family in Agreement with Plan  yes    Plan Comments Spoke to Radha with [facility] [employer].  Pt has been approved and can accept when medically ready.  If pt unable to d/c today, they will have to start a new precert tomorrow.  [facility] requests chair time, so they can arrange dialysis transportation. Radha states that dialysis is right next door, but [facility] charges $50 round trip per day.  [facility] is $15 per trip, but is very hard to get them to meet pt's schedule.  She reports that they can assist pt into car, and [facility] will assist getting her out, if family wants to provide transporation.   Dr. Mercado updated.      Spoke to RADHA Montemayor.  She is working on dialysis chair time, but does not have official time yet.  Albina will work on assisting with transportation, but office closed today d/t holiday.    Row Name 02/17/20 2051       Plan    Plan  Chasity review, per gage, pt. has multiple medical conditions. Dialysis set up and to rehab.    Row Name 02/17/20 1351       Plan    Plan  Called from [facility] with chair times and family chose 12:15. Will call [facility] after holiday to arrange transport.         Discharge Codes    No documentation.       Expected Discharge Date and Time     Expected Discharge Date Expected Discharge Time    Feb 17, 2020             Kelsey Riggs RN

## 2020-02-17 NOTE — PROGRESS NOTES
"Nephrology Progress Note.    LOS: 11 days    Patient Care Team:  Anthony Sweet DO as PCP - General (Family Medicine)  Tien Archer MD as Consulting Physician (Cardiac Electrophysiology)  Xu Block MD (Cardiology)  Yomi Higgins MD as Consulting Physician (Cardiology)    Chief Complaint:    Chief Complaint   Patient presents with   • Weakness - Generalized       Subjective:   Follow up for ESRD and Anemia.     Interval History:   The patient is feeling somewhat better, remains with poor appetite and significant edema, denies any chest pain, her shortness of air has improved, no nausea or vomiting, no dysuria or gross hematuria.  She did not had dialysis yesterday.  It is scheduled for today.  Patient was seen during dialysis.    Objective:    Vital Signs  /65 (BP Location: Right arm, Patient Position: Lying)   Pulse 63   Temp 97.4 °F (36.3 °C) (Oral)   Resp 18   Ht 147.3 cm (58\")   Wt 64.5 kg (142 lb 3.2 oz)   LMP  (LMP Unknown)   SpO2 93%   BMI 29.72 kg/m²     No intake/output data recorded.    Intake/Output Summary (Last 24 hours) at 2/17/2020 0854  Last data filed at 2/17/2020 0600  Gross per 24 hour   Intake 494 ml   Output 50 ml   Net 444 ml       Physical Exam: Appearance: alert, oriented x 3, no acute distress, patient appears to be chronically ill and frail  HEENT: Oral mucosa dry, extra occular movements intact. Sclera clear.  Skin: Warm and dry  Neck: supple, no JVD, trachea midline, she has tunneled dialysis catheter in the right IJ with exit site below the right clavicle with significant hematoma and ecchymoses over the insertion site the lower side of her neck  Lungs: Scattered rhonchi, unlabored breathing effort heart: Irregular rate and rhythm.  peripheral pulses weak but palpable.  Abdomen: Obese, soft, non-tender,  present bowel sounds to auscultation  : no palpable bladder.  Extremities: 3+ upper and lower extremities edema , no cyanosis or clubbing.   Neuro: " normal speech and mental status, grossly non focal.     Results Review:   Results from last 7 days   Lab Units 02/17/20  0511 02/16/20  0602 02/15/20  0540   SODIUM mmol/L 137 138 139   POTASSIUM mmol/L 4.9 4.1 4.3   CHLORIDE mmol/L 100 103 101   CO2 mmol/L 22.5 21.1* 22.4   BUN mg/dL 66* 49* 75*   CREATININE mg/dL 3.32* 2.54* 3.25*   CALCIUM mg/dL 9.5 9.3 9.1   ALBUMIN g/dL 3.60 3.10* 3.20*   BILIRUBIN mg/dL 1.5* 1.4* 1.3*   ALK PHOS U/L 222* 233* 210*   ALT (SGPT) U/L 39* 53* 70*   AST (SGOT) U/L 58* 66* 65*   GLUCOSE mg/dL 104* 118* 119*     Estimated Creatinine Clearance: 11.7 mL/min (A) (by C-G formula based on SCr of 3.32 mg/dL (H)).  Results from last 7 days   Lab Units 02/17/20  0511 02/16/20  0602 02/12/20  0558 02/11/20  0640   MAGNESIUM mg/dL 2.6* 2.4 2.8*  --    PHOSPHORUS mg/dL 3.9 3.4  --  6.5*         Results from last 7 days   Lab Units 02/17/20  0511 02/16/20  0602 02/15/20  0540 02/14/20  0601 02/13/20  0557   WBC 10*3/mm3 10.24 10.53 8.86 8.51 7.97   HEMOGLOBIN g/dL 8.9* 9.0* 8.7* 7.9* 7.6*   PLATELETS 10*3/mm3 69* 80* 77* 104* 107*         Brief Urine Lab Results  (Last result in the past 365 days)      Color   Clarity   Blood   Leuk Est   Nitrite   Protein   CREAT   Urine HCG        02/13/20 1329             43.3       02/13/20 1329             43.7           No results found for: UTPCR  Imaging Results (Last 24 Hours)     ** No results found for the last 24 hours. **          aspirin 81 mg Oral Daily   calcium acetate 1,334 mg Oral TID With Meals   dilTIAZem  mg Oral Q24H   iron polysaccharides 150 mg Oral Daily   levothyroxine 75 mcg Oral Daily   metoprolol succinate XL 25 mg Oral Q24H   pantoprazole 40 mg Oral Daily   predniSONE 10 mg Oral Daily   ranolazine 500 mg Oral Q12H   sodium chloride 10 mL Intravenous Q12H            Medication Review:   Current Facility-Administered Medications   Medication Dose Route Frequency Provider Last Rate Last Dose   • acetaminophen (TYLENOL) tablet  650 mg  650 mg Oral Q4H PRN Familia Paez MD   650 mg at 02/14/20 2043    Or   • acetaminophen (TYLENOL) 160 MG/5ML solution 650 mg  650 mg Oral Q4H PRN Familia Paez MD        Or   • acetaminophen (TYLENOL) suppository 650 mg  650 mg Rectal Q4H PRN Familia Paez MD       • albumin human 25 % IV SOLN 12.5 g  12.5 g Intravenous PRN Jayson Milner MD       • aspirin EC tablet 81 mg  81 mg Oral Daily Familia Paez MD   81 mg at 02/16/20 0927   • calcium acetate (PHOS BINDER)) capsule 1,334 mg  1,334 mg Oral TID With Meals Familia Paez MD   1,334 mg at 02/17/20 0807   • dilTIAZem CD (CARDIZEM CD) 24 hr capsule 120 mg  120 mg Oral Q24H Familia Paez MD   120 mg at 02/16/20 1144   • ipratropium-albuterol (DUO-NEB) nebulizer solution 3 mL  3 mL Nebulization Q4H PRN Familia Paez MD       • iron polysaccharides (NIFEREX) capsule 150 mg  150 mg Oral Daily Familia Paez MD   150 mg at 02/16/20 0927   • levothyroxine (SYNTHROID, LEVOTHROID) tablet 75 mcg  75 mcg Oral Daily Familia Paez MD   75 mcg at 02/16/20 0927   • metoprolol succinate XL (TOPROL-XL) 24 hr tablet 25 mg  25 mg Oral Q24H Familia Paez MD   25 mg at 02/16/20 1144   • ondansetron (ZOFRAN) injection 4 mg  4 mg Intravenous Q6H PRN Familia Paez MD       • pantoprazole (PROTONIX) EC tablet 40 mg  40 mg Oral Daily Familia Paez MD   40 mg at 02/16/20 0927   • polyethylene glycol (MIRALAX) packet 17 g  17 g Oral Daily PRN Tamara Tomlin DO       • predniSONE (DELTASONE) tablet 10 mg  10 mg Oral Daily Familia Paez MD   10 mg at 02/16/20 0927   • ranolazine (RANEXA) 12 hr tablet 500 mg  500 mg Oral Q12H Familia Paez MD   500 mg at 02/16/20 2015   • sodium chloride 0.9 % bolus 1,000 mL  1,000 mL Intravenous PRN Jayson Milner MD       • sodium chloride 0.9 % flush 10 mL  10 mL Intravenous PRN Familia Paez MD       • sodium chloride 0.9 % flush 10 mL  10 mL Intravenous Q12H  Familia Paez MD   10 mL at 02/16/20 0934   • sodium chloride 0.9 % flush 10 mL  10 mL Intravenous PRN Familia Paez MD           Assessment/Plan:  1. Acute kidney injury on top of chronic kidney disease currently receiving dialysis due to worsening renal function and fluid excess.  Had dialysis yesterday, continue to have significant fluid excess, her electrolytes within acceptable range, her albumin is 3.1, magnesium 2.4, phosphorus 3.4.  2. Chronic kidney disease stage IV: He has known to have stage IV chronic kidney disease, it has been worsening gradually with multiple hospitalization  3. Hyperkalemia: Solved  4. Metabolic acidosis: Improved with oral sodium bicarb and dialysis  5. Abnormal liver function:   6. Anemia: Was transfused yesterday and her hemoglobin today is 9  7. coronary artery disease, status post CABG, status post stent placement:  8. elevated pulmonary pressure of 45 to 55 mmHg.  9. Acute on chronic respiratory failure: Improved  10. Hypersensitivity pneumonitis  11. Interstitial lung disease  12. Thrombocytopenia, platelet is 69,000.      Plan:  · Patient seen on dialysis, it does appear that there is less likelihood that she will recover and she will be deemed end-stage renal disease at this point.  · I will go ahead and decrease the prednisone to 5 mg a day.  It will need a slow taper.  · I will go ahead and give her a dose of Procrit today.  · Continue the same treatment  · Details were also discussed with the hospitalist service.   · Surveillance labs.  · Further recommendations will depend on clinical course of the patient during the current hospitalization.    · I also discussed the details with the nursing staff.  · Rest as ordered.    Sanford Tsang MD, FASN  02/17/20  8:54 AM    Dictated utilizing Dragon dictation.

## 2020-02-17 NOTE — THERAPY TREATMENT NOTE
Patient Name: Pat Morel  : 1940    MRN: [id number]                              Today's Date: 2020       Admit Date: 2020    Visit Dx:     ICD-10-CM ICD-9-CM   1. Acute renal failure, unspecified acute renal failure type (CMS/HCC) N17.9 584.9   2. Hyperkalemia E87.5 276.7   3. Atrial fibrillation, unspecified type (CMS/HCC) I48.91 427.31   4. Acute respiratory insufficiency R06.89 518.82   5. Elevated troponin R79.89 790.6   6. Impaired mobility and ADLs Z74.09 799.89     Patient Active Problem List   Diagnosis   • Persistent atrial fibrillation   • Coronary artery disease involving native coronary artery of native heart without angina pectoris   • Essential hypertension   • Cerebrovascular accident (CVA) due to embolism (CMS/HCC)   • Dyspnea on exertion   • Pulmonary arterial hypertension (CMS/HCC)   • Interstitial lung disease (CMS/HCC)   • Hypersensitivity pneumonitis (CMS/HCC)   • Gastric ulcer   • Coagulation disorder due to circulating anticoagulants (CMS/HCC)   • Dyslipidemia   • Acquired hypothyroidism   • Chronic kidney disease, stage 3 (CMS/HCC)   • GERD without esophagitis   • Iron deficiency anemia   • Acute on chronic diastolic congestive heart failure (CMS/HCC)   • Pneumonia of both lungs due to infectious organism   • Acute respiratory failure with hypoxia (CMS/HCC)   • Diastolic heart failure (CMS/HCC)   • Acute renal failure (CMS/HCC)   • Recurrent falls   • Acute hyperkalemia   • Normocytic anemia   • Stool color black   • Upper abdominal pain   • Elevated LFTs   • Coagulopathy (CMS/HCC)     Past Medical History:   Diagnosis Date   • Acute on chronic diastolic congestive heart failure (CMS/HCC) 2019   • Anemia    • Angina at rest (CMS/HCC)    • Arthritis    • Atrial fibrillation (CMS/HCC)    • Coronary artery disease    • Disease of thyroid gland    • Elevated cholesterol    • GERD without esophagitis 2019   • History of blood transfusion    • History of  colonoscopy 11/19/2019   • History of melena 11/19/2019   • History of stomach ulcers    • Hypertension    • Impaired functional mobility, balance, gait, and endurance    • Impaired functional mobility, balance, gait, and endurance    • Osteoporosis    • Positive TB test    • Stroke (CMS/HCC)     No deficits     Past Surgical History:   Procedure Laterality Date   • BRONCHOSCOPY Bilateral 4/12/2019    Procedure: BRONCHOSCOPY;  Surgeon: Jeff Laura MD;  Location:  [facility];  Service: Pulmonary   • BYPASS GRAFT     • COLONOSCOPY N/A 10/15/2019    Procedure: COLONOSCOPY;  Surgeon: Fredi Aaron MD;  Location:  [facility];  Service: Gastroenterology   • CORONARY ANGIOPLASTY WITH STENT PLACEMENT     • CORONARY ARTERY BYPASS GRAFT  10/18/2010   • ENDOSCOPY N/A 10/14/2019    Procedure: ESOPHAGOGASTRODUODENOSCOPY;  Surgeon: Fredi Aaron MD;  Location:  [facility];  Service: Gastroenterology   • HYSTERECTOMY     • INSERTION HEMODIALYSIS CATHETER N/A 2/14/2020    Procedure: HEMODIALYSIS CATHETER INSERTION(DINO);  Surgeon: Familia Paez MD;  Location: [facility] OR;  Service: General;  Laterality: N/A;   • STOMACH SURGERY  08/11/2019    Upper GI bleed    • STOMACH SURGERY  10/13/2019     General Information     Row Name 02/17/20 1321          PT Evaluation Time/Intention    Document Type  therapy note (daily note)  -LI     Mode of Treatment  physical therapy  -     Row Name 02/17/20 1321          General Information    Patient Profile Reviewed?  yes  -LI     Existing Precautions/Restrictions  fall;oxygen therapy device and L/min  -LI     Row Name 02/17/20 1321          Cognitive Assessment/Intervention- PT/OT    Orientation Status (Cognition)  oriented x 4  -LI     Row Name 02/17/20 1321          Safety Issues, Functional Mobility    Impairments Affecting Function (Mobility)  balance;endurance/activity tolerance;shortness of breath;strength;pain  -LI       User Key  (r) = Recorded  By, (t) = Taken By, (c) = Cosigned By    Initials Name Provider Type    Carolina Harrington PTA Physical Therapy Assistant        Mobility     Row Name 02/17/20 1321          Bed Mobility Assessment/Treatment    Bed Mobility Assessment/Treatment  bed mobility (all) activities  -LI     [place] Level (Bed Mobility)  moderate assist (50% patient effort)  -LI     Assistive Device (Bed Mobility)  bed rails;head of bed elevated  -LI     Row Name 02/17/20 1321          Bed-Chair Transfer    Bed-Chair [place] (Transfers)  2 person assist;moderate assist (50% patient effort)  -LI     Assistive Device (Bed-Chair Transfers)  other (see comments) gait belt and HHA of 2  -LI     Row Name 02/17/20 1321          Sit-Stand Transfer    Sit-Stand [place] (Transfers)  moderate assist (50% patient effort)  -LI     Assistive Device (Sit-Stand Transfers)  other (see comments) gait belt and HHA  -LI     Row Name 02/17/20 1321          Gait/Stairs Assessment/Training    Gait/Stairs Assessment/Training  gait/ambulation assistive device  -LI     [place] Level (Gait)  moderate assist (50% patient effort);2 person assist  -LI     Assistive Device (Gait)  other (see comments) gait belt and HHA of2  -LI     Distance in Feet (Gait)  4  -LI     Pattern (Gait)  step-to  -LI     Deviations/Abnormal Patterns (Gait)  base of support, narrow;chivo decreased;gait speed decreased;stride length decreased  -LI     Bilateral Gait Deviations  forward flexed posture;heel strike decreased;weight shift ability decreased  -LI       User Key  (r) = Recorded By, (t) = Taken By, (c) = Cosigned By    Initials Name Provider Type    Carolina Harrington PTA Physical Therapy Assistant        Obj/Interventions     Row Name 02/17/20 1321          Therapeutic Exercise    Lower Extremity (Therapeutic Exercise)  heel slides, bilateral  -LI     Lower Extremity Range of Motion (Therapeutic Exercise)  ankle dorsiflexion/plantar flexion, bilateral;hip  abduction/adduction, bilateral  -LI     Core Strength (Therapeutic Exercise)  bridging, bilateral lower extremities  -LI     Exercise Type (Therapeutic Exercise)  AROM (active range of motion)  -LI     Position (Therapeutic Exercise)  supine  -     Row Name 02/17/20 1321          Static Sitting Balance    Level of [place] (Unsupported Sitting, Static Balance)  contact guard assist  -     Sitting Position (Unsupported Sitting, Static Balance)  sitting on edge of bed  -     Row Name 02/17/20 132          Dynamic Sitting Balance    Level of [place], Reaches Outside Midline (Sitting, Dynamic Balance)  minimal assist, 75% patient effort  -LI     Sitting Position, Reaches Outside Midline (Sitting, Dynamic Balance)  sitting on edge of bed  -     Row Name 02/17/20 1321          Static Standing Balance    Level of [place] (Supported Standing, Static Balance)  moderate assist, 50 to 74% patient effort;2 person assist  -LI     Row Name 02/17/20 132          Dynamic Standing Balance    Level of [place], Reaches Outside Midline (Standing, Dynamic Balance)  moderate assist, 50 to 74% patient effort;2 person assist  -       User Key  (r) = Recorded By, (t) = Taken By, (c) = Cosigned By    Initials Name Provider Type    Carolina Harrington, PTA Physical Therapy Assistant        Goals/Plan    No documentation.       Clinical Impression     [facility] Name 02/17/20 1321          Pain Scale: Numbers Pre/Post-Treatment    Pain Scale: Numbers, Post-Treatment  0/10 - no pain  -[facility] Name 02/17/20 1321          Plan of Care Review    Plan of Care Reviewed With  patient  -LI     Progress  improving  -     Outcome Summary  Patient reports feeling a little better today.  She had just finished lunch and had been to dialysis this morning.  Completed B LE therex in supine.  Mod A required for bed mobility tasks and brief and gown change.  Mod A of 2 required for standing and ambulating 4 feet with HHA of 2 to the  chair.  -LI     Row Name 02/17/20 1321          Physical Therapy Clinical Impression    Criteria for Skilled Interventions Met (PT Clinical Impression)  yes;treatment indicated  -LI     Rehab Potential (PT Clinical Summary)  good, to achieve stated therapy goals  -LI     Row Name 02/17/20 1321          Vital Signs    Pre SpO2 (%)  88  -LI     O2 Delivery Pre Treatment  supplemental O2  -LI     Intra SpO2 (%)  83  -LI     O2 Delivery Intra Treatment  supplemental O2  -LI     Post SpO2 (%)  90  -LI     O2 Delivery Post Treatment  supplemental O2  -LI     Pre Patient Position  Supine  -LI     Intra Patient Position  Standing  -LI     Post Patient Position  Sitting  -LI     Row Name 02/17/20 1321          Positioning and Restraints    Pre-Treatment Position  in bed  -LI     Post Treatment Position  chair  -LI     In Chair  reclined;call light within reach;encouraged to call for assist  -LI       User Key  (r) = Recorded By, (t) = Taken By, (c) = Cosigned By    Initials Name Provider Type    Carolina Harrington, HAKEEM Physical Therapy Assistant        Outcome Measures     Row Name 02/17/20 1321          How much help from another person do you currently need...    Turning from your back to your side while in flat bed without using bedrails?  2  -LI     Moving from lying on back to sitting on the side of a flat bed without bedrails?  2  -LI     Moving to and from a bed to a chair (including a wheelchair)?  2  -LI     Standing up from a chair using your arms (e.g., wheelchair, bedside chair)?  2  -LI     Climbing 3-5 steps with a railing?  1  -LI     To walk in hospital room?  2  -LI     AM-PAC 6 Clicks Score (PT)  11  -LI     Row Name 02/17/20 1321          Functional Assessment    Outcome Measure Options  AM-PAC 6 Clicks Basic Mobility (PT)  -LI       User Key  (r) = Recorded By, (t) = Taken By, (c) = Cosigned By    Initials Name Provider Type    Carolina Harrington, HAKEEM Physical Therapy Assistant          PT Recommendation and  Plan     Outcome Summary/Treatment Plan (PT)  Anticipated Discharge Disposition (PT): unknown, anticipate therapy at next level of care  Plan of Care Reviewed With: patient  Progress: improving  Outcome Summary: Patient reports feeling a little better today.  She had just finished lunch and had been to dialysis this morning.  Completed B LE therex in supine.  Mod A required for bed mobility tasks and brief and gown change.  Mod A of 2 required for standing and ambulating 4 feet with HHA of 2 to the chair.     Time Calculation:   PT Charges     Row Name 02/17/20 1321             Time Calculation    Start Time  1321  -LI      Stop Time  1353  -LI      Time Calculation (min)  32 min  -LI      PT Received On  02/17/20  -LACI      PT Goal Re-Cert Due Date  02/17/20  -LI         Timed Charges    89272 - PT Therapeutic Exercise Minutes  12  -LI      98584 - PT Therapeutic Activity Minutes  20  -LI        User Key  (r) = Recorded By, (t) = Taken By, (c) = Cosigned By    Initials Name Provider Type    Carolina Harrington PTA Physical Therapy Assistant        Therapy Charges for Today     Code Description Service Date Service Provider Modifiers Qty    80709787472 HC PT THER PROC EA 15 MIN 2/17/2020 Carolina Milligan, PTA GP 1    86375727211 HC PT THERAPEUTIC ACT EA 15 MIN 2/17/2020 Carolina Milligan, PTA GP 1    33566595333 HC PT THER PROC EA 15 MIN 2/17/2020 Carolina Milligan, PTA GP 1    86938944083 HC PT THERAPEUTIC ACT EA 15 MIN 2/17/2020 Carolina Milligan, PTA GP 1          PT G-Codes  Outcome Measure Options: AM-PAC 6 Clicks Basic Mobility (PT)  AM-PAC 6 Clicks Score (PT): 11  AM-PAC 6 Clicks Score (OT): 15    Carolina Milligan PTA  2/17/2020

## 2020-02-17 NOTE — PROGRESS NOTES
[facility]    Name:  Pat Morel   Age:  80 y.o.  Sex:  female  :  1940  MRN:  [id number]   Visit Number:  [id number]  Admission Date:  2020  Date Of Service:  20  Primary Care Physician:  Anthony Sweet DO     LOS: 11 days :  Patient Care Team:  Anthony Sweet DO as PCP - General (Family Medicine)  Tien Archer MD as Consulting Physician (Cardiac Electrophysiology)  Xu Block MD (Cardiology)  Yomi Higgins MD as Consulting Physician (Cardiology):    Chief Complaint:      Generalized weakness and follow-up of renal failure.    Subjective / Interval History:     Ms. Morel is currently getting her hemodialysis.  She is still slightly short of breath.  Denies any chest pain.  Unfortunately, she was started on hemodialysis on 2020 due to progressive shortness of breath.  Since I had seen her last week, she has had evaluation by gastroenterologist Dr. Dee for elevated liver enzymes as well as anemia.  MRCP was negative for any CBD dilatation.    Hospital course:    Patient is a 80-year-old female with history including CAD with CABG, atrial fibrillation, chronic kidney disease stage III, history of CVA, diastolic CHF, iron deficiency anemia, hypersensitivity pneumonitis, hypertension, osteoporosis was brought to the emergency room by EMS with generalized weakness on 2020.  She was admitted with acute worsening of her chronic renal failure, elevated troponin levels  as well as hyperkalemia.  She does have chronic respiratory failure which has been stable on prednisone.  She is currently on prednisone 20 mg daily started this week.  She will taper it down to 10 mg every week and be continued on 10 mg daily until she sees her pulmonologist Dr. Laura.      She was seen by Dr. Tsang from nephrology who recommended gentle IV hydration.  Her renal function slowly improved and her hyperkalemia resolved.  She was  also seen by Dr. Horvath for her elevated troponin levels.  He felt that the patient's troponin levels were related to her coronary artery disease in the setting of renal failure and anemia.  He did offer cardiac catheterization to the patient but she is currently not willing for any invasive cardiac work-up.  He has recommended triple antiplatelet/anticoagulant agents with aspirin, Plavix and Eliquis.  He has also added Ranexa and increased her Bystolic dose.  An echocardiogram was done during this hospitalization which showed normal left ventricular ejection fraction but grade 2 diastolic dysfunction.  Unfortunately, patient continued to have anemia and was seen again by Dr. Horvath who recommended discontinuation of all antiplatelet agents and anticoagulation and to maintain her hematocrit about 30%.    During the hospital stay, patient was noted to have elevated liver enzymes and was consulted by gastroenterology Dr. Dee.  He recommended evaluation with MRCP which the patient initially declined but subsequently did complete MRCP which showed gallstones but no evidence of CBD dilation.  Her liver enzymes subsequently did improve.  The exact etiology of her transaminase elevation was uncertain but it was felt that she may have passed a gallstone.    Unfortunately, despite initial improvement in her renal function, she continued to have intermittent shortness of breath and atrial fibrillation with rapid ventricular rate.  It was subsequently decided by nephrology that the patient would benefit from hemodialysis at least for comfort care.  She subsequently underwent tunneled dialysis catheter placement on the right internal jugular area by Dr. Paez on 2/14/2020 and was started on hemodialysis.    Review of Systems:     General ROS: Patient denies any fevers, chills or loss of consciousness.  Generalized weakness.  Respiratory ROS: Chronic shortness of breath.  Cardiovascular ROS: Denies chest pain or  palpitations. No history of exertional chest pain.  Gastrointestinal ROS: Denies nausea and vomiting. Denies any abdominal pain. No diarrhea.  Neurological ROS: Denies any focal weakness. No loss of consciousness. Denies any numbness.  Dermatological ROS: Denies any redness or pruritis.    Vital Signs:    Temp:  [97.4 °F (36.3 °C)-97.7 °F (36.5 °C)] 97.5 °F (36.4 °C)  Heart Rate:  [63-78] 65  Resp:  [14-18] 14  BP: (106-118)/(59-76) 113/75    Intake and output:    I/O last 3 completed shifts:  In: 904 [P.O.:600; I.V.:254; IV Piggyback:50]  Out: 100 [Urine:100]  No intake/output data recorded.    Physical Examination:    General Appearance:  Alert and cooperative, not in any acute distress.   Head:  Atraumatic and normocephalic, without obvious abnormality.  Facial puffiness noted due to steroid use.   Eyes:          PERRLA, conjunctivae and sclerae normal, no Icterus. No pallor. Extraocular movements are within normal limits.   Neck: Supple, trachea midline, no thyromegaly, no carotid bruit.  Tunneled dialysis catheter noted in the right internal jugular area.  Ecchymosis noted surrounding the catheter site.   Lungs:   Chest shape is normal. Breath sounds heard bilaterally equally.  No wheezing.  Bilateral coarse crackles heard.  No Pleural rub or bronchial breathing.   Heart:  Normal S1 and S2, no murmur, no gallop, no rub. No JVD   Abdomen:   Normal bowel sounds, no masses, no organomegaly. Soft, non-tender, non-distended, no guarding, no rebound tenderness.  Large area of ecchymosis noted in the mid and lower abdomen bilaterally.   Extremities: Moves all extremities well, 1+ edema, no cyanosis, no clubbing.   Skin: No bleeding or rash.  Ecchymotic patches noted in both upper and lower extremities.   Neurologic: Awake, alert and oriented times 3. Moves all 4 extremities equally but does have generalized weakness.     Laboratory results:    Results from last 7 days   Lab Units 02/17/20  0511 02/16/20  0602  02/15/20  0540   SODIUM mmol/L 137 138 139   POTASSIUM mmol/L 4.9 4.1 4.3   CHLORIDE mmol/L 100 103 101   CO2 mmol/L 22.5 21.1* 22.4   BUN mg/dL 66* 49* 75*   CREATININE mg/dL 3.32* 2.54* 3.25*   CALCIUM mg/dL 9.5 9.3 9.1   BILIRUBIN mg/dL 1.5* 1.4* 1.3*   ALK PHOS U/L 222* 233* 210*   ALT (SGPT) U/L 39* 53* 70*   AST (SGOT) U/L 58* 66* 65*   GLUCOSE mg/dL 104* 118* 119*     Results from last 7 days   Lab Units 02/17/20  0511 02/16/20  0602 02/15/20  0540   WBC 10*3/mm3 10.24 10.53 8.86   HEMOGLOBIN g/dL 8.9* 9.0* 8.7*   HEMATOCRIT % 28.0* 29.5* 27.4*   PLATELETS 10*3/mm3 69* 80* 77*             Results from last 7 days   Lab Units 02/11/20  1804 02/11/20  1510   BLOODCX  No growth at 5 days No growth at 5 days     I have reviewed the patient's laboratory results.    Radiology results:    Imaging Results (Last 24 Hours)     ** No results found for the last 24 hours. **        Medication Review:     I have reviewed the patients active and prn medications.       Acute renal failure (CMS/HCC)    Persistent atrial fibrillation    Coronary artery disease involving native coronary artery of native heart without angina pectoris    Essential hypertension    Pulmonary arterial hypertension (CMS/HCC)    Interstitial lung disease (CMS/HCC)    Hypersensitivity pneumonitis (CMS/HCC)    Acquired hypothyroidism    Chronic kidney disease, stage 3 (CMS/HCC)    GERD without esophagitis    Diastolic heart failure (CMS/HCC)    Recurrent falls    Acute hyperkalemia    Normocytic anemia    Stool color black    Upper abdominal pain    Elevated LFTs    Coagulopathy (CMS/HCC)    Assessment:    1.  Acute renal failure on chronic kidney disease stage IV, present on admission, initiated on dialysis.  2.  Recurrent falls at home secondary to uremia, present on admission.  3.  Acute hyperkalemia secondary to #1, present on admission, resolved.  4.  Demand ischemia with elevated troponin levels, present on admission.  5.  Hypersensitivity  pneumonitis on prednisone therapy.  6.  Chronic hypoxic respiratory failure on home oxygen.  7.  Chronic diastolic heart failure, no evidence of exacerbation.  8.  Paroxysmal atrial fibrillation, apixaban on hold due to anemia.  9.  Coronary artery disease status post stents and CABG.  10.  Chronic pulmonary hypertension.  11.  Acquired hypothyroidism.  12.  Elevated transaminases of uncertain etiology, improving.  13.  Acute worsening of chronic anemia status post 1 unit of packed red blood cell transfusion.    Plan:    Ms. Morel  unfortunately has been started on hemodialysis for symptom control.  She is being followed by Dr. Finnegan.  Hepatitis panel has been negative.  Case management is currently working to get outpatient dialysis appointment.  She is being followed by Dr. Higgins from cardiology and has been restarted back on low-dose aspirin.  Apixaban is currently on hold.  She is currently having her dialysis.   and case management is currently working to get her to short-term rehabilitation in [place].  She is currently on prednisone for hypersensitivity pneumonitis and the dose has been decreased to 5 mg daily.  She will need outpatient follow-up with her primary pulmonologist Dr. Laura.      Patient has had anemia during this hospital stay with positive stool guaiac test.  She was seen by gastroenterology but has declined any further invasive work-up for GI bleeding.  She will be continued on physical and Occupational Therapy.  Patient's CODE STATUS is DNR.  Further recommendations depend upon her clinical course.  Hopefully, she may be able to go to short-term rehabilitation in the next 1-2 days.    Denis Mercado MD  02/17/20  12:58 PM    Dictated utilizing Dragon dictation.

## 2020-02-18 NOTE — PROGRESS NOTES
Continued Stay Note  [facility]     Patient Name: Pat Morel  MRN: [id number]  Today's Date: 2/18/2020    Admit Date: 2/6/2020    Discharge Plan     Row Name 02/18/20 1137       Plan    Plan  Spoke to Marcelina at [facility], accepted 12:15 mwf chair time at [facility]. Called [facility] set up transport for Friday 2/21  at 11:30  from [facility] to [facility]. 4 dollars cash round trip to be paid by family to . Notified son, provided [facility] number and Marcelina's number to set up all transport and to arrange signing pt.'s paperwork. Awaiting chair letter. Notified CM.         Discharge Codes    No documentation.       Expected Discharge Date and Time     Expected Discharge Date Expected Discharge Time    Feb 19, 2020             Albina Sands LCSW

## 2020-02-18 NOTE — PROGRESS NOTES
[facility]    [facility]    Name:  Pat Morel   Age:  80 y.o.  Sex:  female  :  1940  MRN:  [id number]   Visit Number:  [id number]  Admission Date:  2020  Date Of Service:  20  Primary Care Physician:  Anthony Sweet DO     LOS: 12 days :  Patient Care Team:  Anthony Sweet DO as PCP - General (Family Medicine)  Tien Archer MD as Consulting Physician (Cardiac Electrophysiology)  Xu Block MD (Cardiology)  Yomi Higgins MD as Consulting Physician (Cardiology):    Chief Complaint:      Generalized weakness and follow-up of renal failure.    Subjective / Interval History:     Ms. Morel is currently lying down on the bed and is comfortable at rest.  She states that her leg swelling and shortness of breath has improved.  She was able to get some fluid off during dialysis yesterday.  No significant overnight events.  Unfortunately, she has had low normal blood pressures and is unable to tolerate more ultrafiltration during dialysis.  She was seen by Dr. Higgins this morning and he has recommended to discontinue the metoprolol at this time and continue the Cardizem for her rate control.  She was started on hemodialysis on 2020 due to progressive shortness of breath.  Since I had seen her last week, she has had evaluation by gastroenterologist Dr. Dee for elevated liver enzymes as well as anemia.  MRCP was negative for any CBD dilatation.    Hospital course:    Patient is a 80-year-old female with history including CAD with CABG, atrial fibrillation, chronic kidney disease stage III, history of CVA, diastolic CHF, iron deficiency anemia, hypersensitivity pneumonitis, hypertension, osteoporosis was brought to the emergency room by EMS with generalized weakness on 2020.  She was admitted with acute worsening of her chronic renal failure, elevated troponin levels  as well as hyperkalemia.  She does have chronic respiratory failure  which has been stable on prednisone.  She is currently on prednisone 20 mg daily started this week.  She will taper it down to 10 mg every week and be continued on 10 mg daily until she sees her pulmonologist Dr. Laura.      She was seen by Dr. Tsang from nephrology who recommended gentle IV hydration.  Her renal function slowly improved and her hyperkalemia resolved.  She was also seen by Dr. Horvath for her elevated troponin levels.  He felt that the patient's troponin levels were related to her coronary artery disease in the setting of renal failure and anemia.  He did offer cardiac catheterization to the patient but she is currently not willing for any invasive cardiac work-up.  He has recommended triple antiplatelet/anticoagulant agents with aspirin, Plavix and Eliquis.  He has also added Ranexa and increased her Bystolic dose.  An echocardiogram was done during this hospitalization which showed normal left ventricular ejection fraction but grade 2 diastolic dysfunction.  Unfortunately, patient continued to have anemia and was seen again by Dr. Horvath who recommended discontinuation of all antiplatelet agents and anticoagulation and to maintain her hematocrit about 30%.    During the hospital stay, patient was noted to have elevated liver enzymes and was consulted by gastroenterology Dr. Dee.  He recommended evaluation with MRCP which the patient initially declined but subsequently did complete MRCP which showed gallstones but no evidence of CBD dilation.  Her liver enzymes subsequently did improve.  The exact etiology of her transaminase elevation was uncertain but it was felt that she may have passed a gallstone.    Unfortunately, despite initial improvement in her renal function, she continued to have intermittent shortness of breath and atrial fibrillation with rapid ventricular rate.  It was subsequently decided by nephrology that the patient would benefit from hemodialysis at least for comfort  care.  She subsequently underwent tunneled dialysis catheter placement on the right internal jugular area by Dr. Paez on 2/14/2020 and was started on hemodialysis.  Due to relative hypotension and difficulty removing fluid during dialysis, she was seen again by Dr. Higgins who discontinued her metoprolol and continued her Cardizem for rate control.    Review of Systems:     General ROS: Patient denies any fevers, chills or loss of consciousness.  Generalized weakness.  Respiratory ROS: Chronic shortness of breath.  Cardiovascular ROS: Denies chest pain or palpitations. No history of exertional chest pain.  Gastrointestinal ROS: Denies nausea and vomiting. Denies any abdominal pain. No diarrhea.  Neurological ROS: Denies any focal weakness. No loss of consciousness. Denies any numbness.  Dermatological ROS: Denies any redness or pruritis.    Vital Signs:    Temp:  [97.1 °F (36.2 °C)-98.1 °F (36.7 °C)] 97.1 °F (36.2 °C)  Heart Rate:  [63-77] 63  Resp:  [16-18] 18  BP: ()/(59-84) 99/59    Intake and output:    I/O last 3 completed shifts:  In: 325 [P.O.:240; I.V.:85]  Out: 2500 [Other:2500]  I/O this shift:  In: 360 [P.O.:360]  Out: -     Physical Examination:    General Appearance:  Alert and cooperative, not in any acute distress.   Head:  Atraumatic and normocephalic, without obvious abnormality.  Facial puffiness noted due to steroid use.   Eyes:          PERRLA, conjunctivae and sclerae normal, no Icterus. No pallor. Extraocular movements are within normal limits.   Neck: Supple, trachea midline, no thyromegaly, no carotid bruit.  Tunneled dialysis catheter noted in the right internal jugular area.  Ecchymosis noted surrounding the catheter site.   Lungs:   Chest shape is normal. Breath sounds heard bilaterally equally.  No wheezing.  Bilateral coarse crackles heard.  No Pleural rub or bronchial breathing.   Heart:  Normal S1 and S2, no murmur, no gallop, no rub. No JVD   Abdomen:   Normal bowel sounds, no  masses, no organomegaly. Soft, non-tender, non-distended, no guarding, no rebound tenderness.  Large area of ecchymosis noted in the mid and lower abdomen bilaterally.   Extremities: Moves all extremities well, 1+ edema, no cyanosis, no clubbing.   Skin: No bleeding or rash.  Ecchymotic patches noted in both upper and lower extremities.   Neurologic: Awake, alert and oriented times 3. Moves all 4 extremities equally but does have generalized weakness.     Laboratory results:    Results from last 7 days   Lab Units 02/17/20  0511 02/16/20  0602 02/15/20  0540   SODIUM mmol/L 137 138 139   POTASSIUM mmol/L 4.9 4.1 4.3   CHLORIDE mmol/L 100 103 101   CO2 mmol/L 22.5 21.1* 22.4   BUN mg/dL 66* 49* 75*   CREATININE mg/dL 3.32* 2.54* 3.25*   CALCIUM mg/dL 9.5 9.3 9.1   BILIRUBIN mg/dL 1.5* 1.4* 1.3*   ALK PHOS U/L 222* 233* 210*   ALT (SGPT) U/L 39* 53* 70*   AST (SGOT) U/L 58* 66* 65*   GLUCOSE mg/dL 104* 118* 119*     Results from last 7 days   Lab Units 02/17/20  0511 02/16/20  0602 02/15/20  0540   WBC 10*3/mm3 10.24 10.53 8.86   HEMOGLOBIN g/dL 8.9* 9.0* 8.7*   HEMATOCRIT % 28.0* 29.5* 27.4*   PLATELETS 10*3/mm3 69* 80* 77*             Results from last 7 days   Lab Units 02/11/20  1804   BLOODCX  No growth at 5 days     I have reviewed the patient's laboratory results.    Radiology results:    Imaging Results (Last 24 Hours)     ** No results found for the last 24 hours. **        Medication Review:     I have reviewed the patients active and prn medications.       Acute renal failure (CMS/HCC)    Persistent atrial fibrillation    Coronary artery disease involving native coronary artery of native heart without angina pectoris    Essential hypertension    Pulmonary arterial hypertension (CMS/HCC)    Interstitial lung disease (CMS/HCC)    Hypersensitivity pneumonitis (CMS/HCC)    Acquired hypothyroidism    Chronic kidney disease, stage 3 (CMS/HCC)    GERD without esophagitis    Diastolic heart failure (CMS/HCC)     Recurrent falls    Acute hyperkalemia    Normocytic anemia    Stool color black    Upper abdominal pain    Elevated LFTs    Coagulopathy (CMS/HCC)    Assessment:    1.  Acute renal failure on chronic kidney disease stage IV, present on admission, initiated on dialysis on 2/14/2020.  2.  Recurrent falls at home secondary to uremia, present on admission.  3.  Acute hyperkalemia secondary to #1, present on admission, resolved.  4.  Demand ischemia with elevated troponin levels, present on admission.  5.  Hypersensitivity pneumonitis on prednisone therapy.  6.  Chronic hypoxic respiratory failure on home oxygen.  7.  Chronic diastolic heart failure, no evidence of exacerbation.  8.  Paroxysmal atrial fibrillation, apixaban on hold due to anemia.  9.  Coronary artery disease status post stents and CABG.  10.  Chronic pulmonary hypertension.  11.  Acquired hypothyroidism.  12.  Elevated transaminases of uncertain etiology, improving.  13.  Acute worsening of chronic anemia status post 1 unit of packed red blood cell transfusion.    Plan:    Ms. Morel is currently doing better and states that her leg swelling and shortness of breath have improved.  She will be continued on hemodialysis 3 times weekly.  Her hepatitis panel has been negative.  She was seen by Dr. Higgins this morning and he has discontinued metoprolol so that her blood pressure remained stable during dialysis so that more ultrafiltration and fluid removal can be achieved.  She will be continued on physical therapy.  She is currently awaiting transfer to [facility] and rehab facility for short-term rehabilitation.    She is currently on low-dose aspirin but her Eliquis has been discontinued during this admission due to persistent anemia and positive stool occult blood.  She was seen by gastroenterology but has declined any further invasive work-up for GI bleeding.  She is currently on prednisone for hypersensitivity pneumonitis and the dose has been decreased  to 5 mg daily.  She will need outpatient follow-up with her primary pulmonologist Dr. Laura.      Hopefully, she should be able to go to short-term rehabilitation in the next 1 to 2 days.  Due to her significant generalized weakness and the fact that she needs 2 person assist to get her out of bed, she will benefit from ambulance transfer to her dialysis until she becomes stronger with rehabilitation.      Denis Mercado MD  02/18/20  4:26 PM    Dictated utilizing Dragon dictation.

## 2020-02-18 NOTE — PROGRESS NOTES
Discharge Planning Assessment  [facility]     Patient Name: Pat Morel  MRN: [id number]  Today's Date: 2/18/2020    Admit Date: 2/6/2020    Discharge Needs Assessment    No documentation.       Discharge Plan     Row Name 02/18/20 1423       Plan    Plan Comments  Chair time MWF has been setup and faxed to [facility].  PT states patient needs two people assit.  Pt does feels she is too weak to go to dialysis setting up.   Son states patient or them could not afford an ambulance.  Raad from [facility] states  if MD will write it is medically necessary and reason why in progress note  she thinks patietnt insurance will pay for EMS transport to [facility].   Dr Mercado informed  and in agreement pt will  need EMS.   Dr Lucero Progress notes will need to be faxed to [facility] stating this    Row Name 02/18/20 1246       Plan    Plan  Chair letter faxed to [facility]    Row Name 02/18/20 6113       Plan    Plan  Spoke to Marcelina at [facility], accepted 12:15 mwf chair time at [facility]. Called [facility] set up transport for Friday 2/21  at 11:30  from [facility] to [facility]. 4 dollars cash round trip to be paid by family to . Notified son, provided [facility] number and Marcelina's number to set up all transport and to arrange signing pt.'s paperwork. Awaiting chair letter. Notified CM.         Destination      Service Provider Request Status Selected Services Address Phone Number Fax Number    [facility] Pending - No Request Sent N/A [address] [address] [phone]      Durable Medical Equipment      Service Provider Request Status Selected Services Address Phone Number Fax Number    [facility] Pending - No Request Sent N/A [address] [address] [phone]      Dialysis/Infusion      Service Provider Request Status Selected Services Address Phone Number Fax Number    [facility] Pending  - No Request Sent N/A [address] [place] [address] [phone] --      Home Medical Care      Coordination has not been started for this encounter.      Therapy      Coordination has not been started for this encounter.      Community Resources      Coordination has not been started for this encounter.        Expected Discharge Date and Time     Expected Discharge Date Expected Discharge Time    Feb 19, 2020         Demographic Summary    No documentation.       Functional Status    No documentation.       Psychosocial    No documentation.       Abuse/Neglect    No documentation.       Legal    No documentation.       Substance Abuse    No documentation.       Patient Forms    No documentation.           Kelsey Rodriguez RN

## 2020-02-18 NOTE — PLAN OF CARE
Problem: Patient Care Overview  Goal: Plan of Care Review  Flowsheets (Taken 2/18/2020 1431)  Outcome Summary: Pt reports feeling very tired at this time.  PT performed transfer from bed to chair with gait belt and stand pivot. Pt was unable to take steps but was able to bare weight in standing with mod-max a for approx 1 min.  See flowsheet for details.

## 2020-02-18 NOTE — PLAN OF CARE
Vss on 2lnc O2 today.  Pt up to chair with assist x2.  Pt states that she is breathing easier today and looking forward to going to rehab.  Pt has a better appetite today.  Will continue to monitor.

## 2020-02-18 NOTE — PROGRESS NOTES
Continued Stay Note  [facility]     Patient Name: Pat Morel  MRN: [id number]  Today's Date: 2/18/2020    Admit Date: 2/6/2020    Discharge Plan     Row Name 02/18/20 1246       Plan    Plan  Chair letter faxed to [facility] and rehab    Row Name 02/18/20 1137       Plan    Plan  Spoke to Marcelina at [facility], accepted 12:15 mwf chair time at [facility]. Called [facility] set up transport for Friday 2/21  at 11:30  from [facility] to [facility]. 4 dollars cash round trip to be paid by family to . Notified son, provided [facility] number and Marcelina's number to set up all transport and to arrange signing pt.'s paperwork. Awaiting chair letter. Notified CM.    Pt. May need non emergent ems transport to/from dialysis, CM aware     Discharge Codes    No documentation.       Expected Discharge Date and Time     Expected Discharge Date Expected Discharge Time    Feb 19, 2020             Albina Sands LCSW

## 2020-02-18 NOTE — PLAN OF CARE
Problem: Patient Care Overview  Goal: Plan of Care Review  Outcome: Ongoing (interventions implemented as appropriate)  Flowsheets  Taken 2/18/2020 1307  Progress: no change  Plan of Care Reviewed With: patient  Taken 2/18/2020 1115  Outcome Summary: OT tx completed. Patient completed supine to sit with max A, sat EOB x 15 mins for grooming and UB AROM; required max x 2 transfer to chair. Continue OT POC

## 2020-02-18 NOTE — PLAN OF CARE
Problem: Patient Care Overview  Goal: Plan of Care Review  Outcome: Ongoing (interventions implemented as appropriate)  Flowsheets (Taken 2/18/2020 6744)  Progress: no change  Plan of Care Reviewed With: patient  Outcome Summary: Patient continues to be a total assist with all ADLS. Patient stated she slept well.  Dressing to be monitored on left arm for leaking and drainage.  Continue to monitor

## 2020-02-18 NOTE — PROGRESS NOTES
[facility] Cardiology IP Progress Note    Pat Morel  1940  [id number]  02/18/20    Subjective:   Mrs. Pat Morel is a 80 y.o. female seen in follow-up for multivessel coronary artery disease and acute on chronic diastolic heart failure.   Initially admitted with worsening renal failure, now on HD.  No acute overnight events.  Mild hypotension on review of vitals, asymptomatic.  Remains in rate controlled atrial fibrillation.  Reports improvement in her shortness of breath after volume removal with HD.  Does have persistent lower extremity swelling, slowly improving.  Denies chest pain or chest discomfort.  She does complain of weakness related to deconditioning.    Review of Systems:   Review of Systems   Constitutional: Positive for fatigue. Negative for chills, diaphoresis and fever.   Respiratory: Negative for cough, chest tightness, shortness of breath and wheezing.    Cardiovascular: Positive for leg swelling. Negative for chest pain and palpitations.   Gastrointestinal: Negative for abdominal pain and GERD.   Neurological: Negative for dizziness, syncope, weakness and light-headedness.       I have reviewed and/or updated the patient's past medical, past surgical, family history, social history, problem list and allergies as appropriate in the chart.     Physical Exam:  Vital Signs:   Vitals:    02/18/20 0410 02/18/20 0538 02/18/20 0815 02/18/20 1213   BP: 117/72  94/84 99/59   BP Location: Right arm  Right arm Right arm   Patient Position: Lying  Lying Lying   Pulse: 76  77 63   Resp: 16  18 18   Temp: 98.1 °F (36.7 °C)  98 °F (36.7 °C) 97.1 °F (36.2 °C)   TempSrc: Oral  Oral Oral   SpO2: 99%  95% 91%   Weight:  64 kg (141 lb 3.2 oz)     Height:           Physical Exam   Constitutional: She is oriented to person, place, and time. She appears well-developed and well-nourished.   Lying in bed in no acute distress.   HENT:   Head: Normocephalic and atraumatic.   Moist Mucous  Membranes.    Eyes: Pupils are equal, round, and reactive to light. EOM are normal.   Cardiovascular: Normal rate, normal heart sounds and intact distal pulses. Exam reveals no gallop and no friction rub.   No murmur heard.  Irregularly irregular rhythm.  Rate controlled.  1+ bilateral lower extremity pitting edema.   Pulmonary/Chest: Effort normal and breath sounds normal. No stridor. No respiratory distress. She has no wheezes. She has no rales. She exhibits no tenderness.   Abdominal: Soft. Bowel sounds are normal. She exhibits no distension. There is no tenderness. There is no rebound and no guarding.   Musculoskeletal: Normal range of motion. She exhibits no edema.   Neurological: She is alert and oriented to person, place, and time.       Results Review:   Results from last 7 days   Lab Units 02/17/20  0511   SODIUM mmol/L 137   POTASSIUM mmol/L 4.9   CHLORIDE mmol/L 100   CO2 mmol/L 22.5   BUN mg/dL 66*   CREATININE mg/dL 3.32*   CALCIUM mg/dL 9.5   BILIRUBIN mg/dL 1.5*   ALK PHOS U/L 222*   ALT (SGPT) U/L 39*   AST (SGOT) U/L 58*   GLUCOSE mg/dL 104*         Results from last 7 days   Lab Units 02/17/20  0511 02/16/20  0602 02/15/20  0540   WBC 10*3/mm3 10.24 10.53 8.86   HEMOGLOBIN g/dL 8.9* 9.0* 8.7*   HEMATOCRIT % 28.0* 29.5* 27.4*   PLATELETS 10*3/mm3 69* 80* 77*         Results from last 7 days   Lab Units 02/17/20  0511   MAGNESIUM mg/dL 2.6*           I personally viewed and interpreted the patient's EKG/Telemetry data     Medications:   )  Current Facility-Administered Medications:   •  acetaminophen (TYLENOL) tablet 650 mg, 650 mg, Oral, Q4H PRN, 650 mg at 02/14/20 2043 **OR** acetaminophen (TYLENOL) 160 MG/5ML solution 650 mg, 650 mg, Oral, Q4H PRN **OR** acetaminophen (TYLENOL) suppository 650 mg, 650 mg, Rectal, Q4H PRN, Familia Paez MD  •  aspirin EC tablet 81 mg, 81 mg, Oral, Daily, Familia Paez MD, 81 mg at 02/18/20 0842  •  calcium acetate (PHOS BINDER)) capsule 1,334 mg, 1,334  mg, Oral, TID With Meals, Familia Paez MD, 1,334 mg at 02/18/20 1148  •  dilTIAZem CD (CARDIZEM CD) 24 hr capsule 120 mg, 120 mg, Oral, Q24H, Familia Paez MD, 120 mg at 02/18/20 0842  •  ipratropium-albuterol (DUO-NEB) nebulizer solution 3 mL, 3 mL, Nebulization, Q4H PRN, Familia Paez MD  •  iron polysaccharides (NIFEREX) capsule 150 mg, 150 mg, Oral, Daily, Familia Paez MD, 150 mg at 02/18/20 0842  •  lactobacillus acidophilus (RISAQUAD) capsule 1 capsule, 1 capsule, Oral, BID, Denis Mercado MD, 1 capsule at 02/18/20 1148  •  levothyroxine (SYNTHROID, LEVOTHROID) tablet 75 mcg, 75 mcg, Oral, Daily, Familia Paez MD, 75 mcg at 02/18/20 0842  •  ondansetron (ZOFRAN) injection 4 mg, 4 mg, Intravenous, Q6H PRN, Familia Paez MD  •  pantoprazole (PROTONIX) EC tablet 40 mg, 40 mg, Oral, Daily, Familia Paez MD, 40 mg at 02/18/20 0842  •  polyethylene glycol (MIRALAX) packet 17 g, 17 g, Oral, Daily PRN, Tamara Tomlin, DO  •  predniSONE (DELTASONE) tablet 5 mg, 5 mg, Oral, Daily, Sanford Tsang MD, FASN, 5 mg at 02/18/20 0842  •  ranolazine (RANEXA) 12 hr tablet 500 mg, 500 mg, Oral, Q12H, Familia Paez MD, 500 mg at 02/18/20 0842  •  sodium chloride 0.9 % flush 10 mL, 10 mL, Intravenous, PRN, Familia Paez MD  •  sodium chloride 0.9 % flush 10 mL, 10 mL, Intravenous, Q12H, Familia Paez MD, 10 mL at 02/18/20 0843  •  sodium chloride 0.9 % flush 10 mL, 10 mL, Intravenous, PRN, Familia Paez MD    Assessment / Plan:     1.  Acute on chronic diastolic heart failure   --Grade 2 diastolic dysfunction with elevated left atrial pressures on echocardiogram  --Shortness of breath improved with volume removal  --Continues to have significant lower extremity edema on exam, with persistent volume overload  --Given hypotension, will discontinue metoprolol to allow for increased BP and hopefully increase volume removal with HD     2.  Multivessel coronary artery  disease  --History of 3V CABG 2012 including LIMA-LAD, SVG-diagonal, and SVG-PDA  --Known occlusion of the SVG-diagonal graft with severe in-stent stenosis in the native diagonal branch, unattractive for PCI  --Remains chest pain-free  --Continue Ranexa as antianginal  --Continue daily aspirin     3.  Persistent atrial fibrillation  --Known history of atrial fibrillation, previously failed several antiarrhythmics and declined Tikosyn initiation  --Poor candidate for amiodarone due to interstitial lung disease  --Remains rate controlled  --Continue current dose of diltiazem  --Holding therapeutic anticoagulation due to anemia with possible GI bleed     4.  Pulmonary arterial hypertension   --In the setting of interstitial lung disease  --RVSP on echocardiogram 60 mmHg     5.  Acute on chronic kidney disease  --Management per nephrology     6.  Dyslipidemia  --Continue statin       Thank you for allowing me to participate in the care of your patient. Please to not hesitate to contact me with additional questions or concerns.     LA NENA Higgins MD  Interventional Cardiology   02/18/20  4:19 PM

## 2020-02-18 NOTE — PROGRESS NOTES
"Nephrology Progress Note.    LOS: 12 days    Patient Care Team:  Anthony Sweet DO as PCP - General (Family Medicine)  Tien Archer MD as Consulting Physician (Cardiac Electrophysiology)  Xu Block MD (Cardiology)  Yomi Higgins MD as Consulting Physician (Cardiology)    Chief Complaint:    Chief Complaint   Patient presents with   • Weakness - Generalized       Subjective:   Follow up for ESRD and Anemia.     Interval History:   The patient is feeling somewhat better, she did say she has been eating much better and significant edema persists, denies any chest pain, her shortness of air has improved, no nausea or vomiting, no dysuria or gross hematuria.  She did had dialysis yesterday.      Objective:    Vital Signs  BP 94/84 (BP Location: Right arm, Patient Position: Lying)   Pulse 77   Temp 98 °F (36.7 °C) (Oral)   Resp 18   Ht 147.3 cm (58\")   Wt 64 kg (141 lb 3.2 oz)   LMP  (LMP Unknown)   SpO2 95%   BMI 29.51 kg/m²     No intake/output data recorded.    Intake/Output Summary (Last 24 hours) at 2/18/2020 0828  Last data filed at 2/18/2020 0410  Gross per 24 hour   Intake 240 ml   Output 2500 ml   Net -2260 ml       Physical Exam: Appearance: alert, oriented x 3, no acute distress, patient appears to be chronically ill and frail  HEENT: Oral mucosa dry, extra occular movements intact. Sclera clear.  Skin: Warm and dry  Neck: supple, no JVD, trachea midline, she has tunneled dialysis catheter in the right IJ with exit site below the right clavicle with significant hematoma and ecchymoses over the insertion site the lower side of her neck  Lungs: Scattered rhonchi, unlabored breathing effort heart: Irregular rate and rhythm.  peripheral pulses weak but palpable.  Abdomen: Obese, soft, non-tender,  present bowel sounds to auscultation  : no palpable bladder.  Extremities: 3+ upper and lower extremities edema , no cyanosis or clubbing.   Neuro: normal speech and mental status, grossly " non focal.     Results Review:   Results from last 7 days   Lab Units 02/17/20  0511 02/16/20  0602 02/15/20  0540   SODIUM mmol/L 137 138 139   POTASSIUM mmol/L 4.9 4.1 4.3   CHLORIDE mmol/L 100 103 101   CO2 mmol/L 22.5 21.1* 22.4   BUN mg/dL 66* 49* 75*   CREATININE mg/dL 3.32* 2.54* 3.25*   CALCIUM mg/dL 9.5 9.3 9.1   ALBUMIN g/dL 3.60 3.10* 3.20*   BILIRUBIN mg/dL 1.5* 1.4* 1.3*   ALK PHOS U/L 222* 233* 210*   ALT (SGPT) U/L 39* 53* 70*   AST (SGOT) U/L 58* 66* 65*   GLUCOSE mg/dL 104* 118* 119*     Estimated Creatinine Clearance: 11.7 mL/min (A) (by C-G formula based on SCr of 3.32 mg/dL (H)).  Results from last 7 days   Lab Units 02/17/20  0511 02/16/20  0602 02/12/20  0558   MAGNESIUM mg/dL 2.6* 2.4 2.8*   PHOSPHORUS mg/dL 3.9 3.4  --          Results from last 7 days   Lab Units 02/17/20  0511 02/16/20  0602 02/15/20  0540 02/14/20  0601 02/13/20  0557   WBC 10*3/mm3 10.24 10.53 8.86 8.51 7.97   HEMOGLOBIN g/dL 8.9* 9.0* 8.7* 7.9* 7.6*   PLATELETS 10*3/mm3 69* 80* 77* 104* 107*         Brief Urine Lab Results  (Last result in the past 365 days)      Color   Clarity   Blood   Leuk Est   Nitrite   Protein   CREAT   Urine HCG        02/13/20 1329             43.3       02/13/20 1329             43.7           No results found for: UTPCR  Imaging Results (Last 24 Hours)     ** No results found for the last 24 hours. **          aspirin 81 mg Oral Daily   calcium acetate 1,334 mg Oral TID With Meals   dilTIAZem  mg Oral Q24H   iron polysaccharides 150 mg Oral Daily   levothyroxine 75 mcg Oral Daily   metoprolol succinate XL 25 mg Oral Q24H   pantoprazole 40 mg Oral Daily   predniSONE 5 mg Oral Daily   ranolazine 500 mg Oral Q12H   sodium chloride 10 mL Intravenous Q12H            Medication Review:   Current Facility-Administered Medications   Medication Dose Route Frequency Provider Last Rate Last Dose   • acetaminophen (TYLENOL) tablet 650 mg  650 mg Oral Q4H PRN Familia Paez MD   650 mg at  02/14/20 2043    Or   • acetaminophen (TYLENOL) 160 MG/5ML solution 650 mg  650 mg Oral Q4H PRN Familia Paez MD        Or   • acetaminophen (TYLENOL) suppository 650 mg  650 mg Rectal Q4H PRN Familia Paez MD       • aspirin EC tablet 81 mg  81 mg Oral Daily Familia Paez MD   81 mg at 02/17/20 1305   • calcium acetate (PHOS BINDER)) capsule 1,334 mg  1,334 mg Oral TID With Meals Familia Paez MD   Stopped at 02/17/20 1811   • dilTIAZem CD (CARDIZEM CD) 24 hr capsule 120 mg  120 mg Oral Q24H Familia Paez MD   120 mg at 02/17/20 1305   • ipratropium-albuterol (DUO-NEB) nebulizer solution 3 mL  3 mL Nebulization Q4H PRN Familia Paez MD       • iron polysaccharides (NIFEREX) capsule 150 mg  150 mg Oral Daily Familia Paez MD   150 mg at 02/17/20 1305   • levothyroxine (SYNTHROID, LEVOTHROID) tablet 75 mcg  75 mcg Oral Daily Familia Paez MD   75 mcg at 02/17/20 1305   • metoprolol succinate XL (TOPROL-XL) 24 hr tablet 25 mg  25 mg Oral Q24H Familia Paez MD   25 mg at 02/17/20 1305   • ondansetron (ZOFRAN) injection 4 mg  4 mg Intravenous Q6H PRN Familia Paez MD       • pantoprazole (PROTONIX) EC tablet 40 mg  40 mg Oral Daily Familia Paez MD   40 mg at 02/17/20 1304   • polyethylene glycol (MIRALAX) packet 17 g  17 g Oral Daily PRN Tamara Tomlin DO       • predniSONE (DELTASONE) tablet 5 mg  5 mg Oral Daily Sanford Tsang MD, FASN       • ranolazine (RANEXA) 12 hr tablet 500 mg  500 mg Oral Q12H Familia Paez MD   500 mg at 02/17/20 2215   • sodium chloride 0.9 % flush 10 mL  10 mL Intravenous PRN Familia Paez MD       • sodium chloride 0.9 % flush 10 mL  10 mL Intravenous Q12H Familia Paez MD   10 mL at 02/17/20 1811   • sodium chloride 0.9 % flush 10 mL  10 mL Intravenous PRN Familia Paez MD           Assessment/Plan:  1. ESRD: continue with 3 times a week dialysis and daily if needed.  2. Metabolic acidosis: Improved with oral  sodium bicarb and dialysis, she is off bicarb.  3. Abnormal liver function:   4. Anemia: Was transfused yesterday and her hemoglobin today is between 8 and 9.  5. coronary artery disease, status post CABG, status post stent placement:  6. elevated pulmonary pressure of 45 to 55 mmHg.  7. Acute on chronic respiratory failure: Improved  8. Hypersensitivity pneumonitis  9. Interstitial lung disease  10. Thrombocytopenia:     Plan:  · She does have Toprol and Cardizem together, blood pressure is on the low side. I have discussed with Dr. Higgins who will reassess her again today and see if we can cut down 1 of these medicines.  · We will continue with the Procrit has 5000 units with each dialysis.  · Continue with the rest of the treatment plan.  · Details were also discussed with the hospitalist service.   · Surveillance labs.  · Further recommendations will depend on clinical course of the patient during the current hospitalization.    · I also discussed the details with the nursing staff.  · Rest as ordered.    Sanford Tsang MD, FASN  02/18/20      Dictated utilizing Dragon dictation.

## 2020-02-18 NOTE — THERAPY TREATMENT NOTE
Acute Care - Physical Therapy Treatment Note  [facility]     Patient Name: Pat Morel  : 1940  MRN: [id number]  Today's Date: 2020  Onset of Illness/Injury or Date of Surgery: 20     Referring Physician: Dr. Mercado    Admit Date: 2020    Visit Dx:    ICD-10-CM ICD-9-CM   1. Acute renal failure, unspecified acute renal failure type (CMS/HCC) N17.9 584.9   2. Hyperkalemia E87.5 276.7   3. Atrial fibrillation, unspecified type (CMS/HCC) I48.91 427.31   4. Acute respiratory insufficiency R06.89 518.82   5. Elevated troponin R79.89 790.6   6. Impaired mobility and ADLs Z74.09 799.89     Patient Active Problem List   Diagnosis   • Persistent atrial fibrillation   • Coronary artery disease involving native coronary artery of native heart without angina pectoris   • Essential hypertension   • Cerebrovascular accident (CVA) due to embolism (CMS/HCC)   • Dyspnea on exertion   • Pulmonary arterial hypertension (CMS/HCC)   • Interstitial lung disease (CMS/HCC)   • Hypersensitivity pneumonitis (CMS/HCC)   • Gastric ulcer   • Coagulation disorder due to circulating anticoagulants (CMS/HCC)   • Dyslipidemia   • Acquired hypothyroidism   • Chronic kidney disease, stage 3 (CMS/HCC)   • GERD without esophagitis   • Iron deficiency anemia   • Acute on chronic diastolic congestive heart failure (CMS/HCC)   • Pneumonia of both lungs due to infectious organism   • Acute respiratory failure with hypoxia (CMS/HCC)   • Diastolic heart failure (CMS/HCC)   • Acute renal failure (CMS/HCC)   • Recurrent falls   • Acute hyperkalemia   • Normocytic anemia   • Stool color black   • Upper abdominal pain   • Elevated LFTs   • Coagulopathy (CMS/HCC)       Therapy Treatment    Rehabilitation Treatment Summary     Row Name 20 1431 20 1115          Treatment Time/Intention    Discipline  physical therapy assistant  -RM  occupational therapist  -SD     Document Type  therapy note (daily note)  -RM  therapy note  (daily note)  -SD     Subjective Information  complains of;weakness;fatigue  -RM  complains of;weakness;fatigue  -SD     Mode of Treatment  physical therapy  -RM  occupational therapy  -SD     Patient/Family Observations  --  supine in bed, 2L  -SD     Care Plan Review  care plan/treatment goals reviewed  -RM  care plan/treatment goals reviewed  -SD     Patient Effort  adequate  -RM  good  -SD     Existing Precautions/Restrictions  fall;oxygen therapy device and L/min  -RM  fall;oxygen therapy device and L/min  -SD     Treatment Considerations/Comments  2L   -RM  2L  -SD     Recorded by [RM] Matt Renee, PTA 02/18/20 1734 [SD] Italia Schumacher, OT 02/18/20 1307     Row Name 02/18/20 1431 02/18/20 1115          Vital Signs    Pre SpO2 (%)  --  97  -SD     O2 Delivery Pre Treatment  --  supplemental O2  -SD     Intra SpO2 (%)  --  87  -SD     O2 Delivery Intra Treatment  supplemental O2  -RM  supplemental O2  -SD     Post SpO2 (%)  --  90  -SD     O2 Delivery Post Treatment  supplemental O2  -RM  supplemental O2  -SD     Recorded by [RM] Matt Renee, PTA 02/18/20 1734 [SD] Italia Schumacher, OT 02/18/20 1307     Row Name 02/18/20 1431 02/18/20 1115          Bed Mobility Assessment/Treatment    Bed Mobility Assessment/Treatment  --  supine-sit  -SD     Supine-Sit [place] (Bed Mobility)  --  maximum assist (25% patient effort)  -SD     Sit-Supine [place] (Bed Mobility)  maximum assist (25% patient effort)  -  --     Bed Mobility, Safety Issues  decreased use of arms for pushing/pulling;decreased use of legs for bridging/pushing  -  --     Assistive Device (Bed Mobility)  draw sheet  -  bed rails;draw sheet;head of bed elevated  -SD     Comment (Bed Mobility)  --  sat EOB x 15 mins for grooming/ AROM  -SD     Recorded by [RM] Matt Renee, PTA 02/18/20 1734 [SD] Italia Schumacher, OT 02/18/20 1307     Row Name 02/18/20 1115             Transfer Assessment/Treatment    Transfer  Assessment/Treatment  bed-chair transfer  -SD      Recorded by [SD] Italia Schumacher, OT 02/18/20 1307      Row Name 02/18/20 1115             Bed-Chair Transfer    Bed-Chair [place] (Transfers)  maximum assist (25% patient effort);2 person assist  -SD      Assistive Device (Bed-Chair Transfers)  -- gait belt  -SD      Recorded by [SD] Italia Schumacher, OT 02/18/20 1307      Row Name 02/18/20 1431             Chair-Bed Transfer    Chair-Bed [place] (Transfers)  maximum assist (25% patient effort)  -RM      Recorded by [RM] Matt Renee, PTA 02/18/20 1734      Row Name 02/18/20 1115             Grooming Assessment/Training    [place] Level (Grooming)  hair care, combing/brushing;oral care regimen;wash face, hands;contact guard assist  -SD      Recorded by [SD] Italia Schumacher OT 02/18/20 1307      Row Name 02/18/20 1115             Toileting Assessment/Training    [place] Level (Toileting)  change pad/brief;perform perineal hygiene;maximum assist (25% patient effort)  -SD      Recorded by [SD] Italia Schumacher, OT 02/18/20 1307      Row Name 02/18/20 1115             Therapeutic Exercise    Upper Extremity Range of Motion (Therapeutic Exercise)  shoulder flexion/extension, bilateral;shoulder abduction/adduction, bilateral;shoulder horizontal abduction/adduction, bilateral;elbow flexion/extension, bilateral  -SD      Hand (Therapeutic Exercise)  hand , bilateral  -SD      Exercise Type (Therapeutic Exercise)  AROM (active range of motion)  -SD      Position (Therapeutic Exercise)  seated  -SD      Sets/Reps (Therapeutic Exercise)  1 x 10  -SD      Expected Outcome (Therapeutic Exercise)  improve functional tolerance, self-care activity  -SD      Recorded by [SD] Italia Schumacher, OT 02/18/20 1307      Row Name 02/18/20 1431 02/18/20 1115          Positioning and Restraints    Pre-Treatment Position  sitting in chair/recliner  -RM  in bed  -SD     Post Treatment Position  bed  -RM  chair   -SD     In Bed  supine;call light within reach;encouraged to call for assist;with nsg  -RM  --     In Chair  --  reclined;call light within reach;encouraged to call for assist;with nsg  -SD     Recorded by [RM] Matt Renee, [facility] 02/18/20 1734 [SD] Italia Schumacher, OT 02/18/20 1307     Row Name 02/18/20 1115             Pain Scale: Numbers Pre/Post-Treatment    Pain Scale: Numbers, Pretreatment  0/10 - no pain  -SD      Pain Scale: Numbers, Post-Treatment  0/10 - no pain  -SD      Recorded by [SD] Italia Schumacher, OT 02/18/20 1307      Row Name                Wound 02/07/20 0950 Left anterior;upper arm Skin Tear    Wound - Properties Group Date first assessed: 02/07/20 [SB] Time first assessed: 0950 [SB] Side: Left [SB] Orientation: anterior;upper [SB] Location: arm [SB] Primary Wound Type: Skin tear [SB] Recorded by:  [SB] Marcelina Lindo RN 02/07/20 0950    Row Name                Wound 01/09/20 0800 Bilateral groin Other (comment)    Wound - Properties Group Date first assessed: 01/09/20 [EB] Time first assessed: 0800 [EB] Side: Bilateral [EB] Location: groin [EB] Primary Wound Type: Other [EB], Excoriation  Additional Comments: Excoriation to groin area [EB] Recorded by:  [EB] Juliana Palmer RN 01/09/20 0932    Row Name                Wound 02/14/20 0749 Right clavicle Incision    Wound - Properties Group Date first assessed: 02/14/20 [JS] Time first assessed: 0749 [JS] Present on Hospital Admission: N [JS] Side: Right [JS] Location: clavicle [JS] Primary Wound Type: Incision [JS] Recorded by:  [JS] Tamara Cervantes RN 02/14/20 0749    Row Name                Wound 02/17/20 0830 Left anterior;lower arm Skin Tear    Wound - Properties Group Date first assessed: 02/17/20 [CB] Time first assessed: 0830 [CB] Side: Left [CB] Orientation: anterior;lower [CB] Location: arm [CB] Primary Wound Type: Skin tear [CB] Recorded by:  [CB] Charlette Cox RN 02/17/20 1261    Row Name 02/18/20 4865             Coping     Observed Emotional State  calm;cooperative  -SD      Verbalized Emotional State  acceptance  -SD      Recorded by [SD] Italia Schumacher, OT 02/18/20 1307      Row Name 02/18/20 1431 02/18/20 1115          Plan of Care Review    Plan of Care Reviewed With  patient  -RM  patient  -SD     Progress  improving  -RM  no change  -SD     Outcome Summary  Pt reports feeling very tired at this time.  PT performed transfer from bed to chair with gait belt and stand pivot. Pt was unable to take steps but was able to bare weight in standing with mod-max a for approx 1 min.  See flowsheet for details.   -RM  OT tx completed. Patient completed supine to sit with max A, sat EOB x 15 mins for grooming and UB AROM; required max x 2 transfer to chair. Continue OT POC  -SD     Recorded by [RM] Matt Renee, PTA 02/18/20 0685 [SD] Italia Schumacher OT 02/18/20 1307     Row Name 02/18/20 1115             Outcome Summary/Treatment Plan (OT)    Daily Summary of Progress (OT)  progress toward functional goals is gradual  -SD      Recorded by [SD] Italia Schumacher OT 02/18/20 1307      Row Name 02/18/20 1431             Outcome Summary/Treatment Plan (PT)    Daily Summary of Progress (PT)  progress toward functional goals is gradual  -RM      Plan for Continued Treatment (PT)  Cont PT per POC.  -RM      Recorded by [RM] Matt Renee, PTA 02/18/20 3496        User Key  (r) = Recorded By, (t) = Taken By, (c) = Cosigned By    Initials Name Effective Dates Discipline    Tamara Govea RN 07/14/16 -  Nurse    Charlette Brunson RN 10/26/16 -  Nurse    Matt Verma, PTA 03/07/18 -  PT    Juliana Street RN 01/29/18 -  Nurse    Italia Norris OT 03/07/18 -  OT    Marcelina Santiago, RN 06/16/16 -  Nurse          Wound 01/09/20 0800 Bilateral groin Other (comment) (Active)   Dressing Appearance open to air 2/18/2020  8:00 AM   Base pink 2/18/2020  8:00 AM   Dressing Care, Wound open to air 2/18/2020  8:00 AM        Wound 02/07/20 0950 Left anterior;upper arm Skin Tear (Active)   Dressing Appearance dry;intact 2/18/2020  8:00 AM   Closure ASHLI 2/18/2020  8:00 AM   Base maroon/purple 2/18/2020  4:10 AM   Periwound edematous;intact;moist;swelling 2/18/2020  4:10 AM   Periwound Temperature warm 2/18/2020  4:10 AM   Periwound Skin Turgor soft 2/18/2020  4:10 AM   Edges open 2/17/2020  8:15 PM   Drainage Characteristics/Odor clear 2/18/2020  4:10 AM   Drainage Amount none 2/18/2020  8:00 AM   Dressing Care, Wound dressing changed;gauze;non-adherent 2/18/2020  4:10 AM       Wound 02/14/20 0749 Right clavicle Incision (Active)   Dressing Appearance dry;intact;dried drainage 2/18/2020  8:00 AM   Closure Sutures 2/18/2020  8:00 AM   Base maroon/purple 2/18/2020  8:00 AM   Drainage Amount small 2/18/2020  8:00 AM       Wound 02/17/20 0830 Left anterior;lower arm Skin Tear (Active)   Dressing Appearance dry;intact 2/18/2020  8:00 AM   Closure ASHLI 2/18/2020  8:00 AM   Base moist 2/18/2020  4:10 AM   Periwound moist 2/18/2020  4:10 AM   Periwound Temperature cool 2/18/2020  4:10 AM   Periwound Skin Turgor soft 2/18/2020  4:10 AM   Drainage Characteristics/Odor clear 2/18/2020  4:10 AM   Drainage Amount moderate 2/18/2020  4:10 AM   Dressing Care, Wound dressing changed;gauze;non-adherent 2/18/2020  4:10 AM               PT Recommendation and Plan     Outcome Summary/Treatment Plan (PT)  Daily Summary of Progress (PT): progress toward functional goals is gradual  Plan for Continued Treatment (PT): Cont PT per POC.  Plan of Care Reviewed With: patient  Progress: improving  Outcome Summary: Pt reports feeling very tired at this time.  PT performed transfer from bed to chair with gait belt and stand pivot. Pt was unable to take steps but was able to bare weight in standing with mod-max a for approx 1 min.  See flowsheet for details.   Outcome Measures     Row Name 02/18/20 1431 02/18/20 1115 02/17/20 1559       How much help from another  person do you currently need...    Turning from your back to your side while in flat bed without using bedrails?  3  -RM  --  --    Moving from lying on back to sitting on the side of a flat bed without bedrails?  3  -RM  --  --    Moving to and from a bed to a chair (including a wheelchair)?  1  -RM  --  --    Standing up from a chair using your arms (e.g., wheelchair, bedside chair)?  2  -RM  --  --    Climbing 3-5 steps with a railing?  1  -RM  --  --    To walk in hospital room?  1  -RM  --  --    AM-PAC 6 Clicks Score (PT)  11  -RM  --  --       How much help from another is currently needed...    Putting on and taking off regular lower body clothing?  --  2  -SD  2  -SD    Bathing (including washing, rinsing, and drying)  --  2  -SD  2  -SD    Toileting (which includes using toilet bed pan or urinal)  --  2  -SD  2  -SD    Putting on and taking off regular upper body clothing  --  2  -SD  2  -SD    Taking care of personal grooming (such as brushing teeth)  --  3  -SD  2  -SD    Eating meals  --  3  -SD  3  -SD    AM-PAC 6 Clicks Score (OT)  --  14  -SD  13  -SD       Functional Assessment    Outcome Measure Options  AM-PAC 6 Clicks Basic Mobility (PT)  -RM  AM-PAC 6 Clicks Daily Activity (OT)  -SD  AM-PAC 6 Clicks Daily Activity (OT)  -SD      User Key  (r) = Recorded By, (t) = Taken By, (c) = Cosigned By    Initials Name Provider Type    RM Matt Renee, PTA Physical Therapy Assistant    Italia Norris, OT Occupational Therapist         Time Calculation:   PT Charges     Row Name 02/18/20 1736             Time Calculation    Start Time  1431  -RM      Stop Time  1443  -RM      Time Calculation (min)  12 min  -RM      PT Received On  02/18/20  -RM      PT Goal Re-Cert Due Date  02/19/20  -RM         Time Calculation- PT    Total Timed Code Minutes- PT  12 minute(s)  -RM         Timed Charges    73374 - PT Therapeutic Activity Minutes  12  -RM        User Key  (r) = Recorded By, (t) = Taken By, (c) =  Cosigned By    Initials Name Provider Type     Matt Renee, HAKEEM Physical Therapy Assistant        Therapy Charges for Today     Code Description Service Date Service Provider Modifiers Qty    17469367452 HC PT THERAPEUTIC ACT EA 15 MIN 2/18/2020 Matt Renee, HAKEEM GP 1          PT G-Codes  Outcome Measure Options: AM-PAC 6 Clicks Basic Mobility (PT)  AM-PAC 6 Clicks Score (PT): 11  AM-PAC 6 Clicks Score (OT): 14    Matt Renee PTA  2/18/2020

## 2020-02-19 NOTE — PROGRESS NOTES
Continued Stay Note  [facility]     Patient Name: Pat Morel  MRN: [id number]  Today's Date: 2/19/2020    Admit Date: 2/6/2020    Discharge Plan     Row Name 02/19/20 1054       Plan    Plan  [facility]    Patient/Family in Agreement with Plan  yes    Plan Comments Received call from [facility] with [facility].  Pt has been approved and okay to accept today.  Report number is [phone].  Fax is [phone].       Per therapy, pt is able to sit in WC.      Per SW,  tranportation has been set up through [employer] and family will pay.      Row Name 02/19/20 0854       Plan    Plan  [facility]    Patient/Family in Agreement with Plan  yes    Plan Comments  Efaxed updated clinicals.        Discharge Codes    No documentation.       Expected Discharge Date and Time     Expected Discharge Date Expected Discharge Time    Feb 19, 2020             Kelsey Riggs RN

## 2020-02-19 NOTE — PROGRESS NOTES
[facility] Cardiology IP Progress Note    Pat Morel  1940  [id number]  02/19/20    Subjective:   Mrs. Pat Morel is a 80 y.o. female seen in follow-up for multivessel coronary artery disease and acute on chronic diastolic heart failure, admitted with worsening renal function and subsequently initiated on HD.  No acute overnight events.  No episodes of RVR overnight on review of telemetry.  This morning, the patient continues to report improved shortness of breath.  Has persistent lower extremity swelling.  No chest pain or chest discomfort.  Anticipating possible discharge to rehab as early as today.  No new complaints.    Review of Systems:   Review of Systems   Constitutional: Positive for fatigue. Negative for chills, diaphoresis and fever.   Respiratory: Negative for cough, chest tightness, shortness of breath and wheezing.    Cardiovascular: Positive for leg swelling. Negative for chest pain and palpitations.   Gastrointestinal: Negative for abdominal pain and GERD.   Neurological: Positive for weakness. Negative for dizziness, syncope and light-headedness.       I have reviewed and/or updated the patient's past medical, past surgical, family history, social history, problem list and allergies as appropriate in the chart.     Physical Exam:  Vital Signs:   Vitals:    02/18/20 2012 02/18/20 2348 02/19/20 0433 02/19/20 0510   BP: 105/51 125/66 121/63    BP Location: Right arm Right arm Left arm    Patient Position: Lying Lying Lying    Pulse: 82 70 76    Resp: 18 18 16    Temp: 97.8 °F (36.6 °C) 98.1 °F (36.7 °C) 97.3 °F (36.3 °C)    TempSrc: Oral Oral Oral    SpO2: 93% 95% 98%    Weight:    63.6 kg (140 lb 4.8 oz)   Height:           Physical Exam   Constitutional: She is oriented to person, place, and time. She appears well-developed and well-nourished.   Sitting in bed in no acute distress.   HENT:   Head: Normocephalic and atraumatic.   Moist Mucous Membranes.    Eyes:  Pupils are equal, round, and reactive to light. EOM are normal. No scleral icterus.   Cardiovascular: Normal rate, normal heart sounds and intact distal pulses. Exam reveals no gallop and no friction rub.   No murmur heard.  Irregularly irregular rhythm.  1+ bilateral lower extremity pitting edema.   Pulmonary/Chest: Effort normal and breath sounds normal. No stridor. No respiratory distress. She has no wheezes. She has no rales. She exhibits no tenderness.   On supplemental O2.   Abdominal: Soft. Bowel sounds are normal. She exhibits no distension. There is no tenderness. There is no rebound and no guarding.   Musculoskeletal: Normal range of motion. She exhibits edema.   Neurological: She is alert and oriented to person, place, and time.   Psychiatric: She has a normal mood and affect. Her behavior is normal.   Nursing note and vitals reviewed.      Results Review:   Results from last 7 days   Lab Units 02/19/20  0607   SODIUM mmol/L 135*   POTASSIUM mmol/L 4.9   CHLORIDE mmol/L 99   CO2 mmol/L 21.8*   BUN mg/dL 51*   CREATININE mg/dL 3.03*   CALCIUM mg/dL 9.7   BILIRUBIN mg/dL 1.5*   ALK PHOS U/L 261*   ALT (SGPT) U/L 43*   AST (SGOT) U/L 63*   GLUCOSE mg/dL 95         Results from last 7 days   Lab Units 02/19/20  0607 02/17/20  0511 02/16/20  0602   WBC 10*3/mm3 8.42 10.24 10.53   HEMOGLOBIN g/dL 8.6* 8.9* 9.0*   HEMATOCRIT % 28.5* 28.0* 29.5*   PLATELETS 10*3/mm3 76* 69* 80*         Results from last 7 days   Lab Units 02/17/20  0511   MAGNESIUM mg/dL 2.6*           I personally viewed and interpreted the patient's EKG/Telemetry data     Medications:   )  Current Facility-Administered Medications:   •  acetaminophen (TYLENOL) tablet 650 mg, 650 mg, Oral, Q4H PRN, 650 mg at 02/14/20 2043 **OR** acetaminophen (TYLENOL) 160 MG/5ML solution 650 mg, 650 mg, Oral, Q4H PRN **OR** acetaminophen (TYLENOL) suppository 650 mg, 650 mg, Rectal, Q4H PRN, Familia Paez MD  •  aspirin EC tablet 81 mg, 81 mg, Oral, Daily,  Familia Paez MD, 81 mg at 02/19/20 0850  •  calcium acetate (PHOS BINDER)) capsule 1,334 mg, 1,334 mg, Oral, TID With Meals, Familia Paez MD, 1,334 mg at 02/19/20 0850  •  dilTIAZem CD (CARDIZEM CD) 24 hr capsule 120 mg, 120 mg, Oral, Q24H, Familia Paez MD, 120 mg at 02/19/20 0850  •  ipratropium-albuterol (DUO-NEB) nebulizer solution 3 mL, 3 mL, Nebulization, Q4H PRN, Familia Paez MD  •  iron polysaccharides (NIFEREX) capsule 150 mg, 150 mg, Oral, Daily, Familia Paez MD, 150 mg at 02/19/20 0850  •  lactobacillus acidophilus (RISAQUAD) capsule 1 capsule, 1 capsule, Oral, BID, Denis Mercado MD, 1 capsule at 02/19/20 0850  •  levothyroxine (SYNTHROID, LEVOTHROID) tablet 75 mcg, 75 mcg, Oral, Daily, Familia Paez MD, 75 mcg at 02/19/20 0850  •  ondansetron (ZOFRAN) injection 4 mg, 4 mg, Intravenous, Q6H PRN, Familia Paez MD  •  pantoprazole (PROTONIX) EC tablet 40 mg, 40 mg, Oral, Daily, Familia Paez MD, 40 mg at 02/19/20 0850  •  polyethylene glycol (MIRALAX) packet 17 g, 17 g, Oral, Daily PRN, Tamara Tomlin, DO  •  predniSONE (DELTASONE) tablet 5 mg, 5 mg, Oral, Daily, Sanford Tsang MD, FASN, 5 mg at 02/19/20 0850  •  ranolazine (RANEXA) 12 hr tablet 500 mg, 500 mg, Oral, Q12H, Familia Paez MD, 500 mg at 02/19/20 0850  •  sodium chloride 0.9 % flush 10 mL, 10 mL, Intravenous, PRN, Familia Paez MD  •  sodium chloride 0.9 % flush 10 mL, 10 mL, Intravenous, Q12H, Familia Paez MD, 10 mL at 02/19/20 0850  •  sodium chloride 0.9 % flush 10 mL, 10 mL, Intravenous, PRN, Familia Paez MD    Assessment / Plan:     1.  Acute on chronic diastolic heart failure   --Grade 2 diastolic dysfunction with elevated left atrial pressures on echocardiogram  --Dyspnea improved following volume removal with HD, however has persistent bilateral lower extremity edema  --BP well controlled, and metoprolol discontinued given mild hypotension to allow for additional volume  removal during HD  --Will schedule follow-up in the cardiology clinic in 2 weeks to reevaluate volume status and BP     2.  Multivessel coronary artery disease  --History of 3V CABG 2012 including LIMA-LAD, SVG-diagonal, and SVG-PDA  --Known occlusion of the SVG-diagonal graft with severe in-stent stenosis in the native diagonal branch, unattractive for PCI  --No recurrent episodes of chest pain  --Continue Ranexa as antianginal  --Continue daily aspirin     3.  Persistent atrial fibrillation  --Known history of atrial fibrillation, previously failed several antiarrhythmics, previously declined Tikosyn initiation, poor candidate for amiodarone due to interstitial lung disease  --Will continue with rate control strategy  --Rate remains well controlled on review of telemetry  --Continue current dose of diltiazem  --Holding therapeutic anticoagulation due to anemia with possible GI bleed, reconsider therapeutic anticoagulation as outpatient if hemoglobin remains stable     4.  Pulmonary arterial hypertension   --In the setting of interstitial lung disease  --RVSP on echocardiogram 60 mmHg     5.  Acute on chronic kidney disease  --Management per nephrology     6.  Dyslipidemia  --Continue statin       Thank you for allowing me to participate in the care of your patient. Please to not hesitate to contact me with additional questions or concerns.     LA NENA Higgins MD  Interventional Cardiology   02/19/20  10:31 AM

## 2020-02-19 NOTE — PROGRESS NOTES
"Nephrology Progress Note.    LOS: 13 days    Patient Care Team:  Anthony Sweet DO as PCP - General (Family Medicine)  Tien Archer MD as Consulting Physician (Cardiac Electrophysiology)  Xu Block MD (Cardiology)  Yomi Higgins MD as Consulting Physician (Cardiology)    Chief Complaint:    Chief Complaint   Patient presents with   • Weakness - Generalized       Subjective:   Follow up for ESRD and Anemia.     Interval History:   The patient is feeling somewhat better, she did say she has been eating much better and significant edema persists, denies any chest pain, her shortness of air has improved, no nausea or vomiting, no dysuria or gross hematuria.  She did had dialysis yesterday.      Objective:    Vital Signs  /63 (BP Location: Left arm, Patient Position: Lying)   Pulse 76   Temp 97.3 °F (36.3 °C) (Oral)   Resp 16   Ht 147.3 cm (58\")   Wt 63.6 kg (140 lb 4.8 oz)   LMP  (LMP Unknown)   SpO2 98%   BMI 29.32 kg/m²     No intake/output data recorded.    Intake/Output Summary (Last 24 hours) at 2/19/2020 0814  Last data filed at 2/18/2020 2348  Gross per 24 hour   Intake 600 ml   Output 0 ml   Net 600 ml       Physical Exam: Appearance: alert, oriented x 3, no acute distress, patient appears to be chronically ill and frail  HEENT: Oral mucosa dry, extra occular movements intact. Sclera clear.  Skin: Warm and dry  Neck: supple, no JVD, trachea midline, she has tunneled dialysis catheter in the right IJ with exit site below the right clavicle with significant hematoma and ecchymoses over the insertion site the lower side of her neck  Lungs: Scattered rhonchi, unlabored breathing effort heart: Irregular rate and rhythm.  peripheral pulses weak but palpable.  Abdomen: Obese, soft, non-tender,  present bowel sounds to auscultation  : no palpable bladder.  Extremities: 3+ upper and lower extremities edema , no cyanosis or clubbing.   Neuro: normal speech and mental status, grossly " non focal.     Results Review:   Results from last 7 days   Lab Units 02/19/20  0607 02/17/20  0511 02/16/20  0602   SODIUM mmol/L 135* 137 138   POTASSIUM mmol/L 4.9 4.9 4.1   CHLORIDE mmol/L 99 100 103   CO2 mmol/L 21.8* 22.5 21.1*   BUN mg/dL 51* 66* 49*   CREATININE mg/dL 3.03* 3.32* 2.54*   CALCIUM mg/dL 9.7 9.5 9.3   ALBUMIN g/dL 3.70 3.60 3.10*   BILIRUBIN mg/dL 1.5* 1.5* 1.4*   ALK PHOS U/L 261* 222* 233*   ALT (SGPT) U/L 43* 39* 53*   AST (SGOT) U/L 63* 58* 66*   GLUCOSE mg/dL 95 104* 118*     Estimated Creatinine Clearance: 12.8 mL/min (A) (by C-G formula based on SCr of 3.03 mg/dL (H)).  Results from last 7 days   Lab Units 02/19/20  0607 02/17/20  0511 02/16/20  0602   MAGNESIUM mg/dL  --  2.6* 2.4   PHOSPHORUS mg/dL 2.9 3.9 3.4         Results from last 7 days   Lab Units 02/19/20  0607 02/17/20  0511 02/16/20  0602 02/15/20  0540 02/14/20  0601   WBC 10*3/mm3 8.42 10.24 10.53 8.86 8.51   HEMOGLOBIN g/dL 8.6* 8.9* 9.0* 8.7* 7.9*   PLATELETS 10*3/mm3 76* 69* 80* 77* 104*         Brief Urine Lab Results  (Last result in the past 365 days)      Color   Clarity   Blood   Leuk Est   Nitrite   Protein   CREAT   Urine HCG        02/13/20 1329             43.3       02/13/20 1329             43.7           No results found for: UTPCR  Imaging Results (Last 24 Hours)     ** No results found for the last 24 hours. **          aspirin 81 mg Oral Daily   calcium acetate 1,334 mg Oral TID With Meals   dilTIAZem  mg Oral Q24H   iron polysaccharides 150 mg Oral Daily   lactobacillus acidophilus 1 capsule Oral BID   levothyroxine 75 mcg Oral Daily   pantoprazole 40 mg Oral Daily   predniSONE 5 mg Oral Daily   ranolazine 500 mg Oral Q12H   sodium chloride 10 mL Intravenous Q12H            Medication Review:   Current Facility-Administered Medications   Medication Dose Route Frequency Provider Last Rate Last Dose   • acetaminophen (TYLENOL) tablet 650 mg  650 mg Oral Q4H PRN Familia Paez MD   650 mg at  02/14/20 2043    Or   • acetaminophen (TYLENOL) 160 MG/5ML solution 650 mg  650 mg Oral Q4H PRN Familia Paez MD        Or   • acetaminophen (TYLENOL) suppository 650 mg  650 mg Rectal Q4H PRN Familia Paez MD       • aspirin EC tablet 81 mg  81 mg Oral Daily Familia Paez MD   81 mg at 02/18/20 0842   • calcium acetate (PHOS BINDER)) capsule 1,334 mg  1,334 mg Oral TID With Meals Familia Paez MD   1,334 mg at 02/18/20 1829   • dilTIAZem CD (CARDIZEM CD) 24 hr capsule 120 mg  120 mg Oral Q24H Familia Paez MD   120 mg at 02/18/20 0842   • ipratropium-albuterol (DUO-NEB) nebulizer solution 3 mL  3 mL Nebulization Q4H PRN Familia Paez MD       • iron polysaccharides (NIFEREX) capsule 150 mg  150 mg Oral Daily Familia Paez MD   150 mg at 02/18/20 0842   • lactobacillus acidophilus (RISAQUAD) capsule 1 capsule  1 capsule Oral BID Denis Mercado MD   1 capsule at 02/18/20 2055   • levothyroxine (SYNTHROID, LEVOTHROID) tablet 75 mcg  75 mcg Oral Daily Familia Paez MD   75 mcg at 02/18/20 0842   • ondansetron (ZOFRAN) injection 4 mg  4 mg Intravenous Q6H PRN Familia Paez MD       • pantoprazole (PROTONIX) EC tablet 40 mg  40 mg Oral Daily Familia Paez MD   40 mg at 02/18/20 0842   • polyethylene glycol (MIRALAX) packet 17 g  17 g Oral Daily PRN Tamara Tomlin DO       • predniSONE (DELTASONE) tablet 5 mg  5 mg Oral Daily Sanford Tsang MD, FASN   5 mg at 02/18/20 0842   • ranolazine (RANEXA) 12 hr tablet 500 mg  500 mg Oral Q12H Familia Paez MD   500 mg at 02/18/20 2055   • sodium chloride 0.9 % flush 10 mL  10 mL Intravenous PRN Familia Paez MD       • sodium chloride 0.9 % flush 10 mL  10 mL Intravenous Q12H Familia Paez MD   10 mL at 02/18/20 2055   • sodium chloride 0.9 % flush 10 mL  10 mL Intravenous PRN Familia Paez MD           Assessment/Plan:  1. ESRD: continue with 3 times a week dialysis and daily if needed.  2. Metabolic  acidosis: Improved with oral sodium bicarb and dialysis, she is off bicarb.  3. Abnormal liver function:   4. Anemia: Was transfused yesterday and her hemoglobin today is between 8 and 9.  5. coronary artery disease, status post CABG, status post stent placement:  6. elevated pulmonary pressure of 45 to 55 mmHg.  7. Acute on chronic respiratory failure: Improved  8. Hypersensitivity pneumonitis  9. Interstitial lung disease  10. Thrombocytopenia:     Plan:  · She has been off Toprol and Blood pressure is already starting to look little better.  · We will continue with the Procrit has 5000 units with each dialysis. She is due for her dialysis today and arrangements have been made.  · Continue with the rest of the treatment plan and she will need to be switched to prednisone 5 mg every other day at the time of discharge for 2 weeks and then can stop.  · Details were also discussed with the hospitalist service. If she is stable postdialysis may be able to go to the rehab today.  · Surveillance labs. I will see her at the dialysis clinic.  · Further recommendations will depend on clinical course of the patient during the current hospitalization.    · I also discussed the details with the nursing staff.  · Rest as ordered.    Sanford Tsang MD, FASN  02/19/20      Dictated utilizing Dragon dictation.

## 2020-02-19 NOTE — DISCHARGE PLACEMENT REQUEST
"DANIEL Hylton RN  Case Management  Phone:  [phone]; Fax:  [phone]    Updated Clinicals.    Vilma Morel (80 y.o. Female)     Date of Birth Social Security Number Address Home Phone MRN    1940  [address]  [facility] [address] [phone] [id number]    [place] Marital Status          Lutheran        Admission Date Admission Type Admitting Provider Attending Provider Department, Room/Bed    20 Emergency Denis Mercado MD Pais, Roshan, MD [facility] MED SURG  3, 319/    Discharge Date Discharge Disposition Discharge Destination                       Attending Provider:  Denis Mercado MD    Allergies:  Tuberculin Tests    Isolation:  None   Infection:  None   Code Status:  No CPR    Ht:  147.3 cm (58\")   Wt:  63.6 kg (140 lb 4.8 oz)    Admission Cmt:  None   Principal Problem:  Acute renal failure (CMS/HCC) [N17.9]                 Active Insurance as of 2020     Primary Coverage     Payor Plan Insurance Group Employer/Plan Group    HUMANA MEDICARE REPLACEMENT HUMANA MEDICARE REPLACEMENT [id number]     Payor Plan Address Payor Plan Phone Number Payor Plan Fax Number Effective Dates    [address] [address] [phone]  2018 - None Entered    [facility] [address]       Subscriber Name Subscriber Birth Date Member ID       VILMA MOREL 1940 [id number]                 Emergency Contacts      (Rel.) Home Phone Work Phone Mobile Phone    ElvaDell ledezma (Spouse) -- -- [phone]    Deshaun Yary (Daughter) -- -- [phone]    JacintoShe (Son) -- -- [phone]               Physician Progress Notes (last 24 hours) (Notes from 20 0853 through 20 0853)      Denis Mercado MD at 20 1626                [facility] HOSPITALIST    PROGRESS NOTE    Name:  Vilma Morel   Age:  80 y.o.  Sex:  female  :  1940  MRN:  [id number]   Visit Number:  [id number]  Admission Date:  2020  Date Of " Service:  02/18/20  Primary Care Physician:  Anthony Sweet DO     LOS: 12 days :  Patient Care Team:  Anthony Sweet DO as PCP - General (Family Medicine)  Tien Archer MD as Consulting Physician (Cardiac Electrophysiology)  Xu Block MD (Cardiology)  Yomi Higgins MD as Consulting Physician (Cardiology):    Chief Complaint:      Generalized weakness and follow-up of renal failure.    Subjective / Interval History:     Ms. Morel is currently lying down on the bed and is comfortable at rest.  She states that her leg swelling and shortness of breath has improved.  She was able to get some fluid off during dialysis yesterday.  No significant overnight events.  Unfortunately, she has had low normal blood pressures and is unable to tolerate more ultrafiltration during dialysis.  She was seen by Dr. Higgins this morning and he has recommended to discontinue the metoprolol at this time and continue the Cardizem for her rate control.  She was started on hemodialysis on 2/14/2020 due to progressive shortness of breath.  Since I had seen her last week, she has had evaluation by gastroenterologist Dr. Dee for elevated liver enzymes as well as anemia.  MRCP was negative for any CBD dilatation.    Hospital course:    Patient is a 80-year-old female with history including CAD with CABG, atrial fibrillation, chronic kidney disease stage III, history of CVA, diastolic CHF, iron deficiency anemia, hypersensitivity pneumonitis, hypertension, osteoporosis was brought to the emergency room by EMS with generalized weakness on 2/6/2020.  She was admitted with acute worsening of her chronic renal failure, elevated troponin levels  as well as hyperkalemia.  She does have chronic respiratory failure which has been stable on prednisone.  She is currently on prednisone 20 mg daily started this week.  She will taper it down to 10 mg every week and be continued on 10 mg daily until she sees her pulmonologist   Valentín.      She was seen by Dr. Tsang from nephrology who recommended gentle IV hydration.  Her renal function slowly improved and her hyperkalemia resolved.  She was also seen by Dr. Horvath for her elevated troponin levels.  He felt that the patient's troponin levels were related to her coronary artery disease in the setting of renal failure and anemia.  He did offer cardiac catheterization to the patient but she is currently not willing for any invasive cardiac work-up.  He has recommended triple antiplatelet/anticoagulant agents with aspirin, Plavix and Eliquis.  He has also added Ranexa and increased her Bystolic dose.  An echocardiogram was done during this hospitalization which showed normal left ventricular ejection fraction but grade 2 diastolic dysfunction.  Unfortunately, patient continued to have anemia and was seen again by Dr. Horvath who recommended discontinuation of all antiplatelet agents and anticoagulation and to maintain her hematocrit about 30%.    During the hospital stay, patient was noted to have elevated liver enzymes and was consulted by gastroenterology Dr. Dee.  He recommended evaluation with MRCP which the patient initially declined but subsequently did complete MRCP which showed gallstones but no evidence of CBD dilation.  Her liver enzymes subsequently did improve.  The exact etiology of her transaminase elevation was uncertain but it was felt that she may have passed a gallstone.    Unfortunately, despite initial improvement in her renal function, she continued to have intermittent shortness of breath and atrial fibrillation with rapid ventricular rate.  It was subsequently decided by nephrology that the patient would benefit from hemodialysis at least for comfort care.  She subsequently underwent tunneled dialysis catheter placement on the right internal jugular area by Dr. Paez on 2/14/2020 and was started on hemodialysis.  Due to relative hypotension and difficulty  removing fluid during dialysis, she was seen again by Dr. Higgins who discontinued her metoprolol and continued her Cardizem for rate control.    Review of Systems:     General ROS: Patient denies any fevers, chills or loss of consciousness.  Generalized weakness.  Respiratory ROS: Chronic shortness of breath.  Cardiovascular ROS: Denies chest pain or palpitations. No history of exertional chest pain.  Gastrointestinal ROS: Denies nausea and vomiting. Denies any abdominal pain. No diarrhea.  Neurological ROS: Denies any focal weakness. No loss of consciousness. Denies any numbness.  Dermatological ROS: Denies any redness or pruritis.    Vital Signs:    Temp:  [97.1 °F (36.2 °C)-98.1 °F (36.7 °C)] 97.1 °F (36.2 °C)  Heart Rate:  [63-77] 63  Resp:  [16-18] 18  BP: ()/(59-84) 99/59    Intake and output:    I/O last 3 completed shifts:  In: 325 [P.O.:240; I.V.:85]  Out: 2500 [Other:2500]  I/O this shift:  In: 360 [P.O.:360]  Out: -     Physical Examination:    General Appearance:  Alert and cooperative, not in any acute distress.   Head:  Atraumatic and normocephalic, without obvious abnormality.  Facial puffiness noted due to steroid use.   Eyes:          PERRLA, conjunctivae and sclerae normal, no Icterus. No pallor. Extraocular movements are within normal limits.   Neck: Supple, trachea midline, no thyromegaly, no carotid bruit.  Tunneled dialysis catheter noted in the right internal jugular area.  Ecchymosis noted surrounding the catheter site.   Lungs:   Chest shape is normal. Breath sounds heard bilaterally equally.  No wheezing.  Bilateral coarse crackles heard.  No Pleural rub or bronchial breathing.   Heart:  Normal S1 and S2, no murmur, no gallop, no rub. No JVD   Abdomen:   Normal bowel sounds, no masses, no organomegaly. Soft, non-tender, non-distended, no guarding, no rebound tenderness.  Large area of ecchymosis noted in the mid and lower abdomen bilaterally.   Extremities: Moves all extremities well,  1+ edema, no cyanosis, no clubbing.   Skin: No bleeding or rash.  Ecchymotic patches noted in both upper and lower extremities.   Neurologic: Awake, alert and oriented times 3. Moves all 4 extremities equally but does have generalized weakness.     Laboratory results:    Results from last 7 days   Lab Units 02/17/20  0511 02/16/20  0602 02/15/20  0540   SODIUM mmol/L 137 138 139   POTASSIUM mmol/L 4.9 4.1 4.3   CHLORIDE mmol/L 100 103 101   CO2 mmol/L 22.5 21.1* 22.4   BUN mg/dL 66* 49* 75*   CREATININE mg/dL 3.32* 2.54* 3.25*   CALCIUM mg/dL 9.5 9.3 9.1   BILIRUBIN mg/dL 1.5* 1.4* 1.3*   ALK PHOS U/L 222* 233* 210*   ALT (SGPT) U/L 39* 53* 70*   AST (SGOT) U/L 58* 66* 65*   GLUCOSE mg/dL 104* 118* 119*     Results from last 7 days   Lab Units 02/17/20  0511 02/16/20  0602 02/15/20  0540   WBC 10*3/mm3 10.24 10.53 8.86   HEMOGLOBIN g/dL 8.9* 9.0* 8.7*   HEMATOCRIT % 28.0* 29.5* 27.4*   PLATELETS 10*3/mm3 69* 80* 77*             Results from last 7 days   Lab Units 02/11/20  1804   BLOODCX  No growth at 5 days     I have reviewed the patient's laboratory results.    Radiology results:    Imaging Results (Last 24 Hours)     ** No results found for the last 24 hours. **        Medication Review:     I have reviewed the patients active and prn medications.       Acute renal failure (CMS/HCC)    Persistent atrial fibrillation    Coronary artery disease involving native coronary artery of native heart without angina pectoris    Essential hypertension    Pulmonary arterial hypertension (CMS/HCC)    Interstitial lung disease (CMS/HCC)    Hypersensitivity pneumonitis (CMS/HCC)    Acquired hypothyroidism    Chronic kidney disease, stage 3 (CMS/HCC)    GERD without esophagitis    Diastolic heart failure (CMS/HCC)    Recurrent falls    Acute hyperkalemia    Normocytic anemia    Stool color black    Upper abdominal pain    Elevated LFTs    Coagulopathy (CMS/HCC)    Assessment:    1.  Acute renal failure on chronic kidney disease  stage IV, present on admission, initiated on dialysis on 2/14/2020.  2.  Recurrent falls at home secondary to uremia, present on admission.  3.  Acute hyperkalemia secondary to #1, present on admission, resolved.  4.  Demand ischemia with elevated troponin levels, present on admission.  5.  Hypersensitivity pneumonitis on prednisone therapy.  6.  Chronic hypoxic respiratory failure on home oxygen.  7.  Chronic diastolic heart failure, no evidence of exacerbation.  8.  Paroxysmal atrial fibrillation, apixaban on hold due to anemia.  9.  Coronary artery disease status post stents and CABG.  10.  Chronic pulmonary hypertension.  11.  Acquired hypothyroidism.  12.  Elevated transaminases of uncertain etiology, improving.  13.  Acute worsening of chronic anemia status post 1 unit of packed red blood cell transfusion.    Plan:    Ms. Morel is currently doing better and states that her leg swelling and shortness of breath have improved.  She will be continued on hemodialysis 3 times weekly.  Her hepatitis panel has been negative.  She was seen by Dr. Higgins this morning and he has discontinued metoprolol so that her blood pressure remained stable during dialysis so that more ultrafiltration and fluid removal can be achieved.  She will be continued on physical therapy.  She is currently awaiting transfer to [facility] and rehab facility for short-term rehabilitation.    She is currently on low-dose aspirin but her Eliquis has been discontinued during this admission due to persistent anemia and positive stool occult blood.  She was seen by gastroenterology but has declined any further invasive work-up for GI bleeding.  She is currently on prednisone for hypersensitivity pneumonitis and the dose has been decreased to 5 mg daily.  She will need outpatient follow-up with her primary pulmonologist Dr. Laura.      Hopefully, she should be able to go to short-term rehabilitation in the next 1 to 2 days.  Due to her significant  generalized weakness and the fact that she needs 2 person assist to get her out of bed, she will benefit from ambulance transfer to her dialysis until she becomes stronger with rehabilitation.      Denis Mercado MD  02/18/20  4:26 PM    Dictated utilizing Dragon dictation.      Electronically signed by Denis Mercado MD at 02/18/20 3392     Yomi Higgins MD at 02/18/20 5733               [facility] Cardiology IP Progress Note    Pat Morel  1940  [id number]  02/18/20    Subjective:   Mrs. Pat Morel is a 80 y.o. female seen in follow-up for multivessel coronary artery disease and acute on chronic diastolic heart failure.    Initially admitted with worsening renal failure, now on HD.  No acute overnight events.  Mild hypotension on review of vitals, asymptomatic.  Remains in rate controlled atrial fibrillation.  Reports improvement in her shortness of breath after volume removal with HD.  Does have persistent lower extremity swelling, slowly improving.  Denies chest pain or chest discomfort.  She does complain of weakness related to deconditioning.    Review of Systems:   Review of Systems   Constitutional: Positive for fatigue. Negative for chills, diaphoresis and fever.   Respiratory: Negative for cough, chest tightness, shortness of breath and wheezing.    Cardiovascular: Positive for leg swelling. Negative for chest pain and palpitations.   Gastrointestinal: Negative for abdominal pain and GERD.   Neurological: Negative for dizziness, syncope, weakness and light-headedness.       I have reviewed and/or updated the patient's past medical, past surgical, family history, social history, problem list and allergies as appropriate in the chart.     Physical Exam:  Vital Signs:   Vitals:    02/18/20 0410 02/18/20 0538 02/18/20 0815 02/18/20 1213   BP: 117/72  94/84 99/59   BP Location: Right arm  Right arm Right arm   Patient Position: Lying  Lying Lying   Pulse: 76  77 63   Resp: 16   18 18   Temp: 98.1 °F (36.7 °C)  98 °F (36.7 °C) 97.1 °F (36.2 °C)   TempSrc: Oral  Oral Oral   SpO2: 99%  95% 91%   Weight:  64 kg (141 lb 3.2 oz)     Height:           Physical Exam   Constitutional: She is oriented to person, place, and time. She appears well-developed and well-nourished.   Lying in bed in no acute distress.   HENT:   Head: Normocephalic and atraumatic.   Moist Mucous Membranes.    Eyes: Pupils are equal, round, and reactive to light. EOM are normal.   Cardiovascular: Normal rate, normal heart sounds and intact distal pulses. Exam reveals no gallop and no friction rub.   No murmur heard.  Irregularly irregular rhythm.  Rate controlled.  1+ bilateral lower extremity pitting edema.   Pulmonary/Chest: Effort normal and breath sounds normal. No stridor. No respiratory distress. She has no wheezes. She has no rales. She exhibits no tenderness.   Abdominal: Soft. Bowel sounds are normal. She exhibits no distension. There is no tenderness. There is no rebound and no guarding.   Musculoskeletal: Normal range of motion. She exhibits no edema.   Neurological: She is alert and oriented to person, place, and time.       Results Review:   Results from last 7 days   Lab Units 02/17/20  0511   SODIUM mmol/L 137   POTASSIUM mmol/L 4.9   CHLORIDE mmol/L 100   CO2 mmol/L 22.5   BUN mg/dL 66*   CREATININE mg/dL 3.32*   CALCIUM mg/dL 9.5   BILIRUBIN mg/dL 1.5*   ALK PHOS U/L 222*   ALT (SGPT) U/L 39*   AST (SGOT) U/L 58*   GLUCOSE mg/dL 104*         Results from last 7 days   Lab Units 02/17/20  0511 02/16/20  0602 02/15/20  0540   WBC 10*3/mm3 10.24 10.53 8.86   HEMOGLOBIN g/dL 8.9* 9.0* 8.7*   HEMATOCRIT % 28.0* 29.5* 27.4*   PLATELETS 10*3/mm3 69* 80* 77*         Results from last 7 days   Lab Units 02/17/20  0511   MAGNESIUM mg/dL 2.6*           I personally viewed and interpreted the patient's EKG/Telemetry data     Medications:   )  Current Facility-Administered Medications:   •  acetaminophen (TYLENOL) tablet  650 mg, 650 mg, Oral, Q4H PRN, 650 mg at 02/14/20 2043 **OR** acetaminophen (TYLENOL) 160 MG/5ML solution 650 mg, 650 mg, Oral, Q4H PRN **OR** acetaminophen (TYLENOL) suppository 650 mg, 650 mg, Rectal, Q4H PRN, Familia Paez MD  •  aspirin EC tablet 81 mg, 81 mg, Oral, Daily, Familia Paez MD, 81 mg at 02/18/20 0842  •  calcium acetate (PHOS BINDER)) capsule 1,334 mg, 1,334 mg, Oral, TID With Meals, Familia Paez MD, 1,334 mg at 02/18/20 1148  •  dilTIAZem CD (CARDIZEM CD) 24 hr capsule 120 mg, 120 mg, Oral, Q24H, Familia Paez MD, 120 mg at 02/18/20 0842  •  ipratropium-albuterol (DUO-NEB) nebulizer solution 3 mL, 3 mL, Nebulization, Q4H PRN, Familia Paez MD  •  iron polysaccharides (NIFEREX) capsule 150 mg, 150 mg, Oral, Daily, Familia Paez MD, 150 mg at 02/18/20 0842  •  lactobacillus acidophilus (RISAQUAD) capsule 1 capsule, 1 capsule, Oral, BID, Denis Mercado MD, 1 capsule at 02/18/20 1148  •  levothyroxine (SYNTHROID, LEVOTHROID) tablet 75 mcg, 75 mcg, Oral, Daily, Familia Paez MD, 75 mcg at 02/18/20 0842  •  ondansetron (ZOFRAN) injection 4 mg, 4 mg, Intravenous, Q6H PRN, Familia Paez MD  •  pantoprazole (PROTONIX) EC tablet 40 mg, 40 mg, Oral, Daily, Familia Paez MD, 40 mg at 02/18/20 0842  •  polyethylene glycol (MIRALAX) packet 17 g, 17 g, Oral, Daily PRN, Tamara Tomlin DO  •  predniSONE (DELTASONE) tablet 5 mg, 5 mg, Oral, Daily, Sanford Tsang MD, FASN, 5 mg at 02/18/20 0842  •  ranolazine (RANEXA) 12 hr tablet 500 mg, 500 mg, Oral, Q12H, Familia Paez MD, 500 mg at 02/18/20 0842  •  sodium chloride 0.9 % flush 10 mL, 10 mL, Intravenous, PRN, Familia Paez MD  •  sodium chloride 0.9 % flush 10 mL, 10 mL, Intravenous, Q12H, Familia Paez MD, 10 mL at 02/18/20 0843  •  sodium chloride 0.9 % flush 10 mL, 10 mL, Intravenous, PRN, Familia Paez MD    Assessment / Plan:     1.  Acute on chronic diastolic heart failure   --Grade 2  diastolic dysfunction with elevated left atrial pressures on echocardiogram  --Shortness of breath improved with volume removal  --Continues to have significant lower extremity edema on exam, with persistent volume overload  --Given hypotension, will discontinue metoprolol to allow for increased BP and hopefully increase volume removal with HD     2.  Multivessel coronary artery disease  --History of 3V CABG 2012 including LIMA-LAD, SVG-diagonal, and SVG-PDA  --Known occlusion of the SVG-diagonal graft with severe in-stent stenosis in the native diagonal branch, unattractive for PCI  --Remains chest pain-free  --Continue Ranexa as antianginal  --Continue daily aspirin     3.  Persistent atrial fibrillation  --Known history of atrial fibrillation, previously failed several antiarrhythmics and declined Tikosyn initiation  --Poor candidate for amiodarone due to interstitial lung disease  --Remains rate controlled  --Continue current dose of diltiazem  --Holding therapeutic anticoagulation due to anemia with possible GI bleed     4.  Pulmonary arterial hypertension   --In the setting of interstitial lung disease  --RVSP on echocardiogram 60 mmHg     5.  Acute on chronic kidney disease  --Management per nephrology     6.  Dyslipidemia  --Continue statin       Thank you for allowing me to participate in the care of your patient. Please to not hesitate to contact me with additional questions or concerns.     LA NENA Higgins MD  Interventional Cardiology   02/18/20  4:19 PM      Electronically signed by Yomi Higgins MD at 02/18/20 1625          Physical Therapy Notes (last 24 hours) (Notes from 02/18/20 0853 through 02/19/20 0853)      Matt Renee, PTA at 02/18/20 1738  Version 1 of 1         Problem: Patient Care Overview  Goal: Plan of Care Review  Flowsheets (Taken 2/18/2020 1431)  Outcome Summary: Pt reports feeling very tired at this time.  PT performed transfer from bed to chair with gait belt and stand  gayle. Pt was unable to take steps but was able to bare weight in standing with mod-max a for approx 1 min.  See flowsheet for details.        Electronically signed by Matt Renee, PTA at 20 173     Matt Renee, PTA at 20 173  Version 1 of 1         Acute Care - Physical Therapy Treatment Note  TAYLOR Garcia     Patient Name: Pat Morel  : 1940  MRN: [id number]  Today's Date: 2020  Onset of Illness/Injury or Date of Surgery: 20     Referring Physician: Dr. Mercado    Admit Date: 2020    Visit Dx:    ICD-10-CM ICD-9-CM   1. Acute renal failure, unspecified acute renal failure type (CMS/HCC) N17.9 584.9   2. Hyperkalemia E87.5 276.7   3. Atrial fibrillation, unspecified type (CMS/HCC) I48.91 427.31   4. Acute respiratory insufficiency R06.89 518.82   5. Elevated troponin R79.89 790.6   6. Impaired mobility and ADLs Z74.09 799.89     Patient Active Problem List   Diagnosis   • Persistent atrial fibrillation   • Coronary artery disease involving native coronary artery of native heart without angina pectoris   • Essential hypertension   • Cerebrovascular accident (CVA) due to embolism (CMS/HCC)   • Dyspnea on exertion   • Pulmonary arterial hypertension (CMS/HCC)   • Interstitial lung disease (CMS/HCC)   • Hypersensitivity pneumonitis (CMS/HCC)   • Gastric ulcer   • Coagulation disorder due to circulating anticoagulants (CMS/HCC)   • Dyslipidemia   • Acquired hypothyroidism   • Chronic kidney disease, stage 3 (CMS/HCC)   • GERD without esophagitis   • Iron deficiency anemia   • Acute on chronic diastolic congestive heart failure (CMS/HCC)   • Pneumonia of both lungs due to infectious organism   • Acute respiratory failure with hypoxia (CMS/HCC)   • Diastolic heart failure (CMS/HCC)   • Acute renal failure (CMS/HCC)   • Recurrent falls   • Acute hyperkalemia   • Normocytic anemia   • Stool color black   • Upper abdominal pain   • Elevated LFTs   • Coagulopathy (CMS/HCC)        Therapy Treatment    Rehabilitation Treatment Summary     Row Name 02/18/20 1431 02/18/20 1115          Treatment Time/Intention    Discipline  physical therapy assistant  -RM  occupational therapist  -SD     Document Type  therapy note (daily note)  -RM  therapy note (daily note)  -SD     Subjective Information  complains of;weakness;fatigue  -RM  complains of;weakness;fatigue  -SD     Mode of Treatment  physical therapy  -RM  occupational therapy  -SD     Patient/Family Observations  --  supine in bed, 2L  -SD     Care Plan Review  care plan/treatment goals reviewed  -RM  care plan/treatment goals reviewed  -SD     Patient Effort  adequate  -RM  good  -SD     Existing Precautions/Restrictions  fall;oxygen therapy device and L/min  -RM  fall;oxygen therapy device and L/min  -SD     Treatment Considerations/Comments  2L   -RM  2L  -SD     Recorded by [RM] Matt Renee, PTA 02/18/20 1734 [SD] Italia Schumacher, OT 02/18/20 1307     Row Name 02/18/20 1431 02/18/20 1115          Vital Signs    Pre SpO2 (%)  --  97  -SD     O2 Delivery Pre Treatment  --  supplemental O2  -SD     Intra SpO2 (%)  --  87  -SD     O2 Delivery Intra Treatment  supplemental O2  -RM  supplemental O2  -SD     Post SpO2 (%)  --  90  -SD     O2 Delivery Post Treatment  supplemental O2  -RM  supplemental O2  -SD     Recorded by [RM] Matt Renee, PTA 02/18/20 1734 [SD] Italia Schumacher, OT 02/18/20 1307     Row Name 02/18/20 1431 02/18/20 1115          Bed Mobility Assessment/Treatment    Bed Mobility Assessment/Treatment  --  supine-sit  -SD     Supine-Sit Lamar (Bed Mobility)  --  maximum assist (25% patient effort)  -SD     Sit-Supine Lamar (Bed Mobility)  maximum assist (25% patient effort)  -  --     Bed Mobility, Safety Issues  decreased use of arms for pushing/pulling;decreased use of legs for bridging/pushing  -  --     Assistive Device (Bed Mobility)  draw sheet  -  bed rails;draw sheet;head of bed  elevated  -SD     Comment (Bed Mobility)  --  sat EOB x 15 mins for grooming/ AROM  -SD     Recorded by [RM] Matt Renee, PTA 02/18/20 1734 [SD] Italia Schumacher, OT 02/18/20 1307     Row Name 02/18/20 1115             Transfer Assessment/Treatment    Transfer Assessment/Treatment  bed-chair transfer  -SD      Recorded by [SD] Italia Schumacehr, OT 02/18/20 1307      Row Name 02/18/20 1115             Bed-Chair Transfer    Bed-Chair Brewster (Transfers)  maximum assist (25% patient effort);2 person assist  -SD      Assistive Device (Bed-Chair Transfers)  -- gait belt  -SD      Recorded by [SD] Italia Schumacher OT 02/18/20 1307      Row Name 02/18/20 1431             Chair-Bed Transfer    Chair-Bed Brewster (Transfers)  maximum assist (25% patient effort)  -RM      Recorded by [RM] Matt Renee, PTA 02/18/20 1734      Row Name 02/18/20 1115             Grooming Assessment/Training    [place] Level (Grooming)  hair care, combing/brushing;oral care regimen;wash face, hands;contact guard assist  -SD      Recorded by [SD] Italia Schumacher OT 02/18/20 1307      Row Name 02/18/20 1115             Toileting Assessment/Training    [place] Level (Toileting)  change pad/brief;perform perineal hygiene;maximum assist (25% patient effort)  -SD      Recorded by [SD] Italia Schumacher, OT 02/18/20 1307      Row Name 02/18/20 1115             Therapeutic Exercise    Upper Extremity Range of Motion (Therapeutic Exercise)  shoulder flexion/extension, bilateral;shoulder abduction/adduction, bilateral;shoulder horizontal abduction/adduction, bilateral;elbow flexion/extension, bilateral  -SD      Hand (Therapeutic Exercise)  hand , bilateral  -SD      Exercise Type (Therapeutic Exercise)  AROM (active range of motion)  -SD      Position (Therapeutic Exercise)  seated  -SD      Sets/Reps (Therapeutic Exercise)  1 x 10  -SD      Expected Outcome (Therapeutic Exercise)  improve functional tolerance,  self-care activity  -SD      Recorded by [SD] Italia Schumacher, OT 02/18/20 1307      Row Name 02/18/20 1431 02/18/20 1115          Positioning and Restraints    Pre-Treatment Position  sitting in chair/recliner  -RM  in bed  -SD     Post Treatment Position  bed  -RM  chair  -SD     In Bed  supine;call light within reach;encouraged to call for assist;with nsg  -RM  --     In Chair  --  reclined;call light within reach;encouraged to call for assist;with nsg  -SD     Recorded by [RM] Matt Renee, [facility] 02/18/20 1734 [SD] Italia Schumacher, OT 02/18/20 1307     Row Name 02/18/20 1115             Pain Scale: Numbers Pre/Post-Treatment    Pain Scale: Numbers, Pretreatment  0/10 - no pain  -SD      Pain Scale: Numbers, Post-Treatment  0/10 - no pain  -SD      Recorded by [SD] Italia Schumacher, OT 02/18/20 1307      Row Name                Wound 02/07/20 0950 Left anterior;upper arm Skin Tear    Wound - Properties Group Date first assessed: 02/07/20 [SB] Time first assessed: 0950 [SB] Side: Left [SB] Orientation: anterior;upper [SB] Location: arm [SB] Primary Wound Type: Skin tear [SB] Recorded by:  [SB] Marcelina Lindo RN 02/07/20 0950    Row Name                Wound 01/09/20 0800 Bilateral groin Other (comment)    Wound - Properties Group Date first assessed: 01/09/20 [EB] Time first assessed: 0800 [EB] Side: Bilateral [EB] Location: groin [EB] Primary Wound Type: Other [EB], Excoriation  Additional Comments: Excoriation to groin area [EB] Recorded by:  [EB] Juliana Palmer RN 01/09/20 0932    Row Name                Wound 02/14/20 0749 Right clavicle Incision    Wound - Properties Group Date first assessed: 02/14/20 [JS] Time first assessed: 0749 [JS] Present on Hospital Admission: N [JS] Side: Right [JS] Location: clavicle [JS] Primary Wound Type: Incision [JS] Recorded by:  [JS] Tamara Cervantes RN 02/14/20 0749    Row Name                Wound 02/17/20 0830 Left anterior;lower arm Skin Tear    Wound - Properties  Group Date first assessed: 02/17/20 [CB] Time first assessed: 0830 [CB] Side: Left [CB] Orientation: anterior;lower [CB] Location: arm [CB] Primary Wound Type: Skin tear [CB] Recorded by:  [CB] Charlette Cox RN 02/17/20 1710    Row Name 02/18/20 1115             Coping    Observed Emotional State  calm;cooperative  -SD      Verbalized Emotional State  acceptance  -SD      Recorded by [SD] Italai Schumacher OT 02/18/20 1307      Row Name 02/18/20 1431 02/18/20 1115          Plan of Care Review    Plan of Care Reviewed With  patient  -RM  patient  -SD     Progress  improving  -RM  no change  -SD     Outcome Summary  Pt reports feeling very tired at this time.  PT performed transfer from bed to chair with gait belt and stand pivot. Pt was unable to take steps but was able to bare weight in standing with mod-max a for approx 1 min.  See flowsheet for details.   -RM  OT tx completed. Patient completed supine to sit with max A, sat EOB x 15 mins for grooming and UB AROM; required max x 2 transfer to chair. Continue OT POC  -SD     Recorded by [RM] Matt Renee, PTA 02/18/20 1734 [SD] Italia Schumacher OT 02/18/20 1307     Row Name 02/18/20 1115             Outcome Summary/Treatment Plan (OT)    Daily Summary of Progress (OT)  progress toward functional goals is gradual  -SD      Recorded by [SD] Italia Schumacher OT 02/18/20 1307      Row Name 02/18/20 1431             Outcome Summary/Treatment Plan (PT)    Daily Summary of Progress (PT)  progress toward functional goals is gradual  -RM      Plan for Continued Treatment (PT)  Cont PT per POC.  -RM      Recorded by [RM] Matt Renee, PTA 02/18/20 1734        User Key  (r) = Recorded By, (t) = Taken By, (c) = Cosigned By    Initials Name Effective Dates Discipline    JS Tamara Cervantes RN 07/14/16 -  Nurse    CB Charlette Cox RN 10/26/16 -  Nurse    Matt Verma, PTA 03/07/18 -  PT    Juliana Street RN 01/29/18 -  Nurse    SARA Schumacher  Italia OT 03/07/18 -  OT    SB Marcelina Lindo RN 06/16/16 -  Nurse          Wound 01/09/20 0800 Bilateral groin Other (comment) (Active)   Dressing Appearance open to air 2/18/2020  8:00 AM   Base pink 2/18/2020  8:00 AM   Dressing Care, Wound open to air 2/18/2020  8:00 AM       Wound 02/07/20 0950 Left anterior;upper arm Skin Tear (Active)   Dressing Appearance dry;intact 2/18/2020  8:00 AM   Closure ASHLI 2/18/2020  8:00 AM   Base maroon/purple 2/18/2020  4:10 AM   Periwound edematous;intact;moist;swelling 2/18/2020  4:10 AM   Periwound Temperature warm 2/18/2020  4:10 AM   Periwound Skin Turgor soft 2/18/2020  4:10 AM   Edges open 2/17/2020  8:15 PM   Drainage Characteristics/Odor clear 2/18/2020  4:10 AM   Drainage Amount none 2/18/2020  8:00 AM   Dressing Care, Wound dressing changed;gauze;non-adherent 2/18/2020  4:10 AM       Wound 02/14/20 0749 Right clavicle Incision (Active)   Dressing Appearance dry;intact;dried drainage 2/18/2020  8:00 AM   Closure Sutures 2/18/2020  8:00 AM   Base maroon/purple 2/18/2020  8:00 AM   Drainage Amount small 2/18/2020  8:00 AM       Wound 02/17/20 0830 Left anterior;lower arm Skin Tear (Active)   Dressing Appearance dry;intact 2/18/2020  8:00 AM   Closure ASHLI 2/18/2020  8:00 AM   Base moist 2/18/2020  4:10 AM   Periwound moist 2/18/2020  4:10 AM   Periwound Temperature cool 2/18/2020  4:10 AM   Periwound Skin Turgor soft 2/18/2020  4:10 AM   Drainage Characteristics/Odor clear 2/18/2020  4:10 AM   Drainage Amount moderate 2/18/2020  4:10 AM   Dressing Care, Wound dressing changed;gauze;non-adherent 2/18/2020  4:10 AM               PT Recommendation and Plan     Outcome Summary/Treatment Plan (PT)  Daily Summary of Progress (PT): progress toward functional goals is gradual  Plan for Continued Treatment (PT): Cont PT per POC.  Plan of Care Reviewed With: patient  Progress: improving  Outcome Summary: Pt reports feeling very tired at this time.  PT performed transfer from bed  to chair with gait belt and stand pivot. Pt was unable to take steps but was able to bare weight in standing with mod-max a for approx 1 min.  See flowsheet for details.   Outcome Measures     Row Name 02/18/20 1431 02/18/20 1115 02/17/20 1559       How much help from another person do you currently need...    Turning from your back to your side while in flat bed without using bedrails?  3  -RM  --  --    Moving from lying on back to sitting on the side of a flat bed without bedrails?  3  -RM  --  --    Moving to and from a bed to a chair (including a wheelchair)?  1  -RM  --  --    Standing up from a chair using your arms (e.g., wheelchair, bedside chair)?  2  -RM  --  --    Climbing 3-5 steps with a railing?  1  -RM  --  --    To walk in hospital room?  1  -RM  --  --    AM-PAC 6 Clicks Score (PT)  11  -RM  --  --       How much help from another is currently needed...    Putting on and taking off regular lower body clothing?  --  2  -SD  2  -SD    Bathing (including washing, rinsing, and drying)  --  2  -SD  2  -SD    Toileting (which includes using toilet bed pan or urinal)  --  2  -SD  2  -SD    Putting on and taking off regular upper body clothing  --  2  -SD  2  -SD    Taking care of personal grooming (such as brushing teeth)  --  3  -SD  2  -SD    Eating meals  --  3  -SD  3  -SD    AM-PAC 6 Clicks Score (OT)  --  14  -SD  13  -SD       Functional Assessment    Outcome Measure Options  AM-PAC 6 Clicks Basic Mobility (PT)  -RM  AM-PAC 6 Clicks Daily Activity (OT)  -SD  AM-PAC 6 Clicks Daily Activity (OT)  -SD      User Key  (r) = Recorded By, (t) = Taken By, (c) = Cosigned By    Initials Name Provider Type    RM Matt Renee, PTA Physical Therapy Assistant    Italia Norris, OT Occupational Therapist         Time Calculation:   PT Charges     Row Name 02/18/20 1736             Time Calculation    Start Time  1431  -RM      Stop Time  1443  -RM      Time Calculation (min)  12 min  -RM      PT  Received On  02/18/20  -RM      PT Goal Re-Cert Due Date  02/19/20  -RM         Time Calculation- PT    Total Timed Code Minutes- PT  12 minute(s)  -RM         Timed Charges    30145 - PT Therapeutic Activity Minutes  12  -RM        User Key  (r) = Recorded By, (t) = Taken By, (c) = Cosigned By    Initials Name Provider Type    Matt Verma, [facility]KE[facility] Physical Therapy Assistant        Therapy Charges for Today     Code Description Service Date Service Provider Modifiers Qty    12209825807 HC PT THERAPEUTIC ACT EA 15 MIN 2/18/2020 Matt Renee, [facility]KE[facility] GP 1          PT G-Codes  Outcome Measure Options: AM-PAC 6 Clicks Basic Mobility (PT)  AM-PAC 6 Clicks Score (PT): 11  AM-PAC 6 Clicks Score (OT): 14    Matt Renee PTA  2/18/2020         Electronically signed by Matt Renee PTA at 02/18/20 1735          Occupational Therapy Notes (last 24 hours) (Notes from 02/18/20 0853 through 02/19/20 0853)      Italia Schumacher OT at 02/18/20 1307          Problem: Patient Care Overview  Goal: Plan of Care Review  Outcome: Ongoing (interventions implemented as appropriate)  Flowsheets  Taken 2/18/2020 1307  Progress: no change  Plan of Care Reviewed With: patient  Taken 2/18/2020 1115  Outcome Summary: OT tx completed. Patient completed supine to sit with max A, sat EOB x 15 mins for grooming and UB AROM; required max x 2 transfer to chair. Continue OT POC       Electronically signed by Italia Schumacher OT at 02/18/20 5757

## 2020-02-19 NOTE — DISCHARGE SUMMARY
[facility]   DISCHARGE SUMMARY      Name:  Pat Morel   Age:  80 y.o.  Sex:  female  :  1940  MRN:  [id number]   Visit Number:  [id number]    Admission Date:  2020  Date of Discharge:  2020  Primary Care Physician:  Anthony Sweet DO    Important issues to note:    1.  Patient's Eliquis has been discontinued due to acute on chronic anemia and positive occult blood in stool.  Patient declined gastrointestinal work-up for bleeding.  2.  She is currently on prednisone 5 mg daily for her hypersensitivity pneumonitis and will need to follow-up with Dr. [place] as scheduled.  3.  Patient had elevated transaminases and her Lipitor was discontinued.  This has been restarted and if she continues to have elevated LFTs on serial blood work, her statin therapy may need to be discontinued as well.  4.  Patient will need follow-up with Dr. Higgins and Dr. Finnegan as scheduled.  5.  Follow-up for hemodialysis on Monday, Wednesday and Friday.    Discharge Diagnoses:     1.  Acute renal failure on chronic kidney disease stage IV, present on admission, initiated on dialysis on 2020.  2.  Recurrent falls at home secondary to uremia, present on admission.  3.  Acute hyperkalemia secondary to #1, present on admission, resolved.  4.  Demand ischemia with elevated troponin levels, present on admission.  5.  Hypersensitivity pneumonitis on prednisone therapy.  6.  Chronic hypoxic respiratory failure on home oxygen.  7.  Acute on chronic diastolic heart failure, present on admission, improved.  8.  Paroxysmal atrial fibrillation, apixaban discontinued due to anemia.  9.  Coronary artery disease status post stents and CABG.  10.  Chronic pulmonary hypertension.  11.  Acquired hypothyroidism.  12.  Elevated transaminases of uncertain etiology, improving.  13.  Acute worsening of chronic anemia status post 1 unit of packed red blood cell transfusion.  14.  Functional  quadriplegia.    Problem List:       Acute renal failure (CMS/HCC)    Persistent atrial fibrillation    Coronary artery disease involving native coronary artery of native heart without angina pectoris    Essential hypertension    Pulmonary arterial hypertension (CMS/HCC)    Interstitial lung disease (CMS/HCC)    Hypersensitivity pneumonitis (CMS/HCC)    Acquired hypothyroidism    Chronic kidney disease, stage 3 (CMS/HCC)    GERD without esophagitis    Diastolic heart failure (CMS/HCC)    Recurrent falls    Acute hyperkalemia    Normocytic anemia    Stool color black    Upper abdominal pain    Elevated LFTs    Coagulopathy (CMS/HCC)    Presenting Problem:    Acute renal failure, unspecified acute renal failure type (CMS/HCC) [N17.9]     Consults:     Consulting Physician(s)     Provider Relationship Specialty    Sanford Tsang MD, EVELINA Consulting Physician Nephrology    Yomi Higgins MD Consulting Physician Cardiology    Familia Paez MD Consulting Physician General Surgery    Vannesa Dee MD Consulting Physician Gastroenterology        Procedures Performed:    1. HEMODIALYSIS CATHETER INSERTION(DINO) on 2/14/2020.  2. One unit of packed kami blood cell transfusion.    History of presenting illness:    Patient is a 80-year-old female with history including CAD with CABG, atrial fibrillation, chronic kidney disease stage III, history of CVA, diastolic CHF, iron deficiency anemia, hypersensitivity pneumonitis, hypertension, osteoporosis was brought to the emergency room by EMS with generalized weakness.  Patient has had progressive decline in her health with generalized weakness and multiple falls recently.  She apparently was getting down from her bed to go to the bathroom but gently slumped to the floor.  There was no history of any head trauma.  She does complain of mild shortness of breath and decreased urine output.  However, her shortness of breath is much better compared to in the past  after initiation of high-dose prednisone.  No history of fever, nausea or vomiting.  No history of abdominal pain.     In the emergency room, she was afebrile but slightly tachycardic at 107 with initial pulse oxygen saturation of 86% on room air.  Blood pressure was 143/89.  Blood work done in the emergency room however revealed significant worsening of her renal failure with a creatinine of 4.99 (last creatinine at 2.47 on 1/30/2020),  and a potassium of 6.1.  CBC was unremarkable except for a hemoglobin of 9.4.  Troponin was 0.106 and BNP was more than 70,000.  Chest x-ray revealed chronic changes without any acute process.  Patient was given 500 mL of normal saline.  Dr. Finnegan was called from the emergency room and he recommended IV Bumex 4 mg, insulin and dextrose for hyperkalemia.  Patient was subsequently admitted to the medical floor with telemetry.     She was recently admitted to [facility] from 1/17/2020 to 1/20/2020 with similar presentation.  At that time she was treated for diastolic heart failure as well as acute on chronic renal failure.    Hospital Course:    Patient is a 80-year-old female with history including CAD with CABG, atrial fibrillation, chronic kidney disease stage III, history of CVA, diastolic CHF, iron deficiency anemia, hypersensitivity pneumonitis, hypertension, osteoporosis was brought to the emergency room by EMS with generalized weakness on 2/6/2020.  She was admitted with acute worsening of her chronic renal failure, elevated troponin levels  as well as hyperkalemia.  She does have chronic respiratory failure which has been stable on prednisone.  She was on prednisone 20 mg daily and was subsequently tapered down during the hospital stay to prednisone 5 mg daily.  She was following up with Dr. Laura at [place] but per patient's request, she has been scheduled to see Dr. [place] next month.       She was seen by Dr. Tsang from nephrology who recommended  gentle IV hydration.  Her renal function slowly improved and her hyperkalemia resolved.  She was also seen by Dr. Horvath for her elevated troponin levels.  He felt that the patient's troponin levels were related to her coronary artery disease in the setting of renal failure and anemia.  He did offer cardiac catheterization to the patient but she is currently not willing for any invasive cardiac work-up.  He has recommended triple antiplatelet/anticoagulant agents with aspirin, Plavix and Eliquis.  He has also added Ranexa and increased her Bystolic dose.  An echocardiogram was done during this hospitalization which showed normal left ventricular ejection fraction but grade 2 diastolic dysfunction.  Unfortunately, patient continued to have anemia and was seen again by Dr. Horvath who recommended discontinuation of all antiplatelet agents and anticoagulation and to maintain her hematocrit about 30%.  Patient did receive 1 unit of packed red blood cells during this hospital stay.     During the hospital stay, patient was noted to have elevated liver enzymes and was consulted by gastroenterology Dr. Dee.  He recommended evaluation with MRCP which the patient initially declined but subsequently did complete MRCP which showed gallstones but no evidence of CBD dilation.  Her liver enzymes subsequently did improve.  The exact etiology of her transaminase elevation was uncertain but it was felt that she may have passed a gallstone. Her Lipitor was on hold but with improvement in her LFT it has been restarted.     Unfortunately, despite initial improvement in her renal function, she continued to have intermittent shortness of breath and atrial fibrillation with rapid ventricular rate.  It was subsequently decided by nephrology that the patient would benefit from hemodialysis at least for comfort care.  She subsequently underwent tunneled dialysis catheter placement on the right internal jugular area by Dr. Paez on  2/14/2020 and was started on hemodialysis.  Due to relative hypotension and difficulty removing fluid during dialysis, she was seen again by Dr. Higgins from cardiology who discontinued her metoprolol and continued her Cardizem for rate control.  Patient unfortunately is not a candidate for amiodarone due to her lung fibrosis.    Patient was seen by physical and Occupational Therapy during the hospital stay.  She does have significant generalized weakness and needs two-person assistance to get out of bed into the chair.  Due to this, she is currently being transferred to short-term rehabilitation for physical therapy.  She will need to go to dialysis 3 times weekly on Monday, Wednesday and Friday.  She would benefit from wheelchair transfer.  Yesterday, in my note I had written that she may need ambulance transfer but since she is going to the rehab facility which is in close proximity to the dialysis center, she will only need wheelchair transfer.  If she does not improve with physical therapy and her physical status gets worsened, she may be reevaluated by the rehab facility provider for the need for ambulance transfer to dialysis.      I have discussed the patient's condition and discharge plan with her son Jacinto over the phone.  I have discussed the patient's discharge plan with Dr. Finnegan.  Patient has an appointment with Dr. [place] in March for her hypersensitivity pneumonitis.  She will also need to follow-up with Dr. Finnegan and Dr. Higgins as scheduled.    Vital Signs:    Temp:  [97.1 °F (36.2 °C)-98.1 °F (36.7 °C)] 97.3 °F (36.3 °C)  Heart Rate:  [63-82] 76  Resp:  [16-18] 16  BP: ()/(51-66) 121/63    Physical Exam:    General Appearance:  Alert and cooperative, not in any acute distress.   Head:  Atraumatic and normocephalic, without obvious abnormality.   Eyes:          PERRLA, conjunctivae and sclerae normal, no Icterus. No pallor. Extraocular movements are within normal limits.   Ears:  Ears appear intact  with no abnormalities noted.   Throat: No oral lesions, no thrush, oral mucosa moist.   Neck: Supple, trachea midline, no thyromegaly, no carotid bruit. Tunneled dialysis catheter noted in the right internal jugular area.  Ecchymosis noted surrounding the catheter site.   Back:   No kyphoscoliosis present. No tenderness to palpation,   range of motion normal.   Lungs:   Chest shape is normal. Breath sounds heard bilaterally equally.  No wheezing.  Bilateral coarse crackles heard.  No Pleural rub or bronchial breathing.   Heart:  Normal S1 and S2, no murmur, no gallop, no rub. No JVD.   Abdomen:   Normal bowel sounds, no masses, no organomegaly. Soft, non-tender, non-distended, no guarding, no rebound tenderness.  Large area of ecchymosis noted in the mid and lower abdomen bilaterally.   Extremities: Moves all extremities well, 2+ edema, no cyanosis, no clubbing.   Pulses: Pulses palpable and equal bilaterally.   Skin: No bleeding or rash.  Ecchymotic patches noted in both upper and lower extremities.   Neurologic: Awake, alert and oriented times 3. Moves all 4 extremities equally but does have generalized weakness     Pertinent Lab Results:     Results from last 7 days   Lab Units 02/19/20  0607 02/17/20  0511 02/16/20  0602   SODIUM mmol/L 135* 137 138   POTASSIUM mmol/L 4.9 4.9 4.1   CHLORIDE mmol/L 99 100 103   CO2 mmol/L 21.8* 22.5 21.1*   BUN mg/dL 51* 66* 49*   CREATININE mg/dL 3.03* 3.32* 2.54*   CALCIUM mg/dL 9.7 9.5 9.3   BILIRUBIN mg/dL 1.5* 1.5* 1.4*   ALK PHOS U/L 261* 222* 233*   ALT (SGPT) U/L 43* 39* 53*   AST (SGOT) U/L 63* 58* 66*   GLUCOSE mg/dL 95 104* 118*     Results from last 7 days   Lab Units 02/19/20  0607 02/17/20  0511 02/16/20  0602   WBC 10*3/mm3 8.42 10.24 10.53   HEMOGLOBIN g/dL 8.6* 8.9* 9.0*   HEMATOCRIT % 28.5* 28.0* 29.5*   PLATELETS 10*3/mm3 76* 69* 80*     Pain Management Panel     Pain Management Panel Latest Ref Rng & Units 2/13/2020 2/13/2020    CREATININE UR mg/dL 43.3 43.7         Pertinent Radiology Results:    Imaging Results (All)     Procedure Component Value Units Date/Time    XR Chest 1 View [[id number]] Collected:  02/14/20 0840     Updated:  02/14/20 0843    Narrative:       PROCEDURE: XR CHEST 1 VW-     HISTORY: line placement; N17.9-Acute kidney failure, unspecified;  E87.5-Hyperkalemia; I48.91-Unspecified atrial fibrillation; R06.89-Other  abnormalities of breathing; R79.89-Other specified abnormal findings of  blood chemistry; Z74.09-Other reduced mobility     COMPARISON: 02/11/2020.     FINDINGS: A right internal jugular dialysis catheter is in place with  the tip in the right atrium. The patient is status post median  sternotomy and CABG procedure. The heart is normal in size. The  mediastinum is unremarkable. There is patchy bilateral airspace disease  and effusions which appear worse in the left lung base compared to the  prior exam. There is no pneumothorax.  There are no acute osseous  abnormalities.       Impression:       Placement of a right internal jugular dialysis catheter with  no evidence of pneumothorax.     Continued followup is recommended.     This report was finalized on 2/14/2020 8:41 AM by Joaquín Shannon M.D..    FL C Arm During Surgery [[id number]] Resulted:  02/14/20 0800     Updated:  02/14/20 0800    Narrative:       This procedure was auto-finalized with no dictation required.    MRI abdomen wo contrast mrcp [[id number]] Collected:  02/13/20 1358     Updated:  02/13/20 1401    Narrative:       PROCEDURE: MRI ABDOMEN WO CONTRAST MRCP-     HISTORY: Abnormal liver function tests (LFTs)     PROCEDURE: Multiplanar multisequence imaging of the abdomen was  performed without the use of intravenous contrast. 3-D MRCP images were  obtained.     COMPARISON: None.     FINDINGS: The exam is limited secondary to motion artifact. MRCP images  are nondiagnostic. Stones are present within the gallbladder. The liver,  adrenals, kidneys, spleen and pancreas are  grossly unremarkable. There  is a large hiatal hernia. There are small effusions a the visualized  lung bases.       Impression:       Gallstones. The exam is otherwise grossly unremarkable.           This report was finalized on 2/13/2020 1:59 PM by Joaquín Shannon M.D..    US Abdomen Complete [[id number]] Collected:  02/11/20 1856     Updated:  02/11/20 1857    Narrative:       FINAL REPORT    TECHNIQUE:  Ultrasound images through the abdomen were obtained.    CLINICAL HISTORY:  SWELLING    FINDINGS:  There is bilateral renal atrophy.  The visualized solid  abdominal organs are otherwise unremarkable.  The pancreas is  obscured by overlying bowel gas.  There are multiple shadowing  gallstones.  There is a small amount of perihepatic ascites.  The visualized portions of the aorta and the IVC are normal.  A  right pleural effusion is noted.      Impression:       Cholelithiasis.  Small amount of perihepatic ascites.  Right  pleural effusion incidentally noted.  Bilateral renal atrophy.    Authenticated by Pablo Manley MD on 02/11/2020 06:56:12  PM    XR Abdomen KUB [[id number]] Collected:  02/11/20 1346     Updated:  02/11/20 1350    Narrative:       PROCEDURE: XR ABDOMEN KUB-     HISTORY: abdominal pain; N17.9-Acute kidney failure, unspecified;  E87.5-Hyperkalemia; I48.91-Unspecified atrial fibrillation; R06.89-Other  abnormalities of breathing; R79.89-Other specified abnormal findings of  blood chemistry; Z74.09-Other reduced mobility     COMPARISON: None.     FINDINGS: An AP view of the abdomen and pelvis demonstrates an  unremarkable bowel gas pattern with no evidence of obstruction. There  are calcifications in the right upper quadrant likely gallstones. A  phlebolith is seen in the right hemipelvis. No other abnormal  calcifications are seen overlying the abdomen or pelvis.       Impression:       Calcifications in the right upper quadrant likely  gallstones.           This report was finalized on  2/11/2020 1:47 PM by Joaquín Shannon M.D..    XR Chest 1 View [[id number]] Collected:  02/11/20 1334     Updated:  02/11/20 1337    Narrative:       PROCEDURE: XR CHEST 1 VW-     HISTORY: soa; N17.9-Acute kidney failure, unspecified;  E87.5-Hyperkalemia; I48.91-Unspecified atrial fibrillation; R06.89-Other  abnormalities of breathing; R79.89-Other specified abnormal findings of  blood chemistry; Z74.09-Other reduced mobility     COMPARISON: 02/10/2020.     FINDINGS: The heart is normal in size. The patient is status post median  sternotomy and CABG procedure. There is a moderate-sized hiatal hernia.  There is a worsening right basilar opacity which may reflect atelectasis  or pneumonia. The left lung is clear. There is no pneumothorax.  There  are no acute osseous abnormalities.       Impression:       Worsening right basilar opacity which may reflect  atelectasis or pneumonia.     Continued followup is recommended.     This report was finalized on 2/11/2020 1:35 PM by Joaquín Shannon M.D..    XR Chest 1 View [[id number]] Collected:  02/11/20 0740     Updated:  02/11/20 0744    Narrative:       PROCEDURE: XR CHEST 1 VW-     HISTORY: hypoxia; N17.9-Acute kidney failure, unspecified;  E87.5-Hyperkalemia; I48.91-Unspecified atrial fibrillation; R06.89-Other  abnormalities of breathing; R79.89-Other specified abnormal findings of  blood chemistry; Z74.09-Other reduced mobility     COMPARISON: 02/06/2020.     FINDINGS: The patient is status post median sternotomy and CABG  procedure. The heart is normal in size. The mediastinum is unremarkable.  There are chronic interstitial lung changes. There is a new linear  opacity in the right midlung field which may reflect atelectasis or  pneumonia. The lungs are otherwise clear. There is no pneumothorax.   There are no acute osseous abnormalities.       Impression:       New linear opacity in the right midlung field either  reflecting atelectasis or pneumonia.      Continued followup is recommended.     This report was finalized on 2/11/2020 7:42 AM by Joaquín Shannon M.D..    XR Chest 1 View [[id number]] Collected:  02/06/20 1229     Updated:  02/06/20 1232    Narrative:       PROCEDURE: XR CHEST 1 VW-     HISTORY: dyspnea     COMPARISON: 01/09/2020 and 01/06/2020.     FINDINGS: The heart is mildly enlarged. The mediastinum is unremarkable.  There are chronic interstitial lung changes. No focal opacities were  effusions are evident. There is no pneumothorax.  There are no acute  osseous abnormalities.       Impression:       Chronic lung changes with no acute cardiopulmonary process.     Continued followup is recommended.     This report was finalized on 2/6/2020 12:29 PM by Joaquín Shannon M.D..        Condition on Discharge:      Stable.    Code status during the hospital stay:    Code Status and Medical Interventions:   Ordered at: 02/12/20 1542     Level Of Support Discussed With:    Patient    Next of Kin (If No Surrogate)     Code Status:    No CPR     Medical Interventions (Level of Support Prior to Arrest):    Comfort Measures     Comments:    patient, , and son     Discharge Disposition:    Rehab Facility or Unit (DC - External)    Discharge Medications:       Discharge Medications      New Medications      Instructions Start Date   aspirin 81 MG EC tablet   81 mg, Oral, Daily   Start Date:  February 20, 2020     calcium acetate 667 MG capsule capsule  Commonly known as:  PHOS BINDER)   1,334 mg, Oral, 3 Times Daily With Meals      dilTIAZem  MG 24 hr capsule  Commonly known as:  CARDIZEM CD   120 mg, Oral, Every 24 Hours Scheduled   Start Date:  February 20, 2020     iron polysaccharides 150 MG capsule  Commonly known as:  NIFEREX   150 mg, Oral, Daily   Start Date:  February 20, 2020     polyethylene glycol pack packet  Commonly known as:  MIRALAX   17 g, Oral, Daily PRN      ranolazine 500 MG 12 hr tablet  Commonly known as:  RANEXA   500 mg,  Oral, Every 12 Hours Scheduled         Changes to Medications      Instructions Start Date   predniSONE 5 MG tablet  Commonly known as:  DELTASONE  What changed:    · medication strength  · how much to take  · Another medication with the same name was removed. Continue taking this medication, and follow the directions you see here.   5 mg, Oral, Daily   Start Date:  February 20, 2020        Continue These Medications      Instructions Start Date   atorvastatin 40 MG tablet  Commonly known as:  LIPITOR   40 mg, Oral, Nightly      budesonide 0.5 MG/2ML nebulizer solution  Commonly known as:  PULMICORT   0.5 mg, Nebulization, 2 Times Daily - RT      ipratropium-albuterol 0.5-2.5 mg/3 ml nebulizer  Commonly known as:  DUO-NEB   3 mL, Nebulization, Every 4 Hours PRN      levothyroxine 75 MCG tablet  Commonly known as:  SYNTHROID, LEVOTHROID   75 mcg, Oral, Daily      pantoprazole 40 MG EC tablet  Commonly known as:  PROTONIX   40 mg, Oral, Daily      Vitamin D (Cholecalciferol) 50 MCG (2000 UT) capsule   1 capsule, Oral, Daily         Stop These Medications    apixaban 2.5 MG tablet tablet  Commonly known as:  ELIQUIS     bisoprolol 5 MG tablet  Commonly known as:  ZEBeta     bumetanide 2 MG tablet  Commonly known as:  BUMEX     ferrous sulfate 325 (65 FE) MG tablet     isosorbide mononitrate 120 MG 24 hr tablet  Commonly known as:  IMDUR     nitroglycerin 0.4 MG SL tablet  Commonly known as:  NITROSTAT     potassium chloride 10 MEQ CR capsule  Commonly known as:  MICRO-K          Discharge Diet:     Diet Instructions     Diet: Renal; Thin      Discharge Diet:  Renal    Fluid Consistency:  Thin    Fluid Restriction per day:  1500 mL Fluid        Activity at Discharge:     Activity Instructions     Activity as Tolerated          Follow-up Appointments:    Follow-up Information     Yomi Higgins MD Follow up in 2 week(s).    Specialties:  Cardiology, Interventional Cardiology, Internal Medicine  Contact  information:  [address]  [place] [address] [phone]             Anthony Sweet DO .    Specialty:  Family Medicine  Contact information:  [address] [place] [address] [phone]                 Future Appointments   Date Time Provider Department Center   3/9/2020  2:15 PM Anthony Sweet DO [facility] None   3/16/2020  2:30 PM [facility] None   3/16/2020  3:30 PM Sri [place] MD [facility] None     Test Results Pending at Discharge:    None.       Denis Mercado MD  02/19/20  10:53 AM    Time spent: 40 min.    Dictated utilizing Dragon dictation.

## 2020-02-19 NOTE — PLAN OF CARE
Problem: Patient Care Overview  Goal: Plan of Care Review  Outcome: Ongoing (interventions implemented as appropriate)  Flowsheets (Taken 2/19/2020 0324)  Plan of Care Reviewed With: patient  Outcome Summary: VSS.  No acute events noted.  Plan for pt to DC to rehab.  Dialysis has been set up as outpatient.

## 2020-02-20 NOTE — PROGRESS NOTES
Case Management Discharge Note           Provided post acute provider list?: N/A    Destination - Selection Complete      Service Provider Request Status Selected Services Address Phone Number Fax Number    [facility] Selected Skilled Nursing [address] [place] [address] [phone]      Durable Medical Equipment      No service has been selected for the patient.      Dialysis/Infusion      No service has been selected for the patient.      Home Medical Care      No service has been selected for the patient.      Therapy      No service has been selected for the patient.      Community Resources      No service has been selected for the patient.             Final Discharge Disposition Code: 03 - skilled nursing facility (SNF)

## 2020-03-09 PROBLEM — J96.01 ACUTE RESPIRATORY FAILURE WITH HYPOXIA (HCC): Status: RESOLVED | Noted: 2020-01-01 | Resolved: 2020-01-01

## 2020-03-09 PROBLEM — Z99.2 CKD (CHRONIC KIDNEY DISEASE) REQUIRING CHRONIC DIALYSIS (HCC): Status: ACTIVE | Noted: 2019-12-02

## 2020-03-09 PROBLEM — K25.9 GASTRIC ULCER: Status: RESOLVED | Noted: 2019-11-19 | Resolved: 2020-01-01

## 2020-03-09 PROBLEM — I50.33 ACUTE ON CHRONIC DIASTOLIC CONGESTIVE HEART FAILURE (HCC): Status: RESOLVED | Noted: 2019-01-01 | Resolved: 2020-01-01

## 2020-03-09 PROBLEM — E87.5 ACUTE HYPERKALEMIA: Status: RESOLVED | Noted: 2020-01-01 | Resolved: 2020-01-01

## 2020-03-09 PROBLEM — R10.10 UPPER ABDOMINAL PAIN: Status: RESOLVED | Noted: 2020-01-01 | Resolved: 2020-01-01

## 2020-03-09 PROBLEM — J18.9 PNEUMONIA OF BOTH LUNGS DUE TO INFECTIOUS ORGANISM: Status: RESOLVED | Noted: 2020-01-01 | Resolved: 2020-01-01

## 2020-03-09 PROBLEM — N18.6 CKD (CHRONIC KIDNEY DISEASE) REQUIRING CHRONIC DIALYSIS (HCC): Status: ACTIVE | Noted: 2019-12-02

## 2020-03-09 PROBLEM — K92.1 STOOL COLOR BLACK: Status: RESOLVED | Noted: 2020-01-01 | Resolved: 2020-01-01

## 2020-03-09 PROBLEM — R79.89 ELEVATED LFTS: Status: RESOLVED | Noted: 2020-01-01 | Resolved: 2020-01-01

## 2020-03-10 NOTE — PROGRESS NOTES
Nursing Home Follow Up Note      Anthony Sweet DO []   AYANNA Estrada [x]  [address] [place] [address]  Phone: [phone]  Fax: [phone] Mike Snyder MD []    Asaf Fulton DO []   [address] [place] [address]  Phone: [phone]  Fax: [phone]     PATIENT NAME: Pat Morel                                                                          YOB: 1940           DATE OF SERVICE: 03/09/2020  FACILITY:  [][facility]   [] [facility]   [x] [facility]   [] [facility]   [] Other ______________________________________________________________________      CHIEF COMPLAINT:  Discharging home today      HISTORY OF PRESENT ILLNESS:     Patient is a 80-year-old female with history including CAD with CABG, atrial fibrillation, chronic kidney disease stage III, history of CVA, diastolic CHF, iron deficiency anemia, hypersensitivity pneumonitis, hypertension, osteoporosis, who presented to the emergency room by EMS, with generalized weakness on 2/6/2020.  She was admitted with acute worsening of her chronic renal failure, elevated troponin levels  as well as hyperkalemia.  She does have chronic respiratory failure. She was on prednisone 20 mg daily and was subsequently tapered down during the hospital stay to prednisone 5 mg daily.  She was following up with Dr. Laura at [place] but per patient's request, she has been scheduled to see Dr. Garcia next month.       She was seen by Dr. Tsang from nephrology who recommended gentle IV hydration.  Her renal function slowly improved and her hyperkalemia resolved.  She was also seen by Dr. Horvath for her elevated troponin levels.  He felt that the patient's troponin levels were related to her coronary artery disease in the setting of renal failure and anemia.  He did offer cardiac catheterization to the patient but she was not willing to have any invasive cardiac work-up.  He recommended triple  antiplatelet/anticoagulant agents with aspirin, Plavix and Eliquis.  He also added Ranexa and increased her Bystolic dose.  An echocardiogram was done during this hospitalization which showed normal left ventricular ejection fraction but grade 2 diastolic dysfunction.  Unfortunately, patient continued to have anemia and was seen again by Dr. Horvath, who recommended discontinuation of all antiplatelet agents and anticoagulation, and to maintain her hematocrit about 30%.  Patient did receive 1 unit of packed red blood cells during hospital stay.     During the hospital stay, patient was noted to have elevated liver enzymes and was consulted by gastroenterology Dr. Dee.  He recommended evaluation with MRCP which the patient initially declined but subsequently did complete MRCP which showed gallstones but no evidence of CBD dilation.  Her liver enzymes subsequently did improve.  The exact etiology of her transaminase elevation was uncertain but it was felt that she may have passed a gallstone. Her Lipitor was on hold but with improvement in her LFT it was restarted.     Unfortunately, despite initial improvement in her renal function, she continued to have intermittent shortness of breath and atrial fibrillation with rapid ventricular rate.  It was subsequently decided by nephrology that the patient would benefit from hemodialysis at least for comfort care.  She subsequently underwent tunneled dialysis catheter placement on the right internal jugular area by Dr. Paez on 2/14/2020 and was started on hemodialysis.  Due to relative hypotension and difficulty removing fluid during dialysis, she was seen again by Dr. Higgins from cardiology who discontinued her metoprolol and continued her Cardizem for rate control.  Patient unfortunately is not a candidate for amiodarone due to her lung fibrosis.     Patient was seen by physical and Occupational Therapy during the hospital stay.  She had significant generalized  weakness and required two-person assistance to get out of bed into the chair.  Due to this, she was transferred here to short-term rehabilitation, on, 2/19, for physical therapy.  She has been going to dailysis 3 times weekly on Monday, Wednesday and Friday. She has been progressing fairly well with PT, but insurance will no longer care for her services at [facility], therefore she is going home today, with family and in home services.       PAST MEDICAL & SURGICAL HISTORY:   Past Medical History:   Diagnosis Date   • Acute on chronic diastolic congestive heart failure (CMS/HCC) 12/20/2019   • Anemia    • Angina at rest (CMS/HCC)    • Arthritis    • Atrial fibrillation (CMS/[facility])    • Coronary artery disease    • Disease of thyroid gland    • Elevated cholesterol    • GERD without esophagitis 12/2/2019   • History of blood transfusion    • History of colonoscopy 11/19/2019   • History of melena 11/19/2019   • History of stomach ulcers    • Hypertension    • Impaired functional mobility, balance, gait, and endurance    • Impaired functional mobility, balance, gait, and endurance    • Osteoporosis    • Positive TB test    • Stroke (CMS/[facility])     No deficits      Past Surgical History:   Procedure Laterality Date   • BRONCHOSCOPY Bilateral 4/12/2019    Procedure: BRONCHOSCOPY;  Surgeon: Jeff Laura MD;  Location:  [facility];  Service: Pulmonary   • BYPASS GRAFT     • COLONOSCOPY N/A 10/15/2019    Procedure: COLONOSCOPY;  Surgeon: Fredi Aaron MD;  Location:  [facility];  Service: Gastroenterology   • CORONARY ANGIOPLASTY WITH STENT PLACEMENT     • CORONARY ARTERY BYPASS GRAFT  10/18/2010   • ENDOSCOPY N/A 10/14/2019    Procedure: ESOPHAGOGASTRODUODENOSCOPY;  Surgeon: Fredi Aaron MD;  Location:  [facility];  Service: Gastroenterology   • HYSTERECTOMY     • INSERTION HEMODIALYSIS CATHETER N/A 2/14/2020    Procedure: HEMODIALYSIS CATHETER INSERTION(DINO);  Surgeon: Jeannine  MD Familia;  Location: [facility];  Service: General;  Laterality: N/A;   • STOMACH SURGERY  08/11/2019    Upper GI bleed    • STOMACH SURGERY  10/13/2019         MEDICATIONS:  I have reviewed and reconciled the patients medication list in the patients chart at the skilled nursing facility today.      ALLERGIES:    Allergies   Allergen Reactions   • Tuberculin Tests Rash         SOCIAL HISTORY:    Social History     Socioeconomic History   • Marital status:      Spouse name: Not on file   • Number of children: Not on file   • Years of education: Not on file   • Highest education level: Not on file   Tobacco Use   • Smoking status: Never Smoker   • Smokeless tobacco: Never Used   Substance and Sexual Activity   • Alcohol use: No   • Drug use: No   • Sexual activity: Not Currently       FAMILY HISTORY:    Family History   Problem Relation Age of Onset   • Cancer Mother    • Arthritis Mother    • No Known Problems Father    • Liver disease Sister        REVIEW OF SYSTEMS:   ROS performed 3/6, today, ROS per staff, as patient is at dialysis    General ROS: Patient denies any fevers, chills or loss of consciousness.  Generalized weakness.  Respiratory ROS: Chronic shortness of breath.  Cardiovascular ROS: Denies chest pain or palpitations. No history of exertional chest pain.  Gastrointestinal ROS: Denies nausea and vomiting. Denies any abdominal pain. No diarrhea.  Neurological ROS: Denies any focal weakness. No loss of consciousness. Denies any numbness.  Dermatological ROS: Denies any redness or pruritis.      PHYSICAL EXAMINATION:   VITAL SIGNS:   Vitals:    03/09/20 1322   BP: 120/64   Pulse: 72   Resp: 18   Temp: 97.8 °F (36.6 °C)   SpO2: 97%   Weight: 60.8 kg (134 lb)       Physical Exam   Constitutional: She appears well-developed and well-nourished.   Neck: Normal range of motion.    Tunneled dialysis catheter noted in the right internal jugular area.    Cardiovascular: Normal rate, regular rhythm,  normal heart sounds and intact distal pulses.   Pulmonary/Chest: Effort normal and breath sounds normal. No respiratory distress.   Abdominal: Soft. Bowel sounds are normal. She exhibits no distension. There is no tenderness. There is no guarding.   Musculoskeletal: She exhibits no edema.   Neurological: She is alert.   Skin: Skin is warm and dry. No rash noted.   Psychiatric: She has a normal mood and affect. Her behavior is normal.   Nursing note and vitals reviewed.    Assessment performed 3/6, as patient was to go to dialysis early today, prior to discharge, therefore unable to perform assessment today    RECORDS REVIEW:   I have reviewed and interpreted records in [facility].    Advance Care Planning   ACP discussion was held with the patient during this visit. Patient has an advance directive in EMR which is still valid.     ASSESSMENT     Diagnoses and all orders for this visit:    Weakness    Recurrent falls    Acute renal failure with tubular necrosis (CMS/HCC)    CKD (chronic kidney disease) requiring chronic dialysis (CMS/HCC)    Coagulation disorder due to circulating anticoagulants (CMS/HCC)    Coagulopathy (CMS/HCC)    Normocytic anemia    Cerebrovascular accident (CVA) due to embolism of cerebral artery (CMS/HCC)    Persistent atrial fibrillation    Coronary artery disease involving native coronary artery of native heart without angina pectoris    Interstitial lung disease (CMS/HCC)    Dyspnea on exertion    Hypersensitivity pneumonitis (CMS/HCC)    Essential hypertension    Dyslipidemia    Pulmonary arterial hypertension (CMS/HCC)    Chronic diastolic heart failure (CMS/HCC)    GERD without esophagitis    Acquired hypothyroidism        PLAN    Patient to discharge home today, due to insurance coverage. She is going home with family and will continue in home PT/OT/nursing. He weakness has improved during admission, and she has not had any falls. She will follow up with PCP within 2 weeks.      Follow-up for  hemodialysis on Monday, Wednesday and Friday, for acute on chronic renal failure. Follow up with Dr Cooper as directed. Do not take any NSAIDs or other OTC medications.      Patient was previously on anticoagulants for Afib and CVD/CAD.  Patient's Eliquis has been discontinued due to acute on chronic anemia and positive occult blood in stool.  Patient declined gastrointestinal work-up for bleeding. She is on daily ASA. Continue all medications as directed.      She is currently on prednisone 5 mg daily for her hypersensitivity pneumonitis and will need to follow-up with Dr. Garcia as scheduled, for this, as well as her IS lung disease and SOA. Continue Nebs as directed.     Patient had elevated transaminases and her Lipitor was discontinued, and they improved.  She will continue Lipitor and if she continues to have elevated LFTs on serial blood work, her statin therapy may need to be discontinued by her PCP.     Patient will need follow-up with Dr. Higgins, for HTN, CHF, AFib, CVD/CAD. She is hemodynamically stable at this time. Continue all medications as directed.     GERD symptoms controlled with current meds, continue as directed.     Thyroid hormones normal with lab check 2 weeks ago. Continue medications as directed.     Patient  encouraged to contact PCP with any acute changes,  or any new concerning symptoms. If any CP or SOA, seek emergency treatment.       [x]  Discussed Patient and discharge instructions, in detail, with nursing/staff, addressed all needs today.   Staff will discuss discharge plans and orders with patient, when she returns from dialysis. 20 minutes spent discussing discharge instructions.     [x]  Plan of Care Reviewed   [x]  PT/OT Reviewed   [x]  Order Changes  [x]  Discharge Plans Reviewed  [x]  Advance Directive on file with Nursing Home.   [x]  POA on file with Nursing Home.   [x]  Code Status listed: [x]  Full Code   []  DNR           “I confirm accuracy of unchanged data/findings which  have been copied and pasted from previous visit, as well as I have updated appropriately those that have changed.”        Tiffanie Bustamante, APRN.

## 2020-03-16 PROBLEM — I50.32 CHRONIC DIASTOLIC HEART FAILURE (HCC): Status: ACTIVE | Noted: 2020-01-01

## 2020-03-16 PROBLEM — R53.81 PHYSICAL DECONDITIONING: Status: ACTIVE | Noted: 2020-01-01

## 2020-03-16 NOTE — TELEPHONE ENCOUNTER
Pt bradley and POA, Yary Meade, stopped by office to drop off copy of pt POA to be scanned into pt chart. She has requested a referral to place home health services for CKD, Lung disease and Afib. Please route any questions to her POA Yary Meade or Jacinto Morel.

## 2020-03-20 NOTE — TELEPHONE ENCOUNTER
PT CALLED REQUEST REFILL FOR     ranolazine (RANEXA) 500 MG 12 hr tablet    calcium acetate (PHOS BINDER,) 667 MG capsule capsule    predniSONE (DELTASONE) 5 MG tablet    levothyroxine (SYNTHROID, LEVOTHROID) 75 MCG tablet    dilTIAZem CD (CARDIZEM CD) 120 MG 24 hr capsule    pantoprazole (PROTONIX) 40 MG EC tablet      PLEASE ADVISE.  CALL BACK: [phone]     [facility] [address] [place] [address]

## 2020-03-25 NOTE — TELEPHONE ENCOUNTER
Patient's granddaughter called and stated that refills are needed for the following prescription(s):    ranolazine (RANEXA) 500 MG 12 hr tablet  predniSONE (DELTASONE) 5 MG tablet  dilTIAZem CD (CARDIZEM CD) 120 MG 24 hr capsule  Budesonide     Pharmacy: [facility] in [facility]  PH: [phone]  FX: [phone]    Patient callback: [phone]    Pt's granddaughter states that she called last week for refills and only 2 of the 6 requested were filled.    Please advise.

## 2020-04-04 NOTE — ED PROVIDER NOTES
Subjective   80-year-old female with a past medical history significant for end-stage renal disease on hemodialysis on Monday Wednesday Fridays presents to the ED today for chief complaint of chest pain.  Per the son the patient was brought to the ED after she had a complete dialysis session prior to arrival.  After her dialysis session today the patient started complaining of chest pain and was hypotensive so they gave her 500 cc of fluid and then noted that her heart rate was elevated so they sent her to the ER for evaluation.  On arrival to the ER the patient is tremulous, anxious and tachypneic.  She is uncooperative and would not answer my questions.  Son provides the history and states that sometimes she has some panic/anxiety attacks where she cannot really function until they are over.  Further history and review of systems is limited secondary to the patient being uncooperative.          Review of Systems   Reason unable to perform ROS: Patient uncooperative.       Past Medical History:   Diagnosis Date   • Acute on chronic diastolic congestive heart failure (CMS/HCC) 12/20/2019   • Anemia    • Angina at rest (CMS/HCC)    • Arthritis    • Atrial fibrillation (CMS/HCC)    • Coronary artery disease    • Disease of thyroid gland    • Elevated cholesterol    • GERD without esophagitis 12/2/2019   • History of blood transfusion    • History of colonoscopy 11/19/2019   • History of melena 11/19/2019   • History of stomach ulcers    • Hypertension    • Impaired functional mobility, balance, gait, and endurance    • Impaired functional mobility, balance, gait, and endurance    • Osteoporosis    • Positive TB test    • Stroke (CMS/HCC)     No deficits       Allergies   Allergen Reactions   • Tuberculin Tests Rash       Past Surgical History:   Procedure Laterality Date   • BRONCHOSCOPY Bilateral 4/12/2019    Procedure: BRONCHOSCOPY;  Surgeon: Jeff Laura MD;  Location: [facility];  Service:  Pulmonary   • BYPASS GRAFT     • COLONOSCOPY N/A 10/15/2019    Procedure: COLONOSCOPY;  Surgeon: Fredi Aaron MD;  Location:  [facility];  Service: Gastroenterology   • CORONARY ANGIOPLASTY WITH STENT PLACEMENT     • CORONARY ARTERY BYPASS GRAFT  10/18/2010   • ENDOSCOPY N/A 10/14/2019    Procedure: ESOPHAGOGASTRODUODENOSCOPY;  Surgeon: Fredi Aaron MD;  Location:  [facility];  Service: Gastroenterology   • HYSTERECTOMY     • INSERTION HEMODIALYSIS CATHETER N/A 2/14/2020    Procedure: HEMODIALYSIS CATHETER INSERTION(CANNNON);  Surgeon: Familia Paez MD;  Location:  [facility];  Service: General;  Laterality: N/A;   • STOMACH SURGERY  08/11/2019    Upper GI bleed    • STOMACH SURGERY  10/13/2019       Family History   Problem Relation Age of Onset   • Cancer Mother    • Arthritis Mother    • No Known Problems Father    • Liver disease Sister        Social History     Socioeconomic History   • Marital status:      Spouse name: Not on file   • Number of children: Not on file   • Years of education: Not on file   • Highest education level: Not on file   Tobacco Use   • Smoking status: Never Smoker   • Smokeless tobacco: Never Used   Substance and Sexual Activity   • Alcohol use: No   • Drug use: No   • Sexual activity: Not Currently           Objective   Physical Exam   Constitutional: No distress.   Chronically ill-appearing.  Anxious appearing.   HENT:   Head: Normocephalic and atraumatic.   Nose: Nose normal.   Eyes: Conjunctivae and EOM are normal.   Cardiovascular:   Tachycardic.  Irregular rhythm.  A. fib with RVR on the monitor.   Pulmonary/Chest: Breath sounds normal. No respiratory distress.   Severe tachypnea.  Breath sounds clear bilaterally.  Right chest dialysis catheter in place   Abdominal: Soft. She exhibits no distension. There is no tenderness. There is no guarding.   Musculoskeletal: She exhibits no edema or deformity.   Neurological: No cranial nerve deficit.    Neurological exam incomplete secondary to the patient being uncooperative.  On attempt to open the patient's eyes she would forcefully hold them closed.  She would not follow my commands but would talk to her son at bedside.  She was moving all 4 extremities.   Skin: She is not diaphoretic.   Psychiatric:   Tremulous.  Anxious appearing.   Nursing note and vitals reviewed.      Procedures       PULSE OXIMETRY INTERPRETATION  Patient had a pulse ox of 95 on room air. This is a room air pulse oximetry reading.    ED Course  ED Course as of Apr 04 0749 Fri Apr 03, 2020   1745 EKG interpreted by me.  Atrial fibrillation with rapid ventricular response.  Rate of 123.  Nonspecific ST segment changes.  Abnormal EKG.  Similar to prior EKGs.    [CG]   1834 Patient heart rate improved fib with a rate from .    [CG]      ED Course User Index  [CG] Jovanny Cancino,                                            [facility]  Evaluation, management, and disposition decisions were made in the context of the current coronavirus/COVID 19 pandemic.  In this patient, the increased risk of nosocomial infection is of particular concern.  In my judgment, and with shared decision-making with the patient, the balance of clinical factors dictate expedited evaluation and discharge from the ED in the interest of this patient's health and safety.    80-year-old female end-stage renal disease presents to the ED with chief complaint of chest pain and hypotension following dialysis.  On arrival to the ED the patient is in A. fib with RVR with a heart rate from 1 20-1 40.  She is also very anxious and tachypneic and uncooperative.  She would not follow commands but I do assess the patient moving all 4 extremities.  Discussion with the son states that she does have some anxiety issues.  Will rule out ACS versus other etiology.  I did give the patient 1 mg of Ativan to help with some of her anxiousness.  CT brain negative for acute process.  Chest  x-ray negative for acute process.  Laboratory results were reassuring.  After the Ativan the patient's heart rate normalized and her respiratory rate normalized.  On my reevaluation after the Ativan the patient was cooperative and follow commands.  She was moving all extremities.  Sensation and strength was intact in all extremities.  She was cooperative and answering all my questions appropriately.  The son is indicating that she is currently at her baseline.  Given the negative work-up suspect that the patient had an anxiety versus panic attack.  Given the current covid - 19 pandemic was felt that the patient does not require admission or further evaluation is appropriate for discharge home.  The patient and son are agreeable to this plan.      Final diagnoses:   Anxiety   Atrial fibrillation with RVR (CMS/[facility])            Jovanny Cancino, DO  04/04/20 0749

## 2020-04-07 NOTE — OUTREACH NOTE
Care Coordination Assessment    Documented/Reviewed By:  Xochitl Kennedy RN Date/time:  4/7/2020 11:01 AM   Assessment completed with:  patient  Enrolled in care management program:  No  Living arrangement:  spouse  Support system:  family  Type of residence:  apartment  Home care services:  Yes (Comment: 4-7-20: [facility])  Equipment used at home:  walker, wheelchair, oxygen/respiratory treatment  Communication device:  No  Bed or wheelchair confined:  No  Inadequate nutrition:  No  Medication adherence problem:  No  Experiencing side effects from current medications:  No  History of fall(s) in last 6 months:  No  Difficulty keeping appointments:  No  Family aware of the patient's advance care planning wishes:  Yes

## 2020-04-07 NOTE — OUTREACH NOTE
Care Plan Note      Responses   Annual Wellness Visit:   Patient Will Schedule [Patient will schedule after corona virus pandemic over.]   Care Gaps Addressed  Flu Shot, Pneumonia Vaccine   Flu Shot Status  Up to Date   Pneumonia Vaccine Status  Up to Date   Specific Disease Process Teaching  Heart Failure   Does patient have depression diagnosis?  No   Advanced Directives:  Patient Has   Ed Visits past 12 months:  1   Hospitalizations past 12 months  3 or more   Medication Adherence  Medications understood        The main concerns and/or symptoms the patient would like to address are:   Had ED visit 4-3-20 with chest pain, a-fib, anxiety.  Patient stated she is feeling better; denies further chest pain; is breathing easily without SOA; does get SOA with much exertion.  Said she wears her oxygen at night to sleep, and as needed in the day.  Reports a little swelling in feet/ankles; says the swelling is down first thing in the morning; she elevates her LE's in recliner chair and extra pillow under feet as much as possible.  Patient said she requires assistance to get up and down and walk; she uses her Walker; has a W/C for longer distances.  Stated her daughter and granddaughter (an RN) lives near by and the grd-dtr is with her most of the time; assists patient with ADL's, Mobility, and Medications; assists with any thing needed.  Patient voiced compliance to take daily meds as ordered.  Goes to Hemodialysis every M-W-F, via [facility] van.  Stated Home Health PT is still coming in.    Education/instruction provided by Care Coordinator:   Explained role of Ambulatory  and contact information given.  Discussed ED discharge recommendations; importance of close PCP follow up.  Patient said due to the Corona Virus Pandemic, her MD appts have been cancelled.  Discussed with patient how she can have Virtual Visits with her PCP, and encouraged her to have her grd-dtr assist her via [employer] (active). Discussed need  to schedule AWV for the summer months with her PCP.  Discussed CHF management.  Patient said she does not weigh herself at home; not able to stand on scales independently to get accurate weight; but does get weighed every M-W-F at HD clinic.  Discussed [facility] Nurse Case Mgt; patient said she has contact information.  No other questions or concerns voiced at this time.    Follow Up Outreach Due:   As needed.    Xochitl Kennedy RN  Ambulatory     4/7/2020, 11:04

## 2020-05-06 NOTE — TELEPHONE ENCOUNTER
Patient is calling in regards to her medications. Patient wanted to know if Dr. Sweet wants her to continue taking the prednisone or not. If  wants her to continue use she will need a refill on that. Patient also needs a refill on bumetanide.  Please advise and call back    Confirmed pharmacy  [facility]    Callback number  [phone]

## 2020-05-22 NOTE — TELEPHONE ENCOUNTER
Stephanie from [facility] states that the patient needs a refill of her Budesimine.  She can be reached at [phone]     [facility]

## 2020-06-08 NOTE — ED PROVIDER NOTES
Subjective   History of Present Illness    Chief Complaint: Chest pain palpitations  History of Present Illness: 80-year-old female with history of coronary artery disease, atrial fibrillation, not on anticoagulation other than aspirin secondary to GI bleed remotely.  Here with chest pressure at the end of dialysis.  Per nurses notes patient had faster heart rate than usual as well  Onset: Today at the end of dialysis approximately 30 minutes prior to arrival  Duration: Persistent  Exacerbating / Alleviating factors: None  Associated symptoms: None      Nurses Notes reviewed and agree, including vitals, allergies, social history and prior medical history.     REVIEW OF SYSTEMS: All systems reviewed and not pertinent unless noted.    Positive for: Chest pain palpitation    Negative for: Fever cough hemoptysis syncope travel sick contacts  Review of Systems    Past Medical History:   Diagnosis Date   • Acute on chronic diastolic congestive heart failure (CMS/HCC) 12/20/2019   • Anemia    • Angina at rest (CMS/HCC)    • Arthritis    • Atrial fibrillation (CMS/HCC)    • Coronary artery disease    • Disease of thyroid gland    • Elevated cholesterol    • GERD without esophagitis 12/2/2019   • History of blood transfusion    • History of colonoscopy 11/19/2019   • History of melena 11/19/2019   • History of stomach ulcers    • Hypertension    • Impaired functional mobility, balance, gait, and endurance    • Impaired functional mobility, balance, gait, and endurance    • Osteoporosis    • Positive TB test    • Stroke (CMS/HCC)     No deficits       Allergies   Allergen Reactions   • Tuberculin Tests Rash       Past Surgical History:   Procedure Laterality Date   • BRONCHOSCOPY Bilateral 4/12/2019    Procedure: BRONCHOSCOPY;  Surgeon: Jeff Laura MD;  Location: [facility] ENDOSCOPY;  Service: Pulmonary   • BYPASS GRAFT     • COLONOSCOPY N/A 10/15/2019    Procedure: COLONOSCOPY;  Surgeon: Fredi Aaron MD;   Location:  [facility] ENDOSCOPY;  Service: Gastroenterology   • CORONARY ANGIOPLASTY WITH STENT PLACEMENT     • CORONARY ARTERY BYPASS GRAFT  10/18/2010   • ENDOSCOPY N/A 10/14/2019    Procedure: ESOPHAGOGASTRODUODENOSCOPY;  Surgeon: Fredi Aaron MD;  Location: [facility] ENDOSCOPY;  Service: Gastroenterology   • HYSTERECTOMY     • INSERTION HEMODIALYSIS CATHETER N/A 2/14/2020    Procedure: HEMODIALYSIS CATHETER INSERTION(FR[place]);  Surgeon: Familia Paez MD;  Location: [facility] OR;  Service: General;  Laterality: N/A;   • STOMACH SURGERY  08/11/2019    Upper GI bleed    • STOMACH SURGERY  10/13/2019       Family History   Problem Relation Age of Onset   • Cancer Mother    • Arthritis Mother    • No Known Problems Father    • Liver disease Sister        Social History     Socioeconomic History   • Marital status:      Spouse name: Not on file   • Number of children: Not on file   • Years of education: Not on file   • Highest education level: Not on file   Tobacco Use   • Smoking status: Never Smoker   • Smokeless tobacco: Never Used   Substance and Sexual Activity   • Alcohol use: No   • Drug use: No   • Sexual activity: Not Currently           Objective   Physical Exam      GENERAL APPEARANCE: Well developed, well nourished, pleasant 80-year-old white female no acute distress VITAL SIGNS: per nursing, reviewed and noted  SKIN: no rashes, ulcerations or petechiae.  Right upper chest dialysis catheter in place  Head: Normocephalic, atraumatic.   EYES: perrla. EOMI.  ENT:  TM clear, posterior pharynx patent.  LUNGS:  normal breath sounds. No retractions.   CARDIOVASCULAR: Irregular rate with regular rhythm.  Good peripheral pulses.  No murmurs   aBDOMEN: Soft, nontender, normal bowel sounds. No hernia. No ascites.  MUSCULOSKELETAL:  No tenderness. Full ROM. Strength and tone grossly normal.  NEUROLOGIC: Alert, oriented x 3. No gross deficits.  GCS 15.  NECK: Supple, symmetric.   Back: full rom, no paraspinal  spasm. No CVA tenderness.   PSYCH: appropriate affect,   : no bladder tenderness or distention, no CVA tenderness      Procedures       No attending provider procedures were performed    ED Course  ED Course as of Jun 08 1832   Mon Jun 08, 2020   1606 EKG interpreted by me reveals atrial fibrillation with rapid ventricular response of 110.  No ischemic changes.  Chronically abnormal EKG    [PF]   1627 Troponin T(!!): 0.111 [PF]   1627 Glucose(!): 115 [PF]   1627 BUN: 16 [PF]   1627 Creatinine(!): 1.12 [PF]   1627 Sodium: 139 [PF]   1627 Potassium: 4.1 [PF]   1627 Chloride: 100 [PF]   1627 CO2: 25.1 [PF]   1627 Calcium: 9.4 [PF]   1627 Total Protein: 7.0 [PF]   1627 Albumin: 4.20 [PF]   1627 ALT (SGPT): 15 [PF]   1627 AST (SGOT): 25 [PF]   1627 Alkaline Phosphatase: 92 [PF]   1627 Total Bilirubin: 0.2 [PF]   1627 WBC: 6.69 [PF]   1627 Hemoglobin(!): 11.2 [PF]   1627 Hematocrit(!): 33.4 [PF]   1627 Platelets: 282 [PF]      ED Course User Index  [PF] Des Rinaldi, DO                                           [facility]    Reassuring cardiac work-up with chronic elevation of troponins but lower than previous and trending downward.  Suspect adverse reaction to dialysis for which the patient's daughter states that she has had similar episodes in the past..  No evidence of ischemic changes on EKG.  Chest x-ray without acute findings..  Discharged home and stable condition with outpatient follow-up recommendations and return precautions discussed.    Final diagnoses:   Chest pain, unspecified type   Stage 5 chronic kidney disease on chronic dialysis (CMS/[facility])   History of atrial fibrillation            Des Rinaldi DO  06/08/20 1832

## 2020-06-25 NOTE — TELEPHONE ENCOUNTER
Can you check to see exactly what Ms. CAN needs with respect to her diuretic medications?  It is okay to call in a refill for which ever she needs.

## 2020-07-06 NOTE — TELEPHONE ENCOUNTER
Patient was recently discharged from nursing home and needs to schedule an appt, she is requesting the appt to be in office. She was hoping to schedule her  as well on the same day if possible.   She stated Tuesday and Thursdays work best, if on Monday, Wednesday, or Friday- early AM appt works best.    Best call back number: [phone]     Facility discharged from: NURSING HOME

## 2020-07-07 NOTE — TELEPHONE ENCOUNTER
RADHA pt. Sched appt for Thursday @ 8 am.  Sts she will schedule an appt for her  when they come in for that visit.

## 2020-07-09 PROBLEM — N18.4 CKD (CHRONIC KIDNEY DISEASE) STAGE 4, GFR 15-29 ML/MIN (HCC): Status: ACTIVE | Noted: 2020-01-01

## 2020-07-09 NOTE — PROGRESS NOTES
Established Patient        Chief Complaint:   Chief Complaint   Patient presents with   • Follow-up     med refills; also c/o back pain        Pat Morel is a 80 y.o. female    History of Present Illness:   Here for follow-up of her atrial fibrillation, chronic diastolic congestive heart failure, interstitial lung disease, GERD, hypothyroidism and chronic kidney disease requiring dialysis.  Patient continues to be followed by nephrology, current hemodialysis schedule is Monday/Wednesday/Friday.  She does have a right chest hemodialysis catheter.  She currently receives anticoagulation, heparin, at time of hemodialysis.  She denies any epistaxis or hemoptysis.  Denies any hematuria.  No reports of any bright red blood or black or tarry stools.  Patient denies any chest pain or any shortness of breath.  No reports of any fever, chills or night sweats.  She is routinely followed by cardiology additionally.    Patient reports a fall several weeks ago, perhaps as long as 1 month, in which she landed on her right side.  This was mechanical in nature.  She was evaluated in the emergency room as this occurred after hemodialysis, with diagnosis given of sensitivity to volume extraction at that time.  They have since adjusted her amount of volume extraction during dialysis with no further episodes.  She does describe a left lower back discomfort that is been present for the last several weeks.  It is worsened with pressure, she describes it as occurring since lying in a bed for a longer period of time during the course of her emergency room evaluation.  She denies any overlying skin changes, no rashes.  She does have significant scoliosis.    Subjective     The following portions of the patient's history were reviewed and updated as appropriate: allergies, current medications, past family history, past medical history, past social history, past surgical history and problem list.    Allergies   Allergen Reactions  "  • Tuberculin Tests Rash       Review of Systems  1. Constitutional: Negative for fever. Negative for chills, diaphoresis, fatigue and unexpected weight change.   2. HENT: No dysphagia; no changes to vision/hearing/smell/taste; no epistaxis  3. Eyes: Negative for redness and visual disturbance.   4. Respiratory: negative for shortness of breath. Negative for chest pain . Negative for cough and chest tightness.   5. Cardiovascular: Negative for chest pain and palpitations.   6. Gastrointestinal: Negative for abdominal distention, abdominal pain and blood in stool.   7. Endocrine: Negative for cold intolerance and heat intolerance.   8. Genitourinary: Negative for difficulty urinating, dysuria and frequency.   9. Musculoskeletal: Chronic arthralgias.  As per above, back pain and adjacent myalgias.   10. Skin: Negative for color change, rash and wound.   11. Neurological: Negative for syncope, weakness and headaches.   12. Hematological: Negative for adenopathy. Does not bruise/bleed easily.   13. Psychiatric/Behavioral: Negative for confusion. The patient is not nervous/anxious.    Objective     Physical Exam   Vital Signs: /60   Pulse 65   Temp 98 °F (36.7 °C)   Ht 147.3 cm (58\")   Wt 52.6 kg (116 lb)   LMP  (LMP Unknown)   SpO2 95%   BMI 24.24 kg/m²     General Appearance: alert, oriented x 3, no acute distress.  Pleasant and interactive during questioning and examination.  Skin: warm and dry.  Age related atrophy of the skin.  HEENT: Atraumatic.  pupils round and reactive to light and accommodation, oral mucosa pink and moist.  Nares patent without epistaxis.  External auditory canals are patent tympanic membranes intact.  Neck: supple, no JVD, trachea midline.  No thyromegaly  Lungs: unlabored breathing effort.  Mild rhonchus congestive changes of referred nature throughout all lung fields, cleared with light cough.  Audible air exchange noted all lung fields.  Heart: RRR, normal S1 and S2, no S3, no " rub.  Right chest hemodialysis  Abdomen: soft, non-tender, no palpable bladder, present bowel sounds to auscultation ×4.  No guarding or rigidity.  Extremities: no clubbing, cyanosis or edema.  Good range of motion actively and passively.  Noted spasticity to the paravertebral musculature of the distal thoracic and lumbar spine, slightly worse to the left, focal area of concern just lateral to rib articulation of 10 through 12.  Neuro: normal speech and mental status.  Cranial nerves II through XII intact.  No anosmia. DTR 2+; proprioception intact.  No focal motor/sensory deficits.    Assessment and Plan      Assessment:   Pat was seen today for follow-up.    Diagnoses and all orders for this visit:    Chronic diastolic heart failure (CMS/HCC)  -     dilTIAZem CD (CARDIZEM CD) 120 MG 24 hr capsule; Take 1 capsule by mouth Daily.    Persistent atrial fibrillation (CMS/HCC)  -     dilTIAZem CD (CARDIZEM CD) 120 MG 24 hr capsule; Take 1 capsule by mouth Daily.    Interstitial lung disease (CMS/HCC)  -     predniSONE (DELTASONE) 5 MG tablet; Take 1 tablet by mouth Daily.    Acquired hypothyroidism    GERD without esophagitis    CKD (chronic kidney disease) requiring chronic dialysis (CMS/HCC)    Rib pain on left side  -     XR ribs bilateral 4+ vw w pa chest    Fall with injury, sequela  -     XR ribs bilateral 4+ vw w pa chest    Other orders  -     ranolazine (RANEXA) 500 MG 12 hr tablet; Take 1 tablet by mouth Every 12 (Twelve) Hours.        Plan:  Blood pressure is at goal, vital signs demonstrate hemodynamic stability.  Heart rate well controlled additionally.  Continue current rate controlling regimen, as well as current dose of diltiazem.    Continue statin therapy, as well as Ranexa and daily aspirin.  Keep planned follow-up with cardiology.  She will be seeing Dr. Higgins at his clinic here in [place] in the near future.    I do not feel that her left lower back discomfort is a result of shingles, I do suspect  her fall could have contributed to either an insidious rib fracture versus disarticulation, or acute exacerbation of her problematic scoliosis.  Rib series x-rays will be ordered today.  Plan to follow clinically.    She demonstrates no findings of volume overload at this time.  Tolerating hemodialysis without difficulty.  Discussion Summary:    Discussed plan of care in detail with pt today; pt verb understanding and agrees.  Follow up:  No follow-ups on file.     There are no Patient Instructions on file for this visit.    Anthony Sweet, DO  07/09/20  09:26          Please note that portions of this note may have been completed with a voice recognition program. Efforts were made to edit the dictations, but occasionally words are mistranscribed.

## 2020-07-09 NOTE — OUTREACH NOTE
CHF Week 1 Survey      Responses   Facility patient discharged from?  [facility]   Does the patient have one of the following disease processes/diagnoses(primary or secondary)?  CHF   Is there a successful TCM telephone encounter documented?  Yes [TCM completed 12/13/19. ]          Deborah Escobar RN   Receive pt resting with mother at side, appears sleeping arousal, here for si,  seen by tele psych last night, however was unable to dispo, will be seen by ed md for disposition.   Comfort and safety maintain, observation continues.

## 2020-07-31 PROBLEM — I63.512 ACUTE ISCHEMIC LEFT MCA STROKE (HCC): Status: ACTIVE | Noted: 2020-01-01

## 2020-08-01 NOTE — PLAN OF CARE
Problem: Patient Care Overview  Goal: Interprofessional Rounds/Family Conf  Outcome: Ongoing (interventions implemented as appropriate)  Flowsheets (Taken 8/1/2020 1130)  Participants: nursing; advanced practice nurse  Note:   New Palliative Team consult from Dr. Jay on 8/1/20 at 0154 for GOC/ACP. Seen by Palliative Lanette Khan on 8/1/20 at 1024. Patient was awake, sitting up in a chair. She is able to follow some commands. She could lift her right arm up. She will be moving to stroke floor. She testing negative for COVID. Son Jacinto and daughter Daughter at bedside. We discussed Palliative support. They are hopeful for recovery, she has ESRD and is on dialysis. I gave them our brochure, business card and meal discount card. Palliative will continue to follow for support, goals, symptom management and discharge needs.

## 2020-08-01 NOTE — CONSULTS
Anthony Sweet DO  Consulting physician:Sohail Jay  Reason for referral: goc discussion, ACP  Chief Complaint   Patient presents with   • Stroke     HPI:   79 yo female with ESRD on HD, Afib no anticoagulation, chronic diastolic HF, interstitial lung disease, pulmonary HTN, HTN, prior CVA developed diminished responsiveness on 7/31 pm with development of Right hemiplegia. Last known well at 1530 on 7/31, not tPA candidate. Initial imaging showed acute Left MCA infarct.  Symptoms:   Patient's daughter shared chronic thoracic spine pain.   Daughter has noted that patient has been becoming weaker and more tired with dialysis.  Except for last weekend patient was able to make beans, greens and corn bread, more energy than what she has had in awhile.  Advance care planning discussed: yes  Code Status:   Current Code Status     Date Active Code Status Order ID Comments User Context       8/1/2020 0049 No CPR [id number]  Sohail Jay DO ED       Questions for Current Code Status     Question Answer Comment    Code Status No CPR     Medical Interventions (Level of Support Prior to Arrest) Limited     Limited Support to NOT Include Intubation      Artificial Nutrition     Level Of Support Discussed With Health Care Surrogate         Advance Directive: reviewed on medical record  Surrogate decision maker: Yary hebert  Past Medical History:   Diagnosis Date   • Acute on chronic diastolic congestive heart failure (CMS/HCC) 12/20/2019   • Anemia    • Angina at rest (CMS/HCC)    • Arthritis    • Atrial fibrillation (CMS/HCC)    • Coronary artery disease    • Disease of thyroid gland    • Elevated cholesterol    • GERD without esophagitis 12/2/2019   • History of blood transfusion    • History of colonoscopy 11/19/2019   • History of melena 11/19/2019   • History of stomach ulcers    • Hypertension    • Impaired functional mobility, balance, gait, and endurance    • Impaired functional mobility, balance, gait, and  endurance    • Osteoporosis    • Positive TB test    • Stroke (CMS/HCC)     No deficits     Past Surgical History:   Procedure Laterality Date   • BRONCHOSCOPY Bilateral 4/12/2019    Procedure: BRONCHOSCOPY;  Surgeon: Jeff Laura MD;  Location:  [facility];  Service: Pulmonary   • BYPASS GRAFT     • COLONOSCOPY N/A 10/15/2019    Procedure: COLONOSCOPY;  Surgeon: Fredi Aaron MD;  Location:  [facility];  Service: Gastroenterology   • CORONARY ANGIOPLASTY WITH STENT PLACEMENT     • CORONARY ARTERY BYPASS GRAFT  10/18/2010   • ENDOSCOPY N/A 10/14/2019    Procedure: ESOPHAGOGASTRODUODENOSCOPY;  Surgeon: Fredi Aaron MD;  Location:  [facility];  Service: Gastroenterology   • HYSTERECTOMY     • INSERTION HEMODIALYSIS CATHETER N/A 2/14/2020    Procedure: HEMODIALYSIS CATHETER INSERTION(DINO);  Surgeon: Familia Paez MD;  Location:  [facility];  Service: General;  Laterality: N/A;   • STOMACH SURGERY  08/11/2019    Upper GI bleed    • STOMACH SURGERY  10/13/2019       Reviewed current scheduled and prn medications for route, type, dose and frequency.    Current Facility-Administered Medications   Medication Dose Route Frequency Provider Last Rate Last Dose   • aspirin chewable tablet 81 mg  81 mg Oral Daily Sohail Jay DO        Or   • aspirin suppository 300 mg  300 mg Rectal Daily Sohail Jay DO   300 mg at 08/01/20 0925   • atorvastatin (LIPITOR) tablet 40 mg  40 mg Oral Nightly Sohail Jay DO       • budesonide (PULMICORT) nebulizer solution 0.5 mg  0.5 mg Nebulization BID - RT Sohail Jay DO       • dilTIAZem CD (CARDIZEM CD) 24 hr capsule 120 mg  120 mg Oral Q24H Sohail Jay DO       • heparin (porcine) 5000 UNIT/ML injection 5,000 Units  5,000 Units Subcutaneous Q8H Sohail Jay DO   5,000 Units at 08/01/20 0926   • lactated ringers infusion  50 mL/hr Intravenous Continuous Sohail Jay DO 50 mL/hr at 08/01/20 0232 50 mL/hr at 08/01/20 0232   •  "levothyroxine (SYNTHROID, LEVOTHROID) tablet 75 mcg  75 mcg Oral Q AM Sohail Jay, DO       • ondansetron (ZOFRAN) injection 4 mg  4 mg Intravenous Q6H PRN Sohail Jay DO       • pantoprazole (PROTONIX) EC tablet 40 mg  40 mg Oral Daily Before Lunch Sohail Jay, DO       • predniSONE (DELTASONE) tablet 5 mg  5 mg Oral Daily With Breakfast Sohail Jay, DO       • ranolazine (RANEXA) 12 hr tablet 500 mg  500 mg Oral Q12H Sohail Jay, DO       • sodium chloride 0.9 % flush 10 mL  10 mL Intravenous PRN Sohail Jay, DO       • sodium chloride 0.9 % flush 10 mL  10 mL Intravenous Q12H Sohail Jay, DO       • sodium chloride 0.9 % flush 10 mL  10 mL Intravenous PRN Sohail Jay, DO           lactated ringers 50 mL/hr Last Rate: 50 mL/hr (08/01/20 0232)     ondansetron  •  [COMPLETED] Insert peripheral IV **AND** sodium chloride  •  sodium chloride  Allergies   Allergen Reactions   • Tuberculin Tests Rash     Family History   Problem Relation Age of Onset   • Cancer Mother    • Arthritis Mother    • No Known Problems Father    • Liver disease Sister      Social History     Socioeconomic History   • Marital status:      Spouse name: Not on file   • Number of children: Not on file   • Years of education: Not on file   • Highest education level: Not on file   Tobacco Use   • Smoking status: Never Smoker   • Smokeless tobacco: Never Used   Substance and Sexual Activity   • Alcohol use: No   • Drug use: No   • Sexual activity: Not Currently       Review of Systems - +/- per HPI and symptom review.    PPS: 10%  /72 (BP Location: Left arm, Patient Position: Lying)   Pulse 112   Temp 97.1 °F (36.2 °C) (Oral)   Resp 18   Ht 147.3 cm (58\")   Wt 56.3 kg (124 lb 0.1 oz)   LMP  (LMP Unknown)   SpO2 99%   BMI 25.92 kg/m²   56.3 kg (124 lb 0.1 oz) Body mass index is 25.92 kg/m².  Intake & Output (last day)     None        Physical Exam:  General Appearance: No acute distress, up in chair  Head: " Normocephalic without obvious abnormality, atraumatic  Eyes: Conjunctivae and sclerae normal, no icterus, SHANICE  Throat: No oral lesions, no thrush, oral mucosa moist  Lungs: Clear to auscultation, respirations regular, even and non-labored  Heart: Regular rhythm and normal rate, normal S1  Abdomen: Normal bowel sounds, soft, non-distended, non-tender  Extremities: Moves all extremities, no redness, no cyanosis, no edema  Pulses: Distal pulses palpable and equal bilaterally  Skin: Warm and dry  Neurological: Awake, making eye contact, following commands, no myoclonus, bilateral hand  with weak strength L>R, able to lift lower extremities off bed, L>R, Right facial droop    Reviewed labs and diagnostic results.  Lab Results   Component Value Date    HGBA1C 4.70 (L) 08/01/2020     Results from last 7 days   Lab Units 07/31/20  2228 07/31/20  2219   WBC 10*3/mm3  --  5.29   HEMOGLOBIN g/dL  --  11.2*   HEMOGLOBIN, POC g/dL 11.9*  --    HEMATOCRIT %  --  35.0   HEMATOCRIT POC % 35*  --    PLATELETS 10*3/mm3  --  227     Results from last 7 days   Lab Units 07/31/20  2219   SODIUM mmol/L 132*   POTASSIUM mmol/L 4.5   CHLORIDE mmol/L 93*   CO2 mmol/L 25.0   BUN mg/dL 17   CREATININE mg/dL 2.00*   CALCIUM mg/dL 9.6   BILIRUBIN mg/dL 0.6   ALK PHOS U/L 96   ALT (SGPT) U/L 12   AST (SGOT) U/L 20   GLUCOSE mg/dL 86     Results from last 7 days   Lab Units 07/31/20  2219   SODIUM mmol/L 132*   POTASSIUM mmol/L 4.5   CHLORIDE mmol/L 93*   CO2 mmol/L 25.0   BUN mg/dL 17   CREATININE mg/dL 2.00*   GLUCOSE mg/dL 86   CALCIUM mg/dL 9.6     Imaging Results (Last 72 Hours)     Procedure Component Value Units Date/Time    XR Chest 1 View [[id number]] Collected:  08/01/20 0021     Updated:  08/01/20 0023    Narrative:       CR Chest 1 Vw    INDICATION:   Cerebral infarction. Lung opacities.     COMPARISON:    1/17/2020    FINDINGS:  Single portable AP view(s) of the chest.    Large-bore central line from a right-sided approach  terminates at the right atrium. Postoperative changes of median sternotomy. Cardiomegaly with mild vascular congestion. Probable mild fibrotic changes in both lungs. No new dense consolidation or  effusion. No pneumothorax. Hiatal hernia.          Impression:         1. Cardiomegaly and probable mild vascular congestion.  2. Hiatal hernia.    Signer Name: Pancho Juarez MD   Signed: 8/1/2020 12:21 AM   Workstation Name: [facility]    [facility]    CT Angiogram Neck [[id number]] Collected:  07/31/20 2225     Updated:  07/31/20 2227    Narrative:       See the CT angiogram of the head dictation. The CT angiogram of the head and neck are dictated together.     Signer Name: Jagruti Laureano MD   Signed: 7/31/2020 10:25 PM   Workstation Name: [facility]    [facility]    CT Angiogram Head [[id number]] Collected:  07/31/20 2224     Updated:  07/31/20 2226    Narrative:       CTA Head    INDICATION:   Right-sided weakness onset at 1700.    TECHNIQUE:   CT angiogram of the head and neck with IV contrast. Contrast dose recorded in the patient's chart. 3-D reconstructions were obtained and reviewed.  Evaluation for a significant carotid arterial stenosis is based on the NASCET criteria. Radiation dose  reduction techniques included automated exposure control or exposure modulation based on body size. Count of known CT and cardiac nuc med studies performed in previous 12 months: 1. Noncontrast imaging also obtained prior to contrast enhanced study..    COMPARISON:   Earlier noncontrast head CT. Separate CT perfusion.    FINDINGS:   CTA neck:  There is atherosclerotic vascular calcification at the aortic arch. There is a bovine origin left common carotid artery. There is not hemodynamically significant stenosis of great vessel origins from the arch. There is about 50% narrowing at  the origin of the right common carotid artery from the innominate and there is narrowing at the origin of  the right subclavian artery.    Right carotid system: There is partially calcified plaque at the right carotid bifurcation. By NASCET criteria there is about 10% diameter stenosis proximal right internal carotid artery. Right carotid siphon is widely patent.    Left carotid system: There is calcified plaque at the left carotid bifurcation. By NASCET criteria there is about 45% diameter stenosis. Left carotid siphon is widely patent.    Both vertebral arteries are patent in the neck. The right is mildly dominant. Both supply the basilar.    CTA head:  There is a short segment high-grade stenosis/near cut off of the right M1 vessel about a centimeter from the origin. This finding has her to begin called by myself to Dr. Bragg at about 10:10 PM. This severe stenosis is consistent with the  abnormal perfusion findings and infarct. There is a small anterior communicating artery present. There is a large right posterior communicating artery with a small infundibulum at its origin. There is a tiny left posterior communicating artery. The right  P1 vessel is mildly hypoplastic. There is small amount of contrast outpouching directed laterally at the right MCA bifurcation consistent with a tiny broad necked aneurysm measuring about 1.4 x 2.4 mm dimension. The dural venous sinuses are patent. No  other possible intracranial vascular cut off or focal central stenosis is seen.    There is relative hypoperfusion to the left anterior insula and left lateral frontal lobe anteriorly consistent with the abnormalities seen on the perfusion study. There are patchy groundglass opacities are areas of air-trapping the visualized lungs.  Recommend correlation with a chest x-ray to assess for possible infiltrates. There are degenerative changes in the cervical spine.      Impression:         1. There is a short segment high-grade stenosis/occlusion with reconstitution of the right M1 vessel about a centimeter from its origin. This is  consistent with the infarct seen on the noncontrast head CT and perfusion study. Findings called.  2. By NASCET criteria there is about 45% diameter stenosis of the left carotid bifurcation and about 10% diameter stenosis of the right carotid bifurcation due to calcified plaque.  3. Both vertebral arteries are patent with the right being mildly dominant.  4. No additional focal central stenosis or intracranial vascular cut off is suspected.  5. Patchy groundglass opacities or areas of air trapping partly seen at lung apices. Recommend chest x-ray correlation.  6. there is a small broad necked aneurysm at the right MCA bifurcation measuring about 1.4 x 2.4 mm directed laterally. Follow-up recommended.    Signer Name: Jagruti Laureano MD   Signed: 7/31/2020 10:24 PM   Workstation Name: ROBERT    [facility]    CT Cerebral Perfusion With & Without Contrast [[id number]] Collected:  07/31/20 2210     Updated:  07/31/20 2213    Narrative:       CT CEREBRAL PERFUSION W WO CONTRAST    HISTORY:   80-year-old female with focal neurologic deficit. Right-sided weakness since 1700 hours.    TECHNIQUE:   Axial CT images of the brain without and with intravenous contrast using cerebral perfusion protocol. Post-processing parametric maps were created using RAPID software and reviewed. Radiation dose reduction techniques included automated exposure control  or exposure modulation based on body size. CT and nuclear cardiology exams in the last 12 months: 1.    COMPARISON:   Noncontrast head CT 2126 hours    FINDINGS:   Arterial input function is optimal.     PERFUSION PARAMETERS:  *  Ischemic tissue volume (Tmax > 6 sec.): 44 mL.  *  Infarct core volume (CBF < 30%): 33 mL.  *  Mismatch (penumbra) volume: 11 mL.  *  Mismatch ratio: 1.3.  *  Location/territory: Left MCA territory.    COLLATERAL CIRCULATION PARAMETERS:  *  Volume poor collateral perfusion (Tmax > 10 sec.): 25 mL.  *  Hypoperfusion index (Tmax >10  sec./Tmax > 6 sec.): 0.6.  *  CBV Index (rCBV in Tmax > 6 sec. region): 0.5.        Impression:         1. Abnormal CT perfusion demonstrating acute ischemic change in the left MCA territory. There is a core infarct on the left with a penumbra of associated potentially reversible ischemia.      NOTIFICATION: Critical Value/emergent results were called by telephone at the time of interpretation on 7/31/2020 10:07 PM to Efren Bragg MD who verbally acknowledged these results.      Signer Name: Pancho Juarez MD   Signed: 7/31/2020 10:10 PM   [facility]: [facility]    [facility]    CT Head Without Contrast Stroke Protocol [[id number]] Collected:  07/31/20 2142     Updated:  07/31/20 2144    Narrative:       HISTORY:   Right-sided weakness onset at 1700. Scan time 2125 preliminary findings given to the CT technologist by myself about 7:35 PM.    TECHNIQUE:   CT head without contrast. Radiation dose reduction techniques included automated exposure control or exposure modulation based on body size. Radiation audit for number of CT and nuclear cardiology exams performed in the last year: 1.      COMPARISON:   None    FINDINGS:   There is low-attenuation in the anterior aspect of the left side insula with loss of the insular ribbon anteriorly. In light of history provided this is concerning for early left MCA territory infarct. There is also some low-attenuation in the left  lateral frontal subcortical white matter to cortex. There is no appreciable mass affect. There is no evidence for acute intracranial hemorrhage.  There is generalized atrophy. There are lacunar-type insults in the anterior limb of the right internal  capsule posterior right insula and there is mild to moderate white matter low-attenuation in general which is likely due to small vessel disease. Likely some low-attenuation in the brainstem. No midline shift.    The mastoid air cells are clear. The visualized paranasal  sinuses are clear      Impression:         1. No acute hemorrhage extra-axial fluid collection or intracranial mass effect suspected.  2. There is loss of the insular ribbon on the left side anteriorly with some low-attenuation involving the left anterior basal ganglia and insula. Additionally there is low-attenuation in the left lateral frontal cortex and underlying white matter with  loss of gray-white distinction. These could be early signs of a left MCA territory infarct. This is less than one third of the likely vascular distribution.  3. Moderate probable sequelae of small vessel disease.    Signer Name: Jagruti Laureano MD   Signed: 7/31/2020 9:42 PM   Workstation Name: ROBERT    [facility] [place]        Impression: 80 y.o. female with acute Left MCA stroke, chronic Afib, ESRD on HD, pulmonary HTN  Plan:   Chronic thoracolumbar spinal pain - lidoderm patch for overnight.  Dysphagia - continue to monitor, speech evaluation    Goals of care - met with daughter/HCS, Yary, and son, Jacinto, at bedside.   Both understand watchful waiting with acute stroke recovery.  Patient's  is at home.    Family shared prior TIA episode and intermittent cognition changes. Patient has become more awake today with more movement of extremities.  Reviewed choices to stop/start interventions and choices about plans of care.   Reviewed palliative care team support     Lanette Khan, APRSARTHAK  [phone]  08/01/20  10:24      Time: 60  minutes spent reviewing medical and medication records, assessing and examining patient, discussing with patient, family and nursing staff, answering questions, formulating a plan and documentation of care. > 50% time spent face to face

## 2020-08-01 NOTE — PLAN OF CARE
Problem: Patient Care Overview  Goal: Plan of Care Review  Outcome: Ongoing (interventions implemented as appropriate)  Flowsheets (Taken 8/1/2020 0351)  Progress: no change  Plan of Care Reviewed With: patient  Outcome Summary: pt rested intermittently throughout night, no c/o pain, no c/o n/v/d, pt non verbal, appears to understand what we say, follows commands, NIH 12, appears to have partial hemianopia in the right eye, tachycardic otherwise vss, will continue to monitor.

## 2020-08-01 NOTE — PROGRESS NOTES
"Nutrition Services    Patient Name:  Pat Morel  YOB: 1940  MRN: [id number]  Admit Date:  7/31/2020    Pt admitted with Nutrition Risk Screen of \"difficulty chewing/swallowing.\" SLP consult pending. Per H&P (8/1)- \"Family state she would not want a feeding tubes.\" Will follow per protocol.     Electronically signed by:  Ana Lilia Barboza MS RD/ISSAC [facility]  08/01/20 13:10   "

## 2020-08-01 NOTE — THERAPY EVALUATION
Acute Care - Speech Language Pathology Initial Evaluation  [facility]   Cognitive-Communication Evaluation  Clinical Swallow Evaluation     Patient Name: Pat Morel  : 1940  MRN: [id number]  Today's Date: 2020               Admit Date: 2020     Visit Dx:    ICD-10-CM ICD-9-CM   1. Acute ischemic left MCA stroke (CMS/HCC) I63.512 434.91   2. Dysphagia, unspecified type R13.10 787.20   3. Aphasia R47.01 784.3     Patient Active Problem List   Diagnosis   • Persistent atrial fibrillation (CMS/HCC)   • Coronary artery disease involving native coronary artery of native heart without angina pectoris   • Essential hypertension   • Cerebrovascular accident (CVA) due to embolism (CMS/HCC)   • Dyspnea on exertion   • Pulmonary arterial hypertension (CMS/HCC)   • Interstitial lung disease (CMS/HCC)   • Hypersensitivity pneumonitis (CMS/HCC)   • Coagulation disorder due to circulating anticoagulants (CMS/HCC)   • Dyslipidemia   • Acquired hypothyroidism   • CKD (chronic kidney disease) requiring chronic dialysis (CMS/HCC)   • GERD without esophagitis   • Iron deficiency anemia   • Chronic diastolic heart failure (CMS/HCC)   • Acute renal failure (CMS/HCC)   • Recurrent falls   • Normocytic anemia   • Coagulopathy (CMS/HCC)   • Physical deconditioning   • CKD (chronic kidney disease) stage 4, GFR 15-29 ml/min (CMS/HCC)   • Acute ischemic left MCA stroke (CMS/HCC)     Past Medical History:   Diagnosis Date   • Acute on chronic diastolic congestive heart failure (CMS/HCC) 2019   • Anemia    • Angina at rest (CMS/HCC)    • Arthritis    • Atrial fibrillation (CMS/HCC)    • Coronary artery disease    • Disease of thyroid gland    • Elevated cholesterol    • GERD without esophagitis 2019   • History of blood transfusion    • History of colonoscopy 2019   • History of melena 2019   • History of stomach ulcers    • Hypertension    • Impaired functional mobility, balance, gait, and  endurance    • Impaired functional mobility, balance, gait, and endurance    • Osteoporosis    • Positive TB test    • Stroke (CMS/HCC)     No deficits     Past Surgical History:   Procedure Laterality Date   • BRONCHOSCOPY Bilateral 4/12/2019    Procedure: BRONCHOSCOPY;  Surgeon: Jeff Laura MD;  Location:  [facility];  Service: Pulmonary   • BYPASS GRAFT     • COLONOSCOPY N/A 10/15/2019    Procedure: COLONOSCOPY;  Surgeon: Fredi Aaron MD;  Location:  [facility];  Service: Gastroenterology   • CORONARY ANGIOPLASTY WITH STENT PLACEMENT     • CORONARY ARTERY BYPASS GRAFT  10/18/2010   • ENDOSCOPY N/A 10/14/2019    Procedure: ESOPHAGOGASTRODUODENOSCOPY;  Surgeon: Fredi Aaron MD;  Location:  [facility];  Service: Gastroenterology   • HYSTERECTOMY     • INSERTION HEMODIALYSIS CATHETER N/A 2/14/2020    Procedure: HEMODIALYSIS CATHETER INSERTION(DINO);  Surgeon: Familia Paez MD;  Location:  [facility];  Service: General;  Laterality: N/A;   • STOMACH SURGERY  08/11/2019    Upper GI bleed    • STOMACH SURGERY  10/13/2019        SLP EVALUATION (last 72 hours)      SLP SLC Evaluation     Row Name 08/01/20 6658                   General Information    Prior Level of Function-Communication  WFL  -SM        Patient's Goals for Discharge  patient could not state  -        Family Goals for Discharge  other (see comments) improved communication  -SM           Comprehension Assessment/Intervention    Comprehension Assessment/Intervention  Auditory Comprehension  -SM           Auditory Comprehension Assessment/Intervention    Auditory Comprehension (Communication)  moderate impairment;severe impairment  -SM        Answers Questions (Communication)  yes/no;severe impairment  -SM        Able to Follow Commands (Communication)  1-step;mild impairment;moderate impairment  -SM        Auditory Comprehension Communication, Comment  son reported head shake for no intermittently. No success  with SLP attempts. Hard of hearing, hearing aids placed  -           Expression Assessment/Intervention    Expression Assessment/Intervention  verbal expression  -           Verbal Expression Assessment/Intervention    Verbal Expression  severe impairment  -        Automatic Speech (Communication)  severe impairment  -        Verbal Expression, Comment  No verbal output  -           Motor Speech Assessment/Intervention    Motor Speech Function  unable/difficult to assess  -           SLP Clinical Impressions    SLP Diagnosis  Severe aphasia  -        Rehab Potential/Prognosis  guarded  -        SLC Criteria for Skilled Therapy Interventions Met  yes  -        Functional Impact  difficulty communicating wants, needs  -           Recommendations    Therapy Frequency (SLP SLC)  5 days per week  -        Predicted Duration Therapy Intervention (Days)  until discharge  -        Anticipated Dischage Disposition (SLP)  unknown;anticipate therapy at next level of care  -          User Key  (r) = Recorded By, (t) = Taken By, (c) = Cosigned By    Initials Name Effective Dates    Annmarie Canada, MS CCC-SLP 06/22/15 -              EDUCATION  The patient has been educated in the following areas:     Cognitive Impairment Communication Impairment.    SLP Recommendation and Plan  SLP Diagnosis: Severe aphasia     [facility] for [place]illed Therapy Interventions Met: yes  Anticipated Dischage Disposition (SLP): unknown, anticipate therapy at next level of care     Predicted Duration Therapy Intervention (Days): until discharge    Plan of Care Reviewed With: patient, daughter, son      SLP GOALS     Row Name 08/01/20 0930             Comprehend Questions Goal 1 (SLP)    Improve Ability to Comprehend Questions Goal 1 (SLP)  simple yes/no questions;70%;with maximum cues (25-49%)  -      Time Frame (Comprehend Questions Goal 1, SLP)  short term goal (STG)  -         Follow Directions Goal 2 (SLP)     Improve Ability to Follow Directions Goal 1 (SLP)  1 step direction without objects;70%;with minimal cues (75-90%)  -      Time Frame (Follow Directions Goal 1, SLP)  short term goal (STG)  -SM         Word Retrieval Skills Goal 1 (SLP)    Improve Word Retrieval Skills By Goal 1 (SLP)  completing automatic speech task, counting;repeating sounds;repeating words;completing open ended structured sentence;answer WH question with one word;50%;with maximum cues (25-49%)  -      Time Frame (Word Retrieval Goal 1, SLP)  short term goal (STG)  -SM         Motor Planning Goal 1 (SLP)    Improve Motor Planning to Reduce Apraxia by Goal 1 (SLP)  imitating mouth postures;imitating vowels;following isolated oral commands;50%;with maximum cues (25-49%)  -      Time Frame (Motor Planning Goal 1, SLP)  short term goal (STG)  -        User Key  (r) = Recorded By, (t) = Taken By, (c) = Cosigned By    Initials Name Provider Type    Annmarie Canada MS CCC-SLP Speech and Language Pathologist                  Time Calculation:     Time Calculation- SLP     Row Name 20 1306             Time Calculation- SLP    SLP Start Time  0950  -      SLP Received On  20  -        User Key  (r) = Recorded By, (t) = Taken By, (c) = Cosigned By    Initials Name Provider Type    Annmarie Canada MS CCC-SLP Speech and Language Pathologist          Therapy Charges for Today     Code Description Service Date Service Provider Modifiers Qty    03151272435 HC ST EVAL ORAL PHARYNG SWALLOW 3 2020 Annmarie Noonan MS CCC-SLP GN 1    [id number] HC ST EVAL SPEECH AND PROD W LANG  2 2020 Annmarie Noonan MS CCC-SLP GN 1                     Annmarie Noonan MS CCC-SLP  2020 and Acute Care - Speech Language Pathology   Swallow Initial Evaluation TAYLOR Irizarry     Patient Name: Pat Morel  : 1940  MRN: [id number]  Today's Date: 2020               Admit Date: 2020    Visit Dx:      ICD-10-CM ICD-9-CM   1. Acute ischemic left MCA stroke (CMS/HCC) I63.512 434.91   2. Dysphagia, unspecified type R13.10 787.20   3. Aphasia R47.01 784.3     Patient Active Problem List   Diagnosis   • Persistent atrial fibrillation (CMS/HCC)   • Coronary artery disease involving native coronary artery of native heart without angina pectoris   • Essential hypertension   • Cerebrovascular accident (CVA) due to embolism (CMS/[facility])   • Dyspnea on exertion   • Pulmonary arterial hypertension (CMS/HCC)   • Interstitial lung disease (CMS/HCC)   • Hypersensitivity pneumonitis (CMS/HCC)   • Coagulation disorder due to circulating anticoagulants (CMS/HCC)   • Dyslipidemia   • Acquired hypothyroidism   • CKD (chronic kidney disease) requiring chronic dialysis (CMS/[facility])   • GERD without esophagitis   • Iron deficiency anemia   • Chronic diastolic heart failure (CMS/HCC)   • Acute renal failure (CMS/HCC)   • Recurrent falls   • Normocytic anemia   • Coagulopathy (CMS/[facility])   • Physical deconditioning   • CKD (chronic kidney disease) stage 4, GFR 15-29 ml/min (CMS/[facility])   • Acute ischemic left MCA stroke (CMS/HCC)     Past Medical History:   Diagnosis Date   • Acute on chronic diastolic congestive heart failure (CMS/HCC) 12/20/2019   • Anemia    • Angina at rest (CMS/[facility])    • Arthritis    • Atrial fibrillation (CMS/[facility])    • Coronary artery disease    • Disease of thyroid gland    • Elevated cholesterol    • GERD without esophagitis 12/2/2019   • History of blood transfusion    • History of colonoscopy 11/19/2019   • History of melena 11/19/2019   • History of stomach ulcers    • Hypertension    • Impaired functional mobility, balance, gait, and endurance    • Impaired functional mobility, balance, gait, and endurance    • Osteoporosis    • Positive TB test    • Stroke (CMS/HCC)     No deficits     Past Surgical History:   Procedure Laterality Date   • BRONCHOSCOPY Bilateral 4/12/2019    Procedure: BRONCHOSCOPY;  Surgeon: Valentín  Jeff Means MD;  Location:  [facility];  Service: Pulmonary   • BYPASS GRAFT     • COLONOSCOPY N/A 10/15/2019    Procedure: COLONOSCOPY;  Surgeon: Fredi Aaron MD;  Location:  [facility];  Service: Gastroenterology   • CORONARY ANGIOPLASTY WITH STENT PLACEMENT     • CORONARY ARTERY BYPASS GRAFT  10/18/2010   • ENDOSCOPY N/A 10/14/2019    Procedure: ESOPHAGOGASTRODUODENOSCOPY;  Surgeon: Fredi Aaron MD;  Location:  [facility];  Service: Gastroenterology   • HYSTERECTOMY     • INSERTION HEMODIALYSIS CATHETER N/A 2/14/2020    Procedure: HEMODIALYSIS CATHETER INSERTION(DINO);  Surgeon: Familia Paez MD;  Location:  [facility];  Service: General;  Laterality: N/A;   • STOMACH SURGERY  08/11/2019    Upper GI bleed    • STOMACH SURGERY  10/13/2019        SWALLOW EVALUATION (last 72 hours)      [facility] Adult Swallow Evaluation     Row Name 08/01/20 0930                   Rehab Evaluation    Document Type  evaluation  -        Patient Observations  alert;cooperative  -        Patient/Family Observations  Children present  -           General Information    Patient Profile Reviewed  yes  -SM        Pertinent History Of Current Problem  L MCA CVA, made DNR, palliative consulted  -        Current Method of Nutrition  NPO  -SM        Prior Level of Function-Swallowing  safe, efficient swallowing in all situations  -        Plans/Goals Discussed with  patient and family;agreed upon  -        Barriers to Rehab  none identified  -        Patient's Goals for Discharge  patient could not state  -        Family Goals for Discharge  patient able to return to PO diet  -           Pain Assessment    Additional Documentation  Pain Scale: FACES Pre/Post-Treatment (Group)  -           Pain Scale: FACES Pre/Post-Treatment    Pain: FACES Scale, Pretreatment  0-->no hurt  -SM        Pain: FACES Scale, Post-Treatment  0-->no hurt  -SM           Oral Musculature and Cranial Nerve Assessment     Oral Motor General Assessment  unable to assess;other (see comments) 2' aphasia  -           Clinical Swallow Eval    Pharyngeal Phase  suspected pharyngeal impairment  -           Pharyngeal Phase Concerns    Pharyngeal Phase Concerns  cough  -        Cough  thin  -        Pharyngeal Phase Concerns, Comment  Discussed eval results with pt's children. They know she would not want feeding tube though unsure on wishes for modified PO diet. Wish for FEES to further assess and discuss GOC pending results  -           Clinical Impression    SLP Swallowing Diagnosis  oral dysfunction;suspected pharyngeal dysfunction  -        Functional Impact  risk of aspiration/pneumonia  -           Recommendations    SLP Diet Recommendation  NPO;other (see comments) x ice chips  -        Recommended Diagnostics  FEES;other (see comments) family wishes to proceed with further assessment  -        SLP Rec. for Method of Medication Administration  meds whole;meds crushed;with pudding or applesauce;as tolerated  -          User Key  (r) = Recorded By, (t) = Taken By, (c) = Cosigned By    Initials Name Effective Dates     Annmarie Noonan MS CCC-SLP 06/22/15 -           EDUCATION  The patient has been educated in the following areas:   Dysphagia (Swallowing Impairment).    SLP Recommendation and Plan  SLP Swallowing Diagnosis: oral dysfunction, suspected pharyngeal dysfunction  SLP Diet Recommendation: NPO, other (see comments)(x ice chips)     SLP Rec. for Method of Medication Administration: meds whole, meds crushed, with pudding or applesauce, as tolerated        Recommended Diagnostics: FEES, other (see comments)(family wishes to proceed with further assessment)     Anticipated Dischage Disposition (SLP): unknown, anticipate therapy at next level of care        Predicted Duration Therapy Intervention (Days): until discharge       Plan of Care Reviewed With: patient, daughter, son    SLP GOALS     Row Name  08/01/20 0930             Comprehend Questions Goal 1 (SLP)    Improve Ability to Comprehend Questions Goal 1 (SLP)  simple yes/no questions;70%;with maximum cues (25-49%)  -      Time Frame (Comprehend Questions Goal 1, SLP)  short term goal (STG)  -SM         Follow Directions Goal 2 (SLP)    Improve Ability to Follow Directions Goal 1 (SLP)  1 step direction without objects;70%;with minimal cues (75-90%)  -      Time Frame (Follow Directions Goal 1, SLP)  short term goal (STG)  -SM         Word Retrieval Skills Goal 1 (SLP)    Improve Word Retrieval Skills By Goal 1 (SLP)  completing automatic speech task, counting;repeating sounds;repeating words;completing open ended structured sentence;answer WH question with one word;50%;with maximum cues (25-49%)  -      Time Frame (Word Retrieval Goal 1, SLP)  short term goal (STG)  -SM         Motor Planning Goal 1 (SLP)    Improve Motor Planning to Reduce Apraxia by Goal 1 (SLP)  imitating mouth postures;imitating vowels;following isolated oral commands;50%;with maximum cues (25-49%)  -      Time Frame (Motor Planning Goal 1, SLP)  short term goal (STG)  -        User Key  (r) = Recorded By, (t) = Taken By, (c) = Cosigned By    Initials Name Provider Type    Annmarie Canada MS CCC-SLP Speech and Language Pathologist             Time Calculation:   Time Calculation- SLP     Row Name 08/01/20 1306             Time Calculation- [facility]    SLP Start Time  0950  -      SLP Received On  08/01/20  -        User Key  (r) = Recorded By, (t) = Taken By, (c) = Cosigned By    Initials Name Provider Type    Annmarie Canada MS CCC-SLP Speech and Language Pathologist        Patient was not wearing a face mask and did exhibit coughing during this therapy encounter.  Procedure performed was aerosolizing, involved close contact (within 6 feet for at least 15 minutes or longer), and did not involve contact with infectious secretions or specimens.  Therapist used  appropriate personal protective equipment including gloves, standard procedure mask and eye protection.  Appropriate PPE was worn during the entire therapy session.  Hand hygiene was completed before and after therapy session.       Therapy Charges for Today     Code Description Service Date Service Provider Modifiers Qty    [id number] HC ST EVAL ORAL PHARYNG SWALLOW 3 8/1/2020 Annmarie Noonan, MS CCC-SLP GN 1    [id number] HC ST EVAL SPEECH AND PROD W LANG  2 8/1/2020 Annmarie Noonan, MS CCC-SLP GN 1               Annmarie Noonan MS PATRICIA-SLP  8/1/2020

## 2020-08-01 NOTE — MBS/VFSS/FEES
Acute Care - Speech Language Pathology   Swallow Initial Evaluation  [place]   Fiberoptic Endoscopic Evaluation of Swallowing (FEES)     Patient Name: Pat Morel  : 1940  MRN: [id number]  Today's Date: 2020               Admit Date: 2020    Visit Dx:     ICD-10-CM ICD-9-CM   1. Acute ischemic left MCA stroke (CMS/HCC) I63.512 434.91   2. Oropharyngeal dysphagia R13.12 787.22   3. Aphasia R47.01 784.3   4. 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 (CMS/HCC)   • Dyslipidemia   • Acquired hypothyroidism   • CKD (chronic kidney disease) requiring chronic dialysis (CMS/HCC)   • GERD without esophagitis   • Iron deficiency anemia   • Chronic diastolic heart failure (CMS/HCC)   • Acute renal failure (CMS/HCC)   • Recurrent falls   • Normocytic anemia   • Coagulopathy (CMS/HCC)   • Physical deconditioning   • CKD (chronic kidney disease) stage 4, GFR 15-29 ml/min (CMS/HCC)   • Acute ischemic left MCA stroke (CMS/HCC)     Past Medical History:   Diagnosis Date   • Acute on chronic diastolic congestive heart failure (CMS/HCC) 2019   • Anemia    • Angina at rest (CMS/HCC)    • Arthritis    • Atrial fibrillation (CMS/HCC)    • Coronary artery disease    • Disease of thyroid gland    • Elevated cholesterol    • GERD without esophagitis 2019   • History of blood transfusion    • History of colonoscopy 2019   • History of melena 2019   • History of stomach ulcers    •  Hypertension    • Impaired functional mobility, balance, gait, and endurance    • Impaired functional mobility, balance, gait, and endurance    • Osteoporosis    • Positive TB test    • Stroke (CMS/HCC)     No deficits     Past Surgical History:   Procedure Laterality Date   • BRONCHOSCOPY Bilateral 4/12/2019    Procedure: BRONCHOSCOPY;  Surgeon: Jeff Laura MD;  Location:  [facility];  Service: Pulmonary   • BYPASS GRAFT     • COLONOSCOPY N/A 10/15/2019    Procedure: COLONOSCOPY;  Surgeon: Fredi Aaron MD;  Location:  [facility];  Service: Gastroenterology   • CORONARY ANGIOPLASTY WITH STENT PLACEMENT     • CORONARY ARTERY BYPASS GRAFT  10/18/2010   • ENDOSCOPY N/A 10/14/2019    Procedure: ESOPHAGOGASTRODUODENOSCOPY;  Surgeon: Fredi Aaron MD;  Location:  [facility];  Service: Gastroenterology   • HYSTERECTOMY     • INSERTION HEMODIALYSIS CATHETER N/A 2/14/2020    Procedure: HEMODIALYSIS CATHETER INSERTION(DINO);  Surgeon: Familia Paez MD;  Location:  [facility];  Service: General;  Laterality: N/A;   • STOMACH SURGERY  08/11/2019    Upper GI bleed    • STOMACH SURGERY  10/13/2019        SWALLOW EVALUATION (last 72 hours)      [facility] Adult Swallow Evaluation     Row Name 08/01/20 1445 08/01/20 0930                Rehab Evaluation    Document Type  evaluation  -  evaluation  -       Patient Observations  alert;cooperative  -  alert;cooperative  -       Patient/Family Observations  --  Children present  -          General Information    Patient Profile Reviewed  --  yes  -SM       Pertinent History Of Current Problem  --  L MCA CVA, made DNR, palliative consulted  -       Current Method of Nutrition  NPO  -SM  NPO  -SM       Prior Level of Function-Swallowing  --  safe, efficient swallowing in all situations  -       Plans/Goals Discussed with  --  patient and family;agreed upon  -       Barriers to Rehab  --  none identified  -       Patient's Goals for  Discharge  --  patient could not state  -       Family Goals for Discharge  --  patient able to return to PO diet  -          Pain Assessment    Additional Documentation  --  Pain Scale: FACES Pre/Post-Treatment (Group)  -          Pain Scale: FACES Pre/Post-Treatment    Pain: FACES Scale, Pretreatment  0-->no hurt  -SM  0-->no hurt  -SM       Pain: FACES Scale, Post-Treatment  0-->no hurt  -SM  0-->no hurt  -SM          Oral Musculature and Cranial Nerve Assessment    Oral Motor General Assessment  --  unable to assess;other (see comments) 2' aphasia  -          Clinical Swallow Eval    Pharyngeal Phase  --  suspected pharyngeal impairment  -          Pharyngeal Phase Concerns    Pharyngeal Phase Concerns  --  cough  -       Cough  --  thin  -SM       Pharyngeal Phase Concerns, Comment  --  Discussed eval results with pt's children. They know she would not want feeding tube though unsure on wishes for modified PO diet. Wish for FEES to further assess and discuss GOC pending results  -          Fiberoptic Endoscopic Evaluation of Swallowing (FEES)    Risks/Benefits Reviewed  risks/benefits explained;patient  -SM  --       Nasal Entry  right:  -SM  --          Anatomy and Physiology    Velopharyngeal  WFL  -SM  --       Base of Tongue  CNA  -SM  --       Epiglottis  WFL  -SM  --       Laryngeal Function Breathing  symmetrical  -SM  --       Laryngeal Function Phonation  CNA  -SM  --       Laryngeal Function to Breath Hold  CNA  -SM  --       Secretion Rating Scale (Farooq et al. 1996)  0- normal, no visible secretions  -SM  --       Secretion Description  thin;clear  -SM  --       Ice Chips  elicited swallow;not aspirated  -SM  --       Spontaneous Swallow  frequency adequate  -SM  --       Sensory  sensed scope  -  --       Utensils Used  Spoon;Cup;Straw  -SM  --       Consistencies Trialed  thin liquids;nectar-thick liquids;pudding/puree;Regular textures  -  --          FEES Interpretation    Oral  Phase  anterior loss;reduced lingual control;prolonged manipulation;increased A-P transit time;prespill of liquids into pharynx;oral residue present  -SM  --          Initiation of Pharyngeal Swallow    Initiation of Pharyngeal Swallow  bolus in pyriform sinuses  -SM  --       Pharyngeal Phase  impaired pharyngeal phase of swallowing  -SM  --       Aspiration During the Swallow  thin liquids;nectar-thick liquids;secondary to delayed swallow initiation or mistiming;secondary to reduced laryngeal elevation;secondary to reduced vestibular closure;other (see comments)  -SM  --       Response to Aspiration  no response, silent aspiration;inconsistent response;could not clear subglottic material;other (see comments)  -SM  --       Rosenbek's Scale  thin:;nectar:;8-->Level 8  -SM  --       Attempted Compensatory Maneuvers  bolus size;bolus presentation style  -SM  --       Response to Attempted Compensatory Maneuvers  prevented aspiration  -SM  --       Pharyngeal Phase, Comment  Initially only aspiration with large sip thin liquids - cough response and cleared. Fatigue factor and began silent aspiraiton with all size sips of thin and large sips nectar-thick liquids. Could not cue to attempt to clear.   -SM  --          Clinical Impression    SLP Swallowing Diagnosis  moderate;oral dysfunction;pharyngeal dysfunction  -SM  oral dysfunction;suspected pharyngeal dysfunction  -SM       Functional Impact  risk of aspiration/pneumonia  -SM  risk of aspiration/pneumonia  -SM       Rehab Potential/Prognosis, Swallowing  adequate, monitor progress closely  -SM  --       Swallow Criteria for Skilled Therapeutic Interventions Met  demonstrates skilled criteria  -SM  --          Recommendations    Therapy Frequency (Swallow)  5 days per week  -SM  --       Predicted Duration Therapy Intervention (Days)  until discharge  -SM  --       SLP Diet Recommendation  mechanical soft with no mixed consistencies;nectar thick liquids;water  between meals after oral care, with supervision;ice chips between meals after oral care, with supervision  -  NPO;other (see comments) x ice chips  -       Recommended Diagnostics  --  FEES;other (see comments) family wishes to proceed with further assessment  -       Recommended Precautions and Strategies  upright posture during/after eating;small bites of food and sips of liquid;other (see comments) small sip nectar; tsp-size sip thin H2O between meals  -  --       SLP Rec. for Method of Medication Administration  meds whole;meds crushed;with pudding or applesauce;as tolerated  -  meds whole;meds crushed;with pudding or applesauce;as tolerated  -       Monitor for Signs of Aspiration  yes;notify SLP if any concerns  -  --         User Key  (r) = Recorded By, (t) = Taken By, (c) = Cosigned By    Initials Name Effective Dates    Annmarie Canada, MS CCC-SLP 06/22/15 -           EDUCATION  The patient has been educated in the following areas:   Dysphagia (Swallowing Impairment) Modified Diet Instruction.    SLP Recommendation and Plan  SLP Swallowing Diagnosis: moderate, oral dysfunction, pharyngeal dysfunction  SLP Diet Recommendation: mechanical soft with no mixed consistencies, nectar thick liquids, water between meals after oral care, with supervision, ice chips between meals after oral care, with supervision  Recommended Precautions and Strategies: upright posture during/after eating, small bites of food and sips of liquid, other (see comments)(small sip nectar; tsp-size sip thin H2O between meals)  SLP Rec. for Method of Medication Administration: meds whole, meds crushed, with pudding or applesauce, as tolerated     Monitor for Signs of Aspiration: yes, notify SLP if any concerns    Swallow Criteria for Skilled Therapeutic Interventions Met: demonstrates skilled criteria  Anticipated Dischage Disposition (SLP): unknown, anticipate therapy at next level of care  Rehab Potential/Prognosis,  Swallowing: adequate, monitor progress closely  Therapy Frequency (Swallow): 5 days per week  Predicted Duration Therapy Intervention (Days): until discharge       Plan of Care Reviewed With: patient    SLP GOALS     Row Name 08/01/20 1445 08/01/20 0930          Oral Nutrition/Hydration Goal 1 (SLP)    Oral Nutrition/Hydration Goal 1, SLP  LTG: Tolerate soft diet and thin liquids without s/s aspiration with 100% accuracy and cues  -SM  --     Time Frame (Oral Nutrition/Hydration Goal 1, SLP)  by discharge  -SM  --        Oral Nutrition/Hydration Goal 2 (SLP)    Oral Nutrition/Hydration Goal 2, SLP  Tolerate H2O without s/s aspiration with 100% accuracy and cues for needed precautions  -SM  --     Time Frame (Oral Nutrition/Hydration Goal 2, SLP)  short term goal (STG)  -SM  --        Lingual Strengthening Goal 1 (SLP)    Activity (Lingual Strengthening Goal 1, SLP)  increase lingual tone/sensation/control/coordination/movement;increase tongue back strength  -SM  --     Increase Lingual Tone/Sensation/Control/Coordination/Movement  lingual movement exercises;swallow trials;lingual resistance exercises  -SM  --     Increase Tongue Back Strength  lingual movement exercises;lingual resistance exercises  -SM  --     [place]/Accuracy (Lingual Strengthening Goal 1, SLP)  with moderate cues (50-74% accuracy)  -SM  --     Time Frame (Lingual Strengthening Goal 1, SLP)  short term goal (STG)  -SM  --        Pharyngeal Strengthening Exercise Goal 1 (SLP)    Activity (Pharyngeal Strengthening Goal 1, SLP)  increase timing;increase superior movement of the hyolaryngeal complex;increase closure at entrance to airway/closure of airway at glottis  -SM  --     Increase Timing  prepping - 3 second prep or suck swallow or 3-step swallow  -SM  --     Increase Superior Movement of the Hyolaryngeal Complex  falsetto  -SM  --     Increase Closure at Entrance to Airway/Closure of Airway at Glottis  breath hold exercises;hard effortful  swallow  -SM  --     [place]/Accuracy (Pharyngeal Strengthening Goal 1, SLP)  with moderate cues (50-74% accuracy)  -SM  --     Time Frame (Pharyngeal Strengthening Goal 1, SLP)  short term goal (STG)  -SM  --        Swallow Management Recall Goal 1 (SLP)    Activity (Swallow Management Recall Goal 1, SLP)  safe diet/liquid level;compensatory swallow strategies/techniques  -SM  --     [place]/Accuracy (Swallow Management Recall Goal 1, SLP)  with minimal cues (75-90% accuracy)  -SM  --     Time Frame (Swallow Management Recall Goal 1, SLP)  short term goal (STG)  -SM  --        Swallow Compensatory Strategies Goal 1 (SLP)    Activity (Swallow Compensatory Strategies/Techniques Goal 1, SLP)  compensatory strategies;during meal intake;during p.o. trials;small bites;small cup sips;small straw sips  -SM  --     [place]/Accuracy (Swallow Compensatory Strategies/Techniques Goal 1, SLP)  with minimal cues (75-90% accuracy)  -SM  --     Time Frame (Swallow Compensatory Strategies/Techniques Goal 1, SLP)  short term goal (STG)  -SM  --        Comprehend Questions Goal 1 (SLP)    Improve Ability to Comprehend Questions Goal 1 (SLP)  --  simple yes/no questions;70%;with maximum cues (25-49%)  -     Time Frame (Comprehend Questions Goal 1, SLP)  --  short term goal (STG)  -SM        Follow Directions Goal 2 (SLP)    Improve Ability to Follow Directions Goal 1 (SLP)  --  1 step direction without objects;70%;with minimal cues (75-90%)  -SM     Time Frame (Follow Directions Goal 1, SLP)  --  short term goal (STG)  -SM        Word Retrieval Skills Goal 1 (SLP)    Improve Word Retrieval Skills By Goal 1 (SLP)  --  completing automatic speech task, counting;repeating sounds;repeating words;completing open ended structured sentence;answer WH question with one word;50%;with maximum cues (25-49%)  -SM     Time Frame (Word Retrieval Goal 1, SLP)  --  short term goal (STG)  -SM        Motor Planning Goal 1 (SLP)     Improve Motor Planning to Reduce Apraxia by Goal 1 (SLP)  --  imitating mouth postures;imitating vowels;following isolated oral commands;50%;with maximum cues (25-49%)  -     Time Frame (Motor Planning Goal 1, SLP)  --  short term goal (STG)  -       User Key  (r) = Recorded By, (t) = Taken By, (c) = Cosigned By    Initials Name Provider Type    Annmarie Canada MS CCC-SLP Speech and Language Pathologist             Time Calculation:   Time Calculation- SLP     Row Name 08/01/20 1555 08/01/20 1306          Time Calculation- SLP    SLP Start Time  1445  -SM  0950  -     SLP Received On  08/01/20  -  08/01/20  -       User Key  (r) = Recorded By, (t) = Taken By, (c) = Cosigned By    Initials Name Provider Type    Annmarie Canada MS CCC-SLP Speech and Language Pathologist          Therapy Charges for Today     Code Description Service Date Service Provider Modifiers Qty    52727148904 HC ST EVAL ORAL PHARYNG SWALLOW 3 8/1/2020 Annmarie Noonan MS CCC-SLP GN 1    82127718553 HC ST EVAL SPEECH AND PROD W LANG  2 8/1/2020 Annmarie Noonan MS CCC-SLP GN 1    [id number] HC ST FIBEROPTIC ENDO EVAL SWALL 5 8/1/2020 Annmarie Noonan MS CCC-SLP GN 1        Patient was not wearing a face mask and did exhibit coughing during this therapy encounter.  Procedure performed was aerosolizing, involved close contact (within 6 feet for at least 15 minutes or longer), and did not involve contact with infectious secretions or specimens.  Therapist used appropriate personal protective equipment including gloves, standard procedure mask, eye protection, gown and N95 mask.  Appropriate PPE was worn during the entire therapy session.  Hand hygiene was completed before and after therapy session.          MS NATALY Duckworth  8/1/2020

## 2020-08-01 NOTE — ED PROVIDER NOTES
Subjective   80-year-old female brought in by [facility] for evaluation of altered mental status and right hemiplegia.  Reported the patient was noted to be somnolent and had decreased level of responsiveness when  was talking to her at approximately 5 PM today.  Per the son's report the  of the patient had to assist her along with assistance of a walker from the kitchen to the chair in the living room.  She did not verbally respond to the  at that given time, which was different than her baseline.   The patient reportedly laid down for a nap and an approximate 8 PM was noted be very difficult to arouse, not verbally interactive, and the  ultimately called the son who contacted [facility].  Supposedly the last known well time was at 3:30 PM, per the son when the patient actually ambulated to the kitchen with her walker and was talkative and appropriately interactive at that time.  The patient has a history of TIA in the past.  Has a history of underlying A. fib.  And also is dialysis dependent.  Does not take any anticoagulants per medicine EMS report.  The son verifies that the patient does not currently take any anticoagulants.  Was taking Eliquis for A. fib up until February of this year.  Was discontinued off of Eliquis because she became dialysis dependent.  There is no report of recent infectious symptoms including no fever, body aches, or chills.  The patient was afebrile for [facility].  No reported recent respiratory symptoms including no cough, shortness of breath, sore throat, rhinorrhea, change in sense of taste or smell.  No reported nausea or vomiting.  No other reported aggravating, alleviating, or associated symptoms.  [facility] provided all the current information as the patient is not verbally responsive.          Review of Systems   Unable to perform ROS: Acuity of condition   Constitutional: Negative for chills and fever.        Somnolent   HENT:  Negative for congestion and sore throat.    Respiratory: Negative for cough and shortness of breath.    Cardiovascular: Negative for chest pain.   Gastrointestinal: Negative for abdominal pain, nausea and vomiting.   Skin: Negative for rash and wound.   Allergic/Immunologic: Negative for environmental allergies and immunocompromised state.   Neurological: Positive for speech difficulty and weakness (right upper and lower extremity weakness).   Hematological: Negative for adenopathy.   Psychiatric/Behavioral: Positive for confusion. The patient is not nervous/anxious.    All other systems reviewed and are negative.      Past Medical History:   Diagnosis Date   • Acute on chronic diastolic congestive heart failure (CMS/HCC) 12/20/2019   • Anemia    • Angina at rest (CMS/HCC)    • Arthritis    • Atrial fibrillation (CMS/HCC)    • Coronary artery disease    • Disease of thyroid gland    • Elevated cholesterol    • GERD without esophagitis 12/2/2019   • History of blood transfusion    • History of colonoscopy 11/19/2019   • History of melena 11/19/2019   • History of stomach ulcers    • Hypertension    • Impaired functional mobility, balance, gait, and endurance    • Impaired functional mobility, balance, gait, and endurance    • Osteoporosis    • Positive TB test    • Stroke (CMS/[facility])     No deficits       Allergies   Allergen Reactions   • Tuberculin Tests Rash       Past Surgical History:   Procedure Laterality Date   • BRONCHOSCOPY Bilateral 4/12/2019    Procedure: BRONCHOSCOPY;  Surgeon: Jeff Laura MD;  Location:  [facility];  Service: Pulmonary   • BYPASS GRAFT     • COLONOSCOPY N/A 10/15/2019    Procedure: COLONOSCOPY;  Surgeon: Fredi Aaron MD;  Location:  [facility];  Service: Gastroenterology   • CORONARY ANGIOPLASTY WITH STENT PLACEMENT     • CORONARY ARTERY BYPASS GRAFT  10/18/2010   • ENDOSCOPY N/A 10/14/2019    Procedure: ESOPHAGOGASTRODUODENOSCOPY;  Surgeon: Fredi Aaron  MD;  Location[facility];  Service: Gastroenterology   • HYSTERECTOMY     • INSERTION HEMODIALYSIS CATHETER N/A 2/14/2020    Procedure: HEMODIALYSIS CATHETER INSERTION(DINO);  Surgeon: Familia Paez MD;  Location: [facility] OR;  Service: General;  Laterality: N/A;   • STOMACH SURGERY  08/11/2019    Upper GI bleed    • STOMACH SURGERY  10/13/2019       Family History   Problem Relation Age of Onset   • Cancer Mother    • Arthritis Mother    • No Known Problems Father    • Liver disease Sister        Social History     Socioeconomic History   • Marital status:      Spouse name: Not on file   • Number of children: Not on file   • Years of education: Not on file   • Highest education level: Not on file   Tobacco Use   • Smoking status: Never Smoker   • Smokeless tobacco: Never Used   Substance and Sexual Activity   • Alcohol use: No   • Drug use: No   • Sexual activity: Not Currently           Objective   Physical Exam   Constitutional: She is oriented to person, place, and time. She appears well-developed and well-nourished.  Non-toxic appearance. No distress.   HENT:   Head: Normocephalic and atraumatic.   Right Ear: External ear normal.   Left Ear: External ear normal.   Nose: Nose normal.   Eyes: Pupils are equal, round, and reactive to light. EOM and lids are normal. Right eye exhibits no discharge and no exudate. No foreign body present in the right eye. Left eye exhibits no discharge and no exudate. No foreign body present in the left eye.       Neck: Normal range of motion. Neck supple. No tracheal deviation present.   Cardiovascular: Normal rate, regular rhythm and normal heart sounds. Exam reveals no gallop, no friction rub and no decreased pulses.   No murmur heard.  Pulmonary/Chest: Effort normal and breath sounds normal. No respiratory distress. She has no decreased breath sounds. She has no wheezes. She has no rhonchi. She has no rales.   Abdominal: Soft. Normal appearance and bowel  sounds are normal. There is no tenderness. There is no rebound and no guarding.   Musculoskeletal: Normal range of motion. She exhibits no deformity.   Lymphadenopathy:     She has no cervical adenopathy.   Neurological: She is alert and oriented to person, place, and time. She has normal strength. No cranial nerve deficit or sensory deficit. GCS eye subscore is 4. GCS verbal subscore is 1. GCS motor subscore is 6.   GCS of 11.      Somnolent.    Right upper extremity weakness and drift, right lower extremity weakness and drift.  Right gaze deficit.  aphasic.   Skin: Skin is warm and dry. No rash noted. She is not diaphoretic.   Psychiatric: She has a normal mood and affect. Her speech is normal and behavior is normal. Judgment and thought content normal. Cognition and memory are normal.   Nursing note and vitals reviewed.      Critical Care  Performed by: Efren Bragg MD  Authorized by: Efren Bragg MD     Critical care provider statement:     Critical care time (minutes):  45    Critical care time was exclusive of:  Separately billable procedures and treating other patients    Critical care was necessary to treat or prevent imminent or life-threatening deterioration of the following conditions:  CNS failure or compromise    Critical care was time spent personally by me on the following activities:  Blood draw for specimens, development of treatment plan with patient or surrogate, discussions with consultants, evaluation of patient's response to treatment, examination of patient, ordering and review of laboratory studies, ordering and performing treatments and interventions, ordering and review of radiographic studies, pulse oximetry, re-evaluation of patient's condition and review of old charts               ED Course  ED Course as of Jul 31 2314 Fri Jul 31, 2020 2130 The patient was evaluated in the CT scan and considered not a TPA candidate based off last known well time of 1630.    [NS]    2130 CT scan head without does not show an acute intracranial hemorrhage.  CT perfusion, and CT angiogram of the head and neck will be pursued.    [NS]   2213 Stroke navigator informed me that she had discussed the case with Dr. Tyler who reports that he could take the patient to the Cath Lab to retrieve a clot but the patient most likely would never speak again and would remained nursing home for the rest of her life.  I have previously discussed this possibility with the son he and expressed that he desired for everything to be done.  He at that time was made aware that the patient was not a TPA candidate and he understood.  I have provided the stroke navigator with the son's phone number to discuss the issue in further detail.    [NS]   2301 The stroke navigator has discussed the patient situation with Dr. Tyler, and the son and daughter the patient.  The son and daughter are POA's.  Per her report they do not desire to pursue stroke intervention.  Per the stroke navigator's report the patient also will be made DNR.    [NS]      ED Course User Index  [NS] Efren Bragg MD                                           MDM  Number of Diagnoses or Management Options  Acute ischemic left MCA stroke (CMS/HCC): new and requires workup  Diagnosis management comments: CT imaging of the brain supports a near completed left MCA infarct.  No acute intracranial hemorrhage.  Lab evaluation shows known chronic dialysis unit renal insufficiency.  Otherwise no significant contributing findings.    I have discussed the patient with the son, and the stroke interventional team.    The stroke interventional team will not pursue with intervention.  They have informed both the son and daughter of this.  They also report that the son and daughter desire to make the patient DNR.    I have likewise discussed with the stroke interventional team and with the son the patient is not a TPA candidate, they are in agreement and the son  expresses understanding.    I discussed the patient with the hospitalist, Dr. Jay, who will admit.       Amount and/or Complexity of Data Reviewed  Clinical lab tests: ordered and reviewed  Tests in the radiology section of CPT®: ordered and reviewed  Decide to obtain previous medical records or to obtain history from someone other than the patient: yes  Obtain history from someone other than the patient: yes  Review and summarize past medical records: yes  Discuss the patient with other providers: yes  Independent visualization of images, tracings, or specimens: yes    Risk of Complications, Morbidity, and/or Mortality  Presenting problems: high  Diagnostic procedures: high  Management options: high    Critical Care  Total time providing critical care: 30-74 minutes    Patient Progress  Patient progress: stable      Final diagnoses:   Acute ischemic left MCA stroke (CMS/[facility])            Efren Bragg MD  07/31/20 7111

## 2020-08-01 NOTE — PLAN OF CARE
Problem: Patient Care Overview  Goal: Plan of Care Review  Flowsheets (Taken 8/1/2020 1313)  Plan of Care Reviewed With: patient;daughter;son  Outcome Summary: PT eval completed. Pt presents with generalized weakness, R-side weaker than L. Pt req modA for supine > sit, bed > chair with Ashlyn, and sit <> stand with Ashlyn. Pt ambulated to the door and back with RW with modA, fatiguing very easily by end of distance. Would recommend following behind with chair next time. VSS throughout. Pt followed all commands appropriately, dem expressive aphasia and unable to nod head yes or no. PT recommends d/c to rehab.

## 2020-08-01 NOTE — THERAPY EVALUATION
Patient Name: Pat Morel  : 1940    MRN: [id number]                              Today's Date: 2020       Admit Date: 2020    Visit Dx:     ICD-10-CM ICD-9-CM   1. Acute ischemic left MCA stroke (CMS/HCC) I63.512 434.91   2. Dysphagia, unspecified type R13.10 787.20   3. Aphasia R47.01 784.3   4. Impaired functional mobility, balance, gait, and endurance Z74.09 V49.89     Patient Active Problem List   Diagnosis   • Persistent atrial fibrillation (CMS/HCC)   • Coronary artery disease involving native coronary artery of native heart without angina pectoris   • Essential hypertension   • Cerebrovascular accident (CVA) due to embolism (CMS/[facility])   • Dyspnea on exertion   • Pulmonary arterial hypertension (CMS/HCC)   • Interstitial lung disease (CMS/HCC)   • Hypersensitivity pneumonitis (CMS/[facility])   • Coagulation disorder due to circulating anticoagulants (CMS/[facility])   • Dyslipidemia   • Acquired hypothyroidism   • CKD (chronic kidney disease) requiring chronic dialysis (CMS/[facility])   • GERD without esophagitis   • Iron deficiency anemia   • Chronic diastolic heart failure (CMS/[facility])   • Acute renal failure (CMS/[facility])   • Recurrent falls   • Normocytic anemia   • Coagulopathy (CMS/[facility])   • Physical deconditioning   • CKD (chronic kidney disease) stage 4, GFR 15-29 ml/min (CMS/[facility])   • Acute ischemic left MCA stroke (CMS/HCC)     Past Medical History:   Diagnosis Date   • Acute on chronic diastolic congestive heart failure (CMS/[facility]) 2019   • Anemia    • Angina at rest (CMS/[facility])    • Arthritis    • Atrial fibrillation (CMS/[facility])    • Coronary artery disease    • Disease of thyroid gland    • Elevated cholesterol    • GERD without esophagitis 2019   • History of blood transfusion    • History of colonoscopy 2019   • History of melena 2019   • History of stomach ulcers    • Hypertension    • Impaired functional mobility, balance, gait, and endurance    • Impaired functional mobility,  balance, gait, and endurance    • Osteoporosis    • Positive TB test    • Stroke (CMS/HCC)     No deficits     Past Surgical History:   Procedure Laterality Date   • BRONCHOSCOPY Bilateral 4/12/2019    Procedure: BRONCHOSCOPY;  Surgeon: Jeff Laura MD;  Location:  [facility];  Service: Pulmonary   • BYPASS GRAFT     • COLONOSCOPY N/A 10/15/2019    Procedure: COLONOSCOPY;  Surgeon: Fredi Aaron MD;  Location:  [facility];  Service: Gastroenterology   • CORONARY ANGIOPLASTY WITH STENT PLACEMENT     • CORONARY ARTERY BYPASS GRAFT  10/18/2010   • ENDOSCOPY N/A 10/14/2019    Procedure: ESOPHAGOGASTRODUODENOSCOPY;  Surgeon: Fredi Aaron MD;  Location:  [facility];  Service: Gastroenterology   • HYSTERECTOMY     • INSERTION HEMODIALYSIS CATHETER N/A 2/14/2020    Procedure: HEMODIALYSIS CATHETER INSERTION(DINO);  Surgeon: Familia Paez MD;  Location:  [facility];  Service: General;  Laterality: N/A;   • STOMACH SURGERY  08/11/2019    Upper GI bleed    • STOMACH SURGERY  10/13/2019     General Information     [facility] Name 08/01/20 1044          PT Evaluation Time/Intention    Document Type  evaluation  -     Mode of Treatment  individual therapy  -[facility] Name 08/01/20 1044          General Information    Patient Profile Reviewed?  yes  -     Prior Level of Function  independent:;all household mobility;gait;transfer;bed mobility;ADL's;cooking  -     Existing Precautions/Restrictions  fall;oxygen therapy device and L/min  -     Barriers to Rehab  cognitive status  -     Row Name 08/01/20 1044          Relationship/Environment    Lives With  spouse  -[facility] Name 08/01/20 1044          Resource/Environmental Concerns    Current Living Arrangements  home/apartment/condo  -     Row Name 08/01/20 1044          Home Main Entrance    Number of Stairs, Main Entrance  none  -     Row Name 08/01/20 1044          Stairs Within Home, Primary    Number of Stairs, Within Home,  Primary  none  -     Row Name 08/01/20 1044          Cognitive Assessment/Intervention- PT/OT    Orientation Status (Cognition)  unable/difficult to assess  -     Cognitive Assessment/Intervention Comment  expressive aphasia  -     Row Name 08/01/20 1044          Safety Issues, Functional Mobility    Safety Issues Affecting Function (Mobility)  awareness of need for assistance;insight into deficits/self awareness;sequencing abilities  -     Impairments Affecting Function (Mobility)  balance;motor control  -       User Key  (r) = Recorded By, (t) = Taken By, (c) = Cosigned By    Initials Name Provider Type    Tiffanie Chavez, PT Physical Therapist        Mobility     Row Name 08/01/20 1311          Bed Mobility Assessment/Treatment    Bed Mobility Assessment/Treatment  supine-sit  -     Supine-Sit [place] (Bed Mobility)  moderate assist (50% patient effort);1 person assist  -     Assistive Device (Bed Mobility)  bed rails;draw sheet;head of bed elevated  -     Comment (Bed Mobility)  pt able to initiate, but needed help with trunk  -     Row Name 08/01/20 1311          Transfer Assessment/Treatment    Comment (Transfers)  Pt needed cues for sequencing. SPT from bed > recliner, then sit <> stand for gait training  -     Row Name 08/01/20 1311          Bed-Chair Transfer    Bed-Chair [place] (Transfers)  minimum assist (75% patient effort);1 person assist  -[facility] Name 08/01/20 1311          Sit-Stand Transfer    Sit-Stand [place] (Transfers)  minimum assist (75% patient effort);1 person assist  -     Assistive Device (Sit-Stand Transfers)  walker, front-wheeled  -SJ     Row Name 08/01/20 1311          Gait/Stairs Assessment/Training    Gait/Stairs Assessment/Training  gait/ambulation independence  -     [place] Level (Gait)  moderate assist (50% patient effort);1 person assist  -     Assistive Device (Gait)  walker, front-wheeled  -     Distance in Feet (Gait)   20  -SJ     Pattern (Gait)  step-through;step-to  -SJ     Deviations/Abnormal Patterns (Gait)  bilateral deviations;gait speed decreased  -SJ     Bilateral Gait Deviations  knee buckling, bilateral  -SJ     Comment (Gait/Stairs)  Pt fatigues easily; would recommend following behind with chair next time  -SJ       User Key  (r) = Recorded By, (t) = Taken By, (c) = Cosigned By    Initials Name Provider Type    SJ Tiffanie Martin PT Physical Therapist        Obj/Interventions     Row Name 08/01/20 1313          General ROM    GENERAL ROM COMMENTS  BLE's WFL  -SJ     Row Name 08/01/20 1313          MMT (Manual Muscle Testing)    General MMT Comments  LLE 4+/5, RLE 4-/5  -SJ     Row Name 08/01/20 1313          Therapeutic Exercise    Lower Extremity (Therapeutic Exercise)  SLR (straight leg raise), bilateral  -SJ     Lower Extremity Range of Motion (Therapeutic Exercise)  hip flexion/extension, bilateral;hip abduction/adduction, bilateral;knee flexion/extension, bilateral;ankle dorsiflexion/plantar flexion, bilateral  -SJ     Exercise Type (Therapeutic Exercise)  AAROM (active assistive range of motion)  -SJ     Sets/Reps (Therapeutic Exercise)  10  -SJ     Expected Outcome (Therapeutic Exercise)  facilitate normal movement patterns  -SJ     Row Name 08/01/20 1313          Static Sitting Balance    Level of [place] (Unsupported Sitting, Static Balance)  supervision  -SJ     Sitting Position (Unsupported Sitting, Static Balance)  sitting on edge of bed  -SJ     Row Name 08/01/20 1313          Static Standing Balance    Level of [place] (Supported Standing, Static Balance)  minimal assist, 75% patient effort  -SJ     Assistive Device Utilized (Supported Standing, Static Balance)  walker, rolling  -SJ       User Key  (r) = Recorded By, (t) = Taken By, (c) = Cosigned By    Initials Name Provider Type    Tiffanie Chavez PT Physical Therapist        Goals/Plan     Row Name 08/01/20 1317          Bed Mobility  Goal 1 (PT)    Activity/Assistive Device (Bed Mobility Goal 1, PT)  sit to supine;supine to sit;rolling to left;rolling to right  -SJ     [place] Level/Cues Needed (Bed Mobility Goal 1, PT)  conditional independence  -SJ     Time Frame (Bed Mobility Goal 1, PT)  long term goal (LTG);2 weeks  -SJ     Row Name 08/01/20 1317          Transfer Goal 1 (PT)    Activity/Assistive Device (Transfer Goal 1, PT)  sit-to-stand/stand-to-sit;bed-to-chair/chair-to-bed  -SJ     [place] Level/Cues Needed (Transfer Goal 1, PT)  conditional independence  -SJ     Time Frame (Transfer Goal 1, PT)  long term goal (LTG);2 weeks  -SJ     Row Name 08/01/20 1317          Gait Training Goal 1 (PT)    Activity/Assistive Device (Gait Training Goal 1, PT)  walker, rolling  -SJ     [place] Level (Gait Training Goal 1, PT)  contact guard assist  -SJ     Distance (Gait Goal 1, PT)  200  -SJ     Time Frame (Gait Training Goal 1, PT)  long term goal (LTG);2 weeks  -SJ     Row Name 08/01/20 1317          Patient Education Goal (PT)    Activity (Patient Education Goal, PT)  HEP  -SJ     [place]/Cues/Accuracy (Memory Goal 2, PT)  demonstrates adequately  -SJ     Time Frame (Patient Education Goal, PT)  long term goal (LTG);2 weeks  -SJ       User Key  (r) = Recorded By, (t) = Taken By, (c) = Cosigned By    Initials Name Provider Type    Tiffanie Chavez, PT Physical Therapist        Clinical Impression     Row Name 08/01/20 1313          Pain Assessment    Additional Documentation  Pain Scale: FACES Pre/Post-Treatment (Group)  -SJ     Row Name 08/01/20 1313          Pain Scale: FACES Pre/Post-Treatment    Pain: FACES Scale, Pretreatment  0-->no hurt  -SJ     Pain: FACES Scale, Post-Treatment  0-->no hurt  -SJ     Row Name 08/01/20 1313          Plan of Care Review    Plan of Care Reviewed With  patient;daughter;son  -     Outcome Summary  PT eval completed. Pt presents with generalized weakness, R-side weaker than L. Pt req modA  for supine > sit, bed > chair with Ashlyn, and sit <> stand with Ashlyn. Pt ambulated to the door and back with RW with modA, fatiguing very easily by end of distance. Would recommend following behind with chair next time. VSS throughout. Pt followed all commands appropriately, dem expressive aphasia and unable to nod head yes or no. PT recommends d/c to rehab.  -     Row Name 08/01/20 1313          Physical Therapy Clinical Impression    Patient/Family Goals Statement (PT Clinical Impression)  family deciding on GOC  -     Criteria for Skilled Interventions Met (PT Clinical Impression)  yes;treatment indicated  -     Rehab Potential (PT Clinical Summary)  good, to achieve stated therapy goals  -SJ     Predicted Duration of Therapy (PT)  2wks  -     Row Name 08/01/20 1313          Vital Signs    Pre Systolic BP Rehab  128  -SJ     Pre Treatment Diastolic BP  72  -SJ     Pretreatment Heart Rate (beats/min)  98  -SJ     Intratreatment Heart Rate (beats/min)  148 a-fib  -SJ     Posttreatment Heart Rate (beats/min)  72  -SJ     Pre SpO2 (%)  94  -SJ     O2 Delivery Pre Treatment  supplemental O2  -SJ     Post SpO2 (%)  100  -SJ     O2 Delivery Post Treatment  supplemental O2  -SJ     Row Name 08/01/20 1313          Positioning and Restraints    Pre-Treatment Position  in bed  -SJ     Post Treatment Position  chair  -SJ     In Chair  reclined;call light within reach;encouraged to call for assist;exit alarm on;with family/caregiver;waffle cushion;heels elevated  -SJ       User Key  (r) = Recorded By, (t) = Taken By, (c) = Cosigned By    Initials Name Provider Type    Tiffanie Chavez, PT Physical Therapist        Outcome Measures     Row Name 08/01/20 1318          How much help from another person do you currently need...    Turning from your back to your side while in flat bed without using bedrails?  3  -SJ     Moving from lying on back to sitting on the side of a flat bed without bedrails?  3  -SJ     Moving to  and from a bed to a chair (including a wheelchair)?  3  -SJ     Standing up from a chair using your arms (e.g., wheelchair, bedside chair)?  3  -SJ     Climbing 3-5 steps with a railing?  2  -SJ     To walk in hospital room?  2  -SJ     AM-PAC 6 Clicks Score (PT)  16  -     Row Name 08/01/20 1318          Modified Gail Scale    Pre-Stroke Modified [place]ail Scale  0 - No Symptoms at all.  -     Modified [place] Scale  4 - Moderately severe disability.  Unable to walk without assistance, and unable to attend to own bodily needs without assistance.  -     Row Name 08/01/20 1318          Functional Assessment    Outcome Measure Options  AM-PAC 6 Clicks Basic Mobility (PT);Modified [place]  -       User Key  (r) = Recorded By, (t) = Taken By, (c) = Cosigned By    Initials Name Provider Type    Tiffanie Chavez PT Physical Therapist        Physical Therapy Education                 Title: PT OT SLP Therapies (Done)     Topic: Physical Therapy (Done)     Point: Mobility training (Done)     Description:   Instruct learner(s) on safety and technique for assisting patient out of bed, chair or wheelchair.  Instruct in the proper use of assistive devices, such as walker, crutches, cane or brace.              Patient Friendly Description:   It's important to get you on your feet again, but we need to do so in a way that is safe for you. Falling has serious consequences, and your personal safety is the most important thing of all.        When it's time to get out of bed, one of us or a family member will sit next to you on the bed to give you support.     If your doctor or nurse tells you to use a walker, crutches, a cane, or a brace, be sure you use it every time you get out of bed, even if you think you don't need it.    Learning Progress Summary           Patient Acceptance, E, DENILSON,NR by LANA at 8/1/2020 1319   Family Acceptance, JUAN JOSÉ, DENILSON,NR by LANA at 8/1/2020 1319                   Point: Home exercise program (Done)      Description:   Instruct learner(s) on appropriate technique for monitoring, assisting and/or progressing patient with therapeutic exercises and activities.              Learning Progress Summary           Patient Acceptance, E, DU,NR by  at 8/1/2020 1319   Family Acceptance, E, DU,NR by  at 8/1/2020 1319                   Point: Body mechanics (Done)     Description:   Instruct learner(s) on proper positioning and spine alignment for patient and/or caregiver during mobility tasks and/or exercises.              Learning Progress Summary           Patient Acceptance, E, DU,NR by  at 8/1/2020 1319   Family Acceptance, E, DU,NR by  at 8/1/2020 1319                   Point: Precautions (Done)     Description:   Instruct learner(s) on prescribed precautions during mobility and gait tasks              Learning Progress Summary           Patient Acceptance, E, DU,NR by  at 8/1/2020 1319   Family Acceptance, E, DU,NR by  at 8/1/2020 1319                               User Key     Initials Effective Dates Name Provider Type Discipline     06/19/15 -  Tiffanie Martin, PT Physical Therapist PT              PT Recommendation and Plan  Planned Therapy Interventions (PT Eval): balance training, bed mobility training, gait training, home exercise program, patient/family education, neuromuscular re-education, postural re-education, ROM (range of motion), strengthening, transfer training  Outcome Summary/Treatment Plan (PT)  Anticipated Equipment Needs at Discharge (PT): front wheeled walker  Anticipated Discharge Disposition (PT): skilled nursing facility  Plan of Care Reviewed With: patient, daughter, son  Outcome Summary: PT eval completed. Pt presents with generalized weakness, R-side weaker than L. Pt req modA for supine > sit, bed > chair with Ashlyn, and sit <> stand with Ashlyn. Pt ambulated to the door and back with RW with modA, fatiguing very easily by end of distance. Would recommend following behind with chair  next time. VSS throughout. Pt followed all commands appropriately, dem expressive aphasia and unable to nod head yes or no. PT recommends d/c to rehab.     Time Calculation:   PT Charges     Row Name 08/01/20 1320             Time Calculation    Start Time  1044  -SJ      PT Received On  08/01/20  -      PT Goal Re-Cert Due Date  08/11/20  -        User Key  (r) = Recorded By, (t) = Taken By, (c) = Cosigned By    Initials Name Provider Type     Tiffanie Martin PT Physical Therapist        Therapy Charges for Today     Code Description Service Date Service Provider Modifiers Qty    [id number] HC PT EVAL MOD COMPLEXITY 4 8/1/2020 Tiffanie Martin, PT GP 1          PT G-Codes  Outcome Measure Options: AM-PAC 6 Clicks Basic Mobility (PT), Modified [place]  AM-PAC 6 Clicks Score (PT): 16  Modified Gail Scale: 4 - Moderately severe disability.  Unable to walk without assistance, and unable to attend to own bodily needs without assistance.    Tiffanie Martin PT  8/1/2020

## 2020-08-01 NOTE — CONSULTS
Inpatient Neurology Consult Stroke  Consult performed by: Jabier Mathis MD  Consult ordered by: Sohail Jay DO  Reason for consult: stroke sx          Patient Care Team:  Anthony Sweet DO as PCP - General (Family Medicine)  Tien Archer MD as Consulting Physician (Cardiac Electrophysiology)  Xu Block MD (Cardiology)  Yomi Higgins MD as Consulting Physician (Cardiology)  Xochitl Kennedy, [place] as Ambulatory  ([facility])    Chief complaint: right hemiplegia and AMS    Subjective     History of Present Illness    80 y.o. female referred by Dr Jay for stroke sx.  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  LKW 1530.  Pt noted to be somnolent at 1700 and had to have assistance to walk.  Took a nap at 1800. 2000  difficult to arouse and non verbal.  Stopped Eliquis in February.  Not tPA candidate outside window.     Family made pt DNR.      This am non verbal, able to follow one step commands, and moving right side.      My review of films:    HCT - right frontal infarct   CTA H & N - L M1 stenosis/occlusion, 45% L carotid, 10% R carotid  CTP - core infarct LMCA with reveresible ischemia    Review of Systems   Unable to perform ROS: Mental status change        Past Medical History:   Diagnosis Date   • Acute on chronic diastolic congestive heart failure (CMS/HCC) 12/20/2019   • Anemia    • Angina at rest (CMS/HCC)    • Arthritis    • Atrial fibrillation (CMS/HCC)    • Coronary artery disease    • Disease of thyroid gland    • Elevated cholesterol    • GERD without esophagitis 12/2/2019   • History of blood transfusion    • History of colonoscopy 11/19/2019   • History of melena 11/19/2019   • History of stomach ulcers    • Hypertension    • Impaired functional mobility, balance, gait, and endurance    • Impaired functional mobility, balance, gait, and endurance    • Osteoporosis    •  Positive TB test    • Stroke (CMS/HCC)     No deficits   ,   Past Surgical History:   Procedure Laterality Date   • BRONCHOSCOPY Bilateral 4/12/2019    Procedure: BRONCHOSCOPY;  Surgeon: Jeff Laura MD;  Location:  [facility];  Service: Pulmonary   • BYPASS GRAFT     • COLONOSCOPY N/A 10/15/2019    Procedure: COLONOSCOPY;  Surgeon: Fredi Aaron MD;  Location:  [facility];  Service: Gastroenterology   • CORONARY ANGIOPLASTY WITH STENT PLACEMENT     • CORONARY ARTERY BYPASS GRAFT  10/18/2010   • ENDOSCOPY N/A 10/14/2019    Procedure: ESOPHAGOGASTRODUODENOSCOPY;  Surgeon: Fredi Aaron MD;  Location:  [facility];  Service: Gastroenterology   • HYSTERECTOMY     • INSERTION HEMODIALYSIS CATHETER N/A 2/14/2020    Procedure: HEMODIALYSIS CATHETER INSERTION(DINO);  Surgeon: Familia Paez MD;  Location:  [facility];  Service: General;  Laterality: N/A;   • STOMACH SURGERY  08/11/2019    Upper GI bleed    • STOMACH SURGERY  10/13/2019   ,   Family History   Problem Relation Age of Onset   • Cancer Mother    • Arthritis Mother    • No Known Problems Father    • Liver disease Sister    ,   Social History     Tobacco Use   • Smoking status: Never Smoker   • Smokeless tobacco: Never Used   Substance Use Topics   • Alcohol use: No   • Drug use: No   ,   Medications Prior to Admission   Medication Sig Dispense Refill Last Dose   • aspirin 81 MG EC tablet Take 1 tablet by mouth Daily.   Taking   • atorvastatin (LIPITOR) 40 MG tablet Take 1 tablet by mouth Every Night. 90 tablet 3 Taking   • budesonide (PULMICORT) 0.5 MG/2ML nebulizer solution    Taking   • dilTIAZem CD (CARDIZEM CD) 120 MG 24 hr capsule Take 1 capsule by mouth Daily. 90 capsule 2    • levothyroxine (SYNTHROID, LEVOTHROID) 75 MCG tablet Take 1 tablet by mouth Daily. 90 tablet 2 Taking   • pantoprazole (PROTONIX) 40 MG EC tablet Take 1 tablet by mouth Daily. 90 tablet 3 Taking   • predniSONE (DELTASONE) 5 MG tablet Take 1  tablet by mouth Daily. 90 tablet 1    • ranolazine (RANEXA) 500 MG 12 hr tablet Take 1 tablet by mouth Every 12 (Twelve) Hours. 180 tablet 2    , Scheduled Meds:    aspirin 81 mg Oral Daily   Or      aspirin 300 mg Rectal Daily   atorvastatin 40 mg Oral Nightly   budesonide 0.5 mg Nebulization BID - RT   dilTIAZem  mg Oral Q24H   heparin (porcine) 5,000 Units Subcutaneous Q8H   levothyroxine 75 mcg Oral Q AM   pantoprazole 40 mg Oral Daily Before Lunch   predniSONE 5 mg Oral Daily With Breakfast   ranolazine 500 mg Oral Q12H   sodium chloride 10 mL Intravenous Q12H   , Continuous Infusions:    lactated ringers 50 mL/hr Last Rate: 50 mL/hr (08/01/20 0232)   , PRN Meds:  ondansetron  •  [COMPLETED] Insert peripheral IV **AND** sodium chloride  •  sodium chloride and Allergies:  Tuberculin tests    Objective      Vital Signs  Temp:  [98.3 °F (36.8 °C)-98.5 °F (36.9 °C)] 98.3 °F (36.8 °C)  Heart Rate:  [] 103  Resp:  [18] 18  BP: (121-145)/(59-95) 121/59    Physical Exam   Constitutional: Vital signs are normal. She appears well-developed and well-nourished.   HENT:   Head: Normocephalic and atraumatic.   Eyes: Pupils are equal, round, and reactive to light. EOM and lids are normal.   Fundoscopic exam:       The right eye shows no exudate, no hemorrhage and no papilledema. The right eye shows venous pulsations.        The left eye shows no exudate, no hemorrhage and no papilledema. The left eye shows venous pulsations.   Neck: Normal range of motion and phonation normal. Normal carotid pulses present. Carotid bruit is not present. No thyroid mass and no thyromegaly present.   Cardiovascular: Normal heart sounds. An irregularly irregular rhythm present.   Irregularly irregular    Pulmonary/Chest: Effort normal.   Neurological: She has a normal Finger-Nose-Finger Test.   Skin: Skin is warm and dry.   Psychiatric: She has a normal mood and affect.   Nursing note and vitals reviewed.    Neurologic Exam      Mental Status   Follows 1 step commands.   Speech: mute   Level of consciousness: alert  Unable to name object. Unable to read. Unable to repeat. Unable to write.     Cranial Nerves     CN II   Right visual field deficit: upper nasal and lower nasal quadrant(s)  Left visual field deficit: upper temporal and lower temporal quadrant(s)    CN III, IV, VI   Pupils are equal, round, and reactive to light.  Extraocular motions are normal.     CN V   Right facial sensation deficit: complete    CN VII   Right facial weakness: central    CN VIII   CN VIII normal.     CN IX, X   CN IX normal.   CN X normal.     CN XI   CN XI normal.     CN XII   CN XII normal.     Motor Exam     Strength   Strength 5/5 except as noted.   Right deltoid: 3/5  Right biceps: 3/5  Right triceps: 3/5  Right wrist flexion: 3/5  Right wrist extension: 3/5  Right iliopsoas: 3/5  Right quadriceps: 3/5  Right hamstring: 3/5  Right glutei: 3/5  Right anterior tibial: 3/5  Right posterior tibial: 3/5  Right peroneal: 3/5  Right gastroc: 3/5    Sensory Exam   Decreased pp and LT  R F/A/L      Gait, Coordination, and Reflexes     Coordination   Finger to nose coordination: normal    Tremor   Resting tremor: absent  Intention tremor: absent    Reflexes   Reflexes 2+ except as noted.       Results Review:    I reviewed the patient's new clinical results.  I reviewed the patient's new imaging results and agree with the interpretation.  I reviewed the patient's other test results and agree with the interpretation        Assessment/Plan       Acute ischemic left MCA stroke (CMS/HCC)    Persistent atrial fibrillation (CMS/HCC)    Coronary artery disease involving native coronary artery of native heart without angina pectoris    Essential hypertension    Cerebrovascular accident (CVA) due to embolism (CMS/HCC)    Pulmonary arterial hypertension (CMS/HCC)    Interstitial lung disease (CMS/HCC)    Dyslipidemia    Acquired hypothyroidism    CKD (chronic kidney  disease) requiring chronic dialysis (CMS/HCC)    Chronic diastolic heart failure (CMS/HCC)    Acute renal failure (CMS/HCC)    1.  L MCA infarct - Family made pt DNR.  Pt improving with increased strength on left side, able to follow one step commands.  Anarthic.  Continue ASA/statin.  Source is likely cardioembolic from AF not on anticoag, PT/OT/SLP      Jabier Mathis MD  08/01/20  07:51

## 2020-08-01 NOTE — PROGRESS NOTES
[facility]  ADMISSION FOLLOW-UP NOTE          Patient admitted after midnight, H&P by my partner performed earlier on today's date reviewed.  Interim findings, labs, and charting also reviewed.        The [facility] Hospital Problem List has been managed and updated to include any new diagnoses:  Active Hospital Problems    Diagnosis  POA   • **Acute ischemic left MCA stroke (CMS/HCC) [I63.512]  Yes   • Acute renal failure (CMS/HCC) [N17.9]  Yes   • Chronic diastolic heart failure (CMS/HCC) [I50.32]  Yes   • CKD (chronic kidney disease) requiring chronic dialysis (CMS/HCC) [N18.6, Z99.2]  Not Applicable   • Acquired hypothyroidism [E03.9]  Yes   • Dyslipidemia [E78.5]  Yes   • Interstitial lung disease (CMS/HCC) [J84.9]  Yes   • Pulmonary arterial hypertension (CMS/HCC) [I27.21]  Yes   • Cerebrovascular accident (CVA) due to embolism (CMS/HCC) [I63.9]  Yes   • Persistent atrial fibrillation (CMS/HCC) [I48.19]  Yes   • Coronary artery disease involving native coronary artery of native heart without angina pectoris [I25.10]  Yes   • Essential hypertension [I10]  Yes      Resolved Hospital Problems   No resolved problems to display.       80-year-old female past medical history of ESRD on HD, hypertension, persistent A. fib, interstitial lung disease, hypothyroidism.  Patient presented with right-sided weakness and complete aphasia, admitted with acute left-sided MCA stroke. This morning patient remains aphasic, has right facial droop, son is at bedside.    Acute left-sided MCA stroke  -Continue stroke work-up per protocol, currently awaiting neurology evaluation  -Awaiting for SLP evaluation.    Suspected COVID-19  -this is now negative, okay to d/c airborne precautions, can transfer out of [facility]    Overall prognosis is currently guarded if not poor, patient is a DNR, palliative care has been consulted.    Otherwise continue plan of care as per admission H&P.    Electronically  signed by Luis Mayorga MD, 08/01/20, 10:02 AM.

## 2020-08-01 NOTE — PLAN OF CARE
Problem: Patient Care Overview  Goal: Plan of Care Review  8/1/2020 5714 by Annmarie Noonan MS CCC-SLP  Outcome: Ongoing (interventions implemented as appropriate)  Flowsheets (Taken 8/1/2020 1554)  Plan of Care Reviewed With: patient  Note:   SLP evaluation with FEES completed. Safe for modified PO diet. Will continue to address dysphagia. Please see note for further details and recommendations.

## 2020-08-01 NOTE — CONSULTS
Stroke Consult Note    Patient Name: Pat Morel   MRN: [id number]  Age: 80 y.o.  Sex: female  : 1940    Primary Care Physician: nAthony Sweet DO  Referring Physician:  No ref. provider found    TIME STROKE TEAM CALLED:  EST     TIME PATIENT SEEN:  EST    Handedness: Right  Race:     Chief Complaint/Reason for Consultation: Right hemiplegia and altered mental status    Subjective .  HPI: Pat Morel is a 80 yr old female a PMH significant for A fib (not on anticoagulants), CAD, CABG, CKD IV (on HD MWF), HTN, interstitial lung disease, HLD, hypothyroidism, frequent falls, and LETTY who presents to [facility] ED via EMS with c/o right hemiplegia and altered mental status. Her LKW was 1530. The patient reportedly walked with her walker to the kitchen with her . She laid down for a nap shortly after. At approximately 2000, her  had difficulty waking her and could not get her to verbally respond. He called their son, (who is a nurse), who advised him to call EMS. Upon EMS arrival, the patient was noted to be globally aphasic and hemiplegic on the right side. The patient had been on Eliquis until 2020. Per hospital records, it was d/c'd d/t acute on chronic LETTY and positive FOBS.    Upon arrival to [facility], right hemiplegia, global aphasia, mild right facial droop, partial gaze palsy, sensory loss, right neglect, and  right visual field cut noted.  CTH revealed early signs of LMCA territory infarction. CTP with acute ischemic change in the left MCA territory with an area of core infarction of 33ml, ischemic penumbra 44ml. Mismatch ratio of 11ml. CTA H/N revealed a short segment high-grade stenosis/occlusion with reconstitution of the right M1 vessel about a centimeter from its origin and a right MCA aneurysm measuring 1.4 x 2.4mm. Baseline MRS is a 3.     Test results discussed with Dr. Tyler who discussed possibly taking the patient to the cath lab for mechanical thrombectomy  with the understanding that it would strictly be palliative and the patient would likely not speak again and would required long term care in a skilled nursing facility. The case was discussed with the patient's son, Jacinto and her granddaughter Jenifer who is a former [facility] Neuro ICU RN. Ultimately the decision was made not to take the patient for mechanical thrombectomy d/t the large area of core infarction, low mismatch ratio, decreased baseline functional status, the presence of multiple significant comorbid conditions, and poor overall prognosis. Dr. Bragg and Dr. Jay with [facility] medicine updated on the plan of care. Per the patient's son Jacinto, the patient's prior code status is per her wishes as a No CPR. Living will and POA documents are present in the medical record. The patient's family is agreeable to a Palliative care consult for assistance with goals of care.       Last Known Normal Date/Time: 1530 EST     Review of Systems   Unable to perform ROS: Acuity of condition   Neurological: Positive for speech difficulty and weakness.   Psychiatric/Behavioral: Positive for confusion.      Past Medical History:   Diagnosis Date   • Acute on chronic diastolic congestive heart failure (CMS/HCC) 12/20/2019   • Anemia    • Angina at rest (CMS/HCC)    • Arthritis    • Atrial fibrillation (CMS/HCC)    • Coronary artery disease    • Disease of thyroid gland    • Elevated cholesterol    • GERD without esophagitis 12/2/2019   • History of blood transfusion    • History of colonoscopy 11/19/2019   • History of melena 11/19/2019   • History of stomach ulcers    • Hypertension    • Impaired functional mobility, balance, gait, and endurance    • Impaired functional mobility, balance, gait, and endurance    • Osteoporosis    • Positive TB test    • Stroke (CMS/HCC)     No deficits     Past Surgical History:   Procedure Laterality Date   • BRONCHOSCOPY Bilateral 4/12/2019    Procedure: BRONCHOSCOPY;  Surgeon: Valentín  Jeff Means MD;  Location:  [facility];  Service: Pulmonary   • BYPASS GRAFT     • COLONOSCOPY N/A 10/15/2019    Procedure: COLONOSCOPY;  Surgeon: Fredi Aaron MD;  Location:  [facility];  Service: Gastroenterology   • CORONARY ANGIOPLASTY WITH STENT PLACEMENT     • CORONARY ARTERY BYPASS GRAFT  10/18/2010   • ENDOSCOPY N/A 10/14/2019    Procedure: ESOPHAGOGASTRODUODENOSCOPY;  Surgeon: Fredi Aaron MD;  Location:  [facility];  Service: Gastroenterology   • HYSTERECTOMY     • INSERTION HEMODIALYSIS CATHETER N/A 2/14/2020    Procedure: HEMODIALYSIS CATHETER INSERTION(FRANCISCONON);  Surgeon: Familia Paez MD;  Location:  [facility];  Service: General;  Laterality: N/A;   • STOMACH SURGERY  08/11/2019    Upper GI bleed    • STOMACH SURGERY  10/13/2019     Family History   Problem Relation Age of Onset   • Cancer Mother    • Arthritis Mother    • No Known Problems Father    • Liver disease Sister      Social History     Socioeconomic History   • Marital status:      Spouse name: Not on file   • Number of children: Not on file   • Years of education: Not on file   • Highest education level: Not on file   Tobacco Use   • Smoking status: Never Smoker   • Smokeless tobacco: Never Used   Substance and Sexual Activity   • Alcohol use: No   • Drug use: No   • Sexual activity: Not Currently     Allergies   Allergen Reactions   • Tuberculin Tests Rash     Prior to Admission medications    Medication Sig Start Date End Date Taking? Authorizing Provider   aspirin 81 MG EC tablet Take 1 tablet by mouth Daily. 2/20/20   Denis Mercado MD   atorvastatin (LIPITOR) 40 MG tablet Take 1 tablet by mouth Every Night. 12/31/19   Anthony Sweet DO   budesonide (PULMICORT) 0.5 MG/2ML nebulizer solution  6/10/20   Provider, MD Cayla   dilTIAZem CD (CARDIZEM CD) 120 MG 24 hr capsule Take 1 capsule by mouth Daily. 7/9/20   Anthony Sweet DO   levothyroxine (SYNTHROID, LEVOTHROID) 75 MCG tablet Take 1  tablet by mouth Daily. 3/20/20   Anthony Sweet, DO   pantoprazole (PROTONIX) 40 MG EC tablet Take 1 tablet by mouth Daily. 3/20/20   Anthony Sweet K, DO   predniSONE (DELTASONE) 5 MG tablet Take 1 tablet by mouth Daily. 7/9/20   Anthony Sweet K, DO   ranolazine (RANEXA) 500 MG 12 hr tablet Take 1 tablet by mouth Every 12 (Twelve) Hours. 7/9/20   Anthony Sweet, DO             Objective     Heart Rate:  [81] 81  Resp:  [18] 18  BP: (136)/(68) 136/68  Neurological Exam  Mental Status  Drowsy. Oriented only to person. Severe dysarthria present. Global aphasia present.    Cranial Nerves  CN II: Vision test: As above. Partial gaze palsy to the left, right visual field cut.  CN III, IV, VI: Pupils equal round and reactive to light bilaterally.  CN V: Facial sensation is normal.  CN VII:  Right: Minor right facial droop.  CN VIII: Hearing aids. [place] at baseline.  CN IX, X: Palate elevates symmetrically  CN XI:  Right: Trapezius strength is weak.  CN XII: Unable to assess. Patient did not follow commands..    Motor   Right hemiparesis.  Difficult to assess. Pt follows simple commands intermittently. Strength 4/5 in LUE/LLE. .    Sensory  Right hemisensory loss.     Coordination  Unable to assess. Patient unable to participate..    Gait  Not assessed.      Physical Exam   Constitutional: She appears well-developed and well-nourished.   HENT:   Head: Normocephalic and atraumatic.   Eyes: Pupils are equal, round, and reactive to light.   As above   Neck: No JVD present. No tracheal deviation present.   Cardiovascular:   Tachycardia, atrial fibrillation on the monitor   Pulmonary/Chest: Effort normal and breath sounds normal.   Abdominal: Soft. Bowel sounds are normal.   Musculoskeletal:   As above   Neurological: A cranial nerve deficit and sensory deficit is present.   As above   Skin: Skin is warm and dry.       Acute Stroke Data    Alteplase (tPA) Inclusion / Exclusion Criteria    Time: 2155  Person Administering  Scale: AYANNA Delgado    Inclusion Criteria  [x]   18 years of age or greater   []   Onset of symptoms < 4.5 hours before beginning treatment (stroke onset = time patient was last seen well or without symptoms).   []   Diagnosis of acute ischemic stroke causing measurable disabling deficit (Complete Hemianopia, Any Aphasia, Visual or Sensory Extinction, Any weakness limiting sustained effort against gravity)   []   Any remaining deficit considered potentially disabling in view of patient and practitioner   Exclusion criteria (Do not proceed with Alteplase if any are checked under exclusion criteria)  [x]   Onset unknown or GREATER than 4.5 hours   []   ICH on CT/MRI   []   CT demonstrates hypodensity representing acute or subacute infarct   []   Significant head trauma or prior stroke in the previous 3 months   []   Symptoms suggestive of subarachnoid hemorrhage   []   History of un-ruptured intracranial aneurysm GREATER than 10 mm   []   Recent intracranial or intraspinal surgery within the last 3 months   []   Arterial puncture at a non-compressible site in the previous 7 days   []   Active internal bleeding   []   Acute bleeding tendency   []   Platelet count LESS than 100,000 for known hematological diseases such as leukemia, thrombocytopenia or chronic cirrhosis   []   Current use of anticoagulant with INR GREATER than 1.7 or PT GREATER than 15 seconds, aPTT GREATER than 40 seconds   []   Heparin received within 48 hours, resulting in abnormally elevated aPTT GREATER than upper limit of normal   []   Current use of direct thrombin inhibitors or direct factor Xa inhibitors in the past 48 hours   []   Elevated blood pressure refractory to treatment (systolic GREATER than 185 mm/Hg or diastolic  GREATER than 110 mm/Hg   []   Suspected infective endocarditis and aortic arch dissection   []   Current use of therapeutic treatment dose of low-molecular-weight heparin (LMWH) within the previous 24 hours   []    Structural GI malignancy or bleed   Relative exclusion for all patients  []   Only minor non-disabling symptoms   []   Pregnancy   []   Seizure at onset with postictal residual neurological impairments   []   Major surgery or previous trauma within past 14 days   []   History of previous spontaneous ICH, intracranial neoplasm, or AV malformation   []   Postpartum (within previous 14 days)   []   Recent GI or urinary tract hemorrhage (within previous 21 days)   []   Recent acute MI (within previous 3 months)   []   History of un-ruptured intracranial aneurysm LESS than 10 mm   []   History of ruptured intracranial aneurysm   []   Blood glucose LESS than 50 mg/dL (2.7 mmol/L)   []   Dural puncture within the last 7 days   []   Known GREATER than 10 cerebral microbleeds   Additional exclusions for patients with symptoms onset between 3 and 4.5 hours.  []   Age > 80.   []   On any anticoagulants regardless of INR  >>> Warfarin (Coumadin), Heparin, Enoxaparin (Lovenox), fondaparinux (Arixtra), bivalirudin (Angiomax), Argatroban, dabigatran (Pradaxa), rivaroxaban (Xarelto), or apixaban (Eliquis)   []   Severe stroke (NIHSS > 25).   []   History of BOTH diabetes and previous ischemic stroke.   []   The risks and benefits have been discussed with the patient or family related to the administration of IV Alteplase for stroke symptoms.   []   I have discussed and reviewed the patient's case and imaging with the attending prior to IV Alteplase.    Time Alteplase administered       Hospital Meds:  Scheduled-   aspirin 300 mg Rectal Once     Infusions-     PRNs- [COMPLETED] Insert peripheral IV **AND** sodium chloride    Functional Status Prior to Current Stroke/[place] Score: 3    NIH Stroke Scale  Time: 2155  Person Administering Scale: AYANNA Delgado    1a  Level of consciousness: 1=not alert but arousable by minor stimulation to obey, answer or respond   1b. LOC questions:  2=Performs neither task correctly   1c.  LOC commands: 1=Performs one task correctly   2.  Best Gaze: 1=partial gaze palsy   3.  Visual: 2=Complete hemianopia   4. Facial Palsy: 1=Minor paralysis (flattened nasolabial fold, asymmetric on smiling)   5a.  Motor left arm: 0=No drift, limb holds 90 (or 45) degrees for full 10 seconds   5b.  Motor right arm: 3=No effort against gravity, limb falls   6a. motor left le=No drift, limb holds 90 (or 45) degrees for full 10 seconds   6b  Motor right leg:  3=No effort against gravity, limb falls   7. Limb Ataxia: 0=Absent   8.  Sensory: 1=Mild to moderate sensory loss; patient feels pinprick is less sharp or is dull on the affected side; there is a loss of superficial pain with pinprick but patient is aware She is being touched   9. Best Language:  3=Mute, global aphasia; no usable speech or auditory comprehension   10. Dysarthria: 2=Severe; patient speech is so slurred as to be unintelligible in the absence of or our of proportion to any dysphagia, or is mute/anarthric   11. Extinction and Inattention: 1=Visual, tactile, auditory, spatial or personal inattention or extinction to bilateral simultaneous stimulation in one of the sensory modalities    Total:   21       Results Reviewed:  I have personally reviewed current lab, radiology, and data and agree with results.    Results for orders placed during the hospital encounter of 20   Adult Transthoracic Echo Complete W/ Cont if Necessary Per Protocol    Addendum 1.  Normal left ventricular size and systolic function, LVEF 65-70%. 2.  Mild to moderate concentric LVH. 3.  Impaired LV diastolic filling pattern consistent with grade 2  diastolic dysfunction and elevated left atrial pressure. 4.  Mild to moderate right ventricular dilation, with normal RV systolic  function 5.  Moderate to severe bilateral atrial enlargement. 6.  Mild to moderate mitral regurgitation. 7.  Moderate to severe tricuspid regurgitation. 8.  Suspected PFO. 9.  Pulmonary hypertension,  with RVSP 60 mmHg.      Yomi Higgins MD 2/10/2020 12:37 PM          Narrative 1.  Normal left ventricular size and systolic function, LVEF 65-70%.  2.  Mild to moderate concentric LVH.  3.  Impaired LV diastolic filling pattern consistent with grade 2   diastolic dysfunction and elevated left atrial pressure.  4.  Normal right ventricular size and systolic function.  5.  Moderate to severe bilateral atrial enlargement.  6.  Mild to moderate mitral regurgitation.  7.  Moderate to severe tricuspid regurgitation.  7.  Suspected PFO.             Assessment/Plan:      Pat Morel is a 80 yr old female a PMH significant for A fib (not on anticoagulants), CAD, CABG, CKD IV (on HD MWF), HTN, interstitial lung disease, HLD, hypothyroidism, frequent falls, and LETTY who presents to [facility] ED via EMS with c/o right hemiplegia and altered mental status. Her LKW was 1530. CTH revealed early signs of LMCA territory infarction.CTP/CTA obtained. Test results discussed with Dr. Tyler. The patient is not a candidate for mechanical thrombectomy d/t the large area of core infarction, low mismatch ratio, decreased baseline functional status, as well the presence of multiple significant comorbid conditions.     Acute Ischemic LMCA territory stroke with right sided weakness and global aphasia  -TIA/Ischemic Stroke without thrombolytic therapy order set  -ASA/Statin  -HgbA1c in am  -HLD; Lipid panel in am  -ESRD on HD  -Afib; no anticoagulation at this time d/t risk of hemorrhagic conversion  -Permissive HTN for 24 hrs  -PT/OT/SLP  -Code Status No CPR and no feeding tubes per POA/patient's wishes  -Palliative care consult for goals of care discussion; likely Hospice consult.         Stacy Marin, AYANNA  July 31, 2020  11:42 PM

## 2020-08-01 NOTE — PLAN OF CARE
Problem: Patient Care Overview  Goal: Plan of Care Review  Outcome: Ongoing (interventions implemented as appropriate)  Flowsheets (Taken 8/1/2020 1301)  Plan of Care Reviewed With: patient; daughter; son  Note:   SLP evaluation completed. Will continue to address aphasia and dysphagia with FEES to follow to assist in establishing GOC, per son/dtr's wishes. Please see note for further details and recommendations.

## 2020-08-01 NOTE — H&P
[facility] Medicine Services  HISTORY AND PHYSICAL    Patient Name: Pat Morel  : 1940  MRN: [id number]  Primary Care Physician: Anthony Sweet DO  Date of admission: 2020      Subjective   Subjective     Chief Complaint:  She had a stroke    HPI:  Pat Morel is a 80 y.o. female with past medical history of atrial fibrillation no longer on anticoagulation due to risks with dialysis, chronic diastolic CHF, hypothyroidism, end-stage renal disease on hemodialysis, hyperlipidemia, interstitial lung disease, pulmonary hypertension, history of CVA, coronary artery disease, hypertension who presents to the ER with right-sided weakness and complete aphasia.    Patient's family states that she was last known well at approximately 1530.  At 1800, her  asked her a question, however she did not respond but seemed to be acting relatively normal and he did not think anything of it.  He thought maybe she did not have her hearing aids in.  She went down to take a nap.  At , her  noticed that she was not responding verbally when he woke her and had difficulty walking and was falling to her right side.  Upon EMS arrival, patient was noted to be globally a phasic and hemiplegic to the right side for the most part, but was able to assist with transfer.  Upon arrival to the ER she was found to have mild right-sided facial droop, gaze to the left side, sensory loss and right-sided neglect with right visual field cut.  Work-up revealed acute ischemic changes within the MCA territory.  Unfortunately she was out of the time window for intervention.    Family has decided to make the patient DNR.  They state she would not want feeding tubes.  They would like to proceed with speech consult and placement.  Patient is a dialysis patient.  She was tested for recovery approximately 3 weeks ago which was negative.  She has not had any cough, shortness of air, fever, chills,  or any other symptoms.    Review of Systems   Gen- No fevers, chills  CV- No chest pain, palpitations  Resp- No cough, dyspnea  GI- No N/V/D, abd pain      All other systems reviewed and are negative.     Personal History     Past Medical History:   Diagnosis Date   • Acute on chronic diastolic congestive heart failure (CMS/HCC) 12/20/2019   • Anemia    • Angina at rest (CMS/HCC)    • Arthritis    • Atrial fibrillation (CMS/HCC)    • Coronary artery disease    • Disease of thyroid gland    • Elevated cholesterol    • GERD without esophagitis 12/2/2019   • History of blood transfusion    • History of colonoscopy 11/19/2019   • History of melena 11/19/2019   • History of stomach ulcers    • Hypertension    • Impaired functional mobility, balance, gait, and endurance    • Impaired functional mobility, balance, gait, and endurance    • Osteoporosis    • Positive TB test    • Stroke (CMS/HCC)     No deficits       Past Surgical History:   Procedure Laterality Date   • BRONCHOSCOPY Bilateral 4/12/2019    Procedure: BRONCHOSCOPY;  Surgeon: Jeff Laura MD;  Location:  [facility];  Service: Pulmonary   • BYPASS GRAFT     • COLONOSCOPY N/A 10/15/2019    Procedure: COLONOSCOPY;  Surgeon: Fredi Aaron MD;  Location:  [facility];  Service: Gastroenterology   • CORONARY ANGIOPLASTY WITH STENT PLACEMENT     • CORONARY ARTERY BYPASS GRAFT  10/18/2010   • ENDOSCOPY N/A 10/14/2019    Procedure: ESOPHAGOGASTRODUODENOSCOPY;  Surgeon: Fredi Aaron MD;  Location:  [facility];  Service: Gastroenterology   • HYSTERECTOMY     • INSERTION HEMODIALYSIS CATHETER N/A 2/14/2020    Procedure: HEMODIALYSIS CATHETER INSERTION(DINO);  Surgeon: Familia Paez MD;  Location: [facility] OR;  Service: General;  Laterality: N/A;   • STOMACH SURGERY  08/11/2019    Upper GI bleed    • STOMACH SURGERY  10/13/2019       Family History: family history includes Arthritis in her mother; Cancer in her mother; Liver  disease in her sister; No Known Problems in her father. Otherwise pertinent FHx was reviewed and unremarkable.     Social History:  reports that she has never smoked. She has never used smokeless tobacco. She reports that she does not drink alcohol or use drugs.  Social History     Social History Narrative   • Not on file       Medications:  Available home medication information reviewed.    (Not in a hospital admission)    Allergies   Allergen Reactions   • Tuberculin Tests Rash       Objective   Objective     Vital Signs:   Heart Rate:  [] 113  Resp:  [18] 18  BP: (125-145)/(64-95) 145/95   Total (NIH Stroke Scale): 19    Physical Exam   Constitutional: Awake, alert, will follow commands, occasionally will nod yes or no with difficulty  Eyes: PERRLA, sclerae anicteric, no conjunctival injection  HENT: NCAT, mucous membranes dry  Neck: Supple, no thyromegaly, no lymphadenopathy, trachea midline  Respiratory: Clear to auscultation bilaterally, nonlabored respirations   Cardiovascular: RRR, systolic murmurs,, palpable pedal pulses bilaterally  Gastrointestinal: Positive bowel sounds, soft, nontender, nondistended  Musculoskeletal: some bilateral ankle edema, no clubbing or cyanosis to extremities  Psychiatric: Appropriate affect, cooperative  Neurologic: Unable to assess orientation as patient is nonverbal, normal movement of the right upper extremity, right-sided hemineglect, possible right-sided visual deficit, slight weakness to the right lower extremity in comparison to the left, but able to lift off the bed with some drift, Cranial Nerves grossly intact to confrontation, speech absent  Skin: Bruises      Results Reviewed:  I have personally reviewed current lab and radiology data.    Results from last 7 days   Lab Units 07/31/20 2228 07/31/20 2219 07/31/20  2146   WBC 10*3/mm3  --  5.29  --    HEMOGLOBIN g/dL  --  11.2*  --    HEMOGLOBIN, POC g/dL 11.9*  --   --    HEMATOCRIT %  --  35.0  --     HEMATOCRIT POC % 35*  --   --    PLATELETS 10*3/mm3  --  227  --    INR   --   --  1.0     Results from last 7 days   Lab Units 07/31/20  2219   SODIUM mmol/L 132*   POTASSIUM mmol/L 4.5   CHLORIDE mmol/L 93*   CO2 mmol/L 25.0   BUN mg/dL 17   CREATININE mg/dL 2.00*   GLUCOSE mg/dL 86   CALCIUM mg/dL 9.6   ALT (SGPT) U/L 12   AST (SGOT) U/L 20   LACTATE mmol/L 1.3     Estimated Creatinine Clearance: 20.3 mL/min (A) (by C-G formula based on SCr of 2 mg/dL (H)).  Brief Urine Lab Results  (Last result in the past 365 days)      Color   Clarity   Blood   Leuk Est   Nitrite   Protein   CREAT   Urine HCG        02/13/20 1329             43.3       02/13/20 1329             43.7           Imaging Results (Last 24 Hours)     Procedure Component Value Units Date/Time    XR Chest 1 View [[id number]] Collected:  08/01/20 0021     Updated:  08/01/20 0023    Narrative:       CR Chest 1 Vw    INDICATION:   Cerebral infarction. Lung opacities.     COMPARISON:    1/17/2020    FINDINGS:  Single portable AP view(s) of the chest.    Large-bore central line from a right-sided approach terminates at the right atrium. Postoperative changes of median sternotomy. Cardiomegaly with mild vascular congestion. Probable mild fibrotic changes in both lungs. No new dense consolidation or  effusion. No pneumothorax. Hiatal hernia.          Impression:         1. Cardiomegaly and probable mild vascular congestion.  2. Hiatal hernia.    Signer Name: Pancho Juarez MD   Signed: 8/1/2020 12:21 AM   Work[facility]: [email or web address]    [facility]    CT Angiogram Neck [[id number]] Collected:  07/31/20 2225     Updated:  07/31/20 2227    Narrative:       See the CT angiogram of the head dictation. The CT angiogram of the head and neck are dictated together.     Signer Name: Jagruti Laureano MD   Signed: 7/31/2020 10:25 PM   Workstation [facility]: [email or web address]    [facility]    CT Angiogram Head [[id number]] Collected:   07/31/20 2224     Updated:  07/31/20 2226    Narrative:       CTA Head    INDICATION:   Right-sided weakness onset at 1700.    TECHNIQUE:   CT angiogram of the head and neck with IV contrast. Contrast dose recorded in the patient's chart. 3-D reconstructions were obtained and reviewed.  Evaluation for a significant carotid arterial stenosis is based on the NASCET criteria. Radiation dose  reduction techniques included automated exposure control or exposure modulation based on body size. Count of known CT and cardiac nuc med studies performed in previous 12 months: 1. Noncontrast imaging also obtained prior to contrast enhanced study..    COMPARISON:   Earlier noncontrast head CT. Separate CT perfusion.    FINDINGS:   CTA neck:  There is atherosclerotic vascular calcification at the aortic arch. There is a bovine origin left common carotid artery. There is not hemodynamically significant stenosis of great vessel origins from the arch. There is about 50% narrowing at  the origin of the right common carotid artery from the innominate and there is narrowing at the origin of the right subclavian artery.    Right carotid system: There is partially calcified plaque at the right carotid bifurcation. By NASCET criteria there is about 10% diameter stenosis proximal right internal carotid artery. Right carotid siphon is widely patent.    Left carotid system: There is calcified plaque at the left carotid bifurcation. By NASCET criteria there is about 45% diameter stenosis. Left carotid siphon is widely patent.    Both vertebral arteries are patent in the neck. The right is mildly dominant. Both supply the basilar.    CTA head:  There is a short segment high-grade stenosis/near cut off of the right M1 vessel about a centimeter from the origin. This finding has her to begin called by myself to Dr. Bragg at about 10:10 PM. This severe stenosis is consistent with the  abnormal perfusion findings and infarct. There is a small  anterior communicating artery present. There is a large right posterior communicating artery with a small infundibulum at its origin. There is a tiny left posterior communicating artery. The right  P1 vessel is mildly hypoplastic. There is small amount of contrast outpouching directed laterally at the right MCA bifurcation consistent with a tiny broad necked aneurysm measuring about 1.4 x 2.4 mm dimension. The dural venous sinuses are patent. No  other possible intracranial vascular cut off or focal central stenosis is seen.    There is relative hypoperfusion to the left anterior insula and left lateral frontal lobe anteriorly consistent with the abnormalities seen on the perfusion study. There are patchy groundglass opacities are areas of air-trapping the visualized lungs.  Recommend correlation with a chest x-ray to assess for possible infiltrates. There are degenerative changes in the cervical spine.      Impression:         1. There is a short segment high-grade stenosis/occlusion with reconstitution of the right M1 vessel about a centimeter from its origin. This is consistent with the infarct seen on the noncontrast head CT and perfusion study. Findings called.  2. By NASCET criteria there is about 45% diameter stenosis of the left carotid bifurcation and about 10% diameter stenosis of the right carotid bifurcation due to calcified plaque.  3. Both vertebral arteries are patent with the right being mildly dominant.  4. No additional focal central stenosis or intracranial vascular cut off is suspected.  5. Patchy groundglass opacities or areas of air trapping partly seen at lung apices. Recommend chest x-ray correlation.  6. there is a small broad necked aneurysm at the right MCA bifurcation measuring about 1.4 x 2.4 mm directed laterally. Follow-up recommended.    Signer Name: Jagruti Laureano MD   Signed: 7/31/2020 10:24 PM   Workstation Name: [email or web address]    [facility]    CT Cerebral  Perfusion With & Without Contrast [[id number]] Collected:  07/31/20 2210     Updated:  07/31/20 2213    Narrative:       CT CEREBRAL PERFUSION W WO CONTRAST    HISTORY:   80-year-old female with focal neurologic deficit. Right-sided weakness since 1700 hours.    TECHNIQUE:   Axial CT images of the brain without and with intravenous contrast using cerebral perfusion protocol. Post-processing parametric maps were created using RAPID software and reviewed. Radiation dose reduction techniques included automated exposure control  or exposure modulation based on body size. CT and nuclear cardiology exams in the last 12 months: 1.    COMPARISON:   Noncontrast head CT 2126 hours    FINDINGS:   Arterial input function is optimal.     PERFUSION PARAMETERS:  *  Ischemic tissue volume (Tmax > 6 sec.): 44 mL.  *  Infarct core volume (CBF < 30%): 33 mL.  *  Mismatch (penumbra) volume: 11 mL.  *  Mismatch ratio: 1.3.  *  Location/territory: Left MCA territory.    COLLATERAL CIRCULATION PARAMETERS:  *  Volume poor collateral perfusion (Tmax > 10 sec.): 25 mL.  *  Hypoperfusion index (Tmax >10 sec./Tmax > 6 sec.): 0.6.  *  CBV Index (rCBV in Tmax > 6 sec. region): 0.5.        Impression:         1. Abnormal CT perfusion demonstrating acute ischemic change in the left MCA territory. There is a core infarct on the left with a penumbra of associated potentially reversible ischemia.      NOTIFICATION: Critical Value/emergent results were called by telephone at the time of interpretation on 7/31/2020 10:07 PM to Efren Bragg MD who verbally acknowledged these results.      Signer Name: Pancho Juarez MD   Signed: 7/31/2020 10:10 PM   [email or web address]    [facility]    CT Head Without Contrast Stroke Protocol [[id number]] Collected:  07/31/20 2142     Updated:  07/31/20 2144    Narrative:       HISTORY:   Right-sided weakness onset at 1700. Scan time 2125 preliminary findings given to the CT  technologist by myself about 7:35 PM.    TECHNIQUE:   CT head without contrast. Radiation dose reduction techniques included automated exposure control or exposure modulation based on body size. Radiation audit for number of CT and nuclear cardiology exams performed in the last year: 1.      COMPARISON:   None    FINDINGS:   There is low-attenuation in the anterior aspect of the left side insula with loss of the insular ribbon anteriorly. In light of history provided this is concerning for early left MCA territory infarct. There is also some low-attenuation in the left  lateral frontal subcortical white matter to cortex. There is no appreciable mass affect. There is no evidence for acute intracranial hemorrhage.  There is generalized atrophy. There are lacunar-type insults in the anterior limb of the right internal  capsule posterior right insula and there is mild to moderate white matter low-attenuation in general which is likely due to small vessel disease. Likely some low-attenuation in the brainstem. No midline shift.    The mastoid air cells are clear. The visualized paranasal sinuses are clear      Impression:         1. No acute hemorrhage extra-axial fluid collection or intracranial mass effect suspected.  2. There is loss of the insular ribbon on the left side anteriorly with some low-attenuation involving the left anterior basal ganglia and insula. Additionally there is low-attenuation in the left lateral frontal cortex and underlying white matter with  loss of gray-white distinction. These could be early signs of a left MCA territory infarct. This is less than one third of the likely vascular distribution.  3. Moderate probable sequelae of small vessel disease.    Signer Name: Jagruti Laureano MD   Signed: 7/31/2020 9:42 PM   Workstation Name: [email or web address]    [facility]        Results for orders placed during the hospital encounter of 02/06/20   Adult Transthoracic Echo Complete W/ Cont  if Necessary Per Protocol    Addendum 1.  Normal left ventricular size and systolic function, LVEF 65-70%. 2.  Mild to moderate concentric LVH. 3.  Impaired LV diastolic filling pattern consistent with grade 2  diastolic dysfunction and elevated left atrial pressure. 4.  Mild to moderate right ventricular dilation, with normal RV systolic  function 5.  Moderate to severe bilateral atrial enlargement. 6.  Mild to moderate mitral regurgitation. 7.  Moderate to severe tricuspid regurgitation. 8.  Suspected PFO. 9.  Pulmonary hypertension, with RVSP 60 mmHg.      Yomi Higgins MD 2/10/2020 12:37 PM          Narrative 1.  Normal left ventricular size and systolic function, LVEF 65-70%.  2.  Mild to moderate concentric LVH.  3.  Impaired LV diastolic filling pattern consistent with grade 2   diastolic dysfunction and elevated left atrial pressure.  4.  Normal right ventricular size and systolic function.  5.  Moderate to severe bilateral atrial enlargement.  6.  Mild to moderate mitral regurgitation.  7.  Moderate to severe tricuspid regurgitation.  7.  Suspected PFO.       Assessment/Plan   Assessment & Plan     Active Hospital Problems    Diagnosis POA   • **Acute ischemic left MCA stroke (CMS/HCC) [I63.512] Yes   • Acute renal failure (CMS/HCC) [N17.9] Yes   • Chronic diastolic heart failure (CMS/HCC) [I50.32] Yes   • CKD (chronic kidney disease) requiring chronic dialysis (CMS/[facility]) [N18.6, Z99.2] Not Applicable   • Acquired hypothyroidism [E03.9] Yes   • Dyslipidemia [E78.5] Yes   • Interstitial lung disease (CMS/[facility]) [J84.9] Yes   • Pulmonary arterial hypertension (CMS/[facility]) [I27.21] Yes   • Cerebrovascular accident (CVA) due to embolism (CMS/[facility]) [I63.9] Yes   • Persistent atrial fibrillation (CMS/[facility]) [I48.19] Yes   • Coronary artery disease involving native coronary artery of native heart without angina pectoris [I25.10] Yes   • Essential hypertension [I10] Yes     Pt is a 80 F with past medical history of  atrial fibrillation no longer on anticoagulation due to risks with dialysis, chronic diastolic CHF, hypothyroidism, end-stage renal disease on hemodialysis, hyperlipidemia, interstitial lung disease, pulmonary hypertension, history of CVA, coronary artery disease, hypertension who presents to the ER with right-sided weakness and complete aphasia.    1.  Acute left-sided MCA stroke with aphasia and right-sided weakness  2.  End-stage renal disease on hemodialysis  3.  Chronic diastolic heart failure  4.  Atrial fibrillation on no anticoagulation  5.  Hyperlipidemia  6.  Hypothyroidism  7.  Interstitial lung disease  8.  Hypertension    --Allow permissive hypertension  --Neuro consulted  --Determined to not be a TPA candidate  --Family request DNR status  --Palliative care consulted  --Speech therapy consulted  --Case management consulted for placement, will likely be hospice  --Consult nephrology if patient is not determined to be a hospice candidate.  --Aspirin, statin  --No full dose AC for now due to risk of hemorrhagic conversion  --covid test as she is dialysis patient, ground glass opacities may be related to her fibrosis, no signs of sx of pna but will rule out.           DVT prophylaxis: heparin    CODE STATUS: DNR after discussion with POA, states this is what patient wanted.  States that she would not have wanted feeding tubes either.  Code Status and Medical Interventions:   Ordered at: 08/01/20 0049     Limited Support to NOT Include:    Intubation    Artificial Nutrition     Level Of Support Discussed With:    Health Care Surrogate     Code Status:    No CPR     Medical Interventions (Level of Support Prior to Arrest):    Limited       Admission Status:  I believe this patient meets INPATIENT status due to stroke .  I feel patient’s risk for adverse outcomes and need for care warrant INPATIENT evaluation and I predict the patient’s care encounter to likely last beyond 2 midnights.      Electronically  signed by Sohail Jay DO, 08/01/20, 12:49 AM.

## 2020-08-02 NOTE — THERAPY EVALUATION
Acute Care - Occupational Therapy Initial Evaluation  [facility]     Patient Name: Pat Morel  : 1940  MRN: [id number]  Today's Date: 2020             Admit Date: 2020       ICD-10-CM ICD-9-CM   1. Acute ischemic left MCA stroke (CMS/HCC) I63.512 434.91   2. Oropharyngeal dysphagia R13.12 787.22   3. Aphasia R47.01 784.3   4. 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 (CMS/HCC)   • Dyslipidemia   • Acquired hypothyroidism   • CKD (chronic kidney disease) requiring chronic dialysis (CMS/HCC)   • GERD without esophagitis   • Iron deficiency anemia   • Chronic diastolic heart failure (CMS/HCC)   • Acute renal failure (CMS/HCC)   • Recurrent falls   • Normocytic anemia   • Coagulopathy (CMS/HCC)   • Physical deconditioning   • CKD (chronic kidney disease) stage 4, GFR 15-29 ml/min (CMS/HCC)   • Acute ischemic left MCA stroke (CMS/HCC)     Past Medical History:   Diagnosis Date   • Acute on chronic diastolic congestive heart failure (CMS/HCC) 2019   • Anemia    • Angina at rest (CMS/HCC)    • Arthritis    • Atrial fibrillation (CMS/HCC)    • Coronary artery disease    • Disease of thyroid gland    • Elevated cholesterol    • GERD without esophagitis 2019   • History of blood transfusion    • History of colonoscopy 2019   • History of melena 2019   • History of stomach ulcers    • Hypertension    • Impaired functional mobility, balance, gait, and endurance    •  Impaired functional mobility, balance, gait, and endurance    • Osteoporosis    • Positive TB test    • Stroke (CMS/HCC)     No deficits     Past Surgical History:   Procedure Laterality Date   • BRONCHOSCOPY Bilateral 4/12/2019    Procedure: BRONCHOSCOPY;  Surgeon: Jeff Laura MD;  Location:  [facility];  Service: Pulmonary   • BYPASS GRAFT     • COLONOSCOPY N/A 10/15/2019    Procedure: COLONOSCOPY;  Surgeon: Fredi Aaron MD;  Location:  [facility];  Service: Gastroenterology   • CORONARY ANGIOPLASTY WITH STENT PLACEMENT     • CORONARY ARTERY BYPASS GRAFT  10/18/2010   • ENDOSCOPY N/A 10/14/2019    Procedure: ESOPHAGOGASTRODUODENOSCOPY;  Surgeon: Fredi Aaron MD;  Location:  [facility];  Service: Gastroenterology   • HYSTERECTOMY     • INSERTION HEMODIALYSIS CATHETER N/A 2/14/2020    Procedure: HEMODIALYSIS CATHETER INSERTION(DINO);  Surgeon: Familia Paez MD;  Location:  [facility];  Service: General;  Laterality: N/A;   • STOMACH SURGERY  08/11/2019    Upper GI bleed    • STOMACH SURGERY  10/13/2019          OT ASSESSMENT FLOWSHEET (last 12 hours)      Occupational Therapy Evaluation     Row Name 08/02/20 1517                   OT Evaluation Time/Intention    Subjective Information  no complaints pt w/ expressive aphasia, difficult to assess; lethargic   -JY        Document Type  evaluation  -JY        Mode of Treatment  occupational therapy  -JY        Patient Effort  good  -JY        Symptoms Noted During/After Treatment  other (see comments) increased heart rate   -JY           General Information    Patient Profile Reviewed?  yes  -JY        Patient Observations  alert;cooperative;agree to therapy  -JY        Patient/Family Observations  no family present   -JY        Prior Level of Function  independent:;all household mobility;gait;transfer;bed mobility;ADL's;feeding;grooming;dressing;bathing;cooking per chart review; pt u/a to relay d/t expressive aphasia   -JY   "      Equipment Currently Used at Home  other (see comments) difficult to ascertain d/t pt's expressive aphasia   -JY        Existing Precautions/Restrictions  fall;other (see comments) R sided weakness, deficits, expressive aphasia   -JY        Equipment Issued to Patient  adaptive cup;electrolarynx;utensils, large   -JY        Risks Reviewed  patient:  -JY        Benefits Reviewed  patient:  -JY        Barriers to Rehab  cognitive status  -JY           Relationship/Environment    Lives With  spouse  -JY           Resource/Environmental Concerns    Current Living Arrangements  home/apartment/condo  -JY           Home Main Entrance    Number of Stairs, Main Entrance  none;other (see comments) taken from chart review   -JY           Stairs Within Home, Primary    Number of Stairs, Within Home, Primary  none  -JY           Cognitive Assessment/Interventions    Additional Documentation  Cognitive Assessment/Intervention (Group)  -JY           Cognitive Assessment/Intervention- PT/OT    Affect/Mental Status (Cognitive)  unable/difficult to assess;other (see comments) did not present w/ any overt symptoms, characterisitics   -JY        Orientation Status (Cognition)  unable/difficult to assess;other (see comments) pt appear to nod \"yes\" to name   -JY        Follows Commands (Cognition)  follows one step commands;50-74% accuracy;increased processing time needed;delayed response/completion;repetition of directions required;verbal cues/prompting required;physical/tactile prompts required;other (see comments) pt attempted to follow all commands, majority correct   -JY        Cognitive Function (Cognitive)  unable/difficult to assess  -JY        Safety Deficit (Cognitive)  unable/difficult to assess  -JY        Cognitive Assessment/Intervention Comment  expressive aphasia thus cognitive assessment limited   -JY           Safety Issues, Functional Mobility    Safety Issues Affecting Function (Mobility)  awareness of need " for assistance;insight into deficits/self awareness;sequencing abilities  -JY        Impairments Affecting Function (Mobility)  balance;motor control  -JY        Comment, Safety Issues/Impairments (Mobility)  req'd cues for optimal hand placement, seq for greater fluidity   -JY           Bed Mobility Assessment/Treatment    Bed Mobility Assessment/Treatment  supine-sit;sit-supine;scooting/bridging  -JY        Scooting/Bridging [place] (Bed Mobility)  moderate assist (50% patient effort);verbal cues  -JY        Supine-Sit [place] (Bed Mobility)  moderate assist (50% patient effort);verbal cues  -JY        Sit-Supine [place] (Bed Mobility)  moderate assist (50% patient effort);verbal cues  -JY        Bed Mobility, Safety Issues  decreased use of arms for pushing/pulling;decreased use of legs for bridging/pushing;cognitive deficits limit understanding  -JY        Assistive Device (Bed Mobility)  bed rails;head of bed elevated;draw sheet  -JY        Comment (Bed Mobility)  pt required most A for uprighting trunk into sitting in supine > sitting, to bring LEs up to bed level in return to supine   -JY           Functional Mobility    Functional Mobility- Comment  did not assess this date d/t underlying impairments and presentation of increased lethargy   -JY           Transfer Assessment/Treatment    Transfer Assessment/Treatment  sit-stand transfer;stand-sit transfer  -JY        Comment (Transfers)  v/c for sequencing, optimal hand placement; R sided deficits still present thus OT A at R side   -JY           Sit-Stand Transfer    Sit-Stand [place] (Transfers)  moderate assist (50% patient effort)  -JY        Assistive Device (Sit-Stand Transfers)  other (see comments) gait belt, RUE support   -JY           Stand-Sit Transfer    Stand-Sit [place] (Transfers)  minimum assist (75% patient effort);verbal cues  -JY        Assistive Device (Stand-Sit Transfers)  other (see comments) gait belt donned,  RUE support   -JY           ADL Assessment/Intervention    BADL Assessment/Intervention  upper body dressing  -JY           Upper Body Dressing Assessment/Training    Upper Body Dressing [place] Level  doff;don;pajama/robe;moderate assist (50% patient effort);verbal cues;set up  -JY        Upper Body Dressing Position  edge of bed sitting  -JY        Comment (Upper Body Dressing)  req'd A largely to thread over RUE more proximally and posterior mgmt; pt mayra integrate BUEs inbto task   -JY           BADL Safety/Performance    Impairments, BADL Safety/Performance  balance;cognition;endurance/activity tolerance;grasp/prehension;motor control;motor planning;muscle tone abnormality;range of motion;strength;trunk/postural control  -JY        Cognitive Impairments, BADL Safety/Performance  awareness, need for assistance;insight into deficits/self awareness;safety precaution awareness;safety precaution follow-through;sequencing abilities  -JY        Skilled BADL Treatment/Intervention  BADL process/adaptation training  -JY        Progress in BADL Status  other (see comments) initiated for POC   -JY           General ROM    GENERAL ROM COMMENTS  LUE: WFL; RUE: grossly 90 degrees sh FE, abduction AROM; otherwise WFL   -JY           MMT (Manual Muscle Testing)    General MMT Comments  LUE: gross 4/5, RUE: 3/5 at hand/wrist, 3+/5 at biceps, triceps   -JY           Motor Assessment/Interventions    Additional Documentation  Balance (Group)  -JY           Balance    Balance  static sitting balance;static standing balance;dynamic sitting balance  -JY           Static Sitting Balance    Level of [place] (Unsupported Sitting, Static Balance)  supervision  -JY        Sitting Position (Unsupported Sitting, Static Balance)  sitting on edge of bed  -JY           Dynamic Sitting Balance    Level of [place], Reaches Outside Midline (Sitting, Dynamic Balance)  standby assist  -JY        Sitting Position, Reaches Outside  Midline (Sitting, Dynamic Balance)  sitting on edge of bed  -JY        Comment, Reaches Outside Midline (Sitting, Dynamic Balance)  ROM, MMT   -JY           Static Standing Balance    Level of [place] (Supported Standing, Static Balance)  minimal assist, 75% patient effort  -JY        Assistive Device Utilized (Supported Standing, Static Balance)  other (see comments) gait belt donned, RUE support   -JY           Sensory Assessment/Intervention    Sensory General Assessment  other (see comments) difficult to assess given expressive aphasia   -JY           Positioning and Restraints    Pre-Treatment Position  in bed  -JY        Post Treatment Position  bed  -JY        In Bed  notified nsg;fowlers;call light within reach;encouraged to call for assist;exit alarm on;side rails up x2;RUE elevated;legs elevated;waffle boots/both  -JY           Pain Assessment    Additional Documentation  Pain Scale: FACES Pre/Post-Treatment (Group)  -JY           Pain Scale: FACES Pre/Post-Treatment    Pain: FACES Scale, Pretreatment  0-->no hurt  -JY        Pain: FACES Scale, Post-Treatment  0-->no hurt  -JY           Wound 08/01/20 0310 midline coccyx Pressure Injury    Wound - Properties Group Date first assessed: 08/01/20  -MW Time first assessed: 0310  -MW Present on Hospital Admission: Y  -MW Orientation: midline  -MW Location: coccyx  -MW Primary Wound Type: Pressure inj  -MW Stage, Pressure Injury: Stage 1  -MW       Wound 02/14/20 0749 Right clavicle Incision    Wound - Properties Group Date first assessed: 02/14/20  -JS Time first assessed: 0749  -JS Present on Hospital Admission: N  -JS Side: Right  -JS Location: clavicle  -JS Primary Wound Type: Incision  -JS       Plan of Care Review    Plan of Care Reviewed With  patient  -JY        Progress  no change OT IE   -JY           Clinical Impression (OT)    OT Diagnosis  decreased I in ADLs, related t/f   -JY        Patient/Family Goals Statement (OT Eval)  to increase I in  ADLs, related t/f   -JY        Criteria for Skilled Therapeutic Interventions Met (OT Eval)  yes;treatment indicated  -JY        Rehab Potential (OT Eval)  good, to achieve stated therapy goals  -JY        Therapy Frequency (OT Eval)  daily  -JY        Anticipated Discharge Disposition (OT)  inpatient rehabilitation facility  -JY           Vital Signs    Pre Systolic BP Rehab  120  -JY        Pre Treatment Diastolic BP  54  -JY        Post Systolic BP Rehab  113  -JY        Post Treatment Diastolic BP  65  -JY        Pretreatment Heart Rate (beats/min)  108  -JY        Posttreatment Heart Rate (beats/min)  116  -JY        Pre SpO2 (%)  100  -JY        O2 Delivery Pre Treatment  room air  -JY        O2 Delivery Intra Treatment  room air  -JY        Post SpO2 (%)  99  -JY        O2 Delivery Post Treatment  room air  -JY        Pre Patient Position  Supine  -JY        Intra Patient Position  Standing  -JY        Post Patient Position  Supine  -JY           OT Goals    Transfer Goal Selection (OT)  transfer, OT goal 1  -JY        Dressing Goal Selection (OT)  dressing, OT goal 1  -JY        Self-Feeding Goal Selection (OT)  self feeding, OT goal 1  -JY        ROM Goal Selection (OT)  --  -JY        Strength Goal Selection (OT)  strength, OT goal 1  -JY        Safety Awareness Goal Selection (OT)  safety awareness, OT goal 1  -JY        Additional Documentation  Self-Feeding Goal Selection (OT) (Row);ROM Goal Selection (OT) (Row);Strength Goal Selection (OT) (Row);Safety Awareness Goal Selection (OT) (Row)  -JY           Transfer Goal 1 (OT)    Activity/Assistive Device (Transfer Goal 1, OT)  sit-to-stand/stand-to-sit;bed-to-chair/chair-to-bed;commode, bedside without drop arms;walker, rolling;other (see comments) w/ recommended AD/AE; progress to toilet as able   -JY        [place] Level/Cues Needed (Transfer Goal 1, OT)  minimum assist (75% or more patient effort);verbal cues required;tactile cues required  -JY         Time Frame (Transfer Goal 1, OT)  long term goal (LTG);by discharge  -JY        Progress/Outcome (Transfer Goal 1, OT)  goal ongoing  -JY           Dressing Goal 1 (OT)    Activity/Assistive Device (Dressing Goal 1, OT)  upper body dressing w/ AD/AE as needed/recommended   -JY        [place]/Cues Needed (Dressing Goal 1, OT)  minimum assist (75% or more patient effort);verbal cues required;tactile cues required;set-up required  -JY        Time Frame (Dressing Goal 1, OT)  long term goal (LTG);by discharge  -JY        Progress/Outcome (Dressing Goal 1, OT)  goal ongoing  -JY           Self-Feeding Goal 1 (OT)    Activity/Assistive Device (Self-Feeding Goal 1, OT)  self-feeding skills, all;other (see comments) determine if AE is needed w/ further assessment/trial   -JY        [place] Level/Cues Needed (Self-Feeding Goal 1, OT)  conditional independence  -JY        Time Frame (Self-Feeding Goal 1, OT)  long term goal (LTG);by discharge  -JY        Progress/Outcomes (Self-Feeding Goal 1, OT)  goal ongoing  -JY           Strength Goal 1 (OT)    Strength Goal 1 (OT)  Pt to complete seated progressive BUE ROM/strength HEP with emphasis on R>L in order to maximize integration/use for advancement in ADLs, related t/f   -JY        Time Frame (Strength Goal 1, OT)  long term goal (LTG);by discharge  -JY        Progress/Outcome (Strength Goal 1, OT)  goal ongoing  -JY           Safety Awareness Goal 1 (OT)    Activity (Safety Awareness Goal 1, OT)  demonstrates compliance;demonstration of safe behaviors;demonstrates understanding;awareness of right side;insight into deficits/self awareness;safe use of assistive device/equipment;follow through of safety precautions increased awareness and use of R side for ADLs for safety   -JY        [place]/Cues/Accuracy (Safety Awareness Goal 1, OT)  with minimum;physical/tactile cues;verbal cues/redirection  -JY        Time Frame (Safety Awareness Goal 1, OT)  long term  goal (LTG);by discharge  -DAE        Progress/Outcome (Safety Awareness Goal 1, OT)  goal ongoing  -JY           Living Environment    Home Accessibility  other (see comments) difficult to ascertain, will need f/u assessment   -DAE          User Key  (r) = Recorded By, (t) = Taken By, (c) = Cosigned By    Initials Name Effective Dates    Deborah Vyas, RN 06/16/16 -     Tamara Govea RN 07/14/16 -     Amanda Encarnacion OT 01/29/20 -          Occupational Therapy Education                 Title: PT OT SLP Therapies (In Progress)     Topic: Occupational Therapy (In Progress)     Point: ADL training (In Progress)     Description:   Instruct learner(s) on proper safety adaptation and remediation techniques during self care or transfers.   Instruct in proper use of assistive devices.              Learning Progress Summary           Patient Acceptance, E,D, NR by DAE at 8/2/2020 1517                   Point: Home exercise program (Not Started)     Description:   Instruct learner(s) on appropriate technique for monitoring, assisting and/or progressing therapeutic exercises/activities.              Learner Progress:   Not documented in this visit.          Point: Precautions (In Progress)     Description:   Instruct learner(s) on prescribed precautions during self-care and functional transfers.              Learning Progress Summary           Patient Acceptance, E,D, NR by DAE at 8/2/2020 1517                   Point: Body mechanics (In Progress)     Description:   Instruct learner(s) on proper positioning and spine alignment during self-care, functional mobility activities and/or exercises.              Learning Progress Summary           Patient Acceptance, E,D, NR by DAE at 8/2/2020 1517                               User Key     Initials Effective Dates Name Provider Type Discipline    DAE 01/29/20 -  Amanda Soto, OT Occupational Therapist OT                  OT Recommendation and Plan  Outcome Summary/Treatment  Plan (OT)  Anticipated Discharge Disposition (OT): inpatient rehabilitation facility  Therapy Frequency (OT Eval): daily  Plan of Care Review  Plan of Care Reviewed With: patient  Plan of Care Reviewed With: patient  Outcome Summary: OT IE completed post chart review. Pt presented w/ decreased I in ADLs, related t/f compared to gathered PLOF. Pt is difficult historian d/t expressive aphasia and no caregivers present for A in PLOF obtainment . Pt intermittently able to communicate w/ y/no head movements that seemed appropriate. Pt required mod A for gross bed mobility w/ increased A for uprighting trunk and to bring LEs up toward bed in return to supine. Pt grossly maintained neutral sitting at EOB. Pt req'd mod A for sit > standing w/ gait belt and RUE support, min A for standing to sitting. No further OOB activity completed d/t pt underlying impairments and increased lethargy at arrival. Pt able to assist w/ BUE integration in d/d gown w/ gross mod A. Pt appeared to present w/ difficulty in following commands to demo distal reach toward feet for LBD thus u/a to fully assess. Pt was provided w/ adapted cup and utensils w/ built up handle per nursing request on prior unit howevr uncertain if pt will need. PT presents w/ AROM at BUEs w/ drift at RUE. L UE impairments > R however improving. Pt would benefit from further IPOT POC and recommend IRF at d/c.    Outcome Measures     Row Name 08/02/20 2020             How much help from another is currently needed...    Putting on and taking off regular lower body clothing?  2  -JY      Bathing (including washing, rinsing, and drying)  2  -JY      Toileting (which includes using toilet bed pan or urinal)  2  -JY      Putting on and taking off regular upper body clothing  2  -JY      Taking care of personal grooming (such as brushing teeth)  3  -JY      Eating meals  3  -JY      AM-PAC 6 Clicks Score (OT)  14  -JY         Modified [place] Scale    Pre-Stroke Modified [place]  Scale  0 - No Symptoms at all.  -JY      Modified [place] Scale  4 - Moderately severe disability.  Unable to walk without assistance, and unable to attend to own bodily needs without assistance.  -JY         Functional Assessment    Outcome Measure Options  AM-PAC 6 Clicks Daily Activity (OT);Modified [place]  -JY        User Key  (r) = Recorded By, (t) = Taken By, (c) = Cosigned By    Initials Name Provider Type    Amanda Encarnacion OT Occupational Therapist          Time Calculation:   Time Calculation- OT     Row Name 08/02/20 1517             Time Calculation- OT    OT Start Time  1517  -JY      OT Received On  08/02/20  -JLOPEZ      OT Goal Re-Cert Due Date  08/12/20  -JLOPEZ        User Key  (r) = Recorded By, (t) = Taken By, (c) = Cosigned By    Initials Name Provider Type    Amanda Encarnacion OT Occupational Therapist        Therapy Charges for Today     Code Description Service Date Service Provider Modifiers Qty    [id number] HC OT EVAL MOD COMPLEXITY 4 8/2/2020 Amanda Soto OT GO 1               Amanda Soto OT  8/2/2020

## 2020-08-02 NOTE — THERAPY RE-EVALUATION
Acute Care - Speech Language Pathology   Swallow Re-Evaluation [facility]     Patient Name: Pat Morel  : 1940  MRN: [id number]  Today's Date: 2020               Admit Date: 2020    Visit Dx:     ICD-10-CM ICD-9-CM   1. Acute ischemic left MCA stroke (CMS/HCC) I63.512 434.91   2. Oropharyngeal dysphagia R13.12 787.22   3. Aphasia R47.01 784.3   4. 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 (CMS/HCC)   • Dyslipidemia   • Acquired hypothyroidism   • CKD (chronic kidney disease) requiring chronic dialysis (CMS/HCC)   • GERD without esophagitis   • Iron deficiency anemia   • Chronic diastolic heart failure (CMS/HCC)   • Acute renal failure (CMS/HCC)   • Recurrent falls   • Normocytic anemia   • Coagulopathy (CMS/HCC)   • Physical deconditioning   • CKD (chronic kidney disease) stage 4, GFR 15-29 ml/min (CMS/HCC)   • Acute ischemic left MCA stroke (CMS/HCC)     Past Medical History:   Diagnosis Date   • Acute on chronic diastolic congestive heart failure (CMS/HCC) 2019   • Anemia    • Angina at rest (CMS/HCC)    • Arthritis    • Atrial fibrillation (CMS/HCC)    • Coronary artery disease    • Disease of thyroid gland    • Elevated cholesterol    • GERD without esophagitis 2019   • History of blood transfusion    • History of colonoscopy 2019   • History of melena 2019   • History of stomach ulcers    • Hypertension    • Impaired functional mobility, balance, gait,  and endurance    • Impaired functional mobility, balance, gait, and endurance    • Osteoporosis    • Positive TB test    • Stroke (CMS/HCC)     No deficits     Past Surgical History:   Procedure Laterality Date   • BRONCHOSCOPY Bilateral 4/12/2019    Procedure: BRONCHOSCOPY;  Surgeon: Jeff Laura MD;  Location:  [facility];  Service: Pulmonary   • BYPASS GRAFT     • COLONOSCOPY N/A 10/15/2019    Procedure: COLONOSCOPY;  Surgeon: Fredi Aaron MD;  Location:  [facility];  Service: Gastroenterology   • CORONARY ANGIOPLASTY WITH STENT PLACEMENT     • CORONARY ARTERY BYPASS GRAFT  10/18/2010   • ENDOSCOPY N/A 10/14/2019    Procedure: ESOPHAGOGASTRODUODENOSCOPY;  Surgeon: Fredi Aaron MD;  Location:  [facility];  Service: Gastroenterology   • HYSTERECTOMY     • INSERTION HEMODIALYSIS CATHETER N/A 2/14/2020    Procedure: HEMODIALYSIS CATHETER INSERTION(DINO);  Surgeon: Familia Paez MD;  Location:  [facility];  Service: General;  Laterality: N/A;   • STOMACH SURGERY  08/11/2019    Upper GI bleed    • STOMACH SURGERY  10/13/2019        SWALLOW EVALUATION (last 72 hours)      SLP Adult Swallow Evaluation     Row Name 08/02/20 1200 08/01/20 1445 08/01/20 0930             Rehab Evaluation    Document Type  re-evaluation;therapy note (daily note)  -SM  evaluation  -SM  evaluation  -SM      Patient Observations  alert;cooperative  -SM  alert;cooperative  -SM  alert;cooperative  -SM      Patient/Family Observations  Family member present  -SM  --  Children present  -         General Information    Patient Profile Reviewed  --  --  yes  -SM      Pertinent History Of Current Problem  RN contacted SLP, concerns not tolerating diet with dinner yesterday. Seemed better this AM, requesting recheck  -  --  L MCA CVA, made DNR, palliative consulted  -      Current Method of Nutrition  mechanical soft, no mixed consistencies;nectar/syrup-thick liquids  -SM  NPO  -SM  NPO  -SM      Prior  Level of Function-Swallowing  --  --  safe, efficient swallowing in all situations  -      Plans/Goals Discussed with  patient and family;agreed upon  -  --  patient and family;agreed upon  -      Barriers to Rehab  none identified  -  --  none identified  -      Patient's Goals for Discharge  patient could not state  -  --  patient could not state  -      Family Goals for Discharge  patient able to eat/drink without coughing/choking  -  --  patient able to return to PO diet  -         Pain Assessment    Additional Documentation  --  --  Pain Scale: FACES Pre/Post-Treatment (Group)  -         Pain Scale: FACES Pre/Post-Treatment    Pain: FACES Scale, Pretreatment  0-->no hurt  -SM  0-->no hurt  -SM  0-->no hurt  -SM      Pain: FACES Scale, Post-Treatment  0-->no hurt  -SM  0-->no hurt  -SM  0-->no hurt  -SM         Oral Musculature and Cranial Nerve Assessment    Oral Motor General Assessment  --  --  unable to assess;other (see comments) 2' aphasia  -         Clinical Swallow Eval    Pharyngeal Phase  --  --  suspected pharyngeal impairment  -      Clinical Swallow Evaluation Summary  ? further fatigue impacting tolerance of diet. No s/s aspiration of current diet with SLP trials.   -  --  --         Pharyngeal Phase Concerns    Pharyngeal Phase Concerns  --  --  cough  -      Cough  --  --  thin  -      Pharyngeal Phase Concerns, Comment  --  --  Discussed eval results with pt's children. They know she would not want feeding tube though unsure on wishes for modified PO diet. Wish for FEES to further assess and discuss GOC pending results  -         Fiberoptic Endoscopic Evaluation of Swallowing (FEES)    Risks/Benefits Reviewed  --  risks/benefits explained;patient  -  --      Nasal Entry  --  right:  -  --         Anatomy and Physiology    Velopharyngeal  --  WFL  -  --      Base of Tongue  --  CNA  -  --      Epiglottis  --  WFL  -  --      Laryngeal Function Breathing   --  symmetrical  -SM  --      Laryngeal Function Phonation  --  CNA  -SM  --      Laryngeal Function to Breath Hold  --  CNA  -SM  --      Secretion Rating Scale (Farooq et al. 1996)  --  0- normal, no visible secretions  -SM  --      Secretion Description  --  thin;clear  -SM  --      Ice Chips  --  elicited swallow;not aspirated  -SM  --      Spontaneous Swallow  --  frequency adequate  -SM  --      Sensory  --  sensed scope  -SM  --      Utensils Used  --  Spoon;Cup;Straw  -SM  --      Consistencies Trialed  --  thin liquids;nectar-thick liquids;pudding/puree;Regular textures  -SM  --         FEES Interpretation    Oral Phase  --  anterior loss;reduced lingual control;prolonged manipulation;increased A-P transit time;prespill of liquids into pharynx;oral residue present  -SM  --         Initiation of Pharyngeal Swallow    Initiation of Pharyngeal Swallow  --  bolus in pyriform sinuses  -SM  --      Pharyngeal Phase  --  impaired pharyngeal phase of swallowing  -SM  --      Aspiration During the Swallow  --  thin liquids;nectar-thick liquids;secondary to delayed swallow initiation or mistiming;secondary to reduced laryngeal elevation;secondary to reduced vestibular closure;other (see comments)  -SM  --      Response to Aspiration  --  no response, silent aspiration;inconsistent response;could not clear subglottic material;other (see comments)  -SM  --      Rosenbek's Scale  --  thin:;nectar:;8-->Level 8  -SM  --      Attempted Compensatory Maneuvers  --  bolus size;bolus presentation style  -SM  --      Response to Attempted Compensatory Maneuvers  --  prevented aspiration  -SM  --      Pharyngeal Phase, Comment  --  Initially only aspiration with large sip thin liquids - cough response and cleared. Fatigue factor and began silent aspiraiton with all size sips of thin and large sips nectar-thick liquids. Could not cue to attempt to clear.   -SM  --         Clinical Impression    SLP Swallowing Diagnosis   moderate;oral dysfunction;other (see comments) known pharyngeal dysphagia  -  moderate;oral dysfunction;pharyngeal dysfunction  -  oral dysfunction;suspected pharyngeal dysfunction  -      Functional Impact  no impact on function  -  risk of aspiration/pneumonia  -  risk of aspiration/pneumonia  -      Rehab Potential/Prognosis, Swallowing  --  adequate, monitor progress closely  -  --      Swallow Criteria for Skilled Therapeutic Interventions Met  --  demonstrates skilled criteria  -  --         Recommendations    Therapy Frequency (Swallow)  --  5 days per week  -  --      Predicted Duration Therapy Intervention (Days)  --  until discharge  -  --      SLP Diet Recommendation  mechanical soft with no mixed consistencies;nectar thick liquids  -  mechanical soft with no mixed consistencies;nectar thick liquids;water between meals after oral care, with supervision;ice chips between meals after oral care, with supervision  -  NPO;other (see comments) x ice chips  -      Recommended Diagnostics  reassess via clinical swallow evaluation;other (see comments) pending tolerance, may repeat with MBS and assess fatigue  -  --  FEES;other (see comments) family wishes to proceed with further assessment  -      Recommended Precautions and Strategies  upright posture during/after eating;small bites of food and sips of liquid;other (see comments) breaks as needed, offer more snacks than full meals  -  upright posture during/after eating;small bites of food and sips of liquid;other (see comments) small sip nectar; tsp-size sip thin H2O between meals  -  --      SLP Rec. for Method of Medication Administration  meds whole;meds crushed;with pudding or applesauce  -  meds whole;meds crushed;with pudding or applesauce;as tolerated  -  meds whole;meds crushed;with pudding or applesauce;as tolerated  -      Monitor for Signs of Aspiration  yes;notify SLP if any concerns;other (see comments) make  NPO x meds and ice chips if further concern  -SM  yes;notify SLP if any concerns  -SM  --      Anticipated Dischage Disposition (SLP)  inpatient rehabilitation facility;anticipate therapy at next level of care  -  --  --        User Key  (r) = Recorded By, (t) = Taken By, (c) = Cosigned By    Initials Name Effective Dates    ALVIN Mokristine Annmarie ROBLERO, MS CCC-SLP 06/22/15 -           EDUCATION  The patient has been educated in the following areas:   Communication Impairment Dysphagia (Swallowing Impairment) Modified Diet Instruction.    SLP Recommendation and Plan  SLP Swallowing Diagnosis: moderate, oral dysfunction, other (see comments)(known pharyngeal dysphagia)  SLP Diet Recommendation: mechanical soft with no mixed consistencies, nectar thick liquids  Recommended Precautions and Strategies: upright posture during/after eating, small bites of food and sips of liquid, other (see comments)(breaks as needed, offer more snacks than full meals)  SLP Rec. for Method of Medication Administration: meds whole, meds crushed, with pudding or applesauce     Monitor for Signs of Aspiration: yes, notify SLP if any concerns, other (see comments)(make NPO x meds and ice chips if further concern)  Recommended Diagnostics: reassess via clinical swallow evaluation, other (see comments)(pending tolerance, may repeat with MBS and assess fatigue)     Anticipated Dischage Disposition (SLP): inpatient rehabilitation facility, anticipate therapy at next level of care                Plan of Care Reviewed With: patient    SLP GOALS     Row Name 08/02/20 1200 08/01/20 1445 08/01/20 0930       Oral Nutrition/Hydration Goal 1 (SLP)    Oral Nutrition/Hydration Goal 1, SLP  --  LTG: Tolerate soft diet and thin liquids without s/s aspiration with 100% accuracy and cues  -SM  --    Time Frame (Oral Nutrition/Hydration Goal 1, SLP)  --  by discharge  -  --       Oral Nutrition/Hydration Goal 2 (SLP)    Oral Nutrition/Hydration Goal 2, SLP  --   Tolerate H2O without s/s aspiration with 100% accuracy and cues for needed precautions  -SM  --    Time Frame (Oral Nutrition/Hydration Goal 2, SLP)  --  short term goal (STG)  -SM  --       Lingual Strengthening Goal 1 (SLP)    Activity (Lingual Strengthening Goal 1, SLP)  --  increase lingual tone/sensation/control/coordination/movement;increase tongue back strength  -SM  --    Increase Lingual Tone/Sensation/Control/Coordination/Movement  --  lingual movement exercises;swallow trials;lingual resistance exercises  -SM  --    Increase Tongue Back Strength  --  lingual movement exercises;lingual resistance exercises  -SM  --    [place]/Accuracy (Lingual Strengthening Goal 1, SLP)  --  with moderate cues (50-74% accuracy)  -SM  --    Time Frame (Lingual Strengthening Goal 1, SLP)  --  short term goal (STG)  -SM  --       Pharyngeal Strengthening Exercise Goal 1 (SLP)    Activity (Pharyngeal Strengthening Goal 1, SLP)  --  increase timing;increase superior movement of the hyolaryngeal complex;increase closure at entrance to airway/closure of airway at glottis  -SM  --    Increase Timing  --  prepping - 3 second prep or suck swallow or 3-step swallow  -SM  --    Increase Superior Movement of the Hyolaryngeal Complex  --  falsetto  -SM  --    Increase Closure at Entrance to Airway/Closure of Airway at Glottis  --  breath hold exercises;hard effortful swallow  -SM  --    [place]/Accuracy (Pharyngeal Strengthening Goal 1, SLP)  --  with moderate cues (50-74% accuracy)  -SM  --    Time Frame (Pharyngeal Strengthening Goal 1, SLP)  --  short term goal (STG)  -SM  --       Swallow Management Recall Goal 1 (SLP)    Activity (Swallow Management Recall Goal 1, SLP)  --  safe diet/liquid level;compensatory swallow strategies/techniques  -SM  --    [place]/Accuracy (Swallow Management Recall Goal 1, SLP)  --  with minimal cues (75-90% accuracy)  -SM  --    Time Frame (Swallow Management Recall Goal 1, SLP)  --   short term goal (STG)  -SM  --       Swallow Compensatory Strategies Goal 1 (SLP)    Activity (Swallow Compensatory Strategies/Techniques Goal 1, SLP)  --  compensatory strategies;during meal intake;during p.o. trials;small bites;small cup sips;small straw sips  -SM  --    [place]/Accuracy (Swallow Compensatory Strategies/Techniques Goal 1, SLP)  --  with minimal cues (75-90% accuracy)  -SM  --    Time Frame (Swallow Compensatory Strategies/Techniques Goal 1, SLP)  --  short term goal (STG)  -SM  --       Comprehend Questions Goal 1 (SLP)    Improve Ability to Comprehend Questions Goal 1 (SLP)  simple yes/no questions;70%;with maximum cues (25-49%)  -SM  --  simple yes/no questions;70%;with maximum cues (25-49%)  -SM    Time Frame (Comprehend Questions Goal 1, SLP)  short term goal (STG)  -SM  --  short term goal ([facility])  -SM    Progress (Ability to Comprehend Questions Goal 1, SLP)  10%;independently (over 90% accuracy)  -SM  --  --    Progress/Outcomes (Comprehend Questions Goal 1, SLP)  continuing progress toward goal  -SM  --  --       Follow Directions Goal 2 (SLP)    Improve Ability to Follow Directions Goal 1 (SLP)  1 step direction without objects;70%;with minimal cues (75-90%)  -SM  --  1 step direction without objects;70%;with minimal cues (75-90%)  -SM    Time Frame (Follow Directions Goal 1, SLP)  short term goal (STG)  -SM  --  short term goal (STG)  -SM    Barriers (Improve Ability to Follow Directions Goal 1, SLP)  hearing loss  -SM  --  --    Progress (Ability to Follow Directions Goal 1, SLP)  60%;with minimal cues (75-90%)  -SM  --  --    Progress/Outcomes (Follow Directions Goal 1, SLP)  good progress toward goal  -SM  --  --       Word Retrieval Skills Goal 1 (SLP)    Improve Word Retrieval Skills By Goal 1 (SLP)  completing automatic speech task, counting;repeating sounds;repeating words;completing open ended structured sentence;answer WH question with one word;50%;with maximum cues (25-49%)   -SM  --  completing automatic speech task, counting;repeating sounds;repeating words;completing open ended structured sentence;answer WH question with one word;50%;with maximum cues (25-49%)  -SM    Time Frame (Word Retrieval Goal 1, SLP)  short term goal (STG)  -SM  --  short term goal (STG)  -SM    Progress (Word Retrieval Skills Goal 1, SLP)  0%;with maximum cues (25-49%)  -SM  --  --    Progress/Outcomes (Word Retrieval Goal 1, SLP)  continuing progress toward goal  -SM  --  --       Motor Planning Goal 1 (SLP)    Improve Motor Planning to Reduce Apraxia by Goal 1 (SLP)  imitating mouth postures;imitating vowels;following isolated oral commands;50%;with maximum cues (25-49%)  -SM  --  imitating mouth postures;imitating vowels;following isolated oral commands;50%;with maximum cues (25-49%)  -SM    Time Frame (Motor Planning Goal 1, SLP)  short term goal (STG)  -SM  --  short term goal (STG)  -SM    Progress (Motor Planning Goal 1, SLP)  40%;with moderate cues (50-74%)  -SM  --  --    Progress/Outcomes (Motor Planning Goal 1, SLP)  good progress toward goal  -SM  --  --    Comment (Motor Planning Goal 1, SLP)  Throat clearing approximation x1. Blowing air onto SLP's hand x2, mouth posturing x1  -SM  --  --       Augmentative/Alternative Communication Objectives Goal 1 (SLP    Communication (Augmentative/Alternative Communication Goal 1, SLP)  improve ability to use non-verbal communication strategies  -SM  --  --    Improve Communication by (Augmentative/Alternative Communication Goal 1, SLP)  use gestures to communicate;demonstrate comprehension of head nod/head shake;70%;with moderate cues (50-74%)  -SM  --  --    Time Frame (Augmentative/Alternative Communication Goal 1, SLP)  short term goal (STG)  -SM  --  --    Progress (Augmentative/Alternative Communication Goal 1, SLP)  10%;with moderate cues (50-74%)  -SM  --  --    Progress/Outcomes (Augmentative/Alternative Communication Goal 1, SLP)  continuing  progress toward goal  -SM  --  --       Additional Goal 1 (SLP)    Additional Goal 1, SLP  LTG: Improve communicaiton in order to participate in care while in hospital setting with 60% accuracy and cues  -SM  --  --    Time Frame (Additional Goal 1, SLP)  by discharge  -SM  --  --    Progress/Outcomes (Additional Goal 1, SLP)  continuing progress toward goal  -SM  --  --      User Key  (r) = Recorded By, (t) = Taken By, (c) = Cosigned By    Initials Name Provider Type    Annmarie Canada MS CCC-SLP Speech and Language Pathologist             Time Calculation:   Time Calculation- SLP     Row Name 08/02/20 1405             Time Calculation- SLP    SLP Start Time  1200  -      SLP Received On  08/02/20  -        User Key  (r) = Recorded By, (t) = Taken By, (c) = Cosigned By    Initials Name Provider Type    Annmarie Canada MS CCC-SLP Speech and Language Pathologist          Therapy Charges for Today     Code Description Service Date Service Provider Modifiers Qty    03225955131 HC ST EVAL ORAL PHARYNG SWALLOW 3 8/1/2020 Annmarie Noonan MS CCC-SLP GN 1    77320648361 HC ST EVAL SPEECH AND PROD W LANG  2 8/1/2020 Annmarie Noonan MS CCC-SLP GN 1    [id number] HC ST FIBEROPTIC ENDO EVAL SWALL 5 8/1/2020 Annmarie Noonan MS CCC-SLP GN 1    [id number] HC ST EVAL ORAL PHARYNG SWALLOW 2 8/2/2020 Annmarie Noonan MS CCC-SLP GN 1    53035166021 HC ST EVAL SPEECH AND PROD W LANG  2 8/2/2020 Annmarie Noonan, MS CCC-SLP GN 1            Patient was not wearing a face mask and did not exhibit coughing during this therapy encounter.  Procedure performed was aerosolizing, involved close contact (within 6 feet for at least 15 minutes or longer), and did not involve contact with infectious secretions or specimens.  Therapist used appropriate personal protective equipment including gloves, standard procedure mask and eye protection.  Appropriate PPE was worn during the entire therapy  session.  Hand hygiene was completed before and after therapy session.     Annmarie Noonan, MS CCC-SLP  8/2/2020

## 2020-08-02 NOTE — PROGRESS NOTES
[facility] Medicine Services  PROGRESS NOTE    Patient Name: Pat Morel  : 1940  MRN: [id number]    Date of Admission: 2020  Primary Care Physician: Anthony Sweet DO    Subjective   Subjective     CC:  F/U Stroke    HPI:  No overnight issues reported. RN at bedside assisting with meds and breakfast. Still nonverbal, but follows commands    Review of Systems  Gen- No fevers, chills  CV- No chest pain, palpitations  Resp- No cough, dyspnea  GI- No N/V/D, abd pain        Objective   Objective     Vital Signs:   Temp:  [96.5 °F (35.8 °C)-98.7 °F (37.1 °C)] 96.5 °F (35.8 °C)  Heart Rate:  [] 103  Resp:  [16-18] 16  BP: (112-145)/(45-84) 122/64  Total (NIH Stroke Scale): 15     Physical Exam:  Constitutional: No acute distress, awake, alert  HENT: NCAT, mucous membranes moist  Respiratory: Clear to auscultation bilaterally, respiratory effort normal   Cardiovascular: RRR, no murmurs, rubs, or gallops, palpable pedal pulses bilaterally  Gastrointestinal: Positive bowel sounds, soft, nontender, nondistended  Musculoskeletal: No bilateral ankle edema  Psychiatric: Appropriate affect, cooperative  Neurologic: follows all commands, attempts to answer questions and will nod yes or no, R>L weakness, aphasic  Skin: No rashes      Results Reviewed:  Results from last 7 days   Lab Units 20   WBC 10*3/mm3  --  5.29  --    HEMOGLOBIN g/dL  --  11.2*  --    HEMOGLOBIN, POC g/dL 11.9*  --   --    HEMATOCRIT %  --  35.0  --    HEMATOCRIT POC % 35*  --   --    PLATELETS 10*3/mm3  --  227  --    INR   --   --  1.0     Results from last 7 days   Lab Units 20   SODIUM mmol/L 132*  --    POTASSIUM mmol/L 4.5  --    CHLORIDE mmol/L 93*  --    CO2 mmol/L 25.0  --    BUN mg/dL 17  --    CREATININE mg/dL 2.00* 2.20*   GLUCOSE mg/dL 86  --    CALCIUM mg/dL 9.6  --    ALT (SGPT) U/L 12  --    AST (SGOT) U/L 20  --       Estimated Creatinine Clearance: 20.3 mL/min (A) (by C-G formula based on SCr of 2 mg/dL (H)).    Microbiology Results Abnormal     Procedure Component Value - Date/Time    Respiratory Panel PCR w/COVID-19(SARS-CoV-2) KEY/SHAILA/RADHA In-House, NP Swab in UTM/VTP, 8-12 Hr TAT - Swab, Nasopharynx [[id number]]  (Normal) Collected:  08/01/20 0048    Lab Status:  Final result Specimen:  Swab from Nasopharynx Updated:  08/01/20 0259     ADENOVIRUS, PCR Not Detected     Coronavirus 229E Not Detected     Coronavirus HKU1 Not Detected     Coronavirus NL63 Not Detected     Coronavirus OC43 Not Detected     COVID19 Not Detected     Human Metapneumovirus Not Detected     Human Rhinovirus/Enterovirus Not Detected     Influenza A PCR Not Detected     Influenza A H1 Not Detected     Influenza A H1 2009 PCR Not Detected     Influenza A H3 Not Detected     Influenza B PCR Not Detected     Parainfluenza Virus 1 Not Detected     Parainfluenza Virus 2 Not Detected     Parainfluenza Virus 3 Not Detected     Parainfluenza Virus 4 Not Detected     RSV, PCR Not Detected     Bordetella pertussis pcr Not Detected     Bordetella parapertussis PCR Not Detected     Chlamydophila pneumoniae PCR Not Detected     Mycoplasma pneumo by PCR Not Detected    Narrative:       Fact sheet for providers: https://docs.[employer]/wp-content/uploads/OYP6007-3801-KS7.1-EUA-Provider-Fact-Sheet-3.pdf    Fact sheet for patients: https://docs.[employer]/wp-content/uploads/JBM6293-2385-DI7.1-EUA-Patient-Fact-Sheet-1.pdf          Imaging Results (Last 24 Hours)     Procedure Component Value Units Date/Time    SLP FEES - Fiberoptic Endo Eval Swallow [[id number]] Resulted:  08/01/20 1541     Updated:  08/01/20 1541    Narrative:       This procedure was auto-finalized with no dictation required.          Results for orders placed during the hospital encounter of 02/06/20   Adult Transthoracic Echo Complete W/ Cont if Necessary Per Protocol    Addendum 1.  Normal  left ventricular size and systolic function, LVEF 65-70%. 2.  Mild to moderate concentric LVH. 3.  Impaired LV diastolic filling pattern consistent with grade 2  diastolic dysfunction and elevated left atrial pressure. 4.  Mild to moderate right ventricular dilation, with normal RV systolic  function 5.  Moderate to severe bilateral atrial enlargement. 6.  Mild to moderate mitral regurgitation. 7.  Moderate to severe tricuspid regurgitation. 8.  Suspected PFO. 9.  Pulmonary hypertension, with RVSP 60 mmHg.      Yomi Higgins MD 2/10/2020 12:37 PM          Narrative 1.  Normal left ventricular size and systolic function, LVEF 65-70%.  2.  Mild to moderate concentric LVH.  3.  Impaired LV diastolic filling pattern consistent with grade 2   diastolic dysfunction and elevated left atrial pressure.  4.  Normal right ventricular size and systolic function.  5.  Moderate to severe bilateral atrial enlargement.  6.  Mild to moderate mitral regurgitation.  7.  Moderate to severe tricuspid regurgitation.  7.  Suspected PFO.       I have reviewed the medications:  Scheduled Meds:  aspirin 81 mg Oral Daily   atorvastatin 40 mg Oral Nightly   budesonide 0.5 mg Nebulization BID - RT   dilTIAZem  mg Oral Q24H   heparin (porcine) 5,000 Units Subcutaneous Q8H   levothyroxine 75 mcg Oral Q AM   lidocaine 1 patch Transdermal Q24H   pantoprazole 40 mg Oral Daily Before Lunch   predniSONE 5 mg Oral Daily With Breakfast   ranolazine 500 mg Oral Q12H   senna 2 tablet Oral Nightly   sodium chloride 10 mL Intravenous Q12H     Continuous Infusions:   PRN Meds:.bisacodyl  •  ondansetron  •  [COMPLETED] Insert peripheral IV **AND** sodium chloride  •  sodium chloride    Assessment/Plan   Assessment & Plan     Active Hospital Problems    Diagnosis  POA   • **Acute ischemic left MCA stroke (CMS/HCC) [I63.512]  Yes   • Acute renal failure (CMS/HCC) [N17.9]  Yes   • Chronic diastolic heart failure (CMS/HCC) [I50.32]  Yes   • CKD  (chronic kidney disease) requiring chronic dialysis (CMS/HCC) [N18.6, Z99.2]  Not Applicable   • Acquired hypothyroidism [E03.9]  Yes   • Dyslipidemia [E78.5]  Yes   • Interstitial lung disease (CMS/HCC) [J84.9]  Yes   • Pulmonary arterial hypertension (CMS/HCC) [I27.21]  Yes   • Cerebrovascular accident (CVA) due to embolism (CMS/[facility]) [I63.9]  Yes   • Persistent atrial fibrillation (CMS/[facility]) [I48.19]  Yes   • Coronary artery disease involving native coronary artery of native heart without angina pectoris [I25.10]  Yes   • Essential hypertension [I10]  Yes      Resolved Hospital Problems   No resolved problems to display.        Brief Hospital Course to date:  Pat Morel is a 80 y.o. female with past medical history of ESRD on HD, hypertension, persistent A. fib, interstitial lung disease, hypothyroidism.  Patient presented with right-sided weakness and complete aphasia, admitted with acute left-sided MCA stroke. Patient remains aphasic, has right facial droop, with weakness improving:    Acute left-sided MCA stroke- ok to move to stroke unit  - stroke work-up per protocol- likely cardioembolic with A fib/ PFO  - echo completed in last 6 mos:  Normal LVEF 65-70%, grade 2 diastolic dysfunction, Moderate to severe bilateral atrial enlargement,  Mild to moderate mitral regurgitation, Moderate to severe tricuspid regurgitation.  Suspected PFO and Pulmonary hypertension.  -r SLP evaluation completed and placed on [facility] soft with nectar thick liquids  --Neuro eval completed and s/o- continue asa and statin  -- continue PT/OT  -- CM for rehab placement    ESRD   -- consult renal for HD    A fib  -- no longer on AC due to risks with renal disease  -- continue diltiazem    HTN  -stable    ILD  -- continue home prednisone 5mg daily    Hypothyroidism  -- continue synthroid     Suspected COVID-19 Ruled out  -this is now negative, okay to d/c airborne precautions, can transfer out of 6A     Overall prognosis is currently  guarded if not poor, patient is a DNR, palliative care has been consulted.       DVT Prophylaxis:  mechanical      Disposition: I expect the patient to be discharged TBD- recommended SNF at [place]    CODE STATUS:   Code Status and Medical Interventions:   Ordered at: 08/01/20 0049     Limited Support to NOT Include:    Intubation    Artificial Nutrition     Level Of Support Discussed With:    Health Care Surrogate     Code Status:    No CPR     Medical Interventions (Level of Support Prior to Arrest):    Limited         Electronically signed by AYANNA Piedra, 08/02/20, 10:40.

## 2020-08-02 NOTE — PROGRESS NOTES
"   LOS: 2 days   Patient Care Team:  Anthony Sweet DO as PCP - General (Family Medicine)  Tien Archer MD as Consulting Physician (Cardiac Electrophysiology)  Xu Block MD (Cardiology)  Yomi Higgins MD as Consulting Physician (Cardiology)  Xochitl Kennedy, WOODROW as Ambulatory  ([facility])    Chief Complaint: stroke     History of Present Illness    Subjective    80 y.o. female seen in follow up for stroke.  Pt sitting in bed awake and alert.  Non verbal but follows one step commands.  Improved right sided weakness.      History taken from: patient chart     Objective:  Vital signs: (most recent): Blood pressure 122/64, pulse 111, temperature 96.5 °F (35.8 °C), temperature source Oral, resp. rate 16, height 147.3 cm (58\"), weight 56.3 kg (124 lb 0.1 oz), SpO2 100 %.    Pupils:  Pupils are equal, round, and reactive to light.            Neurological Exam  Mental Status  Awake. Patient is nonverbal. Global aphasia present. Follows one-step commands.    Cranial Nerves  CN III, IV, VI: Extraocular movements intact bilaterally. Pupils equal round and reactive to light bilaterally.  CN V:  Right: Diminished sensation of the entire right side of the face.  Left: Facial sensation is normal on the left.  CN VII:  Right: There is central facial weakness.  Left: There is no facial weakness.  CN VIII: Hearing is normal.  CN IX, X: Palate elevates symmetrically  CN XI: Shoulder shrug strength is normal.  CN XII: Tongue midline without atrophy or fasciculations.    Motor   Right pronator drift. Right hemiparesis.    Coordination  Right: Finger-to-nose abnormality: Rapid alternating movement abnormality:  Left: Finger-to-nose normal. Rapid alternating movement normal.        Results Review:     I reviewed the patient's new clinical results.  I reviewed the patient's new imaging results and agree with the interpretation.  I reviewed the patient's other test results and agree with the " interpretation         Assessment/Plan       Acute ischemic left MCA stroke (CMS/HCC)    Persistent atrial fibrillation (CMS/HCC)    Coronary artery disease involving native coronary artery of native heart without angina pectoris    Essential hypertension    Cerebrovascular accident (CVA) due to embolism (CMS/HCC)    Pulmonary arterial hypertension (CMS/HCC)    Interstitial lung disease (CMS/HCC)    Dyslipidemia    Acquired hypothyroidism    CKD (chronic kidney disease) requiring chronic dialysis (CMS/HCC)    Chronic diastolic heart failure (CMS/HCC)    Acute renal failure (CMS/HCC)    1.  L MCA infarct - pt improving right sided weakness clinically but remains globally aphasic.   Moderate oral/pharyngeal dysfunction documented by SLP recommend [facility] soft diet.  Continue ASA/statin.  Palliative medicine on case.  Will sign off call if needed.      Jabier Mathis MD  08/02/20  09:49

## 2020-08-02 NOTE — NURSING NOTE
WO nurse consult for speciality bed placement.    Spoke with WOODROW Samano - patient does reposition and ambulating with PT - no speciality bed at this time.    Reconsult if needed  Thank you

## 2020-08-02 NOTE — PLAN OF CARE
Problem: Skin Injury Risk (Adult)  Goal: Identify Related Risk Factors and Signs and Symptoms  Outcome: Ongoing (interventions implemented as appropriate)  Note:   Pt has been intermittently tachycardic since arrival on floor, NIH 19, calm, aphasic, large incontinent void with pure wick applied, turning Q 2

## 2020-08-02 NOTE — PLAN OF CARE
Problem: Patient Care Overview  Goal: Plan of Care Review  Outcome: Ongoing (interventions implemented as appropriate)  Flowsheets (Taken 8/1/2020 1955)  Plan of Care Reviewed With: patient  Note:   Pt asleep most of HS on 2L O2 sats in high 90's.  Incont of sm amt dark shirley urine.  Nonverbal but follows commands.  R side cont to be weaker.

## 2020-08-02 NOTE — PLAN OF CARE
Problem: Patient Care Overview  Goal: Plan of Care Review  Flowsheets (Taken 8/2/2020 1516)  Outcome Summary: OT IE completed post chart review. Pt presented w/ decreased I in ADLs, related t/f compared to gathered PLOF. Pt is difficult historian d/t expressive aphasia and no caregivers present for A in PLOF obtainment . Pt intermittently able to communicate w/ y/no head movements that seemed appropriate. Pt required mod A for gross bed mobility w/ increased A for uprighting trunk and to bring LEs up toward bed in return to supine. Pt grossly maintained neutral sitting at EOB. Pt req'd mod A for sit > standing w/ gait belt and RUE support, min A for standing to sitting. No further OOB activity completed d/t pt underlying impairments and increased lethargy at arrival. Pt able to assist w/ BUE integration in d/d gown w/ gross mod A. Pt appeared to present w/ difficulty in following commands to demo distal reach toward feet for LBD thus u/a to fully assess. Pt was provided w/ adapted cup and utensils w/ built up handle per nursing request on prior unit howevr uncertain if pt will need. PT presents w/ AROM at BUEs w/ drift at RUE. L UE impairments > R however improving. Difficult to assess pt vision, sensory. Pt would benefit from further IPOT POC and recommend IRF at d/c.

## 2020-08-02 NOTE — PLAN OF CARE
Problem: Patient Care Overview  Goal: Plan of Care Review  Outcome: Ongoing (interventions implemented as appropriate)  Flowsheets (Taken 8/2/2020 6087)  Plan of Care Reviewed With: patient  Note:   SLP re-evaluation and treatment completed. Will continue to address dysphagia and aphasia/apraxia. Please see note for further details and recommendations.

## 2020-08-03 PROBLEM — N18.6 ESRD (END STAGE RENAL DISEASE) (HCC): Chronic | Status: ACTIVE | Noted: 2020-01-01

## 2020-08-03 NOTE — CONSULTS
Chart review for diabetes educator consult.    At the time of this review patient A1c is 4.7 , they have no noted history of diabetes and no home medications noted for treatment of diabetes. At this time we do not feel the patient would benefit from diabetes education. Thank you for this consult, should patient needs change please re consult us.

## 2020-08-03 NOTE — PLAN OF CARE
Problem: Patient Care Overview  Goal: Interprofessional Rounds/Family Conf  Flowsheets (Taken 8/3/2020 1409)  Summary: 1300 Palliative Care Interdisciplinary Rounds - Palliative Care Team members present: GRAY Hernandez DO; STEFANY Khan APRN; ANDRES Matthews RN, PN; SADAF Ravi RN, PN; ANDRES Floyd \[facility]\""W, [facility]-; Hospice  HEATH Dugan RN, PN  Participants: advanced practice nurse; nursing; physician; social work/services     Problem: Palliative Care (Adult)  Intervention: Support/Optimize Psychosocial Response  Note:   1140 Visit with patient and patient's  at bedside - patient denies symptom, slightly anxious-tearful when  arrived, clinging to hand.  Patient going down for swallow study.  Patient and spouse receptive to supportive/empathic presence, active listening, and symptom assessment.

## 2020-08-03 NOTE — PROGRESS NOTES
"    [facility] Medicine Services  PROGRESS NOTE    Patient Name: Pat Morel  : 1940  MRN: [id number]    Date of Admission: 2020  Primary Care Physician: Anthony Sweet DO    Subjective   Subjective     CC:  F/U Stroke. Aphasic.    HPI:  No overnight issues reported. Patient with dense expressive aphasia.    Review of Systems  Unable to obtain full ROS but patient shakes her head \"no\" if I ask her if she's in pain.    Objective   Objective     Vital Signs:   Temp:  [96.4 °F (35.8 °C)-98.6 °F (37 °C)] 98.5 °F (36.9 °C)  Heart Rate:  [] 96  Resp:  [16-18] 16  BP: (111-131)/(54-89) 131/89  Total (NIH Stroke Scale): 16     Physical Exam:  Constitutional: No acute distress, awake, alert  HENT: NCAT, mucous membranes moist  Respiratory: Clear to auscultation bilaterally, respiratory effort normal   Cardiovascular: irreg/irreg, palpable pedal pulses bilaterally  Gastrointestinal: Positive bowel sounds, soft, nontender, nondistended  Musculoskeletal: No bilateral ankle edema  Psychiatric: Appropriate affect, cooperative  Neurologic: follows all commands, attempts to answer questions and will nod yes or no, R sided weakness, aphasic  Skin: No rashes      Results Reviewed:  Results from last 7 days   Lab Units 20  0738 20  2146   WBC 10*3/mm3 6.01  --  5.29  --    HEMOGLOBIN g/dL 9.6*  --  11.2*  --    HEMOGLOBIN, POC g/dL  --  11.9*  --   --    HEMATOCRIT % 31.2*  --  35.0  --    HEMATOCRIT POC %  --  35*  --   --    PLATELETS 10*3/mm3 217  --  227  --    INR   --   --   --  1.0     Results from last 7 days   Lab Units 20  0738 20  2146   SODIUM mmol/L 132* 132*  --    POTASSIUM mmol/L 4.5 4.5  --    CHLORIDE mmol/L 99 93*  --    CO2 mmol/L 20.0* 25.0  --    BUN mg/dL 35* 17  --    CREATININE mg/dL 3.17* 2.00* 2.20*   GLUCOSE mg/dL 87 86  --    CALCIUM mg/dL 8.5* 9.6  --    ALT (SGPT) U/L  --  12  --    AST " (SGOT) U/L  --  20  --      Estimated Creatinine Clearance: 12.8 mL/min (A) (by C-G formula based on SCr of 3.17 mg/dL (H)).    Microbiology Results Abnormal     Procedure Component Value - Date/Time    Respiratory Panel PCR w/COVID-19(SARS-CoV-2) KEY/SHAILA/RADHA In-House, NP Swab in UTM/VTP, 8-12 Hr TAT - Swab, Nasopharynx [[id number]]  (Normal) Collected:  08/01/20 0048    Lab Status:  Final result Specimen:  Swab from Nasopharynx Updated:  08/01/20 0259     ADENOVIRUS, PCR Not Detected     Coronavirus 229E Not Detected     Coronavirus HKU1 Not Detected     Coronavirus NL63 Not Detected     Coronavirus OC43 Not Detected     COVID19 Not Detected     Human Metapneumovirus Not Detected     Human Rhinovirus/Enterovirus Not Detected     Influenza A PCR Not Detected     Influenza A H1 Not Detected     Influenza A H1 2009 PCR Not Detected     Influenza A H3 Not Detected     Influenza B PCR Not Detected     Parainfluenza Virus 1 Not Detected     Parainfluenza Virus 2 Not Detected     Parainfluenza Virus 3 Not Detected     Parainfluenza Virus 4 Not Detected     RSV, PCR Not Detected     Bordetella pertussis pcr Not Detected     Bordetella parapertussis PCR Not Detected     Chlamydophila pneumoniae PCR Not Detected     Mycoplasma pneumo by PCR Not Detected    Narrative:       Fact sheet for providers: https://docs.[employer]/wp-content/uploads/YJT7613-1572-YY8.1-EUA-Provider-Fact-Sheet-3.pdf    Fact sheet for patients: https://docs.[employer]/wp-content/uploads/WYG9029-7748-YK5.1-EUA-Patient-Fact-Sheet-1.pdf          Imaging Results (Last 24 Hours)     ** No results found for the last 24 hours. **          Results for orders placed during the hospital encounter of 02/06/20   Adult Transthoracic Echo Complete W/ Cont if Necessary Per Protocol    Addendum 1.  Normal left ventricular size and systolic function, LVEF 65-70%. 2.  Mild to moderate concentric LVH. 3.  Impaired LV diastolic filling pattern consistent with grade 2   diastolic dysfunction and elevated left atrial pressure. 4.  Mild to moderate right ventricular dilation, with normal RV systolic  function 5.  Moderate to severe bilateral atrial enlargement. 6.  Mild to moderate mitral regurgitation. 7.  Moderate to severe tricuspid regurgitation. 8.  Suspected PFO. 9.  Pulmonary hypertension, with RVSP 60 mmHg.      Yomi Higgins MD 2/10/2020 12:37 PM          Narrative 1.  Normal left ventricular size and systolic function, LVEF 65-70%.  2.  Mild to moderate concentric LVH.  3.  Impaired LV diastolic filling pattern consistent with grade 2   diastolic dysfunction and elevated left atrial pressure.  4.  Normal right ventricular size and systolic function.  5.  Moderate to severe bilateral atrial enlargement.  6.  Mild to moderate mitral regurgitation.  7.  Moderate to severe tricuspid regurgitation.  7.  Suspected PFO.       I have reviewed the medications:  Scheduled Meds:    aspirin 81 mg Oral Daily   atorvastatin 40 mg Oral Nightly   budesonide 0.5 mg Nebulization BID - RT   dilTIAZem  mg Oral Q24H   heparin (porcine) 5,000 Units Subcutaneous Q8H   levothyroxine 75 mcg Oral Q AM   lidocaine 1 patch Transdermal Q24H   pantoprazole 40 mg Oral Daily Before Lunch   predniSONE 5 mg Oral Daily With Breakfast   ranolazine 500 mg Oral Q12H   senna 2 tablet Oral Nightly   sodium chloride 10 mL Intravenous Q12H     Continuous Infusions:   PRN Meds:.•  acetaminophen **OR** acetaminophen  •  bisacodyl  •  ondansetron  •  [COMPLETED] Insert peripheral IV **AND** sodium chloride  •  sodium chloride    Assessment/Plan   Assessment & Plan     Active Hospital Problems    Diagnosis  POA   • **Acute ischemic left MCA stroke (CMS/HCC) [I63.512]  Yes   • Acute renal failure (CMS/[facility]) [N17.9]  Yes   • Chronic diastolic heart failure (CMS/[facility]) [I50.32]  Yes   • CKD (chronic kidney disease) requiring chronic dialysis (CMS/[facility]) [N18.6, Z99.2]  Not Applicable   • Acquired hypothyroidism  [E03.9]  Yes   • Dyslipidemia [E78.5]  Yes   • Interstitial lung disease (CMS/HCC) [J84.9]  Yes   • Pulmonary arterial hypertension (CMS/HCC) [I27.21]  Yes   • Cerebrovascular accident (CVA) due to embolism (CMS/HCC) [I63.9]  Yes   • Persistent atrial fibrillation (CMS/HCC) [I48.19]  Yes   • Coronary artery disease involving native coronary artery of native heart without angina pectoris [I25.10]  Yes   • Essential hypertension [I10]  Yes      Resolved Hospital Problems   No resolved problems to display.        Brief Hospital Course to date:  Pat Morel is a 80 y.o. female with past medical history of ESRD on HD, hypertension, persistent A. fib, interstitial lung disease, hypothyroidism.  Patient presented with right-sided weakness and complete aphasia, admitted on 7/31/20 with acute left-sided MCA stroke. Patient remains aphasic, has right facial droop, with weakness improving:    Acute left-sided MCA stroke- ok to move to stroke unit  - stroke work-up per protocol- likely cardioembolic with A fib/ PFO  - echo completed in last 6 mos:  Normal LVEF 65-70%, grade 2 diastolic dysfunction, Moderate to severe bilateral atrial enlargement,  Mild to moderate mitral regurgitation, Moderate to severe tricuspid regurgitation.  Suspected PFO and Pulmonary hypertension.  -Dysphagia - modified diet.  --Neuro eval completed and they have signed off- continue asa and statin  -- continue PT/OT  -- CM for rehab placement    ESRD   -- consult renal for HD    A fib  -- no longer on AC due to risks with renal disease  -- continue diltiazem    HTN  -stable    ILD  -- continue home prednisone 5mg daily    Hypothyroidism  -- continue synthroid  --TSH WNL     COVID-19 Ruled out  -COVID negative     Overall prognosis is currently guarded if not poor, patient is a DNR, palliative care has been consulted.       DVT Prophylaxis:  mechanical      Disposition: I expect the patient to be discharged TBD- recommended SNF at [place]    CODE  STATUS:   Code Status and Medical Interventions:   Ordered at: 08/01/20 0049     Limited Support to NOT Include:    Intubation    Artificial Nutrition     Level Of Support Discussed With:    Health Care Surrogate     Code Status:    No CPR     Medical Interventions (Level of Support Prior to Arrest):    Limited         Electronically signed by Farhana Richmond MD, 08/03/20, 11:12.

## 2020-08-03 NOTE — PLAN OF CARE
Problem: Patient Care Overview  Goal: Plan of Care Review  Flowsheets (Taken 8/3/2020 1000; 1200)  Progress: declining  Plan of Care Reviewed With: patient  Note:   SLP re-evaluation and treatment and MBS completed. Will address mild-moderate oral and moderate-severe pharyngeal dysphagia during treatment. Will continue to address aphasia during treatment. Please see note for further details and recommendations.

## 2020-08-03 NOTE — PLAN OF CARE
Problem: Skin Injury Risk (Adult)  Goal: Identify Related Risk Factors and Signs and Symptoms  Outcome: Ongoing (interventions implemented as appropriate)  Note:   Inpatient dialysis contacted about patients Monday, wednesday, Friday schedule, pt used communication board to indicate that she is lonely, daughter and  visited today. several episodes of crying today. Alert to self and family and situation. Patient off the floor to dialysis. Yesterday and today one large incontinent void per each day shift

## 2020-08-03 NOTE — PROGRESS NOTES
"   LOS: 2 days    Patient Care Team:  Anthony Sweet DO as PCP - General (Family Medicine)  Tien Archer MD as Consulting Physician (Cardiac Electrophysiology)  Xu Block MD (Cardiology)  Yomi Higgins MD as Consulting Physician (Cardiology)  Xochitl Kennedy, [place] as Ambulatory  ([facility] [place])        Objective     Vital Signs:  Blood pressure 129/77, pulse 92, temperature 97.7 °F (36.5 °C), temperature source Oral, resp. rate 18, height 147.3 cm (58\"), weight 56.3 kg (124 lb 0.1 oz), SpO2 100 %.    Flowsheet Rows      First Filed Value   Admission Height  147.3 cm (58\") Documented at 08/01/2020 0054   Admission Weight  56.3 kg (124 lb 0.1 oz) Documented at 07/31/2020 2135            Labs:  Results from last 7 days   Lab Units 07/31/20 2228 07/31/20 2219   WBC 10*3/mm3  --  5.29   HEMOGLOBIN g/dL  --  11.2*   HEMOGLOBIN, POC g/dL 11.9*  --    PLATELETS 10*3/mm3  --  227     Results from last 7 days   Lab Units 07/31/20  2219 07/31/20  2146   SODIUM mmol/L 132*  --    POTASSIUM mmol/L 4.5  --    CHLORIDE mmol/L 93*  --    CO2 mmol/L 25.0  --    BUN mg/dL 17  --    CREATININE mg/dL 2.00* 2.20*   CALCIUM mg/dL 9.6  --    MAGNESIUM mg/dL 2.2  --    ALBUMIN g/dL 4.60  --      Results from last 7 days   Lab Units 07/31/20  2219   ALK PHOS U/L 96   BILIRUBIN mg/dL 0.6   ALT (SGPT) U/L 12   AST (SGOT) U/L 20     Results from last 7 days   Lab Units 07/31/20  2231   PH, ARTERIAL pH units 7.453*   PO2 ART mm Hg 62.3*   PCO2, ARTERIAL mm Hg 37.7   HCO3 ART mmol/L 26.4*           Estimated Creatinine Clearance: 20.3 mL/min (A) (by C-G formula based on SCr of 2 mg/dL (H)).       Chart reviewed.  Will see pt in am.  Call if needed to see sooner  A/P:    ESRD:   Electrolytes stable. BP stable. No emergent need for HD.  Will evaluate in am for HD.    HTN:  BP stable    Hyponatremia:  Mild.  Correct with HD     Anemia:  At goal     CVA:  Now aphasic.  avoid Hypotension with HD.     Jabier Purdy" MD Jamel  08/02/20  23:10

## 2020-08-03 NOTE — CONSULTS
Referring Provider: Padmini Garcia  Reason for Consultation: ESRD     Subjective     Chief complaint Stroke     History of present illness:  80 year old female with past medical hx of ESRD MWF under care of Dr Tsang started in march 2020, AVF placed by Dr Perez about a month back (per tenzin) who was admitted for Stroke. She has hx of CVA, CAD and hypertension. She is aphasic and hx was provided by the daughter. She seems to be in no distress. Nephrology service has been consulted for ESRD management.     History  Past Medical History:   Diagnosis Date   • Acute on chronic diastolic congestive heart failure (CMS/HCC) 12/20/2019   • Anemia    • Angina at rest (CMS/HCC)    • Arthritis    • Atrial fibrillation (CMS/HCC)    • Coronary artery disease    • Disease of thyroid gland    • Elevated cholesterol    • GERD without esophagitis 12/2/2019   • History of blood transfusion    • History of colonoscopy 11/19/2019   • History of melena 11/19/2019   • History of stomach ulcers    • Hypertension    • Impaired functional mobility, balance, gait, and endurance    • Impaired functional mobility, balance, gait, and endurance    • Osteoporosis    • Positive TB test    • Stroke (CMS/HCC)     No deficits   ,   Past Surgical History:   Procedure Laterality Date   • BRONCHOSCOPY Bilateral 4/12/2019    Procedure: BRONCHOSCOPY;  Surgeon: Jeff Laura MD;  Location:  [facility];  Service: Pulmonary   • BYPASS GRAFT     • COLONOSCOPY N/A 10/15/2019    Procedure: COLONOSCOPY;  Surgeon: Fredi Aaron MD;  Location:  [facility];  Service: Gastroenterology   • CORONARY ANGIOPLASTY WITH STENT PLACEMENT     • CORONARY ARTERY BYPASS GRAFT  10/18/2010   • ENDOSCOPY N/A 10/14/2019    Procedure: ESOPHAGOGASTRODUODENOSCOPY;  Surgeon: Fredi Aaron MD;  Location:  [facility];  Service: Gastroenterology   • HYSTERECTOMY     • INSERTION HEMODIALYSIS CATHETER N/A 2/14/2020    Procedure: HEMODIALYSIS  CATHETER INSERTION(DINO);  Surgeon: Familia Paez MD;  Location: [facility];  Service: General;  Laterality: N/A;   • STOMACH SURGERY  08/11/2019    Upper GI bleed    • STOMACH SURGERY  10/13/2019   ,   Family History   Problem Relation Age of Onset   • Cancer Mother    • Arthritis Mother    • No Known Problems Father    • Liver disease Sister    ,   Social History     Tobacco Use   • Smoking status: Never Smoker   • Smokeless tobacco: Never Used   Substance Use Topics   • Alcohol use: No   • Drug use: No   ,   Medications Prior to Admission   Medication Sig Dispense Refill Last Dose   • aspirin 81 MG EC tablet Take 1 tablet by mouth Daily.   Taking   • atorvastatin (LIPITOR) 40 MG tablet Take 1 tablet by mouth Every Night. 90 tablet 3 Taking   • budesonide (PULMICORT) 0.5 MG/2ML nebulizer solution    Taking   • dilTIAZem CD (CARDIZEM CD) 120 MG 24 hr capsule Take 1 capsule by mouth Daily. 90 capsule 2    • levothyroxine (SYNTHROID, LEVOTHROID) 75 MCG tablet Take 1 tablet by mouth Daily. 90 tablet 2 Taking   • pantoprazole (PROTONIX) 40 MG EC tablet Take 1 tablet by mouth Daily. 90 tablet 3 Taking   • predniSONE (DELTASONE) 5 MG tablet Take 1 tablet by mouth Daily. 90 tablet 1    • ranolazine (RANEXA) 500 MG 12 hr tablet Take 1 tablet by mouth Every 12 (Twelve) Hours. 180 tablet 2    , Scheduled Meds:    aspirin 81 mg Oral Daily   atorvastatin 40 mg Oral Nightly   budesonide 0.5 mg Nebulization BID - RT   dilTIAZem 60 mg Oral Once   [START ON 8/4/2020] dilTIAZem  mg Oral Q24H   heparin (porcine) 5,000 Units Subcutaneous Q8H   levothyroxine 75 mcg Oral Q AM   lidocaine 1 patch Transdermal Q24H   pantoprazole 40 mg Oral Daily Before Lunch   predniSONE 5 mg Oral Daily With Breakfast   ranolazine 500 mg Oral Q12H   senna 2 tablet Oral Nightly   sodium chloride 10 mL Intravenous Q12H   , Continuous Infusions:   , PRN Meds:  •  acetaminophen **OR** acetaminophen  •  bisacodyl  •  ondansetron  •  [COMPLETED]  Insert peripheral IV **AND** sodium chloride  •  sodium chloride and Allergies:  Tuberculin tests    Review of Systems  Pertinent items are noted in HPI    Objective     Vital Signs  Temp:  [97.7 °F (36.5 °C)-98.7 °F (37.1 °C)] 98.7 °F (37.1 °C)  Heart Rate:  [] 95  Resp:  [16-18] 16  BP: (111-131)/(55-89) 116/56    I/O this shift:  In: 160 [P.O.:160]  Out: -   I/O last 3 completed shifts:  In: 236 [P.O.:236]  Out: -     Physical Exam:     General Appearance:    Alert, cooperative, in no acute distress   Head:    Normocephalic, without obvious abnormality, atraumatic   Eyes:            Lids and lashes normal, conjunctivae and sclerae normal, no   icterus, no pallor   Ears:    Ears appear intact with no abnormalities noted       Neck:   Supple, trachea midline, no thyromegaly, no     carotid bruit, no JVD       Lungs:     Clear to auscultation,respirations regular, even and                   unlabored    Heart:    Regular rhythm and normal rate, normal S1 and S2, no            murmur, no gallop, no rub, no click       Abdomen:     Normal bowel sounds, soft        non-tender, non-distended, no guarding       Extremities:   No edema, no cyanosis, no redness   Pulses:   Pulses palpable and equal bilaterally           Neurologic:   Aphasic, right sided weakness. Alert       Results Review:   I reviewed the patient's new clinical results.    WBC WBC   Date Value Ref Range Status   08/03/2020 6.01 3.40 - 10.80 10*3/mm3 Final   07/31/2020 5.29 3.40 - 10.80 10*3/mm3 Final      HGB Hemoglobin   Date Value Ref Range Status   08/03/2020 9.6 (L) 12.0 - 15.9 g/dL Final   07/31/2020 11.9 (L) 12.0 - 17.0 g/dL Final   07/31/2020 11.2 (L) 12.0 - 15.9 g/dL Final      HCT Hematocrit   Date Value Ref Range Status   08/03/2020 31.2 (L) 34.0 - 46.6 % Final   07/31/2020 35 (L) 38 - 51 % Final   07/31/2020 35.0 34.0 - 46.6 % Final      Platlets No results found for: LABPLAT   MCV MCV   Date Value Ref Range Status   08/03/2020 98.4 (H)  79.0 - 97.0 fL Final   07/31/2020 96.2 79.0 - 97.0 fL Final          Sodium Sodium   Date Value Ref Range Status   08/03/2020 132 (L) 136 - 145 mmol/L Final   07/31/2020 132 (L) 136 - 145 mmol/L Final      Potassium Potassium   Date Value Ref Range Status   08/03/2020 4.5 3.5 - 5.2 mmol/L Final   07/31/2020 4.5 3.5 - 5.2 mmol/L Final      Chloride Chloride   Date Value Ref Range Status   08/03/2020 99 98 - 107 mmol/L Final   07/31/2020 93 (L) 98 - 107 mmol/L Final      CO2 CO2   Date Value Ref Range Status   08/03/2020 20.0 (L) 22.0 - 29.0 mmol/L Final   07/31/2020 25.0 22.0 - 29.0 mmol/L Final      BUN BUN   Date Value Ref Range Status   08/03/2020 35 (H) 8 - 23 mg/dL Final   07/31/2020 17 8 - 23 mg/dL Final      Creatinine Creatinine   Date Value Ref Range Status   08/03/2020 3.17 (H) 0.57 - 1.00 mg/dL Final   07/31/2020 2.00 (H) 0.57 - 1.00 mg/dL Final   07/31/2020 2.20 (H) 0.60 - 1.30 mg/dL Final     Comment:     Serial Number: [id number]:  [id number]      Calcium Calcium   Date Value Ref Range Status   08/03/2020 8.5 (L) 8.6 - 10.5 mg/dL Final   07/31/2020 9.6 8.6 - 10.5 mg/dL Final      PO4 No results found for: CAPO4   Albumin Albumin   Date Value Ref Range Status   08/03/2020 3.40 (L) 3.50 - 5.20 g/dL Final   07/31/2020 4.60 3.50 - 5.20 g/dL Final      Magnesium Magnesium   Date Value Ref Range Status   07/31/2020 2.2 1.6 - 2.4 mg/dL Final      Uric Acid No results found for: URICACID           aspirin 81 mg Oral Daily   atorvastatin 40 mg Oral Nightly   budesonide 0.5 mg Nebulization BID - RT   dilTIAZem 60 mg Oral Once   [START ON 8/4/2020] dilTIAZem  mg Oral Q24H   heparin (porcine) 5,000 Units Subcutaneous Q8H   levothyroxine 75 mcg Oral Q AM   lidocaine 1 patch Transdermal Q24H   pantoprazole 40 mg Oral Daily Before Lunch   predniSONE 5 mg Oral Daily With Breakfast   ranolazine 500 mg Oral Q12H   senna 2 tablet Oral Nightly   sodium chloride 10 mL Intravenous Q12H          Assessment/Plan        Acute ischemic left MCA stroke (CMS/HCC)    Persistent atrial fibrillation (CMS/HCC)    Coronary artery disease involving native coronary artery of native heart without angina pectoris    Essential hypertension    Cerebrovascular accident (CVA) due to embolism (CMS/HCC)    Pulmonary arterial hypertension (CMS/HCC)    Interstitial lung disease (CMS/HCC)    Dyslipidemia    Acquired hypothyroidism    CKD (chronic kidney disease) requiring chronic dialysis (CMS/HCC)    Chronic diastolic heart failure (CMS/HCC)    Acute renal failure (CMS/HCC)    ESRD (end stage renal disease) (CMS/[facility])      1- ESRD - MWF - Dr Santamaria is her nephrologist   2- HTN   3- Anemia of chronic disease.   4- Mild hyponatremia     Plan:  - HD today. UF as tolerated .  - Renal diet  - Will hold EPO for now   - Monitor H/H and transfuse for hgb less than 7.0   - electrolytes will be corrected with HD.     I discussed the patients findings and my recommendations with patient and nursing staff    Sri Cardenas MD  08/03/20

## 2020-08-03 NOTE — PROGRESS NOTES
Discharge Planning Assessment  [facility]     Patient Name: Pat Morel  MRN: [id number]  Today's Date: 8/3/2020    Admit Date: 7/31/2020    Discharge Needs Assessment     Row Name 08/03/20 1341       Living Environment    Lives With  spouse    Current Living Arrangements  home/apartment/condo    Primary Care Provided by  self    Family Caregiver if Needed  child(jennie), adult    Able to Return to Prior Arrangements  yes       Transition Planning    Patient/Family Anticipates Transition to  home with family       Discharge Needs Assessment    Readmission Within the Last 30 Days  no previous admission in last 30 days    Concerns to be Addressed  discharge planning;adjustment to diagnosis/illness;care coordination/care conferences    Equipment Currently Used at Home  walker, rolling;rollator;commode;hospital bed;wheelchair;lift device    Anticipated Changes Related to Illness  inability to care for self    Outpatient/Agency/Support Group Needs  homecare agency    Discharge Facility/Level of Care Needs  home with home health    Current Discharge Risk  physical impairment;cognitively impaired;dependent with mobility/activities of daily living;chronically ill        Discharge Plan     Row Name 08/03/20 1343       Plan    Plan  home    Plan Comments  I met with Mrs. Morel's daughter Yary at the bedside. Mrs. Morel lives in [facility] with her . Prior to this hospitalization she could ambulate independently with a rolling walker. She was independent with activities of daily living. [place] [facility] [place]. Yary transports her there by car. Palliative care is following and assisting with goals of care. Yary does not want her mother to transfer to a skilled nursing facility. She prefers to care for her in her own home. She states that they have a hospital bed, wheelchair, and bedside commode at home if needed. She is going to discuss this with her  brother further, but would prefer for family to care for her. We discussed potential discharge options including home with home health services. She had a lot of questions regarding long term prognosis and specifically whether or not her mother should resume outpatient hemodialysis. I encouraged her to discuss this with Palliative care.    Final Discharge Disposition Code  30 - still a patient        Destination      Coordination has not been started for this encounter.      Durable Medical Equipment      Coordination has not been started for this encounter.      Dialysis/Infusion      Service Provider Request Status Selected Services Address Phone Number Fax Number    [facility] Pending - No Request Sent N/A [address] [place] [address] [phone] --      Home Medical Care      Coordination has not been started for this encounter.      Therapy      Coordination has not been started for this encounter.      Community Resources      Coordination has not been started for this encounter.          Demographic Summary     Row Name 08/03/20 134       General Information    General Information Comments  I confirmed that Anthony Sweet is Mrs. Morel's PCP. [employer] is her insurer.       Contact Information    Contact Information Obtained for  power of  for finances;power of  for healthcare    Contact Information Comments  Daughter Yary is the patient's POA.        Functional Status    No documentation.       Psychosocial    No documentation.       Abuse/Neglect    No documentation.       Legal    No documentation.       Substance Abuse    No documentation.       Patient Forms    No documentation.           Vincent Jimenez RN

## 2020-08-03 NOTE — DISCHARGE INSTR - DIET
MBS/VFSS 8/3/2020  Reason for Referral  Patient was referred for a MBS to assess the efficiency of his/her swallow function, rule out aspiration and make recommendations regarding safe dietary consistencies, effective compensatory strategies, and safe eating environment.             Recommendations/Treatment  SLP Swallowing Diagnosis: mild-moderate, oral dysfunction, mod-severe, pharyngeal dysfunction  Functional Impact: risk of aspiration/pneumonia, risk of malnutrition, risk of dehydration  Rehab Potential/Prognosis, Swallowing: adequate, monitor progress closely  Swallow Criteria for Skilled Therapeutic Interventions Met: demonstrates skilled criteria  Therapy Frequency (Swallow): 5 days per week  Predicted Duration Therapy Intervention (Days): until discharge  SLP Diet Recommendation: mechanical soft with no mixed consistencies, honey thick liquids, other (see comments)(via TEASPOON ONLY (no cup or straw))  Recommended Diagnostics: VFSS (MBS), other (see comments)(completed 8/3/2020)  Recommended Precautions and Strategies: liquid via spoon only, no straw, small bites of food and sips of liquid, upright posture during/after eating, other (see comments)(aspiration precautions; oral care BID/PRN)  SLP Rec. for Method of Medication Administration: meds whole, meds crushed, with pudding or applesauce, as tolerated  Monitor for Signs of Aspiration: yes, notify SLP if any concerns, cough, throat clearing, upper respiratory, pneumonia, right lower lobe infiltrates  Anticipated Dischage Disposition (SLP): unknown, anticipate therapy at next level of care    Instrumental Set-up  Utensils Used: spoon, cup  Consistencies Trialed: thin liquids, nectar/syrup-thick liquids, honey-thick liquids, pudding thick, regular textures    Oral Preparation/ Oral Phase  Oral Prep Phase: impaired oral phase of swallowing  Oral Transit Phase: impaired  Oral Residue: impaired  Oral Preparatory Phase: reduced lip closure around utensil,  anterior loss, prolonged manipulation, reduced lip opening  Reduced Lip Opening: all consistencies tested  Reduced Lip Closure Around Utensil: thin liquids, nectar-thick liquids, honey-thick liquids, secondary to reduced labial seal  Anterior Loss: thin liquids, nectar-thick liquids, secondary to reduced labial seal  Prolonged Manipulation: regular textures, secondary to reduced lingual strength, secondary to reduced lingual range of motion  Impaired Oral Transit Phase: delayed initiation of bolus transit, increased A-P transit time  Delayed Initiation of bolus transit: all consistencies tested, secondary to impaired cognitive status  Impaired Oral Residue: lingual residue, lateral sulci residue  Lingual Residue: regular textures, secondary to reduced lingual strength, secondary to reduced lingual range of motion  Lateral Sulci Residue: regular textures, secondary to reduced lingual strength, secondary to reduced lingual range of motion  Response to Oral Residue: cleared residue, with spontaneous subsequent swallow, with liquid wash    Pharyngeal Phase  Initiation of Pharyngeal Swallow: bolus in pyriform sinuses  Pharyngeal Phase: impaired pharyngeal phase of swallowing  Penetration Before the Swallow: thin liquids, secondary to delayed swallow initiation or mistiming  Aspiration During the Swallow: thin liquids, nectar-thick liquids, honey-thick liquids, secondary to delayed swallow initiation or mistiming, secondary to reduced laryngeal elevation, secondary to reduced vestibular closure, other (see comments)(honey-thick via cup only)  Aspiration After the Swallow: thin liquids, nectar-thick liquids, honey-thick liquids, secondary to residue, in laryngeal vestibule  Response to Penetration: no response  Response to Aspiration: inconsistent response, weak cough/throat clear, could not clear subglottic material, no response, silent aspiration  Pharyngeal Residue: thin liquids, nectar-thick liquids, honey-thick  liquids, base of tongue, valleculae, laryngeal vestibule, secondary to reduced base of tongue retraction, secondary to reduced posterior pharyngeal wall stripping, secondary to reduced laryngeal elevation, secondary to reduced hyolaryngeal excursion  Response to Residue: cleared residue with spontaneous subsequent swallow, other (see comments)(except vestibular residue)  Attempted Compensatory Maneuvers: bolus size, bolus presentation style, other (see comments)(difficult to assess further 2' aphasia/apraxia)  Response to Attempted Compensatory Maneuvers: prevented aspiration, other (see comments)(w/ honey-thick liquids via tsp only)  Pharyngeal Phase, Comment: Moderate-severe pharyngeal dysphagia. Worse c/t previous FEES on 8/1/20. Penetration before w/ aspiration during the swallow w/ thin liquids. Initially aspiration was in small amounts. Pt quickly fatigued and grossly aspirated nectar-thick & honey-thick liquids via cup during the swallow 2' significantly delayed swallow initiation and reduced vestibular closure. Pt inconsistently aspirated nectar-thick liquids via tsp once fatigued. No penetration/aspiration w/ honey-thick liquids via teaspoon only, pudding, or solids. Minimal-mild BOT/vallecular residue w/ liquids that cleared w/ spontaneous secondary swallow. Otherwise vestibular residue present that pt noted to aspirate t/o exam w/ thins/nectar/honey. Pt noted w/ reduced pharyngeal stripping wave, decr'd elevation, and reduced excursion. Most of aspiration was silent (primarily w/ thins), but pt sensed episodes of gross aspiration w/ delayed coughing.     Cervical Esophageal Phase  Esophageal Phase: other (see comments)(no immediate retrograde flow noted)

## 2020-08-03 NOTE — MBS/VFSS/FEES
Acute Care - Speech Language Pathology   Swallow Re-Evaluation [facility]   Clinical Swallow Evaluation  Modified Barium Swallow Study (MBS)  +cognitive-communication treatment       Patient Name: Pat Morel  : 1940  MRN: [id number]  Today's Date: 8/3/2020               Admit Date: 2020    Visit Dx:     ICD-10-CM ICD-9-CM   1. Acute ischemic left MCA stroke (CMS/HCC) I63.512 434.91   2. Oropharyngeal dysphagia R13.12 787.22   3. Aphasia R47.01 784.3   4. 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 (CMS/HCC)   • Dyslipidemia   • Acquired hypothyroidism   • CKD (chronic kidney disease) requiring chronic dialysis (CMS/HCC)   • GERD without esophagitis   • Iron deficiency anemia   • Chronic diastolic heart failure (CMS/HCC)   • Acute renal failure (CMS/HCC)   • Recurrent falls   • Normocytic anemia   • Coagulopathy (CMS/HCC)   • Physical deconditioning   • CKD (chronic kidney disease) stage 4, GFR 15-29 ml/min (CMS/HCC)   • Acute ischemic left MCA stroke (CMS/HCC)   • ESRD (end stage renal disease) (CMS/HCC)     Past Medical History:   Diagnosis Date   • Acute on chronic diastolic congestive heart failure (CMS/HCC) 2019   • Anemia    • Angina at rest (CMS/HCC)    • Arthritis    • Atrial fibrillation (CMS/HCC)    • Coronary artery disease    • Disease of thyroid gland    • Elevated cholesterol    • GERD without esophagitis 2019   • History of blood transfusion    • History of  colonoscopy 11/19/2019   • History of melena 11/19/2019   • History of stomach ulcers    • Hypertension    • Impaired functional mobility, balance, gait, and endurance    • Impaired functional mobility, balance, gait, and endurance    • Osteoporosis    • Positive TB test    • Stroke (CMS/HCC)     No deficits     Past Surgical History:   Procedure Laterality Date   • BRONCHOSCOPY Bilateral 4/12/2019    Procedure: BRONCHOSCOPY;  Surgeon: Jeff Laura MD;  Location:  [facility];  Service: Pulmonary   • BYPASS GRAFT     • COLONOSCOPY N/A 10/15/2019    Procedure: COLONOSCOPY;  Surgeon: Fredi Aaron MD;  Location:  [facility];  Service: Gastroenterology   • CORONARY ANGIOPLASTY WITH STENT PLACEMENT     • CORONARY ARTERY BYPASS GRAFT  10/18/2010   • ENDOSCOPY N/A 10/14/2019    Procedure: ESOPHAGOGASTRODUODENOSCOPY;  Surgeon: Fredi Aaron MD;  Location:  [facility];  Service: Gastroenterology   • HYSTERECTOMY     • INSERTION HEMODIALYSIS CATHETER N/A 2/14/2020    Procedure: HEMODIALYSIS CATHETER INSERTION(DINO);  Surgeon: Familia Paez MD;  Location:  [facility];  Service: General;  Laterality: N/A;   • STOMACH SURGERY  08/11/2019    Upper GI bleed    • STOMACH SURGERY  10/13/2019        SWALLOW EVALUATION (last 72 hours)      SLP Adult Swallow Evaluation     Row Name 08/03/20 1200 08/03/20 1000       Rehab Evaluation    Document Type  MBS evaluation (see below)  re-evaluation;therapy note (daily note)  -RD    Patient Observations  --  alert, cooperative    Patient/Family Observations  --  no family present       General Information    Patient Profile Reviewed  --  yes    Pertinent History Of Current Problem  --  See previous evaluation. Re-evaluation 2' reports of difficulty swallowing on dysphagia diet.    Current Method of Nutrition  --  mechanical soft, no mixed consistencies;nectar/syrup-thick liquids;other (see comments) small sips  -RD    Prior Level of Function-Swallowing   --  safe, efficient swallowing in all situations  -RD    Plans/Goals Discussed with  --  patient;agreed upon  -RD    Barriers to Rehab  --  none identified  -RD    Patient's Goals for Discharge  --  patient could not state  -RD    Family Goals for Discharge  --  --       Pain Assessment    Additional Documentation  --  Pain Scale: Numbers Pre/Post-Treatment (Group)  -RD       Pain Scale: Numbers Pre/Post-Treatment    Pain Scale: Numbers, Pretreatment  --  0/10 - no pain  -RD    Pain Scale: Numbers, Post-Treatment  --  0/10 - no pain  -RD    Pre/Post Treatment Pain Comment  --  Pt able to shake head no when asked about pain  -RD       Pain Scale: FACES Pre/Post-Treatment    Pain: FACES Scale, Pretreatment  --  --    Pain: FACES Scale, Post-Treatment  --  --       Oral Motor and Function    Mucosal Quality  --  moist, healthy  -RD    Volitional Swallow  --  unable to elicit  -RD    Volitional Cough  --  unable to elicit  -RD       Oral Musculature and Cranial Nerve Assessment    Oral Motor General Assessment  --  unable to assess;other (see comments);oral labial or buccal impairment 2' aphasia/apraxia  -RD    Oral Labial or Buccal Impairment, Detail, Cranial Nerve VII (Facial):  --  right labial droop;reduced ROM  -RD    Oral Motor, Comment  --  Inconsistently following oral motor commands  -RD       General Eating/Swallowing Observations    Respiratory Support Currently in Use  --  room air  -RD    Eating/Swallowing Skills  --  fed by SLP  -RD    Positioning During Eating  --  upright 90 degree;upright in bed  -RD    Utensils Used  --  spoon;cup;straw  -RD    Consistencies Trialed  --  nectar/syrup-thick liquids;pureed;regular textures  -RD       Respiratory    Respiratory Status  --  increase in respiratory rate;during swallowing/eating  -RD       Clinical Swallow Eval    Oral Prep Phase  --  impaired  -RD    Oral Transit  --  impaired  -RD    Oral Residue  --  WFL  -RD    Pharyngeal Phase  --  suspected pharyngeal  impairment  -RD    Clinical Swallow Evaluation Summary  --  --       Oral Prep Concerns    Oral Prep Concerns  --  increased prep time  -RD    Increased Prep Time  --  regular consistencies  -RD       Oral Transit Concerns    Oral Transit Concerns  --  increased oral transit time  -RD    Increased Oral Transit Time  --  regular consistencies  -RD       Pharyngeal Phase Concerns    Pharyngeal Phase Concerns  --  multiple swallows;cough  -RD    Multiple Swallows  --  nectar  -RD    Cough  --  nectar  -RD    Pharyngeal Phase Concerns, Comment  --  Repeat clinical dysphagia eval 2' concerns for worsening swallowing difficulty noted. Trials of nectar, pureed, and solid given. (recent FEES 8/1 revealed oropharyngeal dysphagia and aspiration of thins & large sips of nectar-thick). Overt s/s of aspiration c/b inconsistent coughing & throat clearing w/ nectar-thick via cup & straw. Incr'd RR t/o PO trials. No immediate s/s w/ pureed/solids. Suspect worsening pharyngeal dysphagia. Safest NPO, meds crushed in pureed until MBS (pt w/ COVID-19 negative results this admission). RN notified.   -RD       MBS/VFSS    Utensils Used  spoon;cup  -RD  --    Consistencies Trialed  thin liquids;nectar/syrup-thick liquids;honey-thick liquids;pudding thick;regular textures  -RD  --       MBS/VFSS Interpretation    Oral Prep Phase  impaired oral phase of swallowing  -RD  --    Oral Transit Phase  impaired  -RD  --    Oral Residue  impaired  -RD  --       Oral Preparatory Phase    Oral Preparatory Phase  reduced lip closure around utensil;anterior loss;prolonged manipulation;reduced lip opening  -RD  --    Reduced Lip Opening  all consistencies tested  -RD  --    Reduced Lip Closure Around Utensil  thin liquids;nectar-thick liquids;honey-thick liquids;secondary to reduced labial seal  -RD  --    Anterior Loss  thin liquids;nectar-thick liquids;secondary to reduced labial seal  -RD  --    Prolonged Manipulation  regular textures;secondary to  reduced lingual strength;secondary to reduced lingual range of motion  -RD  --       Oral Transit Phase    Impaired Oral Transit Phase  delayed initiation of bolus transit;increased A-P transit time  -RD  --    Delayed Initiation of bolus transit  all consistencies tested;secondary to impaired cognitive status  -RD  --    Increased A-P Transit Time  all consistencies tested;secondary to reduced lingual control  -RD  --       Oral Residue    Impaired Oral Residue  lingual residue;lateral sulci residue  -RD  --    Lingual Residue  regular textures;secondary to reduced lingual strength;secondary to reduced lingual range of motion  -RD  --    Lateral Sulci Residue  regular textures;secondary to reduced lingual strength;secondary to reduced lingual range of motion  -RD  --    Response to Oral Residue  cleared residue;with spontaneous subsequent swallow;with liquid wash  -RD  --       Initiation of Pharyngeal Swallow    Initiation of Pharyngeal Swallow  bolus in pyriform sinuses  -RD  --    Pharyngeal Phase  impaired pharyngeal phase of swallowing  -RD  --    Penetration Before the Swallow  thin liquids;secondary to delayed swallow initiation or mistiming  -RD  --    Aspiration During the Swallow  thin liquids;nectar-thick liquids;honey-thick liquids;secondary to delayed swallow initiation or mistiming;secondary to reduced laryngeal elevation;secondary to reduced vestibular closure;other (see comments) honey-thick via cup only  -RD  --    Aspiration After the Swallow  thin liquids;nectar-thick liquids;honey-thick liquids;secondary to residue;in laryngeal vestibule  -RD  --    Response to Penetration  no response  -RD  --    Response to Aspiration  inconsistent response;weak cough/throat clear;could not clear subglottic material;no response, silent aspiration  -RD  --    Rosenbek's Scale  thin:;nectar:;honey:;8--->level 8;pudding/puree:;regular textures:;1--->level 1  -RD  --    Pharyngeal Residue  thin  liquids;nectar-thick liquids;honey-thick liquids;base of tongue;valleculae;laryngeal vestibule;secondary to reduced base of tongue retraction;secondary to reduced posterior pharyngeal wall stripping;secondary to reduced laryngeal elevation;secondary to reduced hyolaryngeal excursion  -RD  --    Response to Residue  cleared residue with spontaneous subsequent swallow;other (see comments) except vestibular residue  -RD  --    Attempted Compensatory Maneuvers  bolus size;bolus presentation style;other (see comments) difficult to assess further 2' aphasia/apraxia  -RD  --    Response to Attempted Compensatory Maneuvers  prevented aspiration;other (see comments) w/ honey-thick liquids via tsp only  -RD  --    Pharyngeal Phase, Comment  Moderate-severe pharyngeal dysphagia. Worse c/t previous FEES on 8/1/20. Penetration before w/ aspiration during the swallow w/ thin liquids. Initially aspiration was in small amounts. Pt quickly fatigued and grossly aspirated nectar-thick & honey-thick liquids via cup during the swallow 2' significantly delayed swallow initiation and reduced vestibular closure. Pt inconsistently aspirated nectar-thick liquids via tsp once fatigued. No penetration/aspiration w/ honey-thick liquids via teaspoon only, pudding, or solids. Minimal-mild BOT/vallecular residue w/ liquids that cleared w/ spontaneous secondary swallow. Otherwise vestibular residue present that pt noted to aspirate t/o exam w/ thins/nectar/honey. Pt noted w/ reduced pharyngeal stripping wave, decr'd elevation, and reduced excursion. Most of aspiration was silent (primarily w/ thins), but pt sensed episodes of gross aspiration w/ delayed coughing.   -RD  --       Esophageal Phase    Esophageal Phase  other (see comments) no immediate retrograde flow noted  -RD  --       Clinical Impression    SLP Swallowing Diagnosis  mild-moderate;oral dysfunction;mod-severe;pharyngeal dysfunction  -RD  --    Functional Impact  risk of  aspiration/pneumonia;risk of malnutrition;risk of dehydration  -RD  --    Rehab Potential/Prognosis, Swallowing  adequate, monitor progress closely  -RD  --    Swallow Criteria for Skilled Therapeutic Interventions Met  demonstrates skilled criteria  -RD  --       Recommendations    Therapy Frequency (Swallow)  5 days per week  -RD  --    Predicted Duration Therapy Intervention (Days)  until discharge  -RD  --    SLP Diet Recommendation  mechanical soft with no mixed consistencies;honey thick liquids;other (see comments) via TEASPOON ONLY (no cup or straw)  -RD  --    Recommended Diagnostics  VFSS (MBS);other (see comments) completed 8/3/2020  -RD  --    Recommended Precautions and Strategies  liquid via spoon only;no straw;small bites of food and sips of liquid;upright posture during/after eating;other (see comments) aspiration precautions; oral care BID/PRN  -RD  --    SLP Rec. for Method of Medication Administration  meds whole;meds crushed;with pudding or applesauce;as tolerated  -RD  --    Monitor for Signs of Aspiration  yes;notify SLP if any concerns;cough;throat clearing;upper respiratory;pneumonia;right lower lobe infiltrates  -RD  --    Anticipated Dischage Disposition (SLP)  unknown;anticipate therapy at next level of care  -RD  --      User Key  (r) = Recorded By, (t) = Taken By, (c) = Cosigned By    Initials Name Effective Dates    Annmarie Canada, MS CCC-SLP 06/22/15 -     Chantelle Schwartz, MS CCC-SLP 04/03/18 -           EDUCATION  The patient has been educated in the following areas:   Dysphagia (Swallowing Impairment) Oral Care/Hydration NPO rationale Modified Diet Instruction.    SLP Recommendation and Plan  SLP Swallowing Diagnosis: mild-moderate, oral dysfunction, mod-severe, pharyngeal dysfunction  SLP Diet Recommendation: mechanical soft with no mixed consistencies, honey thick liquids, other (see comments)(via TEASPOON ONLY (no cup or straw))  Recommended Precautions and  Strategies: liquid via spoon only, no straw, small bites of food and sips of liquid, upright posture during/after eating, other (see comments)(aspiration precautions; oral care BID/PRN)  SLP Rec. for Method of Medication Administration: meds whole, meds crushed, with pudding or applesauce, as tolerated     Monitor for Signs of Aspiration: yes, notify SLP if any concerns, cough, throat clearing, upper respiratory, pneumonia, right lower lobe infiltrates  Recommended Diagnostics: VFSS ([facility] [place]), other (see comments)(completed 8/3/2020)  Swallow Criteria for Skilled Therapeutic Interventions Met: demonstrates skilled criteria  Anticipated Dischage Disposition (SLP): unknown, anticipate therapy at next level of care  Rehab Potential/Prognosis, Swallowing: adequate, monitor progress closely  Therapy Frequency (Swallow): 5 days per week  Predicted Duration Therapy Intervention (Days): until discharge       Plan of Care Reviewed With: patient  Progress: declining    SLP GOALS     Row Name 08/03/20 1200 08/03/20 1000 08/02/20 1200       Oral Nutrition/Hydration Goal 1 (SLP)    Oral Nutrition/Hydration Goal 1, SLP  LTG: Tolerate soft diet and thin liquids without s/s aspiration with 100% accuracy and cues  -RD  --  --    Time Frame (Oral Nutrition/Hydration Goal 1, SLP)  by discharge  -RD  --  --    Progress/Outcomes (Oral Nutrition/Hydration Goal 1, SLP)  continuing progress toward goal  -RD  --  --       Oral Nutrition/Hydration Goal 2 (SLP)    Oral Nutrition/Hydration Goal 2, SLP  Tolerate H2O without s/s aspiration with 100% accuracy and cues for needed precautions  -RD  --  --    Time Frame (Oral Nutrition/Hydration Goal 2, SLP)  short term goal (STG)  -RD  --  --    Progress/Outcomes (Oral Nutrition/Hydration Goal 2, SLP)  goal ongoing  -RD  --  --       Oral Nutrition/Hydration Goal (SLP)    Oral Nutrition/Hydration Goal, SLP  Pt will tolerate honey-thick liquids via tsp only and soft no mixed diet w/ no overt s/s of  aspiration w/ 100% accuracy w/o cues  -RD  --  --    Time Frame (Oral Nutrition/Hydration Goal, SLP)  short term goal (STG);by discharge  -RD  --  --       Lingual Strengthening Goal 1 (SLP)    Activity (Lingual Strengthening Goal 1, SLP)  increase lingual tone/sensation/control/coordination/movement;increase tongue back strength  -RD  --  --    Increase Lingual Tone/Sensation/Control/Coordination/Movement  lingual movement exercises;swallow trials;lingual resistance exercises  -RD  --  --    Increase Tongue Back Strength  lingual movement exercises;lingual resistance exercises  -RD  --  --    [place]/Accuracy (Lingual Strengthening Goal 1, SLP)  with moderate cues (50-74% accuracy)  -RD  --  --    Time Frame (Lingual Strengthening Goal 1, SLP)  short term goal (STG)  -RD  --  --    Progress/Outcomes (Lingual Strengthening Goal 1, SLP)  goal ongoing  -RD  --  --       Pharyngeal Strengthening Exercise Goal 1 (SLP)    Activity (Pharyngeal Strengthening Goal 1, SLP)  increase timing;increase superior movement of the hyolaryngeal complex;increase closure at entrance to airway/closure of airway at glottis  -RD  --  --    Increase Timing  prepping - 3 second prep or suck swallow or 3-step swallow  -RD  --  --    Increase Superior Movement of the Hyolaryngeal Complex  falsetto  -RD  --  --    Increase Closure at Entrance to Airway/Closure of Airway at Glottis  breath hold exercises;hard effortful swallow  -RD  --  --    [place]/Accuracy (Pharyngeal Strengthening Goal 1, SLP)  with moderate cues (50-74% accuracy)  -RD  --  --    Time Frame (Pharyngeal Strengthening Goal 1, SLP)  short term goal (STG)  -RD  --  --    Progress/Outcomes (Pharyngeal Strengthening Goal 1, SLP)  goal ongoing  -RD  --  --       Swallow Management Recall Goal 1 (SLP)    Activity (Swallow Management Recall Goal 1, SLP)  safe diet/liquid level;compensatory swallow strategies/techniques  -RD  --  --    [place]/Accuracy (Swallow  Management Recall Goal 1, SLP)  with minimal cues (75-90% accuracy)  -RD  --  --    Time Frame (Swallow Management Recall Goal 1, SLP)  short term goal (STG)  -RD  --  --    Progress/Outcomes (Swallow Management Recall Goal 1, SLP)  goal ongoing  -RD  --  --       Swallow Compensatory Strategies Goal 1 (SLP)    Activity (Swallow Compensatory Strategies/Techniques Goal 1, SLP)  compensatory strategies;postural techniques;liquids by teaspoon only  -RD  --  --    [place]/Accuracy (Swallow Compensatory Strategies/Techniques Goal 1, SLP)  with minimal cues (75-90% accuracy)  -RD  --  --    Time Frame (Swallow Compensatory Strategies/Techniques Goal 1, SLP)  short term goal (STG);by discharge  -RD  --  --    Progress/Outcomes (Swallow Compensatory Strategies/Techniques Goal 1, SLP)  goal revised this date;goal ongoing  -RD  --  --       Comprehend Questions Goal 1 (SLP)    Improve Ability to Comprehend Questions Goal 1 (SLP)  --  simple yes/no questions;70%;with maximum cues (25-49%)  -RD  simple yes/no questions;70%;with maximum cues (25-49%)  -SM    Time Frame (Comprehend Questions Goal 1, SLP)  --  short term goal (STG)  -RD  short term goal (STG)  -SM    Progress (Ability to Comprehend Questions Goal 1, SLP)  --  60%;with minimal cues (75-90%)  -RD  10%;independently (over 90% accuracy)  -SM    Progress/Outcomes (Comprehend Questions Goal 1, SLP)  --  good progress toward goal  -RD  continuing progress toward goal  -SM    Comment (Comprehend Questions Goal 1, SLP)  --  via head nod  -RD  --       Follow Directions Goal 2 (SLP)    Improve Ability to Follow Directions Goal 1 (SLP)  --  1 step direction without objects;70%;with minimal cues (75-90%)  -RD  1 step direction without objects;70%;with minimal cues (75-90%)  -SM    Time Frame (Follow Directions Goal 1, SLP)  --  short term goal (STG)  -RD  short term goal (STG)  -SM    Barriers (Improve Ability to Follow Directions Goal 1, SLP)  --  --  hearing loss  -SM     Progress (Ability to Follow Directions Goal 1, SLP)  --  60%;with minimal cues (75-90%)  -RD  60%;with minimal cues (75-90%)  -SM    Progress/Outcomes (Follow Directions Goal 1, SLP)  --  good progress toward goal  -RD  good progress toward goal  -SM       Word Retrieval Skills Goal 1 (SLP)    Improve Word Retrieval Skills By Goal 1 (SLP)  --  completing automatic speech task, counting;repeating sounds;repeating words;completing open ended structured sentence;answer WH question with one word;50%;with maximum cues (25-49%)  -RD  completing automatic speech task, counting;repeating sounds;repeating words;completing open ended structured sentence;answer WH question with one word;50%;with maximum cues (25-49%)  -SM    Time Frame (Word Retrieval Goal 1, SLP)  --  short term goal (STG)  -RD  short term goal (STG)  -SM    Progress (Word Retrieval Skills Goal 1, SLP)  --  0%;with maximum cues (25-49%)  -RD  0%;with maximum cues (25-49%)  -SM    Progress/Outcomes (Word Retrieval Goal 1, SLP)  --  continuing progress toward goal  -RD  continuing progress toward goal  -SM       Motor Planning Goal 1 (SLP)    Improve Motor Planning to Reduce Apraxia by Goal 1 (SLP)  --  imitating mouth postures;imitating vowels;following isolated oral commands;50%;with maximum cues (25-49%)  -RD  imitating mouth postures;imitating vowels;following isolated oral commands;50%;with maximum cues (25-49%)  -SM    Time Frame (Motor Planning Goal 1, SLP)  --  short term goal (STG)  -RD  short term goal (STG)  -SM    Progress (Motor Planning Goal 1, SLP)  --  30%;with minimal cues (75-90%)  -RD  40%;with moderate cues (50-74%)  -SM    Progress/Outcomes (Motor Planning Goal 1, SLP)  --  continuing progress toward goal  -RD  good progress toward goal  -SM    Comment (Motor Planning Goal 1, SLP)  --  suspect apraxia component  -RD  Throat clearing approximation x1. Blowing air onto SLP's hand x2, mouth posturing x1  -SM       Augmentative/Alternative  Communication Objectives Goal 1 (SLP    Communication (Augmentative/Alternative Communication Goal 1, SLP)  --  improve ability to use non-verbal communication strategies  -RD  improve ability to use non-verbal communication strategies  -SM    Improve Communication by (Augmentative/Alternative Communication Goal 1, SLP)  --  use gestures to communicate;demonstrate comprehension of head nod/head shake;alphabet/picture board;70%;with moderate cues (50-74%)  -RD  use gestures to communicate;demonstrate comprehension of head nod/head shake;70%;with moderate cues (50-74%)  -SM    Time Frame (Augmentative/Alternative Communication Goal 1, SLP)  --  short term goal (STG)  -RD  short term goal (STG)  -SM    Progress (Augmentative/Alternative Communication Goal 1, SLP)  --  60%;with moderate cues (50-74%)  -RD  10%;with moderate cues (50-74%)  -SM    Progress/Outcomes (Augmentative/Alternative Communication Goal 1, SLP)  --  continuing progress toward goal;goal revised this date  -RD  continuing progress toward goal  -SM    Comment (Augmentative/Alternative Communication Goal 1, SLP)  --  added pic comm board; pt able to communicate w/ pointing to pics w/ cues  -RD  --       Additional Goal 1 (SLP)    Additional Goal 1, SLP  --  LTG: Improve communicaiton in order to participate in care while in hospital setting with 60% accuracy and cues  -RD  LTG: Improve communicaiton in order to participate in care while in hospital setting with 60% accuracy and cues  -SM    Time Frame (Additional Goal 1, SLP)  --  by discharge  -RD  by discharge  -SM    Progress/Outcomes (Additional Goal 1, SLP)  --  continuing progress toward goal  -RD  continuing progress toward goal  -SM    Row Name 08/01/20 1445 08/01/20 0930          Oral Nutrition/Hydration Goal 1 (SLP)    Oral Nutrition/Hydration Goal 1, SLP  LTG: Tolerate soft diet and thin liquids without s/s aspiration with 100% accuracy and cues  -SM  --     Time Frame (Oral  Nutrition/Hydration Goal 1, SLP)  by discharge  -SM  --        Oral Nutrition/Hydration Goal 2 (SLP)    Oral Nutrition/Hydration Goal 2, SLP  Tolerate H2O without s/s aspiration with 100% accuracy and cues for needed precautions  -SM  --     Time Frame (Oral Nutrition/Hydration Goal 2, SLP)  short term goal (STG)  -SM  --        Lingual Strengthening Goal 1 (SLP)    Activity (Lingual Strengthening Goal 1, SLP)  increase lingual tone/sensation/control/coordination/movement;increase tongue back strength  -SM  --     Increase Lingual Tone/Sensation/Control/Coordination/Movement  lingual movement exercises;swallow trials;lingual resistance exercises  -SM  --     Increase Tongue Back Strength  lingual movement exercises;lingual resistance exercises  -SM  --     [place]/Accuracy (Lingual Strengthening Goal 1, SLP)  with moderate cues (50-74% accuracy)  -SM  --     Time Frame (Lingual Strengthening Goal 1, SLP)  short term goal (STG)  -SM  --        Pharyngeal Strengthening Exercise Goal 1 (SLP)    Activity (Pharyngeal Strengthening Goal 1, SLP)  increase timing;increase superior movement of the hyolaryngeal complex;increase closure at entrance to airway/closure of airway at glottis  -SM  --     Increase Timing  prepping - 3 second prep or suck swallow or 3-step swallow  -SM  --     Increase Superior Movement of the Hyolaryngeal Complex  falsetto  -SM  --     Increase Closure at Entrance to Airway/Closure of Airway at Glottis  breath hold exercises;hard effortful swallow  -SM  --     [place]/Accuracy (Pharyngeal Strengthening Goal 1, SLP)  with moderate cues (50-74% accuracy)  -SM  --     Time Frame (Pharyngeal Strengthening Goal 1, SLP)  short term goal (STG)  -SM  --        Swallow Management Recall Goal 1 (SLP)    Activity (Swallow Management Recall Goal 1, SLP)  safe diet/liquid level;compensatory swallow strategies/techniques  -SM  --     [place]/Accuracy (Swallow Management Recall Goal 1, SLP)  with  minimal cues (75-90% accuracy)  -SM  --     Time Frame (Swallow Management Recall Goal 1, SLP)  short term goal (STG)  -SM  --        Swallow Compensatory Strategies Goal 1 (SLP)    Activity (Swallow Compensatory Strategies/Techniques Goal 1, SLP)  compensatory strategies;during meal intake;during p.o. trials;small bites;small cup sips;small straw sips  -SM  --     [place]/Accuracy (Swallow Compensatory Strategies/Techniques Goal 1, SLP)  with minimal cues (75-90% accuracy)  -SM  --     Time Frame (Swallow Compensatory Strategies/Techniques Goal 1, SLP)  short term goal (STG)  -SM  --        Comprehend Questions Goal 1 (SLP)    Improve Ability to Comprehend Questions Goal 1 (SLP)  --  simple yes/no questions;70%;with maximum cues (25-49%)  -     Time Frame (Comprehend Questions Goal 1, SLP)  --  short term goal (STG)  -SM        Follow Directions Goal 2 (SLP)    Improve Ability to Follow Directions Goal 1 (SLP)  --  1 step direction without objects;70%;with minimal cues (75-90%)  -     Time Frame (Follow Directions Goal 1, SLP)  --  short term goal (STG)  -SM        Word Retrieval Skills Goal 1 (SLP)    Improve Word Retrieval Skills By Goal 1 (SLP)  --  completing automatic speech task, counting;repeating sounds;repeating words;completing open ended structured sentence;answer WH question with one word;50%;with maximum cues (25-49%)  -     Time Frame (Word Retrieval Goal 1, SLP)  --  short term goal (STG)  -SM        Motor Planning Goal 1 (SLP)    Improve Motor Planning to Reduce Apraxia by Goal 1 (SLP)  --  imitating mouth postures;imitating vowels;following isolated oral commands;50%;with maximum cues (25-49%)  -     Time Frame (Motor Planning Goal 1, SLP)  --  short term goal (STG)  -SM       User Key  (r) = Recorded By, (t) = Taken By, (c) = Cosigned By    Initials Name Provider Type    Annmarie Canada, MS CCC-SLP Speech and Language Pathologist    Chantelle Schwartz, MS CCC-SLP Speech and  Language Pathologist             Time Calculation:   Time Calculation- SLP     Row Name 20 1439             Time Calculation- SLP    SLP Start Time  1000  -RD      SLP Received On  20  -RD        User Key  (r) = Recorded By, (t) = Taken By, (c) = Cosigned By    Initials Name Provider Type    BLAINE McintoshphillChantelle MS CCC-SLP Speech and Language Pathologist          Therapy Charges for Today     Code Description Service Date Service Provider Modifiers Qty    97066786933 HC ST EVAL ORAL PHARYNG SWALLOW 3 8/3/2020 Chantelle Briscoe, MS CCC-SLP GN 1    44955211855 HC ST TREATMENT SPEECH 2 8/3/2020 Chantelle Briscoe, MS CCC-SLP GN 1    88493719816 HC ST MOTION FLUORO EVAL SWALLOW 4 8/3/2020 Chantelle Briscoe, MS CCC-SLP GN 1        [facility] - Speech Language Pathology Treatment Note  TAYLOR Irizarry     Patient Name: Pat Morel  : 1940  MRN: [id number]  Today's Date: 8/3/2020         Admit Date: 2020    Visit Dx:      ICD-10-CM ICD-9-CM   1. Acute ischemic left MCA stroke (CMS/HCC) I63.512 434.91   2. Oropharyngeal dysphagia R13.12 787.22   3. Aphasia R47.01 784.3   4. 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 (CMS/HCC)   • Dyslipidemia   • Acquired hypothyroidism   • CKD (chronic kidney disease) requiring chronic dialysis (CMS/HCC)   • GERD without esophagitis   • Iron deficiency anemia   • Chronic diastolic heart failure (CMS/HCC)   • Acute renal failure (CMS/HCC)   •  "Recurrent falls   • Normocytic anemia   • Coagulopathy (CMS/[facility])   • Physical deconditioning   • CKD (chronic kidney disease) stage 4, GFR 15-29 ml/min (CMS/[facility])   • Acute ischemic left MCA stroke (CMS/[facility])   • ESRD (end stage renal disease) (CMS/[facility])        Therapy Treatment  Rehabilitation Treatment Summary     Row Name 08/03/20 1000             Treatment Time/Intention    Discipline  speech language pathologist  -RD      Mode of Treatment  speech-language pathology;individual therapy  -RD2      Care Plan Review  evaluation/treatment results reviewed;care plan/treatment goals reviewed;risks/benefits reviewed;current/potential barriers reviewed;patient/other agree to care plan  -RD2      Therapy Frequency (Swallow)  5 days per week  -RD2      Therapy Frequency (SLP SLC)  5 days per week  -RD2      Recorded by [RD] Chantelle Briscoe MS CCC-SLP 08/03/20 1411  [RD2] Chantelle Briscoe, MS CCC-SLP 08/03/20 1423      Row Name             Row Name 08/03/20 1000             Outcome Summary/Treatment Plan (SLP)    Daily Summary of Progress (SLP)  progress toward functional goals as expected  -RD      Plan for Continued Treatment (SLP)  Saw for S/L treatment. Pt unable to verbalize t/o treatment despite max cues. Inconsistently following oral motor commands w/ verbal/visual cues. Suspect apraxia component. Pt able to nod head yes/no to answer most simple questions. Able to correctly identify 4/5 items on picture comm board correctly. Able to point to \"frustrated\" in response to communication and \"choking\" in response to swallowing evaluation. Continue to address aphasia/apraxia during treatment.   -RD      Anticipated Dischage Disposition (SLP)  inpatient rehabilitation facility;anticipate therapy at next level of care  -RD      Recorded by [RD] Chantelle Briscoe, MS CCC-SLP 08/03/20 8314        User Key  (r) = Recorded By, (t) = Taken By, (c) = Cosigned By    Initials Name Effective Dates Discipline    Deborah Vyas " "NASIM, RN 06/16/16 -  Nurse    Tamara Govea RN 07/14/16 -  Nurse    Chantelle Schwartz, MS CCC-SLP 04/03/18 -  SLP    Jazmyne Beaulieu RN 06/21/18 -  Nurse          EDUCATION  The patient has been educated in the following areas:   Cognitive Impairment Communication Impairment.    SLP Recommendation and Plan  Daily Summary of Progress (SLP): progress toward functional goals as expected     Plan for Continued Treatment (SLP): Saw for S/L treatment. Pt unable to verbalize t/o treatment despite max cues. Inconsistently following oral motor commands w/ verbal/visual cues. Suspect apraxia component. Pt able to nod head yes/no to answer most simple questions. Able to correctly identify 4/5 items on picture comm board correctly. Able to point to \"frustrated\" in response to communication and \"choking\" in response to swallowing evaluation. Continue to address aphasia/apraxia during treatment.   Anticipated Dischage Disposition (SLP): unknown, anticipate therapy at next level of care             SLP GOALS     Row Name 08/03/20 1200 08/03/20 1000 08/02/20 1200       Oral Nutrition/Hydration Goal 1 (SLP)    Oral Nutrition/Hydration Goal 1, SLP  LTG: Tolerate soft diet and thin liquids without s/s aspiration with 100% accuracy and cues  -RD  --  --    Time Frame (Oral Nutrition/Hydration Goal 1, SLP)  by discharge  -RD  --  --    Progress/Outcomes (Oral Nutrition/Hydration Goal 1, SLP)  continuing progress toward goal  -RD  --  --       Oral Nutrition/Hydration Goal 2 (SLP)    Oral Nutrition/Hydration Goal 2, SLP  Tolerate H2O without s/s aspiration with 100% accuracy and cues for needed precautions  -RD  --  --    Time Frame (Oral Nutrition/Hydration Goal 2, SLP)  short term goal (STG)  -RD  --  --    Progress/Outcomes (Oral Nutrition/Hydration Goal 2, SLP)  goal ongoing  -RD  --  --       Oral Nutrition/Hydration Goal (SLP)    Oral Nutrition/Hydration Goal, SLP  Pt will tolerate honey-thick liquids via tsp only and " soft no mixed diet w/ no overt s/s of aspiration w/ 100% accuracy w/o cues  -RD  --  --    Time Frame (Oral Nutrition/Hydration Goal, SLP)  short term goal (STG);by discharge  -RD  --  --       Lingual Strengthening Goal 1 (SLP)    Activity (Lingual Strengthening Goal 1, SLP)  increase lingual tone/sensation/control/coordination/movement;increase tongue back strength  -RD  --  --    Increase Lingual Tone/Sensation/Control/Coordination/Movement  lingual movement exercises;swallow trials;lingual resistance exercises  -RD  --  --    Increase Tongue Back Strength  lingual movement exercises;lingual resistance exercises  -RD  --  --    [place]/Accuracy (Lingual Strengthening Goal 1, SLP)  with moderate cues (50-74% accuracy)  -RD  --  --    Time Frame (Lingual Strengthening Goal 1, SLP)  short term goal (STG)  -RD  --  --    Progress/Outcomes (Lingual Strengthening Goal 1, SLP)  goal ongoing  -RD  --  --       Pharyngeal Strengthening Exercise Goal 1 (SLP)    Activity (Pharyngeal Strengthening Goal 1, SLP)  increase timing;increase superior movement of the hyolaryngeal complex;increase closure at entrance to airway/closure of airway at glottis  -RD  --  --    Increase Timing  prepping - 3 second prep or suck swallow or 3-step swallow  -RD  --  --    Increase Superior Movement of the Hyolaryngeal Complex  falsetto  -RD  --  --    Increase Closure at Entrance to Airway/Closure of Airway at Glottis  breath hold exercises;hard effortful swallow  -RD  --  --    [place]/Accuracy (Pharyngeal Strengthening Goal 1, SLP)  with moderate cues (50-74% accuracy)  -RD  --  --    Time Frame (Pharyngeal Strengthening Goal 1, SLP)  short term goal (STG)  -RD  --  --    Progress/Outcomes (Pharyngeal Strengthening Goal 1, SLP)  goal ongoing  -RD  --  --       Swallow Management Recall Goal 1 (SLP)    Activity (Swallow Management Recall Goal 1, SLP)  safe diet/liquid level;compensatory swallow strategies/techniques  -RD  --  --     [place]/Accuracy (Swallow Management Recall Goal 1, SLP)  with minimal cues (75-90% accuracy)  -RD  --  --    Time Frame (Swallow Management Recall Goal 1, SLP)  short term goal (STG)  -RD  --  --    Progress/Outcomes (Swallow Management Recall Goal 1, SLP)  goal ongoing  -RD  --  --       Swallow Compensatory Strategies Goal 1 (SLP)    Activity (Swallow Compensatory Strategies/Techniques Goal 1, SLP)  compensatory strategies;postural techniques;liquids by teaspoon only  -RD  --  --    [place]/Accuracy (Swallow Compensatory Strategies/Techniques Goal 1, SLP)  with minimal cues (75-90% accuracy)  -RD  --  --    Time Frame (Swallow Compensatory Strategies/Techniques Goal 1, SLP)  short term goal (STG);by discharge  -RD  --  --    Progress/Outcomes (Swallow Compensatory Strategies/Techniques Goal 1, SLP)  goal revised this date;goal ongoing  -RD  --  --       Comprehend Questions Goal 1 (SLP)    Improve Ability to Comprehend Questions Goal 1 (SLP)  --  simple yes/no questions;70%;with maximum cues (25-49%)  -RD  simple yes/no questions;70%;with maximum cues (25-49%)  -SM    Time Frame (Comprehend Questions Goal 1, SLP)  --  short term goal (STG)  -RD  short term goal (STG)  -SM    Progress (Ability to Comprehend Questions Goal 1, SLP)  --  60%;with minimal cues (75-90%)  -RD  10%;independently (over 90% accuracy)  -SM    Progress/Outcomes (Comprehend Questions Goal 1, SLP)  --  good progress toward goal  -RD  continuing progress toward goal  -SM    Comment (Comprehend Questions Goal 1, SLP)  --  via head nod  -RD  --       Follow Directions Goal 2 (SLP)    Improve Ability to Follow Directions Goal 1 (SLP)  --  1 step direction without objects;70%;with minimal cues (75-90%)  -RD  1 step direction without objects;70%;with minimal cues (75-90%)  -SM    Time Frame (Follow Directions Goal 1, SLP)  --  short term goal (STG)  -RD  short term goal (STG)  -SM    Barriers (Improve Ability to Follow Directions Goal 1,  SLP)  --  --  hearing loss  -SM    Progress (Ability to Follow Directions Goal 1, SLP)  --  60%;with minimal cues (75-90%)  -RD  60%;with minimal cues (75-90%)  -SM    Progress/Outcomes (Follow Directions Goal 1, SLP)  --  good progress toward goal  -RD  good progress toward goal  -SM       Word Retrieval Skills Goal 1 (SLP)    Improve Word Retrieval Skills By Goal 1 (SLP)  --  completing automatic speech task, counting;repeating sounds;repeating words;completing open ended structured sentence;answer WH question with one word;50%;with maximum cues (25-49%)  -RD  completing automatic speech task, counting;repeating sounds;repeating words;completing open ended structured sentence;answer WH question with one word;50%;with maximum cues (25-49%)  -SM    Time Frame (Word Retrieval Goal 1, SLP)  --  short term goal (STG)  -RD  short term goal (STG)  -SM    Progress (Word Retrieval Skills Goal 1, SLP)  --  0%;with maximum cues (25-49%)  -RD  0%;with maximum cues (25-49%)  -SM    Progress/Outcomes (Word Retrieval Goal 1, SLP)  --  continuing progress toward goal  -RD  continuing progress toward goal  -SM       Motor Planning Goal 1 (SLP)    Improve Motor Planning to Reduce Apraxia by Goal 1 (SLP)  --  imitating mouth postures;imitating vowels;following isolated oral commands;50%;with maximum cues (25-49%)  -RD  imitating mouth postures;imitating vowels;following isolated oral commands;50%;with maximum cues (25-49%)  -SM    Time Frame (Motor Planning Goal 1, SLP)  --  short term goal (STG)  -RD  short term goal (STG)  -SM    Progress (Motor Planning Goal 1, SLP)  --  30%;with minimal cues (75-90%)  -RD  40%;with moderate cues (50-74%)  -SM    Progress/Outcomes (Motor Planning Goal 1, SLP)  --  continuing progress toward goal  -RD  good progress toward goal  -SM    Comment (Motor Planning Goal 1, SLP)  --  suspect apraxia component  -RD  Throat clearing approximation x1. Blowing air onto SLP's hand x2, mouth posturing x1  -SM        Augmentative/Alternative Communication Objectives Goal 1 (SLP    Communication (Augmentative/Alternative Communication Goal 1, SLP)  --  improve ability to use non-verbal communication strategies  -RD  improve ability to use non-verbal communication strategies  -SM    Improve Communication by (Augmentative/Alternative Communication Goal 1, SLP)  --  use gestures to communicate;demonstrate comprehension of head nod/head shake;alphabet/picture board;70%;with moderate cues (50-74%)  -RD  use gestures to communicate;demonstrate comprehension of head nod/head shake;70%;with moderate cues (50-74%)  -SM    Time Frame (Augmentative/Alternative Communication Goal 1, SLP)  --  short term goal (STG)  -RD  short term goal (STG)  -SM    Progress (Augmentative/Alternative Communication Goal 1, SLP)  --  60%;with moderate cues (50-74%)  -RD  10%;with moderate cues (50-74%)  -SM    Progress/Outcomes ([place]/Alternative Communication Goal 1, SLP)  --  continuing progress toward goal;goal revised this date  -RD  continuing progress toward goal  -SM    Comment (Augmentative/Alternative Communication Goal 1, SLP)  --  added pic comm board; pt able to communicate w/ pointing to pics w/ cues  -RD  --       Additional Goal 1 (SLP)    Additional Goal 1, SLP  --  LTG: Improve communicaiton in order to participate in care while in hospital setting with 60% accuracy and cues  -RD  LTG: Improve communicaiton in order to participate in care while in hospital setting with 60% accuracy and cues  -SM    Time Frame (Additional Goal 1, SLP)  --  by discharge  -RD  by discharge  -SM    Progress/Outcomes (Additional Goal 1, SLP)  --  continuing progress toward goal  -RD  continuing progress toward goal  -SM      User Key  (r) = Recorded By, (t) = Taken By, (c) = Cosigned By    Initials Name Provider Type    Annmarie Canada, MS CCC-SLP Speech and Language Pathologist    Chantelle Schwartz MS CCC-SLP Speech and Language  Pathologist              Time Calculation:     Time Calculation- SLP     Row Name 08/03/20 1439             Time Calculation- SLP    SLP Start Time  1000  -RD      SLP Received On  08/03/20  -RD        User Key  (r) = Recorded By, (t) = Taken By, (c) = Cosigned By    Initials Name Provider Type    Chantelle Schwartz MS CCC-SLP Speech and Language Pathologist          Therapy Charges for Today     Code Description Service Date Service Provider Modifiers Qty    84674737433 HC ST EVAL ORAL PHARYNG SWALLOW 3 8/3/2020 Chantelle Briscoe MS CCC-SLP GN 1    [id number] HC ST TREATMENT SPEECH 2 8/3/2020 Chantelle Briscoe MS CCC-SLP GN 1    [id number] HC ST MOTION FLUORO EVAL SWALLOW 4 8/3/2020 Chantelle Briscoe MS CCC-SLP GN 1        Patient was not wearing a face mask and did exhibit coughing during this therapy encounter.  Procedure performed was aerosolizing, involved close contact (within 6 feet for at least 15 minutes or longer), and did not involve contact with infectious secretions or specimens as indicated in the chart at this time.  Therapist used appropriate personal protective equipment including gloves, standard procedure mask and eye protection.  Appropriate PPE was worn during the entire therapy session.  Hand hygiene was completed before and after therapy session.                MS NATALY Rudolph  8/3/2020           MS NATALY Rudolph  8/3/2020

## 2020-08-04 NOTE — THERAPY TREATMENT NOTE
Patient Name: Pat Morel  : 1940    MRN: [id number]                              Today's Date: 2020       Admit Date: 2020    Visit Dx:     ICD-10-CM ICD-9-CM   1. Acute ischemic left MCA stroke (CMS/HCC) I63.512 434.91   2. Oropharyngeal dysphagia R13.12 787.22   3. Aphasia R47.01 784.3   4. Impaired functional mobility, balance, gait, and endurance Z74.09 V49.89     Patient Active Problem List   Diagnosis   • Persistent atrial fibrillation (CMS/[facility])   • Coronary artery disease involving native coronary artery of native heart without angina pectoris   • Essential hypertension   • Cerebrovascular accident (CVA) due to embolism (CMS/[facility])   • Dyspnea on exertion   • Pulmonary arterial hypertension (CMS/[facility])   • Interstitial lung disease (CMS/[facility])   • Hypersensitivity pneumonitis (CMS/[facility])   • Coagulation disorder due to circulating anticoagulants (CMS/[facility])   • Dyslipidemia   • Acquired hypothyroidism   • CKD (chronic kidney disease) requiring chronic dialysis (CMS/[facility])   • GERD without esophagitis   • Iron deficiency anemia   • Chronic diastolic heart failure (CMS/[facility])   • Acute renal failure (CMS/[facility])   • Recurrent falls   • Normocytic anemia   • Coagulopathy (CMS/[facility])   • Physical deconditioning   • CKD (chronic kidney disease) stage 4, GFR 15-29 ml/min (CMS/[facility])   • Acute ischemic left MCA stroke (CMS/[facility])   • ESRD (end stage renal disease) (CMS/[facility])     Past Medical History:   Diagnosis Date   • Acute on chronic diastolic congestive heart failure (CMS/[facility]) 2019   • Anemia    • Angina at rest (CMS/[facility])    • Arthritis    • Atrial fibrillation (CMS/[facility])    • Coronary artery disease    • Disease of thyroid gland    • Elevated cholesterol    • GERD without esophagitis 2019   • History of blood transfusion    • History of colonoscopy 2019   • History of melena 2019   • History of stomach ulcers    • Hypertension    • Impaired functional mobility, balance, gait, and endurance     • Impaired functional mobility, balance, gait, and endurance    • Osteoporosis    • Positive TB test    • Stroke (CMS/HCC)     No deficits     Past Surgical History:   Procedure Laterality Date   • BRONCHOSCOPY Bilateral 4/12/2019    Procedure: BRONCHOSCOPY;  Surgeon: Jeff Laura MD;  Location:  [facility];  Service: Pulmonary   • BYPASS GRAFT     • COLONOSCOPY N/A 10/15/2019    Procedure: COLONOSCOPY;  Surgeon: Fredi Aaron MD;  Location:  [facility];  Service: Gastroenterology   • CORONARY ANGIOPLASTY WITH STENT PLACEMENT     • CORONARY ARTERY BYPASS GRAFT  10/18/2010   • ENDOSCOPY N/A 10/14/2019    Procedure: ESOPHAGOGASTRODUODENOSCOPY;  Surgeon: Fredi Aaron MD;  Location:  [facility];  Service: Gastroenterology   • HYSTERECTOMY     • INSERTION HEMODIALYSIS CATHETER N/A 2/14/2020    Procedure: HEMODIALYSIS CATHETER INSERTION(DINO);  Surgeon: Familia Paez MD;  Location:  [facility];  Service: General;  Laterality: N/A;   • STOMACH SURGERY  08/11/2019    Upper GI bleed    • STOMACH SURGERY  10/13/2019     General Information     Row Name 08/04/20 1059          PT Evaluation Time/Intention    Document Type  therapy note (daily note)  -KM     Mode of Treatment  physical therapy  -     Row Name 08/04/20 1059          General Information    Patient Profile Reviewed?  yes  -KM     Existing Precautions/Restrictions  fall R sided weakness, aphasia  -     Row Name 08/04/20 1053          Cognitive Assessment/Intervention- PT/OT    Orientation Status (Cognition)  oriented to;person;unable/difficult to assess nods yes/no appropriately  -KM     Cognitive Assessment/Intervention Comment  expressive aphasia  -     Row Name 08/04/20 1057          Safety Issues, Functional Mobility    Safety Issues Affecting Function (Mobility)  awareness of need for assistance;safety precaution awareness;safety precautions follow-through/compliance;insight into deficits/self awareness  -KM        User Key  (r) = Recorded By, (t) = Taken By, (c) = Cosigned By    Initials Name Provider Type    Pat Panchal, PT Physical Therapist        Mobility     Row Name 08/04/20 1102          Bed Mobility Assessment/Treatment    Bed Mobility Assessment/Treatment  supine-sit;sit-supine;scooting/bridging  -KM     Scooting/Bridging [place] (Bed Mobility)  minimum assist (75% patient effort)  -KM     Supine-Sit [place] (Bed Mobility)  minimum assist (75% patient effort)  -KM     Assistive Device (Bed Mobility)  bed rails;draw sheet;head of bed elevated  -KM     Row Name 08/04/20 1102          Sit-Stand Transfer    Sit-Stand [place] (Transfers)  contact guard  -KM     Assistive Device (Sit-Stand Transfers)  walker, front-wheeled  -KM     Row Name 08/04/20 1102          Gait/Stairs Assessment/Training    Gait/Stairs Assessment/Training  gait/ambulation independence  -KM     [place] Level (Gait)  minimum assist (75% patient effort);1 person to manage  followed with chair  -KM     Assistive Device (Gait)  walker, front-wheeled  -KM     Distance in Feet (Gait)  60  -KM     Pattern (Gait)  step-through;step-to  -KM     Deviations/Abnormal Patterns (Gait)  gait speed decreased;base of support, narrow  -KM     Comment (Gait/Stairs)  assist to initially secure R hand , cues to increase BBOS, elevated HR with activity  -KM       User Key  (r) = Recorded By, (t) = Taken By, (c) = Cosigned By    Initials Name Provider Type    Pat Panchal, PT Physical Therapist        Obj/Interventions     Row Name 08/04/20 1106          Therapeutic Exercise    Lower Extremity (Therapeutic Exercise)  gastroc stretch, bilateral;SLR (straight leg raise), bilateral;marching while seated;LAQ (long arc quad), bilateral  -KM     Lower Extremity Range of Motion (Therapeutic Exercise)  hip abduction/adduction, bilateral;ankle dorsiflexion/plantar flexion, bilateral  -KM     Exercise Type  (Therapeutic Exercise)  AAROM (active assistive range of motion)  -KM     Sets/Reps (Therapeutic Exercise)  10x  -KM     Row Name 08/04/20 1106          Static Sitting Balance    Level of [place] (Unsupported Sitting, Static Balance)  supervision  -KM     Sitting Position (Unsupported Sitting, Static Balance)  sitting on edge of bed  -KM     Row Name 08/04/20 1106          Static Standing Balance    Level of [place] (Supported Standing, Static Balance)  contact guard assist  -KM     Assistive Device Utilized (Supported Standing, Static Balance)  walker, rolling  -KM     Row Name 08/04/20 1106          Dynamic Standing Balance    Level of [place], Reaches Outside Midline (Standing, Dynamic Balance)  minimal assist, 75% patient effort  -KM     Assistive Device Utilized (Supported Standing, Dynamic Balance)  walker, rolling  -KM       User Key  (r) = Recorded By, (t) = Taken By, (c) = Cosigned By    Initials Name Provider Type     Pta Hernandez, PT Physical Therapist        Goals/Plan    No documentation.       Clinical Impression     [facility] Name 08/04/20 1108          Pain Scale: Numbers Pre/Post-Treatment    Pain Scale: Numbers, Pretreatment  0/10 - no pain  -KM     Pain Scale: Numbers, Post-Treatment  0/10 - no pain  -[facility] Name 08/04/20 1108          Plan of Care Review    Outcome Summary  Patient transferred to eob with min assist, stood and ambul with r wx and min assist, cues to maintain R hand grasp. Pt ambul 60 ft with r wx and min assist. Recommend  snf for rehab  -KM     [facility] Name 08/04/20 1108          Vital Signs    Pretreatment Heart Rate (beats/min)  105  -KM     Intratreatment Heart Rate (beats/min)  139 afib  -KM     Posttreatment Heart Rate (beats/min)  112  -KM     [facility] Name 08/04/20 1108          Positioning and Restraints    Pre-Treatment Position  in bed  -KM     Post Treatment Position  chair  -KM     In Chair  reclined;call light within reach;encouraged to call for  assist;exit alarm on;with family/caregiver;with other staff  -KM       User Key  (r) = Recorded By, (t) = Taken By, (c) = Cosigned By    Initials Name Provider Type    Pat Panchal PT Physical Therapist        Outcome Measures     Row Name 08/04/20 1111          How much help from another person do you currently need...    Turning from your back to your side while in flat bed without using bedrails?  3  -KM     Moving from lying on back to sitting on the side of a flat bed without bedrails?  3  -KM     Moving to and from a bed to a chair (including a wheelchair)?  3  -KM     Standing up from a chair using your arms (e.g., wheelchair, bedside chair)?  3  -KM     Climbing 3-5 steps with a railing?  2  -KM     To walk in hospital room?  3  -KM     AM-PAC 6 Clicks Score (PT)  17  -KM     Row Name 08/04/20 1111          Functional Assessment    Outcome Measure Options  AM-PAC 6 Clicks Basic Mobility (PT)  -KM       User Key  (r) = Recorded By, (t) = Taken By, (c) = Cosigned By    Initials Name Provider Type    Pat Panchal, PT Physical Therapist        Physical Therapy Education                 Title: PT OT SLP Therapies (In Progress)     Topic: Physical Therapy (Done)     Point: Mobility training (Done)     Description:   Instruct learner(s) on safety and technique for assisting patient out of bed, chair or wheelchair.  Instruct in the proper use of assistive devices, such as walker, crutches, cane or brace.              Patient Friendly Description:   It's important to get you on your feet again, but we need to do so in a way that is safe for you. Falling has serious consequences, and your personal safety is the most important thing of all.        When it's time to get out of bed, one of us or a family member will sit next to you on the bed to give you support.     If your doctor or nurse tells you to use a walker, crutches, a cane, or a brace, be sure you use it every time you get out of  bed, even if you think you don't need it.    Learning Progress Summary           Patient Eager, E, VU by KM at 8/4/2020 1112    Acceptance, E, DU,NR by  at 8/1/2020 1319   Family Acceptance, E, DU,NR by  at 8/1/2020 1319                   Point: Home exercise program (Done)     Description:   Instruct learner(s) on appropriate technique for monitoring, assisting and/or progressing patient with therapeutic exercises and activities.              Learning Progress Summary           Patient Eager, E, VU by KM at 8/4/2020 1112    Acceptance, E, DU,NR by  at 8/1/2020 1319   Family Acceptance, E, DU,NR by  at 8/1/2020 1319                   Point: Body mechanics (Done)     Description:   Instruct learner(s) on proper positioning and spine alignment for patient and/or caregiver during mobility tasks and/or exercises.              Learning Progress Summary           Patient Eager, E, VU by KM at 8/4/2020 1112    Acceptance, E, DU,NR by  at 8/1/2020 1319   Family Acceptance, E, DU,NR by  at 8/1/2020 1319                   Point: Precautions (Done)     Description:   Instruct learner(s) on prescribed precautions during mobility and gait tasks              Learning Progress Summary           Patient Eager, E, VU by  at 8/4/2020 1112    Acceptance, E, DU,NR by  at 8/1/2020 1319   Family Acceptance, E, DU,NR by  at 8/1/2020 1319                               User Key     Initials Effective Dates Name Provider Type Discipline     06/19/15 -  Tiffanie Martin, PT Physical Therapist PT     06/19/15 -  Pat Hernandez PT Physical Therapist PT              PT Recommendation and Plan     Plan of Care Reviewed With: patient  Progress: improving  Outcome Summary: Patient transferred to eob with min assist, stood and ambul with r wx and min assist, cues to maintain R hand grasp. Pt ambul 60 ft with r wx and min assist. Recommend  snf for rehab     Time Calculation:   PT Charges     Row Name 08/04/20 1113              Time Calculation    Start Time  0925  -KM      PT Received On  08/04/20  -KM      PT Goal Re-Cert Due Date  08/11/20  -KM         Time Calculation- PT    Total Timed Code Minutes- PT  24 minute(s)  -KM         Timed Charges    40280 - PT Therapeutic Exercise Minutes  12  -KM      87506 - Gait Training Minutes   12  -KM        User Key  (r) = Recorded By, (t) = Taken By, (c) = Cosigned By    Initials Name Provider Type    Pat Panchal, PT Physical Therapist        Therapy Charges for Today     Code Description Service Date Service Provider Modifiers Qty    97110538477 HC PT THER PROC EA 15 MIN 8/4/2020 Pat Hernandez, PT GP 1    87562291383 HC GAIT TRAINING EA 15 MIN 8/4/2020 Pat Hernandez, PT GP 1    85555865354 HC PT THER SUPP EA 15 MIN 8/4/2020 Pat Hernandez, PT GP 2          PT G-Codes  Outcome Measure Options: AM-PAC 6 Clicks Basic Mobility (PT)  AM-PAC 6 Clicks Score (PT): 17  AM-PAC 6 Clicks Score (OT): 14  Modified [place] Scale: 4 - Moderately severe disability.  Unable to walk without assistance, and unable to attend to own bodily needs without assistance.    Pat Hernandez, DAISY  8/4/2020

## 2020-08-04 NOTE — PROGRESS NOTES
"   LOS: 4 days    Patient Care Team:  Anthony Sweet DO as PCP - General (Family Medicine)  Tien Archer MD as Consulting Physician (Cardiac Electrophysiology)  Xu Block MD (Cardiology)  Yomi Higgins MD as Consulting Physician (Cardiology)    Chief Complaint:  Stroke.     Subjective     Interval History:     Non verbal s/p stroke. No complaints. Responds with nodding head.       Objective     Vital Sign Min/Max for last 24 hours  Temp  Min: 97.9 °F (36.6 °C)  Max: 98.5 °F (36.9 °C)   BP  Min: 105/67  Max: 147/78   Pulse  Min: 81  Max: 119   Resp  Min: 16  Max: 20   SpO2  Min: 91 %  Max: 100 %   No data recorded   No data recorded     Flowsheet Rows      First Filed Value   Admission Height  147.3 cm (58\") Documented at 08/01/2020 0054   Admission Weight  56.3 kg (124 lb 0.1 oz) Documented at 07/31/2020 2135          I/O this shift:  In: -   Out: 150 [Urine:150]  I/O last 3 completed shifts:  In: 260 [P.O.:260]  Out: 1340 [Other:1340]    Physical Exam:     General Appearance:    Alert, cooperative, in no acute distress   Head:    Normocephalic, without obvious abnormality, atraumatic   Eyes:            Lids and lashes normal, conjunctivae and sclerae normal, no   icterus, no pallor           Neck:   No adenopathy, supple, trachea midline, no thyromegaly, no     carotid bruit, no JVD       Lungs:     Clear to auscultation,respirations regular, even and                   unlabored    Heart:    Regular rhythm and normal rate, normal S1 and S2, no            murmur, no gallop, no rub, no click       Abdomen:     Normal bowel sounds, no masses, no organomegaly, soft        non-tender, non-distended, no guarding, no rebound                 tenderness       Extremities:   Right sided weakness, no edema, no cyanosis, no              redness               Neurologic:   Aphasic, Rt sided weakness       WBC WBC   Date Value Ref Range Status   08/03/2020 6.01 3.40 - 10.80 10*3/mm3 Final      HGB Hemoglobin "   Date Value Ref Range Status   08/03/2020 9.6 (L) 12.0 - 15.9 g/dL Final      HCT Hematocrit   Date Value Ref Range Status   08/03/2020 31.2 (L) 34.0 - 46.6 % Final      Platlets No results found for: LABPLAT   MCV MCV   Date Value Ref Range Status   08/03/2020 98.4 (H) 79.0 - 97.0 fL Final          Sodium Sodium   Date Value Ref Range Status   08/04/2020 138 136 - 145 mmol/L Final   08/03/2020 132 (L) 136 - 145 mmol/L Final      Potassium Potassium   Date Value Ref Range Status   08/04/2020 4.2 3.5 - 5.2 mmol/L Final   08/03/2020 4.5 3.5 - 5.2 mmol/L Final      Chloride Chloride   Date Value Ref Range Status   08/04/2020 103 98 - 107 mmol/L Final   08/03/2020 99 98 - 107 mmol/L Final      CO2 CO2   Date Value Ref Range Status   08/04/2020 23.0 22.0 - 29.0 mmol/L Final   08/03/2020 20.0 (L) 22.0 - 29.0 mmol/L Final      BUN BUN   Date Value Ref Range Status   08/04/2020 16 8 - 23 mg/dL Final   08/03/2020 35 (H) 8 - 23 mg/dL Final      Creatinine Creatinine   Date Value Ref Range Status   08/04/2020 2.15 (H) 0.57 - 1.00 mg/dL Final   08/03/2020 3.17 (H) 0.57 - 1.00 mg/dL Final      Calcium Calcium   Date Value Ref Range Status   08/04/2020 8.6 8.6 - 10.5 mg/dL Final   08/03/2020 8.5 (L) 8.6 - 10.5 mg/dL Final      PO4 No results found for: CAPO4   Albumin Albumin   Date Value Ref Range Status   08/04/2020 3.70 3.50 - 5.20 g/dL Final   08/03/2020 3.40 (L) 3.50 - 5.20 g/dL Final      Magnesium No results found for: MG   Uric Acid No results found for: URICACID        Results Review:     I reviewed the patient's new clinical results.      aspirin 81 mg Oral Daily   atorvastatin 40 mg Oral Nightly   budesonide 0.5 mg Nebulization BID - RT   dilTIAZem  mg Oral Q24H   heparin (porcine) 5,000 Units Subcutaneous Q8H   levothyroxine 75 mcg Oral Q AM   lidocaine 1 patch Transdermal Q24H   pantoprazole 40 mg Oral Daily Before Lunch   predniSONE 5 mg Oral Daily With Breakfast   ranolazine 500 mg Oral Q12H   senna 2 tablet  Oral Nightly   sodium chloride 10 mL Intravenous Q12H          Medication Review:    Assessment/Plan       Acute ischemic left MCA stroke (CMS/HCC)    Persistent atrial fibrillation (CMS/HCC)    Coronary artery disease involving native coronary artery of native heart without angina pectoris    Essential hypertension    Cerebrovascular accident (CVA) due to embolism (CMS/HCC)    Pulmonary arterial hypertension (CMS/HCC)    Interstitial lung disease (CMS/HCC)    Dyslipidemia    Acquired hypothyroidism    CKD (chronic kidney disease) requiring chronic dialysis (CMS/HCC)    Chronic diastolic heart failure (CMS/HCC)    Acute renal failure (CMS/HCC)    ESRD (end stage renal disease) (CMS/HCC)      1- ESRD - MWF - Dr Santamaria is her nephrologist   2- HTN   3- Anemia of chronic disease.   4- Mild hyponatremia   5- CVA - Aphasic      Plan:  - HD tomorrow. UF as tolerated .  - Renal diet  - Will hold EPO for now   - Monitor H/H and transfuse for hgb less than 7.0       Sri Cardenas MD  08/04/20  14:04

## 2020-08-04 NOTE — PROGRESS NOTES
Continued Stay Note  [facility]     Patient Name: Pat Morel  MRN: [id number]  Today's Date: 8/4/2020    Admit Date: 7/31/2020    Discharge Plan     Row Name 08/04/20 1135       Plan    Plan  home with home health    Patient/Family in Agreement with Plan  yes    Plan Comments  I spoke with Yary, Mrs. Morel's daughter on the phone. She states that she has spoken with her brother and they have agreed that Mrs. Morel is going to discharge to her house and they will collectively care for her. They would like for her to resume outpatient hemodialysis. Yary would like [facility] to be arranged. I have called a referral to [facility]. They will follow Mrs. Morel with skilled nursing, PT, OT, and SLP at home. Yary denies having any needs for additional DME at home. Mrs. Morel's yue Worley will transport her home by car tomorrow and Yary will resume transporting her to outpatient hemodialysis. Yary doesn't anticipate having any additional discharge needs.    Final Discharge Disposition Code  06 - home with home health care        Discharge Codes    No documentation.             Vincent Jimenez RN

## 2020-08-04 NOTE — PLAN OF CARE
"NIH improved; pt answers simple \"yes\" and \"no\" questions with head nods and shakes appropriately, although not always clear if pt is oriented to situation and time. Family updated on plan of care; possible discharge home with family tomorrow; CM following; PO intake encouraged  Problem: Patient Care Overview  Goal: Plan of Care Review  Outcome: Ongoing (interventions implemented as appropriate)  Flowsheets (Taken 8/4/2020 2980)  Progress: improving  Plan of Care Reviewed With: patient; daughter     "

## 2020-08-04 NOTE — PROGRESS NOTES
"    [facility] Medicine Services  PROGRESS NOTE    Patient Name: Pat Morel  : 1940  MRN: [id number]    Date of Admission: 2020  Primary Care Physician: Anthony Sweet DO    Subjective   Subjective     CC:  F/U Stroke. Aphasic.    HPI:  No overnight issues reported. Patient with dense expressive aphasia. Patient wants to go home, not rehab.  Family willing to care for her.    Review of Systems  Unable to obtain full ROS but patient shakes her head \"no\" if I ask her if she's in pain.    Objective   Objective     Vital Signs:   Temp:  [97.9 °F (36.6 °C)-98.5 °F (36.9 °C)] 97.9 °F (36.6 °C)  Heart Rate:  [] 98  Resp:  [16-20] 20  BP: (105-147)/(58-82) 114/70  Total (NIH Stroke Scale): 11     Physical Exam:  Constitutional: No acute distress, awake, alert  HENT: NCAT, mucous membranes moist  Respiratory: Clear to auscultation bilaterally, respiratory effort normal   Cardiovascular: irreg/irreg, palpable pedal pulses bilaterally  Gastrointestinal: Positive bowel sounds, soft, nontender, nondistended  Musculoskeletal: No bilateral ankle edema  Psychiatric: Appropriate affect, cooperative  Neurologic: follows all commands, attempts to answer questions and will nod yes or no, R sided weakness, aphasic  Skin: No rashes      Results Reviewed:  Results from last 7 days   Lab Units 20  0738 20  2228 20  2219 20  2146   WBC 10*3/mm3 6.01  --  5.29  --    HEMOGLOBIN g/dL 9.6*  --  11.2*  --    HEMOGLOBIN, POC g/dL  --  11.9*  --   --    HEMATOCRIT % 31.2*  --  35.0  --    HEMATOCRIT POC %  --  35*  --   --    PLATELETS 10*3/mm3 217  --  227  --    INR   --   --   --  1.0     Results from last 7 days   Lab Units 20  0508 20  0738 20  2219   SODIUM mmol/L 138 132* 132*   POTASSIUM mmol/L 4.2 4.5 4.5   CHLORIDE mmol/L 103 99 93*   CO2 mmol/L 23.0 20.0* 25.0   BUN mg/dL 16 35* 17   CREATININE mg/dL 2.15* 3.17* 2.00*   GLUCOSE mg/dL 96 87 86 "   CALCIUM mg/dL 8.6 8.5* 9.6   ALT (SGPT) U/L  --   --  12   AST (SGOT) U/L  --   --  20     Estimated Creatinine Clearance: 18.9 mL/min (A) (by C-G formula based on SCr of 2.15 mg/dL (H)).    Microbiology Results Abnormal     Procedure Component Value - Date/Time    Respiratory Panel PCR w/COVID-19(SARS-CoV-2) [facility]HA In-House, NP Swab in UT/VTP, 8-12 Hr TAT - Swab, Nasopharynx [[id number]]  (Normal) Collected:  08/01/20 0048    Lab Status:  Final result Specimen:  Swab from Nasopharynx Updated:  08/01/20 0259     ADENOVIRUS, PCR Not Detected     Coronavirus 229E Not Detected     Coronavirus HKU1 Not Detected     Coronavirus NL63 Not Detected     Coronavirus OC43 Not Detected     COVID19 Not Detected     Human Metapneumovirus Not Detected     Human Rhinovirus/Enterovirus Not Detected     Influenza A PCR Not Detected     Influenza A H1 Not Detected     Influenza A H1 2009 PCR Not Detected     Influenza A H3 Not Detected     Influenza B PCR Not Detected     Parainfluenza Virus 1 Not Detected     Parainfluenza Virus 2 Not Detected     Parainfluenza Virus 3 Not Detected     Parainfluenza Virus 4 Not Detected     RSV, PCR Not Detected     Bordetella pertussis pcr Not Detected     Bordetella parapertussis PCR Not Detected     Chlamydophila pneumoniae PCR Not Detected     Mycoplasma pneumo by PCR Not Detected    Narrative:       Fact sheet for providers: https://docs.[employer]/wp-content/uploads/NTM7589-9946-IA8.1-EUA-Provider-Fact-Sheet-3.pdf    Fact sheet for patients: https://docs.[employer]/wp-content/uploads/DDI1863-5315-QB4.1-EUA-Patient-Fact-Sheet-1.pdf          Imaging Results (Last 24 Hours)     Procedure Component Value Units Date/Time    FL Video Swallow With Speech Single Contrast [[id number]] Collected:  08/03/20 1246     Updated:  08/03/20 2144    Narrative:       EXAMINATION: FL VIDEO SWALLOW W SPEECH, SINGLE-CONTRAST-08/03/2020:      INDICATION: DYSPHAGIA, OROPHARYNGEAL, HAS ATTRIBUTABLE CAUSE.      COMPARISON: NONE.     FINDINGS: The dictation is to record 1 minute and 42 seconds of  fluoroscopy time during modified barium swallow. The patient was  evaluated in the seated lateral position while taking a variety of  consistencies by mouth under the direction of speech pathology. Please  see the speech pathologist's report for full details and  recommendations.       Impression:       Fluoroscopy provided for modified barium swallow.     D:  08/03/2020  E:  08/03/2020           This report was finalized on 8/3/2020 9:41 PM by Dr. Pranav Bragg MD.             Results for orders placed during the hospital encounter of 02/06/20   Adult Transthoracic Echo Complete W/ Cont if Necessary Per Protocol    Addendum 1.  Normal left ventricular size and systolic function, LVEF 65-70%. 2.  Mild to moderate concentric LVH. 3.  Impaired LV diastolic filling pattern consistent with grade 2  diastolic dysfunction and elevated left atrial pressure. 4.  Mild to moderate right ventricular dilation, with normal RV systolic  function 5.  Moderate to severe bilateral atrial enlargement. 6.  Mild to moderate mitral regurgitation. 7.  Moderate to severe tricuspid regurgitation. 8.  Suspected PFO. 9.  Pulmonary hypertension, with RVSP 60 mmHg.      Yomi Higgins MD 2/10/2020 12:37 PM          Narrative 1.  Normal left ventricular size and systolic function, LVEF 65-70%.  2.  Mild to moderate concentric LVH.  3.  Impaired LV diastolic filling pattern consistent with grade 2   diastolic dysfunction and elevated left atrial pressure.  4.  Normal right ventricular size and systolic function.  5.  Moderate to severe bilateral atrial enlargement.  6.  Mild to moderate mitral regurgitation.  7.  Moderate to severe tricuspid regurgitation.  7.  Suspected PFO.       I have reviewed the medications:  Scheduled Meds:    aspirin 81 mg Oral Daily   atorvastatin 40 mg Oral Nightly   budesonide 0.5 mg Nebulization BID - RT   dilTIAZem  mg  Oral Q24H   heparin (porcine) 5,000 Units Subcutaneous Q8H   levothyroxine 75 mcg Oral Q AM   lidocaine 1 patch Transdermal Q24H   pantoprazole 40 mg Oral Daily Before Lunch   predniSONE 5 mg Oral Daily With Breakfast   ranolazine 500 mg Oral Q12H   senna 2 tablet Oral Nightly   sodium chloride 10 mL Intravenous Q12H     Continuous Infusions:   PRN Meds:.•  acetaminophen **OR** acetaminophen  •  albumin human  •  bisacodyl  •  ondansetron  •  [COMPLETED] Insert peripheral IV **AND** sodium chloride  •  sodium chloride    Assessment/Plan   Assessment & Plan     Active Hospital Problems    Diagnosis  POA   • **Acute ischemic left MCA stroke (CMS/HCC) [I63.512]  Yes   • ESRD (end stage renal disease) (CMS/[facility]) [N18.6]  Yes   • Acute renal failure (CMS/[facility]) [N17.9]  Yes   • Chronic diastolic heart failure (CMS/[facility]) [I50.32]  Yes   • CKD (chronic kidney disease) requiring chronic dialysis (CMS/[facility]) [N18.6, Z99.2]  Not Applicable   • Acquired hypothyroidism [E03.9]  Yes   • Dyslipidemia [E78.5]  Yes   • Interstitial lung disease (CMS/[facility]) [J84.9]  Yes   • Pulmonary arterial hypertension (CMS/[facility]) [I27.21]  Yes   • Cerebrovascular accident (CVA) due to embolism (CMS/[facility]) [I63.9]  Yes   • Persistent atrial fibrillation (CMS/[facility]) [I48.19]  Yes   • Coronary artery disease involving native coronary artery of native heart without angina pectoris [I25.10]  Yes   • Essential hypertension [I10]  Yes      Resolved Hospital Problems   No resolved problems to display.        Brief Hospital Course to date:  Pat Morel is a 80 y.o. female with past medical history of ESRD on HD, hypertension, persistent A. fib, interstitial lung disease, hypothyroidism.  Patient presented with right-sided weakness and complete aphasia, admitted on 7/31/20 with acute left-sided MCA stroke. Patient remains aphasic, has right facial droop, with weakness improving:    Acute left-sided MCA stroke- ok to move to stroke unit  - stroke work-up per  protocol- likely cardioembolic with A fib/ PFO  - echo completed in last 6 mos:  Normal LVEF 65-70%, grade 2 diastolic dysfunction, Moderate to severe bilateral atrial enlargement,  Mild to moderate mitral regurgitation, Moderate to severe tricuspid regurgitation.  Suspected PFO and Pulmonary hypertension.  -Dysphagia - modified diet.  --Neuro eval completed and they have signed off- continue asa and statin  -- continue PT/OT  -- CM for rehab placement - home with home health tomorrow    ESRD   -- consult renal for HD  --HD tomorrow    A fib  -- no longer on AC due to risks with renal disease  -- continue diltiazem - I increased her dose yesterday to 180 mg with good effect    HTN  -stable    ILD  -- continue home prednisone 5mg daily    Hypothyroidism  -- continue synthroid  --TSH WNL     COVID-19 Ruled out  -COVID negative     Overall prognosis is currently guarded if not poor, patient is a DNR, palliative care has been consulted.       DVT Prophylaxis:  mechanical      Disposition: I expect the patient to be discharged TBD- recommended SNF at DC    CODE STATUS:   Code Status and Medical Interventions:   Ordered at: 08/01/20 0049     Limited Support to NOT Include:    Intubation    Artificial Nutrition     Level Of Support Discussed With:    Health Care Surrogate     Code Status:    No CPR     Medical Interventions (Level of Support Prior to Arrest):    Limited         Electronically signed by Farhana Richmond MD, 08/04/20, 15:37.

## 2020-08-04 NOTE — PLAN OF CARE
Problem: Patient Care Overview  Goal: Plan of Care Review  Flowsheets  Taken 8/4/2020 1112  Progress: improving  Plan of Care Reviewed With: patient  Taken 8/4/2020 1108  Outcome Summary: Patient transferred to eob with min assist, stood and ambul with r wx and min assist, cues to maintain R hand grasp. Pt ambul 60 ft with r wx and min assist. Recommend  snf for rehab

## 2020-08-04 NOTE — PLAN OF CARE
Problem: Patient Care Overview  Goal: Interprofessional Rounds/Family Conf  Flowsheets (Taken 8/4/2020 1400)  Summary: 1300 Palliative Care Interdisciplinary Round-Palliative Care Team members present:  GRAY Hernandez DO; STEFANY Khan APRN; ANDRES Matthews RN, CHPN; SADAF Ravi RN, CHPN; ANDRES [facility]-  Participants: advanced practice nurse; nursing; physician; social work/services     Problem: Palliative Care (Adult)  Intervention: Support/Optimize Psychosocial Response  Note:   1136 Visit at bedside with patient and patient's grandtr Jenifer - patient sitting in chair, no complaints, smiling and pleasant.  Patient's grandtr states plan is home with HH and therapies - she will help caregive.  Patient and patient's grandtr receptive to supportive/empathic presence, active listening, and symptom assessment.

## 2020-08-04 NOTE — PLAN OF CARE
Problem: Patient Care Overview  Goal: Plan of Care Review  Flowsheets  Taken 8/4/2020 0513  Progress: no change  Taken 8/4/2020 0400  Plan of Care Reviewed With: patient  Note:   Pt remains in afib, HR ; otherwise VSS. Pt tolerating room air. Denies pain. No UOP this shift, bladder scan showed 21mls. Rested well this shift. Will continue to monitor.

## 2020-08-05 PROBLEM — I63.512 ACUTE ISCHEMIC LEFT MCA STROKE (HCC): Status: RESOLVED | Noted: 2020-01-01 | Resolved: 2020-01-01

## 2020-08-05 PROBLEM — N17.9 ACUTE RENAL FAILURE (HCC): Status: RESOLVED | Noted: 2020-01-01 | Resolved: 2020-01-01

## 2020-08-05 PROBLEM — I63.9 CEREBROVASCULAR ACCIDENT (CVA) DUE TO EMBOLISM (HCC): Status: RESOLVED | Noted: 2017-12-21 | Resolved: 2020-01-01

## 2020-08-05 NOTE — OUTREACH NOTE
Prep Survey      Responses   [facility] patient discharged from?  [facility]   Is LACE score < 7 ?  No   Eligibility  [facility]   Date of Admission  07/31/20   Date of Discharge  08/05/20   Discharge Disposition  Home or Self Care   Discharge diagnosis  Acute left-sided MCA stroke   COVID-19 Test Status  Negative   Does the patient have one of the following disease processes/diagnoses(primary or secondary)?  Stroke (TIA)   Does the patient have Home health ordered?  Yes   What is the Home health agency?   [facility]   Is there a DME ordered?  No   Comments regarding appointments  will resume outpatient hemodialysis at [facility]   Prep survey completed?  Yes          Tamara Larkin RN

## 2020-08-05 NOTE — PLAN OF CARE
Problem: Patient Care Overview  Goal: Interprofessional Rounds/Family Conf  Flowsheets (Taken 8/5/2020 1306)  Summary: Plan is to d/c home with HH and family after dialysis.  Patient resting comfortably when Palliative SW visited this morning.  Participants: advanced practice nurse; nursing; physician; social work/services  Note:   1200 Palliative Care Interdisciplinary Rounds - Palliative Care Team members present:  GRAY Hernandez DO; STEFANY Forbes MD; STEFANY Khan APRN; ANDRES Matthews RN, CHPN; SADAF Ravi RN, CHPN; ANDRES Floyd LCSW, [facility]-SW; Hospice  HEATH Dugan RN, CHPN

## 2020-08-05 NOTE — DISCHARGE PLACEMENT REQUEST
"Vilma Morel (80 y.o. Female)     Date of Birth Social Security Number Address Home Phone MRN    1940  [address]  [facility] [place] [facility] [address] [phone] [id number]    Spiritism Marital Status          Congregation        Admission Date Admission Type Admitting Provider Attending Provider Department, Room/Bed    20 Emergency Yudelka Gandhi II, Yudelka Griffiths II, DO [facility] 3E, S333/1    Discharge Date Discharge Disposition Discharge Destination         Home or Self Care              Attending Provider:  Yudelka Gandhi II, DO    Allergies:  Tuberculin Tests    Isolation:  None   Infection:  None   Code Status:  No CPR    Ht:  147.3 cm (58\")   Wt:  56.3 kg (124 lb 0.1 oz)    Admission Cmt:  None   Principal Problem:  Acute ischemic left MCA stroke (CMS/[facility]) [I63.512]                 Active Insurance as of 2020     Primary Coverage     Payor Plan Insurance Group Employer/Plan Group    HUMANA MEDICARE REPLACEMENT HUMANA MEDICARE REPLACEMENT [id number]     Payor Plan Address Payor Plan Phone Number Payor Plan Fax Number Effective Dates    [address] [address] [phone]  2018 - None Entered    [facility] [address]       Subscriber Name Subscriber Birth Date Member ID       VILMA MOREL 1940 [id number]                 Emergency Contacts      (Rel.) Home Phone Work Phone Mobile Phone    CHRISTYEDMOND FREEMAN (Son) -- -- [phone]    Maria TeresaDell (Spouse) -- -- [phone]    DeshaunYary (Daughter) -- -- [phone]               Discharge Summary      Yudelka Gandhi II, DO at [address] [place] [address] Medicine Services  DISCHARGE SUMMARY    Patient Name: Vilma Morel  : 1940  MRN: [id number]    Date of Admission: 2020  9:52 PM  Date of Discharge:  2020  Primary Care Physician: Anthony Sweet DO    Consults     Date and Time Order Name Status Description    8/3/2020 1342 Inpatient " Palliative Care MD Consult Completed     8/2/2020 1050 Inpatient Nephrology Consult Completed     8/1/2020 0157 Inpatient Palliative Care MD Consult Completed     8/1/2020 0157 Inpatient Palliative Care MD Consult Completed     8/1/2020 0156 Inpatient Neurology Consult Stroke Completed           Hospital Course     Presenting Problem:   Acute ischemic left MCA stroke (CMS/HCC) [I63.512]    Active Hospital Problems    Diagnosis  POA   • ESRD (end stage renal disease) (CMS/[facility]) [N18.6]  Yes   • Chronic diastolic heart failure (CMS/[facility]) [I50.32]  Yes   • CKD (chronic kidney disease) requiring chronic dialysis (CMS/[facility]) [N18.6, Z99.2]  Not Applicable   • Acquired hypothyroidism [E03.9]  Yes   • Dyslipidemia [E78.5]  Yes   • Interstitial lung disease (CMS/[facility]) [J84.9]  Yes   • Pulmonary arterial hypertension (CMS/[facility]) [I27.21]  Yes   • Persistent atrial fibrillation (CMS/[facility]) [I48.19]  Yes   • Coronary artery disease involving native coronary artery of native heart without angina pectoris [I25.10]  Yes   • Essential hypertension [I10]  Yes      Resolved Hospital Problems    Diagnosis Date Resolved POA   • **Acute ischemic left MCA stroke (CMS/[facility]) [I63.512] 08/05/2020 Yes   • Acute renal failure (CMS/[facility]) [N17.9] 08/05/2020 Yes   • Cerebrovascular accident (CVA) due to embolism (CMS/[facility]) [I63.9] 08/05/2020 Yes          Hospital Course:  Pat Morel is a 80 y.o. female with past medical history of ESRD on HD, hypertension, persistent A. fib, interstitial lung disease, hypothyroidism.  Patient presented with right-sided weakness and complete aphasia, admitted on 7/31/20 with acute left-sided MCA stroke. Patient remains aphasic, has right facial droop, with weakness improving     Acute left-sided MCA stroke- ok to move to stroke unit  - Upon arrival patient underwent extensive stroke work-up per protocol w/ source most likely cardioembolic with A fib/ PFO.   - Though her CTP did show penumbra she was deemed not a  candidate for intervention due to large area of infarct and low mismatch ratio. She had echo completed in last 6 mos:  Normal LVEF 65-70%, grade 2 diastolic dysfunction, Moderate to severe bilateral atrial enlargement,  Mild to moderate mitral regurgitation, Moderate to severe tricuspid regurgitation.  Suspected PFO and Pulmonary hypertension.  - Neuro eval completed and they have signed off- continue asa and statin  - F/U PCP in 1 weeks time.    A fib  -- She is no longer on AC due to risks with renal disease  -- Her diltiazem dose was increased to 180 mg, continue at d/c.    ESRD   --Followed by [facility] throughout stay and remained on HD per usual scheduled.     Discharge Follow Up Recommendations for outpatient labs/diagnostics:   F/U PCP in 1-2 weeks   F/U Dr Romero as scheduled    Day of Discharge     HPI: Seen in HD. Remains aphasic but smiles appropriately. Denies pain or new needs.    Review of Systems  UTO ROS due to aphasia    Vital Signs:   Temp:  [97.8 °F (36.6 °C)-98.7 °F (37.1 °C)] 98.1 °F (36.7 °C)  Heart Rate:  [] 107  Resp:  [16-20] 16  BP: (114-155)/(62-99) 155/99     Physical Exam:  Constitutional: No acute distress, awake, alert, on HD  HENT: NCAT, mucous membranes moist  Chest: Tunneled catheter in left upper chest  Respiratory: Clear to auscultation bilaterally, respiratory effort normal   Cardiovascular: RRR, no murmurs, rubs, or gallops, palpable pedal pulses bilaterally  Gastrointestinal: Positive bowel sounds, soft, nontender, nondistended  Musculoskeletal: No bilateral ankle edema  Psychiatric: Appropriate affect, cooperative  Neurologic: Interactive, expressive aphasia, right hemiparesis  Skin: No rashes    Pertinent  and/or Most Recent Results     Results from last 7 days   Lab Units 08/05/20  0555 08/04/20  0508 08/03/20  0738 07/31/20  2228 07/31/20  2219 07/31/20  2146   WBC 10*3/mm3  --   --  6.01  --  5.29  --    HEMOGLOBIN g/dL 9.7*  --  9.6*  --  11.2*  --    HEMOGLOBIN, POC  g/dL  --   --   --  11.9*  --   --    HEMATOCRIT % 31.3*  --  31.2*  --  35.0  --    HEMATOCRIT POC %  --   --   --  35*  --   --    PLATELETS 10*3/mm3  --   --  217  --  227  --    SODIUM mmol/L 138 138 132*  --  132*  --    POTASSIUM mmol/L 4.2 4.2 4.5  --  4.5  --    CHLORIDE mmol/L 103 103 99  --  93*  --    CO2 mmol/L 24.0 23.0 20.0*  --  25.0  --    BUN mg/dL 28* 16 35*  --  17  --    CREATININE mg/dL 3.01* 2.15* 3.17*  --  2.00* 2.20*   GLUCOSE mg/dL 86 96 87  --  86  --    CALCIUM mg/dL 8.7 8.6 8.5*  --  9.6  --      Results from last 7 days   Lab Units 07/31/20  2219 07/31/20  2146   BILIRUBIN mg/dL 0.6  --    ALK PHOS U/L 96  --    ALT (SGPT) U/L 12  --    AST (SGOT) U/L 20  --    PROTIME seconds  --  12.3*   INR   --  1.0     Results from last 7 days   Lab Units 08/01/20  0641   CHOLESTEROL mg/dL 189   TRIGLYCERIDES mg/dL 143   HDL CHOL mg/dL 62*     Results from last 7 days   Lab Units 08/01/20  0641 07/31/20  2219   TSH uIU/mL 3.630  --    HEMOGLOBIN A1C % 4.70*  --    LACTATE mmol/L  --  1.3       Brief Urine Lab Results  (Last result in the past 365 days)      Color   Clarity   Blood   Leuk Est   Nitrite   Protein   CREAT   Urine HCG        02/13/20 1329             43.3       02/13/20 1329             43.7             Microbiology Results Abnormal     Procedure Component Value - Date/Time    Respiratory Panel PCR w/COVID-19(SARS-CoV-2) KEY/SHAILA/RADHA In-House, NP Swab in [facility], 8-12 Hr TAT - Swab, Nasopharynx [[id number]]  (Normal) Collected:  08/01/20 0048    Lab Status:  Final result Specimen:  Swab from Nasopharynx Updated:  08/01/20 0259     ADENOVIRUS, PCR Not Detected     Coronavirus 229E Not Detected     Coronavirus HKU1 Not Detected     Coronavirus NL63 Not Detected     Coronavirus OC43 Not Detected     COVID19 Not Detected     Human Metapneumovirus Not Detected     Human Rhinovirus/Enterovirus Not Detected     Influenza A PCR Not Detected     Influenza A H1 Not Detected     Influenza A H1 2009  PCR Not Detected     Influenza A H3 Not Detected     Influenza B PCR Not Detected     Parainfluenza Virus 1 Not Detected     Parainfluenza Virus 2 Not Detected     Parainfluenza Virus 3 Not Detected     Parainfluenza Virus 4 Not Detected     RSV, PCR Not Detected     Bordetella pertussis pcr Not Detected     Bordetella parapertussis PCR Not Detected     Chlamydophila pneumoniae PCR Not Detected     Mycoplasma pneumo by PCR Not Detected    Narrative:       Fact sheet for providers: https://docs.[employer].ru/wp-content/uploads/QIL1948-2474-KP0.1-EUA-Provider-Fact-Sheet-3.pdf    Fact sheet for patients: https://docs.[employer].ru/wp-content/uploads/QFB9326-0996-XC5.1-EUA-Patient-Fact-Sheet-1.pdf          Imaging Results (All)     Procedure Component Value Units Date/Time    FL Video Swallow With Speech Single Contrast [[id number]] Collected:  08/03/20 1246     Updated:  08/03/20 2144    Narrative:       EXAMINATION: FL VIDEO SWALLOW W SPEECH, SINGLE-CONTRAST-08/03/2020:      INDICATION: DYSPHAGIA, OROPHARYNGEAL, HAS ATTRIBUTABLE CAUSE.     COMPARISON: NONE.     FINDINGS: The dictation is to record 1 minute and 42 seconds of  fluoroscopy time during modified barium swallow. The patient was  evaluated in the seated lateral position while taking a variety of  consistencies by mouth under the direction of speech pathology. Please  see the speech pathologist's report for full details and  recommendations.       Impression:       Fluoroscopy provided for modified barium swallow.     D:  08/03/2020  E:  08/03/2020           This report was finalized on 8/3/2020 9:41 PM by Dr. Pranav Bragg MD.       SLP FEES - Fiberoptic Endo Eval Swallow [[id number]] Resulted:  08/01/20 1541     Updated:  08/01/20 1541    Narrative:       This procedure was auto-finalized with no dictation required.    XR Chest 1 View [[id number]] Collected:  08/01/20 0021     Updated:  08/01/20 0023    Narrative:       CR Chest 1 Vw    INDICATION:   Cerebral  infarction. Lung opacities.     COMPARISON:    1/17/2020    FINDINGS:  Single portable AP view(s) of the chest.    Large-bore central line from a right-sided approach terminates at the right atrium. Postoperative changes of median sternotomy. Cardiomegaly with mild vascular congestion. Probable mild fibrotic changes in both lungs. No new dense consolidation or  effusion. No pneumothorax. Hiatal hernia.          Impression:         1. Cardiomegaly and probable mild vascular congestion.  2. Hiatal hernia.    Signer Name: Pancho Juarez MD   Signed: 8/1/2020 12:21 AM   Workstation Name: [email or web address]    [facility]    CT Angiogram Neck [[id number]] Collected:  07/31/20 2225     Updated:  07/31/20 2227    Narrative:       See the CT angiogram of the head dictation. The CT angiogram of the head and neck are dictated together.     Signer Name: Jagruti Laureano MD   Signed: 7/31/2020 10:25 PM   Workstation Name: [facility]    [facility]    CT Angiogram Head [[id number]] Collected:  07/31/20 2224     Updated:  07/31/20 2226    Narrative:       CTA Head    INDICATION:   Right-sided weakness onset at 1700.    TECHNIQUE:   CT angiogram of the head and neck with IV contrast. Contrast dose recorded in the patient's chart. 3-D reconstructions were obtained and reviewed.  Evaluation for a significant carotid arterial stenosis is based on the NASCET criteria. Radiation dose  reduction techniques included automated exposure control or exposure modulation based on body size. Count of known CT and cardiac nuc med studies performed in previous 12 months: 1. Noncontrast imaging also obtained prior to contrast enhanced study..    COMPARISON:   Earlier noncontrast head CT. Separate CT perfusion.    FINDINGS:   CTA neck:  There is atherosclerotic vascular calcification at the aortic arch. There is a bovine origin left common carotid artery. There is not hemodynamically significant stenosis of great  vessel origins from the arch. There is about 50% narrowing at  the origin of the right common carotid artery from the innominate and there is narrowing at the origin of the right subclavian artery.    Right carotid system: There is partially calcified plaque at the right carotid bifurcation. By NASCET criteria there is about 10% diameter stenosis proximal right internal carotid artery. Right carotid siphon is widely patent.    Left carotid system: There is calcified plaque at the left carotid bifurcation. By NASCET criteria there is about 45% diameter stenosis. Left carotid siphon is widely patent.    Both vertebral arteries are patent in the neck. The right is mildly dominant. Both supply the basilar.    CTA head:  There is a short segment high-grade stenosis/near cut off of the right M1 vessel about a centimeter from the origin. This finding has her to begin called by myself to Dr. Bragg at about 10:10 PM. This severe stenosis is consistent with the  abnormal perfusion findings and infarct. There is a small anterior communicating artery present. There is a large right posterior communicating artery with a small infundibulum at its origin. There is a tiny left posterior communicating artery. The right  P1 vessel is mildly hypoplastic. There is small amount of contrast outpouching directed laterally at the right MCA bifurcation consistent with a tiny broad necked aneurysm measuring about 1.4 x 2.4 mm dimension. The dural venous sinuses are patent. No  other possible intracranial vascular cut off or focal central stenosis is seen.    There is relative hypoperfusion to the left anterior insula and left lateral frontal lobe anteriorly consistent with the abnormalities seen on the perfusion study. There are patchy groundglass opacities are areas of air-trapping the visualized lungs.  Recommend correlation with a chest x-ray to assess for possible infiltrates. There are degenerative changes in the cervical spine.       Impression:         1. There is a short segment high-grade stenosis/occlusion with reconstitution of the right M1 vessel about a centimeter from its origin. This is consistent with the infarct seen on the noncontrast head CT and perfusion study. Findings called.  2. By NASCET criteria there is about 45% diameter stenosis of the left carotid bifurcation and about 10% diameter stenosis of the right carotid bifurcation due to calcified plaque.  3. Both vertebral arteries are patent with the right being mildly dominant.  4. No additional focal central stenosis or intracranial vascular cut off is suspected.  5. Patchy groundglass opacities or areas of air trapping partly seen at lung apices. Recommend chest x-ray correlation.  6. there is a small broad necked aneurysm at the right MCA bifurcation measuring about 1.4 x 2.4 mm directed laterally. Follow-up recommended.    Signer Name: Jagruti Laureano MD   Signed: 7/31/2020 10:24 PM   Workstation Name: [email or web address]    [facility]    CT Cerebral Perfusion With & Without Contrast [[id number]] Collected:  07/31/20 2210     Updated:  07/31/20 2213    Narrative:       CT CEREBRAL PERFUSION W WO CONTRAST    HISTORY:   80-year-old female with focal neurologic deficit. Right-sided weakness since 1700 hours.    TECHNIQUE:   Axial CT images of the brain without and with intravenous contrast using cerebral perfusion protocol. Post-processing parametric maps were created using RAPID software and reviewed. Radiation dose reduction techniques included automated exposure control  or exposure modulation based on body size. CT and nuclear cardiology exams in the last 12 months: 1.    COMPARISON:   Noncontrast head CT 2126 hours    FINDINGS:   Arterial input function is optimal.     PERFUSION PARAMETERS:  *  Ischemic tissue volume (Tmax > 6 sec.): 44 mL.  *  Infarct core volume (CBF < 30%): 33 mL.  *  Mismatch (penumbra) volume: 11 mL.  *  Mismatch ratio: 1.3.  *   Location/territory: Left MCA territory.    COLLATERAL CIRCULATION PARAMETERS:  *  Volume poor collateral perfusion (Tmax > 10 sec.): 25 mL.  *  Hypoperfusion index (Tmax >10 sec./Tmax > 6 sec.): 0.6.  *  CBV Index (rCBV in Tmax > 6 sec. region): 0.5.        Impression:         1. Abnormal CT perfusion demonstrating acute ischemic change in the left MCA territory. There is a core infarct on the left with a penumbra of associated potentially reversible ischemia.      NOTIFICATION: Critical Value/emergent results were called by telephone at the time of interpretation on 7/31/2020 10:07 PM to Efren Bragg MD who verbally acknowledged these results.      Signer Name: Pancho Juarez MD   Signed: 7/31/2020 10:10 PM   [facility]: [facility]    [facility]    CT Head Without Contrast Stroke Protocol [[id number]] Collected:  07/31/20 2142     Updated:  07/31/20 2144    Narrative:       HISTORY:   Right-sided weakness onset at 1700. Scan time 2125 preliminary findings given to the CT technologist by myself about 7:35 PM.    TECHNIQUE:   CT head without contrast. Radiation dose reduction techniques included automated exposure control or exposure modulation based on body size. Radiation audit for number of CT and nuclear cardiology exams performed in the last year: 1.      COMPARISON:   None    FINDINGS:   There is low-attenuation in the anterior aspect of the left side insula with loss of the insular ribbon anteriorly. In light of history provided this is concerning for early left MCA territory infarct. There is also some low-attenuation in the left  lateral frontal subcortical white matter to cortex. There is no appreciable mass affect. There is no evidence for acute intracranial hemorrhage.  There is generalized atrophy. There are lacunar-type insults in the anterior limb of the right internal  capsule posterior right insula and there is mild to moderate white matter low-attenuation in general  which is likely due to small vessel disease. Likely some low-attenuation in the brainstem. No midline shift.    The mastoid air cells are clear. The visualized paranasal sinuses are clear      Impression:         1. No acute hemorrhage extra-axial fluid collection or intracranial mass effect suspected.  2. There is loss of the insular ribbon on the left side anteriorly with some low-attenuation involving the left anterior basal ganglia and insula. Additionally there is low-attenuation in the left lateral frontal cortex and underlying white matter with  loss of gray-white distinction. These could be early signs of a left MCA territory infarct. This is less than one third of the likely vascular distribution.  3. Moderate probable sequelae of small vessel disease.    Signer Name: Jagruti Laureano MD   Signed: 7/31/2020 9:42 PM   Workstation [facility]: [facility]    [facility]                    Results for orders placed during the hospital encounter of 02/06/20   Adult Transthoracic Echo Complete W/ Cont if Necessary Per Protocol    Addendum 1.  Normal left ventricular size and systolic function, LVEF 65-70%. 2.  Mild to moderate concentric LVH. 3.  Impaired LV diastolic filling pattern consistent with grade 2  diastolic dysfunction and elevated left atrial pressure. 4.  Mild to moderate right ventricular dilation, with normal RV systolic  function 5.  Moderate to severe bilateral atrial enlargement. 6.  Mild to moderate mitral regurgitation. 7.  Moderate to severe tricuspid regurgitation. 8.  Suspected PFO. 9.  Pulmonary hypertension, with RVSP 60 mmHg.      Yomi Higgins MD 2/10/2020 12:37 PM          Narrative 1.  Normal left ventricular size and systolic function, LVEF 65-70%.  2.  Mild to moderate concentric LVH.  3.  Impaired LV diastolic filling pattern consistent with grade 2   diastolic dysfunction and elevated left atrial pressure.  4.  Normal right ventricular size and systolic  function.  5.  Moderate to severe bilateral atrial enlargement.  6.  Mild to moderate mitral regurgitation.  7.  Moderate to severe tricuspid regurgitation.  7.  Suspected PFO.       Plan for Follow-up of Pending Labs/Results: None    Discharge Details        Discharge Medications      Changes to Medications      Instructions Start Date   dilTIAZem  MG 24 hr capsule  Commonly known as:  CARDIZEM CD  What changed:    · medication strength  · how much to take   180 mg, Oral, Every 24 Hours Scheduled         Continue These Medications      Instructions Start Date   aspirin 81 MG EC tablet   81 mg, Oral, Daily      atorvastatin 40 MG tablet  Commonly known as:  LIPITOR   40 mg, Oral, Nightly      budesonide 0.5 MG/2ML nebulizer solution  Commonly known as:  PULMICORT   No dose, route, or frequency recorded.      levothyroxine 75 MCG tablet  Commonly known as:  SYNTHROID, LEVOTHROID   75 mcg, Oral, Daily      pantoprazole 40 MG EC tablet  Commonly known as:  PROTONIX   40 mg, Oral, Daily      predniSONE 5 MG tablet  Commonly known as:  DELTASONE   5 mg, Oral, Daily      ranolazine 500 MG 12 hr tablet  Commonly known as:  RANEXA   500 mg, Oral, Every 12 Hours Scheduled             Allergies   Allergen Reactions   • Tuberculin Tests Rash         Discharge Disposition:  Home or Self Care    Diet:  Hospital:  Diet Order   Procedures   • Diet Dysphagia; IV - Mechanical Soft No Mixed Consistencies; Honey Thick; No Straws, Other; liquids via teaspoon only; Cardiac              CODE STATUS:    Code Status and Medical Interventions:   Ordered at: 08/01/20 0049     Limited Support to NOT Include:    Intubation    Artificial Nutrition     Level Of Support Discussed With:    Health Care Surrogate     Code Status:    No CPR     Medical Interventions (Level of Support Prior to Arrest):    Limited       Future Appointments   Date Time Provider Department Center   10/13/2020 11:00 AM Anthony Sweet DO [facility] None        Additional Instructions for the Follow-ups that You Need to Schedule     Ambulatory Referral to Home Health   As directed      Face to Face Visit Date:  8/4/2020    Follow-up provider for Plan of Care?:  I treated the patient in an acute care facility and will not continue treatment after discharge.    Follow-up provider:  ANTHONY SWEET [[id number]]    Reason/Clinical Findings:  acute ischemic left MCA stroke    Describe mobility limitations that make leaving home difficult:  impaired physical mobility and gait endurance, aphasia    Nursing/Therapeutic Services Requested:  Skilled Nursing Physical Therapy Occupational Therapy Speech Therapy    Skilled nursing orders:  Medication education Neurovascular assessments    PT orders:  Therapeutic exercise Gait Training Strengthening Home safety assessment    Weight Bearing Status:  As Tolerated    Occupational orders:  Activities of daily living Energy conservation Strengthening Cognition Home safety assessment Fine motor    SLP orders:  Dysphagia therapy Cognition Language Non-oral communication    Frequency:  1 Week 1         Discharge Follow-up with PCP   As directed       Currently Documented PCP:    Anthony Sweet DO    PCP Phone Number:    [phone]     Follow Up Details:  1-2 week hospital follow up                     Electronically signed by Yudelka Gandhi II, DO, 08/05/20, 10:38 AM.      Time Spent on Discharge:  I spent  34  minutes on this discharge activity which included: face-to-face encounter with the patient, reviewing the data in the system, coordination of the care with the nursing staff as well as consultants, documentation, and entering orders.          Electronically signed by Yudelka Gandhi II, DO at 08/05/20 1044

## 2020-08-05 NOTE — PLAN OF CARE
Problem: Patient Care Overview  Goal: Plan of Care Review  Outcome: Ongoing (interventions implemented as appropriate)  Note:   Pt VSS. AO x4. Patient has severe aphasia, but understands what you are saying to her. When you ask questions she nods her head yes or no. Takes medications crushed in applesauce.A-Fib on monitor. RA. NIH=11. No pressures or sticks in L arm r/t dialysis. Purewick in place with low UOP. Plan is d/c today to home with home health. Will continue to monitor

## 2020-08-05 NOTE — PLAN OF CARE
Problem: Patient Care Overview  Goal: Plan of Care Review  8/5/2020 1509 by Meron Joseph MS CCC-SLP  Outcome: Outcome(s) achieved  Flowsheets (Taken 8/5/2020 1507)  Plan of Care Reviewed With: patient  Note:   SLP treatment completed. Will recommend pt cont to receive SLP services for dysphagia, aphasia, and suspected apraxia of speech.  Please see note for further details and recommendations.

## 2020-08-05 NOTE — DISCHARGE SUMMARY
[facility] Medicine Services  DISCHARGE SUMMARY    Patient Name: Pat Morel  : 1940  MRN: [id number]    Date of Admission: 2020  9:52 PM  Date of Discharge:  2020  Primary Care Physician: Anthony Sweet DO    Consults     Date and Time Order Name Status Description    8/3/2020 1342 Inpatient Palliative Care MD Consult Completed     2020 1050 Inpatient Nephrology Consult Completed     2020 0157 Inpatient Palliative Care MD Consult Completed     2020 0157 Inpatient Palliative Care MD Consult Completed     2020 0156 Inpatient Neurology Consult Stroke Completed           Hospital Course     Presenting Problem:   Acute ischemic left MCA stroke (CMS/HCC) [I63.512]    Active Hospital Problems    Diagnosis  POA   • ESRD (end stage renal disease) (CMS/HCC) [N18.6]  Yes   • Chronic diastolic heart failure (CMS/HCC) [I50.32]  Yes   • CKD (chronic kidney disease) requiring chronic dialysis (CMS/[facility]) [N18.6, Z99.2]  Not Applicable   • Acquired hypothyroidism [E03.9]  Yes   • Dyslipidemia [E78.5]  Yes   • Interstitial lung disease (CMS/HCC) [J84.9]  Yes   • Pulmonary arterial hypertension (CMS/HCC) [I27.21]  Yes   • Persistent atrial fibrillation (CMS/HCC) [I48.19]  Yes   • Coronary artery disease involving native coronary artery of native heart without angina pectoris [I25.10]  Yes   • Essential hypertension [I10]  Yes      Resolved Hospital Problems    Diagnosis Date Resolved POA   • **Acute ischemic left MCA stroke (CMS/HCC) [I63.512] 2020 Yes   • Acute renal failure (CMS/HCC) [N17.9] 2020 Yes   • Cerebrovascular accident (CVA) due to embolism (CMS/HCC) [I63.9] 2020 Yes          Hospital Course:  Pat Morel is a 80 y.o. female with past medical history of ESRD on HD, hypertension, persistent A. fib, interstitial lung disease, hypothyroidism.  Patient presented with right-sided weakness and complete aphasia, admitted on 20 with  acute left-sided MCA stroke. Patient remains aphasic, has right facial droop, with weakness improving     Acute left-sided MCA stroke- ok to move to stroke unit  - Upon arrival patient underwent extensive stroke work-up per protocol w/ source most likely cardioembolic with A fib/ PFO.   - Though her CTP did show penumbra she was deemed not a candidate for intervention due to large area of infarct and low mismatch ratio. She had echo completed in last 6 mos:  Normal LVEF 65-70%, grade 2 diastolic dysfunction, Moderate to severe bilateral atrial enlargement,  Mild to moderate mitral regurgitation, Moderate to severe tricuspid regurgitation.  Suspected PFO and Pulmonary hypertension.  - Neuro eval completed and they have signed off- continue asa and statin  - F/U PCP in 1 weeks time.    A fib  -- She is no longer on AC due to risks with renal disease  -- Her diltiazem dose was increased to 180 mg, continue at d/c.    ESRD   --Followed by [facility] throughout stay and remained on HD per usual scheduled.     Discharge Follow Up Recommendations for outpatient labs/diagnostics:   F/U PCP in 1-2 weeks   F/U Dr Romero as scheduled    Day of Discharge     HPI: Seen in HD. Remains aphasic but smiles appropriately. Denies pain or new needs.    Review of Systems  UTO ROS due to aphasia    Vital Signs:   Temp:  [97.8 °F (36.6 °C)-98.7 °F (37.1 °C)] 98.1 °F (36.7 °C)  Heart Rate:  [] 107  Resp:  [16-20] 16  BP: (114-155)/(62-99) 155/99     Physical Exam:  Constitutional: No acute distress, awake, alert, on HD  HENT: NCAT, mucous membranes moist  Chest: Tunneled catheter in left upper chest  Respiratory: Clear to auscultation bilaterally, respiratory effort normal   Cardiovascular: RRR, no murmurs, rubs, or gallops, palpable pedal pulses bilaterally  Gastrointestinal: Positive bowel sounds, soft, nontender, nondistended  Musculoskeletal: No bilateral ankle edema  Psychiatric: Appropriate affect, cooperative  Neurologic:  Interactive, expressive aphasia, right hemiparesis  Skin: No rashes    Pertinent  and/or Most Recent Results     Results from last 7 days   Lab Units 08/05/20  0555 08/04/20  0508 08/03/20  0738 07/31/20  2228 07/31/20 2219 07/31/20  2146   WBC 10*3/mm3  --   --  6.01  --  5.29  --    HEMOGLOBIN g/dL 9.7*  --  9.6*  --  11.2*  --    HEMOGLOBIN, POC g/dL  --   --   --  11.9*  --   --    HEMATOCRIT % 31.3*  --  31.2*  --  35.0  --    HEMATOCRIT POC %  --   --   --  35*  --   --    PLATELETS 10*3/mm3  --   --  217  --  227  --    SODIUM mmol/L 138 138 132*  --  132*  --    POTASSIUM mmol/L 4.2 4.2 4.5  --  4.5  --    CHLORIDE mmol/L 103 103 99  --  93*  --    CO2 mmol/L 24.0 23.0 20.0*  --  25.0  --    BUN mg/dL 28* 16 35*  --  17  --    CREATININE mg/dL 3.01* 2.15* 3.17*  --  2.00* 2.20*   GLUCOSE mg/dL 86 96 87  --  86  --    CALCIUM mg/dL 8.7 8.6 8.5*  --  9.6  --      Results from last 7 days   Lab Units 07/31/20 2219 07/31/20  2146   BILIRUBIN mg/dL 0.6  --    ALK PHOS U/L 96  --    ALT (SGPT) U/L 12  --    AST (SGOT) U/L 20  --    PROTIME seconds  --  12.3*   INR   --  1.0     Results from last 7 days   Lab Units 08/01/20  0641   CHOLESTEROL mg/dL 189   TRIGLYCERIDES mg/dL 143   HDL CHOL mg/dL 62*     Results from last 7 days   Lab Units 08/01/20  0641 07/31/20 2219   TSH uIU/mL 3.630  --    HEMOGLOBIN A1C % 4.70*  --    LACTATE mmol/L  --  1.3       Brief Urine Lab Results  (Last result in the past 365 days)      Color   Clarity   Blood   Leuk Est   Nitrite   Protein   CREAT   Urine HCG        02/13/20 1329             43.3       02/13/20 1329             43.7             Microbiology Results Abnormal     Procedure Component Value - Date/Time    Respiratory Panel PCR w/COVID-19(SARS-CoV-2) KEY/SHAILA/RADHA In-House, NP Swab in [facility], 8-12 Hr TAT - Swab, Nasopharynx [[id number]]  (Normal) Collected:  08/01/20 0048    Lab Status:  Final result Specimen:  Swab from Nasopharynx Updated:  08/01/20 0259     ADENOVIRUS,  PCR Not Detected     Coronavirus 229E Not Detected     Coronavirus HKU1 Not Detected     Coronavirus NL63 Not Detected     Coronavirus OC43 Not Detected     COVID19 Not Detected     Human Metapneumovirus Not Detected     Human Rhinovirus/Enterovirus Not Detected     Influenza A PCR Not Detected     Influenza A H1 Not Detected     Influenza A H1 2009 PCR Not Detected     Influenza A H3 Not Detected     Influenza B PCR Not Detected     Parainfluenza Virus 1 Not Detected     Parainfluenza Virus 2 Not Detected     Parainfluenza Virus 3 Not Detected     Parainfluenza Virus 4 Not Detected     RSV, PCR Not Detected     Bordetella pertussis pcr Not Detected     Bordetella parapertussis PCR Not Detected     Chlamydophila pneumoniae PCR Not Detected     Mycoplasma pneumo by PCR Not Detected    Narrative:       Fact sheet for providers: https://docs.[employer]/wp-content/uploads/EQS5682-9902-KM3.1-EUA-Provider-Fact-Sheet-3.pdf    Fact sheet for patients: https://docs.[employer]/wp-content/uploads/YEO4202-3879-AX6.1-EUA-Patient-Fact-Sheet-1.pdf          Imaging Results (All)     Procedure Component Value Units Date/Time    FL Video Swallow With Speech Single Contrast [[id number]] Collected:  08/03/20 1246     Updated:  08/03/20 2144    Narrative:       EXAMINATION: FL VIDEO SWALLOW W SPEECH, SINGLE-CONTRAST-08/03/2020:      INDICATION: DYSPHAGIA, OROPHARYNGEAL, HAS ATTRIBUTABLE CAUSE.     COMPARISON: NONE.     FINDINGS: The dictation is to record 1 minute and 42 seconds of  fluoroscopy time during modified barium swallow. The patient was  evaluated in the seated lateral position while taking a variety of  consistencies by mouth under the direction of speech pathology. Please  see the speech pathologist's report for full details and  recommendations.       Impression:       Fluoroscopy provided for modified barium swallow.     D:  08/03/2020  E:  08/03/2020           This report was finalized on 8/3/2020 9:41 PM by Dr. Rock  MD Yemi.       SLP FEES - Fiberoptic Endo Eval Swallow [[id number]] Resulted:  08/01/20 1541     Updated:  08/01/20 1541    Narrative:       This procedure was auto-finalized with no dictation required.    XR Chest 1 View [[id number]] Collected:  08/01/20 0021     Updated:  08/01/20 0023    Narrative:       CR Chest 1 Vw    INDICATION:   Cerebral infarction. Lung opacities.     COMPARISON:    1/17/2020    FINDINGS:  Single portable AP view(s) of the chest.    Large-bore central line from a right-sided approach terminates at the right atrium. Postoperative changes of median sternotomy. Cardiomegaly with mild vascular congestion. Probable mild fibrotic changes in both lungs. No new dense consolidation or  effusion. No pneumothorax. Hiatal hernia.          Impression:         1. Cardiomegaly and probable mild vascular congestion.  2. Hiatal hernia.    Signer Name: Pancho Juarez MD   Signed: 8/1/2020 12:21 AM   Work[email or web address]    [facility]    CT Angiogram Neck [[id number]] Collected:  07/31/20 2225     Updated:  07/31/20 2227    Narrative:       See the CT angiogram of the head dictation. The CT angiogram of the head and neck are dictated together.     Signer Name: Jagruti Laureano MD   Signed: 7/31/2020 10:25 PM   Workstation Name: [email or web address]    [facility]    CT Angiogram Head [[id number]] Collected:  07/31/20 2224     Updated:  07/31/20 2226    Narrative:       CTA Head    INDICATION:   Right-sided weakness onset at 1700.    TECHNIQUE:   CT angiogram of the head and neck with IV contrast. Contrast dose recorded in the patient's chart. 3-D reconstructions were obtained and reviewed.  Evaluation for a significant carotid arterial stenosis is based on the NASCET criteria. Radiation dose  reduction techniques included automated exposure control or exposure modulation based on body size. Count of known CT and cardiac nuc med studies performed in previous 12 months:  1. Noncontrast imaging also obtained prior to contrast enhanced study..    COMPARISON:   Earlier noncontrast head CT. Separate CT perfusion.    FINDINGS:   CTA neck:  There is atherosclerotic vascular calcification at the aortic arch. There is a bovine origin left common carotid artery. There is not hemodynamically significant stenosis of great vessel origins from the arch. There is about 50% narrowing at  the origin of the right common carotid artery from the innominate and there is narrowing at the origin of the right subclavian artery.    Right carotid system: There is partially calcified plaque at the right carotid bifurcation. By NASCET criteria there is about 10% diameter stenosis proximal right internal carotid artery. Right carotid siphon is widely patent.    Left carotid system: There is calcified plaque at the left carotid bifurcation. By NASCET criteria there is about 45% diameter stenosis. Left carotid siphon is widely patent.    Both vertebral arteries are patent in the neck. The right is mildly dominant. Both supply the basilar.    CTA head:  There is a short segment high-grade stenosis/near cut off of the right M1 vessel about a centimeter from the origin. This finding has her to begin called by myself to Dr. Bragg at about 10:10 PM. This severe stenosis is consistent with the  abnormal perfusion findings and infarct. There is a small anterior communicating artery present. There is a large right posterior communicating artery with a small infundibulum at its origin. There is a tiny left posterior communicating artery. The right  P1 vessel is mildly hypoplastic. There is small amount of contrast outpouching directed laterally at the right MCA bifurcation consistent with a tiny broad necked aneurysm measuring about 1.4 x 2.4 mm dimension. The dural venous sinuses are patent. No  other possible intracranial vascular cut off or focal central stenosis is seen.    There is relative hypoperfusion to the  left anterior insula and left lateral frontal lobe anteriorly consistent with the abnormalities seen on the perfusion study. There are patchy groundglass opacities are areas of air-trapping the visualized lungs.  Recommend correlation with a chest x-ray to assess for possible infiltrates. There are degenerative changes in the cervical spine.      Impression:         1. There is a short segment high-grade stenosis/occlusion with reconstitution of the right M1 vessel about a centimeter from its origin. This is consistent with the infarct seen on the noncontrast head CT and perfusion study. Findings called.  2. By NASCET criteria there is about 45% diameter stenosis of the left carotid bifurcation and about 10% diameter stenosis of the right carotid bifurcation due to calcified plaque.  3. Both vertebral arteries are patent with the right being mildly dominant.  4. No additional focal central stenosis or intracranial vascular cut off is suspected.  5. Patchy groundglass opacities or areas of air trapping partly seen at lung apices. Recommend chest x-ray correlation.  6. there is a small broad necked aneurysm at the right MCA bifurcation measuring about 1.4 x 2.4 mm directed laterally. Follow-up recommended.    Signer Name: Jagruti Laureano MD   Signed: 7/31/2020 10:24 PM   Workstation Name: ROBERT    [facility]    CT Cerebral Perfusion With & Without Contrast [[id number]] Collected:  07/31/20 2210     Updated:  07/31/20 2213    Narrative:       CT CEREBRAL PERFUSION W WO CONTRAST    HISTORY:   80-year-old female with focal neurologic deficit. Right-sided weakness since 1700 hours.    TECHNIQUE:   Axial CT images of the brain without and with intravenous contrast using cerebral perfusion protocol. Post-processing parametric maps were created using RAPID software and reviewed. Radiation dose reduction techniques included automated exposure control  or exposure modulation based on body size. CT and  nuclear cardiology exams in the last 12 months: 1.    COMPARISON:   Noncontrast head CT 2126 hours    FINDINGS:   Arterial input function is optimal.     PERFUSION PARAMETERS:  *  Ischemic tissue volume (Tmax > 6 sec.): 44 mL.  *  Infarct core volume (CBF < 30%): 33 mL.  *  Mismatch (penumbra) volume: 11 mL.  *  Mismatch ratio: 1.3.  *  Location/territory: Left MCA territory.    COLLATERAL CIRCULATION PARAMETERS:  *  Volume poor collateral perfusion (Tmax > 10 sec.): 25 mL.  *  Hypoperfusion index (Tmax >10 sec./Tmax > 6 sec.): 0.6.  *  CBV Index (rCBV in Tmax > 6 sec. region): 0.5.        Impression:         1. Abnormal CT perfusion demonstrating acute ischemic change in the left MCA territory. There is a core infarct on the left with a penumbra of associated potentially reversible ischemia.      NOTIFICATION: Critical Value/emergent results were called by telephone at the time of interpretation on 7/31/2020 10:07 PM to Efren Bragg MD who verbally acknowledged these results.      Signer Name: Pancho Juarez MD   Signed: 7/31/2020 10:10 PM   [facility]: [email or web address]    [facility]    CT Head Without Contrast Stroke Protocol [[id number]] Collected:  07/31/20 2142     Updated:  07/31/20 2144    Narrative:       HISTORY:   Right-sided weakness onset at 1700. Scan time 2125 preliminary findings given to the CT technologist by myself about 7:35 PM.    TECHNIQUE:   CT head without contrast. Radiation dose reduction techniques included automated exposure control or exposure modulation based on body size. Radiation audit for number of CT and nuclear cardiology exams performed in the last year: 1.      COMPARISON:   None    FINDINGS:   There is low-attenuation in the anterior aspect of the left side insula with loss of the insular ribbon anteriorly. In light of history provided this is concerning for early left MCA territory infarct. There is also some low-attenuation in the left  lateral  frontal subcortical white matter to cortex. There is no appreciable mass affect. There is no evidence for acute intracranial hemorrhage.  There is generalized atrophy. There are lacunar-type insults in the anterior limb of the right internal  capsule posterior right insula and there is mild to moderate white matter low-attenuation in general which is likely due to small vessel disease. Likely some low-attenuation in the brainstem. No midline shift.    The mastoid air cells are clear. The visualized paranasal sinuses are clear      Impression:         1. No acute hemorrhage extra-axial fluid collection or intracranial mass effect suspected.  2. There is loss of the insular ribbon on the left side anteriorly with some low-attenuation involving the left anterior basal ganglia and insula. Additionally there is low-attenuation in the left lateral frontal cortex and underlying white matter with  loss of gray-white distinction. These could be early signs of a left MCA territory infarct. This is less than one third of the likely vascular distribution.  3. Moderate probable sequelae of small vessel disease.    Signer Name: Jagruti Laureano MD   Signed: 7/31/2020 9:42 PM   Workstation Name: [email or web address]    [facility] [place]                    Results for orders placed during the hospital encounter of 02/06/20   Adult Transthoracic Echo Complete W/ Cont if Necessary Per Protocol    Addendum 1.  Normal left ventricular size and systolic function, LVEF 65-70%. 2.  Mild to moderate concentric LVH. 3.  Impaired LV diastolic filling pattern consistent with grade 2  diastolic dysfunction and elevated left atrial pressure. 4.  Mild to moderate right ventricular dilation, with normal RV systolic  function 5.  Moderate to severe bilateral atrial enlargement. 6.  Mild to moderate mitral regurgitation. 7.  Moderate to severe tricuspid regurgitation. 8.  Suspected PFO. 9.  Pulmonary hypertension, with RVSP 60 mmHg.      Ronaldo  Yomi Moncada MD 2/10/2020 12:37 PM          Narrative 1.  Normal left ventricular size and systolic function, LVEF 65-70%.  2.  Mild to moderate concentric LVH.  3.  Impaired LV diastolic filling pattern consistent with grade 2   diastolic dysfunction and elevated left atrial pressure.  4.  Normal right ventricular size and systolic function.  5.  Moderate to severe bilateral atrial enlargement.  6.  Mild to moderate mitral regurgitation.  7.  Moderate to severe tricuspid regurgitation.  7.  Suspected PFO.       Plan for Follow-up of Pending Labs/Results: None    Discharge Details        Discharge Medications      Changes to Medications      Instructions Start Date   dilTIAZem  MG 24 hr capsule  Commonly known as:  CARDIZEM CD  What changed:    · medication strength  · how much to take   180 mg, Oral, Every 24 Hours Scheduled         Continue These Medications      Instructions Start Date   aspirin 81 MG EC tablet   81 mg, Oral, Daily      atorvastatin 40 MG tablet  Commonly known as:  LIPITOR   40 mg, Oral, Nightly      budesonide 0.5 MG/2ML nebulizer solution  Commonly known as:  PULMICORT   No dose, route, or frequency recorded.      levothyroxine 75 MCG tablet  Commonly known as:  SYNTHROID, LEVOTHROID   75 mcg, Oral, Daily      pantoprazole 40 MG EC tablet  Commonly known as:  PROTONIX   40 mg, Oral, Daily      predniSONE 5 MG tablet  Commonly known as:  DELTASONE   5 mg, Oral, Daily      ranolazine 500 MG 12 hr tablet  Commonly known as:  RANEXA   500 mg, Oral, Every 12 Hours Scheduled             Allergies   Allergen Reactions   • Tuberculin Tests Rash         Discharge Disposition:  Home or Self Care    Diet:  Hospital:  Diet Order   Procedures   • Diet Dysphagia; IV - Mechanical Soft No Mixed Consistencies; Honey Thick; No Straws, Other; liquids via teaspoon only; Cardiac              CODE STATUS:    Code Status and Medical Interventions:   Ordered at: 08/01/20 0049     Limited Support to NOT Include:     Intubation    Artificial Nutrition     Level Of Support Discussed With:    Health Care Surrogate     Code Status:    No CPR     Medical Interventions (Level of Support Prior to Arrest):    Limited       Future Appointments   Date Time Provider Department Center   10/13/2020 11:00 AM Anthony Sweet DO [facility] None       Additional Instructions for the Follow-ups that You Need to Schedule     Ambulatory Referral to Home Health   As directed      Face to Face Visit Date:  8/4/2020    Follow-up provider for Plan of Care?:  I treated the patient in an acute care facility and will not continue treatment after discharge.    Follow-up provider:  ANTHONY SWEET [[id number]]    Reason/Clinical Findings:  acute ischemic left MCA stroke    Describe mobility limitations that make leaving home difficult:  impaired physical mobility and gait endurance, aphasia    Nursing/Therapeutic Services Requested:  Skilled Nursing Physical Therapy Occupational Therapy Speech Therapy    Skilled nursing orders:  Medication education Neurovascular assessments    PT orders:  Therapeutic exercise Gait Training Strengthening Home safety assessment    Weight Bearing Status:  As Tolerated    Occupational orders:  Activities of daily living Energy conservation Strengthening Cognition Home safety assessment Fine motor    SLP orders:  Dysphagia therapy Cognition Language Non-oral communication    Frequency:  1 Week 1         Discharge Follow-up with PCP   As directed       Currently Documented PCP:    Anthony Sweet DO    PCP Phone Number:    [phone]     Follow Up Details:  1-2 week hospital follow up                     Electronically signed by Yudelka Gandhi II, DO, 08/05/20, 10:38 AM.      Time Spent on Discharge:  I spent  34  minutes on this discharge activity which included: face-to-face encounter with the patient, reviewing the data in the system, coordination of the care with the nursing staff as well as consultants,  documentation, and entering orders.

## 2020-08-05 NOTE — PROGRESS NOTES
Case Management Discharge Note      Final Note: Mrs. Morel is being discharged home today after hemodialysis. I spoke with her daughter Yary. She is going to pick her up today at 15:00. I notified Valentin with [facility]. They will follow Mrs. Morel at home with skilled nursing, PT, OT, and SLP. Mrs. Morel will resume outpatient hemodialysis at [facility]. I notified them that she will be present for her dialysis on Friday. When available I will fax the discharge summary to them at [phone]. Yary denies having any additional discharge needs at this time.         Destination      No service has been selected for the patient.      Durable Medical Equipment      No service has been selected for the patient.      Dialysis/Infusion - Selection Complete      Service Provider Request Status Selected Services Address Phone Number Fax Number    [facility] Selected Dialysis [address] [facility] [id number] [phone] --      Home Medical Care - Selection Complete      Service Provider Request Status Selected Services Address Phone Number Fax Number    [facility] [address] [facility] [address] [phone]      Therapy      No service has been selected for the patient.      Community Resources      No service has been selected for the patient.             Final Discharge Disposition Code: 06 - home with home health care

## 2020-08-05 NOTE — THERAPY TREATMENT NOTE
Acute Care - Speech Language Pathology Treatment Note  [facility]     Patient Name: Pat Morel  : 1940  MRN: [id number]  Today's Date: 2020         Admit Date: 2020    Visit Dx:      ICD-10-CM ICD-9-CM   1. Acute ischemic left MCA stroke (CMS/HCC) I63.512 434.91   2. Oropharyngeal dysphagia R13.12 787.22   3. Aphasia R47.01 784.3   4. Impaired functional mobility, balance, gait, and endurance Z74.09 V49.89   5. Chronic diastolic heart failure (CMS/HCC) I50.32 428.32   6. 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 (CMS/HCC)   • Dyslipidemia   • Acquired hypothyroidism   • CKD (chronic kidney disease) requiring chronic dialysis (CMS/HCC)   • GERD without esophagitis   • Iron deficiency anemia   • Chronic diastolic heart failure (CMS/HCC)   • Recurrent falls   • Normocytic anemia   • Coagulopathy (CMS/HCC)   • Physical deconditioning   • CKD (chronic kidney disease) stage 4, GFR 15-29 ml/min (CMS/HCC)   • ESRD (end stage renal disease) (CMS/HCC)        Therapy Treatment  Rehabilitation Treatment Summary     Row Name 20 7930             Treatment Time/Intention    Discipline  speech language pathologist  -AC      Document Type  therapy note (daily note)  -AC      Subjective Information  swelling pt had difficulty expressing complaints  -AC      Patient/Family Observations  No family present.   -AC      Care Plan Review  evaluation/treatment results reviewed;care plan/treatment goals reviewed;risks/benefits reviewed;patient/other agree to care  "plan;current/potential barriers reviewed  -AC      Therapy Frequency (Swallow)  5 days per week  -AC      Therapy Frequency (SLP SLC)  5 days per week  -AC      Patient Effort  good  -AC      Recorded by [AC] Meron Joseph MS CCC-SLP 08/05/20 1458      Row Name 08/05/20 1350             Pain Assessment    Additional Documentation  Pain Scale: Word Pre/Post-Treatment (Group)  -AC      Recorded by [AC] Meron Joseph MS CCC-SLP 08/05/20 1458      Row Name 08/05/20 1350             Pain Scale: Word Pre/Post-Treatment    Pain: Word Scale, Pretreatment  0 - no pain  -AC      Pain: Word Scale, Post-Treatment  0 - no pain  -AC      Pre/Post Treatment Pain Comment  When asked if in pain, pt shook her head \"no.\" Appeared comfortable.  -AC      Recorded by [AC] Meron Joseph MS CCC-SLP 08/05/20 1458      Row Name                Wound 08/01/20 0310 midline coccyx Pressure Injury    Wound - Properties Group Date first assessed: 08/01/20 [MW] Time first assessed: 0310 [MW] Present on Hospital Admission: Y [MW] Orientation: midline [MW] Location: coccyx [MW] Primary Wound Type: Pressure inj [MW] Stage, Pressure Injury: Stage 1 [MW] Recorded by:  [MW] Deborah Clarke RN 08/01/20 0310    Row Name                Wound 08/03/20 0823 Right distal;lower;posterior arm Skin Tear    Wound - Properties Group Date first assessed: 08/03/20 [EH] Time first assessed: 0823 [EH] Present on Hospital Admission: N [EH] Side: Right [EH] Orientation: distal;lower;posterior [EH] Location: arm [EH] Primary Wound Type: Skin tear [EH] Additional Comments:  placed tourniquet and it caused the skin tear, vaseline gauze applied, gauze and coban, w a pre NS wash out [EH] Recorded by:  [EH] Jazmyne High RN 08/03/20 1124    Row Name 08/05/20 1350             Outcome Summary/Treatment Plan (SLP)    Daily Summary of Progress (SLP)  progress toward functional goals as expected  -      Plan for Continued Treatment (SLP)  Pt appears to be " "tolerating recommended liquids. Able to complete some swallowing exercises w/ cues. Provided info/handouts re: modifying diet, thickening liquids, compensatory strategies, and oral care as pt scheduled to d/c home today. Cont dysphagia level IV (mech soft, no mixed consistencies) and honey-thick liquid via tsp only (no cup or straw drinks). Monitor for fatigue and take breaks PRN. Pt appears to be making some slight improvements in auditory comprehension. Still having significant difficulty w/ verbal expression. Able to verbalize initial \"s\" sound in \"Sunday\" while completing automatic naming task. Then became labile and began to cry. Pt not able to functionally use pic communication board @ this time. Pt would benefit from cont'd dysphagia and speech/language tx and will need services @ next level of care.  -AC      Anticipated Dischage Disposition (SLP)  anticipate therapy at next level of care;home with home health  -AC      Recorded by [AC] Meron Joseph, MS CCC-SLP 08/05/20 7954        User Key  (r) = Recorded By, (t) = Taken By, (c) = Cosigned By    Initials Name Effective Dates Discipline    AC Meron Joseph, MS CCC-SLP 07/27/17 -  SLP    Deborah Vyas RN 06/16/16 -  Nurse    Jazmyne Beaulieu RN 06/21/18 -  Nurse          EDUCATION  The patient has been educated in the following areas:   Communication Impairment Dysphagia (Swallowing Impairment) Oral Care/Hydration Modified Diet Instruction.    SLP Recommendation and Plan  Daily Summary of Progress (SLP): progress toward functional goals as expected     Plan for Continued Treatment (SLP): Pt appears to be tolerating recommended liquids. Able to complete some swallowing exercises w/ cues. Provided info/handouts re: modifying diet, thickening liquids, compensatory strategies, and oral care as pt scheduled to d/c home today. Cont dysphagia level IV (mech soft, no mixed consistencies) and honey-thick liquid via tsp only (no cup or straw drinks). Monitor " "for fatigue and take breaks PRN. Pt appears to be making some slight improvements in auditory comprehension. Still having significant difficulty w/ verbal expression. Able to verbalize initial \"s\" sound in \"Sunday\" while completing automatic naming task. Then became labile and began to cry. Pt not able to functionally use pic communication board @ this time. Pt would benefit from cont'd dysphagia and speech/language tx and will need services @ next level of care.  Anticipated Dischage Disposition (SLP): anticipate therapy at next level of care, home with home health             SLP GOALS     Row Name 08/05/20 1350 08/03/20 1200 08/03/20 1000       Oral Nutrition/Hydration Goal 1 (SLP)    Oral Nutrition/Hydration Goal 1, SLP  LTG: Tolerate soft diet and thin liquids without s/s aspiration with 100% accuracy and cues  -AC  LTG: Tolerate soft diet and thin liquids without s/s aspiration with 100% accuracy and cues  -RD  --    Time Frame (Oral Nutrition/Hydration Goal 1, SLP)  by discharge  -AC  by discharge  -RD  --    Progress/Outcomes (Oral Nutrition/Hydration Goal 1, SLP)  continuing progress toward goal  -AC  continuing progress toward goal  -RD  --       Oral Nutrition/Hydration Goal 2 (SLP)    Oral Nutrition/Hydration Goal 2, SLP  Tolerate H2O without s/s aspiration with 100% accuracy and cues for needed precautions  -AC  Tolerate H2O without s/s aspiration with 100% accuracy and cues for needed precautions  -RD  --    Time Frame (Oral Nutrition/Hydration Goal 2, SLP)  short term goal (STG)  -AC  short term goal (STG)  -RD  --    Progress/Outcomes (Oral Nutrition/Hydration Goal 2, SLP)  goal ongoing  -AC  goal ongoing  -RD  --       Oral Nutrition/Hydration Goal (SLP)    Oral Nutrition/Hydration Goal, SLP  Pt will tolerate honey-thick liquids via tsp only and soft no mixed diet w/ no overt s/s of aspiration w/ 100% accuracy w/o cues  -AC  Pt will tolerate honey-thick liquids via tsp only and soft no mixed diet w/ " no overt s/s of aspiration w/ 100% accuracy w/o cues  -RD  --    Time Frame (Oral Nutrition/Hydration Goal, SLP)  short term goal (STG);by discharge  -AC  short term goal (STG);by discharge  -RD  --    Barriers (Oral Nutrition/Hydration Goal, SLP)  Tolerated honey-thick via tsp w/o s/sxs aspiration. Noted pre-thickened nectar-thick drinks in pt's room, as well as adaptive cup w/ spouted lid. Notified RN and dietary staff that liquids must be honey-thick and only administered via tsp.   -AC  --  --    Progress/Outcomes (Oral Nutrition/Hydration Goal, SLP)  continuing progress toward goal  -AC  --  --       Labial Strengthening Goal 1 (SLP)    Activity (Labial Strengthening Goal 1, SLP)  increase labial tone  -AC  --  --    Increase Labial Tone  labial resistance exercises  -AC  --  --    [place]/Accuracy (Labial Strengthening Goal 1, SLP)  with moderate cues (50-74% accuracy)  -AC  --  --    Time Frame (Labial Strengthening Goal 1, SLP)  short term goal (STG)  -AC  --  --    Barriers (Labial Strengthening Goal 1, SLP)  Protrusion/retraction exercises x10 w/ mod cues.  -AC  --  --       Lingual Strengthening Goal 1 (SLP)    Activity (Lingual Strengthening Goal 1, SLP)  --  increase lingual tone/sensation/control/coordination/movement;increase tongue back strength  -RD  --    Increase Lingual Tone/Sensation/Control/Coordination/Movement  lingual movement exercises;swallow trials;lingual resistance exercises  -AC  lingual movement exercises;swallow trials;lingual resistance exercises  -RD  --    Increase Tongue Back Strength  lingual movement exercises;lingual resistance exercises  -AC  lingual movement exercises;lingual resistance exercises  -RD  --    [place]/Accuracy (Lingual Strengthening Goal 1, SLP)  with moderate cues (50-74% accuracy)  -AC  with moderate cues (50-74% accuracy)  -RD  --    Time Frame (Lingual Strengthening Goal 1, SLP)  short term goal (STG)  -AC  short term goal (STG)  -RD  --     Barriers (Lingual Strengthening Goal 1, SLP)  Lingual lateralization/elevation exercises x10 w/ mod cues.   -AC  --  --    Progress/Outcomes (Lingual Strengthening Goal 1, SLP)  continuing progress toward goal  -AC  goal ongoing  -RD  --       Pharyngeal Strengthening Exercise Goal 1 (SLP)    Activity (Pharyngeal Strengthening Goal 1, SLP)  --  increase timing;increase superior movement of the hyolaryngeal complex;increase closure at entrance to airway/closure of airway at glottis  -RD  --    Increase Timing  prepping - 3 second prep or suck swallow or 3-step swallow  -AC  prepping - 3 second prep or suck swallow or 3-step swallow  -RD  --    Increase Superior Movement of the Hyolaryngeal Complex  falsetto  -AC  falsetto  -RD  --    Increase Closure at Entrance to Airway/Closure of Airway at Glottis  breath hold exercises;hard effortful swallow  -AC  breath hold exercises;hard effortful swallow  -RD  --    [place]/Accuracy (Pharyngeal Strengthening Goal 1, SLP)  with moderate cues (50-74% accuracy)  -AC  with moderate cues (50-74% accuracy)  -RD  --    Time Frame (Pharyngeal Strengthening Goal 1, SLP)  short term goal (STG)  -AC  short term goal (STG)  -RD  --    Barriers (Pharyngeal Strengthening Goal 1, SLP)  3 sec prep 5 w/ mod cues.  -AC  --  --    Progress/Outcomes (Pharyngeal Strengthening Goal 1, SLP)  continuing progress toward goal  -AC  goal ongoing  -RD  --       Swallow Management Recall Goal 1 (SLP)    Activity (Swallow Management Recall Goal 1, SLP)  safe diet/liquid level;compensatory swallow strategies/techniques  -AC  safe diet/liquid level;compensatory swallow strategies/techniques  -RD  --    [place]/Accuracy (Swallow Management Recall Goal 1, SLP)  with minimal cues (75-90% accuracy)  -AC  with minimal cues (75-90% accuracy)  -RD  --    Time Frame (Swallow Management Recall Goal 1, SLP)  short term goal (STG)  -AC  short term goal (STG)  -RD  --    Progress/Outcomes (Swallow Management  Recall Goal 1, SLP)  goal ongoing  -AC  goal ongoing  -RD  --       Swallow Compensatory Strategies Goal 1 (SLP)    Activity (Swallow Compensatory Strategies/Techniques Goal 1, SLP)  compensatory strategies;postural techniques;liquids by teaspoon only  -AC  compensatory strategies;postural techniques;liquids by teaspoon only  -RD  --    [place]/Accuracy (Swallow Compensatory Strategies/Techniques Goal 1, SLP)  with minimal cues (75-90% accuracy)  -AC  with minimal cues (75-90% accuracy)  -RD  --    Time Frame (Swallow Compensatory Strategies/Techniques Goal 1, SLP)  short term goal (STG);by discharge  -AC  short term goal (STG);by discharge  -RD  --    Barriers (Swallow Compensatory Strategies/Techniques Goal 1, SLP)  Tolerated honey via tsp w/o s/sxs aspiration. Needed assist w/ tsp.   -AC  --  --    Progress/Outcomes (Swallow Compensatory Strategies/Techniques Goal 1, SLP)  continuing progress toward goal  -AC  goal revised this date;goal ongoing  -RD  --       Comprehend Questions Goal 1 (SLP)    Improve Ability to Comprehend Questions Goal 1 (SLP)  complex yes/no questions;80%;with minimal cues (75-90%)  -AC  --  simple yes/no questions;70%;with maximum cues (25-49%)  -RD    Time Frame (Comprehend Questions Goal 1, SLP)  short term goal (STG)  -AC  --  short term goal (STG)  -RD    Progress (Ability to Comprehend Questions Goal 1, SLP)  50%;with minimal cues (75-90%)  -AC  --  60%;with minimal cues (75-90%)  -RD    Progress/Outcomes (Comprehend Questions Goal 1, SLP)  goal revised this date;good progress toward goal  -AC  --  good progress toward goal  -RD    Comment (Comprehend Questions Goal 1, SLP)  Met goal for simple y/n ?s. Pt responding w/ head nod/shake to indicate y/n.  -AC  --  via head nod  -RD       Follow Directions Goal 2 (SLP)    Improve Ability to Follow Directions Goal 1 (SLP)  1 step direction without objects;70%;with minimal cues (75-90%)  -AC  --  1 step direction without  "objects;70%;with minimal cues (75-90%)  -RD    Time Frame (Follow Directions Goal 1, SLP)  short term goal (STG)  -AC  --  short term goal (STG)  -RD    Progress (Ability to Follow Directions Goal 1, SLP)  70%;with minimal cues (75-90%)  -AC  --  60%;with minimal cues (75-90%)  -RD    Progress/Outcomes (Follow Directions Goal 1, SLP)  good progress toward goal  -AC  --  good progress toward goal  -RD    Comment (Follow Directions Goal 1, SLP)  Repeating direction x2 improved accuracy.  -AC  --  --       Word Retrieval Skills Goal 1 (SLP)    Improve Word Retrieval Skills By Goal 1 (SLP)  completing automatic speech task, counting;repeating sounds;repeating words;completing open ended structured sentence;answer WH question with one word;50%;with maximum cues (25-49%)  -AC  --  completing automatic speech task, counting;repeating sounds;repeating words;completing open ended structured sentence;answer WH question with one word;50%;with maximum cues (25-49%)  -RD    Time Frame (Word Retrieval Goal 1, SLP)  short term goal (STG)  -AC  --  short term goal (STG)  -RD    Progress (Word Retrieval Skills Goal 1, SLP)  10%;with maximum cues (25-49%)  -AC  --  0%;with maximum cues (25-49%)  -RD    Progress/Outcomes (Word Retrieval Goal 1, SLP)  continuing progress toward goal  -AC  --  continuing progress toward goal  -RD    Comment (Word Retrieval Goal 1, SLP)  Demonstrated groping. Able to verbalize \"s\" sound in Sunday when attempting days of wk automatic task. Then began to cry.   -AC  --  --       Motor Planning Goal 1 (SLP)    Improve Motor Planning to Reduce Apraxia by Goal 1 (SLP)  imitating mouth postures;imitating vowels;following isolated oral commands;50%;with maximum cues (25-49%)  -AC  --  imitating mouth postures;imitating vowels;following isolated oral commands;50%;with maximum cues (25-49%)  -RD    Time Frame (Motor Planning Goal 1, SLP)  short term goal (STG)  -AC  --  short term goal (STG)  -RD    Progress " "(Motor Planning Goal 1, SLP)  --  --  30%;with minimal cues (75-90%)  -RD    Progress/Outcomes (Motor Planning Goal 1, SLP)  goal ongoing  -AC  --  continuing progress toward goal  -RD    Comment (Motor Planning Goal 1, SLP)  --  --  suspect apraxia component  -RD       Augmentative/Alternative Communication Objectives Goal 1 (SLP    Communication (Augmentative/Alternative Communication Goal 1, SLP)  improve ability to use non-verbal communication strategies  -AC  --  improve ability to use non-verbal communication strategies  -RD    Improve Communication by (Augmentative/Alternative Communication Goal 1, SLP)  use gestures to communicate;demonstrate comprehension of head nod/head shake;alphabet/picture board;70%;with moderate cues (50-74%)  -AC  --  use gestures to communicate;demonstrate comprehension of head nod/head shake;alphabet/picture board;70%;with moderate cues (50-74%)  -RD    Time Frame (Augmentative/Alternative Communication Goal 1, SLP)  short term goal (STG)  -AC  --  short term goal (STG)  -RD    Progress (Augmentative/Alternative Communication Goal 1, SLP)  20%;with moderate cues (50-74%)  -AC  --  60%;with moderate cues (50-74%)  -RD    Progress/Outcomes (Augmentative/Alternative Communication Goal 1, SLP)  continuing progress toward goal  -AC  --  continuing progress toward goal;goal revised this date  -RD    Comment (Augmentative/Alternative Communication Goal 1, SLP)  U/a to locate reading glasses in rm. When asked if she wore them, pt nodded her head to indicate \"yes.\" U/a to use alphabet board when asked to spell dictated word. Difficulty locating dictated pics/words.  -AC  --  added pic comm board; pt able to communicate w/ pointing to pics w/ cues  -RD       Additional Goal 1 (SLP)    Additional Goal 1, SLP  LTG: Improve communicaiton in order to participate in care while in hospital setting with 60% accuracy and cues  -AC  --  LTG: Improve communicaiton in order to participate in care while " in hospital setting with 60% accuracy and cues  -RD    Time Frame (Additional Goal 1, SLP)  by discharge  -AC  --  by discharge  -RD    Progress/Outcomes (Additional Goal 1, SLP)  continuing progress toward goal  -AC  --  continuing progress toward goal  -RD      User Key  (r) = Recorded By, (t) = Taken By, (c) = Cosigned By    Initials Name Provider Type    Meron Hoffman MS CCC-SLP Speech and Language Pathologist    Chantelle Schwartz MS CCC-SLP Speech and Language Pathologist              Time Calculation:     Time Calculation- SLP     Row Name 08/05/20 1510             Time Calculation- SLP    SLP Start Time  1350  -AC      SLP Received On  08/05/20  -AC        User Key  (r) = Recorded By, (t) = Taken By, (c) = Cosigned By    Initials Name Provider Type    Meron Hoffman MS CCC-SLP Speech and Language Pathologist          Therapy Charges for Today     Code Description Service Date Service Provider Modifiers Qty    76190745299 HC ST TREATMENT SWALLOW 2 8/5/2020 Meron Joseph MS CCC-SLP GN 1    [id number] HC ST TREATMENT SPEECH 2 8/5/2020 Meron Joseph MS CCC-SLP GN 1          Patient was not wearing a face mask and did not exhibit coughing during this therapy encounter.  Procedure performed was aerosolizing, involved close contact (within 6 feet for at least 15 minutes or longer), and involved contact with infectious secretions or specimens.  Therapist used appropriate personal protective equipment including gloves, standard procedure mask and eye protection.  Appropriate PPE was worn during the entire therapy session.  Hand hygiene was completed before and after therapy session.              MS NATALY Soriano  8/5/2020

## 2020-08-05 NOTE — PROGRESS NOTES
"   LOS: 5 days    Patient Care Team:  Anthony Sweet DO as PCP - General (Family Medicine)  Tien Archer MD as Consulting Physician (Cardiac Electrophysiology)  Xu Block MD (Cardiology)  Yomi Higgins MD as Consulting Physician (Cardiology)    Chief Complaint:  Stroke.     Subjective     Interval History:     Non verbal s/p stroke. No complaints. Responds with nodding head.       Objective     Vital Sign Min/Max for last 24 hours  Temp  Min: 97.8 °F (36.6 °C)  Max: 98.7 °F (37.1 °C)   BP  Min: 114/75  Max: 155/99   Pulse  Min: 80  Max: 113   Resp  Min: 16  Max: 20   SpO2  Min: 93 %  Max: 97 %   No data recorded   No data recorded     Flowsheet Rows      First Filed Value   Admission Height  147.3 cm (58\") Documented at 08/01/2020 0054   Admission Weight  56.3 kg (124 lb 0.1 oz) Documented at 07/31/2020 2135          No intake/output data recorded.  I/O last 3 completed shifts:  In: 160 [P.O.:160]  Out: 150 [Urine:150]    Physical Exam:     General Appearance:    Alert, cooperative, in no acute distress   Head:    Normocephalic, without obvious abnormality, atraumatic   Eyes:            Lids and lashes normal, conjunctivae and sclerae normal, no   icterus, no pallor           Neck:   Supple, trachea midline, no JVD       Lungs:     Clear to auscultation,respirations regular, even and                   unlabored    Heart:    Regular rhythm and normal rate, normal S1 and S2, no            murmur, no gallop, no rub, no click       Abdomen:     Normal bowel sounds, soft ,non-tender, non-distended, no guarding       Extremities:   Right sided weakness, no edema, no cyanosis, no              redness               Neurologic:   Aphasic, Rt sided weakness       WBC WBC   Date Value Ref Range Status   08/03/2020 6.01 3.40 - 10.80 10*3/mm3 Final      HGB Hemoglobin   Date Value Ref Range Status   08/05/2020 9.7 (L) 12.0 - 15.9 g/dL Final   08/03/2020 9.6 (L) 12.0 - 15.9 g/dL Final      HCT Hematocrit "   Date Value Ref Range Status   08/05/2020 31.3 (L) 34.0 - 46.6 % Final   08/03/2020 31.2 (L) 34.0 - 46.6 % Final      Platlets No results found for: LABPLAT   MCV MCV   Date Value Ref Range Status   08/03/2020 98.4 (H) 79.0 - 97.0 fL Final          Sodium Sodium   Date Value Ref Range Status   08/05/2020 138 136 - 145 mmol/L Final   08/04/2020 138 136 - 145 mmol/L Final   08/03/2020 132 (L) 136 - 145 mmol/L Final      Potassium Potassium   Date Value Ref Range Status   08/05/2020 4.2 3.5 - 5.2 mmol/L Final   08/04/2020 4.2 3.5 - 5.2 mmol/L Final   08/03/2020 4.5 3.5 - 5.2 mmol/L Final      Chloride Chloride   Date Value Ref Range Status   08/05/2020 103 98 - 107 mmol/L Final   08/04/2020 103 98 - 107 mmol/L Final   08/03/2020 99 98 - 107 mmol/L Final      CO2 CO2   Date Value Ref Range Status   08/05/2020 24.0 22.0 - 29.0 mmol/L Final   08/04/2020 23.0 22.0 - 29.0 mmol/L Final   08/03/2020 20.0 (L) 22.0 - 29.0 mmol/L Final      BUN BUN   Date Value Ref Range Status   08/05/2020 28 (H) 8 - 23 mg/dL Final   08/04/2020 16 8 - 23 mg/dL Final   08/03/2020 35 (H) 8 - 23 mg/dL Final      Creatinine Creatinine   Date Value Ref Range Status   08/05/2020 3.01 (H) 0.57 - 1.00 mg/dL Final   08/04/2020 2.15 (H) 0.57 - 1.00 mg/dL Final   08/03/2020 3.17 (H) 0.57 - 1.00 mg/dL Final      Calcium Calcium   Date Value Ref Range Status   08/05/2020 8.7 8.6 - 10.5 mg/dL Final   08/04/2020 8.6 8.6 - 10.5 mg/dL Final   08/03/2020 8.5 (L) 8.6 - 10.5 mg/dL Final      PO4 No results found for: CAPO4   Albumin Albumin   Date Value Ref Range Status   08/05/2020 3.90 3.50 - 5.20 g/dL Final   08/04/2020 3.70 3.50 - 5.20 g/dL Final   08/03/2020 3.40 (L) 3.50 - 5.20 g/dL Final      Magnesium No results found for: MG   Uric Acid No results found for: URICACID        Results Review:     I reviewed the patient's new clinical results.      aspirin 81 mg Oral Daily   atorvastatin 40 mg Oral Nightly   budesonide 0.5 mg Nebulization BID - RT    dilTIAZem  mg Oral Q24H   heparin (porcine) 5,000 Units Subcutaneous Q8H   levothyroxine 75 mcg Oral Q AM   lidocaine 1 patch Transdermal Q24H   pantoprazole 40 mg Oral Daily Before Lunch   predniSONE 5 mg Oral Daily With Breakfast   ranolazine 500 mg Oral Q12H   senna 2 tablet Oral Nightly   sodium chloride 10 mL Intravenous Q12H          Medication Review:    Assessment/Plan       Acute ischemic left MCA stroke (CMS/HCC)    Persistent atrial fibrillation (CMS/HCC)    Coronary artery disease involving native coronary artery of native heart without angina pectoris    Essential hypertension    Cerebrovascular accident (CVA) due to embolism (CMS/HCC)    Pulmonary arterial hypertension (CMS/HCC)    Interstitial lung disease (CMS/HCC)    Dyslipidemia    Acquired hypothyroidism    CKD (chronic kidney disease) requiring chronic dialysis (CMS/HCC)    Chronic diastolic heart failure (CMS/HCC)    Acute renal failure (CMS/HCC)    ESRD (end stage renal disease) (CMS/HCC)      1- ESRD - MWF - Dr Santamaria is her nephrologist   2- HTN   3- Anemia of chronic disease.   4- Mild hyponatremia   5- CVA - Aphasic      Plan:  - Patient seen on dialysis. UF as tolerated.   - Renal diet  - Will hold EPO for now   - Monitor H/H and transfuse for hgb less than 7.0   - Electrolytes will be corrected with HD.       Sri Cardenas MD  08/05/20  09:26

## 2020-08-05 NOTE — PLAN OF CARE
HD in progress.    Problem: Hemodialysis (Adult)  Goal: Signs and Symptoms of Listed Potential Problems Will be Absent, Minimized or Managed (Hemodialysis)  Outcome: Ongoing (interventions implemented as appropriate)  Flowsheets (Taken 8/5/2020 3213)  Problems Assessed (Hemodialysis): all  Problems Present (Hemodialysis): electrolyte imbalance; fluid imbalance

## 2020-08-06 NOTE — OUTREACH NOTE
Call Center TCM Note      Responses   [facility] patient discharged from?  [place]   Does the patient have one of the following disease processes/diagnoses(primary or secondary)?  Stroke (TIA)   TCM attempt successful?  No   Unsuccessful attempts  Attempt 1   Call Status  Left message          Tahir Thapa RN    8/6/2020, 11:46

## 2020-08-06 NOTE — OUTREACH NOTE
Call Center TCM Note      Responses   [facility] patient discharged from?  [place]   Does the patient have one of the following disease processes/diagnoses(primary or secondary)?  Stroke (TIA)   TCM attempt successful?  No   Unsuccessful attempts  Attempt 2          Tahir Thapa RN    8/6/2020, 12:25

## 2020-08-07 NOTE — OUTREACH NOTE
Call Center TCM Note      Responses   [facility] patient discharged from?  [place]   Does the patient have one of the following disease processes/diagnoses(primary or secondary)?  Stroke (TIA)   TCM attempt successful?  Yes   Call start time  1215   Call end time  1232   General alerts for this patient  Patient is staying with daughter for awhile as she is recovering. (Yary) Please call this number first.    Discharge diagnosis  Acute left-sided MCA stroke   Is patient permission given to speak with other caregiver?  Yes   Person spoke with today (if not patient) and relationship  spouse/ Dell   Meds reviewed with patient/caregiver?  Yes   Is the patient having any side effects they believe may be caused by any medication additions or changes?  No   Does the patient have all medications ordered at discharge?  Yes   Is the patient taking all medications as directed (includes completed medication regime)?  Yes   Comments regarding appointments  will resume outpatient hemodialysis at [facility]   Does the patient have a primary care provider?   Yes   Does the patient have an appointment with their PCP within 7 days of discharge?  Yes   Comments regarding PCP  Patient has followup appt with Rosanne Cornell on 8/11/2020 (PCP)   Has the patient kept scheduled appointments due by today?  N/A   What is the Home health agency?   [facility]   Has home health visited the patient within 72 hours of discharge?  Call prior to 72 hours   Psychosocial issues?  No   Does the patient require any assistance with activities of daily living such as eating, bathing, dressing, walking, etc.?  Yes   ADL comments  Patient with swallowing issues   Does the patient have any residual symptoms from stroke/TIA?  Yes   Residual symptoms comments  Speech is slow and patient has word finding issues. memory is slow. She has right sided facial drrop   Does the patient understand the diet ordered at discharge?  Yes   Did the patient receive a copy  of their discharge instructions?  Yes   Nursing interventions  Reviewed instructions with patient   What is the patient's perception of their health status since discharge?  Improving [She is improving slowly]   Nursing interventions  Nurse provided patient education   Is the patient able to teach back FAST for Stroke?  Yes   Is the patient/caregiver able to teach back the risk factors for a stroke?  History of Afib, Carotid or other artery disease, High blood pressure-goal below 120/80, High Cholesterol   Is the patient/caregiver able to teach back signs and symptoms related to disease process for when to call PCP?  Yes   Is the patient/caregiver able to teach back signs and symptoms related to disease process for when to call 911?  Yes   TCM call completed?  Yes          Tahir Thapa RN    8/7/2020, 12:32

## 2020-08-10 PROBLEM — Z99.2 CKD (CHRONIC KIDNEY DISEASE) REQUIRING CHRONIC DIALYSIS (HCC): Status: RESOLVED | Noted: 2019-12-02 | Resolved: 2020-01-01

## 2020-08-10 PROBLEM — N18.6 CKD (CHRONIC KIDNEY DISEASE) REQUIRING CHRONIC DIALYSIS (HCC): Status: RESOLVED | Noted: 2019-12-02 | Resolved: 2020-01-01

## 2020-08-10 PROBLEM — D68.318 COAGULATION DISORDER DUE TO CIRCULATING ANTICOAGULANTS (HCC): Status: RESOLVED | Noted: 2019-12-02 | Resolved: 2020-01-01

## 2020-08-10 NOTE — PROGRESS NOTES
Transitional Care Follow Up Visit  Subjective     Pat Morel is a 80 y.o. female who presents for a transitional care management visit.    Within 48 business hours after discharge our office contacted her via telephone to coordinate her care and needs.      I reviewed and discussed the details of that call along with the discharge summary, hospital problems, inpatient lab results, inpatient diagnostic studies, and consultation reports with Pat.     Current outpatient and discharge medications have been reconciled for the patient.  Reviewed by: AYANNA Lewis      Date of TCM Phone Call 8/5/2020   [facility]   Date of Admission 7/31/2020   Date of Discharge 8/5/2020   Discharge Disposition Home or Self Care     Risk for Readmission (LACE) Score: 16 (8/5/2020  6:00 AM)      History of Present Illness   Decreased appetite. Has had an egg today. Sleeping well. Normal BM. No pain. Home health has been coming. She is on thickened liquids and mechanical soft foods.   Dialysis has not been pulling off any fluid the last several times.   Has not been on lipitor because they could not find the bottle.   Has been taking ASA    Course During Hospital Stay:   Hospital Course:  Pat Morel is a 80 y.o. female with past medical history of ESRD on HD, hypertension, persistent A. fib, interstitial lung disease, hypothyroidism.  Patient presented with right-sided weakness and complete aphasia, admitted on 7/31/20 with acute left-sided MCA stroke. Patient remains aphasic, has right facial droop, with weakness improving     Acute left-sided MCA stroke- ok to move to stroke unit  - Upon arrival patient underwent extensive stroke work-up per protocol w/ source most likely cardioembolic with A fib/ PFO.   - Though her CTP did show penumbra she was deemed not a candidate for intervention due to large area of infarct and low mismatch ratio. She had echo completed in last 6 mos:  Normal LVEF  65-70%, grade 2 diastolic dysfunction, Moderate to severe bilateral atrial enlargement,  Mild to moderate mitral regurgitation, Moderate to severe tricuspid regurgitation.  Suspected PFO and Pulmonary hypertension.  - Neuro eval completed and they have signed off- continue asa and statin  - F/U PCP in 1 weeks time.     A fib  -- She is no longer on AC due to risks with renal disease  -- Her diltiazem dose was increased to 180 mg, continue at d/c.     ESRD   --Followed by [facility] throughout stay and remained on HD per usual scheduled.     The following portions of the patient's history were reviewed and updated as appropriate: allergies, current medications, past family history, past medical history, past social history, past surgical history and problem list.    Review of Systems  Gen- No fevers, chills  CV- No chest pain, palpitations  Resp- No cough, dyspnea  GI- No N/V/D, abd pain    Objective   Physical Exam   Constitutional: She appears well-developed and well-nourished.   HENT:   Head: Normocephalic and atraumatic.   Eyes: Pupils are equal, round, and reactive to light.   Neck: Neck supple.   Cardiovascular: Intact distal pulses.   no murmur, irregularly irregular   Pulmonary/Chest: Effort normal and breath sounds normal.   Abdominal: Soft. Bowel sounds are normal.   Neurological:   Alert.  Smiles and nods her head appropriately.  Aphasia present.  Right-sided weakness present as well   Skin: Skin is warm and dry.   Psychiatric: She has a normal mood and affect. Her behavior is normal.   Nursing note and vitals reviewed.      Assessment/Plan   Pat was seen today for follow-up.    Diagnoses and all orders for this visit:    Acute ischemic left MCA stroke (CMS/HCC)    Persistent atrial fibrillation (CMS/HCC)    Hypersensitivity pneumonitis (CMS/HCC)    Interstitial lung disease (CMS/HCC)    Oropharyngeal dysphagia    Other orders  -     atorvastatin (LIPITOR) 40 MG tablet; Take 1 tablet by mouth Every  Night.      --Hospital course reviewed  --Discussed dysphasia.  Recommend continuing nectar thick liquids and mechanical soft diet.  Continue home PT/OT/skilled nursing.  Consider fees in future to evaluate dysphasia.  --Continue dialysis  --Continue diltiazem.  Blood pressure stable  --Start atorvastatin.  Continue aspirin.  Keep follow-up with neurology as scheduled.

## 2020-08-15 NOTE — OUTREACH NOTE
Stroke Week 2 Survey      Responses   [facility] patient discharged from?  [place]   Does the patient have one of the following disease processes/diagnoses(primary or secondary)?  Stroke (TIA)   Week 2 attempt successful?  Yes   Call start time  1511   Call end time  1524   Discharge diagnosis  Acute left-sided MCA stroke   Person spoke with today (if not patient) and relationship  daughter, Deja Cote reviewed with patient/caregiver?  Yes   Is the patient having any side effects they believe may be caused by any medication additions or changes?  No   Does the patient have all medications ordered at discharge?  Yes   Is the patient taking all medications as directed (includes completed medication regime)?  Yes   Does the patient have a primary care provider?   Yes   Does the patient have an appointment with their PCP within 7 days of discharge?  Yes   Has the patient kept scheduled appointments due by today?  Yes   What is the Home health agency?   [facility]   Has home health visited the patient within 72 hours of discharge?  Yes   Home health comments  has HH speech tx, PT and OT   Psychosocial issues?  No   Does the patient require any assistance with activities of daily living such as eating, bathing, dressing, walking, etc.?  Yes   Does the patient have any residual symptoms from stroke/TIA?  Yes   Residual symptoms comments  pt cannot speak, right side still weak, but improving   Does the patient understand the diet ordered at discharge?  Yes [has mechanical soft diet, all liquids thickened with honey, states thickened liquids are hard for pt to take]   Comments  family members are focusing on speech therapy for pt   Did the patient receive a copy of their discharge instructions?  Yes   Nursing interventions  Reviewed instructions with patient   What is the patient's perception of their health status since discharge?  Improving   Nursing interventions  Nurse provided patient education   Is the patient able  to teach back FAST for Stroke?  Yes   Is the patient/caregiver able to teach back the risk factors for a stroke?  High blood pressure-goal below 120/80, Physical inactivity and obesity, Carotid or other artery disease, History of Afib, High Cholesterol   Is the patient/caregiver able to teach back signs and symptoms related to disease process for when to call PCP?  Yes   Is the patient/caregiver able to teach back signs and symptoms related to disease process for when to call 911?  Yes   Is the patient/caregiver able to teach back the hierarchy of who to call/visit for symptoms/problems? PCP, Specialist, Home health nurse, Urgent Care, ED, 911  Yes   Additional teach back comments  daughter , Yary states it is difficult to get liquids in a form pt will drink due to texture which is off-putting to pt, daughter states has moved pt in with her so help manage care, pt's  is elderly himself, and not quite able to manage diet, etc for pt   Week 2 call completed?  Yes          Gloria Hinton RN

## 2020-08-24 NOTE — OUTREACH NOTE
Stroke Week 3 Survey      Responses   [facility] patient discharged from?  [place]   Does the patient have one of the following disease processes/diagnoses(primary or secondary)?  Stroke (TIA)   Week 3 attempt successful?  No   Unsuccessful attempts  Attempt 1          Arabella Waters RN

## 2020-08-25 NOTE — OUTREACH NOTE
Stroke Week 3 Survey      Responses   [facility] patient discharged from?  [place]   Does the patient have one of the following disease processes/diagnoses(primary or secondary)?  Stroke (TIA)   Week 3 attempt successful?  Yes   Call start time  1100   Rescheduled  -- [driving can we call back]   Call end time  1102   Person spoke with today (if not patient) and relationship  daughter Yary          Shivani Galvez, RN

## 2020-09-08 PROBLEM — F34.1 DYSTHYMIA: Status: ACTIVE | Noted: 2020-01-01

## 2020-09-08 PROBLEM — R63.0 LACK OF APPETITE: Status: ACTIVE | Noted: 2020-01-01

## 2020-09-08 PROBLEM — Z78.9 IMPAIRED MOBILITY AND ADLS: Status: ACTIVE | Noted: 2020-01-01

## 2020-09-08 PROBLEM — I69.30 LATE EFFECT OF CEREBROVASCULAR ACCIDENT (CVA): Status: ACTIVE | Noted: 2020-01-01

## 2020-09-08 PROBLEM — N18.4 CKD (CHRONIC KIDNEY DISEASE) STAGE 4, GFR 15-29 ML/MIN (HCC): Status: RESOLVED | Noted: 2020-01-01 | Resolved: 2020-01-01

## 2020-09-08 PROBLEM — Z74.09 IMPAIRED MOBILITY AND ADLS: Status: ACTIVE | Noted: 2020-01-01

## 2020-09-08 NOTE — PROGRESS NOTES
Established Patient        Chief Complaint:   Chief Complaint   Patient presents with   • Follow-up     h/o stroke        Pat Morel is a 80 y.o. female    History of Present Illness:   Here for scheduled follow-up visit concerning her persistent atrial fibrillation, chronic diastolic heart failure, interstitial lung disease, end-stage renal disease on hemodialysis.    HD on M/W/F.    Currently utilizing OT/Speech therapy.  OT once weekly, speech twice weekly; has home health nurse periodically also.    Patient is accompanied today by her daughter, denies any new neurological deficits.  Patient does make efforts to assist in her daily care.    Patient does admit to creased taste sensation since recent CVA.  She is status post hospitalization as a result of her stroke.  Patient's daughter and patient both verbalized understanding the patient has demonstrated substantial increased depression.      Subjective     The following portions of the patient's history were reviewed and updated as appropriate: allergies, current medications, past family history, past medical history, past social history, past surgical history and problem list.    Allergies   Allergen Reactions   • Tuberculin Tests Rash       Review of Systems  1. Constitutional: Negative for fever. Negative for chills, diaphoresis, fatigue and unexpected weight change.   2. HENT: No dysphagia; no changes to vision/hearing/smell/taste; no epistaxis  3. Eyes: Negative for redness and visual disturbance.   4. Respiratory: negative for shortness of breath. Negative for chest pain . Negative for cough and chest tightness.   5. Cardiovascular: Negative for chest pain and palpitations.   6. Gastrointestinal: Negative for abdominal distention, abdominal pain and blood in stool.  Creased appetite as per above.  7. Endocrine: Negative for cold intolerance and heat intolerance.   8. Genitourinary: Negative for difficulty urinating, dysuria and frequency.  "  9. Musculoskeletal: Chronic arthralgias.  As per above, back pain and adjacent myalgias.   10. Skin: Negative for color change, rash and wound.   11. Neurological: Negative for syncope, weakness and headaches.   12. Hematological: Negative for adenopathy. Does not bruise/bleed easily.   13. Psychiatric/Behavioral: Interactive at times.    Objective     Physical Exam   Vital Signs: /60   Pulse 82   Temp 97.8 °F (36.6 °C)   Ht 147.3 cm (58\")   Wt 50.3 kg (111 lb)   LMP  (LMP Unknown)   SpO2 97%   BMI 23.20 kg/m²     General Appearance: alert, oriented x 3, no acute distress.  Pleasant and interactive during questioning and examination.  Skin: warm and dry.  Age related atrophy of the skin.  HEENT: Atraumatic.  pupils round and reactive to light and accommodation, oral mucosa pink and moist.  Nares patent without epistaxis.  External auditory canals are patent tympanic membranes intact.  Posterior tongue and buccal mucosa of bilateral sides demonstrates whitish coating.  Some very erythematous changes noted to the tongue.  No exudate or pustules.  Neck: supple, no JVD, trachea midline.  No thyromegaly  Lungs: unlabored breathing effort.  Mild rhonchus congestive changes of referred nature throughout all lung fields, cleared with light cough.  Audible air exchange noted all lung fields.  Heart: RRR, normal S1 and S2, no S3, no rub.  Right chest hemodialysis  Abdomen: soft, non-tender, no palpable bladder, present bowel sounds to auscultation ×4.  No guarding or rigidity.  Extremities: no clubbing, cyanosis or edema.  Good range of motion actively and passively.  Noted spasticity to the paravertebral musculature of the distal thoracic and lumbar spine, slightly worse to the left, focal area of concern just lateral to rib articulation of 10 through 12.  Neuro:   Cranial nerves II through XII intact.  No anosmia. DTR 2+; proprioception intact.  Persistent neuromuscular deficits, requires assistance of " self-propelled wheelchair for ambulation.    Assessment and Plan      Assessment:   Pat was seen today for follow-up.    Diagnoses and all orders for this visit:    Oral candidiasis  -     nystatin (MYCOSTATIN) 659161 UNIT/ML suspension; Swish and spit 5 mL 3 (Three) Times a Day.    Chronic diastolic heart failure (CMS/HCC)  -     dilTIAZem CD (CARDIZEM CD) 180 MG 24 hr capsule; Take 1 capsule by mouth Daily.    Persistent atrial fibrillation (CMS/HCC)  -     dilTIAZem CD (CARDIZEM CD) 180 MG 24 hr capsule; Take 1 capsule by mouth Daily.    Dysthymia  -     mirtazapine (REMERON) 15 MG tablet; Take 1 tablet by mouth Every Night.    Lack of appetite  -     mirtazapine (REMERON) 15 MG tablet; Take 1 tablet by mouth Every Night.    Other orders  -     Discontinue: pantoprazole (PROTONIX) 40 MG EC tablet; Take 1 tablet by mouth Daily.  -     pantoprazole (PROTONIX) 40 MG EC tablet; Take 1 tablet by mouth Daily.        Plan:  Plan continuation of pantoprazole.  Continue dietary and lifestyle modifications, as well as speech recommendation concerning her diet.    He is without new neurological deficits at this time.  When continuation of skilled nursing at home, Occupational Therapy and speech therapy.  Patient just recently transition out of formal physical therapy.    All signs demonstrate hemodynamic stability, heart rate well controlled.    I recommended nystatin swish and spit 3 times daily.    Adding mirtazapine, this should aid in improved mood stability as well as the strong possibility of improving her appetite.  Discussion Summary:    Discussed plan of care in detail with pt today; pt verb understanding and agrees.  Follow up:  No follow-ups on file.     There are no Patient Instructions on file for this visit.    Anthony Sweet DO  09/08/20  15:28          Please note that portions of this note may have been completed with a voice recognition program. Efforts were made to edit the dictations, but  occasionally words are mistranscribed.

## 2020-09-10 PROBLEM — I69.320 COMBINED RECEPTIVE AND EXPRESSIVE APHASIA AS LATE EFFECT OF CEREBROVASCULAR ACCIDENT (CVA): Status: ACTIVE | Noted: 2020-01-01

## 2020-09-10 PROBLEM — I69.351 HEMIPARESIS AFFECTING RIGHT SIDE AS LATE EFFECT OF CEREBROVASCULAR ACCIDENT (HCC): Status: ACTIVE | Noted: 2020-01-01

## 2020-09-10 PROBLEM — R13.10 DYSPHAGIA: Status: ACTIVE | Noted: 2020-01-01

## 2020-09-10 NOTE — PROGRESS NOTES
Subjective:     Patient ID: Pat Morel is a 80 y.o. female.    CC:   Chief Complaint   Patient presents with   • Stroke       HPI:   History of Present Illness     Due to COVID-19 Pandemic, this visit was completed face to face via VIDEO by Epic/FaceTags with verbal consent to treat obtained from patient.      You have chosen to receive care through a telehealth visit.  Do you consent to use a video/audio connection for your medical care today? Yes    This is a very pleasant 80-year-old female who presents for 6-week [facility] follow-up on acute ischemic left MCA infarct secondary to likely embolism diagnosed on 7/31/2020.  She is accompanied by video visit with her son and daughter.  Her son is a nurse.  She has a very supportive family.  She is living at home currently with family.  She was admitted from 7/31/2020 until 8/5/2020 with aphasia and right-sided weakness.  She was found on CT perfusion scan to have a pending breath with a large area infarct confirm for stroke and she was not a candidate for intervention.  Echo was completed in the past 6 months showing normal left ejection fraction, moderate to severe bilateral atrial enlargement, mild to moderate mitral regurgitation, moderate to severe tricuspid regurgitation and suspected PFO with pulmonary hypertension.  Neurology did evaluate and recommend continue aspirin and statin.  She does have a history of A. fib but no longer on anticoagulation due to chronic renal disease and hemodialysis since 2/2020 with left arm fistula and bruising.  Her diltiazem was increased to 180 mg dosage.  She is been home and has completed physical therapy and is having minimal assistance with getting dressed by her family.  She is able to ambulate with a walker.  She has had no falls.  Speech therapy is coming into work with her expressive and receptive aphasia.  They have seen some improvement but she does still have difficulty with verbalization.   She cannot complete simple verbalizing.  She is also doing occupational therapy but they are about to discharge her.  She was having dysphagia but has switched to a soft and regular diet and has not been coughing or choking and has been tolerating this better.  She has not had a great appetite and actually lost about 12 pounds with hospitalization and they are working with PCP and speech therapy and her family to get her better nutrition.  Recently started on mirtazapine just yesterday to help with appetite short-term.  On CTA of head and neck there was a note of a small aneurysm in the right MCA of 1.4 x 2.4 mm.  There was recommendation by radiology to follow-up on this.  Patient and family are okay with waiting on any follow-up for now since they would not be interested in intervention due to her age.  We did discuss this would be very low risk for rupture with the size of the aneurysm.  They have no new concerns or complaints today.  They do need refills on aspirin and Lipitor.  Plan to follow-up with PCP in 1 month.     The following portions of the patient's history were reviewed and updated as appropriate: allergies, current medications, past family history, past medical history, past social history, past surgical history and problem list.    Past Medical History:   Diagnosis Date   • Acute on chronic diastolic congestive heart failure (CMS/HCC) 12/20/2019   • Anemia    • Angina at rest (CMS/HCC)    • Arthritis    • Atrial fibrillation (CMS/HCC)    • CKD (chronic kidney disease) requiring chronic dialysis (CMS/HCC) 12/2/2019   • Coagulation disorder due to circulating anticoagulants (CMS/HCC) 12/2/2019   • Coronary artery disease    • Disease of thyroid gland    • Elevated cholesterol    • GERD without esophagitis 12/2/2019   • History of blood transfusion    • History of colonoscopy 11/19/2019   • History of melena 11/19/2019   • History of stomach ulcers    • Hypertension    • Impaired functional mobility,  balance, gait, and endurance    • Impaired functional mobility, balance, gait, and endurance    • Osteoporosis    • Positive TB test    • Stroke (CMS/HCC)     No deficits       Past Surgical History:   Procedure Laterality Date   • BRONCHOSCOPY Bilateral 4/12/2019    Procedure: BRONCHOSCOPY;  Surgeon: Jeff Laura MD;  Location:  [facility];  Service: Pulmonary   • BYPASS GRAFT     • COLONOSCOPY N/A 10/15/2019    Procedure: COLONOSCOPY;  Surgeon: Fredi Aaron MD;  Location:  [facility];  Service: Gastroenterology   • CORONARY ANGIOPLASTY WITH STENT PLACEMENT     • CORONARY ARTERY BYPASS GRAFT  10/18/2010   • ENDOSCOPY N/A 10/14/2019    Procedure: ESOPHAGOGASTRODUODENOSCOPY;  Surgeon: Fredi Aaron MD;  Location:  [facility];  Service: Gastroenterology   • HYSTERECTOMY     • INSERTION HEMODIALYSIS CATHETER N/A 2/14/2020    Procedure: HEMODIALYSIS CATHETER INSERTION([place]);  Surgeon: Familia Paez MD;  Location:  [facility];  Service: General;  Laterality: N/A;   • STOMACH SURGERY  08/11/2019    Upper GI bleed    • STOMACH SURGERY  10/13/2019       Social History     Socioeconomic History   • Marital status:      Spouse name: Not on file   • Number of children: Not on file   • Years of education: Not on file   • Highest education level: Not on file   Tobacco Use   • Smoking status: Never Smoker   • Smokeless tobacco: Never Used   Substance and Sexual Activity   • Alcohol use: No   • Drug use: No   • Sexual activity: Not Currently       Family History   Problem Relation Age of Onset   • Cancer Mother    • Arthritis Mother    • No Known Problems Father    • Liver disease Sister         Review of Systems   Constitutional: Positive for activity change and appetite change.   HENT: Positive for trouble swallowing.    Eyes: Negative.    Respiratory: Negative.    Cardiovascular: Negative.    Gastrointestinal: Negative.    Endocrine: Negative.    Genitourinary: Negative.     Musculoskeletal: Positive for gait problem.   Skin: Negative.    Allergic/Immunologic: Negative.    Neurological: Positive for speech difficulty and weakness.   Hematological: Negative.    Psychiatric/Behavioral: Positive for decreased concentration.        Objective:  LMP  (LMP Unknown)     Neurologic Exam     Mental Status   Speech: (Expressive and receptive aphasia, can count from 1-5, cannot spell name correctly or state name by video visit)  Level of consciousness: alert    Cranial Nerves     CN II   Visual fields full to confrontation.     CN III, IV, VI   Pupils are equal, round, and reactive to light.  Extraocular motions are normal.     CN VII   Right facial weakness: central  Left facial weakness: none    CN VIII   CN VIII normal.     CN IX, X   CN IX normal.   CN X normal.     CN XI   CN XI normal.     CN XII   CN XII normal.     Gross eval Right facial droop d/t recent CVA on video visit     Motor Exam   Muscle bulk: normal    Strength   Strength 5/5 except as noted.   Right strength: Right sided weakness without pronator drift on video exma    Gait, Coordination, and Reflexes     Gait  Gait: (sitting in recliner in home for video visit)    Coordination   Finger to nose coordination: normal    Tremor   Resting tremor: absent  Intention tremor: absent  Action tremor: absent      Physical Exam   Constitutional: She appears well-developed and well-nourished.   Eyes: Pupils are equal, round, and reactive to light. EOM are normal.   Neurological: She has a normal Finger-Nose-Finger Test.   Psychiatric: She has a normal mood and affect. She is noncommunicative (expressive and receptive aphasia).     Pleasant, smiles, cooperative       Assessment/Plan:       Pat was seen today for stroke.    Diagnoses and all orders for this visit:    Hemiparesis affecting right side as late effect of cerebrovascular accident (CMS/[facility])  Comments:  completed PT, cont. home exercises, falls prevention and walker    Acute  ischemic left MCA stroke (CMS/HCC)  Comments:  ASA, LIPITOR f/u with Cardiology and PCP as scheduled  Orders:  -     aspirin (aspirin) 81 MG EC tablet; Take 1 tablet by mouth Daily.  -     atorvastatin (LIPITOR) 40 MG tablet; Take 1 tablet by mouth Every Night.    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  Continue with speech therapy and Occupational Therapy.  Continue home PT exercises.  Follow-up in 3 months or sooner if needed.  Reviewed medications, potential side effects and signs and symptoms to report. Discussed risk versus benefits of treatment plan with patient and/or family-including medications, labs and radiology that may be ordered. Addressed questions and concerns during visit. Patient and/or family verbalized understanding and agree with plan.    Discussed signs and symptoms of stroke and when to call 911. Instructed to follow a low fat diet including the Mediterranean diet. Instructed to take all medications daily as prescribed. Encouraged 30 minutes of exercise 3-4 times a week as tolerated. Stay well hydrated. Discussed potential side effects of new medications and signs and symptoms to report. If patient is currently using tobacco products, smoking cessation was encouraged. Reviewed stroke risk factors and stroke prevention plan. Patient and/or family verbalizes understanding and agrees with plan.     During this visit the following were done:  Labs Reviewed [x]    Labs Ordered []    Radiology Reports Reviewed [x]    Radiology Ordered []    PCP Records Reviewed []    Referring Provider Records Reviewed []    ER Records Reviewed [x]    Hospital Records Reviewed [x]    History Obtained From Family [x]   Son and daughter  Radiology Images Reviewed []    Other Reviewed [x]    Records Requested []      Marjorie Polo, APRN  9/10/2020

## 2020-09-10 NOTE — TELEPHONE ENCOUNTER
----- Message from AYANNA Steiner sent at 9/10/2020 11:38 AM EDT -----  Please call family and schedule 3 month follow up in person for now. If they cannot make it they can switch to video. Thanks.

## 2020-09-16 PROBLEM — I63.9 ISCHEMIC STROKE OF FRONTAL LOBE (HCC): Status: ACTIVE | Noted: 2020-01-01

## 2020-09-16 NOTE — ED NOTES
Called House Supervisor for bed assignment, Patient will be going to  312. Rn, Lucía notified.     Erica Potter  09/16/20 [id number]

## 2020-09-16 NOTE — ED NOTES
Unable to collect labs at this time.  Hope notified and will draw lab specimens.      Herlinda Ramirez RN  09/16/20 [id number]

## 2020-09-16 NOTE — ED PROVIDER NOTES
Subjective   This is a 80-year-old female who presents the emergency department by EMS with chief complaint dizziness and fall just prior to arrival.  Patient states that she recently had her dialysis for her chronic renal failure.  States after getting home she became dizzy.  States that this caused her to fall injuring her left hip.  Patient reports history of atrial fib, hyperlipidemia, hypertension, chronic kidney disease, thyroid disease.  She denies any head injury or loss of consciousness.      History provided by:  Patient   used: No    Dizziness  Quality:  Lightheadedness  Severity:  Moderate  Onset quality:  Gradual  Duration:  1 hour  Timing:  Intermittent  Progression:  Worsening  Chronicity:  New  Context: not when bending over, not with bowel movement, not with ear pain, not with head movement, not with inactivity, not with loss of consciousness, not with physical activity and not when standing up    Relieved by:  Nothing  Worsened by:  Nothing  Ineffective treatments:  None tried  Associated symptoms: weakness    Associated symptoms: no blood in stool, no chest pain, no diarrhea, no headaches, no nausea, no shortness of breath, no syncope, no tinnitus and no vomiting    Risk factors: no anemia, no heart disease, no hx of vertigo and no Meniere's disease    Fall  Mechanism of injury: fall    Injury location:  Pelvis  Pelvic injury location:  L hip  Incident location:  Home  Time since incident:  1 day  Arrived directly from scene: no    Fall:     Fall occurred:  Standing    Impact surface:  Athletic surface    Entrapped after fall: no    Suspicion of alcohol use: no    Suspicion of drug use: no    Tetanus status:  Unknown  Prior to arrival data:     Bystander interventions:  None    Patient ambulatory at scene: no      Blood loss:  None    Orientation at scene:  Person, situation and place    Loss of consciousness: no      Amnesic to event: no      Airway interventions:  None     Breathing interventions:  None    IO access:  None    Fluids administered:  None    Cardiac interventions:  None    Medications administered:  None    Immobilization:  None  Associated symptoms: no abdominal pain, no back pain, no chest pain, no difficulty breathing, no headaches, no nausea and no vomiting    Risk factors: dialysis    Risk factors: no anticoagulation therapy, no asthma, no CABG, no COPD, no diabetes, no hemophilia, no pacemaker and not pregnant        Review of Systems   HENT: Negative for tinnitus.    Respiratory: Negative for shortness of breath.    Cardiovascular: Negative for chest pain and syncope.   Gastrointestinal: Negative for abdominal pain, blood in stool, diarrhea, nausea and vomiting.   Musculoskeletal: Positive for arthralgias, joint swelling and myalgias. Negative for back pain.   Neurological: Positive for dizziness and weakness. Negative for headaches.   All other systems reviewed and are negative.      Past Medical History:   Diagnosis Date   • Acute on chronic diastolic congestive heart failure (CMS/[facility]) 12/20/2019   • Anemia    • Angina at rest (CMS/[facility])    • Arthritis    • Atrial fibrillation (CMS/[facility])    • CKD (chronic kidney disease) requiring chronic dialysis (CMS/[facility]) 12/2/2019   • Coagulation disorder due to circulating anticoagulants (CMS/[facility]) 12/2/2019   • Coronary artery disease    • Disease of thyroid gland    • Elevated cholesterol    • GERD without esophagitis 12/2/2019   • History of blood transfusion    • History of colonoscopy 11/19/2019   • History of melena 11/19/2019   • History of stomach ulcers    • Hypertension    • Impaired functional mobility, balance, gait, and endurance    • Impaired functional mobility, balance, gait, and endurance    • Osteoporosis    • Positive TB test    • Stroke (CMS/[facility])     No deficits       Allergies   Allergen Reactions   • Tuberculin Tests Rash       Past Surgical History:   Procedure Laterality Date   • BRONCHOSCOPY Bilateral  4/12/2019    Procedure: BRONCHOSCOPY;  Surgeon: Jeff Laura MD;  Location:  [facility];  Service: Pulmonary   • BYPASS GRAFT     • COLONOSCOPY N/A 10/15/2019    Procedure: COLONOSCOPY;  Surgeon: Fredi Aaron MD;  Location:  [facility];  Service: Gastroenterology   • CORONARY ANGIOPLASTY WITH STENT PLACEMENT     • CORONARY ARTERY BYPASS GRAFT  10/18/2010   • ENDOSCOPY N/A 10/14/2019    Procedure: ESOPHAGOGASTRODUODENOSCOPY;  Surgeon: Fredi Aaron MD;  Location:  [facility];  Service: Gastroenterology   • HYSTERECTOMY     • INSERTION HEMODIALYSIS CATHETER N/A 2/14/2020    Procedure: HEMODIALYSIS CATHETER INSERTION([place]);  Surgeon: Familia Paez MD;  Location:  [facility];  Service: General;  Laterality: N/A;   • STOMACH SURGERY  08/11/2019    Upper GI bleed    • STOMACH SURGERY  10/13/2019       Family History   Problem Relation Age of Onset   • Cancer Mother    • Arthritis Mother    • No Known Problems Father    • Liver disease Sister        Social History     Socioeconomic History   • Marital status:      Spouse name: Not on file   • Number of children: Not on file   • Years of education: Not on file   • Highest education level: Not on file   Tobacco Use   • Smoking status: Never Smoker   • Smokeless tobacco: Never Used   Substance and Sexual Activity   • Alcohol use: No   • Drug use: No   • Sexual activity: Not Currently           Objective   Physical Exam  Vitals signs and nursing note reviewed.   Constitutional:       General: She is not in acute distress.     Appearance: Normal appearance. She is normal weight. She is not ill-appearing, toxic-appearing or diaphoretic.   HENT:      Head: Normocephalic and atraumatic.      Right Ear: Tympanic membrane and ear canal normal. There is no impacted cerumen.      Left Ear: Tympanic membrane and ear canal normal. There is no impacted cerumen.      Nose: Nose normal. No congestion or rhinorrhea.      Mouth/Throat:       Mouth: Mucous membranes are moist.      Pharynx: No oropharyngeal exudate or posterior oropharyngeal erythema.   Eyes:      General: No scleral icterus.        Right eye: No discharge.         Left eye: No discharge.      Extraocular Movements: Extraocular movements intact.      Conjunctiva/sclera: Conjunctivae normal.      Pupils: Pupils are equal, round, and reactive to light.   Neck:      Musculoskeletal: Normal range of motion.   Cardiovascular:      Rate and Rhythm: Normal rate. Rhythm irregularly irregular.      Pulses: Normal pulses.      Heart sounds: Normal heart sounds. No murmur. No friction rub. No gallop.    Pulmonary:      Effort: Pulmonary effort is normal. No respiratory distress.      Breath sounds: Normal breath sounds. No stridor. No wheezing or rhonchi.   Abdominal:      General: Abdomen is flat. Bowel sounds are normal. There is no distension.      Palpations: There is no mass.      Tenderness: There is no abdominal tenderness. There is no guarding or rebound.      Hernia: No hernia is present.   Musculoskeletal: Normal range of motion.         General: No swelling, tenderness, deformity or signs of injury.      Left lower leg: No edema.   Skin:     General: Skin is warm and dry.      Coloration: Skin is not jaundiced or pale.      Findings: No bruising or erythema.   Neurological:      General: No focal deficit present.      Mental Status: She is alert.      Sensory: No sensory deficit.      Motor: No weakness.      Coordination: Coordination normal.   Psychiatric:         Mood and Affect: Mood normal.         Behavior: Behavior normal.         Thought Content: Thought content normal.         Procedures           ED Course  ED Course as of Sep 16 1927   Wed Sep 16, 2020   1705 Discussed care with on call neurolgy. Discussed acute vs subacute infarct of left frontal lobe. Will do bedside neurology consult.     []   171 EKG interpreted by me reveals A. fib at a rate of 101.  Nonspecific T wave  changes.    [PF]   1755 X-ray sent to radiology.     []   1830 No acute findings on x-ray read by Dr. Nathan.     []   1830 Awaiting phone call from neurology.     []   1904 Discussed care with neurology recommended to admit and have MRI/MRA. Will call hospitalist for possible admit.     []   1910 Discussed care with Dr. Dunham. Patient will be admitted to telemetry.     []      ED Course User Index  [] Milad Pan PA-C  [PF] Des Rinaldi,                                          NIHSS (NIH Stroke Scale/Score) reviewed and/or performed as part of the patient evaluation and treatment planning process.  The result associated with this review/performance is: 2       MDM      Final diagnoses:   Ischemic stroke of frontal lobe (CMS/HCC)   Dizziness   Left hip pain   Atrial fibrillation, unspecified type (CMS/HCC)            Milad Pan PA-C  09/16/20 1915       Milad Pan PA-C  09/16/20 1927

## 2020-09-16 NOTE — CONSULTS
Stroke Consult Note    Patient Name: Pat Morel   MRN: [id number]  Age: 80 y.o.  Sex: female  : 1940    Primary Care Physician: Anthony Sweet DO  Referring Physician:  No ref. provider found    TIME STROKE TEAM CALLED: 5:01 EST     TIME PATIENT SEEN: 3:00 EST    Chief Complaint/Reason for Consultation:   Vertigo      Subjective:      HPI:  History obtained from patient and from medical staff and from family members at bedside.   recent history of stroke, But no recent transient ischemic attack, no history of seizures or passing out. No history of focal weakness suggesting relapsing remitting MS. No history of migraine, no neck stiffness or fever.  Patient taken no anticoagulation therapy, Patient taking antiplatelet therapy.  No history of  head trauma or meningitis , no  strong family history of epilepsy.  No strong family history of aneurysm. No recent change in patient daily routine.  No recent new medication , symptoms started unprovoked.  Patient 2020 suffer from stroke causing aphasia and right sided weakness.  Today patient experience this afternoon positional vertigo, no nausea no vomiting, symptoms improving and currently close to her baseline.    ROS: 12 point of review of system negative except as above.    Past Medical History:   Diagnosis Date   • Acute on chronic diastolic congestive heart failure (CMS/HCC) 2019   • Anemia    • Angina at rest (CMS/HCC)    • Arthritis    • Atrial fibrillation (CMS/HCC)    • CKD (chronic kidney disease) requiring chronic dialysis (CMS/HCC) 2019   • Coagulation disorder due to circulating anticoagulants (CMS/HCC) 2019   • Coronary artery disease    • Disease of thyroid gland    • Elevated cholesterol    • GERD without esophagitis 2019   • History of blood transfusion    • History of colonoscopy 2019   • History of melena 2019   • History of stomach ulcers    • Hypertension    • Impaired functional mobility,  balance, gait, and endurance    • Impaired functional mobility, balance, gait, and endurance    • Osteoporosis    • Positive TB test    • Stroke (CMS/HCC)     No deficits     Past Surgical History:   Procedure Laterality Date   • BRONCHOSCOPY Bilateral 4/12/2019    Procedure: BRONCHOSCOPY;  Surgeon: Jeff Laura MD;  Location:  [facility];  Service: Pulmonary   • BYPASS GRAFT     • COLONOSCOPY N/A 10/15/2019    Procedure: COLONOSCOPY;  Surgeon: Fredi Aaron MD;  Location:  [facility];  Service: Gastroenterology   • CORONARY ANGIOPLASTY WITH STENT PLACEMENT     • CORONARY ARTERY BYPASS GRAFT  10/18/2010   • ENDOSCOPY N/A 10/14/2019    Procedure: ESOPHAGOGASTRODUODENOSCOPY;  Surgeon: Fredi Aaron MD;  Location:  [facility];  Service: Gastroenterology   • HYSTERECTOMY     • INSERTION HEMODIALYSIS CATHETER N/A 2/14/2020    Procedure: HEMODIALYSIS CATHETER INSERTION(CANNNON);  Surgeon: Familia Paez MD;  Location:  [facility];  Service: General;  Laterality: N/A;   • STOMACH SURGERY  08/11/2019    Upper GI bleed    • STOMACH SURGERY  10/13/2019     Family History   Problem Relation Age of Onset   • Cancer Mother    • Arthritis Mother    • No Known Problems Father    • Liver disease Sister      Social History     Socioeconomic History   • Marital status:      Spouse name: Not on file   • Number of children: Not on file   • Years of education: Not on file   • Highest education level: Not on file   Tobacco Use   • Smoking status: Never Smoker   • Smokeless tobacco: Never Used   Substance and Sexual Activity   • Alcohol use: No   • Drug use: No   • Sexual activity: Not Currently       Allergies   Allergen Reactions   • Tuberculin Tests Rash     Prior to Admission medications    Medication Sig Start Date End Date Taking? Authorizing Provider   aspirin (aspirin) 81 MG EC tablet Take 1 tablet by mouth Daily. 9/10/20   Marjorie Polo APRN   atorvastatin (LIPITOR) 40 MG  tablet Take 1 tablet by mouth Every Night. 9/10/20   Marjorie Polo APRN   budesonide (PULMICORT) 0.5 MG/2ML nebulizer solution  6/10/20   Provider, MD Cayla   dilTIAZem CD (CARDIZEM CD) 180 MG 24 hr capsule Take 1 capsule by mouth Daily. 20   Anthony Sweet K, DO   levothyroxine (SYNTHROID, LEVOTHROID) 75 MCG tablet Take 1 tablet by mouth Daily. 3/20/20   Anthony Sweet K, DO   mirtazapine (REMERON) 15 MG tablet Take 1 tablet by mouth Every Night. 20   Anthony Sweet K, DO   nystatin (MYCOSTATIN) 205668 UNIT/ML suspension Swish and spit 5 mL 3 (Three) Times a Day. 20   Anthony Sweet K, DO   pantoprazole (PROTONIX) 40 MG EC tablet Take 1 tablet by mouth Daily. 20   Anthony Sweet, DO   predniSONE (DELTASONE) 5 MG tablet Take 1 tablet by mouth Daily. 20   Anthony Sweet K, DO   ranolazine (RANEXA) 500 MG 12 hr tablet Take 1 tablet by mouth Every 12 (Twelve) Hours. 20   Anthony Sweet DO       Objective:    Temp:  [97.9 °F (36.6 °C)] 97.9 °F (36.6 °C)  Heart Rate:  [] 104  Resp:  [16] 16  BP: (127)/(58) 127/58    Neuro Exam:     NIHSS    NIH Stroke Scale  Time: 17:18 EDT  Person Administering Scale: Basher A Mohsen, MD    1a  Level of consciousness: 0=alert; keenly responsive   1b. LOC questions:  0=Performs both tasks correctly   1c. LOC commands: 0=Performs both tasks correctly   2.  Best Gaze: 0=normal   3.  Visual: 0=No visual loss   4. Facial Palsy: 1=Minor paralysis (flattened nasolabial fold, asymmetric on smiling)   5a.  Motor left arm: 0=No drift, limb holds 90 (or 45) degrees for full 10 seconds   5b.  Motor right arm: 1=Drift, limb holds 90 (or 45) degrees but drifts down before full 10 seconds: does not hit bed   6a. motor left le=No drift, limb holds 90 (or 45) degrees for full 10 seconds   6b  Motor right le=Drift, limb holds 90 (or 45) degrees but drifts down before full 10 seconds: does not hit bed   7. Limb Ataxia: 0=Absent   8.  Sensory:  0=Normal; no sensory loss   9. Best Language:  1=Mild to moderate aphasia; some obvious loss of fluency or facility of comprehension without significant limitation on ideas expressed or form of expression.   10. Dysarthria: 0=Normal   11. Extinction and Inattention: 0=No abnormality    Total:   4       This was an audio and video enabled telemedicine encounter.       Physical Exam:Awake and follow simple command, Track objects all directions. No visual filed cut . Pupils equal an reactive. Right  facial droop. No nystagmus. Slowly Verbal, Power: Move all extremities 5/5 in left 4/5 in right. Did good finger nose finger. Normal sensory examination to light touch. DTR symmetric. no pathological reflexes. Didn't evaluate gait at this time.    Hospital Meds:  Scheduled-   Infusions- No current facility-administered medications for this encounter.      PRNs-     Results Reviewed:  I have personally reviewed current lab, radiology, and data and agree with results.    Radiology Results  Results for orders placed during the hospital encounter of 02/06/20   Adult Transthoracic Echo Complete W/ Cont if Necessary Per Protocol    Addendum 1.  Normal left ventricular size and systolic function, LVEF 65-70%. 2.  Mild to moderate concentric LVH. 3.  Impaired LV diastolic filling pattern consistent with grade 2  diastolic dysfunction and elevated left atrial pressure. 4.  Mild to moderate right ventricular dilation, with normal RV systolic  function 5.  Moderate to severe bilateral atrial enlargement. 6.  Mild to moderate mitral regurgitation. 7.  Moderate to severe tricuspid regurgitation. 8.  Suspected PFO. 9.  Pulmonary hypertension, with RVSP 60 mmHg.      Yomi Higgins MD 2/10/2020 12:37 PM          Narrative 1.  Normal left ventricular size and systolic function, LVEF 65-70%.  2.  Mild to moderate concentric LVH.  3.  Impaired LV diastolic filling pattern consistent with grade 2   diastolic dysfunction and elevated left  atrial pressure.  4.  Normal right ventricular size and systolic function.  5.  Moderate to severe bilateral atrial enlargement.  6.  Mild to moderate mitral regurgitation.  7.  Moderate to severe tricuspid regurgitation.  7.  Suspected PFO.     Results for orders placed during the hospital encounter of 09/16/20   CT Head Without Contrast    Narrative FINAL REPORT    CLINICAL HISTORY:  dizziness    COMPARISON:  April 3, 2020    FINDINGS:  Axial images of the head were obtained without contrast. Coronal  reformatted images were also obtained. This study was performed  with techniques to keep radiation doses as low as reasonably  achievable (ALARA). Individualized dose reduction techniques  using automated exposure control or adjustment of mA and/or kV  according to the patient''s size were employed.  There is  generalized age-appropriate atrophy.  Periventricular  low-attenuation areas are seen consistent with mild chronic  ischemic changes.  There is no evidence of intracranial  hemorrhage or mass.  There is a new low-attenuation area in the  left frontal region measuring approximate 4 cm consistent with  an acute or subacute infarct.   There is no evidence of shift of  the midline structures.  No skull abnormality is seen on the  bone window images.      Impression Acute or subacute infarct in the left frontal lobe.  Atrophy and  mild periventricular chronic ischemic changes.    Authenticated by Ranulfo Dumont III, MD on 09/16/2020  05:04:34 PM     Results for orders placed during the hospital encounter of 02/06/20   MRI abdomen wo contrast mrcp    Narrative PROCEDURE: MRI ABDOMEN WO CONTRAST MRCP-     HISTORY: Abnormal liver function tests (LFTs)     PROCEDURE: Multiplanar multisequence imaging of the abdomen was  performed without the use of intravenous contrast. 3-D MRCP images were  obtained.     COMPARISON: None.     FINDINGS: The exam is limited secondary to motion artifact. MRCP images  are nondiagnostic.  Stones are present within the gallbladder. The liver,  adrenals, kidneys, spleen and pancreas are grossly unremarkable. There  is a large hiatal hernia. There are small effusions a the visualized  lung bases.       Impression Gallstones. The exam is otherwise grossly unremarkable.           This report was finalized on 2/13/2020 1:59 PM by Joaquín Shannon M.D..       Lab Results  Lab Results   Lab Value Date/Time    CHOL 189 08/01/2020 0641    HDL 62 (H) 08/01/2020 0641    LDL 98 08/01/2020 0641    TRIG 143 08/01/2020 0641               Assessment:  1. Subacute left frontal ischemic stroke.  2. New onset vertigo suspected peripheral.  3. End-stage renal disease.    Plan:  Stat MRI of the brain and MRA of the brain.  Carotid  Dopplers.  Continue aspirin and high does statin.  Consider Meclzine prn.  Time: 17:18 EDT  Person Administering Scale: Basher A Mohsen, MD         This was an audio and video enabled telemedicine encounter.       Electronically signed by Basher A Mohsen, MD, 09/16/20, 5:18 PM EDT.

## 2020-09-17 NOTE — THERAPY EVALUATION
Patient Name: Pat Morel  : 1940    MRN: [id number]                              Today's Date: 2020       Admit Date: 2020    Visit Dx:     ICD-10-CM ICD-9-CM   1. Ischemic stroke of frontal lobe (CMS/Formerly [facility])  I63.9 434.91   2. Dizziness  R42 780.4   3. Left hip pain  M25.552 719.45   4. Atrial fibrillation, unspecified type (CMS/Formerly [facility])  I48.91 427.31     Patient Active Problem List   Diagnosis   • Persistent atrial fibrillation (CMS/HCC)   • Coronary artery disease involving native coronary artery of native heart without angina pectoris   • Essential hypertension   • Pulmonary arterial hypertension (CMS/HCC)   • Interstitial lung disease (CMS/HCC)   • Hypersensitivity pneumonitis (CMS/Formerly [facility])   • Dyslipidemia   • Acquired hypothyroidism   • GERD without esophagitis   • Iron deficiency anemia   • Chronic diastolic heart failure (CMS/Formerly [facility])   • Recurrent falls   • Physical deconditioning   • Acute ischemic left MCA stroke (CMS/Formerly [facility])   • ESRD (end stage renal disease) (CMS/Formerly [facility])   • Dysthymia   • Lack of appetite   • Late effect of cerebrovascular accident (CVA)   • Impaired mobility and ADLs   • Hemiparesis affecting right side as late effect of cerebrovascular accident (CMS/Formerly [facility])   • Combined receptive and expressive aphasia as late effect of cerebrovascular accident (CVA)   • Dysphagia   • Ischemic stroke of frontal lobe (CMS/Formerly [facility])     Past Medical History:   Diagnosis Date   • Acute on chronic diastolic congestive heart failure (CMS/Formerly [facility]) 2019   • Anemia    • Angina at rest (CMS/Formerly [facility])    • Arthritis    • Atrial fibrillation (CMS/Formerly [facility])    • CKD (chronic kidney disease) requiring chronic dialysis (CMS/Formerly [facility]) 2019   • Coagulation disorder due to circulating anticoagulants (CMS/Formerly [facility]) 2019   • Coronary artery disease    • Disease of thyroid gland    • Elevated cholesterol    • GERD without esophagitis 2019   • History of blood transfusion    • History of colonoscopy 2019   • History of  melena 11/19/2019   • History of stomach ulcers    • Hypertension    • Impaired functional mobility, balance, gait, and endurance    • Impaired functional mobility, balance, gait, and endurance    • Osteoporosis    • Positive TB test    • Stroke (CMS/HCC)     No deficits     Past Surgical History:   Procedure Laterality Date   • BRONCHOSCOPY Bilateral 4/12/2019    Procedure: BRONCHOSCOPY;  Surgeon: Jeff Laura MD;  Location:  [facility];  Service: Pulmonary   • BYPASS GRAFT     • COLONOSCOPY N/A 10/15/2019    Procedure: COLONOSCOPY;  Surgeon: Fredi Aaron MD;  Location:  [facility];  Service: Gastroenterology   • CORONARY ANGIOPLASTY WITH STENT PLACEMENT     • CORONARY ARTERY BYPASS GRAFT  10/18/2010   • ENDOSCOPY N/A 10/14/2019    Procedure: ESOPHAGOGASTRODUODENOSCOPY;  Surgeon: Fredi Aaron MD;  Location:  [facility];  Service: Gastroenterology   • HYSTERECTOMY     • INSERTION HEMODIALYSIS CATHETER N/A 2/14/2020    Procedure: HEMODIALYSIS CATHETER INSERTION(DINO);  Surgeon: Familia Paez MD;  Location:  [facility];  Service: General;  Laterality: N/A;   • STOMACH SURGERY  08/11/2019    Upper GI bleed    • STOMACH SURGERY  10/13/2019     General Information     Row Name 09/17/20 1122          OT Time and Intention    Document Type  evaluation  -SD     Mode of Treatment  occupational therapy  -SD     Row Name 09/17/20 1122          General Information    Prior Level of Function  -- unable to determine d/t dysarthria  -SD     Existing Precautions/Restrictions  fall  -SD     Barriers to Rehab  medically complex;previous functional deficit;cognitive status  -SD     Row Name 09/17/20 1122          Living Environment    Lives With  child(jennie), adult  -SD     Row Name 09/17/20 1122          Home Main Entrance    Number of Stairs, Main Entrance  -- pt indicates there are stairs, but unable to determine inside vs outside and how many  -SD     Row Name 09/17/20 1122           Cognition    Orientation Status (Cognition)  oriented x 4 able to determine through yes/no  -SD     Row Name 09/17/20 1122          Safety Issues, Functional Mobility    Safety Issues Affecting Function (Mobility)  safety precautions follow-through/compliance;safety precaution awareness;sequencing abilities  -SD     Impairments Affecting Function (Mobility)  balance;cognition;coordination;endurance/activity tolerance;motor planning;postural/trunk control;strength  -SD       User Key  (r) = Recorded By, (t) = Taken By, (c) = Cosigned By    Initials Name Provider Type    SD Italia Schumacher OT Occupational Therapist        Mobility/ADL's     [facility] 09/17/20 1126          Bed Mobility    Bed Mobility  supine-sit  -SD     Supine-Sit [place] (Bed Mobility)  minimum assist (75% patient effort)  -SD     Bed Mobility, Safety Issues  decreased use of arms for pushing/pulling;decreased use of legs for bridging/pushing;impaired trunk control for bed mobility  -SD     Assistive Device (Bed Mobility)  head of bed elevated;bed rails  -SD     Row Name 09/17/20 1126          Transfers    Transfers  sit-stand transfer  -SD     Sit-Stand [place] (Transfers)  minimum assist (75% patient effort);2 person assist  -SD     Row Name 09/17/20 1126          Sit-Stand Transfer    Assistive Device (Sit-Stand Transfers)  -- HHA, gait belt  -SD     Row Name 09/17/20 1126          Activities of Daily Living    BADL Assessment/Intervention  bathing;upper body dressing;lower body dressing;grooming;feeding;toileting  -SD     Row Name 09/17/20 1126          Bathing Assessment/Intervention    [place] Level (Bathing)  maximum assist (25% patient effort)  -SD     Row Name 09/17/20 1126          Upper Body Dressing Assessment/Training    [place] Level (Upper Body Dressing)  minimum assist (75% patient effort)  -SD     Row Name 09/17/20 1126          Lower Body Dressing Assessment/Training    [place] Level (Lower Body Dressing)   maximum assist (25% patient effort)  -SD     Row Name 09/17/20 1126          Grooming Assessment/Training    [place] Level (Grooming)  minimum assist (75% patient effort)  -SD     Row Name 09/17/20 1126          Self-Feeding Assessment/Training    Assistive Devices (Feeding)  other (see comments)  -SD     Comment (Feeding)  NPO  -SD     Row Name 09/17/20 1126          Toileting Assessment/Training    [place] Level (Toileting)  maximum assist (25% patient effort)  -SD     Comment (Toileting)  incontinence  -SD       User Key  (r) = Recorded By, (t) = Taken By, (c) = Cosigned By    Initials Name Provider Type    Italia Norris OT Occupational Therapist        Obj/Interventions     Row Name 09/17/20 1128          Sensory Assessment (Somatosensory)    Sensory Assessment (Somatosensory)  sensation intact  -SD     [facility] Name 09/17/20 1128          Range of Motion Comprehensive    General Range of Motion  bilateral upper extremity ROM WFL  -SD     [facility] Name 09/17/20 1128          Strength Comprehensive (MMT)    General Manual Muscle Testing (MMT) Assessment  upper extremity strength deficits identified  -SD     [facility] Name 09/17/20 1128          Upper Extremity (Manual Muscle Testing)    Comment, MMT: Upper Extremity  RUE 3-/5 LUE 3+/5  -SD       User Key  (r) = Recorded By, (t) = Taken By, (c) = Cosigned By    Initials Name Provider Type    Italia Norris OT Occupational Therapist        Goals/Plan     Row Name 09/17/20 1130          Transfer Goal 1 (OT)    Activity/Assistive Device (Transfer Goal 1, OT)  sit-to-stand/stand-to-sit;walker, rolling  -SD     [place] Level/Cues Needed (Transfer Goal 1, OT)  minimum assist (75% or more patient effort)  -SD     Time Frame (Transfer Goal 1, OT)  2 weeks  -SD     Progress/Outcome (Transfer Goal 1, OT)  goal ongoing  -SD     Row Name 09/17/20 1130          Dressing Goal 1 (OT)    Activity/Device (Dressing Goal 1, OT)  lower body dressing  -SD      [place]/Cues Needed (Dressing Goal 1, OT)  moderate assist (50-74% patient effort)  -SD     Time Frame (Dressing Goal 1, OT)  2 weeks  -SD     Progress/Outcome (Dressing Goal 1, OT)  goal ongoing  -SD     Row Name 09/17/20 1130          Toileting Goal 1 (OT)    Activity/Device (Toileting Goal 1, OT)  commode, bedside without drop arms  -SD     [place] Level/Cues Needed (Toileting Goal 1, OT)  moderate assist (50-74% patient effort)  -SD     Time Frame (Toileting Goal 1, OT)  2 weeks  -SD     Progress/Outcome (Toileting Goal 1, OT)  goal ongoing  -SD     Row Name 09/17/20 1130          Strength Goal 1 (OT)    Strength Goal 1 (OT)  Patient to perform UB ther ex as tolerated  -SD     Time Frame (Strength Goal 1, OT)  long term goal (LTG)  -SD     Progress/Outcome (Strength Goal 1, OT)  goal ongoing  -SD     Row Name 09/17/20 1130          Therapy Assessment/Plan (OT)    Planned Therapy Interventions (OT)  activity tolerance training;BADL retraining;occupation/activity based interventions;patient/caregiver education/training;strengthening exercise;transfer/mobility retraining  -SD       User Key  (r) = Recorded By, (t) = Taken By, (c) = Cosigned By    Initials Name Provider Type    Italia Norris OT Occupational Therapist        Clinical Impression     Row Name 09/17/20 1128          Plan of Care Review    Plan of Care Reviewed With  patient  -SD     Progress  no change  -SD     Outcome Summary  OT eval completed. Patient presents deficits in strength, endurance, balance, mobility and ADL performance. Patient is expected to benefit from continued OT services prior to DC.  -SD     Row Name 09/17/20 1128          Therapy Assessment/Plan (OT)    Patient/Family Therapy Goal Statement (OT)  Return home  -SD     Rehab Potential (OT)  fair, will monitor progress closely  -SD     Criteria for Skilled Therapeutic Interventions Met (OT)  yes;skilled treatment is necessary  -SD     Therapy Frequency (OT)  3  times/wk  -SD     Predicted Duration of Therapy Intervention (OT)  5 if indicated  -SD     Row Name 09/17/20 1128          Therapy Plan Review/Discharge Plan (OT)    Anticipated Discharge Disposition (OT)  home with home health;home with assist  -SD     Row Name 09/17/20 1128          Vital Signs    Pretreatment Heart Rate (beats/min)  111  -SD     Posttreatment Heart Rate (beats/min)  166  -SD     Intratreatment Resp Rate (breaths/min)  112  -SD     O2 Delivery Pre Treatment  room air  -SD     O2 Delivery Intra Treatment  room air  -SD     O2 Delivery Post Treatment  room air  -SD       User Key  (r) = Recorded By, (t) = Taken By, (c) = Cosigned By    Initials Name Provider Type    SD Italia Schumacher OT Occupational Therapist        Outcome Measures     Row Name 09/17/20 1131          How much help from another is currently needed...    Putting on and taking off regular lower body clothing?  2  -SD     Bathing (including washing, rinsing, and drying)  2  -SD     Toileting (which includes using toilet bed pan or urinal)  2  -SD     Putting on and taking off regular upper body clothing  3  -SD     Taking care of personal grooming (such as brushing teeth)  3  -SD     Eating meals  3  -SD     AM-PAC 6 Clicks Score (OT)  15  -SD     Row Name 09/17/20 1131          Functional Assessment    Outcome Measure Options  AM-PAC 6 Clicks Daily Activity (OT)  -SD       User Key  (r) = Recorded By, (t) = Taken By, (c) = Cosigned By    Initials Name Provider Type    Italia Norris OT Occupational Therapist        Occupational Therapy Education                 Title: PT OT SLP Therapies (Not Started)     Topic: Occupational Therapy (In Progress)     Point: ADL training (Done)     Description:   Instruct learner(s) on proper safety adaptation and remediation techniques during self care or transfers.   Instruct in proper use of assistive devices.              Learning Progress Summary           Patient Acceptance, E,TB, VU by  SD at 9/17/2020 1132    Comment: Benefit of OT; OT POC                   Point: Home exercise program (Not Started)     Description:   Instruct learner(s) on appropriate technique for monitoring, assisting and/or progressing therapeutic exercises/activities.              Learner Progress:  Not documented in this visit.          Point: Precautions (Not Started)     Description:   Instruct learner(s) on prescribed precautions during self-care and functional transfers.              Learner Progress:  Not documented in this visit.          Point: Body mechanics (Not Started)     Description:   Instruct learner(s) on proper positioning and spine alignment during self-care, functional mobility activities and/or exercises.              Learner Progress:  Not documented in this visit.                      User Key     Initials Effective Dates Name Provider Type Discipline    SD 03/07/18 -  Italia Schumacher OT Occupational Therapist OT              OT Recommendation and Plan  Retired Outcome Summary/Treatment Plan (OT)  Anticipated Discharge Disposition (OT): home with home health, home with assist  Planned Therapy Interventions (OT): activity tolerance training, BADL retraining, occupation/activity based interventions, patient/caregiver education/training, strengthening exercise, transfer/mobility retraining  Therapy Frequency (OT): 3 times/wk  Plan of Care Review  Plan of Care Reviewed With: patient  Progress: no change  Outcome Summary: OT eval completed. Patient presents deficits in strength, endurance, balance, mobility and ADL performance. Patient is expected to benefit from continued OT services prior to DC.  Plan of Care Reviewed With: patient  Outcome Summary: OT eval completed. Patient presents deficits in strength, endurance, balance, mobility and ADL performance. Patient is expected to benefit from continued OT services prior to DC.     Time Calculation:   Time Calculation- OT     Row Name 09/17/20 1133              Time Calculation- OT    OT Start Time  0932 -SD      OT Received On  09/17/20  -SD      OT Goal Re-Cert Due Date  09/27/20  -SD        User Key  (r) = Recorded By, (t) = Taken By, (c) = Cosigned By    Initials Name Provider Type    Italia Norris OT Occupational Therapist        Therapy Charges for Today     Code Description Service Date Service Provider Modifiers Qty    [id number] HC OT EVAL MOD COMPLEXITY 3 9/17/2020 Italia Schumacher OT GO 1               Italia Schumacher OT  9/17/2020

## 2020-09-17 NOTE — CONSULTS
[facility] Cardiology Consult Note    Pat Morel  1940  [id number]  09/17/20    Requesting Physician: Tamara Tomlin DO    Chief Complaint: Dizziness and fall    History of Present Illness:   Mrs. Pat Morel is a 79 y.o. female who is being seen by cardiology for further management of atrial fibrillation with rapid ventricular rates. The patient has a past medical history which includes hypertension, prior CVA, history of GI bleeding in the setting of peptic ulcer disease, and stage III chronic kidney disease.  She also has a history of pulmonary artery hypertension and interstitial lung disease possibly secondary to hypersensitivity pneumonitis.  Her cardiac history is significant for persistent atrial fibrillation, with a history of prior cardioversion.   In terms of her atrial fibrillation, she was previously anticoagulated for CVA prophylaxis.  Her anticoagulation was discontinued secondary to worsening anemia with possible GI bleeding as well as a concern for frequent falls.  She presented to [facility] on six 9/17 after sustaining a mechanical fall.  Per family report, the patient had stood up from sitting on the couch to ambulate with her walker, and upon quickly turning around to look behind her she became dizzy, lightheaded, and subsequently fell.  There does not appear to be any syncope.  The patient landed on her left hip, and a subsequent hip x-ray in the emergency department did not show any acute pathology.  A CT of the head was performed, which did suggest an acute versus subacute infarct of the left frontal lobe.  An MRI is currently pending.  During her hospitalization, she was noted to go into atrial fibrillation with rapid ventricular rates.  She was given a bolus of diltiazem, which subsequently resulted in improved rate control without having to start diltiazem drip.  Cardiology has been consulted for further recommendations and  management.    Past Cardiac Testin. Last Coronary Angio: Rody years ago, report unavailable  2. Prior Stress Testing: None  3. Last Echo: 10/16/2019  · The left atrium is enlarged.  · Global and segmental LV wall motion is normal. The estimated LV ejection fraction is >70%.  · The aortic valve appears to be mildly sclerotic. AS and AI are absent.  · There is mild-moderate mitral regurgitation.  · There is moderate tricuspid regurgitation with a moderate elevation is estimated PA pressure (45-55 mmHg)  · There are no other important findings on this study.  4. Prior Holter Monitor: 7-day Holter monitor 2018              1.  100% atrial fibrillation    Review of Systems:   Review of Systems   Constitutional: Negative for activity change, appetite change, chills, diaphoresis, fatigue, fever, unexpected weight gain and unexpected weight loss.   Eyes: Negative for blurred vision and double vision.   Respiratory: Negative for cough, chest tightness, shortness of breath and wheezing.    Cardiovascular: Negative for chest pain, palpitations and leg swelling.   Gastrointestinal: Negative for abdominal pain, anal bleeding, blood in stool and GERD.   Endocrine: Negative for cold intolerance and heat intolerance.   Genitourinary: Negative for hematuria.   Neurological: Positive for dizziness and light-headedness. Negative for syncope and weakness.   Hematological: Does not bruise/bleed easily.   Psychiatric/Behavioral: Negative for depressed mood and stress. The patient is not nervous/anxious.        Past Medical History:   Past Medical History:   Diagnosis Date   • Acute on chronic diastolic congestive heart failure (CMS/HCC) 2019   • Anemia    • Angina at rest (CMS/HCC)    • Arthritis    • Atrial fibrillation (CMS/HCC)    • CKD (chronic kidney disease) requiring chronic dialysis (CMS/HCC) 2019   • Coagulation disorder due to circulating anticoagulants (CMS/HCC) 2019   • Coronary artery disease    •  Disease of thyroid gland    • Elevated cholesterol    • GERD without esophagitis 12/2/2019   • History of blood transfusion    • History of colonoscopy 11/19/2019   • History of melena 11/19/2019   • History of stomach ulcers    • Hypertension    • Impaired functional mobility, balance, gait, and endurance    • Impaired functional mobility, balance, gait, and endurance    • Impaired functional mobility, balance, gait, and endurance    • Osteoporosis    • Positive TB test    • Stroke (CMS/HCC)     No deficits       Past Surgical History:   Past Surgical History:   Procedure Laterality Date   • BRONCHOSCOPY Bilateral 4/12/2019    Procedure: BRONCHOSCOPY;  Surgeon: Jeff Laura MD;  Location:  [facility];  Service: Pulmonary   • BYPASS GRAFT     • COLONOSCOPY N/A 10/15/2019    Procedure: COLONOSCOPY;  Surgeon: Fredi Aaron MD;  Location:  [facility];  Service: Gastroenterology   • CORONARY ANGIOPLASTY WITH STENT PLACEMENT     • CORONARY ARTERY BYPASS GRAFT  10/18/2010   • ENDOSCOPY N/A 10/14/2019    Procedure: ESOPHAGOGASTRODUODENOSCOPY;  Surgeon: Fredi Aaron MD;  Location:  [facility];  Service: Gastroenterology   • HYSTERECTOMY     • INSERTION HEMODIALYSIS CATHETER N/A 2/14/2020    Procedure: HEMODIALYSIS CATHETER INSERTION(DINO);  Surgeon: Familia Paez MD;  Location:  [facility];  Service: General;  Laterality: N/A;   • STOMACH SURGERY  08/11/2019    Upper GI bleed    • STOMACH SURGERY  10/13/2019       Family History:   Family History   Problem Relation Age of Onset   • Cancer Mother    • Arthritis Mother    • No Known Problems Father    • Liver disease Sister        Social History:   Social History     Socioeconomic History   • Marital status:      Spouse name: Not on file   • Number of children: Not on file   • Years of education: Not on file   • Highest education level: Not on file   Tobacco Use   • Smoking status: Never Smoker   • Smokeless tobacco: Never  Used   Substance and Sexual Activity   • Alcohol use: No   • Drug use: No   • Sexual activity: Not Currently       Medications:     Current Facility-Administered Medications:   •  acetaminophen (TYLENOL) tablet 650 mg, 650 mg, Oral, Q4H PRN **OR** acetaminophen (TYLENOL) 160 MG/5ML solution 650 mg, 650 mg, Oral, Q4H PRN **OR** acetaminophen (TYLENOL) suppository 650 mg, 650 mg, Rectal, Q4H PRN, Alexsander Moseley, DO  •  aspirin EC tablet 81 mg, 81 mg, Oral, Daily, Alexsander Moseley, DO, 81 mg at 09/17/20 1327  •  atorvastatin (LIPITOR) tablet 40 mg, 40 mg, Oral, Nightly, Alexsander Moseley, DO  •  bisacodyl (DULCOLAX) suppository 10 mg, 10 mg, Rectal, Daily PRN, Alexsander Moseley, DO  •  budesonide (PULMICORT) nebulizer solution 0.5 mg, 0.5 mg, Nebulization, Daily - RT, Alexsander Moseley, DO  •  dilTIAZem (CARDIZEM) 100 mg in 100 mL NS (1 mg/mL) infusion (ADV), 5-15 mg/hr, Intravenous, Titrated, Tamara Tomlin, DO  •  enoxaparin (LOVENOX) syringe 30 mg, 30 mg, Subcutaneous, Q24H, Alexsander Moseley, , 30 mg at 09/16/20 2200  •  levothyroxine (SYNTHROID, LEVOTHROID) tablet 75 mcg, 75 mcg, Oral, Daily, Alexsander Moseley, DO  •  melatonin tablet 5 mg, 5 mg, Oral, Nightly PRN, Alexsander Moseley, DO  •  mirtazapine (REMERON) tablet 15 mg, 15 mg, Oral, Nightly, Alexsander Moseley, DO  •  ondansetron (ZOFRAN) injection 4 mg, 4 mg, Intravenous, Q6H PRN, Alexsander Moseley, DO  •  pantoprazole (PROTONIX) EC tablet 40 mg, 40 mg, Oral, Daily, Alexsander Moseley, DO  •  ranolazine (RANEXA) 12 hr tablet 500 mg, 500 mg, Oral, Q12H, Alexsander Moseley DO, 500 mg at 09/17/20 1327    Allergies:   Allergies   Allergen Reactions   • Tuberculin Tests Rash       Physical Exam:  Vital Signs:   Vitals:    09/17/20 1329 09/17/20 1330 09/17/20 1335 09/17/20 1533   BP: 125/68  114/61 123/59   BP Location:    Right arm   Patient Position:    Lying   Pulse: 113 114 96     Resp:    16   Temp:    97.5 °F (36.4 °C)   TempSrc:    Oral   SpO2: 100% 100% 99%    Weight:       Height:           Physical Exam  Vitals signs and nursing note reviewed.   Constitutional:       General: She is not in acute distress.     Appearance: She is well-developed. She is not diaphoretic.      Comments: Elderly female lying in bed in no acute distress   HENT:      Head: Normocephalic and atraumatic.   Eyes:      General: No scleral icterus.     Pupils: Pupils are equal, round, and reactive to light.   Neck:      Musculoskeletal: No muscular tenderness.      Trachea: No tracheal deviation.   Cardiovascular:      Rate and Rhythm: Normal rate. Rhythm irregular.      Heart sounds: Normal heart sounds. No murmur. No friction rub. No gallop.       Comments: Normal JVD.    Pulmonary:      Effort: Pulmonary effort is normal. No respiratory distress.      Breath sounds: Normal breath sounds. No stridor. No wheezing or rales.   Chest:      Chest wall: No tenderness.   Abdominal:      General: Bowel sounds are normal. There is no distension.      Palpations: Abdomen is soft.      Tenderness: There is no abdominal tenderness. There is no guarding or rebound.   Musculoskeletal: Normal range of motion.   Lymphadenopathy:      Cervical: No cervical adenopathy.   Skin:     General: Skin is warm and dry.      Findings: No erythema.   Neurological:      General: No focal deficit present.      Mental Status: She is alert and oriented to person, place, and time.   Psychiatric:         Mood and Affect: Mood normal.         Behavior: Behavior normal.         Results Review:   Results from last 7 days   Lab Units 09/17/20  0529 09/16/20  1832   SODIUM mmol/L 137 136   POTASSIUM mmol/L 4.4 4.1   CHLORIDE mmol/L 101 96*   CO2 mmol/L 23.9 24.0   BUN mg/dL 16 9   CREATININE mg/dL 1.87* 1.38*   CALCIUM mg/dL 8.8 9.9   BILIRUBIN mg/dL  --  0.6   ALK PHOS U/L  --  109   ALT (SGPT) U/L  --  20   AST (SGOT) U/L  --  24   GLUCOSE mg/dL 85  103*     Results from last 7 days   Lab Units 09/17/20  0015 09/16/20  2143 09/16/20  1832   TROPONIN T ng/mL 0.101* 0.116* 0.154*     Results from last 7 days   Lab Units 09/17/20  0529 09/16/20  1832   WBC 10*3/mm3 8.34 10.00   HEMOGLOBIN g/dL 10.0* 12.9   HEMATOCRIT % 30.7* 39.7   PLATELETS 10*3/mm3 226 288             Results from last 7 days   Lab Units 09/17/20  0529   CHOLESTEROL mg/dL 165   TRIGLYCERIDES mg/dL 144   HDL CHOL mg/dL 76*   LDL CHOL mg/dL 60       I personally viewed and interpreted the patient's EKG/Telemetry data     Assessment / Plan:     1. Persistent atrial fibrillation with rapid ventricular rates  --Known history of persistent atrial fibrillation, rate controlled with diltiazem as outpatient  --Went into RVR this afternoon, however heart rate is now well controlled after receiving bolus of IV diltiazem  --Will start short acting diltiazem and uptitrate as required for rate control  --Previously anticoagulated for CVA prophylaxis, however anticoagulation was discontinued due to concern for GI bleeding as well as frequent falls  --If MRI shows recurrent CVA, will then need to rediscuss anticoagulation with the patient's family    2.  Possible CVA  --MRI currently pending    3.  Chronic diastolic heart failure   --History of grade 2 diastolic dysfunction on most recent echocardiogram  --Lasix as needed     4. Multivessel coronary artery disease  --History of three-vessel CABG 2012 including LIMA-LAD, as SVG- PL and PDA  --Last coronary angiography in 2017 with patent grafts at that time  --No current chest pain or anginal symptoms  --Continue high intensity statin      5.    Chronic kidney disease  --Management per nephrology and primary service     6.  Dyslipidemia  --Continue statin therapy    Thank you for allowing me to participate in the care of your patient. Please do not hesitate to contact me with additional questions or concerns.     LA NENA Higgins MD  Interventional Cardiology    09/17/20  16:27 EDT

## 2020-09-17 NOTE — PROGRESS NOTES
[facility] HOSPITALIST    PROGRESS NOTE    Name:  Pat Morel   Age:  80 y.o.  Sex:  female  :  1940  MRN:  [id number]   Visit Number:  [id number]  Admission Date:  2020  Date Of Service:  20  Primary Care Physician:  Anthony Sweet DO     LOS: 0 days :      Chief Complaint:  Follow up acute vs subacute stroke        Subjective / Interval History: The patient was seen this morning.  She was anxious after accidentally having a bowel movement in the bed with A. fib with RVR.  She was given a diltiazem bolus and drip.  She denied chest pain, shortness of breath, abdominal pain.  She was anxious.  History was difficult to obtain.  I called the patient's daughter to review her care and case.  The patient's daughter states that she does not think that she is having an acute stroke as she had a very severe stroke last month and was admitted and discharged from [facility].  The patient has had chronic dysarthria every since.     The patient is a chronically ill 80-year-old lady with history of CVA with severe dysarthria, right upper extremity weakness, CAD, hypertension and hyperlipidemia.  She also has paroxysmal A. fib and history of GI bleed in 2019.  She presented to the emergency room after having dizziness and fall.  She had already completed her hemodialysis earlier in the day.  She had gone home felt weak and came to the ER with her daughter.  She had fallen on her left hip but x-ray of the hip showed no acute disease.  CT scan of the head without contrast showed acute versus subacute ischemic CVA.  Previous CVA was right MCA.  Telemedicine neurology was consulted.  Hospitalist service admitted.  The patient did not get her MRI earlier today as there was some reported metal.  A CTA could not be performed due to difficult IV access.  After much discussion with the patient and MRI, the patient only has a fistula in the left arm and was able to undergo MRI.   She was seen by therapy services with no acute deficits noted beyond what she had from her prior stroke.      Review of Systems: Patient anxious and tearful and refused to say more than a few words. Dementia likely complicated this.      Vital Signs:    Temp:  [97.5 °F (36.4 °C)-98.3 °F (36.8 °C)] 97.5 °F (36.4 °C)  Heart Rate:  [] 96  Resp:  [16-18] 16  BP: (114-154)/(59-97) 123/59    Intake and output:    I/O last 3 completed shifts:  In: 500 [IV Piggyback:500]  Out: -   I/O this shift:  In: 240 [P.O.:240]  Out: -     Physical Examination:    General Appearance:   Elderly lady.  Chronically ill-appearing.  Tearful and anxious alert and cooperative, not in any acute distress.   Head:    Atraumatic and normocephalic, without obvious abnormality.   Eyes:            PERRLA, conjunctivae and sclerae normal, no Icterus. No pallor. Extraocular movements are within normal limits.   Throat:   No oral lesions, no thrush, oral mucosa moist.   Neck:   Supple, trachea midline, no thyromegaly   Lungs:     Chest shape is normal. Breath sounds heard bilaterally equally.  No crackles or wheezing. No Pleural rub or bronchial breathing.    Heart:   Tachycardic and irregularly irregular, no murmur   Abdomen:     Normal bowel sounds, no masses, no organomegaly. Soft     nontender, nondistended, no guarding, no rebound                tenderness   Extremities:   Moves all extremities well, no edema, no cyanosis, no           clubbing   Skin:   No bleeding, bruising or rash.   Neurologic:   No tremor, sensation intact. Equal movement leg and arms bilaterally. Anxiety limited examination and compliance with exam. Refused speech   Laboratory results:    Results from last 7 days   Lab Units 09/17/20  0529 09/16/20  1832   SODIUM mmol/L 137 136   POTASSIUM mmol/L 4.4 4.1   CHLORIDE mmol/L 101 96*   CO2 mmol/L 23.9 24.0   BUN mg/dL 16 9   CREATININE mg/dL 1.87* 1.38*   CALCIUM mg/dL 8.8 9.9   BILIRUBIN mg/dL  --  0.6   ALK PHOS U/L  --   109   ALT (SGPT) U/L  --  20   AST (SGOT) U/L  --  24   GLUCOSE mg/dL 85 103*     Results from last 7 days   Lab Units 09/17/20  0529 09/16/20  1832   WBC 10*3/mm3 8.34 10.00   HEMOGLOBIN g/dL 10.0* 12.9   HEMATOCRIT % 30.7* 39.7   PLATELETS 10*3/mm3 226 288         Results from last 7 days   Lab Units 09/17/20  0015 09/16/20  2143 09/16/20  1832   TROPONIN T ng/mL 0.101* 0.116* 0.154*     Results from last 7 days   Lab Units 09/16/20  1821   URINECX  No growth           I have reviewed the patient's laboratory results.    Radiology results:    Imaging Results (Last 24 Hours)     Procedure Component Value Units Date/Time    MRI Angiogram Head Without Contrast [[id number]] Collected: 09/17/20 1722     Updated: 09/17/20 1723    Narrative:      FINAL REPORT    CLINICAL HISTORY:  Stroke MRA BRAIN.    FINDINGS:  The intracranial portions of the internal carotid arteries are  normal as are the anterior and middle cerebral arteries  bilaterally.  There are diminished peripheral left MCA branches  consistent with acute/subacute infarction seen on the MR.  The  intracranial vertebral arteries and the basilar artery and their  major branches are also unremarkable.      Impression:      Diminished left MCA peripheral branches consistent with  infarction.    Authenticated by Ran Nathan M.D. on 09/17/2020 05:22:26 PM    MRI Brain Without Contrast [[id number]] Collected: 09/17/20 1722     Updated: 09/17/20 1723    Narrative:      FINAL REPORT    TECHNIQUE:  Multiplanar imaging was performed of the brain without  gadolinium infusion.    CLINICAL HISTORY:  . STROKE.    FINDINGS:  Diffusion-weighted images show restricted diffusion in the left  frontoparietal region consistent with an acute infarction in the  MCA territory. The ventricles are mildly dilated, but  proportionate to the degree of generalized atrophy.  Periventricular and deep white matter signal abnormality is  consistent with changes of chronic small vessel ischemia.  There  is no mass or shift of midline structures. There is no  intracranial hemorrhage. No significant sinus or osseous  abnormality is seen.      Impression:      Acute/subacute infarction in the left frontoparietal region.    Authenticated by Ran Nathan M.D. on 09/17/2020 05:22:25 PM    US Carotid Bilateral [[id number]] Collected: 09/17/20 1128     Updated: 09/17/20 1131    Narrative:      PROCEDURE: US CAROTID BILATERAL-     HISTORY: tia; I63.9-Cerebral infarction, unspecified; R42-Dizziness and  giddiness; M25.552-Pain in left hip; I48.91-Unspecified atrial  fibrillation     Technique: Real-time imaging was performed of the extracranial carotid  arteries in transverse and longitudinal planes, with color duplex  evaluation of blood flow velocity. Spectral analysis was performed. The  cervicovertebral arteries were also examined. Stenosis evaluation is  based on validated velocity criteria.     Right carotid system:  CCA: 78 cm/s  ICA: 76 cm/s  ECA: 105 cm/s  Vertebral artery: Antegrade  ICA/CCA ratio: 0.98  Mild plaque is identified at the bifurcation.     Left carotid system:  CCA: 78 cm/s  ICA: 107 cm/s  ECA: 88 cm/s  Vertebral artery: Antegrade  ICA/CCA ratio: 1.38  Mild plaque is identified at the bifurcation.          Impression:      Mild plaque within the carotid bulbs with no hemodynamically significant  stenosis.     This report was finalized on 9/17/2020 11:29 AM by Joaquín Shannon M.D..    XR Chest 1 View [[id number]] Collected: 09/17/20 1019     Updated: 09/17/20 1036    Narrative:      PROCEDURE: XR CHEST 1 VW-     HISTORY: fall, dizziness     COMPARISON: June 2020.     FINDINGS: The heart is enlarged. There is a right IJ dialysis catheter  present. The patient is status post median sternotomy for CABG. There is  mild pulmonary edema. There is no pneumothorax.  There are no acute  osseous abnormalities. There are large right upper quadrant  calcifications likely due to gallstones.       Impression:       Mild pulmonary edema.     Probable cholelithiasis.              Images were reviewed, interpreted, and dictated by Dr. Joaquín Shannon M.D.  Transcribed by Thea Sage PA-C.     This report was finalized on 9/17/2020 10:34 AM by Joaquín Shannon M.D..          I have reviewed the patient's radiology reports.    Medication Review:     I have reviewed the patients active and prn medications.     Assessment:    Acute but likely subacute infarct in the left frontal lobe, with history of August 1, 2020 Left MCA stroke  Dizziness, orthostatic hypotension versus ischemic stroke/TIA  History of CVA with residual dysphasia/dysarthria and right UE weakness  ESRD on intermittent hemodialysis, Monday Wednesday Friday  Chronic diastolic heart failure  Atrial fibrillation, not on anticoagulation  CAD  Hypertension  Hyperlipidemia  GERD  Impaired functional mobility balance gait and endurance  Poor oral intake with mild dehydration prior to admission       Plan:  Continue to monitor the patient in hospital, on telemetry. She has  Not been eating or drinking much lately and daughter concerned about dehydration. I have restarted diet since speech therapy saw her. Patient has history of dysphagia, but cleared for thin liquids and daughter was giving this to her at home as well. The patient doesn't like thickened liquids.  Gave small fluid bolus, as patient is on dialysis. Her heart rate improved with this, as well as cardizem bolus and oral diltiazem this AM. She may need uptitrated dose, as she has required this in the past.     I discussed the case with cardiology. Continue diltiazem drip if needed, and started on oral short acting diltiazem. She is on aspirin 81mg for now. Neurology following stroke with recommendations. After further monitoring, consider if patient should have anticoagulation. Note October 2019 patient had workup for anemia and gi bleeding and was taken off anticoagulation. She is also fall risk.  Daughter was concerned about restarting this. Further future discussion will be needed with family and cardiology and neurology. I had night Neurology look at MRI and this can be further reviewed by Day Neurologist tomorrow for further recommendations.    I notified Dr Trinh alexis that the patient has scheduled hemodialysis for Friday and is due tomorrow.   Medication risks and benefits were discussed in detail. Patient reported satisfaction with care delivered and treatment plan.       I discussed poor prognosis with Dr Higgins and patient's daughter. She stated she was aware and already had home Palliative care/Advanced care nurse, home health, and assistance at home. She wants to take her back home once improved.   Tamara Tomlin,   09/17/20  18:21 EDT

## 2020-09-17 NOTE — PLAN OF CARE
Spoke with pt and her daughter at her bedside.  I listened and offered support to pt's daughter while she was away for testing, Dtr reported that her mother lives in an apartment in their basement and that she provide care for her as needed.  Dtr commented on the symptoms she has notices since last summer when her mother had a stroke and realizes that she is declining physically.  The pt's ACP that is on file was discussed, as well as concerns pt's dtr has about caring for her mother.  I provided a card with scripture and offered prayer at the dtr's request.

## 2020-09-17 NOTE — PLAN OF CARE
Problem: Adult Inpatient Plan of Care  Goal: Plan of Care Review  Outcome: Ongoing, Progressing  Flowsheets (Taken 9/17/2020 0102)  Progress: no change  Plan of Care Reviewed With: patient  Outcome Summary: NEW ADMISSION,  VSS,  PT ALERT, ORIENTED AND OBEYS COMMANDS,  SPEECH GARBLED AT TIMES,  TELEMETRY MONITORING, MONITORING NEURO STATUS,  NO ACUTE CHANGES NOTED.   Goal Outcome Evaluation:  Plan of Care Reviewed With: patient  Progress: no change  Outcome Summary: NEW ADMISSION,  VSS,  PT ALERT, ORIENTED AND OBEYS COMMANDS,  SPEECH GARBLED AT TIMES,  TELEMETRY MONITORING, MONITORING NEURO STATUS,  NO ACUTE CHANGES NOTED.

## 2020-09-17 NOTE — THERAPY EVALUATION
Acute Care - Speech Language Pathology   Swallow Initial Evaluation TAYLOR Garcia     Patient Name: Pat Morel  : 1940  MRN: [id number]  Today's Date: 2020               Admit Date: 2020    Visit Dx:     ICD-10-CM ICD-9-CM   1. Ischemic stroke of frontal lobe (CMS/HCC)  I63.9 434.91   2. Dizziness  R42 780.4   3. Left hip pain  M25.552 719.45   4. Atrial fibrillation, unspecified type (CMS/HCC)  I48.91 427.31     Patient Active Problem List   Diagnosis   • Persistent atrial fibrillation (CMS/HCC)   • Coronary artery disease involving native coronary artery of native heart without angina pectoris   • Essential hypertension   • Pulmonary arterial hypertension (CMS/HCC)   • Interstitial lung disease (CMS/HCC)   • Hypersensitivity pneumonitis (CMS/HCC)   • Dyslipidemia   • Acquired hypothyroidism   • GERD without esophagitis   • Iron deficiency anemia   • Chronic diastolic heart failure (CMS/[facility])   • Recurrent falls   • Physical deconditioning   • Acute ischemic left MCA stroke (CMS/[facility])   • ESRD (end stage renal disease) (CMS/[facility])   • Dysthymia   • Lack of appetite   • Late effect of cerebrovascular accident (CVA)   • Impaired mobility and ADLs   • Hemiparesis affecting right side as late effect of cerebrovascular accident (CMS/HCC)   • Combined receptive and expressive aphasia as late effect of cerebrovascular accident (CVA)   • Dysphagia   • Ischemic stroke of frontal lobe (CMS/HCC)     Past Medical History:   Diagnosis Date   • Acute on chronic diastolic congestive heart failure (CMS/[facility]) 2019   • Anemia    • Angina at rest (CMS/[facility])    • Arthritis    • Atrial fibrillation (CMS/HCC)    • CKD (chronic kidney disease) requiring chronic dialysis (CMS/[facility]) 2019   • Coagulation disorder due to circulating anticoagulants (CMS/[facility]) 2019   • Coronary artery disease    • Disease of thyroid gland    • Elevated cholesterol    • GERD without esophagitis 2019   • History of blood  transfusion    • History of colonoscopy 11/19/2019   • History of melena 11/19/2019   • History of stomach ulcers    • Hypertension    • Impaired functional mobility, balance, gait, and endurance    • Impaired functional mobility, balance, gait, and endurance    • Impaired functional mobility, balance, gait, and endurance    • Osteoporosis    • Positive TB test    • Stroke (CMS/HCC)     No deficits     Past Surgical History:   Procedure Laterality Date   • BRONCHOSCOPY Bilateral 4/12/2019    Procedure: BRONCHOSCOPY;  Surgeon: Jeff Laura MD;  Location:  [facility];  Service: Pulmonary   • BYPASS GRAFT     • COLONOSCOPY N/A 10/15/2019    Procedure: COLONOSCOPY;  Surgeon: Fredi Aaron MD;  Location:  [facility];  Service: Gastroenterology   • CORONARY ANGIOPLASTY WITH STENT PLACEMENT     • CORONARY ARTERY BYPASS GRAFT  10/18/2010   • ENDOSCOPY N/A 10/14/2019    Procedure: ESOPHAGOGASTRODUODENOSCOPY;  Surgeon: Fredi Aaron MD;  Location:  [facility];  Service: Gastroenterology   • HYSTERECTOMY     • INSERTION HEMODIALYSIS CATHETER N/A 2/14/2020    Procedure: HEMODIALYSIS CATHETER INSERTION(DINO);  Surgeon: Familia Paez MD;  Location:  [facility];  Service: General;  Laterality: N/A;   • STOMACH SURGERY  08/11/2019    Upper GI bleed    • STOMACH SURGERY  10/13/2019        SWALLOW EVALUATION (last 72 hours)      SLP Adult Swallow Evaluation     Row Name 09/17/20 1112                   Rehab Evaluation    Document Type  evaluation  -MD        Subjective Information  no complaints  -MD        Patient Observations  alert;cooperative  -MD        Patient Effort  good  -MD        Symptoms Noted During/After Treatment  none  -MD           General Information    Patient Profile Reviewed  yes  -MD        Pertinent History Of Current Problem  history of CVAs, receptive/expressive aphasia, dysarthria, hemiparesis, end stage renal disease  -MD        Current Method of Nutrition  NPO   -MD        Prior Level of Function-Communication  receptive language impairment;expressive language impairment  -MD        Prior Level of Function-Swallowing  no diet consistency restrictions  -MD        Plans/Goals Discussed with  patient;other (see comments) RN  -MD        Barriers to Rehab  medically complex  -MD           Pain    Additional Documentation  Pain Scale: Numbers Pre/Post-Treatment (Group)  -MD           Pain Scale: Numbers Pre/Post-Treatment    Pretreatment Pain Rating  0/10 - no pain  -MD        Posttreatment Pain Rating  0/10 - no pain  -MD           Oral Motor and Function    Dentition Assessment  natural, present and adequate  -MD        Secretion Management  WNL/WFL  -MD        Mucosal Quality  moist, healthy  -MD        Volitional Swallow  unable to elicit  -MD        Volitional Cough  unable to elicit  -MD           Oral Musculature and Cranial Nerve Assessment    Oral Motor General Assessment  other (see comments) labial asymmetry   -MD           General Eating/Swallowing Observations    Respiratory Support Currently in Use  room air  -MD        Eating/Swallowing Skills  fed by SLP;self-fed  -MD        Positioning During Eating  upright in chair  -MD        Utensils Used  spoon;cup;straw  -MD        Consistencies Trialed  regular textures;soft textures;pureed;thin liquids  -MD        Pre SpO2 (%)  97  -MD        Post SpO2 (%)  97  -MD           Respiratory    Respiratory Status  WFL  -MD           Clinical Swallow Eval    Oral Prep Phase  WFL  -MD        Oral Transit  WFL  -MD        Oral Residue  WFL  -MD        Pharyngeal Phase  no overt signs/symptoms of pharyngeal impairment  -MD        Esophageal Phase  unremarkable  -MD        Clinical Swallow Evaluation Summary  Bedside eval of swallowing completed. Patient currently NPO.  Receptive and expressive langauge deficits, as well as dysarthria present from late effects of previous CVA.  Oral mech completed. Right sided labial asymmetry  noted. Lingual ROM WFL.  Patient had difficulty following verbal directiosn with oral mech but could follow directions after presented with a model.  Trialed puree, mech soft, regular, and thin via cup and straw.  Oral phase WFL.  It should be noted, patient independently cleared all oral residue of crackers with tongue sweeps and additional swallow.  No overt s/s aspiration with any trials presented.  Per RN, bridget reported no swallowing problems after strokes and patient on regular diet prior to ER visit.  Recommend: 1. regular diet and thin liquids 2. meds whole with thin as julienne  3.  aspiration precautions. Discussed with RN  -MD           Recommendations    Therapy Frequency (Swallow)  evaluation only  -MD        SLP Diet Recommendation  regular textures;thin liquids  -MD        Recommended Precautions and Strategies  upright posture during/after eating;small bites of food and sips of liquid  -MD        SLP Rec. for Method of Medication Administration  meds whole;with thin liquids;as tolerated  -MD        Monitor for Signs of Aspiration  yes;notify SLP if any concerns  -MD          User Key  (r) = Recorded By, (t) = Taken By, (c) = Cosigned By    Initials Name Effective Dates    Manasa Seaman 01/09/20 -           EDUCATION  The patient has been educated in the following areas:   Dysphagia (Swallowing Impairment).    SLP Recommendation and Plan     SLP Diet Recommendation: regular textures, thin liquids  Recommended Precautions and Strategies: upright posture during/after eating, small bites of food and sips of liquid  SLP Rec. for Method of Medication Administration: meds whole, with thin liquids, as tolerated     Monitor for Signs of Aspiration: yes, notify SLP if any concerns              Therapy Frequency (Swallow): evaluation only                            Progress: improving  Outcome Summary: Bedside eval of swallowing completed. Patient currently NPO.  Receptive and expressive langauge deficits, as  well as dysarthria present from late effects of previous CVA.  Oral mech completed. Right sided labial asymmetry noted. Lingual ROM WFL.  Patient had difficulty following verbal directiosn with oral mech but could follow directions after presented with a model.  Trialed puree, mech soft, regular, and thin via cup and straw.  Oral phase WFL.  It should be noted, patient independently cleared all oral residue of crackers with tongue sweeps and additional swallow.  No overt s/s aspiration with any trials presented.  Per RN, bridget reported no swallowing problems after strokes and patient on regular diet prior to ER visit.  Recommend: 1. regular diet and thin liquids 2. meds whole with thin as julienne  3.  aspiration precautions. Discussed with RN           Time Calculation:   Time Calculation- SLP     Row Name 09/17/20 1145             Time Calculation- SLP    SLP Start Time  1112  -MD      SLP Stop Time  1127  -MD      SLP Time Calculation (min)  15 min  -MD      SLP Received On  09/17/20  -MD        User Key  (r) = Recorded By, (t) = Taken By, (c) = Cosigned By    Initials Name Provider Type    Manasa Seaman Speech and Language Pathologist          Therapy Charges for Today     Code Description Service Date Service Provider Modifiers Qty    [id number]  ST EVAL ORAL PHARYNG SWALLOW 4 9/17/2020 Manasa Vaughn GN 1               Manasa Vaughn  9/17/2020

## 2020-09-17 NOTE — PLAN OF CARE
Problem: Adult Inpatient Plan of Care  Goal: Plan of Care Review  Recent Flowsheet Documentation  Taken 9/17/2020 1128 by Italia Schumacher OT  Progress: no change  Plan of Care Reviewed With: patient  Outcome Summary: OT eval completed. Patient presents deficits in strength, endurance, balance, mobility and ADL performance. Patient is expected to benefit from continued OT services prior to DC.

## 2020-09-17 NOTE — THERAPY EVALUATION
Patient Name: Pat Morel  : 1940    MRN: [id number]                              Today's Date: 2020       Admit Date: 2020    Visit Dx:     ICD-10-CM ICD-9-CM   1. Ischemic stroke of frontal lobe (CMS/[facility])  I63.9 434.91   2. Dizziness  R42 780.4   3. Left hip pain  M25.552 719.45   4. Atrial fibrillation, unspecified type (CMS/[facility])  I48.91 427.31     Patient Active Problem List   Diagnosis   • Persistent atrial fibrillation (CMS/HCC)   • Coronary artery disease involving native coronary artery of native heart without angina pectoris   • Essential hypertension   • Pulmonary arterial hypertension (CMS/HCC)   • Interstitial lung disease (CMS/HCC)   • Hypersensitivity pneumonitis (CMS/[facility])   • Dyslipidemia   • Acquired hypothyroidism   • GERD without esophagitis   • Iron deficiency anemia   • Chronic diastolic heart failure (CMS/[facility])   • Recurrent falls   • Physical deconditioning   • Acute ischemic left MCA stroke (CMS/[facility])   • ESRD (end stage renal disease) (CMS/[facility])   • Dysthymia   • Lack of appetite   • Late effect of cerebrovascular accident (CVA)   • Impaired mobility and ADLs   • Hemiparesis affecting right side as late effect of cerebrovascular accident (CMS/[facility])   • Combined receptive and expressive aphasia as late effect of cerebrovascular accident (CVA)   • Dysphagia   • Ischemic stroke of frontal lobe (CMS/[facility])     Past Medical History:   Diagnosis Date   • Acute on chronic diastolic congestive heart failure (CMS/[facility]) 2019   • Anemia    • Angina at rest (CMS/[facility])    • Arthritis    • Atrial fibrillation (CMS/[facility])    • CKD (chronic kidney disease) requiring chronic dialysis (CMS/[facility]) 2019   • Coagulation disorder due to circulating anticoagulants (CMS/[facility]) 2019   • Coronary artery disease    • Disease of thyroid gland    • Elevated cholesterol    • GERD without esophagitis 2019   • History of blood transfusion    • History of colonoscopy 2019   • History of  melena 11/19/2019   • History of stomach ulcers    • Hypertension    • Impaired functional mobility, balance, gait, and endurance    • Impaired functional mobility, balance, gait, and endurance    • Impaired functional mobility, balance, gait, and endurance    • Osteoporosis    • Positive TB test    • Stroke (CMS/HCC)     No deficits     Past Surgical History:   Procedure Laterality Date   • BRONCHOSCOPY Bilateral 4/12/2019    Procedure: BRONCHOSCOPY;  Surgeon: Jeff Laura MD;  Location:  [facility];  Service: Pulmonary   • BYPASS GRAFT     • COLONOSCOPY N/A 10/15/2019    Procedure: COLONOSCOPY;  Surgeon: Fredi Aaron MD;  Location:  [facility];  Service: Gastroenterology   • CORONARY ANGIOPLASTY WITH STENT PLACEMENT     • CORONARY ARTERY BYPASS GRAFT  10/18/2010   • ENDOSCOPY N/A 10/14/2019    Procedure: ESOPHAGOGASTRODUODENOSCOPY;  Surgeon: Fredi Aaron MD;  Location:  [facility];  Service: Gastroenterology   • HYSTERECTOMY     • INSERTION HEMODIALYSIS CATHETER N/A 2/14/2020    Procedure: HEMODIALYSIS CATHETER INSERTION(DINO);  Surgeon: Familia Paez MD;  Location: [facility];  Service: General;  Laterality: N/A;   • STOMACH SURGERY  08/11/2019    Upper GI bleed    • STOMACH SURGERY  10/13/2019     General Information     Row Name 09/17/20 0930          Physical Therapy Time and Intention    Mode of Treatment  physical therapy  -LM     Row Name 09/17/20 0930          General Information    Patient Profile Reviewed  yes  -LM     Prior Level of Function  -- Unable to definitively determine d/t patient's dysarthria but she indicated she was ambulatory at home.  -LM     Barriers to Rehab  language barrier;medically complex;previous functional deficit  -LM     Row Name 09/17/20 0930          Living Environment    Lives With  child(jennie), adult  -LM     Row Name 09/17/20 0930          Home Main Entrance    Number of Stairs, Main Entrance  -- Unknown  -LM     Row Name  09/17/20 0930          Stairs Within Home, Primary    Stairs, Within Home, Primary  Unknown  -LM     Row Name 09/17/20 0930          Cognition    Orientation Status (Cognition)  oriented x 4  -LM     Row Name 09/17/20 0930          Safety Issues, Functional Mobility    Safety Issues Affecting Function (Mobility)  safety precaution awareness;safety precautions follow-through/compliance  -LM     Impairments Affecting Function (Mobility)  balance;endurance/activity tolerance;strength  -LM       User Key  (r) = Recorded By, (t) = Taken By, (c) = Cosigned By    Initials Name Provider Type    Lupe Franco PT Physical Therapist        Mobility     Row Name 09/17/20 0930          Bed Mobility    Supine-Sit [place] (Bed Mobility)  verbal cues;minimum assist (75% patient effort)  -LM     Assistive Device (Bed Mobility)  bed rails;draw sheet;head of bed elevated  -LM     Row Name 09/17/20 0930          Sit-Stand Transfer    Sit-Stand [place] (Transfers)  verbal cues;minimum assist (75% patient effort);2 person assist  -LM     Assistive Device (Sit-Stand Transfers)  walker, front-wheeled  -LM     Row Name 09/17/20 1147 09/17/20 0930       Gait/Stairs (Locomotion)    [place] Level (Gait)  --  verbal cues;minimum assist (75% patient effort);2 person assist  -LM    Assistive Device (Gait)  --  walker, front-wheeled  -LM    Distance in Feet (Gait)  Ambulation distance limited d/t markedly increased HR to 166 BPM with minimal activity.  -LM  3'  -LM    Deviations/Abnormal Patterns (Gait)  --  base of support, narrow;chivo decreased;festinating/shuffling;gait speed decreased;stride length decreased  -LM    Bilateral Gait Deviations  --  heel strike decreased;weight shift ability decreased  -LM      User Key  (r) = Recorded By, (t) = Taken By, (c) = Cosigned By    Initials Name Provider Type    Lupe Franco PT Physical Therapist        Obj/Interventions     Row Name 09/17/20 0930          Range of Motion  Comprehensive    General Range of Motion  bilateral upper extremity ROM WFL;bilateral lower extremity ROM WFL  -LM     Row Name 09/17/20 0930          Strength Comprehensive (MMT)    General Manual Muscle Testing (MMT) Assessment  upper extremity strength deficits identified;lower extremity strength deficits identified  -LM     Comment, General Manual Muscle Testing (MMT) Assessment  R UE 3-/5; R LE and L UE and LE 3+/5  -LM     Row Name 09/17/20 0930          Balance    Balance Assessment  sitting static balance;sitting dynamic balance;standing static balance;standing dynamic balance  -LM     Static Sitting Balance  WFL;unsupported  -LM     Dynamic Sitting Balance  WFL;unsupported  -LM     Static Standing Balance  mild impairment;supported  -LM     Dynamic Standing Balance  mild impairment;supported  -LM       User Key  (r) = Recorded By, (t) = Taken By, (c) = Cosigned By    Initials Name Provider Type    LM Lupe Baugh, PT Physical Therapist        Goals/Plan     Row Name 09/17/20 0930          Bed Mobility Goal 1 (PT)    Activity/Assistive Device (Bed Mobility Goal 1, PT)  bed rails;bed mobility activities, all  -LM     [place] Level/Cues Needed (Bed Mobility Goal 1, PT)  modified independence  -LM     Time Frame (Bed Mobility Goal 1, PT)  short term goal (STG);10 days  -LM     Progress/Outcomes (Bed Mobility Goal 1, PT)  goal ongoing  -LM     Row Name 09/17/20 0930          Transfer Goal 1 (PT)    Activity/Assistive Device (Transfer Goal 1, PT)  sit-to-stand/stand-to-sit;bed-to-chair/chair-to-bed;toilet;walker, rolling  -LM     [place] Level/Cues Needed (Transfer Goal 1, PT)  contact guard assist  -LM     Time Frame (Transfer Goal 1, PT)  short term goal (STG);10 days  -LM     Progress/Outcome (Transfer Goal 1, PT)  goal ongoing  -LM     Row Name 09/17/20 0930          Gait Training Goal 1 (PT)    Activity/Assistive Device (Gait Training Goal 1, PT)  gait (walking locomotion);assistive device  use;walker, rolling  -LM     [place] Level (Gait Training Goal 1, PT)  contact guard assist  -LM     Distance (Gait Training Goal 1, PT)  50'  -LM     Time Frame (Gait Training Goal 1, PT)  short term goal (STG);10 days  -LM     Row Name 09/17/20 0930          Patient Education Goal (PT)    Activity (Patient Education Goal, PT)  Pt will perform B LE ther ex x 15 reps.  -LM     [place]/Cues/Accuracy (Memory Goal 2, PT)  demonstrates adequately  -LM     Time Frame (Patient Education Goal, PT)  short term goal (STG);10 days  -LM     Progress/Outcome (Patient Education Goal, PT)  goal ongoing  -LM       User Key  (r) = Recorded By, (t) = Taken By, (c) = Cosigned By    Initials Name Provider Type    LM Lupe Baugh, PT Physical Therapist        Clinical Impression     Row Name 09/17/20 0930          Pain    Additional Documentation  Pain Scale: Numbers Pre/Post-Treatment (Group)  -LM     Row Name 09/17/20 0930          Pain Scale: Numbers Pre/Post-Treatment    Pretreatment Pain Rating  0/10 - no pain  -LM     Posttreatment Pain Rating  0/10 - no pain  -LM     Row Name 09/17/20 0930          Plan of Care Review    Progress  no change  -LM     Row Name 09/17/20 0930          Therapy Assessment/Plan (PT)    Rehab Potential (PT)  good, to achieve stated therapy goals  -LM     Criteria for Skilled Interventions Met (PT)  yes;skilled treatment is necessary  -LM     Row Name 09/17/20 0930          Vital Signs    Pretreatment Heart Rate (beats/min)  111  -LM     Intratreatment Heart Rate (beats/min)  166  -LM     Posttreatment Heart Rate (beats/min)  105  -LM     O2 Delivery Pre Treatment  room air  -LM     O2 Delivery Intra Treatment  room air  -LM     O2 Delivery Post Treatment  room air  -LM     Row Name 09/17/20 0930          Positioning and Restraints    Pre-Treatment Position  in bed  -LM     Post Treatment Position  chair  -LM     In Chair  reclined;notified nsg;call light within reach;encouraged to call for  assist;exit alarm on  -LM       User Key  (r) = Recorded By, (t) = Taken By, (c) = Cosigned By    Initials Name Provider Type    Lupe Franco PT Physical Therapist        Outcome Measures     Row Name 09/17/20 0930          How much help from another person do you currently need...    Turning from your back to your side while in flat bed without using bedrails?  3  -LM     Moving from lying on back to sitting on the side of a flat bed without bedrails?  3  -LM     Moving to and from a bed to a chair (including a wheelchair)?  3  -LM     Standing up from a chair using your arms (e.g., wheelchair, bedside chair)?  3  -LM     Climbing 3-5 steps with a railing?  2  -LM     To walk in hospital room?  3  -LM     AM-PAC 6 Clicks Score (PT)  17  -LM     Row Name 09/17/20 0930          Functional Assessment    Outcome Measure Options  AM-PAC 6 Clicks Basic Mobility (PT)  -LM       User Key  (r) = Recorded By, (t) = Taken By, (c) = Cosigned By    Initials Name Provider Type     Lupe Baugh PT Physical Therapist        Physical Therapy Education                 Title: PT OT SLP Therapies (Not Started)     Topic: Physical Therapy (In Progress)     Point: Mobility training (Done)     Learning Progress Summary           Patient Acceptance, E,TB, VU by  at 9/17/2020 1152    Comment: Purpose of PT/POC.                   Point: Home exercise program (Done)     Learning Progress Summary           Patient Acceptance, E,TB, VU by  at 9/17/2020 1152    Comment: Purpose of PT/POC.                   Point: Body mechanics (Not Started)     Learner Progress:  Not documented in this visit.          Point: Precautions (Done)     Learning Progress Summary           Patient Acceptance, E,TB, VU by  at 9/17/2020 1152    Comment: Purpose of PT/POC.                               User Key     Initials Effective Dates Name Provider Type [facility] 04/03/18 -  Lupe Baugh PT Physical Therapist PT              PT  Recommendation and Plan  Planned Therapy Interventions (PT): balance training, neuromuscular re-education, bed mobility training, gait training, transfer training, home exercise program, patient/family education, strengthening  Progress: no change  Outcome Summary: PT eval completed.   Patient presents with deficits in strength, balance, endurance and independence. She is expected to benefit from continued skilled PT intervention to improve her mobility status prior to D/C.     Time Calculation:   PT Charges     Row Name 09/17/20 1153             Time Calculation    Start Time  0930  -LM      PT Received On  09/17/20  -LM      PT Goal Re-Cert Due Date  09/27/20  -        User Key  (r) = Recorded By, (t) = Taken By, (c) = Cosigned By    Initials Name Provider Type     Lupe Baugh, PT Physical Therapist        Therapy Charges for Today     Code Description Service Date Service Provider Modifiers Qty    [id number]  PT EVAL MOD COMPLEXITY 3 9/17/2020 Lupe Baugh, PT GP 1          PT G-Codes  Outcome Measure Options: AM-PAC 6 Clicks Daily Activity (OT)  AM-PAC 6 Clicks Score (PT): 17  AM-PAC 6 Clicks Score (OT): 15    Lupe Baugh PT  9/17/2020

## 2020-09-17 NOTE — PROGRESS NOTES
Discharge Planning Assessment   Jose     Patient Name: Pat Morel  MRN: [id number]  Today's Date: 9/17/2020    Admit Date: 9/16/2020    Discharge Needs Assessment     Row Name 09/17/20 1638       Living Environment    Lives With  significant other    Name(s) of Who Lives With Patient   Jacinto    Unique Family Situation  Daughter lives close by and plan for pt to go home with her at discharge    Current Living Arrangements  home/apartment/condo    Primary Care Provided by  self    Provides Primary Care For  no one, unable/limited ability to care for self    Caregiving Concerns  Has [facility] provided by [employer] that comes 1 x a month and [facility]  for Skilled Nursing and OT.  Was doing so well PT had been DC'd    Family Caregiver if Needed  none    Quality of Family Relationships  supportive    Able to Return to Prior Arrangements  yes    Living Arrangement Comments  Daugher plans for pt to go home with her at discharge.  She has been assisting with since pt's stoke a few months ago       Resource/Environmental Concerns    Resource/Environmental Concerns  none    Transportation Concerns  car, none       Transition Planning    Patient/Family Anticipates Transition to  home    Patient/Family Anticipated Services at Transition  home health care    Transportation Anticipated  family or friend will provide       Discharge Needs Assessment    Readmission Within the Last 30 Days  no previous admission in last 30 days    Equipment Currently Used at Home  commode;oxygen;rollator;shower chair    Concerns to be Addressed  discharge planning    Anticipated Changes Related to Illness  inability to care for self    Equipment Needed After Discharge  none    Outpatient/Agency/Support Group Needs  homecare agency    Discharge Facility/Level of Care Needs  home with home health    Provided Post Acute Provider List?  N/A    N/A Provider List Comment  No addition DME, HH or Skilled nursing needs (a current pt of [facility]  HH)    Provided Post Acute Provider Quality & Resource List?  N/A    N/A Quality & Resource List Comment  No needs    Patient's Choice of Community Agency(s)  [facility]    Current Discharge Risk  chronically ill;physical impairment    Row Name 09/17/20 1635       Living Environment    Lives With  alone    Unique Family Situation  States [place], family live around her    Current Living Arrangements  home/apartment/condo    Caregiving Concerns  Lives alone    Living Arrangement Comments  States she lives alone and family lives all aroung ther        Discharge Plan     Row Name 09/17/20 1645       Plan    Plan  Attempted to speak to pt in room but could not seem to get words out.  Ask if could call family and nodded yes.  Called to daughter Yary and was just arriving in pts room.  Spoke to Yary in pt's room and states she is the POA and there is a LW. Both Documents are on file.  Pt had a stroke this past first week in Aug and was at [facility]. Has been steadily improving.  Address and phone no correct, but will be going home with her at discharge, at [address] [place] [address].  Pt normally lives with her  in an apartment.  PT has [facility] who visits for Skilled Nursing 1 time weekly, PT one time weekly and Speech 2 x week.  Unable to verify as too late in day.  Daughter states pt has Dialysis 3 x week on MWF and she transport her to the [facility].  Also has [facility] who visit 1 x monthly provided by [employer].  Has Home o2 she wears PRN at 2.5 to 3 liters.  Does not know the DME and CM unable to verify.  Called to [employer] and [employer] and neither provide the o2.  Also has a rolling walker, rollator, WC.  Daughter plans to take pt home with her at discharge.  OBS notice explained and signed by daughter. Denies further DC needs.          Continued Care and Services - Admitted Since 9/16/2020    Coordination has not been started for this encounter.     Selected Continued Care -  Prior Encounters Includes selections from prior encounters from 6/18/2020 to 9/17/2020    Discharged on 8/5/2020 Admission date: 7/31/2020 - Discharge disposition: Home-Health Care Svc    Dialysis/Infusion     Service Provider Selected Services Address Phone Fax    [facility] [place]  [facility] [address], [place] [address] [phone] --          Home Medical Care     Service Provider Selected Services Address Phone Fax    [facility]  Home Health Services [address], [facility] [address] [phone]                      Demographic Summary     Row Name 09/17/20 1632       General Information    Admission Type  observation    Required Notices Provided  Observation Status Notice    Expected Length of Stay (LOS)  less then 2 midnights    Referral Source  admission list    Reason for Consult  discharge planning     Used During This Interaction  no    Row Name 09/17/20 1611       General Information    Admission Type  observation    Arrived From  emergency department    Required Notices Provided  Observation Status Notice    Expected Length of Stay (LOS)  lestt then 2 midnights.    Referral Source  admission list    Reason for Consult  discharge planning     Used During This Interaction  no        Functional Status     Row Name 09/17/20 1636       Functional Status    Usual Activity Tolerance  fair    Current Activity Tolerance  poor    Functional Status Comments  self to one per assist, Daughter assist with care       Functional Status, IADL    Medications  assistive person    Meal Preparation  assistive person    Housekeeping  assistive person    Laundry  assistive person    Shopping  assistive person    IADL Comments  Self with minimal assistance       Mental Status    General Appearance WDL  appearance Makes eye contact but has difficulty getting word together.  Daughter Yary states pt does speak slowly.       Mental Status Summary     Recent Changes in Mental Status/Cognitive Functioning  unable to assess    Row Name 09/17/20 1633       Functional Status    Usual Activity Tolerance  fair    Current Activity Tolerance  fair    Functional Status Comments  Independent       Functional Status, IADL    Medications  independent    Meal Preparation  independent    Housekeeping  assistive equipment    Laundry  independent    Shopping  assistive equipment    IADL Comments  Self       Mental Status    General Appearance WDL  WDL       Mental Status Summary    Recent Changes in Mental Status/Cognitive Functioning  unable to assess    Mental Status Comments  alert and oriented        Psychosocial    No documentation.       Abuse/Neglect    No documentation.       Legal    No documentation.       Substance Abuse    No documentation.       Patient Forms    No documentation.           Anushka Guzman RN

## 2020-09-17 NOTE — PLAN OF CARE
Problem: Adult Inpatient Plan of Care  Goal: Plan of Care Review  Outcome: Ongoing, Progressing  Flowsheets  Taken 9/17/2020 1144 by Manasa Vaughn  Progress: improving  Outcome Summary: Bedside eval of swallowing completed. Patient currently NPO.  Receptive and expressive langauge deficits, as well as dysarthria present from late effects of previous CVA.  Oral mech completed. Right sided labial asymmetry noted. Lingual ROM WFL.  Patient had difficulty following verbal directiosn with oral mech but could follow directions after presented with a model.  Trialed puree, mech soft, regular, and thin via cup and straw.  Oral phase WFL.  It should be noted, patient independently cleared all oral residue of crackers with tongue sweeps and additional swallow.  No overt s/s aspiration with any trials presented.  Per RN, bridget reported no swallowing problems after strokes and patient on regular diet prior to ER visit.  Recommend: 1. regular diet and thin liquids 2. meds whole with thin as julienne  3.  aspiration precautions. Discussed with RN  Taken 9/17/2020 1128 by Italia Schumacher OT  Plan of Care Reviewed With: patient

## 2020-09-17 NOTE — PLAN OF CARE
Problem: Adult Inpatient Plan of Care  Goal: Plan of Care Review  Flowsheets (Taken 9/17/2020 0951)  Progress: no change  Plan of Care Reviewed With: --  Outcome Summary: PT eval completed.   Patient presents with deficits in strength, balance, endurance and independence. She is expected to benefit from continued skilled PT intervention to improve her mobility status prior to D/C.

## 2020-09-17 NOTE — H&P
[facility]      Name:  Pat Morel   Age:  80 y.o.  Sex:  female  :  1940  MRN:  [id number]   Visit Number:  [id number]  Admission Date:  2020  Date Of Service:  20  Primary Care Physician:  Anthony Sweet DO    Chief Complaint:     Dizziness with fall    History Of Presenting Illness:      Patient is a chronically ill 80-year-old female with history significant for end-stage renal disease on intermittent hemodialysis, A. fib not anticoagulated, history of CVA with severe dysarthria and right upper extremity weakness, CAD, hypertension and hyperlipidemia who presents to the hospital from home with complaints of dizziness and mechanical fall.  Patient is able to provide history though due to her difficult speech history is largely been provided by her daughter who is present at bedside.  She tells me that she did receive her routine dialysis today.  She is normally very weak and hypotensive after her sessions.  Patient had come home and had stood up from the couch and ambulating with her walker.  She was trailed by her home health care nurse.  Patient thought her nurse was talking to her so she quickly turned around to look behind her, suddenly became dizzy/lightheaded and lost her footing.  Patient fell on her left hip.  She denies head injury or loss of consciousness.  She denies current dizziness/lightheadedness.  X-ray of hip in the ER showed no acute disease.  CT of the head without contrast showed an acute versus subacute infarct in the left frontal lobe.  Patient's previous CVA was in 2020.  CTA at that time showed ischemic CVA of the right M1 vessel.  Telemetry neurology was consulted by the ER physician.  Recommendation for admission to the hospital for observation and further imaging.    Review Of Systems:     A 10 point ROS was reviewed and negative unless otherwise mentioned in the HPI     Past Medical History:    Past  Medical History:   Diagnosis Date   • Acute on chronic diastolic congestive heart failure (CMS/HCC) 12/20/2019   • Anemia    • Angina at rest (CMS/[facility])    • Arthritis    • Atrial fibrillation (CMS/[facility])    • CKD (chronic kidney disease) requiring chronic dialysis (CMS/[facility]) 12/2/2019   • Coagulation disorder due to circulating anticoagulants (CMS/[facility]) 12/2/2019   • Coronary artery disease    • Disease of thyroid gland    • Elevated cholesterol    • GERD without esophagitis 12/2/2019   • History of blood transfusion    • History of colonoscopy 11/19/2019   • History of melena 11/19/2019   • History of stomach ulcers    • Hypertension    • Impaired functional mobility, balance, gait, and endurance    • Impaired functional mobility, balance, gait, and endurance    • Osteoporosis    • Positive TB test    • Stroke (CMS/[facility])     No deficits       Past Surgical history:    Past Surgical History:   Procedure Laterality Date   • BRONCHOSCOPY Bilateral 4/12/2019    Procedure: BRONCHOSCOPY;  Surgeon: Jeff Laura MD;  Location:  [facility];  Service: Pulmonary   • BYPASS GRAFT     • COLONOSCOPY N/A 10/15/2019    Procedure: COLONOSCOPY;  Surgeon: Fredi Aaron MD;  Location:  [facility];  Service: Gastroenterology   • CORONARY ANGIOPLASTY WITH STENT PLACEMENT     • CORONARY ARTERY BYPASS GRAFT  10/18/2010   • ENDOSCOPY N/A 10/14/2019    Procedure: ESOPHAGOGASTRODUODENOSCOPY;  Surgeon: Fredi Aaron MD;  Location:  [facility];  Service: Gastroenterology   • HYSTERECTOMY     • INSERTION HEMODIALYSIS CATHETER N/A 2/14/2020    Procedure: HEMODIALYSIS CATHETER INSERTION(DINO);  Surgeon: Familia Paez MD;  Location: [facility] OR;  Service: General;  Laterality: N/A;   • STOMACH SURGERY  08/11/2019    Upper GI bleed    • STOMACH SURGERY  10/13/2019       Social History:    Social History     Socioeconomic History   • Marital status:      Spouse name: Not on file   • Number of children:  Not on file   • Years of education: Not on file   • Highest education level: Not on file   Tobacco Use   • Smoking status: Never Smoker   • Smokeless tobacco: Never Used   Substance and Sexual Activity   • Alcohol use: No   • Drug use: No   • Sexual activity: Not Currently       Family History:    Family History   Problem Relation Age of Onset   • Cancer Mother    • Arthritis Mother    • No Known Problems Father    • Liver disease Sister        Allergies:      Tuberculin tests    Home Medications:    Prior to Admission Medications     Prescriptions Last Dose Informant Patient Reported? Taking?    aspirin (aspirin) 81 MG EC tablet   No No    Take 1 tablet by mouth Daily.    atorvastatin (LIPITOR) 40 MG tablet   No No    Take 1 tablet by mouth Every Night.    budesonide (PULMICORT) 0.5 MG/2ML nebulizer solution   Yes No    dilTIAZem CD (CARDIZEM CD) 180 MG 24 hr capsule   No No    Take 1 capsule by mouth Daily.    levothyroxine (SYNTHROID, LEVOTHROID) 75 MCG tablet   No No    Take 1 tablet by mouth Daily.    mirtazapine (REMERON) 15 MG tablet   No No    Take 1 tablet by mouth Every Night.    nystatin (MYCOSTATIN) 199029 UNIT/ML suspension   No No    Swish and spit 5 mL 3 (Three) Times a Day.    pantoprazole (PROTONIX) 40 MG EC tablet   No No    Take 1 tablet by mouth Daily.    predniSONE (DELTASONE) 5 MG tablet   No No    Take 1 tablet by mouth Daily.    ranolazine (RANEXA) 500 MG 12 hr tablet   No No    Take 1 tablet by mouth Every 12 (Twelve) Hours.             ED Medications:    Medications - No data to display    Vital Signs:    Temp:  [97.9 °F (36.6 °C)] 97.9 °F (36.6 °C)  Heart Rate:  [] 87  Resp:  [16] 16  BP: (127-153)/(58-86) 136/72        09/16/20  1614   Weight: 50.8 kg (112 lb)       Body mass index is 23.41 kg/m².    Physical Exam:    General Appearance:  Alert and cooperative, not in any acute distress.   Head:  Atraumatic and normocephalic, without obvious abnormality.   Eyes:          PERRLA,  conjunctivae and sclerae normal, no Icterus. No pallor. Extraocular movements are within normal limits.   Ears:  Ears appear intact with no abnormalities noted.   Throat: No oral lesions, no thrush, oral mucosa moist.   Neck: Supple, trachea midline,    Chest:    Patient has a tunneled dialysis catheter   Lungs:   Chest shape is normal. Breath sounds heard bilaterally equally.  No crackles or wheezing.    Heart:   Irregularly irregular, no murmur   Abdomen:   Normal bowel sounds,  Soft, nontender, nondistended, no guarding, no rebound tenderness.   Extremities:  Right upper extremity weakness, 2/5, normal strength in all other extremities no edema, no cyanosis, no clubbing.  Patent left upper extremity fistula with palpable thrill   Pulses: Pulses palpable and equal bilaterally.   Skin:  Bruising to left upper extremity   Neurologic: Alert and oriented x 3.  Patient is severely dysarthric and difficult to understand.  No facial asymmetry.  Able to follow commands though limited by right upper extremity weakness from prior CVA     Laboratory data:    I have reviewed the labs done in the emergency room.    Results from last 7 days   Lab Units 09/16/20  1832   SODIUM mmol/L 136   POTASSIUM mmol/L 4.1   CHLORIDE mmol/L 96*   CO2 mmol/L 24.0   BUN mg/dL 9   CREATININE mg/dL 1.38*   CALCIUM mg/dL 9.9   BILIRUBIN mg/dL 0.6   ALK PHOS U/L 109   ALT (SGPT) U/L 20   AST (SGOT) U/L 24   GLUCOSE mg/dL 103*     Results from last 7 days   Lab Units 09/16/20  1832   WBC 10*3/mm3 10.00   HEMOGLOBIN g/dL 12.9   HEMATOCRIT % 39.7   PLATELETS 10*3/mm3 288         Results from last 7 days   Lab Units 09/16/20  1832   TROPONIN T ng/mL 0.154*                     Results from last 7 days   Lab Units 09/16/20  1821   COLOR UA  Orange*   GLUCOSE UA  Negative   KETONES UA  Trace*   LEUKOCYTES UA  Moderate (2+)*   PH, URINE  8.5*   BILIRUBIN UA  Negative   UROBILINOGEN UA  1.0 E.U./dL     Pain Management Panel     Pain Management Panel Latest  Ref Rng & Units 2/13/2020 2/13/2020    CREATININE UR mg/dL 43.3 43.7              EKG:      EKG personally reviewed, atrial fibrillation with rate of 101.  Nonspecific T wave changes.    Radiology:    Imaging Results (Last 72 Hours)     Procedure Component Value Units Date/Time    XR Hip With or Without Pelvis 2 - 3 View Left [[id number]] Collected: 09/16/20 1818     Updated: 09/16/20 1819    Narrative:      FINAL REPORT    TECHNIQUE:  2 views of the hip were obtained.    CLINICAL HISTORY:  . fall    FINDINGS:  There is no fracture, dislocation or other acute abnormality of  bone or joint.  There is severe right hip osteophytes.  There is  mild left hip posterior arthritis.  There is extensive vascular  calcification.      Impression:      No acute disease.    Authenticated by Ran Nathan M.D. on 09/16/2020 06:18:26 PM    CT Head Without Contrast [[id number]] Collected: 09/16/20 1704     Updated: 09/16/20 1705    Narrative:      FINAL REPORT    CLINICAL HISTORY:  dizziness    COMPARISON:  April 3, 2020    FINDINGS:  Axial images of the head were obtained without contrast. Coronal  reformatted images were also obtained. This study was performed  with techniques to keep radiation doses as low as reasonably  achievable (ALARA). Individualized dose reduction techniques  using automated exposure control or adjustment of mA and/or kV  according to the patient''s size were employed.  There is  generalized age-appropriate atrophy.  Periventricular  low-attenuation areas are seen consistent with mild chronic  ischemic changes.  There is no evidence of intracranial  hemorrhage or mass.  There is a new low-attenuation area in the  left frontal region measuring approximate 4 cm consistent with  an acute or subacute infarct.   There is no evidence of shift of  the midline structures.  No skull abnormality is seen on the  bone window images.      Impression:      Acute or subacute infarct in the left frontal lobe.  Atrophy and  mild  periventricular chronic ischemic changes.    Authenticated by Ranulfo Dumont III, MD on 09/16/2020  05:04:34 PM    XR Chest 1 View [[id number]] Resulted: 09/16/20 1659     Updated: 09/16/20 1700            Ischemic stroke of frontal lobe (CMS/HCC)      Assessment:    Acute to subacute infarct in the left frontal lobe  Dizziness, orthostatic hypotension versus ischemic stroke/TIA  History of CVA with residual dysphasia/dysarthria and right UE weakness  ESRD on intermittent hemodialysis, Monday Wednesday Friday  Chronic diastolic heart failure  Atrial fibrillation, not on anticoagulation  CAD  Hypertension  Hyperlipidemia  GERD  Impaired functional mobility balance gait and endurance      Plan:    We will admit patient to the hospital with cardiac monitoring.  Neuro checks per nursing.  CT of the head without contrast showed acute to subacute infarct in the left frontal lobe.  Telemetry neurology consulted.  Recommend inpatient admission with further imaging in the a.m.  Will proceed with CTA imaging of head neck.  Cannot obtain MRI due to metal substance in body.  Patient does have CKD.  Will provide 500 cc of normal saline prior to imaging.  Cautious on volume resuscitation due to history of chronic diastolic heart failure.  Continue home aspirin and statin.  Will obtain HbA1c and lipid panel in the a.m.  PT, OT, SLP consult.  N.p.o.    Suspect that patient will be discharged prior to her next dialysis session on Friday.  May need to consult Dr. Tsang if plan of care changes.    Discussed placement with daughter.  States that she plans to take mother home at discharge.  Further orders as clinical course dictates.  Anticipate less than 2 midnight stay.    Advance Care Planning   ACP discussion was declined by the patient. Patient does not have an advance directive, declines further assistance.    Alexsander Moseley,   09/16/20  20:35 EDT    Dictated utilizing Dragon dictation.

## 2020-09-17 NOTE — CONSULTS
"Neuro Progress Note    Patient Name: Pat Morel   MRN: [id number]  Age: 80 y.o.  Sex: female  : 1940    Primary Care Physician: Anthony Sweet DO  Referring Physician:  No ref. provider found    Subjective:    No new focal weakness, No new neurological complines, No seizures or auras or abnormal movements. No headache or neck stiffness. No stroke or TIA symptoms. No visual or language changes. No Neck pain. Neurologically stable. Tolerating medicine without side effects. No family at bedside. No neurological events since last evaluation.      Objective:  Physical Exam:Awake and follow simple command, Track objects all directions. No visual filed cut . Pupils equal an reactive. Right  facial droop. No nystagmus. Slowly Verbal, Power: Move all extremities 5/5 in left 4/5 in right. Did good finger nose finger. Normal sensory examination to light touch. DTR symmetric. no pathological reflexes. Didn't evaluate gait at this time.       /70 (BP Location: Right arm, Patient Position: Lying)   Pulse 117   Temp 97.7 °F (36.5 °C) (Oral)   Resp 18   Ht 147.3 cm (58\")   Wt 50.8 kg (112 lb)   LMP  (LMP Unknown)   SpO2 96%   BMI 23.41 kg/m²     Hospital Meds:  Scheduled- aspirin, 81 mg, Oral, Daily  atorvastatin, 40 mg, Oral, Nightly  budesonide, 0.5 mg, Nebulization, Daily - RT  dilTIAZem CD, 180 mg, Oral, Q24H  enoxaparin, 30 mg, Subcutaneous, Q24H  levothyroxine, 75 mcg, Oral, Daily  mirtazapine, 15 mg, Oral, Nightly  pantoprazole, 40 mg, Oral, Daily  ranolazine, 500 mg, Oral, Q12H      Infusions-     PRNs- •  acetaminophen **OR** acetaminophen **OR** acetaminophen  •  bisacodyl  •  melatonin  •  ondansetron    Results Reviewed:  I have personally reviewed current lab, radiology, and data and agree with results.    Radiology Results  Results for orders placed during the hospital encounter of 20   Adult Transthoracic Echo Complete W/ Cont if Necessary Per Protocol    Addendum 1.  Normal left " ventricular size and systolic function, LVEF 65-70%. 2.  Mild to moderate concentric LVH. 3.  Impaired LV diastolic filling pattern consistent with grade 2  diastolic dysfunction and elevated left atrial pressure. 4.  Mild to moderate right ventricular dilation, with normal RV systolic  function 5.  Moderate to severe bilateral atrial enlargement. 6.  Mild to moderate mitral regurgitation. 7.  Moderate to severe tricuspid regurgitation. 8.  Suspected PFO. 9.  Pulmonary hypertension, with RVSP 60 mmHg.      Yomi Higgins MD 2/10/2020 12:37 PM          Narrative 1.  Normal left ventricular size and systolic function, LVEF 65-70%.  2.  Mild to moderate concentric LVH.  3.  Impaired LV diastolic filling pattern consistent with grade 2   diastolic dysfunction and elevated left atrial pressure.  4.  Normal right ventricular size and systolic function.  5.  Moderate to severe bilateral atrial enlargement.  6.  Mild to moderate mitral regurgitation.  7.  Moderate to severe tricuspid regurgitation.  7.  Suspected PFO.     Results for orders placed during the hospital encounter of 09/16/20   CT Head Without Contrast    Narrative FINAL REPORT    CLINICAL HISTORY:  dizziness    COMPARISON:  April 3, 2020    FINDINGS:  Axial images of the head were obtained without contrast. Coronal  reformatted images were also obtained. This study was performed  with techniques to keep radiation doses as low as reasonably  achievable (ALARA). Individualized dose reduction techniques  using automated exposure control or adjustment of mA and/or kV  according to the patient''s size were employed.  There is  generalized age-appropriate atrophy.  Periventricular  low-attenuation areas are seen consistent with mild chronic  ischemic changes.  There is no evidence of intracranial  hemorrhage or mass.  There is a new low-attenuation area in the  left frontal region measuring approximate 4 cm consistent with  an acute or subacute infarct.   There  is no evidence of shift of  the midline structures.  No skull abnormality is seen on the  bone window images.      Impression Acute or subacute infarct in the left frontal lobe.  Atrophy and  mild periventricular chronic ischemic changes.    Authenticated by Ranulfo Dumont III, MD on 09/16/2020  05:04:34 PM     Results for orders placed during the hospital encounter of 02/06/20   MRI abdomen wo contrast mrcp    Narrative PROCEDURE: MRI ABDOMEN WO CONTRAST MRCP-     HISTORY: Abnormal liver function tests (LFTs)     PROCEDURE: Multiplanar multisequence imaging of the abdomen was  performed without the use of intravenous contrast. 3-D MRCP images were  obtained.     COMPARISON: None.     FINDINGS: The exam is limited secondary to motion artifact. MRCP images  are nondiagnostic. Stones are present within the gallbladder. The liver,  adrenals, kidneys, spleen and pancreas are grossly unremarkable. There  is a large hiatal hernia. There are small effusions a the visualized  lung bases.       Impression Gallstones. The exam is otherwise grossly unremarkable.           This report was finalized on 2/13/2020 1:59 PM by Joaquín Shannon M.D..         Lab Results  Lab Results   Lab Value Date/Time    CHOL 165 09/17/2020 0529    HDL 76 (H) 09/17/2020 0529    LDL 60 09/17/2020 0529    TRIG 144 09/17/2020 0529     Results from last 7 days   Lab Units 09/17/20  0529 09/16/20  1832   WBC 10*3/mm3 8.34 10.00   HEMOGLOBIN g/dL 10.0* 12.9   MCV fL 98.7* 98.5*   PLATELETS 10*3/mm3 226 288   NEUTROPHIL % % 79.7* 82.5*   LYMPHOCYTE % % 9.0* 10.4*   EOSINOPHIL % % 2.9 0.5   IMM GRAN % % 1.3* 1.3*   NEUTROS ABS 10*3/mm3 6.65 8.25*   LYMPHS ABS 10*3/mm3 0.75 1.04   EOS ABS 10*3/mm3 0.24 0.05   IMMATURE GRANS (ABS) 10*3/mm3 0.11* 0.13*     Results from last 7 days   Lab Units 09/17/20  0529   SODIUM mmol/L 137   POTASSIUM mmol/L 4.4   CHLORIDE mmol/L 101   CO2 mmol/L 23.9   BUN mg/dL 16   CREATININE mg/dL 1.87*   GLUCOSE mg/dL 85    CALCIUM mg/dL 8.8             Assessment:  1. Subacute left frontal ischemic stroke.  2. New onset vertigo suspected peripheral.  3. End-stage renal disease.     Plan:  Stat MRI of the brain and MRA of the brain.  Carotid  Dopplers.  Continue aspirin and high does statin.  Consider Meclzine prn.

## 2020-09-18 NOTE — CONSULTS
"Neuro Progress Note    Patient Name: Pat Morel   MRN: [id number]  Age: 80 y.o.  Sex: female  : 1940    Primary Care Physician: Anthony Sweet DO  Referring Physician:  No ref. provider found    Subjective:    No new focal weakness, No new neurological complines, No seizures or auras or abnormal movements. No headache or neck stiffness. No stroke or TIA symptoms. No visual or language changes. No Neck pain. Neurologically stable. Tolerating medicine without side effects. No family at bedside. No neurological events since last evaluation.        Objective:    Physical Exam:  Physical Exam:Awake and follow simple command, Track objects all directions. No visual filed cut . Pupils equal an reactive. Right  facial droop. No nystagmus. Slowly Verbal, Power: Move all extremities 5/5 in left 4/5 in right. Did good finger nose finger. Normal sensory examination to light touch. DTR symmetric. no pathological reflexes. Didn't evaluate gait at this time.  /69 (BP Location: Right arm, Patient Position: Lying)   Pulse 80   Temp 97.8 °F (36.6 °C) (Oral)   Resp 16   Ht 147.3 cm (58\")   Wt 50.8 kg (112 lb)   LMP  (LMP Unknown)   SpO2 96%   BMI 23.41 kg/m²     Hospital Meds:  Scheduled- aspirin, 81 mg, Oral, Daily  atorvastatin, 40 mg, Oral, Nightly  budesonide, 0.5 mg, Nebulization, Daily - RT  dilTIAZem, 30 mg, Oral, Q6H  enoxaparin, 30 mg, Subcutaneous, Q24H  levothyroxine, 75 mcg, Oral, Daily  mirtazapine, 15 mg, Oral, Nightly  pantoprazole, 40 mg, Oral, Daily  predniSONE, 5 mg, Oral, Daily  ranolazine, 500 mg, Oral, Q12H      Infusions-     PRNs- •  acetaminophen **OR** acetaminophen **OR** acetaminophen  •  bisacodyl  •  melatonin  •  ondansetron  •  sodium chloride    Results Reviewed:  I have personally reviewed current lab, radiology, and data and agree with results.    Radiology Results  Results for orders placed during the hospital encounter of 20   Adult Transthoracic Echo Complete W/ " Cont if Necessary Per Protocol    Addendum 1.  Normal left ventricular size and systolic function, LVEF 65-70%. 2.  Mild to moderate concentric LVH. 3.  Impaired LV diastolic filling pattern consistent with grade 2  diastolic dysfunction and elevated left atrial pressure. 4.  Mild to moderate right ventricular dilation, with normal RV systolic  function 5.  Moderate to severe bilateral atrial enlargement. 6.  Mild to moderate mitral regurgitation. 7.  Moderate to severe tricuspid regurgitation. 8.  Suspected PFO. 9.  Pulmonary hypertension, with RVSP 60 mmHg.      Yomi Higgins MD 2/10/2020 12:37 PM          Narrative 1.  Normal left ventricular size and systolic function, LVEF 65-70%.  2.  Mild to moderate concentric LVH.  3.  Impaired LV diastolic filling pattern consistent with grade 2   diastolic dysfunction and elevated left atrial pressure.  4.  Normal right ventricular size and systolic function.  5.  Moderate to severe bilateral atrial enlargement.  6.  Mild to moderate mitral regurgitation.  7.  Moderate to severe tricuspid regurgitation.  7.  Suspected PFO.     Results for orders placed during the hospital encounter of 09/16/20   CT Head Without Contrast    Narrative FINAL REPORT    CLINICAL HISTORY:  dizziness    COMPARISON:  April 3, 2020    FINDINGS:  Axial images of the head were obtained without contrast. Coronal  reformatted images were also obtained. This study was performed  with techniques to keep radiation doses as low as reasonably  achievable (ALARA). Individualized dose reduction techniques  using automated exposure control or adjustment of mA and/or kV  according to the patient''s size were employed.  There is  generalized age-appropriate atrophy.  Periventricular  low-attenuation areas are seen consistent with mild chronic  ischemic changes.  There is no evidence of intracranial  hemorrhage or mass.  There is a new low-attenuation area in the  left frontal region measuring approximate 4  cm consistent with  an acute or subacute infarct.   There is no evidence of shift of  the midline structures.  No skull abnormality is seen on the  bone window images.      Impression Acute or subacute infarct in the left frontal lobe.  Atrophy and  mild periventricular chronic ischemic changes.    Authenticated by Ranulfo Dumont III, MD on 09/16/2020  05:04:34 PM     Results for orders placed during the hospital encounter of 09/16/20   MRI Angiogram Head Without Contrast    Narrative FINAL REPORT    CLINICAL HISTORY:  Stroke MRA BRAIN.    FINDINGS:  The intracranial portions of the internal carotid arteries are  normal as are the anterior and middle cerebral arteries  bilaterally.  There are diminished peripheral left MCA branches  consistent with acute/subacute infarction seen on the MR.  The  intracranial vertebral arteries and the basilar artery and their  major branches are also unremarkable.      Impression Diminished left MCA peripheral branches consistent with  infarction.    Authenticated by Ran Nathan M.D. on 09/17/2020 05:22:26 PM         Lab Results  Lab Results   Lab Value Date/Time    CHOL 165 09/17/2020 0529    HDL 76 (H) 09/17/2020 0529    LDL 60 09/17/2020 0529    TRIG 144 09/17/2020 0529     Results from last 7 days   Lab Units 09/18/20  0522 09/17/20  0529   WBC 10*3/mm3 8.20 8.34   HEMOGLOBIN g/dL 10.3* 10.0*   MCV fL 99.1* 98.7*   PLATELETS 10*3/mm3 228 226   NEUTROPHIL % % 82.5* 79.7*   LYMPHOCYTE % % 7.2* 9.0*   EOSINOPHIL % % 2.7 2.9   IMM GRAN % % 0.6* 1.3*   NEUTROS ABS 10*3/mm3 6.77 6.65   LYMPHS ABS 10*3/mm3 0.59* 0.75   EOS ABS 10*3/mm3 0.22 0.24   IMMATURE GRANS (ABS) 10*3/mm3 0.05 0.11*     Results from last 7 days   Lab Units 09/18/20  0522   SODIUM mmol/L 138   POTASSIUM mmol/L 3.4*   CHLORIDE mmol/L 104   CO2 mmol/L 22.8   BUN mg/dL 26*   CREATININE mg/dL 2.64*   GLUCOSE mg/dL 115*   CALCIUM mg/dL 9.1             Assessment:  1. Subacute left frontal ischemic stroke.  2. New onset  vertigo suspected peripheral.  3. End-stage renal disease.  4.Atrial fibrillation.     Plan:  MRI of the brain and MRA of the brain, Noted .  Carotid  Dopplers, can be done as out pt.  Continue Antiplatelet therapy and high does statin.  Consider Meclzine prn.  Cardiology input appreciated regarding anticoagulation treatment, not good candidate secondly to G.I. bleed and frequent falls.  No further recommendation needed at this time, please contact neurology for any further question, patient need to follow-up is outpatient in one to two weeks..

## 2020-09-18 NOTE — PROGRESS NOTES
[facility]    Name:  Pat Morel   Age:  80 y.o.  Sex:  female  :  1940  MRN:  [id number]   Visit Number:  [id number]  Admission Date:  2020  Date Of Service:  20  Primary Care Physician:  Anthony Sweet DO     LOS: 0 days :  Patient Care Team:  Anthony Sweet DO as PCP - General (Family Medicine)  Tien Archer MD as Consulting Physician (Cardiac Electrophysiology)  Xu Block MD (Cardiology)  Yomi Higgins MD as Consulting Physician (Cardiology):    Chief Complaint:      Inaudible sounds due to dysarthria    Subjective / Interval History:     History from patient's daughter at bedside. Patient has been ore lethargic, weak and slightly confused from baseline. She is not able to do as well as previous with attempting ambulation. She is also sp HD this afternoon.    Review of Systems:     General ROS: Patient denies any fevers, chills or loss of consciousness.  Respiratory ROS: Denies cough or shortness of breath.  Cardiovascular ROS: Denies chest pain or palpitations. No history of exertional chest pain.  Gastrointestinal ROS: Denies nausea and vomiting. Denies any abdominal pain. No diarrhea.  Neurological ROS: Denies any focal weakness. No loss of consciousness. Denies any numbness.  Dermatological ROS: Denies any redness or pruritis.    Vital Signs:    Temp:  [96.7 °F (35.9 °C)-98.5 °F (36.9 °C)] 98.3 °F (36.8 °C)  Heart Rate:  [] 92  Resp:  [16-21] 18  BP: (121-142)/(52-90) 121/52    Intake and output:    I/O last 3 completed shifts:  In: 1220 [P.O.:720; IV Piggyback:500]  Out: 1500 [Other:1500]  No intake/output data recorded.    Physical Examination:    General Appearance:  Alert and awake, not in any acute distress.   Head:  Atraumatic and normocephalic, without obvious abnormality.   Eyes:          PERRLA, conjunctivae and sclerae normal, no Icterus. No pallor. Extraocular movements are within normal limits.    Neck: Supple, trachea midline, no thyromegaly, no carotid bruit.   Lungs:   Chest shape is normal. Breath sounds heard bilaterally equally.  No crackles or wheezing. No pleural rub or bronchial breathing.   Heart:  Normal S1 and S2, no murmur, no gallop, no rub. No JVD   Abdomen:   Normal bowel sounds, no masses, no organomegaly. Soft, nontender, nondistended, no guarding, no rebound tenderness.   Extremities: Moves all extremities, no edema, no cyanosis, no clubbing.   Skin: No bleeding, bruising or rash.   Neurologic: Not oriented to person,place or time. Dysarthria severe.     Laboratory results:    Results from last 7 days   Lab Units 09/18/20  0522 09/17/20  0529 09/16/20  1832   SODIUM mmol/L 138 137 136   POTASSIUM mmol/L 3.4* 4.4 4.1   CHLORIDE mmol/L 104 101 96*   CO2 mmol/L 22.8 23.9 24.0   BUN mg/dL 26* 16 9   CREATININE mg/dL 2.64* 1.87* 1.38*   CALCIUM mg/dL 9.1 8.8 9.9   BILIRUBIN mg/dL  --   --  0.6   ALK PHOS U/L  --   --  109   ALT (SGPT) U/L  --   --  20   AST (SGOT) U/L  --   --  24   GLUCOSE mg/dL 115* 85 103*     Results from last 7 days   Lab Units 09/18/20  0522 09/17/20  0529 09/16/20  1832   WBC 10*3/mm3 8.20 8.34 10.00   HEMOGLOBIN g/dL 10.3* 10.0* 12.9   HEMATOCRIT % 32.2* 30.7* 39.7   PLATELETS 10*3/mm3 228 226 288         Results from last 7 days   Lab Units 09/17/20  0015 09/16/20  2143 09/16/20  1832   TROPONIN T ng/mL 0.101* 0.116* 0.154*     Results from last 7 days   Lab Units 09/16/20  1821   URINECX  No growth           I have reviewed the patient's laboratory results.    Radiology results:    Imaging Results (Last 24 Hours)     ** No results found for the last 24 hours. **          I have reviewed the patient's radiology reports.    Medication Review:     I have reviewed the patient's active and prn medications.     Assessment:        Ischemic stroke of frontal lobe (CMS/HCC)  Acute vs subacute CVA  Acute metabolic Encephalopathy  Falls at home  ESRD   Afib RVR not on  anticoagulation due to prior bleed  Debility  Dysarthria    Plan:    Lethargic today with mild encephalopathy,likely metabolic type. Received hemodialysis. Warrants another night in hospital due to risk of advancing CVA, needs monitored prior to DC. PT/OT following as well. Discussing risk benefits of anticoagulation with patient and daughter.    Libertad Bishop MD  09/18/20  19:46 EDT    Dictated utilizing Dragon dictation.

## 2020-09-18 NOTE — THERAPY TREATMENT NOTE
Patient unavailable for PT d/t being in dialysis.  PT will follow up with patient tomorrow and progress activity as tolerated and appropriate.

## 2020-09-19 NOTE — PROGRESS NOTES
Discharge Planning Assessment   Jose     Patient Name: Pat Morel  MRN: [id number]  Today's Date: 9/19/2020    Admit Date: 9/16/2020    Discharge Needs Assessment    No documentation.       Discharge Plan     Row Name 09/19/20 3001       Plan    Plan  Orders for discharge home with resumption of HH with [facility]  noted   Pt signed IMM and  voiced she was ready to go home   Family member at bedside to provide transportation        Continued Care and Services - Admitted Since 9/16/2020    Coordination has not been started for this encounter.     Selected Continued Care - Prior Encounters Includes selections from prior encounters from 6/18/2020 to 9/19/2020    Discharged on 8/5/2020 Admission date: 7/31/2020 - Discharge disposition: Home-Health Care Svc    Dialysis/Infusion     Service Provider Selected Services Address Phone Fax    [facility] [place]  [facility] [address] , [place] [facility] [address] [phone] --          Home Medical Care     Service Provider Selected Services Address Phone Fax    [facility]  [facility] [address] [address] [phone]                    Expected Discharge Date and Time     Expected Discharge Date Expected Discharge Time    Sep 19, 2020         Demographic Summary    No documentation.       Functional Status    No documentation.       Psychosocial    No documentation.       Abuse/Neglect    No documentation.       Legal    No documentation.       Substance Abuse    No documentation.       Patient Forms    No documentation.           Dona Godoy, RN

## 2020-09-19 NOTE — DISCHARGE SUMMARY
[facility]   DISCHARGE SUMMARY      Name:  Pat Morel   Age:  80 y.o.  Sex:  female  :  1940  MRN:  [id number]   Visit Number:  [id number]    Admission Date:  2020  Date of Discharge:  2020  Primary Care Physician:  Anthony Sweet DO    Important issues to note:    Patient unable to tolerate anticoagulation due to prior history of GI bleeding.  Acute on acute CVA with persistent dysarthria    Discharge Diagnoses:        Acute but likely subacute infarct in the left frontal lobe, with history of 2020 Left MCA stroke.    Dizziness, orthostatic hypotension versus ischemic stroke/TIA  History of CVA with residual dysphasia/dysarthria and right UE weakness   Moderate to severe tricuspid regurgitation  Pulmonary hypertension  ESRD on intermittent hemodialysis,   Chronic diastolic heart failure  Atrial fibrillation, not on anticoagulation due to history of GI bleed and high risk of falls  CAD  Hypertension  Hyperlipidemia  GERD  Impaired functional mobility balance gait and endurance  Poor oral intake with mild dehydration prior to admission    Problem List:       Ischemic stroke of frontal lobe (CMS/HCC)      Presenting Problem:    Ischemic stroke of frontal lobe (CMS/HCC) [I63.9]  Ischemic stroke of frontal lobe (CMS/HCC) [I63.9]     Consults:     Consults     Date and Time Order Name Status Description    2020 1825 Inpatient Nephrology Consult Completed     2020 1244 Inpatient Cardiology Consult Completed         Consulting Physician(s)     Provider Relationship Specialty    Sanford Tsang MD, EVELINA Consulting Physician Nephrology    Yomi Higgins MD Consulting Physician Cardiology          Procedures Performed:           History of presenting illness:    This is a 80-year-old female lives at home with family support she was taking aspirin and had a recent history of CVA of the left involving left MCA as of   2020.  She has a known history of atrial fibrillation and is usually rate controlled.  In October 2019 she had work-up for anemia and GI bleed and was taken off anticoagulation she is also high fall risk.  She was admitted after she had what was suspected to be neurological changes consistent with dysarthria and weakness of the upper extremity on the right.  She was consulted on by cardiology and neurology here.  As well as nephrology.  Patient is end-stage renal disease patient continues to receive hemodialysis.  Hospital Course:    During this hospital course we contemplated restarting her anticoagulation.  But given her risk for falls and a prior bleeding risk versus the risk for stroke.  It was felt that the stroke is likely subacute but acute on superimposed subacute CVA cannot be ruled out.  The patient required PT evaluation here.  She lives at home and has supplies needed from where she was recently discharged for stroke.  She has no acute needs.  She be discharged home with home health PT.  He is on hemodialysis Monday Wednesday Friday and this will be resumed in the outpatient setting.     Vital Signs:    Temp:  [97.5 °F (36.4 °C)-98.4 °F (36.9 °C)] 97.5 °F (36.4 °C)  Heart Rate:  [] 75  Resp:  [16-18] 16  BP: (115-127)/(52-71) 115/61    Physical Exam:    General Appearance:  Alert and cooperative, not in any acute distress.   Head:  Atraumatic and normocephalic, without obvious abnormality.   Eyes:          PERRLA, conjunctivae and sclerae normal, no Icterus. No pallor. Extraocular movements are within normal limits.  Dysarthria noted   Ears:  Ears appear intact with no abnormalities noted.   Throat: No oral lesions, no thrush, oral mucosa moist.   Neck: Supple, trachea midline, no thyromegaly, no carotid bruit.   Back:   No kyphoscoliosis present. No tenderness to palpation,   range of motion normal.   Lungs:   Chest shape is normal. Breath sounds heard bilaterally equally.  No crackles or  wheezing. No Pleural rub or bronchial breathing.   Heart:   Irregular rate and rhythm   Abdomen:   Normal bowel sounds, no masses, no organomegaly. Soft, non-tender, non-distended, no guarding, no rebound tenderness.   Extremities:  right upper extremity weakness.   Pulses: Pulses palpable and equal bilaterally.   Skin: No bleeding, bruising or rash.   Neurologic: Alert and oriented x 3.  Right upper extremity weakness noted, dysarthria noted     Pertinent Lab Results:     Results from last 7 days   Lab Units 09/19/20  0721 09/18/20  0522 09/17/20  0529 09/16/20  1832   SODIUM mmol/L 132* 138 137 136   POTASSIUM mmol/L 4.2 3.4* 4.4 4.1   CHLORIDE mmol/L 99 104 101 96*   CO2 mmol/L 21.8* 22.8 23.9 24.0   BUN mg/dL 16 26* 16 9   CREATININE mg/dL 2.17* 2.64* 1.87* 1.38*   CALCIUM mg/dL 8.9 9.1 8.8 9.9   BILIRUBIN mg/dL  --   --   --  0.6   ALK PHOS U/L  --   --   --  109   ALT (SGPT) U/L  --   --   --  20   AST (SGOT) U/L  --   --   --  24   GLUCOSE mg/dL 97 115* 85 103*     Results from last 7 days   Lab Units 09/19/20  0721 09/18/20  0522 09/17/20  0529   WBC 10*3/mm3 9.15 8.20 8.34   HEMOGLOBIN g/dL 11.1* 10.3* 10.0*   HEMATOCRIT % 34.1 32.2* 30.7*   PLATELETS 10*3/mm3 206 228 226         Results from last 7 days   Lab Units 09/17/20  0015 09/16/20  2143 09/16/20  1832   TROPONIN T ng/mL 0.101* 0.116* 0.154*                     Results from last 7 days   Lab Units 09/16/20  1821   COLOR UA  Orange*   GLUCOSE UA  Negative   KETONES UA  Trace*   LEUKOCYTES UA  Moderate (2+)*   PH, URINE  8.5*   BILIRUBIN UA  Negative   UROBILINOGEN UA  1.0 E.U./dL     Pain Management Panel     Pain Management Panel Latest Ref Rng & Units 2/13/2020 2/13/2020    CREATININE UR mg/dL 43.3 43.7        Results from last 7 days   Lab Units 09/16/20  1821   URINECX  No growth       Pertinent Radiology Results:    Imaging Results (All)     Procedure Component Value Units Date/Time    MRI Angiogram Head Without Contrast [[id number]] Collected:  09/17/20 1722     Updated: 09/17/20 1723    Narrative:      FINAL REPORT    CLINICAL HISTORY:  Stroke MRA BRAIN.    FINDINGS:  The intracranial portions of the internal carotid arteries are  normal as are the anterior and middle cerebral arteries  bilaterally.  There are diminished peripheral left MCA branches  consistent with acute/subacute infarction seen on the MR.  The  intracranial vertebral arteries and the basilar artery and their  major branches are also unremarkable.      Impression:      Diminished left MCA peripheral branches consistent with  infarction.    Authenticated by Ran Nathan M.D. on 09/17/2020 05:22:26 PM    MRI Brain Without Contrast [[id number]] Collected: 09/17/20 1722     Updated: 09/17/20 1723    Narrative:      FINAL REPORT    TECHNIQUE:  Multiplanar imaging was performed of the brain without  gadolinium infusion.    CLINICAL HISTORY:  . STROKE.    FINDINGS:  Diffusion-weighted images show restricted diffusion in the left  frontoparietal region consistent with an acute infarction in the  MCA territory. The ventricles are mildly dilated, but  proportionate to the degree of generalized atrophy.  Periventricular and deep white matter signal abnormality is  consistent with changes of chronic small vessel ischemia. There  is no mass or shift of midline structures. There is no  intracranial hemorrhage. No significant sinus or osseous  abnormality is seen.      Impression:      Acute/subacute infarction in the left frontoparietal region.    Authenticated by Ran Nathan M.D. on 09/17/2020 05:22:25 PM    US Carotid Bilateral [[id number]] Collected: 09/17/20 1128     Updated: 09/17/20 1131    Narrative:      PROCEDURE: US CAROTID BILATERAL-     HISTORY: tia; I63.9-Cerebral infarction, unspecified; R42-Dizziness and  giddiness; M25.552-Pain in left hip; I48.91-Unspecified atrial  fibrillation     Technique: Real-time imaging was performed of the extracranial carotid  arteries in transverse and  longitudinal planes, with color duplex  evaluation of blood flow velocity. Spectral analysis was performed. The  cervicovertebral arteries were also examined. Stenosis evaluation is  based on validated velocity criteria.     Right carotid system:  CCA: 78 cm/s  ICA: 76 cm/s  ECA: 105 cm/s  Vertebral artery: Antegrade  ICA/CCA ratio: 0.98  Mild plaque is identified at the bifurcation.     Left carotid system:  CCA: 78 cm/s  ICA: 107 cm/s  ECA: 88 cm/s  Vertebral artery: Antegrade  ICA/CCA ratio: 1.38  Mild plaque is identified at the bifurcation.          Impression:      Mild plaque within the carotid bulbs with no hemodynamically significant  stenosis.     This report was finalized on 9/17/2020 11:29 AM by Joaquín Shannon M.D..    XR Chest 1 View [[id number]] Collected: 09/17/20 1019     Updated: 09/17/20 1036    Narrative:      PROCEDURE: XR CHEST 1 VW-     HISTORY: fall, dizziness     COMPARISON: June 2020.     FINDINGS: The heart is enlarged. There is a right IJ dialysis catheter  present. The patient is status post median sternotomy for CABG. There is  mild pulmonary edema. There is no pneumothorax.  There are no acute  osseous abnormalities. There are large right upper quadrant  calcifications likely due to gallstones.       Impression:      Mild pulmonary edema.     Probable cholelithiasis.              Images were reviewed, interpreted, and dictated by Dr. Joaquín Shannon M.D.  Transcribed by Thea Sage PA-C.     This report was finalized on 9/17/2020 10:34 AM by Joaquín Shannon M.D..    XR Hip With or Without Pelvis 2 - 3 View Left [[id number]] Collected: 09/16/20 1818     Updated: 09/16/20 1819    Narrative:      FINAL REPORT    TECHNIQUE:  2 views of the hip were obtained.    CLINICAL HISTORY:  . fall    FINDINGS:  There is no fracture, dislocation or other acute abnormality of  bone or joint.  There is severe right hip osteophytes.  There is  mild left hip posterior arthritis.  There is  extensive vascular  calcification.      Impression:      No acute disease.    Authenticated by Ran Nathan M.D. on 09/16/2020 06:18:26 PM    CT Head Without Contrast [[id number]] Collected: 09/16/20 1704     Updated: 09/16/20 1705    Narrative:      FINAL REPORT    CLINICAL HISTORY:  dizziness    COMPARISON:  April 3, 2020    FINDINGS:  Axial images of the head were obtained without contrast. Coronal  reformatted images were also obtained. This study was performed  with techniques to keep radiation doses as low as reasonably  achievable (ALARA). Individualized dose reduction techniques  using automated exposure control or adjustment of mA and/or kV  according to the patient''s size were employed.  There is  generalized age-appropriate atrophy.  Periventricular  low-attenuation areas are seen consistent with mild chronic  ischemic changes.  There is no evidence of intracranial  hemorrhage or mass.  There is a new low-attenuation area in the  left frontal region measuring approximate 4 cm consistent with  an acute or subacute infarct.   There is no evidence of shift of  the midline structures.  No skull abnormality is seen on the  bone window images.      Impression:      Acute or subacute infarct in the left frontal lobe.  Atrophy and  mild periventricular chronic ischemic changes.    Authenticated by Ranulfo Dumont III, MD on 09/16/2020  05:04:34 PM          Echo:    Results for orders placed during the hospital encounter of 02/06/20   Adult Transthoracic Echo Complete W/ Cont if Necessary Per Protocol    Addendum 1.  Normal left ventricular size and systolic function, LVEF 65-70%. 2.  Mild to moderate concentric LVH. 3.  Impaired LV diastolic filling pattern consistent with grade 2  diastolic dysfunction and elevated left atrial pressure. 4.  Mild to moderate right ventricular dilation, with normal RV systolic  function 5.  Moderate to severe bilateral atrial enlargement. 6.  Mild to moderate mitral regurgitation.  7.  Moderate to severe tricuspid regurgitation. 8.  Suspected PFO. 9.  Pulmonary hypertension, with RVSP 60 mmHg.      Yomi Higgins MD 2/10/2020 12:37 PM          Narrative 1.  Normal left ventricular size and systolic function, LVEF 65-70%.  2.  Mild to moderate concentric LVH.  3.  Impaired LV diastolic filling pattern consistent with grade 2   diastolic dysfunction and elevated left atrial pressure.  4.  Normal right ventricular size and systolic function.  5.  Moderate to severe bilateral atrial enlargement.  6.  Mild to moderate mitral regurgitation.  7.  Moderate to severe tricuspid regurgitation.  7.  Suspected PFO.       Condition on Discharge:      Stable.    Code status during the hospital stay:    Code Status and Medical Interventions:   Ordered at: 09/16/20 2114     Limited Support to NOT Include:    Intubation    Cardioversion/Defibrillation     Code Status:    No CPR     Medical Interventions (Level of Support Prior to Arrest):    Limited       Discharge Disposition:        Discharge Medications:       Discharge Medications      Continue These Medications      Instructions Start Date   aspirin 81 MG EC tablet   81 mg, Oral, Daily      atorvastatin 40 MG tablet  Commonly known as: LIPITOR   40 mg, Oral, Nightly      budesonide 0.5 MG/2ML nebulizer solution  Commonly known as: PULMICORT   No dose, route, or frequency recorded.      dilTIAZem  MG 24 hr capsule  Commonly known as: CARDIZEM CD   180 mg, Oral, Every 24 Hours Scheduled      levothyroxine 75 MCG tablet  Commonly known as: SYNTHROID, LEVOTHROID   75 mcg, Oral, Daily      mirtazapine 15 MG tablet  Commonly known as: REMERON   15 mg, Oral, Nightly      nystatin 864716 UNIT/ML suspension  Commonly known as: MYCOSTATIN   500,000 Units, Swish & Spit, 3 Times Daily      pantoprazole 40 MG EC tablet  Commonly known as: PROTONIX   40 mg, Oral, Daily      predniSONE 5 MG tablet  Commonly known as: DELTASONE   5 mg, Oral, Daily       ranolazine 500 MG 12 hr tablet  Commonly known as: RANEXA   500 mg, Oral, Every 12 Hours Scheduled             Discharge Diet:     Puréed one-to-one feeds aspiration precautions  Activity at Discharge:     With assist devices and with assistance due to risk for falls    Follow-up Appointments:    Follow-up Information     Sanford Tsang MD, JULIANN Follow up in 1 week(s).    Specialties: Nephrology, Hospitalist  Contact information:  [facility] [place] [address]  [phone]             Anthony Sweet,  Follow up in 1 week(s).    Specialty: Family Medicine  Contact information:  [address] [place] [address]  [phone]                   Future Appointments   Date Time Provider Department Center   9/29/2020  2:00 PM Anthony Sweet DO [facility] None   10/13/2020 11:00 AM Anthony Sweet DO [facility] None   10/13/2020  1:30 PM Yomi Higgins MD [facility]   12/10/2020  2:30 PM Marjorie Polo APRN [facility]           Test Results Pending at Discharge:           Libertad Bishop MD  09/19/20  13:01 EDT    Time: I spent greater than 30 minutes minutes on this discharge activity which included: face-to-face encounter with the patient, reviewing the data in the system, coordination of the care with the nursing staff as well as consultants, documentation, and entering orders.     Dictated utilizing Dragon dictation.

## 2020-09-19 NOTE — THERAPY TREATMENT NOTE
Acute Care - Physical Therapy Treatment Note  [facility]     Patient Name: Pat Morel  : 1940  MRN: [id number]  Today's Date: 2020           PT Assessment (last 12 hours)      PT Evaluation and Treatment     Row Name 20 1317          Physical Therapy Time and Intention    Subjective Information  no complaints  -TW     Document Type  therapy note (daily note)  -TW     Mode of Treatment  physical therapy  -TW     Patient Effort  adequate  -TW     Symptoms Noted During/After Treatment  fatigue  -TW     Row Name 20 1317          General Information    Patient Profile Reviewed  yes  -TW     Patient Observations  alert;cooperative  -TW     Patient/Family/Caregiver Comments/Observations  Pt received supine in bed on room air dauther present  -TW     Row Name 20 1317          Pain Scale: Numbers Pre/Post-Treatment    Pretreatment Pain Rating  0/10 - no pain  -TW     Posttreatment Pain Rating  0/10 - no pain  -TW     Row Name 20 1317          Bed Mobility    Bed Mobility  supine-sit  -TW     Supine-Sit Phillips (Bed Mobility)  contact guard;verbal cues  -TW     Bed Mobility, Safety Issues  decreased use of arms for pushing/pulling  -TW     Assistive Device (Bed Mobility)  head of bed elevated  -TW     Row Name 20 1317          Transfers    Transfers  sit-stand transfer;stand-sit transfer  -TW     Sit-Stand Phillips (Transfers)  contact guard;verbal cues  -TW     Stand-Sit Phillips (Transfers)  contact guard;verbal cues  -TW     Row Name 20 1317          Sit-Stand Transfer    Assistive Device (Sit-Stand Transfers)  walker, front-wheeled  -TW     Row Name 20 1317          Stand-Sit Transfer    Assistive Device (Stand-Sit Transfers)  walker, front-wheeled  -TW     Row Name 20 131          Gait/Stairs (Locomotion)    Phillips Level (Gait)  contact guard;verbal cues  -TW     Assistive Device (Gait)  walker, front-wheeled  -     Distance in Feet  (Gait)  38 ft   -TW     Pattern (Gait)  step-to;step-through  -TW     Deviations/Abnormal Patterns (Gait)  base of support, narrow;chivo decreased  -TW     Bilateral Gait Deviations  heel strike decreased;forward flexed posture  -TW     Comment (Gait/Stairs)  The height of walker lowered for pt and she stated it felt it was a better height for ambulation  -TW     Row Name 09/19/20 1317          Safety Issues, Functional Mobility    Safety Issues Affecting Function (Mobility)  safety precaution awareness;safety precautions follow-through/compliance  -TW     Impairments Affecting Function (Mobility)  balance;cognition;endurance/activity tolerance;postural/trunk control;strength  -TW     Row Name 09/19/20 1317          Balance    Static Standing Balance  mild impairment  -TW     Dynamic Standing Balance  mild impairment  -TW     Row Name 09/19/20 1317          Motor Skills    Therapeutic Exercise  knee;hip  -TW     Row Name 09/19/20 1317          Hip (Therapeutic Exercise)    Hip (Therapeutic Exercise)  strengthening exercise;isometric exercises  -TW     Hip Isometrics (Therapeutic Exercise)  gluteal sets;10 repetitions;3 second hold hip add sets with pillow  -TW     Hip Strengthening (Therapeutic Exercise)  marching while seated;heel slides;10 repetitions;sitting reclined in chair  -TW     Row Name 09/19/20 1317          Knee (Therapeutic Exercise)    Knee (Therapeutic Exercise)  strengthening exercise;isometric exercises  -TW     Knee Isometrics (Therapeutic Exercise)  quad sets;10 repetitions;3 second hold  -TW     Knee Strengthening (Therapeutic Exercise)  LAQ (long arc quad);10 repetitions;sitting  -TW     Row Name             Wound 09/16/20 2032 Right posterior elbow Skin Tear    Wound - Properties Group Placement Date: 09/16/20  -RE Placement Time: 2032  -RE Present on Hospital Admission: Y  -RE Side: Right  -RE Orientation: posterior  -RE Location: elbow  -RE Primary Wound Type: Skin tear  -RE    Retired  Wound - Properties Group Date first assessed: 09/16/20  -RE Time first assessed: 2032  -RE Present on Hospital Admission: Y  -RE Side: Right  -RE Location: elbow  -RE Primary Wound Type: Skin tear  -RE    Row Name             Wound 09/16/20 2031 Right upper arm Other (comment)    Wound - Properties Group Placement Date: 09/16/20  -RE Placement Time: 2031  -RE Present on Hospital Admission: Y  -RE Side: Right  -RE Orientation: upper  -RE Location: arm  -RE Primary Wound Type: Other  -RE, BRUISE, FLUID FILLED BLISTERS     Retired Wound - Properties Group Date first assessed: 09/16/20  -RE Time first assessed: 2031  -RE Present on Hospital Admission: Y  -RE Side: Right  -RE Location: arm  -RE Primary Wound Type: Other  -RE, BRUISE, FLUID FILLED BLISTERS     Row Name 09/19/20 1317          Coping    Observed Emotional State  pleasant;cooperative  -TW     Verbalized Emotional State  acceptance  -TW     Trust Relationship/Rapport  choices provided;questions encouraged;questions answered  -TW     Family/Support Persons  daughter  -TW     Involvement in Care  supportive of patient  -TW     Family/Support System Care  support provided  -TW     Row Name 09/19/20 1317          Plan of Care Review    Plan of Care Reviewed With  patient;daughter  -TW     Progress  improving  -TW     Outcome Summary  PT treatment completed this pm with pt showing improvement in her ambulation, needing CGA for 38 ft using rolling walker and for transfers, needing CGA . Pt also continued with LE ther ex per flowsheet. Pt's daughter states that pt looks much stronger this pm. Will continue current PT POC.   -TW     Row Name 09/19/20 1317          Vital Signs    Pretreatment Heart Rate (beats/min)  82  -TW     Posttreatment Heart Rate (beats/min)  86  -TW     Pre SpO2 (%)  96  -TW     O2 Delivery Pre Treatment  room air  -TW     Post SpO2 (%)  97  -TW     O2 Delivery Post Treatment  room air  -TW     Row Name 09/19/20 1317          Bed Mobility Goal  1 (PT)    Progress/Outcomes (Bed Mobility Goal 1, PT)  goal partially met;goal ongoing  -     Row Name 09/19/20 1317          Transfer Goal 1 (PT)    Progress/Outcome (Transfer Goal 1, PT)  goal partially met  -     Row Name 09/19/20 1317          Gait Training Goal 1 (PT)    Time Frame (Gait Training Goal 1, PT)  -- goal partially met  -     Row Name 09/19/20 1317          Patient Education Goal (PT)    Progress/Outcome (Patient Education Goal, PT)  goal ongoing  -     Row Name 09/19/20 1317          Positioning and Restraints    Pre-Treatment Position  in bed  -TW     Post Treatment Position  chair  -TW     In Chair  reclined;call light within reach;encouraged to call for assist;exit alarm on;with family/caregiver  -       User Key  (r) = Recorded By, (t) = Taken By, (c) = Cosigned By    Initials Name Provider Type    Noemí Feng RN Registered Nurse    Trudy Lizararga, PT Physical Therapist        Physical Therapy Education                 Title: PT OT SLP Therapies (Not Started)     Topic: Physical Therapy (In Progress)     Point: Mobility training (Done)     Learning Progress Summary           Patient Acceptance, E,D, DU by  at 9/19/2020 1317    Comment: Pt education for use of rolling walker for transfers and gait. Pt education also for LE ther ex technique    Acceptance, E,TB, VU by  at 9/17/2020 1152    Comment: Purpose of PT/POC.   Family Acceptance, E,D, DU by  at 9/19/2020 1317    Comment: Pt education for use of rolling walker for transfers and gait. Pt education also for LE ther ex technique                   Point: Home exercise program (Done)     Learning Progress Summary           Patient Acceptance, E,D, DU by TW at 9/19/2020 1317    Comment: Pt education for use of rolling walker for transfers and gait. Pt education also for LE ther ex technique    Acceptance, E,TB, VU by  at 9/17/2020 1152    Comment: Purpose of PT/POC.   Family Acceptance, E,D, DU by  at 9/19/2020 1317     Comment: Pt education for use of rolling walker for transfers and gait. Pt education also for LE ther ex technique                   Point: Body mechanics (Not Started)     Learner Progress:  Not documented in this visit.          Point: Precautions (Done)     Learning Progress Summary           Patient Acceptance, E,TB, VU by  at 9/17/2020 1152    Comment: Purpose of PT/POC.                               User Key     Initials Effective Dates Name Provider Type Discipline     04/03/18 -  Lupe Baugh, PT Physical Therapist PT     03/26/19 -  Trudy Hyde PT Physical Therapist PT              PT Recommendation and Plan     Plan of Care Reviewed With: patient, daughter  Progress: improving  Outcome Summary: PT treatment completed this pm with pt showing improvement in her ambulation, needing CGA for 38 ft using rolling walker and for transfers, needing CGA . Pt also continued with LE ther ex per flowsheet. Pt's daughter states that pt looks much stronger this pm. Will continue current PT POC.        Time Calculation:   PT Charges     Row Name 09/19/20 1317             Time Calculation    Start Time  1250  -TW      Stop Time  1317  -TW      Time Calculation (min)  27 min  -TW      PT Received On  09/19/20  -TW         Timed Charges    84079 - Gait Training Minutes   15  -TW      69479 - PT Therapeutic Activity Minutes  12  -TW        User Key  (r) = Recorded By, (t) = Taken By, (c) = Cosigned By    Initials Name Provider Type     Trudy Hyde PT Physical Therapist        Therapy Charges for Today     Code Description Service Date Service Provider Modifiers Qty    10506704432 HC GAIT TRAINING EA 15 MIN 9/19/2020 Trudy Hyde, PT GP 1    02570085831 HC PT THERAPEUTIC ACT EA 15 MIN 9/19/2020 Trudy Hyde PT GP 1          PT G-Codes  Outcome Measure Options: AM-PAC 6 Clicks Basic Mobility (PT)  AM-PAC 6 Clicks Score (PT): 18  AM-PAC 6 Clicks Score (OT): 15    Trudy Hyde PT  9/19/2020

## 2020-09-19 NOTE — PAYOR COMM NOTE
"Please review for inpatient auth  Reference number [id number]  [place]   [phone],  Fax [phone]    Vilma Morel (80 y.o. Female)     Date of Birth Social Security Number Address Home Phone MRN    1940  [address]  [facility] [id number] [phone] [id number]    [place] Marital Status          Episcopal        Admission Date Admission Type Admitting Provider Attending Provider Department, Room/Bed    20 Emergency Libertad Bishop MD Lum, Nina Ngwa, MD [facility] MED SURG  3, /1    Discharge Date Discharge Disposition Discharge Destination                       Attending Provider: Libertad Bishop MD    Allergies: Tuberculin Tests    Isolation: None   Infection: None   Code Status: No CPR    Ht: 147.3 cm (58\")   Wt: 50.8 kg (112 lb)    Admission Cmt: None   Principal Problem: None                Active Insurance as of 2020     Primary Coverage     Payor Plan Insurance Group Employer/Plan Group    HUMANA MEDICARE REPLACEMENT HUMANA MEDICARE REPLACEMENT [id number]     Payor Plan Address Payor Plan Phone Number Payor Plan Fax Number Effective Dates    [address] [address] [phone]  2018 - None Entered    [facility] [id number]       Subscriber Name Subscriber Birth Date Member ID       VILMA MOREL 1940 [id number]                 Emergency Contacts      (Rel.) Home Phone Work Phone Mobile Phone    Yary Meade (Daughter) -- -- [phone]    ALISONEDMOND (Son) -- -- [phone]    Dell Morel (Spouse) -- -- [phone]               History & Physical      Alexsander Moseley DO at 20              [facility]      Name:  Vilma Morel   Age:  80 y.o.  Sex:  female  :  1940  MRN:  [id number]   Visit Number:  [id number]  Admission Date:  2020  Date Of Service:  20  Primary Care Physician:  Anthony Sweet DO    Chief Complaint:     Dizziness " with fall    History Of Presenting Illness:      Patient is a chronically ill 80-year-old female with history significant for end-stage renal disease on intermittent hemodialysis, A. fib not anticoagulated, history of CVA with severe dysarthria and right upper extremity weakness, CAD, hypertension and hyperlipidemia who presents to the hospital from home with complaints of dizziness and mechanical fall.  Patient is able to provide history though due to her difficult speech history is largely been provided by her daughter who is present at bedside.  She tells me that she did receive her routine dialysis today.  She is normally very weak and hypotensive after her sessions.  Patient had come home and had stood up from the couch and ambulating with her walker.  She was trailed by her home health care nurse.  Patient thought her nurse was talking to her so she quickly turned around to look behind her, suddenly became dizzy/lightheaded and lost her footing.  Patient fell on her left hip.  She denies head injury or loss of consciousness.  She denies current dizziness/lightheadedness.  X-ray of hip in the ER showed no acute disease.  CT of the head without contrast showed an acute versus subacute infarct in the left frontal lobe.  Patient's previous CVA was in July 2020.  CTA at that time showed ischemic CVA of the right M1 vessel.  Telemetry neurology was consulted by the ER physician.  Recommendation for admission to the hospital for observation and further imaging.    Review Of Systems:     A 10 point ROS was reviewed and negative unless otherwise mentioned in the HPI     Past Medical History:    Past Medical History:   Diagnosis Date   • Acute on chronic diastolic congestive heart failure (CMS/HCC) 12/20/2019   • Anemia    • Angina at rest (CMS/HCC)    • Arthritis    • Atrial fibrillation (CMS/HCC)    • CKD (chronic kidney disease) requiring chronic dialysis (CMS/HCC) 12/2/2019   • Coagulation disorder due to  circulating anticoagulants (CMS/HCC) 12/2/2019   • Coronary artery disease    • Disease of thyroid gland    • Elevated cholesterol    • GERD without esophagitis 12/2/2019   • History of blood transfusion    • History of colonoscopy 11/19/2019   • History of melena 11/19/2019   • History of stomach ulcers    • Hypertension    • Impaired functional mobility, balance, gait, and endurance    • Impaired functional mobility, balance, gait, and endurance    • Osteoporosis    • Positive TB test    • Stroke (CMS/HCC)     No deficits       Past Surgical history:    Past Surgical History:   Procedure Laterality Date   • BRONCHOSCOPY Bilateral 4/12/2019    Procedure: BRONCHOSCOPY;  Surgeon: Jeff Laura MD;  Location:  [facility];  Service: Pulmonary   • BYPASS GRAFT     • COLONOSCOPY N/A 10/15/2019    Procedure: COLONOSCOPY;  Surgeon: Fredi Aaron MD;  Location:  [facility];  Service: Gastroenterology   • CORONARY ANGIOPLASTY WITH STENT PLACEMENT     • CORONARY ARTERY BYPASS GRAFT  10/18/2010   • ENDOSCOPY N/A 10/14/2019    Procedure: ESOPHAGOGASTRODUODENOSCOPY;  Surgeon: Fredi Aaron MD;  Location:  [facility];  Service: Gastroenterology   • HYSTERECTOMY     • INSERTION HEMODIALYSIS CATHETER N/A 2/14/2020    Procedure: HEMODIALYSIS CATHETER INSERTION(DINO);  Surgeon: Familia Paez MD;  Location: [facility];  Service: General;  Laterality: N/A;   • STOMACH SURGERY  08/11/2019    Upper GI bleed    • STOMACH SURGERY  10/13/2019       Social History:    Social History     Socioeconomic History   • Marital status:      Spouse name: Not on file   • Number of children: Not on file   • Years of education: Not on file   • Highest education level: Not on file   Tobacco Use   • Smoking status: Never Smoker   • Smokeless tobacco: Never Used   Substance and Sexual Activity   • Alcohol use: No   • Drug use: No   • Sexual activity: Not Currently       Family History:    Family History    Problem Relation Age of Onset   • Cancer Mother    • Arthritis Mother    • No Known Problems Father    • Liver disease Sister        Allergies:      Tuberculin tests    Home Medications:    Prior to Admission Medications     Prescriptions Last Dose Informant Patient Reported? Taking?    aspirin (aspirin) 81 MG EC tablet   No No    Take 1 tablet by mouth Daily.    atorvastatin (LIPITOR) 40 MG tablet   No No    Take 1 tablet by mouth Every Night.    budesonide (PULMICORT) 0.5 MG/2ML nebulizer solution   Yes No    dilTIAZem CD (CARDIZEM CD) 180 MG 24 hr capsule   No No    Take 1 capsule by mouth Daily.    levothyroxine (SYNTHROID, LEVOTHROID) 75 MCG tablet   No No    Take 1 tablet by mouth Daily.    mirtazapine (REMERON) 15 MG tablet   No No    Take 1 tablet by mouth Every Night.    nystatin (MYCOSTATIN) 144436 UNIT/ML suspension   No No    Swish and spit 5 mL 3 (Three) Times a Day.    pantoprazole (PROTONIX) 40 MG EC tablet   No No    Take 1 tablet by mouth Daily.    predniSONE (DELTASONE) 5 MG tablet   No No    Take 1 tablet by mouth Daily.    ranolazine (RANEXA) 500 MG 12 hr tablet   No No    Take 1 tablet by mouth Every 12 (Twelve) Hours.             ED Medications:    Medications - No data to display    Vital Signs:    Temp:  [97.9 °F (36.6 °C)] 97.9 °F (36.6 °C)  Heart Rate:  [] 87  Resp:  [16] 16  BP: (127-153)/(58-86) 136/72        09/16/20  1614   Weight: 50.8 kg (112 lb)       Body mass index is 23.41 kg/m².    Physical Exam:    General Appearance:  Alert and cooperative, not in any acute distress.   Head:  Atraumatic and normocephalic, without obvious abnormality.   Eyes:          PERRLA, conjunctivae and sclerae normal, no Icterus. No pallor. Extraocular movements are within normal limits.   Ears:  Ears appear intact with no abnormalities noted.   Throat: No oral lesions, no thrush, oral mucosa moist.   Neck: Supple, trachea midline,    Chest:    Patient has a tunneled dialysis catheter   Lungs:    Chest shape is normal. Breath sounds heard bilaterally equally.  No crackles or wheezing.    Heart:   Irregularly irregular, no murmur   Abdomen:   Normal bowel sounds,  Soft, nontender, nondistended, no guarding, no rebound tenderness.   Extremities:  Right upper extremity weakness, 2/5, normal strength in all other extremities no edema, no cyanosis, no clubbing.  Patent left upper extremity fistula with palpable thrill   Pulses: Pulses palpable and equal bilaterally.   Skin:  Bruising to left upper extremity   Neurologic: Alert and oriented x 3.  Patient is severely dysarthric and difficult to understand.  No facial asymmetry.  Able to follow commands though limited by right upper extremity weakness from prior CVA     Laboratory data:    I have reviewed the labs done in the emergency room.    Results from last 7 days   Lab Units 09/16/20  1832   SODIUM mmol/L 136   POTASSIUM mmol/L 4.1   CHLORIDE mmol/L 96*   CO2 mmol/L 24.0   BUN mg/dL 9   CREATININE mg/dL 1.38*   CALCIUM mg/dL 9.9   BILIRUBIN mg/dL 0.6   ALK PHOS U/L 109   ALT (SGPT) U/L 20   AST (SGOT) U/L 24   GLUCOSE mg/dL 103*     Results from last 7 days   Lab Units 09/16/20  1832   WBC 10*3/mm3 10.00   HEMOGLOBIN g/dL 12.9   HEMATOCRIT % 39.7   PLATELETS 10*3/mm3 288         Results from last 7 days   Lab Units 09/16/20  1832   TROPONIN T ng/mL 0.154*                     Results from last 7 days   Lab Units 09/16/20  1821   COLOR UA  Orange*   GLUCOSE UA  Negative   KETONES UA  Trace*   LEUKOCYTES UA  Moderate (2+)*   PH, URINE  8.5*   BILIRUBIN UA  Negative   UROBILINOGEN UA  1.0 E.U./dL     Pain Management Panel     Pain Management Panel Latest Ref Rng & Units 2/13/2020 2/13/2020    CREATININE UR mg/dL 43.3 43.7              EKG:      EKG personally reviewed, atrial fibrillation with rate of 101.  Nonspecific T wave changes.    Radiology:    Imaging Results (Last 72 Hours)     Procedure Component Value Units Date/Time    XR Hip With or Without Pelvis 2 -  3 View Left [[id number]] Collected: 09/16/20 1818     Updated: 09/16/20 1819    Narrative:      FINAL REPORT    TECHNIQUE:  2 views of the hip were obtained.    CLINICAL HISTORY:  . fall    FINDINGS:  There is no fracture, dislocation or other acute abnormality of  bone or joint.  There is severe right hip osteophytes.  There is  mild left hip posterior arthritis.  There is extensive vascular  calcification.      Impression:      No acute disease.    Authenticated by Ran Nathan M.D. on 09/16/2020 06:18:26 PM    CT Head Without Contrast [[id number]] Collected: 09/16/20 1704     Updated: 09/16/20 1705    Narrative:      FINAL REPORT    CLINICAL HISTORY:  dizziness    COMPARISON:  April 3, 2020    FINDINGS:  Axial images of the head were obtained without contrast. Coronal  reformatted images were also obtained. This study was performed  with techniques to keep radiation doses as low as reasonably  achievable (ALARA). Individualized dose reduction techniques  using automated exposure control or adjustment of mA and/or kV  according to the patient''s size were employed.  There is  generalized age-appropriate atrophy.  Periventricular  low-attenuation areas are seen consistent with mild chronic  ischemic changes.  There is no evidence of intracranial  hemorrhage or mass.  There is a new low-attenuation area in the  left frontal region measuring approximate 4 cm consistent with  an acute or subacute infarct.   There is no evidence of shift of  the midline structures.  No skull abnormality is seen on the  bone window images.      Impression:      Acute or subacute infarct in the left frontal lobe.  Atrophy and  mild periventricular chronic ischemic changes.    Authenticated by Ranulfo Dumont III, MD on 09/16/2020  05:04:34 PM    XR Chest 1 View [[id number]] Resulted: 09/16/20 1659     Updated: 09/16/20 1700            Ischemic stroke of frontal lobe (CMS/HCC)      Assessment:    Acute to subacute infarct in the left frontal  lobe  Dizziness, orthostatic hypotension versus ischemic stroke/TIA  History of CVA with residual dysphasia/dysarthria and right UE weakness  ESRD on intermittent hemodialysis, Monday Wednesday Friday  Chronic diastolic heart failure  Atrial fibrillation, not on anticoagulation  CAD  Hypertension  Hyperlipidemia  GERD  Impaired functional mobility balance gait and endurance      Plan:    We will admit patient to the hospital with cardiac monitoring.  Neuro checks per nursing.  CT of the head without contrast showed acute to subacute infarct in the left frontal lobe.  Telemetry neurology consulted.  Recommend inpatient admission with further imaging in the a.m.  Will proceed with CTA imaging of head neck.  Cannot obtain MRI due to metal substance in body.  Patient does have CKD.  Will provide 500 cc of normal saline prior to imaging.  Cautious on volume resuscitation due to history of chronic diastolic heart failure.  Continue home aspirin and statin.  Will obtain HbA1c and lipid panel in the a.m.  PT, OT, SLP consult.  N.p.o.    Suspect that patient will be discharged prior to her next dialysis session on Friday.  May need to consult Dr. Tsang if plan of care changes.    Discussed placement with daughter.  States that she plans to take mother home at discharge.  Further orders as clinical course dictates.  Anticipate less than 2 midnight stay.    Advance Care Planning   ACP discussion was declined by the patient. Patient does not have an advance directive, declines further assistance.    Alexsander Moseley DO  09/16/20  20:35 EDT    Dictated utilizing Dragon dictation.      Electronically signed by Alexsander Moseley DO at 09/17/20 0056          Emergency Department Notes      Milad aPn PA-C at 09/16/20 1621     Attestation signed by Des Rinaldi DO at 09/17/20 1927          For this patient encounter, I reviewed the NP or PA documentation, treatment plan, and medical decision making. Des  KATHLEEN Rinaldi DO 9/17/2020 19:27 EDT                  Subjective   This is a 80-year-old female who presents the emergency department by EMS with chief complaint dizziness and fall just prior to arrival.  Patient states that she recently had her dialysis for her chronic renal failure.  States after getting home she became dizzy.  States that this caused her to fall injuring her left hip.  Patient reports history of atrial fib, hyperlipidemia, hypertension, chronic kidney disease, thyroid disease.  She denies any head injury or loss of consciousness.      History provided by:  Patient   used: No    Dizziness  Quality:  Lightheadedness  Severity:  Moderate  Onset quality:  Gradual  Duration:  1 hour  Timing:  Intermittent  Progression:  Worsening  Chronicity:  New  Context: not when bending over, not with bowel movement, not with ear pain, not with head movement, not with inactivity, not with loss of consciousness, not with physical activity and not when standing up    Relieved by:  Nothing  Worsened by:  Nothing  Ineffective treatments:  None tried  Associated symptoms: weakness    Associated symptoms: no blood in stool, no chest pain, no diarrhea, no headaches, no nausea, no shortness of breath, no syncope, no tinnitus and no vomiting    Risk factors: no anemia, no heart disease, no hx of vertigo and no Meniere's disease    Fall  Mechanism of injury: fall    Injury location:  Pelvis  Pelvic injury location:  L hip  Incident location:  Home  Time since incident:  1 day  Arrived directly from scene: no    Fall:     Fall occurred:  Standing    Impact surface:  Athletic surface    Entrapped after fall: no    Suspicion of alcohol use: no    Suspicion of drug use: no    Tetanus status:  Unknown  Prior to arrival data:     Bystander interventions:  None    Patient ambulatory at scene: no      Blood loss:  None    Orientation at scene:  Person, situation and place    Loss of consciousness: no      Amnesic  to event: no      Airway interventions:  None    Breathing interventions:  None    IO access:  None    Fluids administered:  None    Cardiac interventions:  None    Medications administered:  None    Immobilization:  None  Associated symptoms: no abdominal pain, no back pain, no chest pain, no difficulty breathing, no headaches, no nausea and no vomiting    Risk factors: dialysis    Risk factors: no anticoagulation therapy, no asthma, no CABG, no COPD, no diabetes, no hemophilia, no pacemaker and not pregnant        Review of Systems   HENT: Negative for tinnitus.    Respiratory: Negative for shortness of breath.    Cardiovascular: Negative for chest pain and syncope.   Gastrointestinal: Negative for abdominal pain, blood in stool, diarrhea, nausea and vomiting.   Musculoskeletal: Positive for arthralgias, joint swelling and myalgias. Negative for back pain.   Neurological: Positive for dizziness and weakness. Negative for headaches.   All other systems reviewed and are negative.      Past Medical History:   Diagnosis Date   • Acute on chronic diastolic congestive heart failure (CMS/[facility]) 12/20/2019   • Anemia    • Angina at rest (CMS/[facility])    • Arthritis    • Atrial fibrillation (CMS/[facility])    • CKD (chronic kidney disease) requiring chronic dialysis (CMS/[facility]) 12/2/2019   • Coagulation disorder due to circulating anticoagulants (CMS/[facility]) 12/2/2019   • Coronary artery disease    • Disease of thyroid gland    • Elevated cholesterol    • GERD without esophagitis 12/2/2019   • History of blood transfusion    • History of colonoscopy 11/19/2019   • History of melena 11/19/2019   • History of stomach ulcers    • Hypertension    • Impaired functional mobility, balance, gait, and endurance    • Impaired functional mobility, balance, gait, and endurance    • Osteoporosis    • Positive TB test    • Stroke (CMS/[facility])     No deficits       Allergies   Allergen Reactions   • Tuberculin Tests Rash       Past Surgical History:    Procedure Laterality Date   • BRONCHOSCOPY Bilateral 4/12/2019    Procedure: BRONCHOSCOPY;  Surgeon: Jeff Laura MD;  Location:  [facility];  Service: Pulmonary   • BYPASS GRAFT     • COLONOSCOPY N/A 10/15/2019    Procedure: COLONOSCOPY;  Surgeon: Fredi Aaron MD;  Location:  [facility];  Service: Gastroenterology   • CORONARY ANGIOPLASTY WITH STENT PLACEMENT     • CORONARY ARTERY BYPASS GRAFT  10/18/2010   • ENDOSCOPY N/A 10/14/2019    Procedure: ESOPHAGOGASTRODUODENOSCOPY;  Surgeon: Fredi Aaron MD;  Location:  [facility];  Service: Gastroenterology   • HYSTERECTOMY     • INSERTION HEMODIALYSIS CATHETER N/A 2/14/2020    Procedure: HEMODIALYSIS CATHETER INSERTION(CANNNON);  Surgeon: Familia Paez MD;  Location:  [facility];  Service: General;  Laterality: N/A;   • STOMACH SURGERY  08/11/2019    Upper GI bleed    • STOMACH SURGERY  10/13/2019       Family History   Problem Relation Age of Onset   • Cancer Mother    • Arthritis Mother    • No Known Problems Father    • Liver disease Sister        Social History     Socioeconomic History   • Marital status:      Spouse name: Not on file   • Number of children: Not on file   • Years of education: Not on file   • Highest education level: Not on file   Tobacco Use   • Smoking status: Never Smoker   • Smokeless tobacco: Never Used   Substance and Sexual Activity   • Alcohol use: No   • Drug use: No   • Sexual activity: Not Currently           Objective   Physical Exam  Vitals signs and nursing note reviewed.   Constitutional:       General: She is not in acute distress.     Appearance: Normal appearance. She is normal weight. She is not ill-appearing, toxic-appearing or diaphoretic.   HENT:      Head: Normocephalic and atraumatic.      Right Ear: Tympanic membrane and ear canal normal. There is no impacted cerumen.      Left Ear: Tympanic membrane and ear canal normal. There is no impacted cerumen.      Nose: Nose  normal. No congestion or rhinorrhea.      Mouth/Throat:      Mouth: Mucous membranes are moist.      Pharynx: No oropharyngeal exudate or posterior oropharyngeal erythema.   Eyes:      General: No scleral icterus.        Right eye: No discharge.         Left eye: No discharge.      Extraocular Movements: Extraocular movements intact.      Conjunctiva/sclera: Conjunctivae normal.      Pupils: Pupils are equal, round, and reactive to light.   Neck:      Musculoskeletal: Normal range of motion.   Cardiovascular:      Rate and Rhythm: Normal rate. Rhythm irregularly irregular.      Pulses: Normal pulses.      Heart sounds: Normal heart sounds. No murmur. No friction rub. No gallop.    Pulmonary:      Effort: Pulmonary effort is normal. No respiratory distress.      Breath sounds: Normal breath sounds. No stridor. No wheezing or rhonchi.   Abdominal:      General: Abdomen is flat. Bowel sounds are normal. There is no distension.      Palpations: There is no mass.      Tenderness: There is no abdominal tenderness. There is no guarding or rebound.      Hernia: No hernia is present.   Musculoskeletal: Normal range of motion.         General: No swelling, tenderness, deformity or signs of injury.      Left lower leg: No edema.   Skin:     General: Skin is warm and dry.      Coloration: Skin is not jaundiced or pale.      Findings: No bruising or erythema.   Neurological:      General: No focal deficit present.      Mental Status: She is alert.      Sensory: No sensory deficit.      Motor: No weakness.      Coordination: Coordination normal.   Psychiatric:         Mood and Affect: Mood normal.         Behavior: Behavior normal.         Thought Content: Thought content normal.         Procedures          ED Course  ED Course as of Sep 16 1927   Wed Sep 16, 2020   1706 Discussed care with on call neurolgy. Discussed acute vs subacute infarct of left frontal lobe. Will do bedside neurology consult.     []   1713 EKG  interpreted by me reveals A. fib at a rate of 101.  Nonspecific T wave changes.    [PF]   1755 X-ray sent to radiology.     [BH]   1830 No acute findings on x-ray read by Dr. Nathan.     [BH]   1830 Awaiting phone call from neurology.     []   1904 Discussed care with neurology recommended to admit and have MRI/MRA. Will call hospitalist for possible admit.     []   1910 Discussed care with Dr. Dunham. Patient will be admitted to telemetry.     []      ED Course User Index  [] Milad Pan PA-C  [PF] Des Rinaldi DO                                         NIHSS (NIH Stroke Scale/Score) reviewed and/or performed as part of the patient evaluation and treatment planning process.  The result associated with this review/performance is: 2       MDM      Final diagnoses:   Ischemic stroke of frontal lobe (CMS/HCC)   Dizziness   Left hip pain   Atrial fibrillation, unspecified type (CMS/HCC)            Milad Pan PA-C  09/16/20 1915       Milad Pan PA-C  09/16/20 1927      Electronically signed by Des Rinaldi DO at 09/17/20 1927     Herlinda Ramirez RN at 09/16/20 1712        Teleneurohealth consult with Dr. Bashar Mohsen.      Herlinda Ramirez RN  09/16/20 1713      Electronically signed by Herlinda Ramirez RN at 09/16/20 1713     Herlinda Ramirez RN at 09/16/20 1735        Unable to collect labs at this time.  Hope notified and will draw lab specimens.      Herlinda Ramirez RN  09/16/20 1735      Electronically signed by Herlinda Ramirez RN at 09/16/20 1735     Erica Potter at 09/16/20 1907        Called Dr. Dunham per Tl, Dr. Dunham is here in the ER now.      Erica Potter  09/16/20 1908      Electronically signed by Erica Potter at 09/16/20 1908     Erica Potter at 09/16/20 1916        Called House Supervisor for bed assignment, Patient will be going to [facility]. Lucía Zuñiga notified.     Erica Potter  09/16/20 1917      Electronically signed by Erica Potter at 09/16/20 1917      Herlinda Ramirez RN at 09/16/20 1923        Dr. Moseley at bedside.  Report to WOODROW Castrejon.      Herlinda Ramirez RN  09/16/20 1923      Electronically signed by Herlinda Ramirez RN at 09/16/20 1923         Facility-Administered Medications as of 9/19/2020   Medication Dose Route Frequency Provider Last Rate Last Dose   • acetaminophen (TYLENOL) tablet 650 mg  650 mg Oral Q4H PRN Alexsander Moseley, DO        Or   • acetaminophen (TYLENOL) 160 MG/5ML solution 650 mg  650 mg Oral Q4H PRN Alexsander Moseley, DO        Or   • acetaminophen (TYLENOL) suppository 650 mg  650 mg Rectal Q4H PRN Alexsander Moseley, DO       • aspirin EC tablet 81 mg  81 mg Oral Daily Alexsander Moseley, DO   81 mg at 09/19/20 0937   • atorvastatin (LIPITOR) tablet 40 mg  40 mg Oral Nightly Alexsander Moseley, DO   40 mg at 09/18/20 2102   • bisacodyl (DULCOLAX) suppository 10 mg  10 mg Rectal Daily PRN Alexsander Moseley DO       • budesonide (PULMICORT) nebulizer solution 0.5 mg  0.5 mg Nebulization Daily - RT Alexsander Moseley DO   0.5 mg at 09/19/20 0644   • [COMPLETED] dilTIAZem (CARDIZEM) injection 10 mg  10 mg Intravenous Once Tamara Tomlin DO   10 mg at 09/17/20 1327   • dilTIAZem (CARDIZEM) tablet 30 mg  30 mg Oral Q6H Yomi Higgins MD   30 mg at 09/19/20 0605   • enoxaparin (LOVENOX) syringe 30 mg  30 mg Subcutaneous Q24H Alexsander Moseley DO   30 mg at 09/18/20 2102   • [COMPLETED] heparin (porcine) 5000 UNIT/ML injection 5,000 Units  5,000 Units Intravenous Once Sanford Tsang MD, FASN   5,000 Units at 09/18/20 1223   • levothyroxine (SYNTHROID, LEVOTHROID) tablet 75 mcg  75 mcg Oral Daily Alexsander Moseley DO   75 mcg at 09/19/20 0605   • melatonin tablet 5 mg  5 mg Oral Nightly PRN Alexsander Moseley DO   5 mg at 09/18/20 2102   • mirtazapine (REMERON) tablet 15 mg  15 mg Oral Nightly Alexsander Moseley DO   15 mg at 09/18/20 2102   • ondansetron (ZOFRAN)  injection 4 mg  4 mg Intravenous Q6H PRN Alexsander Moseley DO       • pantoprazole (PROTONIX) EC tablet 40 mg  40 mg Oral Daily Alexsander Moseley DO   40 mg at 09/19/20 0605   • [COMPLETED] potassium chloride (MICRO-K) CR capsule 40 mEq  40 mEq Oral Once Sanford Tsang MD, FASN   40 mEq at 09/18/20 1250   • predniSONE (DELTASONE) tablet 5 mg  5 mg Oral Daily Tamara Tomlin DO   5 mg at 09/19/20 0937   • ranolazine (RANEXA) 12 hr tablet 500 mg  500 mg Oral Q12H Alexsander Moseley DO   500 mg at 09/19/20 0937   • [COMPLETED] sodium chloride 0.9 % bolus 250 mL  250 mL Intravenous Once Tamara Tomlin DO   Stopped at 09/17/20 1400   • [COMPLETED] sodium chloride 0.9 % bolus 500 mL  500 mL Intravenous Once Alexsander Moseley DO   Stopped at 09/17/20 0230       Lab Results (last 24 hours)     Procedure Component Value Units Date/Time    Basic Metabolic Panel [[id number]]  (Abnormal) Collected: 09/19/20 0721    Specimen: Blood Updated: 09/19/20 0814     Glucose 97 mg/dL      BUN 16 mg/dL      Creatinine 2.17 mg/dL      Sodium 132 mmol/L      Potassium 4.2 mmol/L      Chloride 99 mmol/L      CO2 21.8 mmol/L      Calcium 8.9 mg/dL      eGFR Non African Amer 22 mL/min/1.73      BUN/Creatinine Ratio 7.4     Anion Gap 11.2 mmol/L     Narrative:      GFR Normal >60  Chronic Kidney Disease <60  Kidney Failure <15      CBC & Differential [[id number]]  (Abnormal) Collected: 09/19/20 0721    Specimen: Blood Updated: 09/19/20 0753    Narrative:      The following orders were created for panel order CBC & Differential.  Procedure                               Abnormality         Status                     ---------                               -----------         ------                     CBC Auto Differential[[id number]]        Abnormal            Final result                 Please view results for these tests on the individual orders.    CBC Auto Differential [[id number]]  (Abnormal) Collected: 09/19/20  0721    Specimen: Blood Updated: 09/19/20 0753     WBC 9.15 10*3/mm3      RBC 3.42 10*6/mm3      Hemoglobin 11.1 g/dL      Hematocrit 34.1 %      MCV 99.7 fL      MCH 32.5 pg      MCHC 32.6 g/dL      RDW 15.9 %      RDW-SD 58.0 fl      MPV 8.8 fL      Platelets 206 10*3/mm3      Neutrophil % 79.5 %      Lymphocyte % 9.6 %      Monocyte % 7.2 %      Eosinophil % 2.0 %      Basophil % 0.5 %      Immature Grans % 1.2 %      Neutrophils, Absolute 7.27 10*3/mm3      Lymphocytes, Absolute 0.88 10*3/mm3      Monocytes, Absolute 0.66 10*3/mm3      Eosinophils, Absolute 0.18 10*3/mm3      Basophils, Absolute 0.05 10*3/mm3      Immature Grans, Absolute 0.11 10*3/mm3      nRBC 0.0 /100 WBC     POC Glucose Once [[id number]]  (Abnormal) Collected: 09/18/20 1101    Specimen: Blood Updated: 09/18/20 1104     Glucose 145 mg/dL      Comment: Serial Number: [id number]:  [id number]           Imaging Results (Last 72 Hours)     Procedure Component Value Units Date/Time    MRI Angiogram Head Without Contrast [[id number]] Collected: 09/17/20 1722     Updated: 09/17/20 1723    Narrative:      FINAL REPORT    CLINICAL HISTORY:  Stroke MRA BRAIN.    FINDINGS:  The intracranial portions of the internal carotid arteries are  normal as are the anterior and middle cerebral arteries  bilaterally.  There are diminished peripheral left MCA branches  consistent with acute/subacute infarction seen on the MR.  The  intracranial vertebral arteries and the basilar artery and their  major branches are also unremarkable.      Impression:      Diminished left MCA peripheral branches consistent with  infarction.    Authenticated by Ran Nathan M.D. on 09/17/2020 05:22:26 PM    MRI Brain Without Contrast [[id number]] Collected: 09/17/20 1722     Updated: 09/17/20 1723    Narrative:      FINAL REPORT    TECHNIQUE:  Multiplanar imaging was performed of the brain without  gadolinium infusion.    CLINICAL HISTORY:  . STROKE.    FINDINGS:  Diffusion-weighted  images show restricted diffusion in the left  frontoparietal region consistent with an acute infarction in the  MCA territory. The ventricles are mildly dilated, but  proportionate to the degree of generalized atrophy.  Periventricular and deep white matter signal abnormality is  consistent with changes of chronic small vessel ischemia. There  is no mass or shift of midline structures. There is no  intracranial hemorrhage. No significant sinus or osseous  abnormality is seen.      Impression:      Acute/subacute infarction in the left frontoparietal region.    Authenticated by Ran Nathan M.D. on 09/17/2020 05:22:25 PM    US Carotid Bilateral [[id number]] Collected: 09/17/20 1128     Updated: 09/17/20 1131    Narrative:      PROCEDURE: US CAROTID BILATERAL-     HISTORY: tia; I63.9-Cerebral infarction, unspecified; R42-Dizziness and  giddiness; M25.552-Pain in left hip; I48.91-Unspecified atrial  fibrillation     Technique: Real-time imaging was performed of the extracranial carotid  arteries in transverse and longitudinal planes, with color duplex  evaluation of blood flow velocity. Spectral analysis was performed. The  cervicovertebral arteries were also examined. Stenosis evaluation is  based on validated velocity criteria.     Right carotid system:  CCA: 78 cm/s  ICA: 76 cm/s  ECA: 105 cm/s  Vertebral artery: Antegrade  ICA/CCA ratio: 0.98  Mild plaque is identified at the bifurcation.     Left carotid system:  CCA: 78 cm/s  ICA: 107 cm/s  ECA: 88 cm/s  Vertebral artery: Antegrade  ICA/CCA ratio: 1.38  Mild plaque is identified at the bifurcation.          Impression:      Mild plaque within the carotid bulbs with no hemodynamically significant  stenosis.     This report was finalized on 9/17/2020 11:29 AM by Joaquín Shannon M.D..    XR Chest 1 View [[id number]] Collected: 09/17/20 1019     Updated: 09/17/20 1036    Narrative:      PROCEDURE: XR CHEST 1 VW-     HISTORY: fall, dizziness     COMPARISON: June  2020.     FINDINGS: The heart is enlarged. There is a right IJ dialysis catheter  present. The patient is status post median sternotomy for CABG. There is  mild pulmonary edema. There is no pneumothorax.  There are no acute  osseous abnormalities. There are large right upper quadrant  calcifications likely due to gallstones.       Impression:      Mild pulmonary edema.     Probable cholelithiasis.              Images were reviewed, interpreted, and dictated by Dr. Joaquín Shannon M.D.  Transcribed by Thea Sage PA-C.     This report was finalized on 9/17/2020 10:34 AM by Joaquín Shannon M.D..    XR Hip With or Without Pelvis 2 - 3 View Left [[id number]] Collected: 09/16/20 1818     Updated: 09/16/20 1819    Narrative:      FINAL REPORT    TECHNIQUE:  2 views of the hip were obtained.    CLINICAL HISTORY:  . fall    FINDINGS:  There is no fracture, dislocation or other acute abnormality of  bone or joint.  There is severe right hip osteophytes.  There is  mild left hip posterior arthritis.  There is extensive vascular  calcification.      Impression:      No acute disease.    Authenticated by Ran Nathan M.D. on 09/16/2020 06:18:26 PM    CT Head Without Contrast [[id number]] Collected: 09/16/20 1704     Updated: 09/16/20 1705    Narrative:      FINAL REPORT    CLINICAL HISTORY:  dizziness    COMPARISON:  April 3, 2020    FINDINGS:  Axial images of the head were obtained without contrast. Coronal  reformatted images were also obtained. This study was performed  with techniques to keep radiation doses as low as reasonably  achievable (ALARA). Individualized dose reduction techniques  using automated exposure control or adjustment of mA and/or kV  according to the patient''s size were employed.  There is  generalized age-appropriate atrophy.  Periventricular  low-attenuation areas are seen consistent with mild chronic  ischemic changes.  There is no evidence of intracranial  hemorrhage or mass.  There is a new  low-attenuation area in the  left frontal region measuring approximate 4 cm consistent with  an acute or subacute infarct.   There is no evidence of shift of  the midline structures.  No skull abnormality is seen on the  bone window images.      Impression:      Acute or subacute infarct in the left frontal lobe.  Atrophy and  mild periventricular chronic ischemic changes.    Authenticated by Ranulfo Dumont III, MD on 2020  05:04:34 PM           Physician Progress Notes (last 24 hours) (Notes from 20 1102 through 20 1102)      Libertad Bishop MD at 20 1946            [facility]    [facility]ESS [facility]    Name:  Pat Morel   Age:  80 y.o.  Sex:  female  :  1940  MRN:  [id number]   Visit Number:  [id number]  Admission Date:  2020  Date Of Service:  20  Primary Care Physician:  Anthony Sweet DO     LOS: 0 days :  Patient Care Team:  Anthony Sweet DO as PCP - General (Family Medicine)  Tien Archer MD as Consulting Physician (Cardiac Electrophysiology)  Xu Block MD (Cardiology)  Yomi Higgins MD as Consulting Physician (Cardiology):    Chief Complaint:      Inaudible sounds due to dysarthria    Subjective / Interval History:     History from patient's daughter at bedside. Patient has been ore lethargic, weak and slightly confused from baseline. She is not able to do as well as previous with attempting ambulation. She is also sp HD this afternoon.    Review of Systems:     General ROS: Patient denies any fevers, chills or loss of consciousness.  Respiratory ROS: Denies cough or shortness of breath.  Cardiovascular ROS: Denies chest pain or palpitations. No history of exertional chest pain.  Gastrointestinal ROS: Denies nausea and vomiting. Denies any abdominal pain. No diarrhea.  Neurological ROS: Denies any focal weakness. No loss of consciousness. Denies any numbness.  Dermatological ROS: Denies any redness or  pruritis.    Vital Signs:    Temp:  [96.7 °F (35.9 °C)-98.5 °F (36.9 °C)] 98.3 °F (36.8 °C)  Heart Rate:  [] 92  Resp:  [16-21] 18  BP: (121-142)/(52-90) 121/52    Intake and output:    I/O last 3 completed shifts:  In: 1220 [P.O.:720; IV Piggyback:500]  Out: 1500 [Other:1500]  No intake/output data recorded.    Physical Examination:    General Appearance:  Alert and awake, not in any acute distress.   Head:  Atraumatic and normocephalic, without obvious abnormality.   Eyes:          PERRLA, conjunctivae and sclerae normal, no Icterus. No pallor. Extraocular movements are within normal limits.   Neck: Supple, trachea midline, no thyromegaly, no carotid bruit.   Lungs:   Chest shape is normal. Breath sounds heard bilaterally equally.  No crackles or wheezing. No pleural rub or bronchial breathing.   Heart:  Normal S1 and S2, no murmur, no gallop, no rub. No JVD   Abdomen:   Normal bowel sounds, no masses, no organomegaly. Soft, nontender, nondistended, no guarding, no rebound tenderness.   Extremities: Moves all extremities, no edema, no cyanosis, no clubbing.   Skin: No bleeding, bruising or rash.   Neurologic: Not oriented to person,place or time. Dysarthria severe.     Laboratory results:    Results from last 7 days   Lab Units 09/18/20  0522 09/17/20  0529 09/16/20  1832   SODIUM mmol/L 138 137 136   POTASSIUM mmol/L 3.4* 4.4 4.1   CHLORIDE mmol/L 104 101 96*   CO2 mmol/L 22.8 23.9 24.0   BUN mg/dL 26* 16 9   CREATININE mg/dL 2.64* 1.87* 1.38*   CALCIUM mg/dL 9.1 8.8 9.9   BILIRUBIN mg/dL  --   --  0.6   ALK PHOS U/L  --   --  109   ALT (SGPT) U/L  --   --  20   AST (SGOT) U/L  --   --  24   GLUCOSE mg/dL 115* 85 103*     Results from last 7 days   Lab Units 09/18/20  0522 09/17/20  0529 09/16/20  1832   WBC 10*3/mm3 8.20 8.34 10.00   HEMOGLOBIN g/dL 10.3* 10.0* 12.9   HEMATOCRIT % 32.2* 30.7* 39.7   PLATELETS 10*3/mm3 228 226 288         Results from last 7 days   Lab Units 09/17/20  0015 09/16/20  2146  09/16/20  1832   TROPONIN T ng/mL 0.101* 0.116* 0.154*     Results from last 7 days   Lab Units 09/16/20  1821   URINECX  No growth           I have reviewed the patient's laboratory results.    Radiology results:    Imaging Results (Last 24 Hours)     ** No results found for the last 24 hours. **          I have reviewed the patient's radiology reports.    Medication Review:     I have reviewed the patient's active and prn medications.     Assessment:        Ischemic stroke of frontal lobe (CMS/HCC)  Acute vs subacute CVA  Acute metabolic Encephalopathy  Falls at home  ESRD   Afib RVR not on anticoagulation due to prior bleed  Debility  Dysarthria    Plan:    Lethargic today with mild encephalopathy,likely metabolic type. Received hemodialysis. Warrants another night in hospital due to risk of advancing CVA, needs monitored prior to DC. PT/OT following as well. Discussing risk benefits of anticoagulation with patient and daughter.    Libertad Bishop MD  09/18/20  19:46 EDT    Dictated utilizing Dragon dictation.      Electronically signed by Libertad Bishop MD at 09/18/20 1959       Consult Notes (last 24 hours) (Notes from 09/18/20 1103 through 09/19/20 1103)    No notes of this type exist for this encounter.

## 2020-09-19 NOTE — OUTREACH NOTE
Prep Survey      Responses   [facility] patient discharged from?  [facility]   Is LACE score < 7 ?  No   Eligibility  [facility]    Date of Admission  09/16/20   Date of Discharge  09/19/20   Discharge Disposition  Home-Health Care Svc   Discharge diagnosis  Acute but likely subacute infarct in the left frontal lobe,  MCA stroke    COVID-19 Test Status  Not tested   Does the patient have one of the following disease processes/diagnoses(primary or secondary)?  Stroke (TIA)   Does the patient have Home health ordered?  Yes   What is the Home health agency?   [facility]   Is there a DME ordered?  No   Prep survey completed?  Yes          Maggie Alexandra RN

## 2020-09-19 NOTE — PLAN OF CARE
PT treatment completed this pm with pt showing improvement in her ambulation, needing CGA for 38 ft using rolling walker and for transfers, needing CGA . Pt also continued with LE ther ex per flowsheet. Pt's daughter states that pt looks much stronger this pm. Will continue current PT POC.

## 2020-09-20 NOTE — PROGRESS NOTES
Case Management Discharge Note           Provided Post Acute Provider List?: N/A  N/A Provider List Comment: No addition DME, HH or Skilled nursing needs (a current pt of [facility])  Provided Post Acute Provider Quality & Resource List?: N/A  N/A Quality & Resource List Comment: No needs    Selected Continued Care - Discharged on 9/19/2020 Admission date: 9/16/2020 - Discharge disposition: Home or Self Care    Destination    No services have been selected for the patient.              Durable Medical Equipment    No services have been selected for the patient.              Dialysis/Infusion    No services have been selected for the patient.              Home Medical Care    No services have been selected for the patient.              Therapy    No services have been selected for the patient.              Community Resources    No services have been selected for the patient.                Selected Continued Care - Prior Encounters Includes selections from prior encounters from 6/18/2020 to 9/19/2020    Discharged on 8/5/2020 Admission date: 7/31/2020 - Discharge disposition: Home-Health Care Svc    Dialysis/Infusion     Service Provider Selected Services Address Phone Fax    [facility] [place]  Dialysis [address], [place] [address] [phone] --          Home Medical Care     Service Provider Selected Services Address Phone Fax    [facility]  Home Health Services [address] [facility] [address] [phone]                    Transportation Services  Private: Car    Final Discharge Disposition Code: 06 - home with home health care

## 2020-09-20 NOTE — OUTREACH NOTE
Call Center TCM Note      Responses   [facility] patient discharged from?  [facility]   Does the patient have one of the following disease processes/diagnoses(primary or secondary)?  Stroke (TIA)   TCM attempt successful?  Yes   Call start time  1337   Call end time  1344   General alerts for this patient  Patient is staying with daughter for awhile as she is recovering. (Yary) Please call this number first.    Discharge diagnosis  Acute but likely subacute infarct in the left frontal lobe,  MCA stroke    List who call center can speak with  Pt gave a verbal that RN can speak with Yary-daughter today   Person spoke with today (if not patient) and relationship  Patient, Mr. Morel-spouse, and Yary New-daughter    Meds reviewed with patient/caregiver?  Yes   Is the patient having any side effects they believe may be caused by any medication additions or changes?  No   Does the patient have all medications ordered at discharge?  N/A   Prescription comments  May need refill levothyroxine. Routed to PCP office.    Is the patient taking all medications as directed (includes completed medication regime)?  Yes   Does the patient have a primary care provider?   Yes   Does the patient have an appointment with their PCP within 7 days of discharge?  Yes   Comments regarding PCP  Hospital d/c f/u appt is on 9/29/20 at 2:00 pm    Has the patient kept scheduled appointments due by today?  N/A   What is the Home health agency?   [facility]   Psychosocial issues?  No   Does the patient require any assistance with activities of daily living such as eating, bathing, dressing, walking, etc.?  No   Does the patient have any residual symptoms from stroke/TIA?  Yes   Did the patient receive a copy of their discharge instructions?  Yes   Nursing interventions  Reviewed instructions with patient   What is the patient's perception of their health status since discharge?  Improving   Nursing interventions  Nurse provided patient education    Is the patient able to teach back FAST for Stroke?  Yes [Provided education ]   Is the patient/caregiver able to teach back the risk factors for a stroke?  History of TIAs, Diabetes, High blood pressure-goal below 120/80, Smoking, High Cholesterol [Not a diabetic]   Is the patient/caregiver able to teach back signs and symptoms related to disease process for when to call PCP?  Yes   Is the patient/caregiver able to teach back signs and symptoms related to disease process for when to call 911?  Yes   Is the patient/caregiver able to teach back the hierarchy of who to call/visit for symptoms/problems? PCP, Specialist, Home health nurse, Urgent Care, ED, 911  Yes   TCM call completed?  Yes   Wrap up additional comments  Patient's son is nurse Noemí Russo RN    9/20/2020, 13:46 EDT

## 2020-09-24 NOTE — TELEPHONE ENCOUNTER
OSMAN PRAKASH, A PHYSICAL THERAPIST / [facility] HAS CALLED AND IS NEEDING A VERBAL ORDER FOR HOME HEALTH PHYSICAL THERAPY FOR 1 TIME A WEEK FOR 2 WEEKS FOR PATIENT. HER CONTACT INFO IS [phone]. PLEASE ADVISE.

## 2020-09-29 NOTE — OUTREACH NOTE
Stroke Week 2 Survey      Responses   [facility] patient discharged from?  [facility]   Does the patient have one of the following disease processes/diagnoses(primary or secondary)?  Stroke (TIA)   Week 2 attempt successful?  Yes   Call start time  1009   Call end time  1016   General alerts for this patient  Patient is staying with daughter for awhile as she is recovering. (Yary) Please call this number first.    Discharge diagnosis  Acute but likely subacute infarct in the left frontal lobe,  MCA stroke    Is patient permission given to speak with other caregiver?  Yes   List who call center can speak with  Daughter   Person spoke with today (if not patient) and relationship  Daughter   Meds reviewed with patient/caregiver?  Yes   Is the patient taking all medications as directed (includes completed medication regime)?  Yes   Has the patient kept scheduled appointments due by today?  N/A   Comments  Dr prather today at 2pm   Home health comments  PT is coming in   Psychosocial issues?  No   Does the patient require any assistance with activities of daily living such as eating, bathing, dressing, walking, etc.?  No   ADL comments  Daughter helps as pt is weak. Needs w/c for mobilty   Does the patient have any residual symptoms from stroke/TIA?  Yes   Residual symptoms comments  Pt had fallen again and did not hurt herself. HD makes her weaker   Does the patient understand the diet ordered at discharge?  Yes   Comments  Switching access to fistula in arm.   What is the patient's perception of their health status since discharge?  Same   Nursing interventions  Nurse provided patient education   Is the patient able to teach back FAST for Stroke?  Yes   Is the patient/caregiver able to teach back the risk factors for a stroke?  History of TIAs, Physical inactivity and obesity   Is the patient/caregiver able to teach back signs and symptoms related to disease process for when to call PCP?  Yes   Is the  patient/caregiver able to teach back signs and symptoms related to disease process for when to call 911?  Yes   Is the patient/caregiver able to teach back the hierarchy of who to call/visit for symptoms/problems? PCP, Specialist, Home health nurse, Urgent Care, ED, 911  Yes   Additional teach back comments  Not eating alot and daughter is enc her to eat.    Week 2 call completed?  Yes          Shana Ford RN

## 2020-10-07 NOTE — OUTREACH NOTE
Stroke Week 3 Survey      Responses   [facility] patient discharged from?  [facility]   Does the patient have one of the following disease processes/diagnoses(primary or secondary)?  Stroke (TIA)   Week 3 attempt successful?  Yes   Call start time  0850   Call end time  0857   General alerts for this patient  please call lher  daughter Yary   Medication alerts for this patient  has  gotten all medications   Meds reviewed with patient/caregiver?  Yes   Is the patient taking all medications as directed (includes completed medication regime)?  Yes   Comments regarding appointments  has kept appointments   Has the patient kept scheduled appointments due by today?  Yes   Has home health visited the patient within 72 hours of discharge?  N/A   Home health comments  speech is stilll coming and PT has signed off   Does the patient require any assistance with activities of daily living such as eating, bathing, dressing, walking, etc.?  No [daughter states she is  able to dress self and get of bed]   Does the patient understand the diet ordered at discharge?  Yes   Did the patient receive a copy of their discharge instructions?  Yes   Nursing interventions  Reviewed instructions with patient   What is the patient's perception of their health status since discharge?  Returned to baseline/stable   Is the patient able to teach back FAST for Stroke?  Yes   Is the patient/caregiver able to teach back the risk factors for a stroke?  High blood pressure-goal below 120/80 [daughter is aware, dialysis wears her out]   Is the patient/caregiver able to teach back signs and symptoms related to disease process for when to call PCP?  Yes   Is the patient/caregiver able to teach back signs and symptoms related to disease process for when to call 911?  Yes   Is the patient/caregiver able to teach back the hierarchy of who to call/visit for symptoms/problems? PCP, Specialist, Home health nurse, Urgent Care, ED, 911  Yes   Week 3 call  completed?  Yes   Wrap up additional comments  daughter feels mother is back to baseline          Tika Riggs RN

## 2020-10-13 NOTE — PROGRESS NOTES
[facility] Cardiology Office Follow Up Note    Pat Morel  [id number]  10/13/2020    Primary Care Provider: Anthony Sweet DO    Chief Complaint: Hospital follow-up    History of Present Illness:   Mrs. Pat Morel is a 79 y.o. female who is being seen by cardiology hospital follow-up. The patient has a past medical history which includes hypertension, prior CVA, history of GI bleeding in the setting of peptic ulcer disease, and stage III chronic kidney disease.  She also has a history of pulmonary artery hypertension and interstitial lung disease possibly secondary to hypersensitivity pneumonitis.  Her cardiac history is significant for persistent atrial fibrillation, with a history of prior cardioversion.   In terms of her atrial fibrillation, she was previously anticoagulated for CVA prophylaxis.  Her anticoagulation was discontinued secondary to worsening anemia with significant GI bleeding.  She also has a history of recurrent falls, putting her at high risk for anticoagulation.  Her recent medical history significant for hospitalization in 2020, at which time she was diagnosed with acute/subacute infarction involving the left frontoparietal region.  Since returning home, the patient reports she has done well.  She has worked with physical therapy at home and ambulates with assistance.  She denies any chest pain or chest discomfort.  She does report intermittent episodes of shortness of breath, which typically resolve spontaneously.  She denies palpitations.  On recent follow-up appointments, her heart rate appears to have been well controlled.  No other specific complaints at this time.    Past Cardiac Testin. Last Coronary Angio: Rody years ago, report unavailable  2. Prior Stress Testing: None  3. Last Echo: 10/16/2019   1.  The left atrium is enlarged.   2.  Global and segmental LV wall motion is normal. The estimated LV ejection fraction is >70%.   3.  The  aortic valve appears to be mildly sclerotic. AS and AI are absent.   4.  There is mild-moderate mitral regurgitation.   5.  There is moderate tricuspid regurgitation with a moderate elevation is estimated PA pressure (45-55 mmHg)   6.  There are no other important findings on this study.  4. Prior Holter Monitor: 7-day Holter monitor 9/24/2018              1.  100% atrial fibrillation    Review of Systems:   Review of Systems   Constitutional: Negative for activity change, appetite change, chills, diaphoresis, fatigue, fever, unexpected weight gain and unexpected weight loss.   Eyes: Negative for blurred vision and double vision.   Respiratory: Positive for shortness of breath. Negative for cough, chest tightness and wheezing.    Cardiovascular: Negative for chest pain, palpitations and leg swelling.   Gastrointestinal: Negative for abdominal pain, anal bleeding, blood in stool and GERD.   Endocrine: Negative for cold intolerance and heat intolerance.   Genitourinary: Negative for hematuria.   Neurological: Negative for dizziness, syncope, weakness and light-headedness.   Hematological: Does not bruise/bleed easily.   Psychiatric/Behavioral: Negative for depressed mood and stress. The patient is not nervous/anxious.        I have reviewed and/or updated the patient's past medical, past surgical, family, social history, problem list and allergies as appropriate.     Medications:     Current Outpatient Medications:   •  aspirin (aspirin) 81 MG EC tablet, Take 1 tablet by mouth Daily., Disp: 90 tablet, Rfl: 3  •  atorvastatin (LIPITOR) 40 MG tablet, Take 1 tablet by mouth Every Night., Disp: 90 tablet, Rfl: 3  •  budesonide (PULMICORT) 0.5 MG/2ML nebulizer solution, , Disp: , Rfl:   •  dilTIAZem CD (CARDIZEM CD) 180 MG 24 hr capsule, Take 1 capsule by mouth Daily., Disp: 90 capsule, Rfl: 2  •  Heparin Sodium, Porcine, (heparin, porcine,) 1000 UNIT/ML injection, Heparin Sodium (Porcine) 1,000 Units/mL Catheter Lock  "Venous, Disp: , Rfl:   •  levothyroxine (SYNTHROID, LEVOTHROID) 75 MCG tablet, Take 1 tablet by mouth Daily., Disp: 90 tablet, Rfl: 2  •  mirtazapine (REMERON) 15 MG tablet, Take 1 tablet by mouth Every Night., Disp: 30 tablet, Rfl: 3  •  pantoprazole (PROTONIX) 40 MG EC tablet, Take 1 tablet by mouth Daily., Disp: 30 tablet, Rfl: 0  •  predniSONE (DELTASONE) 5 MG tablet, Take 1 tablet by mouth Daily., Disp: 90 tablet, Rfl: 1  •  ranolazine (RANEXA) 500 MG 12 hr tablet, Take 1 tablet by mouth Every 12 (Twelve) Hours., Disp: 180 tablet, Rfl: 2  •  nystatin (MYCOSTATIN) 533540 UNIT/ML suspension, Swish and spit 5 mL 3 (Three) Times a Day., Disp: 90 mL, Rfl: 0    Physical Exam:  Vital Signs:   Vitals:    10/13/20 1318   BP: 110/60   BP Location: Right arm   Patient Position: Sitting   Pulse: 108   Resp: 18   SpO2: 95%   Weight: 48.1 kg (106 lb)   Height: 147.3 cm (58\")       Physical Exam  Constitutional:       General: She is not in acute distress.     Appearance: She is well-developed. She is not diaphoretic.      Comments: Appears frail   HENT:      Head: Normocephalic and atraumatic.   Eyes:      General: No scleral icterus.     Pupils: Pupils are equal, round, and reactive to light.   Neck:      Musculoskeletal: Neck supple. No muscular tenderness.      Trachea: No tracheal deviation.   Cardiovascular:      Rate and Rhythm: Tachycardia present. Rhythm irregular.      Heart sounds: Normal heart sounds. No murmur. No friction rub. No gallop.       Comments: Normal JVD.  Pulmonary:      Effort: Pulmonary effort is normal. No respiratory distress.      Breath sounds: Normal breath sounds. No stridor. No wheezing or rales.   Chest:      Chest wall: No tenderness.   Abdominal:      General: Bowel sounds are normal. There is no distension.      Palpations: Abdomen is soft.      Tenderness: There is no abdominal tenderness. There is no guarding or rebound.   Musculoskeletal: Normal range of motion.         General: No " swelling.   Lymphadenopathy:      Cervical: No cervical adenopathy.   Skin:     General: Skin is warm and dry.      Findings: No erythema.   Neurological:      General: No focal deficit present.      Mental Status: She is alert and oriented to person, place, and time.   Psychiatric:         Mood and Affect: Mood normal.         Behavior: Behavior normal.         Results Review:   I reviewed the patient's new clinical results.  I personally viewed and interpreted the patient's EKG/Telemetry data      ECG 12 Lead    Date/Time: 10/13/2020 1:53 PM  Performed by: Yomi Higgins MD  Authorized by: Yoim Higgins MD   Comparison: not compared with previous ECG   Rhythm: atrial fibrillation  Rate: tachycardic  QRS axis: left  Other findings comments: Cannot rule out prior inferior or anterior MI    Clinical impression: abnormal EKG            Assessment / Plan:     1. Persistent atrial fibrillation with rapid ventricular rates  --Mildly rapid ventricular rates today  --On review of recent follow-up encounters, heart rate appears to overall be controlled  --Continue current dose of diltiazem for rate control  --Previously anticoagulated for CVA prophylaxis, however anticoagulation was discontinued due to prior GI bleeding and frequent falls  --Given recurrent CVA, again discussed the risk and benefits of therapeutic anticoagulation for CVA prophylaxis, and both the patient and her daughter indicate they do not want to restart therapeutic anticoagulation at this time and understand the risk of CVA  --Continue full-strength aspirin  --Follow-up in 6 months, or sooner if required     2.    CVA  --MRI imaging in 9/2020 with acute/subacute infarct  --Suspect embolic etiology, in the setting of A. fib     3.  Chronic diastolic heart failure   --Currently appears to be euvolemic     4. Multivessel coronary artery disease  --History of three-vessel CABG 2012 including LIMA-LAD, as SVG- PL and PDA  --Last coronary angiography  in 2017 with patent grafts at that time  --No current chest pain or anginal symptoms  --Continue high intensity statin and aspirin     5.    Chronic kidney disease  --Per PCP and nephrology     6.  Dyslipidemia  --Continue statin therapy       Follow Up:   Return in about 6 months (around 4/13/2021).      Thank you for allowing me to participate in the care of your patient. Please to not hesitate to contact me with additional questions or concerns.     LA NENA Higgins MD  Interventional Cardiology   10/13/2020  13:24 EDT

## 2020-12-10 NOTE — TELEPHONE ENCOUNTER
Sara with [facility] called to let you know that Ms Morel is refusing anymore treatments including Dialysis.  She is being Transitioned [facility].

## 2020-12-10 NOTE — TELEPHONE ENCOUNTER
Sara called back stating that [facility] can take this patient tomorrow, but they will need to speak with our office/provider first.

## 2020-12-14 NOTE — TELEPHONE ENCOUNTER
[facility] does not have the Roxinol and [facility] alfredot be able to get it for a few days.  [facility]osp[facility] has requesting this be sent to [facility].  They have verified it is in stock

## 2020-12-21 ENCOUNTER — TELEPHONE (OUTPATIENT)
Dept: FAMILY MEDICINE CLINIC | Facility: CLINIC | Age: 80
End: 2020-12-21

## 2020-12-21 NOTE — TELEPHONE ENCOUNTER
Veena with [facility] called to inform Dr. Sweet that patient passed away on 12/21/2020 at 4:11am.

## 2020-12-21 NOTE — TELEPHONE ENCOUNTER
Patients , Dell, called and stated that the patient passed away last night. He states that he will spend the day planning the  arrangements so if he were to receive a call from the office he may not be able to answer. Please advise.     Dell call back [phone]

## 2021-03-10 NOTE — PLAN OF CARE
Problem: Patient Care Overview  Goal: Plan of Care Review  Outcome: Ongoing (interventions implemented as appropriate)  Flowsheets (Taken 2/15/2020 2000)  Progress: no change  Outcome Summary: pleasant patient with no complaint of pain-medications given per hospitalist's orders-eid in place for retention-dialysis patient-Dr. Tsang-monitor labs and continue to monitor patient      EMS here for pt transport. Pt awake and reports she wants to sign voluntary admission paperwork. Pt refusing to go with EMS until she signs paperwork. Pt continually repeating herself and stating "I'm a voluntary admission." Pt explained admission process by provider and ANM and writer multiple times. Pt continued to refused to go to with EMS d/t lack of paperwork. Provider contacting telepsych for consult. Pt VSS. Pt remains in bed, 1:1 sitter in place.

## 2021-04-19 NOTE — PLAN OF CARE
Problem: Patient Care Overview  Goal: Plan of Care Review  Outcome: Ongoing (interventions implemented as appropriate)  Flowsheets (Taken 1/17/2020 7885)  Progress: no change  Plan of Care Reviewed With: patient     Problem: Patient Care Overview  Goal: Individualization and Mutuality  Outcome: Ongoing (interventions implemented as appropriate)     Problem: Patient Care Overview  Goal: Discharge Needs Assessment  Outcome: Ongoing (interventions implemented as appropriate)     Problem: Patient Care Overview  Goal: Interprofessional Rounds/Family Conf  Outcome: Ongoing (interventions implemented as appropriate)     Problem: Skin Injury Risk (Adult)  Goal: Identify Related Risk Factors and Signs and Symptoms  Outcome: Ongoing (interventions implemented as appropriate)     Problem: Skin Injury Risk (Adult)  Goal: Skin Health and Integrity  Outcome: Ongoing (interventions implemented as appropriate)     Problem: Fall Risk (Adult)  Goal: Identify Related Risk Factors and Signs and Symptoms  Outcome: Ongoing (interventions implemented as appropriate)     Problem: Fall Risk (Adult)  Goal: Absence of Fall  Outcome: Ongoing (interventions implemented as appropriate)  VSS. No c/o pain or discomfort. SOA with exertion. Resting well. Will continue to monitor.       Call back tomorrow with update

## 2022-04-23 NOTE — TELEPHONE ENCOUNTER
New or established patient: NEW  Date of discharge: 8-5-20  Facility discharged from: [facility]  Diagnosis/Symptoms: STROKE  Length of stay (If applicable): 6 DAYS  Best call back number:[phone]    PATIENT WAS SEEN BY  IN HOSPITAL WITH A RECOMMENDATION OF 1 MONTH WITH MADISON BARCLAY, I WAS ABLE TO FIND THE TIMEFRAME SO APPOINTMENT IS 9- AT 11 AM.    THANK YOU.     
clear

## 2023-05-16 NOTE — OUTREACH NOTE
CHF Week 1 Survey      Responses   Facility patient discharged from?  [facility]   Does the patient have one of the following disease processes/diagnoses(primary or secondary)?  CHF   Is there a successful TCM telephone encounter documented?  Yes          Luis Miguel Rasheed RN   Positive

## 2023-09-06 NOTE — TELEPHONE ENCOUNTER
Order has been placed for home health wound care.  
Order has been sent to [facility] for review and scheduling.  
PT DAUGHTER CALLED IN STATED HER MOM WAS RECENTLY DISCHARGED FROM THE NURSING HOME AND SHE HAS A BAD SKIN TEAR AND PRESSURE ULCER ON HER BOTTOM WHICH IS BLEEDING THE DAUGHTER IS REQUESTING AN ORDER FOR HOME HEALTH NURSING TO COME OUT TO PROVIDE WOUND CARE TO HER MOM PLEASE ADVISE        CB NUMBER: [phone]    
No

## (undated) DEVICE — TRAP,MUCUS SPECIMEN,40CC: Brand: MEDLINE

## (undated) DEVICE — SINGLE USE BIOPSY VALVE MAJ-210: Brand: SINGLE USE BIOPSY VALVE (STERILE)

## (undated) DEVICE — COL SPUTUM SYS 50ML

## (undated) DEVICE — SIDESTREAM™ HIGH-EFFICIENCY NEBULIZER: Brand: AIRLIFE™

## (undated) DEVICE — CVR PROB ULTRASND GLS STRL

## (undated) DEVICE — DECANT BG O JET

## (undated) DEVICE — GLV SURG SENSICARE W/ALOE PF LF 7.5 STRL

## (undated) DEVICE — RICH MAJOR PROCEDURE: Brand: MEDLINE INDUSTRIES, INC.

## (undated) DEVICE — SOL IRR NACL 0.9PCT BT 1000ML

## (undated) DEVICE — CONTN GRAD MEAS TRIANG 32OZ BLK

## (undated) DEVICE — SYR LUERLOK 5CC

## (undated) DEVICE — NDL HYPO ECLPS SFTY 18G 1 1/2IN

## (undated) DEVICE — THE BITE BLOCK MAXI, LATEX FREE STRAP IS USED TO PROTECT THE ENDOSCOPE INSERTION TUBE FROM BEING BITTEN BY THE PATIENT.

## (undated) DEVICE — INTRO ACCSR BLNT TP

## (undated) DEVICE — BOWL UTIL STRL 32OZ

## (undated) DEVICE — SUT ETHLN 3/0 FS1 663G

## (undated) DEVICE — CLAVICLE STRAP: Brand: DEROYAL

## (undated) DEVICE — SYR LL TP 10ML STRL

## (undated) DEVICE — SINGLE USE SUCTION VALVE MAJ-209: Brand: SINGLE USE SUCTION VALVE (STERILE)

## (undated) DEVICE — MSK ENDO PORT O2 POM ELITE CURAPLEX A/

## (undated) DEVICE — KT CATH HEMO NEXTSTEP 15F 19CM GRN

## (undated) DEVICE — SYR LUERLOK 50ML

## (undated) DEVICE — DRSNG SURESITE123 6X8IN

## (undated) DEVICE — KT PERC INTRO PEELPRO PTFE STDTRWY 16F 14

## (undated) DEVICE — SYR LUERLOK 30CC

## (undated) DEVICE — SINGLE-USE POLYPECTOMY SNARE: Brand: SENSATION SHORT THROW

## (undated) DEVICE — TUBING, SUCTION, 1/4" X 10', STRAIGHT: Brand: MEDLINE

## (undated) DEVICE — CANNULA,ADULT,SOFT-TOUCH,7'TUBE,UC: Brand: PENDING

## (undated) DEVICE — NDL HYPO ECLPS SFTY 25G 1 1/2IN

## (undated) DEVICE — SYR SLPTP 20CC

## (undated) DEVICE — SPNG CVR STR 2S 4X4

## (undated) DEVICE — SYR SLP TP 10ML DISP

## (undated) DEVICE — SPEC CONT WIDE MOUTH 3OZ 400/CS 20 CS/SK: Brand: MEDEGEN MEDICAL PRODUCTS, LLC

## (undated) DEVICE — KT BIOGUARD SXN VLV AIR/H20 4PC DISP

## (undated) DEVICE — SOL IRR H2O BTL 1000ML STRL

## (undated) DEVICE — SYR LUER/TIP MONOJECT RGD/PK 60CC STRL

## (undated) DEVICE — SOL NACL 0.9PCT 100ML SGL